# Patient Record
Sex: MALE | Race: BLACK OR AFRICAN AMERICAN | NOT HISPANIC OR LATINO | Employment: UNEMPLOYED | URBAN - METROPOLITAN AREA
[De-identification: names, ages, dates, MRNs, and addresses within clinical notes are randomized per-mention and may not be internally consistent; named-entity substitution may affect disease eponyms.]

---

## 2016-03-01 LAB — CRC RECOMMENDATION EXT: NORMAL

## 2017-03-03 ENCOUNTER — OFFICE VISIT (OUTPATIENT)
Dept: FAMILY MEDICINE | Facility: CLINIC | Age: 62
End: 2017-03-03
Payer: MEDICARE

## 2017-03-03 VITALS
WEIGHT: 187.38 LBS | DIASTOLIC BLOOD PRESSURE: 88 MMHG | OXYGEN SATURATION: 99 % | SYSTOLIC BLOOD PRESSURE: 146 MMHG | HEART RATE: 75 BPM | TEMPERATURE: 98 F | BODY MASS INDEX: 26.82 KG/M2 | HEIGHT: 70 IN

## 2017-03-03 DIAGNOSIS — E11.59 HYPERTENSION ASSOCIATED WITH DIABETES: ICD-10-CM

## 2017-03-03 DIAGNOSIS — I15.2 HYPERTENSION ASSOCIATED WITH DIABETES: ICD-10-CM

## 2017-03-03 DIAGNOSIS — E11.8 TYPE 2 DIABETES MELLITUS WITH COMPLICATION, WITHOUT LONG-TERM CURRENT USE OF INSULIN: Primary | ICD-10-CM

## 2017-03-03 DIAGNOSIS — I69.359 CVA, OLD, HEMIPARESIS: ICD-10-CM

## 2017-03-03 DIAGNOSIS — J30.1 SEASONAL ALLERGIC RHINITIS DUE TO POLLEN: ICD-10-CM

## 2017-03-03 DIAGNOSIS — F17.200 TOBACCO DEPENDENCE: ICD-10-CM

## 2017-03-03 PROCEDURE — 2022F DILAT RTA XM EVC RTNOPTHY: CPT | Mod: S$GLB,,, | Performed by: FAMILY MEDICINE

## 2017-03-03 PROCEDURE — 3079F DIAST BP 80-89 MM HG: CPT | Mod: S$GLB,,, | Performed by: FAMILY MEDICINE

## 2017-03-03 PROCEDURE — 99499 UNLISTED E&M SERVICE: CPT | Mod: S$GLB,,, | Performed by: FAMILY MEDICINE

## 2017-03-03 PROCEDURE — 99214 OFFICE O/P EST MOD 30 MIN: CPT | Mod: S$GLB,,, | Performed by: FAMILY MEDICINE

## 2017-03-03 PROCEDURE — 3044F HG A1C LEVEL LT 7.0%: CPT | Mod: S$GLB,,, | Performed by: FAMILY MEDICINE

## 2017-03-03 PROCEDURE — 4010F ACE/ARB THERAPY RXD/TAKEN: CPT | Mod: S$GLB,,, | Performed by: FAMILY MEDICINE

## 2017-03-03 PROCEDURE — 3077F SYST BP >= 140 MM HG: CPT | Mod: S$GLB,,, | Performed by: FAMILY MEDICINE

## 2017-03-03 PROCEDURE — 1160F RVW MEDS BY RX/DR IN RCRD: CPT | Mod: S$GLB,,, | Performed by: FAMILY MEDICINE

## 2017-03-03 RX ORDER — METFORMIN HYDROCHLORIDE 1000 MG/1
1000 TABLET ORAL 2 TIMES DAILY WITH MEALS
Qty: 180 TABLET | Refills: 3 | Status: SHIPPED | OUTPATIENT
Start: 2017-03-03 | End: 2017-07-11 | Stop reason: SDUPTHER

## 2017-03-03 RX ORDER — METOPROLOL TARTRATE 25 MG/1
25 TABLET, FILM COATED ORAL 2 TIMES DAILY
Qty: 180 TABLET | Refills: 3 | Status: SHIPPED | OUTPATIENT
Start: 2017-03-03 | End: 2017-07-11 | Stop reason: SDUPTHER

## 2017-03-03 RX ORDER — LACTULOSE 10 G/15ML
SOLUTION ORAL; RECTAL
Qty: 1800 ML | Refills: 3 | Status: SHIPPED | OUTPATIENT
Start: 2017-03-03 | End: 2017-08-25 | Stop reason: SDUPTHER

## 2017-03-03 RX ORDER — VALSARTAN 320 MG/1
320 TABLET ORAL DAILY
Qty: 90 TABLET | Refills: 3 | Status: SHIPPED | OUTPATIENT
Start: 2017-03-03 | End: 2017-07-11

## 2017-03-03 RX ORDER — GLIMEPIRIDE 2 MG/1
2 TABLET ORAL
Qty: 90 TABLET | Refills: 3 | Status: SHIPPED | OUTPATIENT
Start: 2017-03-03 | End: 2017-07-11 | Stop reason: SDUPTHER

## 2017-03-03 RX ORDER — ATORVASTATIN CALCIUM 80 MG/1
80 TABLET, FILM COATED ORAL DAILY
Qty: 90 TABLET | Refills: 3 | Status: SHIPPED | OUTPATIENT
Start: 2017-03-03 | End: 2017-07-11 | Stop reason: SDUPTHER

## 2017-03-03 RX ORDER — CLOPIDOGREL BISULFATE 75 MG/1
75 TABLET ORAL DAILY
Qty: 90 TABLET | Refills: 3 | Status: SHIPPED | OUTPATIENT
Start: 2017-03-03 | End: 2017-07-11 | Stop reason: SDUPTHER

## 2017-03-03 RX ORDER — AMLODIPINE BESYLATE 10 MG/1
10 TABLET ORAL DAILY
Qty: 90 TABLET | Refills: 3 | Status: SHIPPED | OUTPATIENT
Start: 2017-03-03 | End: 2017-07-11 | Stop reason: SDUPTHER

## 2017-03-03 NOTE — MR AVS SNAPSHOT
[facility]  [address] [place] [address]  Darlyn SCHULER [address]  Phone: [phone]  Fax: [phone]                  Cristi Pulido   3/3/2017 10:20 AM   Office Visit    Description:  Male : 1955   Provider:  Rell Winn MD   Department:  [facility]           Reason for Visit     Follow-up           Diagnoses this Visit        Comments    Type 2 diabetes mellitus with complication, without long-term current use of insulin    -  Primary     Seasonal allergic rhinitis due to pollen         CVA, old, hemiparesis         Tobacco dependence         Hypertension associated with diabetes                To Do List           Goals (5 Years of Data)     None       These Medications        Disp Refills Start End    amlodipine (NORVASC) 10 MG tablet 90 tablet 3 3/3/2017     Take 1 tablet (10 mg total) by mouth once daily. - Oral    Pharmacy: [facility] - [address] Ph #: [phone]       atorvastatin (LIPITOR) 80 MG tablet 90 tablet 3 3/3/2017     Take 1 tablet (80 mg total) by mouth once daily. - Oral    Pharmacy: [facility] - [address] Ph #: [phone]       clopidogrel (PLAVIX) 75 mg tablet 90 tablet 3 3/3/2017     Take 1 tablet (75 mg total) by mouth once daily. - Oral    Pharmacy: [facility] - [address] Ph #: [phone]       glimepiride (AMARYL) 2 MG tablet 90 tablet 3 3/3/2017     Take 1 tablet (2 mg total) by mouth daily with breakfast. - Oral    Pharmacy: [facility] - [place] [id number] Ph #: [phone]       lactulose (CHRONULAC) 10 gram/15 mL solution 1800 mL 3 3/3/2017     TAKE  15ML  1-3  TIMES  A  DAY    Pharmacy: [facility] Delivery [address] Ph #: [phone]       metformin (GLUCOPHAGE) 1000 MG tablet 180 tablet 3 3/3/2017     Take 1 tablet (1,000 mg  total) by mouth 2 (two) times daily with meals. - Oral    Pharmacy: [facility] - [facility] [address] [facility] Ph #: [phone]       metoprolol tartrate (LOPRESSOR) 25 MG tablet 180 tablet 3 3/3/2017 3/3/2018    Take 1 tablet (25 mg total) by mouth 2 (two) times daily. - Oral    Pharmacy: [facility] - [facility] [address] [facility] Ph #: [phone]       valsartan (DIOVAN) 320 MG tablet 90 tablet 3 3/3/2017 3/3/2018    Take 1 tablet (320 mg total) by mouth once daily. - Oral    Pharmacy: [facility] - [facility] [address] [facility] Ph #: [phone]         [facility]     [facility] On Call Nurse Care Line - 24/7 Assistance  Registered nurses in the [facility] On Call Center provide clinical advisement, health education, appointment booking, and other advisory services.  Call for this free service at [phone].             Medications           Message regarding Medications     Verify the changes and/or additions to your medication regime listed below are the same as discussed with your clinician today.  If any of these changes or additions are incorrect, please notify your healthcare provider.        STOP taking these medications     cetirizine (ZYRTEC) 10 mg Cap One po daily           Verify that the below list of medications is an accurate representation of the medications you are currently taking.  If none reported, the list may be blank. If incorrect, please contact your healthcare provider. Carry this list with you in case of emergency.           Current Medications     ACCU-CHEK FASTCLIX Misc TEST TWICE DAILY    amlodipine (NORVASC) 10 MG tablet Take 1 tablet (10 mg total) by mouth once daily.    atorvastatin (LIPITOR) 80 MG tablet Take 1 tablet (80 mg total) by mouth once daily.    blood sugar diagnostic (ACCU-CHEK SMARTVIEW TEST STRIP) Strp TEST TWICE DAILY    clopidogrel (PLAVIX) 75 mg tablet Take 1 tablet (75 mg total) by  "mouth once daily.    fluticasone (FLONASE) 50 mcg/actuation nasal spray 1 spray by Each Nare route once daily.    gabapentin (NEURONTIN) 600 MG tablet Take 1 tablet (600 mg total) by mouth 3 (three) times daily.    glimepiride (AMARYL) 2 MG tablet Take 1 tablet (2 mg total) by mouth daily with breakfast.    lactulose (CHRONULAC) 10 gram/15 mL solution TAKE  15ML  1-3  TIMES  A  DAY    metformin (GLUCOPHAGE) 1000 MG tablet Take 1 tablet (1,000 mg total) by mouth 2 (two) times daily with meals.    metoprolol tartrate (LOPRESSOR) 25 MG tablet Take 1 tablet (25 mg total) by mouth 2 (two) times daily.    polyethylene glycol (GLYCOLAX) 17 gram/dose powder Take 17 g by mouth once daily.    valsartan (DIOVAN) 320 MG tablet Take 1 tablet (320 mg total) by mouth once daily.           Clinical Reference Information           Your Vitals Were     BP Pulse Temp Height Weight SpO2    146/88 75 97.7 °F (36.5 °C) (Oral) 5' 10" (1.778 m) 85 kg (187 lb 6.3 oz) 99%    BMI                26.89 kg/m2          Blood Pressure          Most Recent Value    BP  (!)  146/88      Allergies as of 3/3/2017     No Known Allergies      Immunizations Administered on Date of Encounter - 3/3/2017     None      Orders Placed During Today's Visit     Future Labs/Procedures Expected by Expires    CBC auto differential  As directed 3/3/2018    Comprehensive metabolic panel  As directed 3/3/2018    Hemoglobin A1c  As directed 3/3/2018    Lipid panel  As directed 3/3/2018    TSH  As directed 3/3/2018      [facility] Sign-Up     Activating your [facility] account is as easy as 1-2-3!     1) Visit [email or web address], select Sign Up Now, enter this activation code and your date of birth, then select Next.  [email or web address]  Expires: 4/17/2017 11:06 AM      2) Create a username and password to use when you visit [facility] in the future and select a security question in case you lose your password and select Next.    3) Enter your e-mail address and click Sign " Up!    Additional Information  If you have questions, please e-mail [email or web address] or call [phone] to talk to our [facility] staff. Remember, [facility] is NOT to be used for urgent needs. For medical emergencies, dial 911.         Smoking Cessation     If you would like to quit smoking:   You may be eligible for free services if you are a [place] resident and started smoking cigarettes before September 1, 1988.  Call the Smoking Cessation [employer] ([facility]) toll free at [phone] or [phone].   Call 6-497-QUIT-NOW if you do not meet the above criteria.            Language Assistance Services     ATTENTION: Language assistance services are available, free of charge. Please call [phone].      ATENCIÓN: Si charli anneekaterina, tiene a enrique disposición servicios gratuitos de asistencia lingüística. Llame al [phone].     CHÚ Ý: N?u b?n nói Ti?ng Vi?t, có các d?ch v? h? tr? ngôn ng? mi?n phí dành cho b?n. G?i s? [phone].         [facility] complies with applicable Federal civil rights laws and does not discriminate on the basis of race, color, national origin, age, disability, or sex.

## 2017-03-03 NOTE — PROGRESS NOTES
Patient ID: Cristi Pulido is a 62 y.o. male.    Chief Complaint: Follow-up (check-up)    HPI       Cristi Pulido is a 62 y.o. male doing well at this time with no co  Dm stable  Htn not taking Diovan ? Why taking amlodipine and metoprolol  Constipation much better with lactulose prn  Neuropathy stable on gabapentin  CVA with muscle deficit cont with no extension. Cont to take Plavix and statin will place on arb       Review of Symptoms    Constitutional  Neg activity change, No chills/ fever   Resp  Neg hemoptysis, stridor, choking  CVS  Neg chest pain, palpitations    Physical Exam    Constitutional:   Oriented to person, place, and time.appears well-developed and well-nourished.   No distress.     HENT:   Head: Normocephalic and atraumatic.     Right Ear: Tympanic membrane, external ear and ear canal normal     Left Ear: Tympanic membrane, external ear and ear canal normal    Nose: External Normal. Normal turbinates, No rhinorrhea     Mouth/Throat: Uvula is midline, oropharynx is clear and moist and mucous membranes are normal.     Neck: supple no anterior cervical adenopathy    Carotid artery:  Bruit    NEG [x]   POS[]    Eyes:   Conjunctivae are normal. Right eye exhibits no discharge. Left eye exhibits no discharge. No scleral icterus. No periorbital edema       Cardiovascular:  Regular rate, Regular rhythm     No extrasystole  Normal S1-S2    Pulmonary/Chest:   Normal effort and breath sounds.  No wheezing, rhonchi or rales.      Musculoskeletal:  No edema.  No worsening of neurological deficits  Walks with walker-can walk without walker however at risk of falling       Neurological:   Alert and oriented to person, place, and time. Coordination fair      Skin:   Skin is warm and dry.   No diaphoresis.   No rash noted.    Psychiatric: Normal mood and affect. Behavior is normal. Judgment and thought content normal.       Assessment / Plan:      ICD-10-CM ICD-9-CM   1. Type 2 diabetes mellitus with complication,  without long-term current use of insulin E11.8 250.90   2. Seasonal allergic rhinitis due to pollen J30.1 477.0   3. CVA, old, hemiparesis I69.359 438.20   4. Tobacco dependence F17.200 305.1   5. Hypertension associated with diabetes E11.59 250.80    I10 401.9     Type 2 diabetes mellitus with complication, without long-term current use of insulin  -     Comprehensive metabolic panel; Future  -     CBC auto differential; Future  -     Lipid panel; Future  -     TSH; Future  -     Hemoglobin A1c; Future    Seasonal allergic rhinitis due to pollen  -     Comprehensive metabolic panel; Future  -     CBC auto differential; Future  -     Lipid panel; Future  -     TSH; Future  -     Hemoglobin A1c; Future    CVA, old, hemiparesis  -     Comprehensive metabolic panel; Future  -     CBC auto differential; Future  -     Lipid panel; Future  -     TSH; Future  -     Hemoglobin A1c; Future    Tobacco dependence  -     Comprehensive metabolic panel; Future  -     CBC auto differential; Future  -     Lipid panel; Future  -     TSH; Future  -     Hemoglobin A1c; Future    Hypertension associated with diabetes  -     Comprehensive metabolic panel; Future  -     CBC auto differential; Future  -     Lipid panel; Future  -     TSH; Future  -     Hemoglobin A1c; Future    Other orders  -     amlodipine (NORVASC) 10 MG tablet; Take 1 tablet (10 mg total) by mouth once daily.  Dispense: 90 tablet; Refill: 3  -     atorvastatin (LIPITOR) 80 MG tablet; Take 1 tablet (80 mg total) by mouth once daily.  Dispense: 90 tablet; Refill: 3  -     clopidogrel (PLAVIX) 75 mg tablet; Take 1 tablet (75 mg total) by mouth once daily.  Dispense: 90 tablet; Refill: 3  -     glimepiride (AMARYL) 2 MG tablet; Take 1 tablet (2 mg total) by mouth daily with breakfast.  Dispense: 90 tablet; Refill: 3  -     lactulose (CHRONULAC) 10 gram/15 mL solution; TAKE  15ML  1-3  TIMES  A  DAY  Dispense: 1800 mL; Refill: 3  -     metformin (GLUCOPHAGE) 1000 MG  tablet; Take 1 tablet (1,000 mg total) by mouth 2 (two) times daily with meals.  Dispense: 180 tablet; Refill: 3  -     metoprolol tartrate (LOPRESSOR) 25 MG tablet; Take 1 tablet (25 mg total) by mouth 2 (two) times daily.  Dispense: 180 tablet; Refill: 3  -     valsartan (DIOVAN) 320 MG tablet; Take 1 tablet (320 mg total) by mouth once daily.  Dispense: 90 tablet; Refill: 3      Not taking Diovan I am not sure why  Blood pressure elevated-diabetic restart Diovan

## 2017-03-04 LAB
ALBUMIN SERPL-MCNC: 4.2 G/DL (ref 3.6–5.1)
ALBUMIN/GLOB SERPL: 1.4 (CALC) (ref 1–2.5)
ALP SERPL-CCNC: 104 U/L (ref 40–115)
ALT SERPL-CCNC: 7 U/L (ref 9–46)
AST SERPL-CCNC: 9 U/L (ref 10–35)
BASOPHILS # BLD AUTO: 47 CELLS/UL (ref 0–200)
BASOPHILS NFR BLD AUTO: 0.6 %
BILIRUB SERPL-MCNC: 0.4 MG/DL (ref 0.2–1.2)
BUN SERPL-MCNC: 16 MG/DL (ref 7–25)
BUN/CREAT SERPL: ABNORMAL (CALC) (ref 6–22)
CALCIUM SERPL-MCNC: 9.4 MG/DL (ref 8.6–10.3)
CHLORIDE SERPL-SCNC: 105 MMOL/L (ref 98–110)
CHOLEST SERPL-MCNC: 166 MG/DL (ref 125–200)
CHOLEST/HDLC SERPL: 3.3 (CALC)
CO2 SERPL-SCNC: 27 MMOL/L (ref 20–31)
CREAT SERPL-MCNC: 1.09 MG/DL (ref 0.7–1.25)
EOSINOPHIL # BLD AUTO: 166 CELLS/UL (ref 15–500)
EOSINOPHIL NFR BLD AUTO: 2.1 %
ERYTHROCYTE [DISTWIDTH] IN BLOOD BY AUTOMATED COUNT: 15.2 % (ref 11–15)
GFR SERPL CREATININE-BSD FRML MDRD: 71 ML/MIN/1.73M2
GLOBULIN SER CALC-MCNC: 3 G/DL (CALC) (ref 1.9–3.7)
GLUCOSE SERPL-MCNC: 104 MG/DL (ref 65–99)
HBA1C MFR BLD: 6.9 % OF TOTAL HGB
HCT VFR BLD AUTO: 39.9 % (ref 38.5–50)
HDLC SERPL-MCNC: 50 MG/DL
HGB BLD-MCNC: 12.9 G/DL (ref 13.2–17.1)
LDLC SERPL CALC-MCNC: 104 MG/DL (CALC)
LYMPHOCYTES # BLD AUTO: 3516 CELLS/UL (ref 850–3900)
LYMPHOCYTES NFR BLD AUTO: 44.5 %
MCH RBC QN AUTO: 28.2 PG (ref 27–33)
MCHC RBC AUTO-ENTMCNC: 32.3 G/DL (ref 32–36)
MCV RBC AUTO: 87.3 FL (ref 80–100)
MONOCYTES # BLD AUTO: 435 CELLS/UL (ref 200–950)
MONOCYTES NFR BLD AUTO: 5.5 %
NEUTROPHILS # BLD AUTO: 3737 CELLS/UL (ref 1500–7800)
NEUTROPHILS NFR BLD AUTO: 47.3 %
NONHDLC SERPL-MCNC: 116 MG/DL (CALC)
PLATELET # BLD AUTO: 202 THOUSAND/UL (ref 140–400)
PMV BLD REES-ECKER: 11.3 FL (ref 7.5–12.5)
POTASSIUM SERPL-SCNC: 5.1 MMOL/L (ref 3.5–5.3)
PROT SERPL-MCNC: 7.2 G/DL (ref 6.1–8.1)
RBC # BLD AUTO: 4.57 MILLION/UL (ref 4.2–5.8)
SODIUM SERPL-SCNC: 144 MMOL/L (ref 135–146)
TRIGL SERPL-MCNC: 58 MG/DL
TSH SERPL-ACNC: 0.47 MIU/L (ref 0.4–4.5)
WBC # BLD AUTO: 7.9 THOUSAND/UL (ref 3.8–10.8)

## 2017-07-11 ENCOUNTER — OFFICE VISIT (OUTPATIENT)
Dept: FAMILY MEDICINE | Facility: CLINIC | Age: 62
End: 2017-07-11
Payer: MEDICARE

## 2017-07-11 VITALS
OXYGEN SATURATION: 98 % | SYSTOLIC BLOOD PRESSURE: 138 MMHG | WEIGHT: 186.63 LBS | HEIGHT: 69 IN | TEMPERATURE: 99 F | BODY MASS INDEX: 27.64 KG/M2 | DIASTOLIC BLOOD PRESSURE: 86 MMHG | HEART RATE: 78 BPM

## 2017-07-11 DIAGNOSIS — I69.359 CVA, OLD, HEMIPARESIS: ICD-10-CM

## 2017-07-11 DIAGNOSIS — E78.5 HYPERLIPIDEMIA, UNSPECIFIED HYPERLIPIDEMIA TYPE: ICD-10-CM

## 2017-07-11 DIAGNOSIS — E11.59 HYPERTENSION ASSOCIATED WITH DIABETES: Primary | ICD-10-CM

## 2017-07-11 DIAGNOSIS — E11.9 TYPE 2 DIABETES MELLITUS WITHOUT COMPLICATION, WITHOUT LONG-TERM CURRENT USE OF INSULIN: ICD-10-CM

## 2017-07-11 DIAGNOSIS — I15.2 HYPERTENSION ASSOCIATED WITH DIABETES: Primary | ICD-10-CM

## 2017-07-11 DIAGNOSIS — J30.1 CHRONIC SEASONAL ALLERGIC RHINITIS DUE TO POLLEN: ICD-10-CM

## 2017-07-11 DIAGNOSIS — Z23 NEED FOR PNEUMOCOCCAL VACCINATION: ICD-10-CM

## 2017-07-11 DIAGNOSIS — F17.200 TOBACCO DEPENDENCE: ICD-10-CM

## 2017-07-11 PROCEDURE — 99214 OFFICE O/P EST MOD 30 MIN: CPT | Mod: S$GLB,,, | Performed by: FAMILY MEDICINE

## 2017-07-11 PROCEDURE — 99499 UNLISTED E&M SERVICE: CPT | Mod: S$GLB,,, | Performed by: FAMILY MEDICINE

## 2017-07-11 PROCEDURE — 4010F ACE/ARB THERAPY RXD/TAKEN: CPT | Mod: S$GLB,,, | Performed by: FAMILY MEDICINE

## 2017-07-11 PROCEDURE — 3044F HG A1C LEVEL LT 7.0%: CPT | Mod: S$GLB,,, | Performed by: FAMILY MEDICINE

## 2017-07-11 RX ORDER — ATORVASTATIN CALCIUM 80 MG/1
80 TABLET, FILM COATED ORAL DAILY
Qty: 90 TABLET | Refills: 3 | Status: SHIPPED | OUTPATIENT
Start: 2017-07-11 | End: 2017-08-25 | Stop reason: SDUPTHER

## 2017-07-11 RX ORDER — CLOPIDOGREL BISULFATE 75 MG/1
75 TABLET ORAL DAILY
Qty: 90 TABLET | Refills: 3 | Status: SHIPPED | OUTPATIENT
Start: 2017-07-11 | End: 2017-08-25 | Stop reason: SDUPTHER

## 2017-07-11 RX ORDER — METFORMIN HYDROCHLORIDE 1000 MG/1
1000 TABLET ORAL 2 TIMES DAILY WITH MEALS
Qty: 180 TABLET | Refills: 3 | Status: SHIPPED | OUTPATIENT
Start: 2017-07-11 | End: 2017-08-25 | Stop reason: SDUPTHER

## 2017-07-11 RX ORDER — METOPROLOL TARTRATE 25 MG/1
25 TABLET, FILM COATED ORAL 2 TIMES DAILY
Qty: 180 TABLET | Refills: 3 | Status: SHIPPED | OUTPATIENT
Start: 2017-07-11 | End: 2017-08-25 | Stop reason: SDUPTHER

## 2017-07-11 RX ORDER — LEVOCETIRIZINE DIHYDROCHLORIDE 5 MG/1
5 TABLET, FILM COATED ORAL NIGHTLY
Qty: 30 TABLET | Refills: 1 | Status: SHIPPED | OUTPATIENT
Start: 2017-07-11 | End: 2018-03-12

## 2017-07-11 RX ORDER — LANCETS
EACH MISCELLANEOUS
Qty: 204 EACH | Refills: 3 | Status: SHIPPED | OUTPATIENT
Start: 2017-07-11 | End: 2018-06-07 | Stop reason: SDUPTHER

## 2017-07-11 RX ORDER — LISINOPRIL 20 MG/1
40 TABLET ORAL DAILY
Qty: 180 TABLET | Refills: 3 | Status: SHIPPED | OUTPATIENT
Start: 2017-07-11 | End: 2017-08-25 | Stop reason: SDUPTHER

## 2017-07-11 RX ORDER — POLYETHYLENE GLYCOL 3350 17 G/17G
17 POWDER, FOR SOLUTION ORAL DAILY
Qty: 510 BOTTLE | Refills: 11 | Status: SHIPPED | OUTPATIENT
Start: 2017-07-11 | End: 2017-08-25 | Stop reason: SDUPTHER

## 2017-07-11 RX ORDER — GLIMEPIRIDE 2 MG/1
2 TABLET ORAL
Qty: 90 TABLET | Refills: 3 | Status: SHIPPED | OUTPATIENT
Start: 2017-07-11 | End: 2017-08-25 | Stop reason: SDUPTHER

## 2017-07-11 RX ORDER — GABAPENTIN 600 MG/1
600 TABLET ORAL 3 TIMES DAILY
Qty: 270 TABLET | Refills: 3 | Status: SHIPPED | OUTPATIENT
Start: 2017-07-11 | End: 2017-08-25 | Stop reason: SDUPTHER

## 2017-07-11 RX ORDER — AMLODIPINE BESYLATE 10 MG/1
10 TABLET ORAL DAILY
Qty: 90 TABLET | Refills: 3 | Status: SHIPPED | OUTPATIENT
Start: 2017-07-11 | End: 2017-08-25 | Stop reason: SDUPTHER

## 2017-07-11 NOTE — PROGRESS NOTES
Patient ID: Cristi Pulido is a 62 y.o. male.    Chief Complaint: Medication Refill    HPI      Cristi Pulido is a 62 y.o. male here for follow-up visit and medicine refill.  Patient with hypertension-controlled at this time.  Stated that he was taking brothers lisinopril and work better than valsartan and would like to change.  CVA with right-sided weakness-no changes at this time.    Takes Plavix  Patient with ALLERGIC rhinitis-uses Flonase.  Symptoms not fully controlled wants additional medicines  Patient with lipidemia takes Lipitor 80 mg daily  Diabetes-controlled with diet gabapentin        Review of Symptoms    Constitutional  Neg activity change, No chills /fever   Resp  Neg hemoptysis, stridor, choking  CVS  Neg chest pain, palpitations    Physical Exam    Constitutional:  Oriented to person, place, and time.appears well-developed and well-nourished.  No distress.      HENT  Head: Normocephalic and atraumatic  Right Ear: External ear normal.   Left Ear: External ear normal.   Nose: External nose normal.   Mouth:  Moist mucus membranes.    Eyes:  Conjunctivae are normal. Right eye exhibits no discharge.  Left eye exhibits no discharge. No scleral icterus.  No periorbital edema    Cardiovascular:  Regular rate, Regular rhythm     No extrasystole  Normal S1-S2      Pulmonary/Chest:   Normal effort and breath sounds.  No wheezing, rhonchi or rales.    Musculoskeletal:  No edema. No obvious deformity No waisting  Right sided weakness no change.     Neurological:  Alert and oriented to person, place, and time.   Coordination fair-uses walker    Skin:   Skin is warm and dry.  No diaphoresis.   No rash noted.     Psychiatric: Normal mood and affect. Behavior is normal.  Judgment and thought content normal.     Carotid neg for bruit    Assessment / Plan:      ICD-10-CM ICD-9-CM   1. Hypertension associated with diabetes E11.59 250.80    I10 401.9   2. Type 2 diabetes mellitus without complication, without long-term  current use of insulin E11.9 250.00   3. Hyperlipidemia, unspecified hyperlipidemia type E78.5 272.4   4. CVA, old, hemiparesis I69.359 438.20   5. Tobacco dependence F17.200 305.1   6. Chronic seasonal allergic rhinitis due to pollen J30.1 477.0   7. Need for pneumococcal vaccination Z23 V03.82     Hypertension associated with diabetes  -     Comprehensive metabolic panel; Future  -     CBC auto differential; Future  -     Lipid panel; Future  -     TSH; Future  -     Hemoglobin A1c; Future    Type 2 diabetes mellitus without complication, without long-term current use of insulin  -     Comprehensive metabolic panel; Future  -     CBC auto differential; Future  -     Lipid panel; Future  -     TSH; Future  -     Hemoglobin A1c; Future    Hyperlipidemia, unspecified hyperlipidemia type  -     Comprehensive metabolic panel; Future  -     CBC auto differential; Future  -     Lipid panel; Future  -     TSH; Future  -     Hemoglobin A1c; Future    CVA, old, hemiparesis  -     Comprehensive metabolic panel; Future  -     CBC auto differential; Future  -     Lipid panel; Future  -     TSH; Future  -     Hemoglobin A1c; Future    Tobacco dependence  -     Comprehensive metabolic panel; Future  -     CBC auto differential; Future  -     Lipid panel; Future  -     TSH; Future  -     Hemoglobin A1c; Future    Chronic seasonal allergic rhinitis due to pollen  -     Comprehensive metabolic panel; Future  -     CBC auto differential; Future  -     Lipid panel; Future  -     TSH; Future  -     Hemoglobin A1c; Future    Need for pneumococcal vaccination  -     Cancel: Pneumococcal Polysaccharide Vaccine (23 Valent) (SQ/IM)  -     Comprehensive metabolic panel; Future  -     CBC auto differential; Future  -     Lipid panel; Future  -     TSH; Future  -     Hemoglobin A1c; Future    Other orders  -     lisinopril (PRINIVIL,ZESTRIL) 20 MG tablet; Take 2 tablets (40 mg total) by mouth once daily.  Dispense: 180 tablet; Refill: 3  -      polyethylene glycol (GLYCOLAX) 17 gram/dose powder; Take 17 g by mouth once daily.  Dispense: 510 Bottle; Refill: 11  -     metoprolol tartrate (LOPRESSOR) 25 MG tablet; Take 1 tablet (25 mg total) by mouth 2 (two) times daily.  Dispense: 180 tablet; Refill: 3  -     metformin (GLUCOPHAGE) 1000 MG tablet; Take 1 tablet (1,000 mg total) by mouth 2 (two) times daily with meals.  Dispense: 180 tablet; Refill: 3  -     glimepiride (AMARYL) 2 MG tablet; Take 1 tablet (2 mg total) by mouth daily with breakfast.  Dispense: 90 tablet; Refill: 3  -     gabapentin (NEURONTIN) 600 MG tablet; Take 1 tablet (600 mg total) by mouth 3 (three) times daily.  Dispense: 270 tablet; Refill: 3  -     clopidogrel (PLAVIX) 75 mg tablet; Take 1 tablet (75 mg total) by mouth once daily.  Dispense: 90 tablet; Refill: 3  -     atorvastatin (LIPITOR) 80 MG tablet; Take 1 tablet (80 mg total) by mouth once daily.  Dispense: 90 tablet; Refill: 3  -     amlodipine (NORVASC) 10 MG tablet; Take 1 tablet (10 mg total) by mouth once daily.  Dispense: 90 tablet; Refill: 3  -     lancets (ACCU-CHEK FASTCLIX) Misc; TEST TWICE DAILY  Dispense: 204 each; Refill: 3  -     blood sugar diagnostic (ACCU-CHEK SMARTVIEW TEST STRIP) Strp; TEST TWICE DAILY  Dispense: 200 strip; Refill: 3  -     levocetirizine (XYZAL) 5 MG tablet; Take 1 tablet (5 mg total) by mouth every evening. For allergies  Dispense: 30 tablet; Refill: 1    wants to change to lisinopril and stop valsartan

## 2017-08-25 ENCOUNTER — TELEPHONE (OUTPATIENT)
Dept: FAMILY MEDICINE | Facility: CLINIC | Age: 62
End: 2017-08-25

## 2017-08-25 RX ORDER — GABAPENTIN 600 MG/1
600 TABLET ORAL 3 TIMES DAILY
Qty: 270 TABLET | Refills: 3 | Status: SHIPPED | OUTPATIENT
Start: 2017-08-25 | End: 2018-01-29 | Stop reason: SDUPTHER

## 2017-08-25 RX ORDER — AMLODIPINE BESYLATE 10 MG/1
10 TABLET ORAL DAILY
Qty: 90 TABLET | Refills: 3 | Status: SHIPPED | OUTPATIENT
Start: 2017-08-25 | End: 2018-01-29 | Stop reason: SDUPTHER

## 2017-08-25 RX ORDER — GLIMEPIRIDE 2 MG/1
2 TABLET ORAL
Qty: 90 TABLET | Refills: 3 | Status: SHIPPED | OUTPATIENT
Start: 2017-08-25 | End: 2018-01-29 | Stop reason: SDUPTHER

## 2017-08-25 RX ORDER — FLUTICASONE PROPIONATE 50 MCG
1 SPRAY, SUSPENSION (ML) NASAL DAILY
Qty: 3 BOTTLE | Refills: 3 | Status: SHIPPED | OUTPATIENT
Start: 2017-08-25 | End: 2019-03-01 | Stop reason: SDUPTHER

## 2017-08-25 RX ORDER — METFORMIN HYDROCHLORIDE 1000 MG/1
1000 TABLET ORAL 2 TIMES DAILY WITH MEALS
Qty: 180 TABLET | Refills: 3 | Status: SHIPPED | OUTPATIENT
Start: 2017-08-25 | End: 2018-01-29 | Stop reason: SDUPTHER

## 2017-08-25 RX ORDER — METOPROLOL TARTRATE 25 MG/1
25 TABLET, FILM COATED ORAL 2 TIMES DAILY
Qty: 180 TABLET | Refills: 3 | Status: SHIPPED | OUTPATIENT
Start: 2017-08-25 | End: 2018-01-29 | Stop reason: SDUPTHER

## 2017-08-25 RX ORDER — CLOPIDOGREL BISULFATE 75 MG/1
75 TABLET ORAL DAILY
Qty: 90 TABLET | Refills: 3 | Status: SHIPPED | OUTPATIENT
Start: 2017-08-25 | End: 2018-01-29 | Stop reason: SDUPTHER

## 2017-08-25 RX ORDER — ATORVASTATIN CALCIUM 80 MG/1
80 TABLET, FILM COATED ORAL DAILY
Qty: 90 TABLET | Refills: 3 | Status: SHIPPED | OUTPATIENT
Start: 2017-08-25 | End: 2018-01-29 | Stop reason: SDUPTHER

## 2017-08-25 RX ORDER — POLYETHYLENE GLYCOL 3350 17 G/17G
17 POWDER, FOR SOLUTION ORAL DAILY
Qty: 510 BOTTLE | Refills: 11 | Status: SHIPPED | OUTPATIENT
Start: 2017-08-25 | End: 2019-03-01 | Stop reason: SDUPTHER

## 2017-08-25 RX ORDER — LACTULOSE 10 G/15ML
SOLUTION ORAL; RECTAL
Qty: 1800 ML | Refills: 3 | Status: SHIPPED | OUTPATIENT
Start: 2017-08-25 | End: 2019-03-01 | Stop reason: SDUPTHER

## 2017-08-25 RX ORDER — LISINOPRIL 20 MG/1
40 TABLET ORAL DAILY
Qty: 180 TABLET | Refills: 3 | Status: SHIPPED | OUTPATIENT
Start: 2017-08-25 | End: 2018-01-29 | Stop reason: SDUPTHER

## 2017-08-25 NOTE — TELEPHONE ENCOUNTER
----- Message from Mandy Taylor sent at 8/25/2017 12:25 PM CDT -----  Contact: The pt came in   The pt states that his medications were supposed to be sent to [facility].  He still has not received anything.  Please call him at [phone]

## 2018-01-12 ENCOUNTER — PES CALL (OUTPATIENT)
Dept: ADMINISTRATIVE | Facility: CLINIC | Age: 63
End: 2018-01-12

## 2018-01-29 ENCOUNTER — OFFICE VISIT (OUTPATIENT)
Dept: FAMILY MEDICINE | Facility: CLINIC | Age: 63
End: 2018-01-29
Payer: MEDICARE

## 2018-01-29 VITALS
HEIGHT: 69 IN | TEMPERATURE: 99 F | OXYGEN SATURATION: 98 % | WEIGHT: 182.81 LBS | SYSTOLIC BLOOD PRESSURE: 146 MMHG | DIASTOLIC BLOOD PRESSURE: 98 MMHG | BODY MASS INDEX: 27.08 KG/M2 | HEART RATE: 112 BPM

## 2018-01-29 DIAGNOSIS — F17.200 TOBACCO DEPENDENCE: ICD-10-CM

## 2018-01-29 DIAGNOSIS — I15.2 HYPERTENSION ASSOCIATED WITH DIABETES: Primary | ICD-10-CM

## 2018-01-29 DIAGNOSIS — I69.359 CVA, OLD, HEMIPARESIS: ICD-10-CM

## 2018-01-29 DIAGNOSIS — E11.59 HYPERTENSION ASSOCIATED WITH DIABETES: Primary | ICD-10-CM

## 2018-01-29 DIAGNOSIS — Z23 NEED FOR PNEUMOCOCCAL VACCINATION: ICD-10-CM

## 2018-01-29 DIAGNOSIS — J30.1 CHRONIC SEASONAL ALLERGIC RHINITIS DUE TO POLLEN: ICD-10-CM

## 2018-01-29 DIAGNOSIS — E78.5 HYPERLIPIDEMIA, UNSPECIFIED HYPERLIPIDEMIA TYPE: ICD-10-CM

## 2018-01-29 DIAGNOSIS — E11.9 TYPE 2 DIABETES MELLITUS WITHOUT COMPLICATION, WITHOUT LONG-TERM CURRENT USE OF INSULIN: ICD-10-CM

## 2018-01-29 PROCEDURE — 99499 UNLISTED E&M SERVICE: CPT | Mod: S$GLB,,, | Performed by: FAMILY MEDICINE

## 2018-01-29 PROCEDURE — 99214 OFFICE O/P EST MOD 30 MIN: CPT | Mod: S$GLB,,, | Performed by: FAMILY MEDICINE

## 2018-01-29 PROCEDURE — 90732 PPSV23 VACC 2 YRS+ SUBQ/IM: CPT | Mod: S$GLB,ICN,, | Performed by: FAMILY MEDICINE

## 2018-01-29 PROCEDURE — G0009 ADMIN PNEUMOCOCCAL VACCINE: HCPCS | Mod: S$GLB,ICN,, | Performed by: FAMILY MEDICINE

## 2018-01-29 RX ORDER — METOPROLOL TARTRATE 25 MG/1
25 TABLET, FILM COATED ORAL 2 TIMES DAILY
Qty: 180 TABLET | Refills: 3 | Status: SHIPPED | OUTPATIENT
Start: 2018-01-29 | End: 2018-01-29 | Stop reason: SDUPTHER

## 2018-01-29 RX ORDER — GABAPENTIN 600 MG/1
600 TABLET ORAL 3 TIMES DAILY
Qty: 270 TABLET | Refills: 3 | Status: SHIPPED | OUTPATIENT
Start: 2018-01-29 | End: 2018-10-15 | Stop reason: SDUPTHER

## 2018-01-29 RX ORDER — CEPHALEXIN 500 MG/1
1000 CAPSULE ORAL EVERY 12 HOURS
Qty: 28 CAPSULE | Refills: 0 | Status: SHIPPED | OUTPATIENT
Start: 2018-01-29 | End: 2018-03-12

## 2018-01-29 RX ORDER — LISINOPRIL 20 MG/1
40 TABLET ORAL DAILY
Qty: 60 TABLET | Refills: 3 | Status: SHIPPED | OUTPATIENT
Start: 2018-01-29 | End: 2018-03-12 | Stop reason: SDUPTHER

## 2018-01-29 RX ORDER — AMLODIPINE BESYLATE 10 MG/1
10 TABLET ORAL DAILY
Qty: 90 TABLET | Refills: 3 | Status: SHIPPED | OUTPATIENT
Start: 2018-01-29 | End: 2018-01-29 | Stop reason: SDUPTHER

## 2018-01-29 RX ORDER — METFORMIN HYDROCHLORIDE 1000 MG/1
1000 TABLET ORAL 2 TIMES DAILY WITH MEALS
Qty: 180 TABLET | Refills: 3 | Status: SHIPPED | OUTPATIENT
Start: 2018-01-29 | End: 2018-05-16 | Stop reason: SDUPTHER

## 2018-01-29 RX ORDER — ATORVASTATIN CALCIUM 80 MG/1
80 TABLET, FILM COATED ORAL DAILY
Qty: 90 TABLET | Refills: 3 | Status: SHIPPED | OUTPATIENT
Start: 2018-01-29 | End: 2018-03-12 | Stop reason: SDUPTHER

## 2018-01-29 RX ORDER — LISINOPRIL 20 MG/1
40 TABLET ORAL DAILY
Qty: 180 TABLET | Refills: 3 | Status: SHIPPED | OUTPATIENT
Start: 2018-01-29 | End: 2018-01-29 | Stop reason: SDUPTHER

## 2018-01-29 RX ORDER — METOPROLOL TARTRATE 50 MG/1
50 TABLET ORAL 2 TIMES DAILY
Qty: 180 TABLET | Refills: 3 | Status: SHIPPED | OUTPATIENT
Start: 2018-01-29 | End: 2018-05-16 | Stop reason: SDUPTHER

## 2018-01-29 RX ORDER — CLOPIDOGREL BISULFATE 75 MG/1
75 TABLET ORAL DAILY
Qty: 90 TABLET | Refills: 3 | Status: SHIPPED | OUTPATIENT
Start: 2018-01-29 | End: 2018-05-16 | Stop reason: SDUPTHER

## 2018-01-29 RX ORDER — AMLODIPINE BESYLATE 10 MG/1
10 TABLET ORAL DAILY
Qty: 30 TABLET | Refills: 3 | Status: SHIPPED | OUTPATIENT
Start: 2018-01-29 | End: 2018-03-12 | Stop reason: SDUPTHER

## 2018-01-29 RX ORDER — GLIMEPIRIDE 2 MG/1
2 TABLET ORAL
Qty: 30 TABLET | Refills: 3 | Status: SHIPPED | OUTPATIENT
Start: 2018-01-29 | End: 2018-03-12 | Stop reason: SDUPTHER

## 2018-01-29 RX ORDER — GLIMEPIRIDE 2 MG/1
2 TABLET ORAL
Qty: 90 TABLET | Refills: 3 | Status: SHIPPED | OUTPATIENT
Start: 2018-01-29 | End: 2018-01-29 | Stop reason: SDUPTHER

## 2018-01-29 NOTE — PROGRESS NOTES
Patient ID: Cristi Pulido is a 62 y.o. male.    Chief Complaint: Diabetes (6 Month f/u ) and Hypertension    HPI      Cristi Pulido is a 62 y.o. male. here for exam.   No current complaints except high bp and sore on chest wall. It  this skin lesion has been there for 2 weeks.  It is tender slightly erythematous and drains occasionally.    CVA-no new problems takes A inhibitor  ALLERGIC rhinitis-Xyzal  Diabetes-uncontrolled    Hypertension-not fully controlled at this time      Review of Symptoms    Constitutional: Negative.    HENT: Negative.    Eyes: Negative.    Respiratory: Negative.    Cardiovascular: Negative.    Gastrointestinal: Negative.    Endocrine: Negative.    Genitourinary: Negative.    Musculoskeletal: Residual muscle skeletal problems-walks with walker cannot work    Skin: Negative.    Allergic/Immunologic: Negative.    Neurological: Negative.    Hematological: Negative.    Psychiatric/Behavioral: Negative.      Except as above in HPI        Physical  Exam    Constitutional:  Oriented to person, place, and time. Appears well-developed and well-nourished.     HENT:   Head: Normocephalic and atraumatic.     Right Ear: Tympanic membrane, external ear and ear canal normal.     Left Ear: Tympanic membrane, external ear and ear canal normal.     Nose: Nose normal. No rhinorrhea or nasal deformity.     Mouth/Throat: Uvula is midline, oropharynx is clear and moist and mucous membranes are normal.      Eyes: Conjunctivae are normal. Right eye exhibits no discharge. Left eye exhibits no discharge. No scleral icterus.     Neck:  No JVD present. No tracheal deviation  []  Neck supple.   []  No Carotid bruit    Cardiovascular: Normal rate, regular rhythm and normal heart sounds.      Pulmonary/Chest: Effort normal and breath sounds normal. No stridor. No respiratory distress. No wheezes. No rales.      Musculoskeletal: Normal range of motion. No edema or tenderness.   No deformity   Missing toes       Lymphadenopathy:   No cervical adenopathy.     Neurological:  Alert and oriented to person, place, and time. Coordination normal.     Skin: Skin is warm and dry. No rash noted.   2 cm oval nodule under right breast-small head able to dilate with Q-tip.  Tender to palpation no discharge    Psychiatric: Normal mood and affect. Speech is normal and behavior is normal. Judgment and thought content normal.     Complete Blood Count  Lab Results   Component Value Date    RBC 4.57 03/03/2017    HGB 12.9 (L) 03/03/2017    HCT 39.9 03/03/2017    MCV 87.3 03/03/2017    MCH 28.2 03/03/2017    MCHC 32.3 03/03/2017    RDW 15.2 (H) 03/03/2017     03/03/2017    MPV 11.3 03/03/2017    GRAN 2.4 02/04/2016    GRAN 39.5 02/04/2016    LYMPH 3,516 03/03/2017    LYMPH 44.5 03/03/2017    MONO 435 03/03/2017    MONO 5.5 03/03/2017     03/03/2017    BASO 47 03/03/2017    EOSINOPHIL 2.1 03/03/2017    BASOPHIL 0.6 03/03/2017    DIFFMETHOD Automated 02/04/2016       Comprehensive Metabolic Panel  No results found for: GLU, BUN, CREATININE, NA, K, CL, PROT, ALBUMIN, BILITOT, AST, ALKPHOS, CO2, ALT, ANIONGAP, EGFRNONAA, ESTGFRAFRICA    TSH  No results found for: TSH    Assessment / Plan:      ICD-10-CM ICD-9-CM   1. Hypertension associated with diabetes E11.59 250.80    I10 401.9   2. Type 2 diabetes mellitus without complication, without long-term current use of insulin E11.9 250.00   3. Hyperlipidemia, unspecified hyperlipidemia type E78.5 272.4   4. CVA, old, hemiparesis I69.359 438.20   5. Chronic seasonal allergic rhinitis due to pollen J30.1 477.0   6. Tobacco dependence F17.200 305.1   7. Need for pneumococcal vaccination Z23 V03.82     Hypertension associated with diabetes  -     Comprehensive metabolic panel; Future  -     CBC auto differential; Future  -     Lipid panel; Future  -     TSH; Future  -     Hemoglobin A1c; Future  -     Pneumococcal Polysaccharide Vaccine (23 Valent) (SQ/IM)    Type 2 diabetes mellitus without complication,  without long-term current use of insulin  -     Comprehensive metabolic panel; Future  -     CBC auto differential; Future  -     Lipid panel; Future  -     TSH; Future  -     Hemoglobin A1c; Future  -     Pneumococcal Polysaccharide Vaccine (23 Valent) (SQ/IM)    Hyperlipidemia, unspecified hyperlipidemia type  -     Comprehensive metabolic panel; Future  -     CBC auto differential; Future  -     Lipid panel; Future  -     TSH; Future  -     Hemoglobin A1c; Future  -     Pneumococcal Polysaccharide Vaccine (23 Valent) (SQ/IM)    CVA, old, hemiparesis  -     Comprehensive metabolic panel; Future  -     CBC auto differential; Future  -     Lipid panel; Future  -     TSH; Future  -     Hemoglobin A1c; Future  -     Pneumococcal Polysaccharide Vaccine (23 Valent) (SQ/IM)    Chronic seasonal allergic rhinitis due to pollen  -     Comprehensive metabolic panel; Future  -     CBC auto differential; Future  -     Lipid panel; Future  -     TSH; Future  -     Hemoglobin A1c; Future  -     Pneumococcal Polysaccharide Vaccine (23 Valent) (SQ/IM)    Tobacco dependence  -     Comprehensive metabolic panel; Future  -     CBC auto differential; Future  -     Lipid panel; Future  -     TSH; Future  -     Hemoglobin A1c; Future  -     Pneumococcal Polysaccharide Vaccine (23 Valent) (SQ/IM)    Need for pneumococcal vaccination  -     Pneumococcal Polysaccharide Vaccine (23 Valent) (SQ/IM)    Other orders  -     Discontinue: glimepiride (AMARYL) 2 MG tablet; Take 1 tablet (2 mg total) by mouth daily with breakfast.  Dispense: 90 tablet; Refill: 3  -     clopidogrel (PLAVIX) 75 mg tablet; Take 1 tablet (75 mg total) by mouth once daily.  Dispense: 90 tablet; Refill: 3  -     metFORMIN (GLUCOPHAGE) 1000 MG tablet; Take 1 tablet (1,000 mg total) by mouth 2 (two) times daily with meals.  Dispense: 180 tablet; Refill: 3  -     Discontinue: metoprolol tartrate (LOPRESSOR) 25 MG tablet; Take 1 tablet (25 mg total) by mouth 2 (two) times  daily.  Dispense: 180 tablet; Refill: 3  -     Discontinue: lisinopril (PRINIVIL,ZESTRIL) 20 MG tablet; Take 2 tablets (40 mg total) by mouth once daily.  Dispense: 180 tablet; Refill: 3  -     gabapentin (NEURONTIN) 600 MG tablet; Take 1 tablet (600 mg total) by mouth 3 (three) times daily.  Dispense: 270 tablet; Refill: 3  -     atorvastatin (LIPITOR) 80 MG tablet; Take 1 tablet (80 mg total) by mouth once daily.  Dispense: 90 tablet; Refill: 3  -     Discontinue: amLODIPine (NORVASC) 10 MG tablet; Take 1 tablet (10 mg total) by mouth once daily.  Dispense: 90 tablet; Refill: 3  -     amLODIPine (NORVASC) 10 MG tablet; Take 1 tablet (10 mg total) by mouth once daily.  Dispense: 30 tablet; Refill: 3  -     glimepiride (AMARYL) 2 MG tablet; Take 1 tablet (2 mg total) by mouth daily with breakfast.  Dispense: 30 tablet; Refill: 3  -     lisinopril (PRINIVIL,ZESTRIL) 20 MG tablet; Take 2 tablets (40 mg total) by mouth once daily.  Dispense: 60 tablet; Refill: 3  -     metoprolol tartrate (LOPRESSOR) 50 MG tablet; Take 1 tablet (50 mg total) by mouth 2 (two) times daily.  Dispense: 180 tablet; Refill: 3  -     cephALEXin (KEFLEX) 500 MG capsule; Take 2 capsules (1,000 mg total) by mouth every 12 (twelve) hours.  Dispense: 28 capsule; Refill: 0     discussed increasing meds    Small abscess on chest wall open up with Q-tip-wound should drain-Keflex if not better      Discussed how to stay healthy including: diet, exercise, refraining from smoking and discussed screening exams / tests needed for age, sex and family Hx.

## 2018-01-30 LAB
ALBUMIN SERPL-MCNC: 4.5 G/DL (ref 3.6–5.1)
ALBUMIN/GLOB SERPL: 1.5 (CALC) (ref 1–2.5)
ALP SERPL-CCNC: 112 U/L (ref 40–115)
ALT SERPL-CCNC: 12 U/L (ref 9–46)
AST SERPL-CCNC: 15 U/L (ref 10–35)
BASOPHILS # BLD AUTO: 31 CELLS/UL (ref 0–200)
BASOPHILS NFR BLD AUTO: 0.5 %
BILIRUB SERPL-MCNC: 0.6 MG/DL (ref 0.2–1.2)
BUN SERPL-MCNC: 12 MG/DL (ref 7–25)
BUN/CREAT SERPL: ABNORMAL (CALC) (ref 6–22)
CALCIUM SERPL-MCNC: 9.8 MG/DL (ref 8.6–10.3)
CHLORIDE SERPL-SCNC: 102 MMOL/L (ref 98–110)
CHOLEST SERPL-MCNC: 180 MG/DL
CHOLEST/HDLC SERPL: 3.3 (CALC)
CO2 SERPL-SCNC: 29 MMOL/L (ref 20–31)
CREAT SERPL-MCNC: 1.06 MG/DL (ref 0.7–1.25)
EOSINOPHIL # BLD AUTO: 87 CELLS/UL (ref 15–500)
EOSINOPHIL NFR BLD AUTO: 1.4 %
ERYTHROCYTE [DISTWIDTH] IN BLOOD BY AUTOMATED COUNT: 12.4 % (ref 11–15)
GFR SERPL CREATININE-BSD FRML MDRD: 74 ML/MIN/1.73M2
GLOBULIN SER CALC-MCNC: 3 G/DL (CALC) (ref 1.9–3.7)
GLUCOSE SERPL-MCNC: 199 MG/DL (ref 65–99)
HBA1C MFR BLD: 7.2 % OF TOTAL HGB
HCT VFR BLD AUTO: 45.3 % (ref 38.5–50)
HDLC SERPL-MCNC: 54 MG/DL
HGB BLD-MCNC: 14.3 G/DL (ref 13.2–17.1)
LDLC SERPL CALC-MCNC: 107 MG/DL (CALC)
LYMPHOCYTES # BLD AUTO: 2982 CELLS/UL (ref 850–3900)
LYMPHOCYTES NFR BLD AUTO: 48.1 %
MCH RBC QN AUTO: 28.1 PG (ref 27–33)
MCHC RBC AUTO-ENTMCNC: 31.6 G/DL (ref 32–36)
MCV RBC AUTO: 89.2 FL (ref 80–100)
MONOCYTES # BLD AUTO: 409 CELLS/UL (ref 200–950)
MONOCYTES NFR BLD AUTO: 6.6 %
NEUTROPHILS # BLD AUTO: 2691 CELLS/UL (ref 1500–7800)
NEUTROPHILS NFR BLD AUTO: 43.4 %
NONHDLC SERPL-MCNC: 126 MG/DL (CALC)
PLATELET # BLD AUTO: 179 THOUSAND/UL (ref 140–400)
PMV BLD REES-ECKER: 13 FL (ref 7.5–12.5)
POTASSIUM SERPL-SCNC: 4.4 MMOL/L (ref 3.5–5.3)
PROT SERPL-MCNC: 7.5 G/DL (ref 6.1–8.1)
RBC # BLD AUTO: 5.08 MILLION/UL (ref 4.2–5.8)
SODIUM SERPL-SCNC: 141 MMOL/L (ref 135–146)
TRIGL SERPL-MCNC: 96 MG/DL
TSH SERPL-ACNC: 0.6 MIU/L (ref 0.4–4.5)
WBC # BLD AUTO: 6.2 THOUSAND/UL (ref 3.8–10.8)

## 2018-03-12 ENCOUNTER — OFFICE VISIT (OUTPATIENT)
Dept: FAMILY MEDICINE | Facility: CLINIC | Age: 63
End: 2018-03-12
Payer: MEDICARE

## 2018-03-12 VITALS
WEIGHT: 185.19 LBS | DIASTOLIC BLOOD PRESSURE: 92 MMHG | TEMPERATURE: 99 F | OXYGEN SATURATION: 99 % | BODY MASS INDEX: 27.35 KG/M2 | HEART RATE: 77 BPM | SYSTOLIC BLOOD PRESSURE: 138 MMHG

## 2018-03-12 DIAGNOSIS — J30.1 CHRONIC SEASONAL ALLERGIC RHINITIS DUE TO POLLEN: ICD-10-CM

## 2018-03-12 DIAGNOSIS — E11.9 TYPE 2 DIABETES MELLITUS WITHOUT COMPLICATION, WITHOUT LONG-TERM CURRENT USE OF INSULIN: ICD-10-CM

## 2018-03-12 DIAGNOSIS — I69.359 CVA, OLD, HEMIPARESIS: ICD-10-CM

## 2018-03-12 DIAGNOSIS — E11.59 HYPERTENSION ASSOCIATED WITH DIABETES: Primary | ICD-10-CM

## 2018-03-12 DIAGNOSIS — I15.2 HYPERTENSION ASSOCIATED WITH DIABETES: Primary | ICD-10-CM

## 2018-03-12 PROCEDURE — 3080F DIAST BP >= 90 MM HG: CPT | Mod: CPTII,S$GLB,, | Performed by: FAMILY MEDICINE

## 2018-03-12 PROCEDURE — 99499 UNLISTED E&M SERVICE: CPT | Mod: S$GLB,,, | Performed by: FAMILY MEDICINE

## 2018-03-12 PROCEDURE — 3045F PR MOST RECENT HEMOGLOBIN A1C LEVEL 7.0-9.0%: CPT | Mod: CPTII,S$GLB,, | Performed by: FAMILY MEDICINE

## 2018-03-12 PROCEDURE — 99214 OFFICE O/P EST MOD 30 MIN: CPT | Mod: S$GLB,,, | Performed by: FAMILY MEDICINE

## 2018-03-12 PROCEDURE — 3075F SYST BP GE 130 - 139MM HG: CPT | Mod: CPTII,S$GLB,, | Performed by: FAMILY MEDICINE

## 2018-03-12 RX ORDER — LISINOPRIL 20 MG/1
40 TABLET ORAL DAILY
Qty: 180 TABLET | Refills: 3 | Status: SHIPPED | OUTPATIENT
Start: 2018-03-12 | End: 2018-05-16 | Stop reason: SDUPTHER

## 2018-03-12 RX ORDER — GLIMEPIRIDE 2 MG/1
2 TABLET ORAL
Qty: 90 TABLET | Refills: 3 | Status: SHIPPED | OUTPATIENT
Start: 2018-03-12 | End: 2018-10-15 | Stop reason: SDUPTHER

## 2018-03-12 RX ORDER — ATORVASTATIN CALCIUM 80 MG/1
80 TABLET, FILM COATED ORAL DAILY
Qty: 90 TABLET | Refills: 3 | Status: SHIPPED | OUTPATIENT
Start: 2018-03-12 | End: 2018-10-15 | Stop reason: SDUPTHER

## 2018-03-12 RX ORDER — AMLODIPINE BESYLATE 10 MG/1
10 TABLET ORAL DAILY
Qty: 90 TABLET | Refills: 3 | Status: SHIPPED | OUTPATIENT
Start: 2018-03-12 | End: 2018-05-16 | Stop reason: SDUPTHER

## 2018-03-12 NOTE — PROGRESS NOTES
Patient ID: Cristi Pulido is a 63 y.o. male.    Chief Complaint: Follow-up and Medication Refill    HPI      Cristi Pulido is a 63 y.o. male here for physical exam following up on diabetes, high blood pressure, history of CVA with hemiparesis, seasonal ALLERGIES.  All stable at this time.            Review of Symptoms    Constitutional  Neg activity change, No chills /fever   Resp  Neg hemoptysis, stridor, choking  CVS  Neg chest pain, palpitations    Physical Exam    Constitutional:  Oriented to person, place, and time.appears well-developed and well-nourished.  No distress.      HENT  Head: Normocephalic and atraumatic  Right Ear: External ear normal.   Left Ear: External ear normal.   Nose: External nose normal.   Mouth:  Moist mucus membranes.    Eyes:  Conjunctivae are normal. Right eye exhibits no discharge.  Left eye exhibits no discharge. No scleral icterus.  No periorbital edema    Cardiovascular:  Regular rate, Regular rhythm     No extrasystole  Normal S1-S2      Pulmonary/Chest:   Normal effort and breath sounds.  No wheezing, rhonchi or rales.    Musculoskeletal:  No edema. No obvious deformity No wasting  Hemiparesis-no worsening-walks with walker      Neurological:  Alert and oriented to person, place, and time.   Coordination normal.     Skin:   Skin is warm and dry.  No diaphoresis.   No rash noted.     Psychiatric: Normal mood and affect. Behavior is normal.  Judgment and thought content normal.       Assessment / Plan:      ICD-10-CM ICD-9-CM   1. Hypertension associated with diabetes E11.59 250.80    I10 401.9   2. Type 2 diabetes mellitus without complication, without long-term current use of insulin E11.9 250.00   3. Chronic seasonal allergic rhinitis due to pollen J30.1 477.0   4. CVA, old, hemiparesis I69.359 438.20     Hypertension associated with diabetes    Type 2 diabetes mellitus without complication, without long-term current use of insulin    Chronic seasonal allergic rhinitis due to  pollen    CVA, old, hemiparesis    Other orders  -     lisinopril (PRINIVIL,ZESTRIL) 20 MG tablet; Take 2 tablets (40 mg total) by mouth once daily.  Dispense: 180 tablet; Refill: 3  -     amLODIPine (NORVASC) 10 MG tablet; Take 1 tablet (10 mg total) by mouth once daily.  Dispense: 90 tablet; Refill: 3  -     atorvastatin (LIPITOR) 80 MG tablet; Take 1 tablet (80 mg total) by mouth once daily.  Dispense: 90 tablet; Refill: 3  -     glimepiride (AMARYL) 2 MG tablet; Take 1 tablet (2 mg total) by mouth daily with breakfast.  Dispense: 90 tablet; Refill: 3      Above condition stable-continue current medicines no change check lab work periodically

## 2018-03-15 ENCOUNTER — PES CALL (OUTPATIENT)
Dept: ADMINISTRATIVE | Facility: CLINIC | Age: 63
End: 2018-03-15

## 2018-03-21 DIAGNOSIS — E11.9 DIABETES MELLITUS WITHOUT COMPLICATION: ICD-10-CM

## 2018-05-16 ENCOUNTER — OFFICE VISIT (OUTPATIENT)
Dept: FAMILY MEDICINE | Facility: CLINIC | Age: 63
End: 2018-05-16
Payer: MEDICARE

## 2018-05-16 VITALS
WEIGHT: 188.06 LBS | HEIGHT: 70 IN | HEART RATE: 88 BPM | OXYGEN SATURATION: 97 % | TEMPERATURE: 99 F | SYSTOLIC BLOOD PRESSURE: 125 MMHG | BODY MASS INDEX: 26.92 KG/M2 | DIASTOLIC BLOOD PRESSURE: 70 MMHG

## 2018-05-16 DIAGNOSIS — Z00.00 ROUTINE HEALTH MAINTENANCE: Primary | ICD-10-CM

## 2018-05-16 DIAGNOSIS — F17.200 TOBACCO DEPENDENCE: ICD-10-CM

## 2018-05-16 DIAGNOSIS — E78.5 HYPERLIPIDEMIA, UNSPECIFIED HYPERLIPIDEMIA TYPE: ICD-10-CM

## 2018-05-16 DIAGNOSIS — I69.359 CVA, OLD, HEMIPARESIS: ICD-10-CM

## 2018-05-16 DIAGNOSIS — E11.9 TYPE 2 DIABETES MELLITUS WITHOUT COMPLICATION, WITHOUT LONG-TERM CURRENT USE OF INSULIN: ICD-10-CM

## 2018-05-16 PROCEDURE — 99397 PER PM REEVAL EST PAT 65+ YR: CPT | Mod: S$GLB,,, | Performed by: FAMILY MEDICINE

## 2018-05-16 PROCEDURE — 3045F PR MOST RECENT HEMOGLOBIN A1C LEVEL 7.0-9.0%: CPT | Mod: CPTII,S$GLB,, | Performed by: FAMILY MEDICINE

## 2018-05-16 PROCEDURE — 99499 UNLISTED E&M SERVICE: CPT | Mod: S$GLB,,, | Performed by: FAMILY MEDICINE

## 2018-05-16 PROCEDURE — 3074F SYST BP LT 130 MM HG: CPT | Mod: CPTII,S$GLB,, | Performed by: FAMILY MEDICINE

## 2018-05-16 PROCEDURE — 3078F DIAST BP <80 MM HG: CPT | Mod: CPTII,S$GLB,, | Performed by: FAMILY MEDICINE

## 2018-05-16 RX ORDER — METOPROLOL TARTRATE 50 MG/1
50 TABLET ORAL 2 TIMES DAILY
Qty: 180 TABLET | Refills: 3 | Status: SHIPPED | OUTPATIENT
Start: 2018-05-16 | End: 2018-10-15 | Stop reason: SDUPTHER

## 2018-05-16 RX ORDER — AMLODIPINE BESYLATE 10 MG/1
10 TABLET ORAL DAILY
Qty: 90 TABLET | Refills: 3 | Status: SHIPPED | OUTPATIENT
Start: 2018-05-16 | End: 2018-10-15 | Stop reason: SDUPTHER

## 2018-05-16 RX ORDER — LISINOPRIL 20 MG/1
40 TABLET ORAL DAILY
Qty: 180 TABLET | Refills: 3 | Status: SHIPPED | OUTPATIENT
Start: 2018-05-16 | End: 2018-10-15 | Stop reason: SDUPTHER

## 2018-05-16 RX ORDER — METFORMIN HYDROCHLORIDE 1000 MG/1
1000 TABLET ORAL 2 TIMES DAILY WITH MEALS
Qty: 180 TABLET | Refills: 3 | Status: SHIPPED | OUTPATIENT
Start: 2018-05-16 | End: 2018-10-15 | Stop reason: SDUPTHER

## 2018-05-16 RX ORDER — CLOPIDOGREL BISULFATE 75 MG/1
75 TABLET ORAL DAILY
Qty: 90 TABLET | Refills: 3 | Status: SHIPPED | OUTPATIENT
Start: 2018-05-16 | End: 2018-10-15 | Stop reason: SDUPTHER

## 2018-05-16 NOTE — PROGRESS NOTES
Patient ID: Cristi Pulido is a 63 y.o. male.    Chief Complaint: Annual Exam (patient needs [employer] Insurance paper filled out. he will schedule Eye exam ); Immunizations (Gold slip was given for Tetanus, Zoster patient is due for AAA screening); and Medication Refill (lavix, metoprolol, Metformin, lisinopril, Amlodipine)    HPI       Cristi Pulido is a 63 y.o. male here following up on diabetes, history of CVA, hypertension which is controlled at home, tobacco dependence-he continues to smoke and chronic constipation.  Patient also with neuropathy uses gabapentin daily.        Review of Symptoms    Constitutional  No change in activity, No chills fever   Resp  Neg hemoptysis, stridor, choking  CVS  Neg chest pain, palpitations  Other review of systems except for chronic medical conditions-with residual deficits from CVA normal-within normal limits.    Physical Exam    Constitutional:   Oriented to person, place, and time.appears well-developed and well-nourished.   No distress.     HENT  Head: Normocephalic and atraumatic  Right Ear: External ear normal.   Left Ear: External ear normal.   Nose: External nose normal.   Mouth: Moist mucous membranes    Eyes:   Conjunctivae are normal. Right eye exhibits no discharge. Left eye exhibits no discharge. No scleral icterus. No periorbital edema    Musculoskeletal:  No edema. No obvious deformity No wasting  Muscle weakness due to CVA     Neurological:  Alert and oriented to person, place, and time. Coordination normal.  walks with walker    Skin:   Skin is warm and dry.  No diaphoresis.   No rash noted.     Psychiatric: Normal mood and affect. Behavior is normal. Judgment and thought content normal.       Assessment / Plan:      ICD-10-CM ICD-9-CM   1. Routine health maintenance Z00.00 V70.0   2. Type 2 diabetes mellitus without complication, without long-term current use of insulin E11.9 250.00   3. CVA, old, hemiparesis I69.359 438.20   4. Hyperlipidemia, unspecified  hyperlipidemia type E78.5 272.4   5. Tobacco dependence F17.200 305.1      Routine health maintenance    Type 2 diabetes mellitus without complication, without long-term current use of insulin    CVA, old, hemiparesis    Hyperlipidemia, unspecified hyperlipidemia type    Tobacco dependence  -     US Abdominal Aorta; Future; Expected date: 05/16/2018    Other orders  -     amLODIPine (NORVASC) 10 MG tablet; Take 1 tablet (10 mg total) by mouth once daily.  Dispense: 90 tablet; Refill: 3  -     metoprolol tartrate (LOPRESSOR) 50 MG tablet; Take 1 tablet (50 mg total) by mouth 2 (two) times daily.  Dispense: 180 tablet; Refill: 3  -     clopidogrel (PLAVIX) 75 mg tablet; Take 1 tablet (75 mg total) by mouth once daily.  Dispense: 90 tablet; Refill: 3  -     metFORMIN (GLUCOPHAGE) 1000 MG tablet; Take 1 tablet (1,000 mg total) by mouth 2 (two) times daily with meals.  Dispense: 180 tablet; Refill: 3  -     lisinopril (PRINIVIL,ZESTRIL) 20 MG tablet; Take 2 tablets (40 mg total) by mouth once daily.  Dispense: 180 tablet; Refill: 3        Discussed annual labs and screening tools including aortic aneurysm screening immunizations

## 2018-06-07 RX ORDER — LANCETS
EACH MISCELLANEOUS
Qty: 200 EACH | Refills: 3 | Status: SHIPPED | OUTPATIENT
Start: 2018-06-07 | End: 2019-08-12 | Stop reason: SDUPTHER

## 2018-06-07 NOTE — TELEPHONE ENCOUNTER
----- Message from Angelica Fregoso sent at 6/7/2018 12:34 PM CDT -----  Contact: self  Pt states he needs more testing strips and needles for his diabetes      I called and spoke with pt wife, roberto [facility] in [facility]   Accu chek smart view   Strips and lancets BID

## 2018-07-20 DIAGNOSIS — E11.9 DIABETES MELLITUS WITHOUT COMPLICATION: ICD-10-CM

## 2018-10-15 ENCOUNTER — OFFICE VISIT (OUTPATIENT)
Dept: FAMILY MEDICINE | Facility: CLINIC | Age: 63
End: 2018-10-15
Payer: MEDICARE

## 2018-10-15 VITALS
SYSTOLIC BLOOD PRESSURE: 146 MMHG | WEIGHT: 189.94 LBS | HEART RATE: 98 BPM | HEIGHT: 70 IN | BODY MASS INDEX: 27.19 KG/M2 | OXYGEN SATURATION: 96 % | DIASTOLIC BLOOD PRESSURE: 88 MMHG | TEMPERATURE: 99 F

## 2018-10-15 DIAGNOSIS — E78.5 HYPERLIPIDEMIA, UNSPECIFIED HYPERLIPIDEMIA TYPE: ICD-10-CM

## 2018-10-15 DIAGNOSIS — E11.59 HYPERTENSION ASSOCIATED WITH DIABETES: ICD-10-CM

## 2018-10-15 DIAGNOSIS — I15.2 HYPERTENSION ASSOCIATED WITH DIABETES: ICD-10-CM

## 2018-10-15 DIAGNOSIS — Z00.00 ROUTINE HEALTH MAINTENANCE: Primary | ICD-10-CM

## 2018-10-15 DIAGNOSIS — I69.359 CVA, OLD, HEMIPARESIS: ICD-10-CM

## 2018-10-15 DIAGNOSIS — E11.9 TYPE 2 DIABETES MELLITUS WITHOUT COMPLICATION, WITHOUT LONG-TERM CURRENT USE OF INSULIN: ICD-10-CM

## 2018-10-15 DIAGNOSIS — F17.200 TOBACCO DEPENDENCE: ICD-10-CM

## 2018-10-15 DIAGNOSIS — J30.1 CHRONIC SEASONAL ALLERGIC RHINITIS DUE TO POLLEN: ICD-10-CM

## 2018-10-15 PROCEDURE — 3045F PR MOST RECENT HEMOGLOBIN A1C LEVEL 7.0-9.0%: CPT | Mod: CPTII,S$GLB,, | Performed by: FAMILY MEDICINE

## 2018-10-15 PROCEDURE — 3077F SYST BP >= 140 MM HG: CPT | Mod: CPTII,S$GLB,, | Performed by: FAMILY MEDICINE

## 2018-10-15 PROCEDURE — 3079F DIAST BP 80-89 MM HG: CPT | Mod: CPTII,S$GLB,, | Performed by: FAMILY MEDICINE

## 2018-10-15 PROCEDURE — 99397 PER PM REEVAL EST PAT 65+ YR: CPT | Mod: S$GLB,,, | Performed by: FAMILY MEDICINE

## 2018-10-15 RX ORDER — METOPROLOL TARTRATE 50 MG/1
50 TABLET ORAL 2 TIMES DAILY
Qty: 180 TABLET | Refills: 3 | Status: SHIPPED | OUTPATIENT
Start: 2018-10-15 | End: 2019-03-01 | Stop reason: SDUPTHER

## 2018-10-15 RX ORDER — GLIMEPIRIDE 2 MG/1
2 TABLET ORAL
Qty: 90 TABLET | Refills: 3 | Status: SHIPPED | OUTPATIENT
Start: 2018-10-15 | End: 2019-03-01 | Stop reason: SDUPTHER

## 2018-10-15 RX ORDER — SILDENAFIL CITRATE 20 MG/1
TABLET ORAL
Qty: 90 TABLET | Refills: 2 | Status: SHIPPED | OUTPATIENT
Start: 2018-10-15 | End: 2020-01-29 | Stop reason: SDUPTHER

## 2018-10-15 RX ORDER — CLOPIDOGREL BISULFATE 75 MG/1
75 TABLET ORAL DAILY
Qty: 90 TABLET | Refills: 3 | Status: SHIPPED | OUTPATIENT
Start: 2018-10-15 | End: 2019-03-01 | Stop reason: SDUPTHER

## 2018-10-15 RX ORDER — AMLODIPINE BESYLATE 10 MG/1
10 TABLET ORAL DAILY
Qty: 90 TABLET | Refills: 3 | Status: SHIPPED | OUTPATIENT
Start: 2018-10-15 | End: 2019-03-01 | Stop reason: SDUPTHER

## 2018-10-15 RX ORDER — ATORVASTATIN CALCIUM 80 MG/1
80 TABLET, FILM COATED ORAL DAILY
Qty: 90 TABLET | Refills: 3 | Status: SHIPPED | OUTPATIENT
Start: 2018-10-15 | End: 2019-03-01 | Stop reason: SDUPTHER

## 2018-10-15 RX ORDER — INSULIN PUMP SYRINGE, 3 ML
EACH MISCELLANEOUS
Qty: 1 EACH | Refills: 0 | Status: SHIPPED | OUTPATIENT
Start: 2018-10-15

## 2018-10-15 RX ORDER — GABAPENTIN 600 MG/1
600 TABLET ORAL 3 TIMES DAILY PRN
Qty: 270 TABLET | Refills: 3 | Status: SHIPPED | OUTPATIENT
Start: 2018-10-15 | End: 2019-03-01 | Stop reason: SDUPTHER

## 2018-10-15 RX ORDER — METFORMIN HYDROCHLORIDE 1000 MG/1
1000 TABLET ORAL 2 TIMES DAILY WITH MEALS
Qty: 180 TABLET | Refills: 3 | Status: SHIPPED | OUTPATIENT
Start: 2018-10-15 | End: 2019-03-01 | Stop reason: SDUPTHER

## 2018-10-15 RX ORDER — LISINOPRIL 20 MG/1
40 TABLET ORAL DAILY
Qty: 180 TABLET | Refills: 3 | Status: SHIPPED | OUTPATIENT
Start: 2018-10-15 | End: 2019-03-01 | Stop reason: SDUPTHER

## 2018-10-15 NOTE — PROGRESS NOTES
" Patient ID: Cristi Pulido is a 63 y.o. male.    Chief Complaint: Follow-up (medication check )    HPI      Cristi Pulido is a 63 y.o. male. here for annual exam.   No current complaints.  Doing well with current medications.  Glucose is stable according the patient.  No changes in muscle skeletal condition.  CVA with hemiparesis persists but getting around well with walker for stability.         Review of Symptoms    Constitutional: Negative.    HENT: Negative.    Eyes: Negative.    Respiratory: Negative.    Cardiovascular: Negative.    Gastrointestinal: Negative.    Endocrine: Negative.    Genitourinary: Negative.    Musculoskeletal:  Hemiparesis-not dense-has overcome with regaining large percent of range of motion both upper and lowe extremity unilateral  Skin: Negative.    Allergic/Immunologic: Negative.    Neurological: Negative.    Hematological: Negative.    Psychiatric/Behavioral: Negative.      Except as above in HPI      BP (!) 146/88 (BP Location: Left arm, Patient Position: Sitting)   Pulse 98   Temp 99 °F (37.2 °C) (Oral)   Ht 5' 10" (1.778 m)   Wt 86.1 kg (189 lb 14.8 oz)   SpO2 96%   BMI 27.25 kg/m²     Physical  Exam    Constitutional:  Oriented to person, place, and time. Appears well-developed and well-nourished.     HENT:   Head: Normocephalic and atraumatic.     Right Ear: Tympanic membrane, ear canal and External ear normal     Left Ear: Tympanic membrane, ear canal and External ear normal     Nose: Nose normal. No rhinorrhea or nasal deformity.     Mouth/Throat: Uvula is midline, oropharynx is clear and moist and mucous membranes are normal.      Eyes: Conjunctivae are normal. Right eye exhibits no discharge. Left eye exhibits no discharge. No scleral icterus.     Neck:  No JVD present. No tracheal deviation  [x]  Neck supple.   []  No Carotid bruit    Cardiovascular:  Regular rate and rhythm with normal S1 and S2     Pulmonary/Chest:   Clear to auscultation bilaterally without wheezes, " rhonchi or rales    Musculoskeletal:   Gait uses walker    Lymphadenopathy:  No cervical adenopathy.     Neurological:  Alert and oriented to person, place, and time. Mild dysarthria      Skin: Skin is warm and dry. No rash noted.     Psychiatric: Normal mood and affect. Speech is normal and behavior is normal. Judgment and thought content normal.     Complete Blood Count  Lab Results   Component Value Date    RBC 5.08 01/29/2018    HGB 14.3 01/29/2018    HCT 45.3 01/29/2018    MCV 89.2 01/29/2018    MCH 28.1 01/29/2018    MCHC 31.6 (L) 01/29/2018    RDW 12.4 01/29/2018     01/29/2018    MPV 13.0 (H) 01/29/2018    GRAN 2.4 02/04/2016    GRAN 39.5 02/04/2016    LYMPH 2,982 01/29/2018    LYMPH 48.1 01/29/2018    MONO 409 01/29/2018    MONO 6.6 01/29/2018    EOS 87 01/29/2018    BASO 31 01/29/2018    EOSINOPHIL 1.4 01/29/2018    BASOPHIL 0.5 01/29/2018    DIFFMETHOD Automated 02/04/2016       Comprehensive Metabolic Panel  No results found for: GLU, BUN, CREATININE, NA, K, CL, PROT, ALBUMIN, BILITOT, AST, ALKPHOS, CO2, ALT, ANIONGAP, EGFRNONAA, ESTGFRAFRICA    TSH  No results found for: TSH    Assessment / Plan:      ICD-10-CM ICD-9-CM   1. Routine health maintenance Z00.00 V70.0   2. Hyperlipidemia, unspecified hyperlipidemia type E78.5 272.4   3. Type 2 diabetes mellitus without complication, without long-term current use of insulin E11.9 250.00   4. CVA, old, hemiparesis I69.359 438.20   5. Tobacco dependence F17.200 305.1   6. Chronic seasonal allergic rhinitis due to pollen J30.1 477.0   7. Hypertension associated with diabetes E11.59 250.80    I10 401.9     Routine health maintenance    Hyperlipidemia, unspecified hyperlipidemia type    Type 2 diabetes mellitus without complication, without long-term current use of insulin    CVA, old, hemiparesis    Tobacco dependence    Chronic seasonal allergic rhinitis due to pollen    Hypertension associated with diabetes    Other orders  -     atorvastatin (LIPITOR)  80 MG tablet; Take 1 tablet (80 mg total) by mouth once daily.  Dispense: 90 tablet; Refill: 3  -     gabapentin (NEURONTIN) 600 MG tablet; Take 1 tablet (600 mg total) by mouth 3 (three) times daily as needed.  Dispense: 270 tablet; Refill: 3  -     clopidogrel (PLAVIX) 75 mg tablet; Take 1 tablet (75 mg total) by mouth once daily.  Dispense: 90 tablet; Refill: 3  -     metoprolol tartrate (LOPRESSOR) 50 MG tablet; Take 1 tablet (50 mg total) by mouth 2 (two) times daily.  Dispense: 180 tablet; Refill: 3  -     lisinopril (PRINIVIL,ZESTRIL) 20 MG tablet; Take 2 tablets (40 mg total) by mouth once daily.  Dispense: 180 tablet; Refill: 3  -     glimepiride (AMARYL) 2 MG tablet; Take 1 tablet (2 mg total) by mouth daily with breakfast.  Dispense: 90 tablet; Refill: 3  -     amLODIPine (NORVASC) 10 MG tablet; Take 1 tablet (10 mg total) by mouth once daily.  Dispense: 90 tablet; Refill: 3  -     metFORMIN (GLUCOPHAGE) 1000 MG tablet; Take 1 tablet (1,000 mg total) by mouth 2 (two) times daily with meals.  Dispense: 180 tablet; Refill: 3  -     blood-glucose meter kit; Dispense meter  brand covered by insurance  Dispense: 1 each; Refill: 0  -     diabetic supplies, miscellan. Misc; Lancets for 1-2 times a day testing    dispense brand covered by insurance t  Dispense: 180 each; Refill: 3  -     blood sugar diagnostic (BLOOD GLUCOSE TEST) Strp; Strips for one to 2 times a day testing   dispense brand covered by insurance and to match meter brand  Dispense: 180 each; Refill: 3  -     sildenafil (REVATIO) 20 mg Tab; Take 3 to 5 pills each pm as needed for ed  Dispense: 90 tablet; Refill: 2          Discussed how to stay healthy including: diet, exercise, refraining from smoking and discussed screening exams / tests needed for age, sex and family Hx.

## 2018-12-07 ENCOUNTER — PES CALL (OUTPATIENT)
Dept: ADMINISTRATIVE | Facility: CLINIC | Age: 63
End: 2018-12-07

## 2019-01-18 ENCOUNTER — PES CALL (OUTPATIENT)
Dept: ADMINISTRATIVE | Facility: CLINIC | Age: 64
End: 2019-01-18

## 2019-03-01 ENCOUNTER — CLINICAL SUPPORT (OUTPATIENT)
Dept: FAMILY MEDICINE | Facility: CLINIC | Age: 64
End: 2019-03-01
Payer: MEDICARE

## 2019-03-01 ENCOUNTER — OFFICE VISIT (OUTPATIENT)
Dept: FAMILY MEDICINE | Facility: CLINIC | Age: 64
End: 2019-03-01
Payer: MEDICARE

## 2019-03-01 VITALS
HEIGHT: 70 IN | OXYGEN SATURATION: 98 % | DIASTOLIC BLOOD PRESSURE: 94 MMHG | WEIGHT: 192.56 LBS | HEART RATE: 72 BPM | SYSTOLIC BLOOD PRESSURE: 152 MMHG | BODY MASS INDEX: 27.57 KG/M2 | TEMPERATURE: 98 F

## 2019-03-01 DIAGNOSIS — Z00.00 ROUTINE HEALTH MAINTENANCE: Primary | ICD-10-CM

## 2019-03-01 DIAGNOSIS — I15.2 HYPERTENSION ASSOCIATED WITH DIABETES: ICD-10-CM

## 2019-03-01 DIAGNOSIS — E11.59 HYPERTENSION ASSOCIATED WITH DIABETES: ICD-10-CM

## 2019-03-01 DIAGNOSIS — F17.200 TOBACCO DEPENDENCE: ICD-10-CM

## 2019-03-01 DIAGNOSIS — E78.5 HYPERLIPIDEMIA, UNSPECIFIED HYPERLIPIDEMIA TYPE: ICD-10-CM

## 2019-03-01 DIAGNOSIS — E11.8 TYPE 2 DIABETES MELLITUS WITH COMPLICATION, WITHOUT LONG-TERM CURRENT USE OF INSULIN: ICD-10-CM

## 2019-03-01 DIAGNOSIS — I69.359 CVA, OLD, HEMIPARESIS: ICD-10-CM

## 2019-03-01 DIAGNOSIS — J30.1 CHRONIC SEASONAL ALLERGIC RHINITIS DUE TO POLLEN: ICD-10-CM

## 2019-03-01 DIAGNOSIS — Z13.220 NEED FOR LIPID SCREENING: ICD-10-CM

## 2019-03-01 DIAGNOSIS — E11.9 TYPE 2 DIABETES MELLITUS WITHOUT COMPLICATION, WITHOUT LONG-TERM CURRENT USE OF INSULIN: ICD-10-CM

## 2019-03-01 PROCEDURE — 3045F PR MOST RECENT HEMOGLOBIN A1C LEVEL 7.0-9.0%: CPT | Mod: CPTII,S$GLB,, | Performed by: FAMILY MEDICINE

## 2019-03-01 PROCEDURE — 99214 OFFICE O/P EST MOD 30 MIN: CPT | Mod: S$GLB,,, | Performed by: FAMILY MEDICINE

## 2019-03-01 PROCEDURE — 3045F PR MOST RECENT HEMOGLOBIN A1C LEVEL 7.0-9.0%: ICD-10-PCS | Mod: CPTII,S$GLB,, | Performed by: FAMILY MEDICINE

## 2019-03-01 PROCEDURE — 3080F PR MOST RECENT DIASTOLIC BLOOD PRESSURE >= 90 MM HG: ICD-10-PCS | Mod: CPTII,S$GLB,, | Performed by: FAMILY MEDICINE

## 2019-03-01 PROCEDURE — 99499 UNLISTED E&M SERVICE: CPT | Mod: S$GLB,,, | Performed by: FAMILY MEDICINE

## 2019-03-01 PROCEDURE — 1101F PT FALLS ASSESS-DOCD LE1/YR: CPT | Mod: CPTII,S$GLB,, | Performed by: FAMILY MEDICINE

## 2019-03-01 PROCEDURE — 3077F PR MOST RECENT SYSTOLIC BLOOD PRESSURE >= 140 MM HG: ICD-10-PCS | Mod: CPTII,S$GLB,, | Performed by: FAMILY MEDICINE

## 2019-03-01 PROCEDURE — 3080F DIAST BP >= 90 MM HG: CPT | Mod: CPTII,S$GLB,, | Performed by: FAMILY MEDICINE

## 2019-03-01 PROCEDURE — 99499 RISK ADDL DX/OHS AUDIT: ICD-10-PCS | Mod: S$GLB,,, | Performed by: FAMILY MEDICINE

## 2019-03-01 PROCEDURE — 99214 PR OFFICE/OUTPT VISIT, EST, LEVL IV, 30-39 MIN: ICD-10-PCS | Mod: S$GLB,,, | Performed by: FAMILY MEDICINE

## 2019-03-01 PROCEDURE — 1101F PR PT FALLS ASSESS DOC 0-1 FALLS W/OUT INJ PAST YR: ICD-10-PCS | Mod: CPTII,S$GLB,, | Performed by: FAMILY MEDICINE

## 2019-03-01 PROCEDURE — 3077F SYST BP >= 140 MM HG: CPT | Mod: CPTII,S$GLB,, | Performed by: FAMILY MEDICINE

## 2019-03-01 RX ORDER — CLOPIDOGREL BISULFATE 75 MG/1
75 TABLET ORAL DAILY
Qty: 90 TABLET | Refills: 3 | Status: SHIPPED | OUTPATIENT
Start: 2019-03-01 | End: 2019-03-13 | Stop reason: SDUPTHER

## 2019-03-01 RX ORDER — METOPROLOL TARTRATE 50 MG/1
50 TABLET ORAL 2 TIMES DAILY
Qty: 180 TABLET | Refills: 3 | Status: SHIPPED | OUTPATIENT
Start: 2019-03-01 | End: 2019-03-13 | Stop reason: SDUPTHER

## 2019-03-01 RX ORDER — ATORVASTATIN CALCIUM 80 MG/1
80 TABLET, FILM COATED ORAL DAILY
Qty: 90 TABLET | Refills: 3 | Status: SHIPPED | OUTPATIENT
Start: 2019-03-01 | End: 2019-03-13 | Stop reason: SDUPTHER

## 2019-03-01 RX ORDER — POLYETHYLENE GLYCOL 3350 17 G/17G
17 POWDER, FOR SOLUTION ORAL DAILY
Qty: 510 BOTTLE | Refills: 11 | Status: SHIPPED | OUTPATIENT
Start: 2019-03-01 | End: 2019-03-13 | Stop reason: SDUPTHER

## 2019-03-01 RX ORDER — METFORMIN HYDROCHLORIDE 1000 MG/1
1000 TABLET ORAL 2 TIMES DAILY WITH MEALS
Qty: 180 TABLET | Refills: 3 | Status: SHIPPED | OUTPATIENT
Start: 2019-03-01 | End: 2019-03-13 | Stop reason: SDUPTHER

## 2019-03-01 RX ORDER — LACTULOSE 10 G/15ML
SOLUTION ORAL; RECTAL
Qty: 1800 ML | Refills: 3 | Status: SHIPPED | OUTPATIENT
Start: 2019-03-01 | End: 2019-03-01 | Stop reason: SDUPTHER

## 2019-03-01 RX ORDER — GABAPENTIN 600 MG/1
600 TABLET ORAL 3 TIMES DAILY PRN
Qty: 270 TABLET | Refills: 3 | Status: SHIPPED | OUTPATIENT
Start: 2019-03-01 | End: 2019-03-13 | Stop reason: SDUPTHER

## 2019-03-01 RX ORDER — GLIMEPIRIDE 2 MG/1
2 TABLET ORAL
Qty: 90 TABLET | Refills: 3 | Status: SHIPPED | OUTPATIENT
Start: 2019-03-01 | End: 2019-03-13 | Stop reason: SDUPTHER

## 2019-03-01 RX ORDER — FLUTICASONE PROPIONATE 50 MCG
1 SPRAY, SUSPENSION (ML) NASAL DAILY
Qty: 3 BOTTLE | Refills: 3 | Status: SHIPPED | OUTPATIENT
Start: 2019-03-01 | End: 2019-03-13 | Stop reason: SDUPTHER

## 2019-03-01 RX ORDER — LISINOPRIL 20 MG/1
40 TABLET ORAL DAILY
Qty: 180 TABLET | Refills: 3 | Status: SHIPPED | OUTPATIENT
Start: 2019-03-01 | End: 2019-03-13 | Stop reason: SDUPTHER

## 2019-03-01 RX ORDER — LACTULOSE 10 G/15ML
SOLUTION ORAL; RECTAL
Qty: 1800 ML | Refills: 3 | Status: SHIPPED | OUTPATIENT
Start: 2019-03-01 | End: 2019-03-13 | Stop reason: SDUPTHER

## 2019-03-01 RX ORDER — AMLODIPINE BESYLATE 10 MG/1
10 TABLET ORAL DAILY
Qty: 90 TABLET | Refills: 3 | Status: SHIPPED | OUTPATIENT
Start: 2019-03-01 | End: 2019-03-13 | Stop reason: SDUPTHER

## 2019-03-01 NOTE — PROGRESS NOTES
Patient unable to complete eye exam - I was unable to get clear images  Referral placed for eye doctor   And I scheduled his appt with dr aquino

## 2019-03-01 NOTE — PROGRESS NOTES
" Patient ID: Cristi Pulido is a 64 y.o. male.    Chief Complaint: Check up, Lab orders    HPI      Cristi Pulido is a 64 y.o. male. here for annual exam.   No current complaints.        Review of Symptoms    Constitutional: Negative.    HENT: Negative.    Eyes: Negative.    Respiratory: Negative.    Cardiovascular: Negative.    Gastrointestinal: Negative.    Endocrine: Negative.    Genitourinary: Negative.    Musculoskeletal: Negative.    Skin: Negative.    Allergic/Immunologic: Negative.    Neurological: Negative.    Hematological: Negative.    Psychiatric/Behavioral: Negative.      Except as above in HPI      BP (!) 152/94   Pulse 72   Temp 97.8 °F (36.6 °C)   Ht 5' 10" (1.778 m)   Wt 87.4 kg (192 lb 9.2 oz)   SpO2 98%   BMI 27.63 kg/m²     Physical  Exam    Constitutional:  Oriented to person, place, and time. Appears well-developed and well-nourished.     HENT:   Head: Normocephalic and atraumatic.     Right Ear:  External ear normal     Left Ear:  External ear normal     Nose: Nose normal. No rhinorrhea or nasal deformity.     Mouth/Throat: Uvula is midline, oropharynx is clear and moist and mucous membranes are normal.      Eyes: Conjunctivae are normal. Right eye exhibits no discharge. Left eye exhibits no discharge. No scleral icterus.     Neck:  No JVD present. No tracheal deviation  [x]  Neck supple.   [x]  No Carotid bruit    Cardiovascular:  Regular rate and rhythm with normal S1 and S2     Pulmonary/Chest:   Clear to auscultation bilaterally without wheezes, rhonchi or rales    Musculoskeletal: Normal range of motion. No edema or tenderness.   No deformity     Lymphadenopathy:  No cervical adenopathy.     Neurological:  Alert and oriented to person, place, and time. Coordination normal.     Skin: Skin is warm and dry. No rash noted.     Psychiatric: Normal mood and affect. Speech is normal and behavior is normal. Judgment and thought content normal.     Complete Blood Count  Lab Results   Component " Value Date    RBC 4.79 03/01/2019    HGB 13.4 03/01/2019    HCT 42.0 03/01/2019    MCV 87.7 03/01/2019    MCH 28.0 03/01/2019    MCHC 31.9 (L) 03/01/2019    RDW 12.8 03/01/2019     03/01/2019    MPV 12.3 03/01/2019    GRAN 2.4 02/04/2016    GRAN 39.5 02/04/2016    LYMPH 2,635 03/01/2019    LYMPH 47.9 03/01/2019    MONO 413 03/01/2019    MONO 7.5 03/01/2019     03/01/2019    BASO 22 03/01/2019    EOSINOPHIL 2.7 03/01/2019    BASOPHIL 0.4 03/01/2019    DIFFMETHOD Automated 02/04/2016       Comprehensive Metabolic Panel  Lab Results   Component Value Date     (H) 03/01/2019    BUN 10 03/01/2019    CREATININE 0.88 03/01/2019     03/01/2019    K 4.4 03/01/2019     03/01/2019    PROT 7.1 03/01/2019    ALBUMIN 4.4 03/01/2019    BILITOT 0.5 03/01/2019    AST 16 03/01/2019    ALKPHOS 102 03/01/2019    CO2 32 03/01/2019    ALT 13 03/01/2019    EGFRNONAA 90 03/01/2019    ESTGFRAFRICA 104 03/01/2019       TSH  Lab Results   Component Value Date    TSH 0.36 (L) 03/01/2019       Assessment / Plan:      ICD-10-CM ICD-9-CM   1. Routine health maintenance Z00.00 V70.0   2. Need for lipid screening Z13.220 V77.91   3. Type 2 diabetes mellitus with complication, without long-term current use of insulin E11.8 250.90   4. CVA, old, hemiparesis I69.359 438.20   5. Hyperlipidemia, unspecified hyperlipidemia type E78.5 272.4   6. Type 2 diabetes mellitus without complication, without long-term current use of insulin E11.9 250.00   7. Tobacco dependence F17.200 305.1   8. Chronic seasonal allergic rhinitis due to pollen J30.1 477.0   9. Hypertension associated with diabetes E11.59 250.80    I10 401.9     Routine health maintenance    Need for lipid screening  -     Cancel: Lipid panel; Future; Expected date: 03/01/2019  -     Cancel: Hemoglobin A1c; Future; Expected date: 03/01/2019  -     Hemoglobin A1c; Future; Expected date: 03/01/2019  -     Lipid panel; Future; Expected date: 03/01/2019    Type 2  diabetes mellitus with complication, without long-term current use of insulin  -     Cancel: Lipid panel; Future; Expected date: 03/01/2019  -     Cancel: Hemoglobin A1c; Future; Expected date: 03/01/2019  -     Diabetic Eye Screening Photo; Future  -     Hemoglobin A1c; Future; Expected date: 03/01/2019  -     Lipid panel; Future; Expected date: 03/01/2019    CVA, old, hemiparesis  -     Cancel: Lipid panel; Future; Expected date: 03/01/2019  -     Cancel: Hemoglobin A1c; Future; Expected date: 03/01/2019  -     Hemoglobin A1c; Future; Expected date: 03/01/2019  -     Lipid panel; Future; Expected date: 03/01/2019    Hyperlipidemia, unspecified hyperlipidemia type  -     Cancel: Lipid panel; Future; Expected date: 03/01/2019  -     Cancel: Hemoglobin A1c; Future; Expected date: 03/01/2019  -     Hemoglobin A1c; Future; Expected date: 03/01/2019  -     Lipid panel; Future; Expected date: 03/01/2019    Type 2 diabetes mellitus without complication, without long-term current use of insulin  -     Cancel: Lipid panel; Future; Expected date: 03/01/2019  -     Cancel: Hemoglobin A1c; Future; Expected date: 03/01/2019  -     Hemoglobin A1c; Future; Expected date: 03/01/2019  -     Lipid panel; Future; Expected date: 03/01/2019  -     Ambulatory referral to Ophthalmology    Tobacco dependence  -     Cancel: Lipid panel; Future; Expected date: 03/01/2019  -     Cancel: Hemoglobin A1c; Future; Expected date: 03/01/2019  -     Hemoglobin A1c; Future; Expected date: 03/01/2019  -     Lipid panel; Future; Expected date: 03/01/2019    Chronic seasonal allergic rhinitis due to pollen  -     Cancel: Lipid panel; Future; Expected date: 03/01/2019  -     Cancel: Hemoglobin A1c; Future; Expected date: 03/01/2019  -     Hemoglobin A1c; Future; Expected date: 03/01/2019  -     Lipid panel; Future; Expected date: 03/01/2019    Hypertension associated with diabetes  -     Cancel: Lipid panel; Future; Expected date: 03/01/2019  -      Cancel: Hemoglobin A1c; Future; Expected date: 03/01/2019  -     Hemoglobin A1c; Future; Expected date: 03/01/2019  -     Lipid panel; Future; Expected date: 03/01/2019    Other orders  -     metoprolol tartrate (LOPRESSOR) 50 MG tablet; Take 1 tablet (50 mg total) by mouth 2 (two) times daily.  Dispense: 180 tablet; Refill: 3  -     metFORMIN (GLUCOPHAGE) 1000 MG tablet; Take 1 tablet (1,000 mg total) by mouth 2 (two) times daily with meals.  Dispense: 180 tablet; Refill: 3  -     lisinopril (PRINIVIL,ZESTRIL) 20 MG tablet; Take 2 tablets (40 mg total) by mouth once daily.  Dispense: 180 tablet; Refill: 3  -     glimepiride (AMARYL) 2 MG tablet; Take 1 tablet (2 mg total) by mouth daily with breakfast.  Dispense: 90 tablet; Refill: 3  -     gabapentin (NEURONTIN) 600 MG tablet; Take 1 tablet (600 mg total) by mouth 3 (three) times daily as needed.  Dispense: 270 tablet; Refill: 3  -     clopidogrel (PLAVIX) 75 mg tablet; Take 1 tablet (75 mg total) by mouth once daily.  Dispense: 90 tablet; Refill: 3  -     Discontinue: lactulose (CHRONULAC) 10 gram/15 mL solution; TAKE  15ML  1-3  TIMES  A  DAY  Dispense: 1800 mL; Refill: 3  -     blood sugar diagnostic (BLOOD GLUCOSE TEST) Strp; Strips for one to 2 times a day testing   dispense brand covered by insurance and to match meter brand  Dispense: 180 each; Refill: 3  -     atorvastatin (LIPITOR) 80 MG tablet; Take 1 tablet (80 mg total) by mouth once daily.  Dispense: 90 tablet; Refill: 3  -     amLODIPine (NORVASC) 10 MG tablet; Take 1 tablet (10 mg total) by mouth once daily.  Dispense: 90 tablet; Refill: 3  -     polyethylene glycol (GLYCOLAX) 17 gram/dose powder; Take 17 g by mouth once daily.  Dispense: 510 Bottle; Refill: 11  -     fluticasone (FLONASE) 50 mcg/actuation nasal spray; 1 spray (50 mcg total) by Each Nare route once daily.  Dispense: 3 Bottle; Refill: 3  -     lactulose (CHRONULAC) 10 gram/15 mL solution; TAKE  15ML  1-3  TIMES  A  DAY  Dispense:  1800 mL; Refill: 3          Discussed how to stay healthy including: diet, exercise, refraining from smoking and discussed screening exams / tests needed for age, sex and family Hx.

## 2019-03-02 LAB
ALBUMIN SERPL-MCNC: 4.4 G/DL (ref 3.6–5.1)
ALBUMIN/GLOB SERPL: 1.6 (CALC) (ref 1–2.5)
ALP SERPL-CCNC: 102 U/L (ref 40–115)
ALT SERPL-CCNC: 13 U/L (ref 9–46)
AST SERPL-CCNC: 16 U/L (ref 10–35)
BASOPHILS # BLD AUTO: 22 CELLS/UL (ref 0–200)
BASOPHILS NFR BLD AUTO: 0.4 %
BILIRUB SERPL-MCNC: 0.5 MG/DL (ref 0.2–1.2)
BUN SERPL-MCNC: 10 MG/DL (ref 7–25)
BUN/CREAT SERPL: ABNORMAL (CALC) (ref 6–22)
CALCIUM SERPL-MCNC: 9.1 MG/DL (ref 8.6–10.3)
CHLORIDE SERPL-SCNC: 105 MMOL/L (ref 98–110)
CHOLEST SERPL-MCNC: 132 MG/DL
CHOLEST/HDLC SERPL: 2.6 (CALC)
CO2 SERPL-SCNC: 32 MMOL/L (ref 20–32)
CREAT SERPL-MCNC: 0.88 MG/DL (ref 0.7–1.25)
EOSINOPHIL # BLD AUTO: 149 CELLS/UL (ref 15–500)
EOSINOPHIL NFR BLD AUTO: 2.7 %
ERYTHROCYTE [DISTWIDTH] IN BLOOD BY AUTOMATED COUNT: 12.8 % (ref 11–15)
GFRSERPLBLD MDRD-ARVRAT: 90 ML/MIN/1.73M2
GLOBULIN SER CALC-MCNC: 2.7 G/DL (CALC) (ref 1.9–3.7)
GLUCOSE SERPL-MCNC: 137 MG/DL (ref 65–99)
HBA1C MFR BLD: 7.5 % OF TOTAL HGB
HCT VFR BLD AUTO: 42 % (ref 38.5–50)
HDLC SERPL-MCNC: 50 MG/DL
HGB BLD-MCNC: 13.4 G/DL (ref 13.2–17.1)
LDLC SERPL CALC-MCNC: 68 MG/DL (CALC)
LYMPHOCYTES # BLD AUTO: 2635 CELLS/UL (ref 850–3900)
LYMPHOCYTES NFR BLD AUTO: 47.9 %
MCH RBC QN AUTO: 28 PG (ref 27–33)
MCHC RBC AUTO-ENTMCNC: 31.9 G/DL (ref 32–36)
MCV RBC AUTO: 87.7 FL (ref 80–100)
MONOCYTES # BLD AUTO: 413 CELLS/UL (ref 200–950)
MONOCYTES NFR BLD AUTO: 7.5 %
NEUTROPHILS # BLD AUTO: 2283 CELLS/UL (ref 1500–7800)
NEUTROPHILS NFR BLD AUTO: 41.5 %
NONHDLC SERPL-MCNC: 82 MG/DL (CALC)
PLATELET # BLD AUTO: 166 THOUSAND/UL (ref 140–400)
PMV BLD REES-ECKER: 12.3 FL (ref 7.5–12.5)
POTASSIUM SERPL-SCNC: 4.4 MMOL/L (ref 3.5–5.3)
PROT SERPL-MCNC: 7.1 G/DL (ref 6.1–8.1)
RBC # BLD AUTO: 4.79 MILLION/UL (ref 4.2–5.8)
SODIUM SERPL-SCNC: 144 MMOL/L (ref 135–146)
TRIGL SERPL-MCNC: 49 MG/DL
TSH SERPL-ACNC: 0.36 MIU/L (ref 0.4–4.5)
WBC # BLD AUTO: 5.5 THOUSAND/UL (ref 3.8–10.8)

## 2019-03-05 ENCOUNTER — TELEPHONE (OUTPATIENT)
Dept: FAMILY MEDICINE | Facility: CLINIC | Age: 64
End: 2019-03-05

## 2019-03-05 DIAGNOSIS — E11.9 TYPE 2 DIABETES MELLITUS WITHOUT COMPLICATION, WITHOUT LONG-TERM CURRENT USE OF INSULIN: ICD-10-CM

## 2019-03-05 DIAGNOSIS — I69.359 CVA, OLD, HEMIPARESIS: ICD-10-CM

## 2019-03-05 DIAGNOSIS — R79.89 LOW TSH LEVEL: Primary | ICD-10-CM

## 2019-03-05 DIAGNOSIS — E78.5 HYPERLIPIDEMIA, UNSPECIFIED HYPERLIPIDEMIA TYPE: ICD-10-CM

## 2019-03-05 DIAGNOSIS — F17.200 TOBACCO DEPENDENCE: ICD-10-CM

## 2019-03-05 DIAGNOSIS — J30.1 SEASONAL ALLERGIC RHINITIS DUE TO POLLEN: ICD-10-CM

## 2019-03-05 NOTE — LETTER
March 7, 2019    Cristi Pulido  [address]  [facility] [address]             [facility]  [address] [address]  Phone: [phone]  Fax: [phone] Dear Mr. Pulido:    We have been trying to reach you to discuss recent test results and recommended treatment. It is very important to us to keep you updated on your health so we can continue to keep all of our patient's healthy. Please contact our office.       Sincerely,    Dr. Rell Mclaughlin

## 2019-03-06 NOTE — TELEPHONE ENCOUNTER
Your lab work shows that your glucose is well controlled and your cholesterol is very good.  Your electrolytes are fine but your thyroid hormone appears to be over active.  Would like to draw a different lab value to double check.    Free T4 ordered [facility]

## 2019-03-13 ENCOUNTER — CLINICAL SUPPORT (OUTPATIENT)
Dept: FAMILY MEDICINE | Facility: CLINIC | Age: 64
End: 2019-03-13
Payer: MEDICARE

## 2019-03-13 ENCOUNTER — HOSPITAL ENCOUNTER (OUTPATIENT)
Dept: RADIOLOGY | Facility: HOSPITAL | Age: 64
Discharge: HOME | End: 2019-03-13
Attending: FAMILY MEDICINE
Payer: MEDICARE

## 2019-03-13 VITALS — OXYGEN SATURATION: 99 % | HEART RATE: 66 BPM | DIASTOLIC BLOOD PRESSURE: 76 MMHG | SYSTOLIC BLOOD PRESSURE: 136 MMHG

## 2019-03-13 DIAGNOSIS — J30.1 SEASONAL ALLERGIC RHINITIS DUE TO POLLEN: ICD-10-CM

## 2019-03-13 DIAGNOSIS — F17.200 TOBACCO DEPENDENCE: ICD-10-CM

## 2019-03-13 DIAGNOSIS — I69.359 CVA, OLD, HEMIPARESIS: ICD-10-CM

## 2019-03-13 DIAGNOSIS — R79.89 LOW TSH LEVEL: ICD-10-CM

## 2019-03-13 DIAGNOSIS — E78.5 HYPERLIPIDEMIA, UNSPECIFIED HYPERLIPIDEMIA TYPE: ICD-10-CM

## 2019-03-13 DIAGNOSIS — E11.9 TYPE 2 DIABETES MELLITUS WITHOUT COMPLICATION, WITHOUT LONG-TERM CURRENT USE OF INSULIN: ICD-10-CM

## 2019-03-13 LAB — T4 FREE SERPL-MCNC: 1.19 NG/DL

## 2019-03-13 PROCEDURE — 76775 US EXAM ABDO BACK WALL LIM: CPT | Mod: TC,HCNC,PO

## 2019-03-13 PROCEDURE — 84439 ASSAY OF FREE THYROXINE: CPT | Mod: HCNC

## 2019-03-13 RX ORDER — AMLODIPINE BESYLATE 10 MG/1
10 TABLET ORAL DAILY
Qty: 90 TABLET | Refills: 3 | Status: SHIPPED | OUTPATIENT
Start: 2019-03-13 | End: 2019-08-12 | Stop reason: SDUPTHER

## 2019-03-13 RX ORDER — GLIMEPIRIDE 2 MG/1
2 TABLET ORAL
Qty: 90 TABLET | Refills: 3 | Status: SHIPPED | OUTPATIENT
Start: 2019-03-13 | End: 2019-08-12 | Stop reason: SDUPTHER

## 2019-03-13 RX ORDER — FLUTICASONE PROPIONATE 50 MCG
1 SPRAY, SUSPENSION (ML) NASAL DAILY
Qty: 3 BOTTLE | Refills: 3 | Status: SHIPPED | OUTPATIENT
Start: 2019-03-13 | End: 2020-05-20 | Stop reason: SDUPTHER

## 2019-03-13 RX ORDER — POLYETHYLENE GLYCOL 3350 17 G/17G
17 POWDER, FOR SOLUTION ORAL DAILY
Qty: 510 BOTTLE | Refills: 11 | Status: SHIPPED | OUTPATIENT
Start: 2019-03-13 | End: 2020-01-29 | Stop reason: SDUPTHER

## 2019-03-13 RX ORDER — ATORVASTATIN CALCIUM 80 MG/1
80 TABLET, FILM COATED ORAL DAILY
Qty: 90 TABLET | Refills: 3 | Status: SHIPPED | OUTPATIENT
Start: 2019-03-13 | End: 2019-08-12 | Stop reason: SDUPTHER

## 2019-03-13 RX ORDER — LACTULOSE 10 G/15ML
SOLUTION ORAL; RECTAL
Qty: 1800 ML | Refills: 3 | Status: SHIPPED | OUTPATIENT
Start: 2019-03-13 | End: 2020-01-29 | Stop reason: SDUPTHER

## 2019-03-13 RX ORDER — GABAPENTIN 600 MG/1
600 TABLET ORAL 3 TIMES DAILY PRN
Qty: 270 TABLET | Refills: 3 | Status: SHIPPED | OUTPATIENT
Start: 2019-03-13 | End: 2019-08-12 | Stop reason: SDUPTHER

## 2019-03-13 RX ORDER — LISINOPRIL 20 MG/1
40 TABLET ORAL DAILY
Qty: 180 TABLET | Refills: 3 | Status: SHIPPED | OUTPATIENT
Start: 2019-03-13 | End: 2019-08-12 | Stop reason: SDUPTHER

## 2019-03-13 RX ORDER — METOPROLOL TARTRATE 50 MG/1
50 TABLET ORAL 2 TIMES DAILY
Qty: 180 TABLET | Refills: 3 | Status: SHIPPED | OUTPATIENT
Start: 2019-03-13 | End: 2019-08-12 | Stop reason: SDUPTHER

## 2019-03-13 RX ORDER — CLOPIDOGREL BISULFATE 75 MG/1
75 TABLET ORAL DAILY
Qty: 90 TABLET | Refills: 3 | Status: SHIPPED | OUTPATIENT
Start: 2019-03-13 | End: 2019-08-12 | Stop reason: SDUPTHER

## 2019-03-13 RX ORDER — METFORMIN HYDROCHLORIDE 1000 MG/1
1000 TABLET ORAL 2 TIMES DAILY WITH MEALS
Qty: 180 TABLET | Refills: 3 | Status: SHIPPED | OUTPATIENT
Start: 2019-03-13 | End: 2019-08-12 | Stop reason: SDUPTHER

## 2019-03-13 NOTE — PROGRESS NOTES
Cristi Pulido 64 y.o. male is here today for Blood Pressure check.   History of HTN yes.    Review of patient's allergies indicates:  No Known Allergies  Creatinine   Date Value Ref Range Status   03/01/2019 0.88 0.70 - 1.25 mg/dL Final     Comment:     For patients >49 years of age, the reference limit  for Creatinine is approximately 13% higher for people  identified as -American.          Sodium   Date Value Ref Range Status   03/01/2019 144 135 - 146 mmol/L Final     Potassium   Date Value Ref Range Status   03/01/2019 4.4 3.5 - 5.3 mmol/L Final   ]  Patient verifies taking blood pressure medications on a regular basis at the same time of the day.     Current Outpatient Medications:     amLODIPine (NORVASC) 10 MG tablet, Take 1 tablet (10 mg total) by mouth once daily., Disp: 90 tablet, Rfl: 3    atorvastatin (LIPITOR) 80 MG tablet, Take 1 tablet (80 mg total) by mouth once daily., Disp: 90 tablet, Rfl: 3    blood sugar diagnostic (BLOOD GLUCOSE TEST) Strp, Strips for one to 2 times a day testing   dispense brand covered by insurance and to match meter brand, Disp: 180 each, Rfl: 3    blood-glucose meter kit, Dispense meter  brand covered by insurance, Disp: 1 each, Rfl: 0    clopidogrel (PLAVIX) 75 mg tablet, Take 1 tablet (75 mg total) by mouth once daily., Disp: 90 tablet, Rfl: 3    diabetic supplies, miscellan. Misc, Lancets for 1-2 times a day testing    dispense brand covered by insurance t, Disp: 180 each, Rfl: 3    fluticasone (FLONASE) 50 mcg/actuation nasal spray, 1 spray (50 mcg total) by Each Nare route once daily., Disp: 3 Bottle, Rfl: 3    gabapentin (NEURONTIN) 600 MG tablet, Take 1 tablet (600 mg total) by mouth 3 (three) times daily as needed., Disp: 270 tablet, Rfl: 3    glimepiride (AMARYL) 2 MG tablet, Take 1 tablet (2 mg total) by mouth daily with breakfast., Disp: 90 tablet, Rfl: 3    lactulose (CHRONULAC) 10 gram/15 mL solution, TAKE  15ML  1-3  TIMES  A  DAY, Disp: 1800 mL,  Rfl: 3    lancets (ACCU-CHEK FASTCLIX) Misc, TEST TWICE DAILY, Disp: 200 each, Rfl: 3    lisinopril (PRINIVIL,ZESTRIL) 20 MG tablet, Take 2 tablets (40 mg total) by mouth once daily., Disp: 180 tablet, Rfl: 3    metFORMIN (GLUCOPHAGE) 1000 MG tablet, Take 1 tablet (1,000 mg total) by mouth 2 (two) times daily with meals., Disp: 180 tablet, Rfl: 3    metoprolol tartrate (LOPRESSOR) 50 MG tablet, Take 1 tablet (50 mg total) by mouth 2 (two) times daily., Disp: 180 tablet, Rfl: 3    polyethylene glycol (GLYCOLAX) 17 gram/dose powder, Take 17 g by mouth once daily., Disp: 510 Bottle, Rfl: 11    sildenafil (REVATIO) 20 mg Tab, Take 3 to 5 pills each pm as needed for ed, Disp: 90 tablet, Rfl: 2  Does patient have record of home blood pressure readings no.  Last dose of blood pressure medication was taken at 7 am.  Patient is asymptomatic.       Blood pressure reading after 15 minutes was 136/76, Pulse 66.  Dr. Winn  notified.

## 2019-03-13 NOTE — TELEPHONE ENCOUNTER
All pt meds at last visit were sent to [facility]  He needs them to all go to [facility] mail order

## 2019-03-14 LAB
LEFT EYE DM RETINOPATHY: NORMAL
RIGHT EYE DM RETINOPATHY: NORMAL

## 2019-03-19 ENCOUNTER — TELEPHONE (OUTPATIENT)
Dept: ADMINISTRATIVE | Facility: HOSPITAL | Age: 64
End: 2019-03-19

## 2019-04-05 ENCOUNTER — OFFICE VISIT (OUTPATIENT)
Dept: INTERNAL MEDICINE | Facility: CLINIC | Age: 64
End: 2019-04-05
Payer: MEDICARE

## 2019-04-05 VITALS
WEIGHT: 192.81 LBS | DIASTOLIC BLOOD PRESSURE: 80 MMHG | BODY MASS INDEX: 27.6 KG/M2 | HEART RATE: 72 BPM | SYSTOLIC BLOOD PRESSURE: 128 MMHG | HEIGHT: 70 IN

## 2019-04-05 DIAGNOSIS — E11.9 TYPE 2 DIABETES MELLITUS WITHOUT COMPLICATION, WITHOUT LONG-TERM CURRENT USE OF INSULIN: ICD-10-CM

## 2019-04-05 DIAGNOSIS — E11.59 HYPERTENSION ASSOCIATED WITH DIABETES: ICD-10-CM

## 2019-04-05 DIAGNOSIS — F17.200 TOBACCO DEPENDENCE: ICD-10-CM

## 2019-04-05 DIAGNOSIS — Z00.00 ENCOUNTER FOR PREVENTIVE HEALTH EXAMINATION: Primary | ICD-10-CM

## 2019-04-05 DIAGNOSIS — I69.359 CVA, OLD, HEMIPARESIS: ICD-10-CM

## 2019-04-05 DIAGNOSIS — I15.2 HYPERTENSION ASSOCIATED WITH DIABETES: ICD-10-CM

## 2019-04-05 DIAGNOSIS — I70.0 ATHEROSCLEROSIS OF ABDOMINAL AORTA: ICD-10-CM

## 2019-04-05 PROCEDURE — 99999 PR PBB SHADOW E&M-EST. PATIENT-LVL V: CPT | Mod: PBBFAC,HCNC,, | Performed by: NURSE PRACTITIONER

## 2019-04-05 PROCEDURE — 3045F PR MOST RECENT HEMOGLOBIN A1C LEVEL 7.0-9.0%: CPT | Mod: HCNC,CPTII,S$GLB, | Performed by: NURSE PRACTITIONER

## 2019-04-05 PROCEDURE — 3079F DIAST BP 80-89 MM HG: CPT | Mod: HCNC,CPTII,S$GLB, | Performed by: NURSE PRACTITIONER

## 2019-04-05 PROCEDURE — G0439 PPPS, SUBSEQ VISIT: HCPCS | Mod: HCNC,S$GLB,, | Performed by: NURSE PRACTITIONER

## 2019-04-05 PROCEDURE — 3045F PR MOST RECENT HEMOGLOBIN A1C LEVEL 7.0-9.0%: ICD-10-PCS | Mod: HCNC,CPTII,S$GLB, | Performed by: NURSE PRACTITIONER

## 2019-04-05 PROCEDURE — 99999 PR PBB SHADOW E&M-EST. PATIENT-LVL V: ICD-10-PCS | Mod: PBBFAC,HCNC,, | Performed by: NURSE PRACTITIONER

## 2019-04-05 PROCEDURE — G0439 PR MEDICARE ANNUAL WELLNESS SUBSEQUENT VISIT: ICD-10-PCS | Mod: HCNC,S$GLB,, | Performed by: NURSE PRACTITIONER

## 2019-04-05 PROCEDURE — 99499 UNLISTED E&M SERVICE: CPT | Mod: HCNC,S$GLB,, | Performed by: NURSE PRACTITIONER

## 2019-04-05 PROCEDURE — 3074F PR MOST RECENT SYSTOLIC BLOOD PRESSURE < 130 MM HG: ICD-10-PCS | Mod: HCNC,CPTII,S$GLB, | Performed by: NURSE PRACTITIONER

## 2019-04-05 PROCEDURE — 3079F PR MOST RECENT DIASTOLIC BLOOD PRESSURE 80-89 MM HG: ICD-10-PCS | Mod: HCNC,CPTII,S$GLB, | Performed by: NURSE PRACTITIONER

## 2019-04-05 PROCEDURE — 99499 RISK ADDL DX/OHS AUDIT: ICD-10-PCS | Mod: HCNC,S$GLB,, | Performed by: NURSE PRACTITIONER

## 2019-04-05 PROCEDURE — 3074F SYST BP LT 130 MM HG: CPT | Mod: HCNC,CPTII,S$GLB, | Performed by: NURSE PRACTITIONER

## 2019-04-05 NOTE — PATIENT INSTRUCTIONS
Counseling and Referral of Other Preventative  (Italic type indicates deductible and co-insurance are waived)    Patient Name: Cristi Pulido  Today's Date: 4/5/2019    Health Maintenance       Date Due Completion Date    Zoster Vaccine 02/19/2013 ---    TETANUS VACCINE 04/26/2019 (Originally 2/19/1973) ---    Hemoglobin A1c 09/01/2019 3/1/2019    Foot Exam 03/01/2020 3/1/2019 (Done)    Override on 3/1/2019: Done    Override on 1/29/2018: Done (dry skin 4 toes left foot   onch)    Override on 3/3/2017: Done    Override on 3/3/2017: Done (ampuated toe bilat  full sensation dry)    Lipid Panel 03/01/2020 3/1/2019    High Dose Statin 03/13/2020 3/13/2019    Eye Exam 03/14/2020 3/14/2019    Colonoscopy 03/01/2021 3/1/2016    Override on 2/1/2016: (N/S)    Pneumococcal Vaccine (65+ Low/Medium Risk) (2 of 2 - PPSV23) 01/29/2023 1/29/2018        No orders of the defined types were placed in this encounter.    The following information is provided to all patients.  This information is to help you find resources for any of the problems found today that may be affecting your health:                Living healthy guide: www.[facility].gov      Understanding Diabetes: www.diabetes.org      Eating healthy: www.cdc.gov/healthyweight      CDC home safety checklist: www.cdc.gov/steadi/patient.html      Agency on Aging: www.goea.[place].[facility]      Alcoholics anonymous (AA): www.aa.org      Physical Activity: www.elgin.nih.gov/ez2rgwn      Tobacco use: www.quitwithusla.org

## 2019-04-05 NOTE — PROGRESS NOTES
"Cristi Pulido presented for a  Medicare AWV and comprehensive Health Risk Assessment today. The following components were reviewed and updated:    · Medical history  · Family History  · Social history  · Allergies and Current Medications  · Health Risk Assessment  · Health Maintenance  · Care Team     ** See Completed Assessments for Annual Wellness Visit within the encounter summary.**       The following assessments were completed:  · Living Situation  · CAGE  · Depression Screening  · Timed Get Up and Go  · Whisper Test  · Cognitive Function Screening  · Nutrition Screening  · ADL Screening  · PAQ Screening    Vitals:    04/05/19 1118   BP: 128/80   Pulse: 72   Weight: 87.5 kg (192 lb 12.7 oz)   Height: 5' 10" (1.778 m)     Body mass index is 27.66 kg/m².  Physical Exam   Constitutional: He is oriented to person, place, and time. He appears well-developed and well-nourished. No distress.   HENT:   Head: Normocephalic and atraumatic.   Eyes: Pupils are equal, round, and reactive to light. EOM are normal.   Neck: Normal range of motion.   Cardiovascular: Normal rate, regular rhythm and normal heart sounds.   Pulmonary/Chest: Effort normal and breath sounds normal. No respiratory distress.   Abdominal: Soft. There is no tenderness.   Musculoskeletal: Normal range of motion.   Patient ambulates with walker.    Neurological: He is alert and oriented to person, place, and time. Coordination normal.   Skin: Skin is warm and dry.   Psychiatric: He has a normal mood and affect. His behavior is normal. Judgment and thought content normal.   Nursing note and vitals reviewed.        Diagnoses and health risks identified today and associated recommendations/orders:    1. Encounter for preventive health examination    2. Type 2 diabetes mellitus without complication, without long-term current use of insulin  Chronic; stable. Continue current treatment plan as previously prescribed by PCP.     3. Hypertension associated with " diabetes  Chronic; stable. Continue current treatment plan as previously prescribed by PCP.    4. CVA, old, hemiparesis  Chronic; stable. Continue current treatment plan as previously prescribed by PCP.    5. Atherosclerosis of abdominal aorta  Noted on [facility] dated 3/13/2019. Patient followed by PCP.    6. Tobacco dependence  Chronic. Smoking cessation program offered, patient declined at this time. Patient aware of health consequences related to smoking cigarettes. Patient not ready to quit smoking.         Provided Cristi with a 5-10 year written screening schedule and personal prevention plan. Recommendations were developed using the USPSTF age appropriate recommendations. Education, counseling, and referrals were provided as needed. After Visit Summary printed and given to patient which includes a list of additional screenings\tests needed.    Follow up for HRA visit in 1 year.    Noni Criag NP

## 2019-04-15 ENCOUNTER — OFFICE VISIT (OUTPATIENT)
Dept: FAMILY MEDICINE | Facility: CLINIC | Age: 64
End: 2019-04-15
Payer: MEDICARE

## 2019-04-15 VITALS
TEMPERATURE: 98 F | DIASTOLIC BLOOD PRESSURE: 82 MMHG | HEART RATE: 69 BPM | WEIGHT: 193.31 LBS | SYSTOLIC BLOOD PRESSURE: 136 MMHG | HEIGHT: 70 IN | OXYGEN SATURATION: 96 % | BODY MASS INDEX: 27.67 KG/M2

## 2019-04-15 DIAGNOSIS — I15.2 HYPERTENSION ASSOCIATED WITH DIABETES: ICD-10-CM

## 2019-04-15 DIAGNOSIS — E11.59 HYPERTENSION ASSOCIATED WITH DIABETES: ICD-10-CM

## 2019-04-15 DIAGNOSIS — F17.200 TOBACCO DEPENDENCE: ICD-10-CM

## 2019-04-15 DIAGNOSIS — I69.359 CVA, OLD, HEMIPARESIS: Primary | ICD-10-CM

## 2019-04-15 DIAGNOSIS — E11.8 TYPE 2 DIABETES MELLITUS WITH COMPLICATION, WITHOUT LONG-TERM CURRENT USE OF INSULIN: ICD-10-CM

## 2019-04-15 PROCEDURE — 3075F SYST BP GE 130 - 139MM HG: CPT | Mod: CPTII,S$GLB,, | Performed by: FAMILY MEDICINE

## 2019-04-15 PROCEDURE — 3045F PR MOST RECENT HEMOGLOBIN A1C LEVEL 7.0-9.0%: ICD-10-PCS | Mod: CPTII,S$GLB,, | Performed by: FAMILY MEDICINE

## 2019-04-15 PROCEDURE — 3075F PR MOST RECENT SYSTOLIC BLOOD PRESS GE 130-139MM HG: ICD-10-PCS | Mod: CPTII,S$GLB,, | Performed by: FAMILY MEDICINE

## 2019-04-15 PROCEDURE — 1101F PT FALLS ASSESS-DOCD LE1/YR: CPT | Mod: CPTII,S$GLB,, | Performed by: FAMILY MEDICINE

## 2019-04-15 PROCEDURE — 99499 RISK ADDL DX/OHS AUDIT: ICD-10-PCS | Mod: S$GLB,,, | Performed by: FAMILY MEDICINE

## 2019-04-15 PROCEDURE — 3045F PR MOST RECENT HEMOGLOBIN A1C LEVEL 7.0-9.0%: CPT | Mod: CPTII,S$GLB,, | Performed by: FAMILY MEDICINE

## 2019-04-15 PROCEDURE — 99213 OFFICE O/P EST LOW 20 MIN: CPT | Mod: S$GLB,,, | Performed by: FAMILY MEDICINE

## 2019-04-15 PROCEDURE — 99213 PR OFFICE/OUTPT VISIT, EST, LEVL III, 20-29 MIN: ICD-10-PCS | Mod: S$GLB,,, | Performed by: FAMILY MEDICINE

## 2019-04-15 PROCEDURE — 3079F DIAST BP 80-89 MM HG: CPT | Mod: CPTII,S$GLB,, | Performed by: FAMILY MEDICINE

## 2019-04-15 PROCEDURE — 1101F PR PT FALLS ASSESS DOC 0-1 FALLS W/OUT INJ PAST YR: ICD-10-PCS | Mod: CPTII,S$GLB,, | Performed by: FAMILY MEDICINE

## 2019-04-15 PROCEDURE — 99499 UNLISTED E&M SERVICE: CPT | Mod: S$GLB,,, | Performed by: FAMILY MEDICINE

## 2019-04-15 PROCEDURE — 3079F PR MOST RECENT DIASTOLIC BLOOD PRESSURE 80-89 MM HG: ICD-10-PCS | Mod: CPTII,S$GLB,, | Performed by: FAMILY MEDICINE

## 2019-04-15 NOTE — PROGRESS NOTES
Patient ID: Cristi Pulido is a 64 y.o. male.    Chief Complaint: DMV paperwork    HPI      Cristi Pulido is a 64 y.o. male gentleman with history of CVA right-sided weakness.  Here to fill up the MV paperwork delineating his ability/disabilities.    Review of Symptoms    Constitutional  Neg activity change, No chills /fever   Resp  Neg hemoptysis, stridor, choking  CVS  Neg chest pain, palpitations    Physical Exam    Constitutional:  Oriented to person, place, and time.appears well-developed and well-nourished.  No distress.      HENT  Head: Normocephalic and atraumatic  Right Ear: External ear normal.   Left Ear: External ear normal.   Nose: External nose normal.   Mouth:  Moist mucus membranes.    Eyes:  Conjunctivae are normal. Right eye exhibits no discharge.  Left eye exhibits no discharge. No scleral icterus.  No periorbital edema    Cardiovascular:  Regular rate and rhythm with normal S1 and S2     Pulmonary/Chest:   Clear to auscultation bilaterally without wheezes, rhonchi or rales      Musculoskeletal:  No edema. No obvious deformity No wasting       Neurological:  Alert and oriented to person, place, and time.   Coordination normal.     Skin:   Skin is warm and dry.  No diaphoresis.   No rash noted.     Psychiatric: Normal mood and affect. Behavior is normal.  Judgment and thought content normal.     Complete Blood Count  Lab Results   Component Value Date    RBC 4.79 03/01/2019    HGB 13.4 03/01/2019    HCT 42.0 03/01/2019    MCV 87.7 03/01/2019    MCH 28.0 03/01/2019    MCHC 31.9 (L) 03/01/2019    RDW 12.8 03/01/2019     03/01/2019    MPV 12.3 03/01/2019    GRAN 2.4 02/04/2016    GRAN 39.5 02/04/2016    LYMPH 2,635 03/01/2019    LYMPH 47.9 03/01/2019    MONO 413 03/01/2019    MONO 7.5 03/01/2019     03/01/2019    BASO 22 03/01/2019    EOSINOPHIL 2.7 03/01/2019    BASOPHIL 0.4 03/01/2019    DIFFMETHOD Automated 02/04/2016       Comprehensive Metabolic Panel  Lab Results   Component Value Date      (H) 03/01/2019    BUN 10 03/01/2019    CREATININE 0.88 03/01/2019     03/01/2019    K 4.4 03/01/2019     03/01/2019    PROT 7.1 03/01/2019    ALBUMIN 4.4 03/01/2019    BILITOT 0.5 03/01/2019    AST 16 03/01/2019    ALKPHOS 102 03/01/2019    CO2 32 03/01/2019    ALT 13 03/01/2019    EGFRNONAA 90 03/01/2019    ESTGFRAFRICA 104 03/01/2019       TSH  Lab Results   Component Value Date    TSH 0.36 (L) 03/01/2019       Assessment / Plan:      ICD-10-CM ICD-9-CM   1. CVA, old, hemiparesis I69.359 438.20   2. Tobacco dependence F17.200 305.1   3. Type 2 diabetes mellitus with complication, without long-term current use of insulin E11.8 250.90   4. Hypertension associated with diabetes E11.59 250.80    I10 401.9     CVA, old, hemiparesis    Tobacco dependence    Type 2 diabetes mellitus with complication, without long-term current use of insulin    Hypertension associated with diabetes

## 2019-07-23 ENCOUNTER — TELEPHONE (OUTPATIENT)
Dept: FAMILY MEDICINE | Facility: CLINIC | Age: 64
End: 2019-07-23

## 2019-07-23 NOTE — TELEPHONE ENCOUNTER
Spoke with wife she will drop off paper work for Dr. Mclaughlin to fill out. These forms are filled out periodically for patient's insurance.    ----- Message from Katrin Orourke sent at 7/23/2019  9:14 AM CDT -----  Contact: [phone] Cedricelle wife  Pt's wife is requesting to speak with you re: insurance form. She would like to drop off forms. Please advise

## 2019-08-12 ENCOUNTER — OFFICE VISIT (OUTPATIENT)
Dept: FAMILY MEDICINE | Facility: CLINIC | Age: 64
End: 2019-08-12
Payer: MEDICARE

## 2019-08-12 VITALS
DIASTOLIC BLOOD PRESSURE: 84 MMHG | TEMPERATURE: 99 F | SYSTOLIC BLOOD PRESSURE: 136 MMHG | HEART RATE: 82 BPM | OXYGEN SATURATION: 98 % | BODY MASS INDEX: 27 KG/M2 | WEIGHT: 188.63 LBS | HEIGHT: 70 IN

## 2019-08-12 DIAGNOSIS — E11.9 TYPE 2 DIABETES MELLITUS WITHOUT COMPLICATION, WITHOUT LONG-TERM CURRENT USE OF INSULIN: ICD-10-CM

## 2019-08-12 DIAGNOSIS — I15.2 HYPERTENSION ASSOCIATED WITH DIABETES: ICD-10-CM

## 2019-08-12 DIAGNOSIS — I69.359 CVA, OLD, HEMIPARESIS: ICD-10-CM

## 2019-08-12 DIAGNOSIS — E11.59 HYPERTENSION ASSOCIATED WITH DIABETES: ICD-10-CM

## 2019-08-12 DIAGNOSIS — Z00.00 ROUTINE HEALTH MAINTENANCE: Primary | ICD-10-CM

## 2019-08-12 DIAGNOSIS — E78.5 HYPERLIPIDEMIA, UNSPECIFIED HYPERLIPIDEMIA TYPE: ICD-10-CM

## 2019-08-12 PROCEDURE — 99397 PR PREVENTIVE VISIT,EST,65 & OVER: ICD-10-PCS | Mod: S$GLB,,, | Performed by: FAMILY MEDICINE

## 2019-08-12 PROCEDURE — 3079F PR MOST RECENT DIASTOLIC BLOOD PRESSURE 80-89 MM HG: ICD-10-PCS | Mod: CPTII,S$GLB,, | Performed by: FAMILY MEDICINE

## 2019-08-12 PROCEDURE — 99397 PER PM REEVAL EST PAT 65+ YR: CPT | Mod: S$GLB,,, | Performed by: FAMILY MEDICINE

## 2019-08-12 PROCEDURE — 3075F PR MOST RECENT SYSTOLIC BLOOD PRESS GE 130-139MM HG: ICD-10-PCS | Mod: CPTII,S$GLB,, | Performed by: FAMILY MEDICINE

## 2019-08-12 PROCEDURE — 3075F SYST BP GE 130 - 139MM HG: CPT | Mod: CPTII,S$GLB,, | Performed by: FAMILY MEDICINE

## 2019-08-12 PROCEDURE — 3079F DIAST BP 80-89 MM HG: CPT | Mod: CPTII,S$GLB,, | Performed by: FAMILY MEDICINE

## 2019-08-12 PROCEDURE — 3045F PR MOST RECENT HEMOGLOBIN A1C LEVEL 7.0-9.0%: ICD-10-PCS | Mod: CPTII,S$GLB,, | Performed by: FAMILY MEDICINE

## 2019-08-12 PROCEDURE — 3045F PR MOST RECENT HEMOGLOBIN A1C LEVEL 7.0-9.0%: CPT | Mod: CPTII,S$GLB,, | Performed by: FAMILY MEDICINE

## 2019-08-12 RX ORDER — AMLODIPINE BESYLATE 10 MG/1
10 TABLET ORAL DAILY
Qty: 90 TABLET | Refills: 3 | Status: SHIPPED | OUTPATIENT
Start: 2019-08-12 | End: 2020-01-29 | Stop reason: SDUPTHER

## 2019-08-12 RX ORDER — LISINOPRIL 20 MG/1
40 TABLET ORAL DAILY
Qty: 180 TABLET | Refills: 3 | Status: SHIPPED | OUTPATIENT
Start: 2019-08-12 | End: 2020-01-29 | Stop reason: SDUPTHER

## 2019-08-12 RX ORDER — GLIMEPIRIDE 2 MG/1
2 TABLET ORAL
Qty: 90 TABLET | Refills: 3 | Status: SHIPPED | OUTPATIENT
Start: 2019-08-12 | End: 2020-01-29 | Stop reason: SDUPTHER

## 2019-08-12 RX ORDER — ATORVASTATIN CALCIUM 80 MG/1
80 TABLET, FILM COATED ORAL DAILY
Qty: 90 TABLET | Refills: 3 | Status: SHIPPED | OUTPATIENT
Start: 2019-08-12 | End: 2020-01-29 | Stop reason: SDUPTHER

## 2019-08-12 RX ORDER — LANCETS
EACH MISCELLANEOUS
Qty: 200 EACH | Refills: 3 | Status: SHIPPED | OUTPATIENT
Start: 2019-08-12 | End: 2020-01-29 | Stop reason: SDUPTHER

## 2019-08-12 RX ORDER — CLOPIDOGREL BISULFATE 75 MG/1
75 TABLET ORAL DAILY
Qty: 90 TABLET | Refills: 3 | Status: SHIPPED | OUTPATIENT
Start: 2019-08-12 | End: 2020-01-29 | Stop reason: SDUPTHER

## 2019-08-12 RX ORDER — MULTIVITAMIN WITH IRON
1 TABLET ORAL DAILY
Qty: 90 EACH | Refills: 3 | Status: SHIPPED | OUTPATIENT
Start: 2019-08-12 | End: 2020-05-20

## 2019-08-12 RX ORDER — GABAPENTIN 600 MG/1
600 TABLET ORAL 3 TIMES DAILY PRN
Qty: 270 TABLET | Refills: 3 | Status: SHIPPED | OUTPATIENT
Start: 2019-08-12 | End: 2020-01-29 | Stop reason: SDUPTHER

## 2019-08-12 RX ORDER — METOPROLOL TARTRATE 50 MG/1
50 TABLET ORAL 2 TIMES DAILY
Qty: 180 TABLET | Refills: 3 | Status: SHIPPED | OUTPATIENT
Start: 2019-08-12 | End: 2020-01-29 | Stop reason: SDUPTHER

## 2019-08-12 RX ORDER — METFORMIN HYDROCHLORIDE 1000 MG/1
1000 TABLET ORAL 2 TIMES DAILY WITH MEALS
Qty: 180 TABLET | Refills: 3 | Status: SHIPPED | OUTPATIENT
Start: 2019-08-12 | End: 2020-01-29 | Stop reason: SDUPTHER

## 2019-08-19 NOTE — PROGRESS NOTES
" Patient ID: Cristi Pulido is a 64 y.o. male.    Chief Complaint: Medication Refill    HPI       Cristi Pulido is a 64 y.o. male here for medication refill.  Hypertension-under control  CVA with decreased muscle tone-stable no new problems  Type 2 diabetes-under control this time.      Review of Symptoms    Constitutional  Neg activity change, No chills/ fever   Resp  Neg hemoptysis, stridor, choking  CVS  Neg chest pain, palpitations        Physical Exam    Vitals:    08/12/19 1545   BP: 136/84   Pulse: 82   Temp: 98.5 °F (36.9 °C)   SpO2: 98%   Weight: 85.6 kg (188 lb 9.7 oz)   Height: 5' 10" (1.778 m)        Constitutional:   Oriented to person, place, and time.appears well-developed and well-nourished.   No distress.     HENT:   Head: Normocephalic and atraumatic.     Right Ear: Tympanic membrane, external ear and ear canal normal     Left Ear: Tympanic membrane, external ear and ear canal normal    Nose: External Normal. Normal turbinates, No rhinorrhea     Mouth/Throat: Uvula is midline, oropharynx is clear and moist and mucous membranes are normal.     Neck: supple no anterior cervical adenopathy    Carotid artery:  Bruit    NEG []   POS[]    Eyes:   Conjunctivae are normal. Right eye exhibits no discharge. Left eye exhibits no discharge. No scleral icterus. No periorbital edema       Cardiovascular:  Regular rate and rhythm with normal S1 and S2     Pulmonary/Chest:   Clear to auscultation bilaterally without wheezes, rhonchi or rales        Musculoskeletal:  No edema. No obvious deformity No wasting  Walks with a walker-slight decrease in strength in laterally     Neurological:   Alert and oriented to person, place, and time. Coordination normal.     Skin:   Skin is warm and dry.   No diaphoresis.   No rash noted.    Psychiatric: Normal mood and affect. Behavior is normal. Judgment and thought content normal.       Assessment / Plan:      ICD-10-CM ICD-9-CM   1. Routine health maintenance Z00.00 V70.0   2. CVA, " old, hemiparesis I69.359 438.20   3. Hypertension associated with diabetes E11.59 250.80    I10 401.9   4. Hyperlipidemia, unspecified hyperlipidemia type E78.5 272.4   5. Type 2 diabetes mellitus without complication, without long-term current use of insulin E11.9 250.00     Routine health maintenance    CVA, old, hemiparesis    Hypertension associated with diabetes    Hyperlipidemia, unspecified hyperlipidemia type    Type 2 diabetes mellitus without complication, without long-term current use of insulin    Other orders  -     clopidogrel (PLAVIX) 75 mg tablet; Take 1 tablet (75 mg total) by mouth once daily.  Dispense: 90 tablet; Refill: 3  -     metFORMIN (GLUCOPHAGE) 1000 MG tablet; Take 1 tablet (1,000 mg total) by mouth 2 (two) times daily with meals.  Dispense: 180 tablet; Refill: 3  -     atorvastatin (LIPITOR) 80 MG tablet; Take 1 tablet (80 mg total) by mouth once daily.  Dispense: 90 tablet; Refill: 3  -     glimepiride (AMARYL) 2 MG tablet; Take 1 tablet (2 mg total) by mouth daily with breakfast.  Dispense: 90 tablet; Refill: 3  -     blood sugar diagnostic (BLOOD GLUCOSE TEST) Strp; Strips for one to 2 times a day testing   dispense brand covered by insurance and to match meter brand  Dispense: 180 each; Refill: 3  -     lisinopril (PRINIVIL,ZESTRIL) 20 MG tablet; Take 2 tablets (40 mg total) by mouth once daily.  Dispense: 180 tablet; Refill: 3  -     amLODIPine (NORVASC) 10 MG tablet; Take 1 tablet (10 mg total) by mouth once daily.  Dispense: 90 tablet; Refill: 3  -     metoprolol tartrate (LOPRESSOR) 50 MG tablet; Take 1 tablet (50 mg total) by mouth 2 (two) times daily.  Dispense: 180 tablet; Refill: 3  -     gabapentin (NEURONTIN) 600 MG tablet; Take 1 tablet (600 mg total) by mouth 3 (three) times daily as needed.  Dispense: 270 tablet; Refill: 3  -     lancets (ACCU-CHEK FASTCLIX LANCING DEV) Misc; TEST TWICE DAILY  Dispense: 200 each; Refill: 3  -     multivitamin with iron Tab; Take 1 each  by mouth once daily.  Dispense: 90 each; Refill: 3     Discussed medication-continue current medication

## 2019-08-30 DIAGNOSIS — E11.59 HYPERTENSION ASSOCIATED WITH DIABETES: ICD-10-CM

## 2019-08-30 DIAGNOSIS — I15.2 HYPERTENSION ASSOCIATED WITH DIABETES: ICD-10-CM

## 2019-11-20 ENCOUNTER — PES CALL (OUTPATIENT)
Dept: ADMINISTRATIVE | Facility: CLINIC | Age: 64
End: 2019-11-20

## 2020-01-29 ENCOUNTER — OFFICE VISIT (OUTPATIENT)
Dept: FAMILY MEDICINE | Facility: CLINIC | Age: 65
End: 2020-01-29
Payer: MEDICARE

## 2020-01-29 VITALS
HEART RATE: 82 BPM | WEIGHT: 190.56 LBS | BODY MASS INDEX: 27.28 KG/M2 | TEMPERATURE: 98 F | HEIGHT: 70 IN | OXYGEN SATURATION: 98 % | SYSTOLIC BLOOD PRESSURE: 134 MMHG | DIASTOLIC BLOOD PRESSURE: 86 MMHG

## 2020-01-29 DIAGNOSIS — E11.9 TYPE 2 DIABETES MELLITUS WITHOUT COMPLICATION, WITHOUT LONG-TERM CURRENT USE OF INSULIN: Primary | ICD-10-CM

## 2020-01-29 DIAGNOSIS — E78.5 HYPERLIPIDEMIA, UNSPECIFIED HYPERLIPIDEMIA TYPE: ICD-10-CM

## 2020-01-29 DIAGNOSIS — I69.359 CVA, OLD, HEMIPARESIS: ICD-10-CM

## 2020-01-29 DIAGNOSIS — Z13.220 NEED FOR LIPID SCREENING: ICD-10-CM

## 2020-01-29 DIAGNOSIS — R79.89 LOW TSH LEVEL: ICD-10-CM

## 2020-01-29 DIAGNOSIS — E11.59 HYPERTENSION ASSOCIATED WITH DIABETES: ICD-10-CM

## 2020-01-29 DIAGNOSIS — F17.200 TOBACCO DEPENDENCE: ICD-10-CM

## 2020-01-29 DIAGNOSIS — J30.1 SEASONAL ALLERGIC RHINITIS DUE TO POLLEN: ICD-10-CM

## 2020-01-29 DIAGNOSIS — I70.0 ATHEROSCLEROSIS OF ABDOMINAL AORTA: ICD-10-CM

## 2020-01-29 DIAGNOSIS — I15.2 HYPERTENSION ASSOCIATED WITH DIABETES: ICD-10-CM

## 2020-01-29 PROCEDURE — 1159F MED LIST DOCD IN RCRD: CPT | Mod: S$GLB,,, | Performed by: FAMILY MEDICINE

## 2020-01-29 PROCEDURE — 99499 UNLISTED E&M SERVICE: CPT | Mod: S$GLB,,, | Performed by: FAMILY MEDICINE

## 2020-01-29 PROCEDURE — 1101F PT FALLS ASSESS-DOCD LE1/YR: CPT | Mod: CPTII,S$GLB,, | Performed by: FAMILY MEDICINE

## 2020-01-29 PROCEDURE — 3075F SYST BP GE 130 - 139MM HG: CPT | Mod: CPTII,S$GLB,, | Performed by: FAMILY MEDICINE

## 2020-01-29 PROCEDURE — 99214 PR OFFICE/OUTPT VISIT, EST, LEVL IV, 30-39 MIN: ICD-10-PCS | Mod: S$GLB,,, | Performed by: FAMILY MEDICINE

## 2020-01-29 PROCEDURE — 1159F PR MEDICATION LIST DOCUMENTED IN MEDICAL RECORD: ICD-10-PCS | Mod: S$GLB,,, | Performed by: FAMILY MEDICINE

## 2020-01-29 PROCEDURE — 1101F PR PT FALLS ASSESS DOC 0-1 FALLS W/OUT INJ PAST YR: ICD-10-PCS | Mod: CPTII,S$GLB,, | Performed by: FAMILY MEDICINE

## 2020-01-29 PROCEDURE — 3075F PR MOST RECENT SYSTOLIC BLOOD PRESS GE 130-139MM HG: ICD-10-PCS | Mod: CPTII,S$GLB,, | Performed by: FAMILY MEDICINE

## 2020-01-29 PROCEDURE — 3079F PR MOST RECENT DIASTOLIC BLOOD PRESSURE 80-89 MM HG: ICD-10-PCS | Mod: CPTII,S$GLB,, | Performed by: FAMILY MEDICINE

## 2020-01-29 PROCEDURE — 3079F DIAST BP 80-89 MM HG: CPT | Mod: CPTII,S$GLB,, | Performed by: FAMILY MEDICINE

## 2020-01-29 PROCEDURE — 99214 OFFICE O/P EST MOD 30 MIN: CPT | Mod: S$GLB,,, | Performed by: FAMILY MEDICINE

## 2020-01-29 PROCEDURE — 99499 RISK ADDL DX/OHS AUDIT: ICD-10-PCS | Mod: S$GLB,,, | Performed by: FAMILY MEDICINE

## 2020-01-29 RX ORDER — GLIMEPIRIDE 2 MG/1
2 TABLET ORAL
Qty: 90 TABLET | Refills: 3 | Status: SHIPPED | OUTPATIENT
Start: 2020-01-29 | End: 2020-05-20 | Stop reason: SDUPTHER

## 2020-01-29 RX ORDER — CLOPIDOGREL BISULFATE 75 MG/1
75 TABLET ORAL DAILY
Qty: 90 TABLET | Refills: 3 | Status: SHIPPED | OUTPATIENT
Start: 2020-01-29 | End: 2020-05-20 | Stop reason: SDUPTHER

## 2020-01-29 RX ORDER — GABAPENTIN 600 MG/1
600 TABLET ORAL 3 TIMES DAILY PRN
Qty: 270 TABLET | Refills: 3 | Status: SHIPPED | OUTPATIENT
Start: 2020-01-29 | End: 2020-05-20 | Stop reason: SDUPTHER

## 2020-01-29 RX ORDER — METOPROLOL TARTRATE 50 MG/1
50 TABLET ORAL 2 TIMES DAILY
Qty: 180 TABLET | Refills: 3 | Status: SHIPPED | OUTPATIENT
Start: 2020-01-29 | End: 2020-05-20 | Stop reason: SDUPTHER

## 2020-01-29 RX ORDER — LANCETS
EACH MISCELLANEOUS
Qty: 200 EACH | Refills: 3 | Status: SHIPPED | OUTPATIENT
Start: 2020-01-29 | End: 2020-05-20 | Stop reason: SDUPTHER

## 2020-01-29 RX ORDER — SILDENAFIL CITRATE 20 MG/1
TABLET ORAL
Qty: 90 TABLET | Refills: 2 | Status: SHIPPED | OUTPATIENT
Start: 2020-01-29 | End: 2020-05-20 | Stop reason: SDUPTHER

## 2020-01-29 RX ORDER — ATORVASTATIN CALCIUM 80 MG/1
80 TABLET, FILM COATED ORAL DAILY
Qty: 90 TABLET | Refills: 3 | Status: SHIPPED | OUTPATIENT
Start: 2020-01-29 | End: 2020-05-20 | Stop reason: SDUPTHER

## 2020-01-29 RX ORDER — METFORMIN HYDROCHLORIDE 1000 MG/1
1000 TABLET ORAL 2 TIMES DAILY WITH MEALS
Qty: 180 TABLET | Refills: 3 | Status: SHIPPED | OUTPATIENT
Start: 2020-01-29 | End: 2020-05-20 | Stop reason: SDUPTHER

## 2020-01-29 RX ORDER — AMLODIPINE BESYLATE 10 MG/1
10 TABLET ORAL DAILY
Qty: 90 TABLET | Refills: 3 | Status: SHIPPED | OUTPATIENT
Start: 2020-01-29 | End: 2020-05-20 | Stop reason: SDUPTHER

## 2020-01-29 RX ORDER — LACTULOSE 10 G/15ML
SOLUTION ORAL; RECTAL
Qty: 1800 ML | Refills: 3 | Status: SHIPPED | OUTPATIENT
Start: 2020-01-29 | End: 2020-05-20 | Stop reason: SDUPTHER

## 2020-01-29 RX ORDER — LISINOPRIL 20 MG/1
40 TABLET ORAL DAILY
Qty: 180 TABLET | Refills: 3 | Status: SHIPPED | OUTPATIENT
Start: 2020-01-29 | End: 2020-05-20 | Stop reason: SDUPTHER

## 2020-01-29 RX ORDER — POLYETHYLENE GLYCOL 3350 17 G/17G
17 POWDER, FOR SOLUTION ORAL DAILY
Qty: 510 BOTTLE | Refills: 11 | Status: SHIPPED | OUTPATIENT
Start: 2020-01-29 | End: 2020-05-20 | Stop reason: SDUPTHER

## 2020-01-29 NOTE — PROGRESS NOTES
" Patient ID: Cristi Pulido is a 64 y.o. male.    Chief Complaint: Check up; Med refills    HPI      Cristi Pulido is a 64 y.o. male here following up on multiple medical problems including old CVA-continue tobacco dependence-still smoking but less-hypertension associated with diabetes under control.  Seasonal allergies which are controlled at this time.  Hyperlipidemia-continues take medicine without side effects.  Previous foot ulcers and amputation of toe on each foot.    Vitals:    01/29/20 1439   BP: 134/86   Pulse: 82   Temp: 97.6 °F (36.4 °C)   SpO2: 98%   Weight: 86.4 kg (190 lb 9.4 oz)   Height: 5' 10" (1.778 m)            Review of Symptoms    Constitutional  Neg activity change, No chills /fever   Resp  Neg hemoptysis, stridor, choking  CVS  Neg chest pain, palpitations    Physical Exam    Constitutional:  Oriented to person, place, and time.appears well-developed and well-nourished.  No distress.      HENT  Head: Normocephalic and atraumatic  Right Ear: External ear normal.   Left Ear: External ear normal.   Nose: External nose normal.   Mouth:  Moist mucus membranes.    Eyes:  Conjunctivae are normal. Right eye exhibits no discharge.  Left eye exhibits no discharge. No scleral icterus.  No periorbital edema    Cardiovascular:  Regular rate and rhythm with normal S1 and S2     Pulmonary/Chest:   Clear to auscultation bilaterally without wheezes, rhonchi or rales      Musculoskeletal:  No edema. No obvious deformity No wasting    thidk nails    Full sensation    Absent toe bilaterally       Neurological:  Alert and oriented to person, place, and time.   Coordination normal.     Skin:   Skin is warm and dry.  No diaphoresis.   No rash noted.     Psychiatric: Normal mood and affect. Behavior is normal.  Judgment and thought content normal.     Complete Blood Count  Lab Results   Component Value Date    RBC 4.79 03/01/2019    HGB 13.4 03/01/2019    HCT 42.0 03/01/2019    MCV 87.7 03/01/2019    MCH 28.0 03/01/2019 "    MCHC 31.9 (L) 03/01/2019    RDW 12.8 03/01/2019     03/01/2019    MPV 12.3 03/01/2019    GRAN 2.4 02/04/2016    GRAN 39.5 02/04/2016    LYMPH 2,635 03/01/2019    LYMPH 47.9 03/01/2019    MONO 413 03/01/2019    MONO 7.5 03/01/2019     03/01/2019    BASO 22 03/01/2019    EOSINOPHIL 2.7 03/01/2019    BASOPHIL 0.4 03/01/2019    DIFFMETHOD Automated 02/04/2016       Comprehensive Metabolic Panel  No results found for: GLU, BUN, CREATININE, NA, K, CL, PROT, ALBUMIN, BILITOT, AST, ALKPHOS, CO2, ALT, ANIONGAP, EGFRNONAA, ESTGFRAFRICA    TSH  No results found for: TSH    Assessment / Plan:      ICD-10-CM ICD-9-CM   1. Type 2 diabetes mellitus without complication, without long-term current use of insulin E11.9 250.00   2. CVA, old, hemiparesis I69.359 438.20   3. Hypertension associated with diabetes E11.59 250.80    I10 401.9   4. Low TSH level R79.89 794.5   5. Seasonal allergic rhinitis due to pollen J30.1 477.0   6. Need for lipid screening Z13.220 V77.91   7. Hyperlipidemia, unspecified hyperlipidemia type E78.5 272.4   8. Tobacco dependence F17.200 305.1   9. Atherosclerosis of abdominal aorta I70.0 440.0     Type 2 diabetes mellitus without complication, without long-term current use of insulin  -     Hemoglobin A1c; Future; Expected date: 01/29/2020  -     Comprehensive metabolic panel; Future  -     CBC auto differential; Future  -     Lipid panel; Future  -     TSH; Future  -     T4, free; Future; Expected date: 01/29/2020    CVA, old, hemiparesis  -     Comprehensive metabolic panel; Future  -     CBC auto differential; Future  -     Lipid panel; Future  -     TSH; Future    Hypertension associated with diabetes  -     Comprehensive metabolic panel; Future  -     CBC auto differential; Future  -     Lipid panel; Future  -     TSH; Future    Low TSH level  -     Comprehensive metabolic panel; Future  -     CBC auto differential; Future  -     Lipid panel; Future  -     TSH; Future  -     T4, free;  Future; Expected date: 01/29/2020    Seasonal allergic rhinitis due to pollen  -     Comprehensive metabolic panel; Future  -     CBC auto differential; Future  -     Lipid panel; Future  -     TSH; Future    Need for lipid screening  -     Comprehensive metabolic panel; Future  -     CBC auto differential; Future  -     Lipid panel; Future  -     TSH; Future    Hyperlipidemia, unspecified hyperlipidemia type  -     Comprehensive metabolic panel; Future  -     CBC auto differential; Future  -     Lipid panel; Future  -     TSH; Future    Tobacco dependence    Atherosclerosis of abdominal aorta    Other orders  -     lancets (ACCU-CHEK FASTCLIX LANCING DEV) Misc; TEST TWICE DAILY  Dispense: 200 each; Refill: 3  -     lactulose (CHRONULAC) 10 gram/15 mL solution; TAKE  15ML  1-3  TIMES  A  DAY  Dispense: 1800 mL; Refill: 3  -     blood sugar diagnostic (BLOOD GLUCOSE TEST) Strp; Strips for one to 2 times a day testing   dispense brand covered by insurance and to match meter brand  Dispense: 180 each; Refill: 3  -     metFORMIN (GLUCOPHAGE) 1000 MG tablet; Take 1 tablet (1,000 mg total) by mouth 2 (two) times daily with meals.  Dispense: 180 tablet; Refill: 3  -     amLODIPine (NORVASC) 10 MG tablet; Take 1 tablet (10 mg total) by mouth once daily.  Dispense: 90 tablet; Refill: 3  -     polyethylene glycol (GLYCOLAX) 17 gram/dose powder; Take 17 g by mouth once daily.  Dispense: 510 Bottle; Refill: 11  -     glimepiride (AMARYL) 2 MG tablet; Take 1 tablet (2 mg total) by mouth daily with breakfast.  Dispense: 90 tablet; Refill: 3  -     lisinopril (PRINIVIL,ZESTRIL) 20 MG tablet; Take 2 tablets (40 mg total) by mouth once daily.  Dispense: 180 tablet; Refill: 3  -     metoprolol tartrate (LOPRESSOR) 50 MG tablet; Take 1 tablet (50 mg total) by mouth 2 (two) times daily.  Dispense: 180 tablet; Refill: 3  -     gabapentin (NEURONTIN) 600 MG tablet; Take 1 tablet (600 mg total) by mouth 3 (three) times daily as needed.   Dispense: 270 tablet; Refill: 3  -     clopidogrel (PLAVIX) 75 mg tablet; Take 1 tablet (75 mg total) by mouth once daily.  Dispense: 90 tablet; Refill: 3  -     atorvastatin (LIPITOR) 80 MG tablet; Take 1 tablet (80 mg total) by mouth once daily.  Dispense: 90 tablet; Refill: 3  -     sildenafil (REVATIO) 20 mg Tab; Take 3 to 5 pills each pm as needed for ed  Dispense: 90 tablet; Refill: 2

## 2020-02-15 ENCOUNTER — TELEPHONE (OUTPATIENT)
Dept: FAMILY MEDICINE | Facility: CLINIC | Age: 65
End: 2020-02-15

## 2020-02-15 DIAGNOSIS — E11.9 TYPE 2 DIABETES MELLITUS WITHOUT COMPLICATION, WITHOUT LONG-TERM CURRENT USE OF INSULIN: Primary | ICD-10-CM

## 2020-02-15 LAB
BASOPHILS # BLD AUTO: 29 CELLS/UL (ref 0–200)
BASOPHILS NFR BLD AUTO: 0.4 %
CHOLEST SERPL-MCNC: 158 MG/DL
CHOLEST/HDLC SERPL: 3.6 (CALC)
EOSINOPHIL # BLD AUTO: 241 CELLS/UL (ref 15–500)
EOSINOPHIL NFR BLD AUTO: 3.3 %
ERYTHROCYTE [DISTWIDTH] IN BLOOD BY AUTOMATED COUNT: 12.1 % (ref 11–15)
HBA1C MFR BLD: 8.5 % OF TOTAL HGB
HCT VFR BLD AUTO: 42.1 % (ref 38.5–50)
HDLC SERPL-MCNC: 44 MG/DL
HGB BLD-MCNC: 13.8 G/DL (ref 13.2–17.1)
LDLC SERPL CALC-MCNC: 97 MG/DL (CALC)
LYMPHOCYTES # BLD AUTO: 2898 CELLS/UL (ref 850–3900)
LYMPHOCYTES NFR BLD AUTO: 39.7 %
MCH RBC QN AUTO: 28.3 PG (ref 27–33)
MCHC RBC AUTO-ENTMCNC: 32.8 G/DL (ref 32–36)
MCV RBC AUTO: 86.4 FL (ref 80–100)
MONOCYTES # BLD AUTO: 584 CELLS/UL (ref 200–950)
MONOCYTES NFR BLD AUTO: 8 %
NEUTROPHILS # BLD AUTO: 3548 CELLS/UL (ref 1500–7800)
NEUTROPHILS NFR BLD AUTO: 48.6 %
NONHDLC SERPL-MCNC: 114 MG/DL (CALC)
PLATELET # BLD AUTO: 201 THOUSAND/UL (ref 140–400)
PMV BLD REES-ECKER: 12.7 FL (ref 7.5–12.5)
RBC # BLD AUTO: 4.87 MILLION/UL (ref 4.2–5.8)
TRIGL SERPL-MCNC: 78 MG/DL
TSH SERPL-ACNC: 1.1 MIU/L (ref 0.4–4.5)
WBC # BLD AUTO: 7.3 THOUSAND/UL (ref 3.8–10.8)

## 2020-02-15 NOTE — LETTER
February 18, 2020    Cristi Pulido  [address]  [facility] [address]             [facility]  [address] [place] [id number]  Phone: [phone]  Fax: [phone] Dear Mr. Pulido:    We have been trying to reach you to discuss recent test results and recommended treatment. Overall your lab work is good except your hemoglobin A1c is elevated. Dr. Mclaughlin recommends you to schedule a appointment with [facility]. It is very important to us to keep you updated on your health so we can continue to keep all of our patient's healthy. Please contact our office.       If you have any questions or concerns, please don't hesitate to call.    Sincerely,    Silvia Howard MA

## 2020-02-16 NOTE — TELEPHONE ENCOUNTER
Lab work is good except your hemoglobin A1c is elevated.  I am going to send you to diabetic education

## 2020-02-18 NOTE — TELEPHONE ENCOUNTER
LM advising patient of lab results and recommendation to start diabetes education . Contact Letter mailed

## 2020-05-20 ENCOUNTER — OFFICE VISIT (OUTPATIENT)
Dept: FAMILY MEDICINE | Facility: CLINIC | Age: 65
End: 2020-05-20
Payer: MEDICARE

## 2020-05-20 VITALS
OXYGEN SATURATION: 96 % | SYSTOLIC BLOOD PRESSURE: 150 MMHG | HEART RATE: 110 BPM | BODY MASS INDEX: 27.76 KG/M2 | TEMPERATURE: 98 F | HEIGHT: 70 IN | WEIGHT: 193.88 LBS | DIASTOLIC BLOOD PRESSURE: 86 MMHG

## 2020-05-20 DIAGNOSIS — E11.9 TYPE 2 DIABETES MELLITUS WITHOUT COMPLICATION, WITHOUT LONG-TERM CURRENT USE OF INSULIN: Primary | ICD-10-CM

## 2020-05-20 DIAGNOSIS — E11.59 HYPERTENSION ASSOCIATED WITH DIABETES: ICD-10-CM

## 2020-05-20 DIAGNOSIS — R79.89 LOW TSH LEVEL: ICD-10-CM

## 2020-05-20 DIAGNOSIS — I69.359 CVA, OLD, HEMIPARESIS: ICD-10-CM

## 2020-05-20 DIAGNOSIS — I15.2 HYPERTENSION ASSOCIATED WITH DIABETES: ICD-10-CM

## 2020-05-20 DIAGNOSIS — I70.0 ATHEROSCLEROSIS OF ABDOMINAL AORTA: ICD-10-CM

## 2020-05-20 DIAGNOSIS — J30.1 SEASONAL ALLERGIC RHINITIS DUE TO POLLEN: ICD-10-CM

## 2020-05-20 PROCEDURE — 1159F MED LIST DOCD IN RCRD: CPT | Mod: S$GLB,,, | Performed by: FAMILY MEDICINE

## 2020-05-20 PROCEDURE — 1159F PR MEDICATION LIST DOCUMENTED IN MEDICAL RECORD: ICD-10-PCS | Mod: S$GLB,,, | Performed by: FAMILY MEDICINE

## 2020-05-20 PROCEDURE — 3079F DIAST BP 80-89 MM HG: CPT | Mod: CPTII,S$GLB,, | Performed by: FAMILY MEDICINE

## 2020-05-20 PROCEDURE — 99499 RISK ADDL DX/OHS AUDIT: ICD-10-PCS | Mod: S$GLB,,, | Performed by: FAMILY MEDICINE

## 2020-05-20 PROCEDURE — 3077F PR MOST RECENT SYSTOLIC BLOOD PRESSURE >= 140 MM HG: ICD-10-PCS | Mod: CPTII,S$GLB,, | Performed by: FAMILY MEDICINE

## 2020-05-20 PROCEDURE — 3051F PR MOST RECENT HEMOGLOBIN A1C LEVEL 7.0 - < 8.0%: ICD-10-PCS | Mod: CPTII,S$GLB,, | Performed by: FAMILY MEDICINE

## 2020-05-20 PROCEDURE — 1101F PT FALLS ASSESS-DOCD LE1/YR: CPT | Mod: CPTII,S$GLB,, | Performed by: FAMILY MEDICINE

## 2020-05-20 PROCEDURE — 3079F PR MOST RECENT DIASTOLIC BLOOD PRESSURE 80-89 MM HG: ICD-10-PCS | Mod: CPTII,S$GLB,, | Performed by: FAMILY MEDICINE

## 2020-05-20 PROCEDURE — 3051F HG A1C>EQUAL 7.0%<8.0%: CPT | Mod: CPTII,S$GLB,, | Performed by: FAMILY MEDICINE

## 2020-05-20 PROCEDURE — 99499 UNLISTED E&M SERVICE: CPT | Mod: S$GLB,,, | Performed by: FAMILY MEDICINE

## 2020-05-20 PROCEDURE — 3077F SYST BP >= 140 MM HG: CPT | Mod: CPTII,S$GLB,, | Performed by: FAMILY MEDICINE

## 2020-05-20 PROCEDURE — 99214 OFFICE O/P EST MOD 30 MIN: CPT | Mod: S$GLB,,, | Performed by: FAMILY MEDICINE

## 2020-05-20 PROCEDURE — 1101F PR PT FALLS ASSESS DOC 0-1 FALLS W/OUT INJ PAST YR: ICD-10-PCS | Mod: CPTII,S$GLB,, | Performed by: FAMILY MEDICINE

## 2020-05-20 PROCEDURE — 99214 PR OFFICE/OUTPT VISIT, EST, LEVL IV, 30-39 MIN: ICD-10-PCS | Mod: S$GLB,,, | Performed by: FAMILY MEDICINE

## 2020-05-20 RX ORDER — MULTIVITAMIN WITH IRON
1 TABLET ORAL DAILY
Qty: 90 EACH | Refills: 3 | Status: CANCELLED | OUTPATIENT
Start: 2020-05-20

## 2020-05-20 RX ORDER — LANCETS
EACH MISCELLANEOUS
Qty: 200 EACH | Refills: 3 | Status: SHIPPED | OUTPATIENT
Start: 2020-05-20 | End: 2020-05-25 | Stop reason: SDUPTHER

## 2020-05-20 RX ORDER — METOPROLOL TARTRATE 50 MG/1
50 TABLET ORAL 2 TIMES DAILY
Qty: 180 TABLET | Refills: 3 | Status: SHIPPED | OUTPATIENT
Start: 2020-05-20 | End: 2020-05-25 | Stop reason: SDUPTHER

## 2020-05-20 RX ORDER — SILDENAFIL CITRATE 20 MG/1
TABLET ORAL
Qty: 90 TABLET | Refills: 2 | Status: SHIPPED | OUTPATIENT
Start: 2020-05-20 | End: 2020-05-25 | Stop reason: SDUPTHER

## 2020-05-20 RX ORDER — GLIMEPIRIDE 2 MG/1
2 TABLET ORAL
Qty: 90 TABLET | Refills: 3 | Status: SHIPPED | OUTPATIENT
Start: 2020-05-20 | End: 2020-05-25 | Stop reason: SDUPTHER

## 2020-05-20 RX ORDER — MULTIVITAMIN
1 TABLET ORAL DAILY
Qty: 90 TABLET | Refills: 3 | Status: SHIPPED | OUTPATIENT
Start: 2020-05-20 | End: 2020-05-25 | Stop reason: SDUPTHER

## 2020-05-20 RX ORDER — LISINOPRIL 20 MG/1
40 TABLET ORAL DAILY
Qty: 180 TABLET | Refills: 3 | Status: SHIPPED | OUTPATIENT
Start: 2020-05-20 | End: 2020-05-25 | Stop reason: SDUPTHER

## 2020-05-20 RX ORDER — METFORMIN HYDROCHLORIDE 1000 MG/1
1000 TABLET ORAL 2 TIMES DAILY WITH MEALS
Qty: 180 TABLET | Refills: 3 | Status: SHIPPED | OUTPATIENT
Start: 2020-05-20 | End: 2020-05-25 | Stop reason: SDUPTHER

## 2020-05-20 RX ORDER — FLUTICASONE PROPIONATE 50 MCG
1 SPRAY, SUSPENSION (ML) NASAL DAILY
Qty: 48 G | Refills: 3 | Status: SHIPPED | OUTPATIENT
Start: 2020-05-20 | End: 2020-05-25 | Stop reason: SDUPTHER

## 2020-05-20 RX ORDER — AMLODIPINE BESYLATE 10 MG/1
10 TABLET ORAL DAILY
Qty: 90 TABLET | Refills: 3 | Status: SHIPPED | OUTPATIENT
Start: 2020-05-20 | End: 2020-05-25 | Stop reason: SDUPTHER

## 2020-05-20 RX ORDER — POLYETHYLENE GLYCOL 3350 17 G/17G
17 POWDER, FOR SOLUTION ORAL DAILY
Qty: 510 BOTTLE | Refills: 11 | Status: SHIPPED | OUTPATIENT
Start: 2020-05-20 | End: 2020-05-25 | Stop reason: SDUPTHER

## 2020-05-20 RX ORDER — LACTULOSE 10 G/15ML
SOLUTION ORAL; RECTAL
Qty: 1800 ML | Refills: 3 | Status: SHIPPED | OUTPATIENT
Start: 2020-05-20 | End: 2020-05-25 | Stop reason: SDUPTHER

## 2020-05-20 RX ORDER — ATORVASTATIN CALCIUM 80 MG/1
80 TABLET, FILM COATED ORAL DAILY
Qty: 90 TABLET | Refills: 3 | Status: SHIPPED | OUTPATIENT
Start: 2020-05-20 | End: 2020-05-25 | Stop reason: SDUPTHER

## 2020-05-20 RX ORDER — CLOPIDOGREL BISULFATE 75 MG/1
75 TABLET ORAL DAILY
Qty: 90 TABLET | Refills: 3 | Status: SHIPPED | OUTPATIENT
Start: 2020-05-20 | End: 2020-05-25 | Stop reason: SDUPTHER

## 2020-05-20 RX ORDER — GABAPENTIN 600 MG/1
600 TABLET ORAL 3 TIMES DAILY PRN
Qty: 270 TABLET | Refills: 3 | Status: SHIPPED | OUTPATIENT
Start: 2020-05-20 | End: 2020-05-25 | Stop reason: SDUPTHER

## 2020-05-20 RX ORDER — MULTIVITAMIN
1 TABLET ORAL DAILY
Qty: 90 TABLET | Refills: 3 | Status: SHIPPED | OUTPATIENT
Start: 2020-05-20 | End: 2020-05-20 | Stop reason: SDUPTHER

## 2020-05-23 LAB
ALBUMIN SERPL-MCNC: 4.4 G/DL (ref 3.6–5.1)
ALBUMIN/GLOB SERPL: 1.4 (CALC) (ref 1–2.5)
ALP SERPL-CCNC: 107 U/L (ref 35–144)
ALT SERPL-CCNC: 29 U/L (ref 9–46)
AST SERPL-CCNC: 23 U/L (ref 10–35)
BASOPHILS # BLD AUTO: 30 CELLS/UL (ref 0–200)
BASOPHILS NFR BLD AUTO: 0.5 %
BILIRUB SERPL-MCNC: 0.5 MG/DL (ref 0.2–1.2)
BUN SERPL-MCNC: 14 MG/DL (ref 7–25)
BUN/CREAT SERPL: ABNORMAL (CALC) (ref 6–22)
CALCIUM SERPL-MCNC: 10 MG/DL (ref 8.6–10.3)
CHLORIDE SERPL-SCNC: 100 MMOL/L (ref 98–110)
CHOLEST SERPL-MCNC: 167 MG/DL
CHOLEST/HDLC SERPL: 3.3 (CALC)
CO2 SERPL-SCNC: 28 MMOL/L (ref 20–32)
CREAT SERPL-MCNC: 1.1 MG/DL (ref 0.7–1.25)
EOSINOPHIL # BLD AUTO: 112 CELLS/UL (ref 15–500)
EOSINOPHIL NFR BLD AUTO: 1.9 %
ERYTHROCYTE [DISTWIDTH] IN BLOOD BY AUTOMATED COUNT: 14.5 % (ref 11–15)
GFRSERPLBLD MDRD-ARVRAT: 69 ML/MIN/1.73M2
GLOBULIN SER CALC-MCNC: 3.2 G/DL (CALC) (ref 1.9–3.7)
GLUCOSE SERPL-MCNC: 124 MG/DL (ref 65–99)
HBA1C MFR BLD: 7.1 % OF TOTAL HGB
HCT VFR BLD AUTO: 43.1 % (ref 38.5–50)
HDLC SERPL-MCNC: 51 MG/DL
HGB BLD-MCNC: 13.9 G/DL (ref 13.2–17.1)
LDLC SERPL CALC-MCNC: 95 MG/DL (CALC)
LYMPHOCYTES # BLD AUTO: 2614 CELLS/UL (ref 850–3900)
LYMPHOCYTES NFR BLD AUTO: 44.3 %
MCH RBC QN AUTO: 28.5 PG (ref 27–33)
MCHC RBC AUTO-ENTMCNC: 32.3 G/DL (ref 32–36)
MCV RBC AUTO: 88.3 FL (ref 80–100)
MONOCYTES # BLD AUTO: 407 CELLS/UL (ref 200–950)
MONOCYTES NFR BLD AUTO: 6.9 %
NEUTROPHILS # BLD AUTO: 2738 CELLS/UL (ref 1500–7800)
NEUTROPHILS NFR BLD AUTO: 46.4 %
NONHDLC SERPL-MCNC: 116 MG/DL (CALC)
PLATELET # BLD AUTO: 217 THOUSAND/UL (ref 140–400)
PMV BLD REES-ECKER: 12.6 FL (ref 7.5–12.5)
POTASSIUM SERPL-SCNC: 4.8 MMOL/L (ref 3.5–5.3)
PROT SERPL-MCNC: 7.6 G/DL (ref 6.1–8.1)
RBC # BLD AUTO: 4.88 MILLION/UL (ref 4.2–5.8)
SODIUM SERPL-SCNC: 140 MMOL/L (ref 135–146)
T4 FREE SERPL-MCNC: 1.4 NG/DL (ref 0.8–1.8)
TRIGL SERPL-MCNC: 112 MG/DL
TSH SERPL-ACNC: 1 MIU/L (ref 0.4–4.5)
WBC # BLD AUTO: 5.9 THOUSAND/UL (ref 3.8–10.8)

## 2020-05-25 RX ORDER — AMLODIPINE BESYLATE 10 MG/1
10 TABLET ORAL DAILY
Qty: 90 TABLET | Refills: 3 | Status: SHIPPED | OUTPATIENT
Start: 2020-05-25 | End: 2020-05-29 | Stop reason: SDUPTHER

## 2020-05-25 RX ORDER — ATORVASTATIN CALCIUM 80 MG/1
80 TABLET, FILM COATED ORAL DAILY
Qty: 90 TABLET | Refills: 3 | Status: SHIPPED | OUTPATIENT
Start: 2020-05-25 | End: 2020-09-03 | Stop reason: SDUPTHER

## 2020-05-25 RX ORDER — MULTIVITAMIN
1 TABLET ORAL DAILY
Qty: 90 TABLET | Refills: 3 | Status: SHIPPED | OUTPATIENT
Start: 2020-05-25 | End: 2020-09-03 | Stop reason: SDUPTHER

## 2020-05-25 RX ORDER — METOPROLOL TARTRATE 50 MG/1
50 TABLET ORAL 2 TIMES DAILY
Qty: 180 TABLET | Refills: 3 | Status: SHIPPED | OUTPATIENT
Start: 2020-05-25 | End: 2020-05-29 | Stop reason: SDUPTHER

## 2020-05-25 RX ORDER — GLIMEPIRIDE 2 MG/1
2 TABLET ORAL
Qty: 90 TABLET | Refills: 3 | Status: SHIPPED | OUTPATIENT
Start: 2020-05-25 | End: 2020-09-03 | Stop reason: SDUPTHER

## 2020-05-25 RX ORDER — LANCETS
EACH MISCELLANEOUS
Qty: 200 EACH | Refills: 3 | Status: SHIPPED | OUTPATIENT
Start: 2020-05-25

## 2020-05-25 RX ORDER — POLYETHYLENE GLYCOL 3350 17 G/17G
17 POWDER, FOR SOLUTION ORAL DAILY
Qty: 510 BOTTLE | Refills: 11 | Status: SHIPPED | OUTPATIENT
Start: 2020-05-25 | End: 2021-01-04 | Stop reason: SDUPTHER

## 2020-05-25 RX ORDER — METFORMIN HYDROCHLORIDE 1000 MG/1
1000 TABLET ORAL 2 TIMES DAILY WITH MEALS
Qty: 180 TABLET | Refills: 3 | Status: SHIPPED | OUTPATIENT
Start: 2020-05-25 | End: 2020-09-03 | Stop reason: SDUPTHER

## 2020-05-25 RX ORDER — SILDENAFIL CITRATE 20 MG/1
TABLET ORAL
Qty: 90 TABLET | Refills: 2 | Status: SHIPPED | OUTPATIENT
Start: 2020-05-25 | End: 2020-09-03

## 2020-05-25 RX ORDER — FLUTICASONE PROPIONATE 50 MCG
1 SPRAY, SUSPENSION (ML) NASAL DAILY
Qty: 48 G | Refills: 3 | Status: SHIPPED | OUTPATIENT
Start: 2020-05-25 | End: 2021-03-23 | Stop reason: SDUPTHER

## 2020-05-25 RX ORDER — LACTULOSE 10 G/15ML
SOLUTION ORAL; RECTAL
Qty: 1800 ML | Refills: 3 | Status: SHIPPED | OUTPATIENT
Start: 2020-05-25 | End: 2020-12-14 | Stop reason: SDUPTHER

## 2020-05-25 RX ORDER — CLOPIDOGREL BISULFATE 75 MG/1
75 TABLET ORAL DAILY
Qty: 90 TABLET | Refills: 3 | Status: SHIPPED | OUTPATIENT
Start: 2020-05-25 | End: 2020-05-29 | Stop reason: SDUPTHER

## 2020-05-25 RX ORDER — GABAPENTIN 600 MG/1
600 TABLET ORAL 3 TIMES DAILY PRN
Qty: 270 TABLET | Refills: 3 | Status: SHIPPED | OUTPATIENT
Start: 2020-05-25 | End: 2020-12-14 | Stop reason: SDUPTHER

## 2020-05-25 RX ORDER — LISINOPRIL 20 MG/1
40 TABLET ORAL DAILY
Qty: 180 TABLET | Refills: 3 | Status: SHIPPED | OUTPATIENT
Start: 2020-05-25 | End: 2020-05-29 | Stop reason: SDUPTHER

## 2020-05-25 NOTE — TELEPHONE ENCOUNTER
PATIENT NOTIFIED of lab results. Please send all prescriptions to [facility] mail order. Pharmacy has been updated

## 2020-05-29 ENCOUNTER — TELEPHONE (OUTPATIENT)
Dept: ADMINISTRATIVE | Facility: HOSPITAL | Age: 65
End: 2020-05-29

## 2020-05-29 RX ORDER — CLOPIDOGREL BISULFATE 75 MG/1
75 TABLET ORAL DAILY
Qty: 90 TABLET | Refills: 3 | Status: SHIPPED | OUTPATIENT
Start: 2020-05-29 | End: 2020-09-03 | Stop reason: SDUPTHER

## 2020-05-29 RX ORDER — LISINOPRIL 20 MG/1
40 TABLET ORAL DAILY
Qty: 180 TABLET | Refills: 3 | Status: SHIPPED | OUTPATIENT
Start: 2020-05-29 | End: 2020-09-03 | Stop reason: SDUPTHER

## 2020-05-29 RX ORDER — METOPROLOL TARTRATE 50 MG/1
50 TABLET ORAL 2 TIMES DAILY
Qty: 180 TABLET | Refills: 3 | Status: SHIPPED | OUTPATIENT
Start: 2020-05-29 | End: 2020-09-03 | Stop reason: SDUPTHER

## 2020-05-29 RX ORDER — AMLODIPINE BESYLATE 10 MG/1
10 TABLET ORAL DAILY
Qty: 90 TABLET | Refills: 3 | Status: SHIPPED | OUTPATIENT
Start: 2020-05-29 | End: 2020-09-03 | Stop reason: SDUPTHER

## 2020-05-29 NOTE — TELEPHONE ENCOUNTER
Pt is requesting a refill for Norvasc 10mg, plavix 75mg, lisinopril 20mg, metoprolol 50mg sent to [facility] in [facility], pt states he has been out of medication since 05/25/2020.

## 2020-06-03 ENCOUNTER — CLINICAL SUPPORT (OUTPATIENT)
Dept: FAMILY MEDICINE | Facility: CLINIC | Age: 65
End: 2020-06-03
Payer: MEDICARE

## 2020-06-03 DIAGNOSIS — E11.59 HYPERTENSION ASSOCIATED WITH DIABETES: Primary | ICD-10-CM

## 2020-06-03 DIAGNOSIS — I15.2 HYPERTENSION ASSOCIATED WITH DIABETES: Primary | ICD-10-CM

## 2020-06-04 VITALS — DIASTOLIC BLOOD PRESSURE: 72 MMHG | HEART RATE: 73 BPM | OXYGEN SATURATION: 97 % | SYSTOLIC BLOOD PRESSURE: 126 MMHG

## 2020-06-04 NOTE — PROGRESS NOTES
Cristi Pulido 65 y.o. male is here today for Blood Pressure check.   History of HTN yes.    Review of patient's allergies indicates:  No Known Allergies  Creatinine   Date Value Ref Range Status   05/22/2020 1.10 0.70 - 1.25 mg/dL Final     Comment:     For patients >49 years of age, the reference limit  for Creatinine is approximately 13% higher for people  identified as -American.          Sodium   Date Value Ref Range Status   05/22/2020 140 135 - 146 mmol/L Final     Potassium   Date Value Ref Range Status   05/22/2020 4.8 3.5 - 5.3 mmol/L Final   ]  Patient verifies taking blood pressure medications on a regular basis at the same time of the day.     Current Outpatient Medications:     amLODIPine (NORVASC) 10 MG tablet, Take 1 tablet (10 mg total) by mouth once daily., Disp: 90 tablet, Rfl: 3    atorvastatin (LIPITOR) 80 MG tablet, Take 1 tablet (80 mg total) by mouth once daily., Disp: 90 tablet, Rfl: 3    blood sugar diagnostic (BLOOD GLUCOSE TEST) Strp, Strips for one to 2 times a day testing   dispense brand covered by insurance and to match meter brand, Disp: 180 each, Rfl: 3    blood-glucose meter kit, Dispense meter  brand covered by insurance, Disp: 1 each, Rfl: 0    clopidogreL (PLAVIX) 75 mg tablet, Take 1 tablet (75 mg total) by mouth once daily., Disp: 90 tablet, Rfl: 3    diabetic supplies, miscellan. Misc, Lancets for 1-2 times a day testing    dispense brand covered by insurance t, Disp: 180 each, Rfl: 3    fluticasone propionate (FLONASE) 50 mcg/actuation nasal spray, 1 spray (50 mcg total) by Each Nostril route once daily., Disp: 48 g, Rfl: 3    gabapentin (NEURONTIN) 600 MG tablet, Take 1 tablet (600 mg total) by mouth 3 (three) times daily as needed., Disp: 270 tablet, Rfl: 3    glimepiride (AMARYL) 2 MG tablet, Take 1 tablet (2 mg total) by mouth daily with breakfast., Disp: 90 tablet, Rfl: 3    lactulose (CHRONULAC) 10 gram/15 mL solution, TAKE  15ML  1-3  TIMES  A  DAY, Disp:  1800 mL, Rfl: 3    lancets (ACCU-CHEK FASTCLIX LANCING DEV) Misc, TEST TWICE DAILY, Disp: 200 each, Rfl: 3    lisinopriL (PRINIVIL,ZESTRIL) 20 MG tablet, Take 2 tablets (40 mg total) by mouth once daily., Disp: 180 tablet, Rfl: 3    metFORMIN (GLUCOPHAGE) 1000 MG tablet, Take 1 tablet (1,000 mg total) by mouth 2 (two) times daily with meals., Disp: 180 tablet, Rfl: 3    metoprolol tartrate (LOPRESSOR) 50 MG tablet, Take 1 tablet (50 mg total) by mouth 2 (two) times daily., Disp: 180 tablet, Rfl: 3    multivitamin (THERAGRAN) per tablet, Take 1 tablet by mouth once daily., Disp: 90 tablet, Rfl: 3    polyethylene glycol (GLYCOLAX) 17 gram/dose powder, Take 17 g by mouth once daily., Disp: 510 Bottle, Rfl: 11    sildenafil (REVATIO) 20 mg Tab, Take 3 to 5 pills each pm as needed for ed, Disp: 90 tablet, Rfl: 2  Does patient have record of home blood pressure readings no.     Last dose of blood pressure medication was taken at 7 am.  Patient is asymptomatic.     Blood pressure reading after 15 minutes was 126 72, Pulse 73.  Dr. Winn notified.

## 2020-06-08 NOTE — PROGRESS NOTES
" Patient ID: Cristi Pulido is a 65 y.o. male.    Chief Complaint: Check up; med refill    HPI      Cristi Pulido is a 65 y.o. male here for continued follow-up.  Patient with old CVA with hemiparesis no new problems.  Patient with type 2 diabetes not having any problems at home.  Does not take insulin.  Hypertension associated with diabetes-elevated blood pressure today without symptoms.  Continues to smoke a small amount.    Vitals:    05/20/20 1439 05/20/20 1504   BP: (!) 182/96 (!) 150/86   Pulse: 110    Temp: 98.2 °F (36.8 °C)    SpO2: 96%    Weight: 87.9 kg (193 lb 14.3 oz)    Height: 5' 10" (1.778 m)             Review of Symptoms    Constitutional  Neg activity change, No chills /fever   Resp  Neg hemoptysis, stridor, choking  CVS  Neg chest pain, palpitations    Physical Exam    Constitutional:  Oriented to person, place, and time.appears well-developed and well-nourished.  No distress.      HENT  Head: Normocephalic and atraumatic  Right Ear: External ear normal.   Left Ear: External ear normal.   Nose: External nose normal.   Mouth:  Moist mucus membranes.    Eyes:  Conjunctivae are normal. Right eye exhibits no discharge.  Left eye exhibits no discharge. No scleral icterus.  No periorbital edema    Cardiovascular:  Regular rate and rhythm with normal S1 and S2     Pulmonary/Chest:   Clear to auscultation bilaterally without wheezes, rhonchi or rales      Musculoskeletal:  No edema. No obvious deformity No wasting       Neurological:  Alert and oriented to person, place, and time.   Coordination normal.     Skin:   Skin is warm and dry.  No diaphoresis.   No rash noted.     Psychiatric: Normal mood and affect. Behavior is normal.  Judgment and thought content normal.     Complete Blood Count  Lab Results   Component Value Date    RBC 4.88 05/22/2020    HGB 13.9 05/22/2020    HCT 43.1 05/22/2020    MCV 88.3 05/22/2020    MCH 28.5 05/22/2020    MCHC 32.3 05/22/2020    RDW 14.5 05/22/2020     05/22/2020    " MPV 12.6 (H) 05/22/2020    GRAN 2.4 02/04/2016    GRAN 39.5 02/04/2016    LYMPH 2,614 05/22/2020    LYMPH 44.3 05/22/2020    MONO 407 05/22/2020    MONO 6.9 05/22/2020     05/22/2020    BASO 30 05/22/2020    EOSINOPHIL 1.9 05/22/2020    BASOPHIL 0.5 05/22/2020    DIFFMETHOD Automated 02/04/2016       Comprehensive Metabolic Panel  Lab Results   Component Value Date     (H) 05/22/2020    BUN 14 05/22/2020    CREATININE 1.10 05/22/2020     05/22/2020    K 4.8 05/22/2020     05/22/2020    PROT 7.6 05/22/2020    ALBUMIN 4.4 05/22/2020    BILITOT 0.5 05/22/2020    AST 23 05/22/2020    ALKPHOS 107 05/22/2020    CO2 28 05/22/2020    ALT 29 05/22/2020    EGFRNONAA 69 05/22/2020    ESTGFRAFRICA 80 05/22/2020       TSH  Lab Results   Component Value Date    TSH 1.00 05/22/2020       Assessment / Plan:      ICD-10-CM ICD-9-CM   1. Type 2 diabetes mellitus without complication, without long-term current use of insulin E11.9 250.00   2. CVA, old, hemiparesis I69.359 438.20   3. Hypertension associated with diabetes E11.59 250.80    I10 401.9   4. Low TSH level R79.89 794.5   5. Atherosclerosis of abdominal aorta I70.0 440.0   6. Seasonal allergic rhinitis due to pollen J30.1 477.0     Type 2 diabetes mellitus without complication, without long-term current use of insulin  -     Cancel: Hemoglobin A1C; Future; Expected date: 05/20/2020  -     Cancel: Comprehensive metabolic panel; Future; Expected date: 05/20/2020  -     Cancel: CBC auto differential; Future; Expected date: 05/20/2020  -     Comprehensive metabolic panel; Future; Expected date: 05/20/2020  -     CBC auto differential; Future; Expected date: 05/20/2020  -     Hemoglobin A1C; Future; Expected date: 05/20/2020    CVA, old, hemiparesis    Hypertension associated with diabetes    Low TSH level    Atherosclerosis of abdominal aorta    Seasonal allergic rhinitis due to pollen    Other orders  -     Discontinue: metFORMIN (GLUCOPHAGE) 1000 MG  tablet; Take 1 tablet (1,000 mg total) by mouth 2 (two) times daily with meals.  Dispense: 180 tablet; Refill: 3  -     Discontinue: amLODIPine (NORVASC) 10 MG tablet; Take 1 tablet (10 mg total) by mouth once daily.  Dispense: 90 tablet; Refill: 3  -     Discontinue: polyethylene glycol (GLYCOLAX) 17 gram/dose powder; Take 17 g by mouth once daily.  Dispense: 510 Bottle; Refill: 11  -     Discontinue: glimepiride (AMARYL) 2 MG tablet; Take 1 tablet (2 mg total) by mouth daily with breakfast.  Dispense: 90 tablet; Refill: 3  -     Discontinue: lisinopriL (PRINIVIL,ZESTRIL) 20 MG tablet; Take 2 tablets (40 mg total) by mouth once daily.  Dispense: 180 tablet; Refill: 3  -     Discontinue: gabapentin (NEURONTIN) 600 MG tablet; Take 1 tablet (600 mg total) by mouth 3 (three) times daily as needed.  Dispense: 270 tablet; Refill: 3  -     Discontinue: metoprolol tartrate (LOPRESSOR) 50 MG tablet; Take 1 tablet (50 mg total) by mouth 2 (two) times daily.  Dispense: 180 tablet; Refill: 3  -     Discontinue: clopidogreL (PLAVIX) 75 mg tablet; Take 1 tablet (75 mg total) by mouth once daily.  Dispense: 90 tablet; Refill: 3  -     Discontinue: atorvastatin (LIPITOR) 80 MG tablet; Take 1 tablet (80 mg total) by mouth once daily.  Dispense: 90 tablet; Refill: 3  -     Discontinue: fluticasone propionate (FLONASE) 50 mcg/actuation nasal spray; 1 spray (50 mcg total) by Each Nostril route once daily.  Dispense: 48 g; Refill: 3  -     Discontinue: lactulose (CHRONULAC) 10 gram/15 mL solution; TAKE  15ML  1-3  TIMES  A  DAY  Dispense: 1800 mL; Refill: 3  -     Discontinue: sildenafil (REVATIO) 20 mg Tab; Take 3 to 5 pills each pm as needed for ed  Dispense: 90 tablet; Refill: 2  -     Discontinue: lancets (ACCU-CHEK FASTCLIX LANCING DEV) Misc; TEST TWICE DAILY  Dispense: 200 each; Refill: 3  -     Discontinue: blood sugar diagnostic (BLOOD GLUCOSE TEST) Strp; Strips for one to 2 times a day testing   dispense brand covered by  insurance and to match meter brand  Dispense: 180 each; Refill: 3  -     Discontinue: multivitamin (THERAGRAN) per tablet; Take 1 tablet by mouth once daily.  Dispense: 90 tablet; Refill: 3  -     Discontinue: multivitamin (THERAGRAN) per tablet; Take 1 tablet by mouth once daily.  Dispense: 90 tablet; Refill: 3  -     Lipid Panel  -     T4, free  -     TSH  -     Hemoglobin A1C

## 2020-06-19 ENCOUNTER — TELEPHONE (OUTPATIENT)
Dept: FAMILY MEDICINE | Facility: CLINIC | Age: 65
End: 2020-06-19

## 2020-06-19 NOTE — TELEPHONE ENCOUNTER
Pt dropped of paperwork on last week for the DMV.  Has it been completed?      I don't see it anywhere.

## 2020-06-23 NOTE — TELEPHONE ENCOUNTER
I called and LM for the pt advising him that his paper work for the [employer]V is ready to be picked up

## 2020-09-03 ENCOUNTER — OFFICE VISIT (OUTPATIENT)
Dept: FAMILY MEDICINE | Facility: CLINIC | Age: 65
End: 2020-09-03
Payer: MEDICARE

## 2020-09-03 VITALS
OXYGEN SATURATION: 95 % | WEIGHT: 190.69 LBS | DIASTOLIC BLOOD PRESSURE: 86 MMHG | TEMPERATURE: 98 F | HEART RATE: 82 BPM | SYSTOLIC BLOOD PRESSURE: 138 MMHG | HEIGHT: 70 IN | BODY MASS INDEX: 27.3 KG/M2

## 2020-09-03 DIAGNOSIS — E11.59 HYPERTENSION ASSOCIATED WITH DIABETES: ICD-10-CM

## 2020-09-03 DIAGNOSIS — I69.359 CVA, OLD, HEMIPARESIS: ICD-10-CM

## 2020-09-03 DIAGNOSIS — R79.89 LOW TSH LEVEL: ICD-10-CM

## 2020-09-03 DIAGNOSIS — I15.2 HYPERTENSION ASSOCIATED WITH DIABETES: ICD-10-CM

## 2020-09-03 DIAGNOSIS — E11.9 TYPE 2 DIABETES MELLITUS WITHOUT COMPLICATION, WITHOUT LONG-TERM CURRENT USE OF INSULIN: Primary | ICD-10-CM

## 2020-09-03 PROCEDURE — 3079F PR MOST RECENT DIASTOLIC BLOOD PRESSURE 80-89 MM HG: ICD-10-PCS | Mod: CPTII,S$GLB,, | Performed by: FAMILY MEDICINE

## 2020-09-03 PROCEDURE — 3075F PR MOST RECENT SYSTOLIC BLOOD PRESS GE 130-139MM HG: ICD-10-PCS | Mod: CPTII,S$GLB,, | Performed by: FAMILY MEDICINE

## 2020-09-03 PROCEDURE — 3051F HG A1C>EQUAL 7.0%<8.0%: CPT | Mod: CPTII,S$GLB,, | Performed by: FAMILY MEDICINE

## 2020-09-03 PROCEDURE — 3075F SYST BP GE 130 - 139MM HG: CPT | Mod: CPTII,S$GLB,, | Performed by: FAMILY MEDICINE

## 2020-09-03 PROCEDURE — 3051F PR MOST RECENT HEMOGLOBIN A1C LEVEL 7.0 - < 8.0%: ICD-10-PCS | Mod: CPTII,S$GLB,, | Performed by: FAMILY MEDICINE

## 2020-09-03 PROCEDURE — 99397 PER PM REEVAL EST PAT 65+ YR: CPT | Mod: S$GLB,,, | Performed by: FAMILY MEDICINE

## 2020-09-03 PROCEDURE — 99499 RISK ADDL DX/OHS AUDIT: ICD-10-PCS | Mod: S$GLB,,, | Performed by: FAMILY MEDICINE

## 2020-09-03 PROCEDURE — 99499 UNLISTED E&M SERVICE: CPT | Mod: S$GLB,,, | Performed by: FAMILY MEDICINE

## 2020-09-03 PROCEDURE — 3079F DIAST BP 80-89 MM HG: CPT | Mod: CPTII,S$GLB,, | Performed by: FAMILY MEDICINE

## 2020-09-03 PROCEDURE — 99397 PR PREVENTIVE VISIT,EST,65 & OVER: ICD-10-PCS | Mod: S$GLB,,, | Performed by: FAMILY MEDICINE

## 2020-09-03 RX ORDER — GLIMEPIRIDE 2 MG/1
2 TABLET ORAL
Qty: 90 TABLET | Refills: 3 | Status: SHIPPED | OUTPATIENT
Start: 2020-09-03 | End: 2020-12-14 | Stop reason: SDUPTHER

## 2020-09-03 RX ORDER — ATORVASTATIN CALCIUM 80 MG/1
80 TABLET, FILM COATED ORAL DAILY
Qty: 90 TABLET | Refills: 3 | Status: SHIPPED | OUTPATIENT
Start: 2020-09-03 | End: 2020-12-14 | Stop reason: SDUPTHER

## 2020-09-03 RX ORDER — CLOPIDOGREL BISULFATE 75 MG/1
75 TABLET ORAL DAILY
Qty: 90 TABLET | Refills: 3 | Status: SHIPPED | OUTPATIENT
Start: 2020-09-03 | End: 2020-12-14 | Stop reason: SDUPTHER

## 2020-09-03 RX ORDER — LANCETS 33 GAUGE
EACH MISCELLANEOUS
COMMUNITY
Start: 2020-06-01

## 2020-09-03 RX ORDER — METFORMIN HYDROCHLORIDE 1000 MG/1
1000 TABLET ORAL 2 TIMES DAILY WITH MEALS
Qty: 180 TABLET | Refills: 3 | Status: SHIPPED | OUTPATIENT
Start: 2020-09-03 | End: 2020-12-14 | Stop reason: SDUPTHER

## 2020-09-03 RX ORDER — LISINOPRIL 20 MG/1
40 TABLET ORAL DAILY
Qty: 180 TABLET | Refills: 3 | Status: SHIPPED | OUTPATIENT
Start: 2020-09-03 | End: 2020-12-14 | Stop reason: SDUPTHER

## 2020-09-03 RX ORDER — MULTIVITAMIN
1 TABLET ORAL DAILY
Qty: 90 TABLET | Refills: 3 | Status: SHIPPED | OUTPATIENT
Start: 2020-09-03 | End: 2022-10-17 | Stop reason: SDUPTHER

## 2020-09-03 RX ORDER — AMLODIPINE BESYLATE 10 MG/1
10 TABLET ORAL DAILY
Qty: 90 TABLET | Refills: 3 | Status: SHIPPED | OUTPATIENT
Start: 2020-09-03 | End: 2020-12-14 | Stop reason: SDUPTHER

## 2020-09-03 RX ORDER — METOPROLOL TARTRATE 50 MG/1
50 TABLET ORAL 2 TIMES DAILY
Qty: 180 TABLET | Refills: 3 | Status: SHIPPED | OUTPATIENT
Start: 2020-09-03 | End: 2020-12-14 | Stop reason: SDUPTHER

## 2020-09-03 RX ORDER — SILDENAFIL CITRATE 20 MG/1
TABLET ORAL
Qty: 90 TABLET | Refills: 2 | Status: SHIPPED | OUTPATIENT
Start: 2020-09-03 | End: 2020-12-14 | Stop reason: SDUPTHER

## 2020-09-03 NOTE — PROGRESS NOTES
" Patient ID: Cristi Pulido is a 65 y.o. male.    Chief Complaint: Annual Exam    HPI      Cristi Pulido is a 65 y.o. male patient here for annual examination.  No complaints of pain or problems.  Patient has a history of having CVA with hemiparesis walks with a walker.  That has been stable over several years.  Hypertension with diabetes-under control this time  Diabetes has been under control recently.  Allergic rhinitis-stable no new problems    Vitals:    09/03/20 0904   BP: 138/86   Pulse: 82   Temp: 97.8 °F (36.6 °C)   SpO2: 95%   Weight: 86.5 kg (190 lb 11.2 oz)   Height: 5' 10" (1.778 m)            Review of Symptoms    Constitutional  Neg activity change, No chills /fever   Resp  Neg hemoptysis, stridor, choking  CVS  Neg chest pain, palpitations  Other review systems up to 14 no complaints  Physical Exam    Constitutional:  Oriented to person, place, and time.appears well-developed and well-nourished.  No distress.      HENT  Head: Normocephalic and atraumatic  Right Ear: External ear normal.   Left Ear: External ear normal.   Nose: External nose normal.   Mouth:  Moist mucus membranes.    Eyes:  Conjunctivae are normal. Right eye exhibits no discharge.  Left eye exhibits no discharge. No scleral icterus.  No periorbital edema    Cardiovascular:  Regular rate and rhythm with normal S1 and S2     Pulmonary/Chest:   Clear to auscultation bilaterally without wheezes, rhonchi or rales      Musculoskeletal:  No edema. No obvious deformity No wasting  Weakness-right-side     Neurological:  Alert and oriented to person, place, and time.   Coordination uses a walker to walk    Skin:   Skin is warm and dry.  No diaphoresis.   No rash noted.     Psychiatric: Normal mood and affect. Behavior is normal.  Judgment and thought content normal.     Complete Blood Count  Lab Results   Component Value Date    RBC 4.88 05/22/2020    HGB 13.9 05/22/2020    HCT 43.1 05/22/2020    MCV 88.3 05/22/2020    MCH 28.5 05/22/2020    MCHC " 32.3 05/22/2020    RDW 14.5 05/22/2020     05/22/2020    MPV 12.6 (H) 05/22/2020    GRAN 2.4 02/04/2016    GRAN 39.5 02/04/2016    LYMPH 2,614 05/22/2020    LYMPH 44.3 05/22/2020    MONO 407 05/22/2020    MONO 6.9 05/22/2020     05/22/2020    BASO 30 05/22/2020    EOSINOPHIL 1.9 05/22/2020    BASOPHIL 0.5 05/22/2020    DIFFMETHOD Automated 02/04/2016       Comprehensive Metabolic Panel  Lab Results   Component Value Date     (H) 05/22/2020    BUN 14 05/22/2020    CREATININE 1.10 05/22/2020     05/22/2020    K 4.8 05/22/2020     05/22/2020    PROT 7.6 05/22/2020    ALBUMIN 4.4 05/22/2020    BILITOT 0.5 05/22/2020    AST 23 05/22/2020    ALKPHOS 107 05/22/2020    CO2 28 05/22/2020    ALT 29 05/22/2020    EGFRNONAA 69 05/22/2020    ESTGFRAFRICA 80 05/22/2020       TSH  Lab Results   Component Value Date    TSH 1.00 05/22/2020       Assessment / Plan:      ICD-10-CM ICD-9-CM   1. Type 2 diabetes mellitus without complication, without long-term current use of insulin  E11.9 250.00   2. Hypertension associated with diabetes  E11.59 250.80    I10 401.9   3. CVA, old, hemiparesis  I69.359 438.20   4. Low TSH level  R79.89 794.5     Type 2 diabetes mellitus without complication, without long-term current use of insulin  -     Lipid Panel; Future; Expected date: 09/03/2020  -     Hemoglobin A1C; Future; Expected date: 09/03/2020  -     Basic metabolic panel; Future; Expected date: 09/03/2020    Hypertension associated with diabetes  -     Lipid Panel; Future; Expected date: 09/03/2020  -     Hemoglobin A1C; Future; Expected date: 09/03/2020  -     Basic metabolic panel; Future; Expected date: 09/03/2020    CVA, old, hemiparesis  -     Lipid Panel; Future; Expected date: 09/03/2020  -     Hemoglobin A1C; Future; Expected date: 09/03/2020  -     Basic metabolic panel; Future; Expected date: 09/03/2020    Low TSH level  -     Lipid Panel; Future; Expected date: 09/03/2020  -     Hemoglobin A1C;  Future; Expected date: 09/03/2020  -     Basic metabolic panel; Future; Expected date: 09/03/2020    Other orders  -     lisinopriL (PRINIVIL,ZESTRIL) 20 MG tablet; Take 2 tablets (40 mg total) by mouth once daily.  Dispense: 180 tablet; Refill: 3  -     glimepiride (AMARYL) 2 MG tablet; Take 1 tablet (2 mg total) by mouth daily with breakfast.  Dispense: 90 tablet; Refill: 3  -     amLODIPine (NORVASC) 10 MG tablet; Take 1 tablet (10 mg total) by mouth once daily.  Dispense: 90 tablet; Refill: 3  -     metFORMIN (GLUCOPHAGE) 1000 MG tablet; Take 1 tablet (1,000 mg total) by mouth 2 (two) times daily with meals.  Dispense: 180 tablet; Refill: 3  -     clopidogreL (PLAVIX) 75 mg tablet; Take 1 tablet (75 mg total) by mouth once daily.  Dispense: 90 tablet; Refill: 3  -     atorvastatin (LIPITOR) 80 MG tablet; Take 1 tablet (80 mg total) by mouth once daily.  Dispense: 90 tablet; Refill: 3  -     metoprolol tartrate (LOPRESSOR) 50 MG tablet; Take 1 tablet (50 mg total) by mouth 2 (two) times daily.  Dispense: 180 tablet; Refill: 3  -     multivitamin (THERAGRAN) per tablet; Take 1 tablet by mouth once daily.  Dispense: 90 tablet; Refill: 3  -     sildenafil (REVATIO) 20 mg Tab; Take 3 to 5 pills each pm as needed for ed  Dispense: 90 tablet; Refill: 2

## 2020-10-06 ENCOUNTER — PES CALL (OUTPATIENT)
Dept: ADMINISTRATIVE | Facility: CLINIC | Age: 65
End: 2020-10-06

## 2020-12-08 ENCOUNTER — TELEPHONE (OUTPATIENT)
Dept: FAMILY MEDICINE | Facility: CLINIC | Age: 65
End: 2020-12-08

## 2020-12-08 LAB
BUN SERPL-MCNC: 13 MG/DL (ref 7–25)
BUN/CREAT SERPL: ABNORMAL (CALC) (ref 6–22)
CALCIUM SERPL-MCNC: 9.8 MG/DL (ref 8.6–10.3)
CHLORIDE SERPL-SCNC: 103 MMOL/L (ref 98–110)
CHOLEST SERPL-MCNC: 165 MG/DL
CHOLEST/HDLC SERPL: 3 (CALC)
CO2 SERPL-SCNC: 30 MMOL/L (ref 20–32)
CREAT SERPL-MCNC: 1.05 MG/DL (ref 0.7–1.25)
GFRSERPLBLD MDRD-ARVRAT: 73 ML/MIN/1.73M2
GLUCOSE SERPL-MCNC: 129 MG/DL (ref 65–99)
HBA1C MFR BLD: 7.6 % OF TOTAL HGB
HDLC SERPL-MCNC: 55 MG/DL
LDLC SERPL CALC-MCNC: 91 MG/DL (CALC)
NONHDLC SERPL-MCNC: 110 MG/DL (CALC)
POTASSIUM SERPL-SCNC: 4.3 MMOL/L (ref 3.5–5.3)
SODIUM SERPL-SCNC: 143 MMOL/L (ref 135–146)
TRIGL SERPL-MCNC: 92 MG/DL

## 2020-12-14 ENCOUNTER — OFFICE VISIT (OUTPATIENT)
Dept: FAMILY MEDICINE | Facility: CLINIC | Age: 65
End: 2020-12-14
Payer: MEDICARE

## 2020-12-14 VITALS
DIASTOLIC BLOOD PRESSURE: 80 MMHG | HEART RATE: 86 BPM | HEIGHT: 70 IN | OXYGEN SATURATION: 97 % | WEIGHT: 197.19 LBS | TEMPERATURE: 97 F | SYSTOLIC BLOOD PRESSURE: 138 MMHG | BODY MASS INDEX: 28.23 KG/M2

## 2020-12-14 DIAGNOSIS — I70.0 ATHEROSCLEROSIS OF ABDOMINAL AORTA: ICD-10-CM

## 2020-12-14 DIAGNOSIS — Z12.11 SCREENING FOR COLON CANCER: ICD-10-CM

## 2020-12-14 DIAGNOSIS — I15.2 HYPERTENSION ASSOCIATED WITH DIABETES: ICD-10-CM

## 2020-12-14 DIAGNOSIS — E11.59 HYPERTENSION ASSOCIATED WITH DIABETES: ICD-10-CM

## 2020-12-14 DIAGNOSIS — E11.9 TYPE 2 DIABETES MELLITUS WITHOUT COMPLICATION, WITHOUT LONG-TERM CURRENT USE OF INSULIN: Primary | ICD-10-CM

## 2020-12-14 DIAGNOSIS — Z12.11 ENCOUNTER FOR SCREENING FOR MALIGNANT NEOPLASM OF COLON: ICD-10-CM

## 2020-12-14 DIAGNOSIS — I69.359 CVA, OLD, HEMIPARESIS: ICD-10-CM

## 2020-12-14 PROCEDURE — 99499 RISK ADDL DX/OHS AUDIT: ICD-10-PCS | Mod: S$GLB,,, | Performed by: FAMILY MEDICINE

## 2020-12-14 PROCEDURE — 99214 OFFICE O/P EST MOD 30 MIN: CPT | Mod: S$GLB,,, | Performed by: FAMILY MEDICINE

## 2020-12-14 PROCEDURE — 3079F PR MOST RECENT DIASTOLIC BLOOD PRESSURE 80-89 MM HG: ICD-10-PCS | Mod: CPTII,S$GLB,, | Performed by: FAMILY MEDICINE

## 2020-12-14 PROCEDURE — 3051F PR MOST RECENT HEMOGLOBIN A1C LEVEL 7.0 - < 8.0%: ICD-10-PCS | Mod: CPTII,S$GLB,, | Performed by: FAMILY MEDICINE

## 2020-12-14 PROCEDURE — 99214 PR OFFICE/OUTPT VISIT, EST, LEVL IV, 30-39 MIN: ICD-10-PCS | Mod: S$GLB,,, | Performed by: FAMILY MEDICINE

## 2020-12-14 PROCEDURE — 1101F PR PT FALLS ASSESS DOC 0-1 FALLS W/OUT INJ PAST YR: ICD-10-PCS | Mod: CPTII,S$GLB,, | Performed by: FAMILY MEDICINE

## 2020-12-14 PROCEDURE — 3075F PR MOST RECENT SYSTOLIC BLOOD PRESS GE 130-139MM HG: ICD-10-PCS | Mod: CPTII,S$GLB,, | Performed by: FAMILY MEDICINE

## 2020-12-14 PROCEDURE — 3079F DIAST BP 80-89 MM HG: CPT | Mod: CPTII,S$GLB,, | Performed by: FAMILY MEDICINE

## 2020-12-14 PROCEDURE — 1101F PT FALLS ASSESS-DOCD LE1/YR: CPT | Mod: CPTII,S$GLB,, | Performed by: FAMILY MEDICINE

## 2020-12-14 PROCEDURE — 3008F BODY MASS INDEX DOCD: CPT | Mod: CPTII,S$GLB,, | Performed by: FAMILY MEDICINE

## 2020-12-14 PROCEDURE — 1126F AMNT PAIN NOTED NONE PRSNT: CPT | Mod: S$GLB,,, | Performed by: FAMILY MEDICINE

## 2020-12-14 PROCEDURE — 3288F PR FALLS RISK ASSESSMENT DOCUMENTED: ICD-10-PCS | Mod: CPTII,S$GLB,, | Performed by: FAMILY MEDICINE

## 2020-12-14 PROCEDURE — 3051F HG A1C>EQUAL 7.0%<8.0%: CPT | Mod: CPTII,S$GLB,, | Performed by: FAMILY MEDICINE

## 2020-12-14 PROCEDURE — 1159F MED LIST DOCD IN RCRD: CPT | Mod: S$GLB,,, | Performed by: FAMILY MEDICINE

## 2020-12-14 PROCEDURE — 3288F FALL RISK ASSESSMENT DOCD: CPT | Mod: CPTII,S$GLB,, | Performed by: FAMILY MEDICINE

## 2020-12-14 PROCEDURE — 1159F PR MEDICATION LIST DOCUMENTED IN MEDICAL RECORD: ICD-10-PCS | Mod: S$GLB,,, | Performed by: FAMILY MEDICINE

## 2020-12-14 PROCEDURE — 1126F PR PAIN SEVERITY QUANTIFIED, NO PAIN PRESENT: ICD-10-PCS | Mod: S$GLB,,, | Performed by: FAMILY MEDICINE

## 2020-12-14 PROCEDURE — 3008F PR BODY MASS INDEX (BMI) DOCUMENTED: ICD-10-PCS | Mod: CPTII,S$GLB,, | Performed by: FAMILY MEDICINE

## 2020-12-14 PROCEDURE — 3075F SYST BP GE 130 - 139MM HG: CPT | Mod: CPTII,S$GLB,, | Performed by: FAMILY MEDICINE

## 2020-12-14 PROCEDURE — 99499 UNLISTED E&M SERVICE: CPT | Mod: S$GLB,,, | Performed by: FAMILY MEDICINE

## 2020-12-14 RX ORDER — METFORMIN HYDROCHLORIDE 1000 MG/1
1000 TABLET ORAL 2 TIMES DAILY WITH MEALS
Qty: 180 TABLET | Refills: 3 | Status: SHIPPED | OUTPATIENT
Start: 2020-12-14 | End: 2021-01-04 | Stop reason: SDUPTHER

## 2020-12-14 RX ORDER — CLOPIDOGREL BISULFATE 75 MG/1
75 TABLET ORAL DAILY
Qty: 90 TABLET | Refills: 3 | Status: SHIPPED | OUTPATIENT
Start: 2020-12-14 | End: 2021-01-04 | Stop reason: SDUPTHER

## 2020-12-14 RX ORDER — ATORVASTATIN CALCIUM 80 MG/1
80 TABLET, FILM COATED ORAL DAILY
Qty: 90 TABLET | Refills: 3 | Status: SHIPPED | OUTPATIENT
Start: 2020-12-14 | End: 2021-01-04 | Stop reason: SDUPTHER

## 2020-12-14 RX ORDER — LACTULOSE 10 G/15ML
SOLUTION ORAL; RECTAL
Qty: 1800 ML | Refills: 3 | Status: SHIPPED | OUTPATIENT
Start: 2020-12-14 | End: 2021-01-04 | Stop reason: SDUPTHER

## 2020-12-14 RX ORDER — SILDENAFIL 100 MG/1
100 TABLET, FILM COATED ORAL DAILY PRN
Qty: 10 TABLET | Refills: 3 | Status: SHIPPED | OUTPATIENT
Start: 2020-12-14 | End: 2021-01-04 | Stop reason: SDUPTHER

## 2020-12-14 RX ORDER — GLIMEPIRIDE 2 MG/1
2 TABLET ORAL
Qty: 90 TABLET | Refills: 3 | Status: SHIPPED | OUTPATIENT
Start: 2020-12-14 | End: 2021-01-04 | Stop reason: SDUPTHER

## 2020-12-14 RX ORDER — LISINOPRIL 20 MG/1
40 TABLET ORAL DAILY
Qty: 180 TABLET | Refills: 3 | Status: SHIPPED | OUTPATIENT
Start: 2020-12-14 | End: 2021-01-04 | Stop reason: SDUPTHER

## 2020-12-14 RX ORDER — SILDENAFIL CITRATE 20 MG/1
TABLET ORAL
Qty: 90 TABLET | Refills: 2 | Status: SHIPPED | OUTPATIENT
Start: 2020-12-14 | End: 2021-06-23 | Stop reason: SDUPTHER

## 2020-12-14 RX ORDER — METOPROLOL TARTRATE 50 MG/1
50 TABLET ORAL 2 TIMES DAILY
Qty: 180 TABLET | Refills: 3 | Status: SHIPPED | OUTPATIENT
Start: 2020-12-14 | End: 2021-01-04 | Stop reason: SDUPTHER

## 2020-12-14 RX ORDER — GABAPENTIN 600 MG/1
600 TABLET ORAL 3 TIMES DAILY PRN
Qty: 270 TABLET | Refills: 3 | Status: SHIPPED | OUTPATIENT
Start: 2020-12-14 | End: 2021-01-04 | Stop reason: SDUPTHER

## 2020-12-14 RX ORDER — CETIRIZINE HYDROCHLORIDE 10 MG/1
10 TABLET ORAL DAILY
Qty: 90 TABLET | Refills: 3 | Status: SHIPPED | OUTPATIENT
Start: 2020-12-14 | End: 2021-01-04 | Stop reason: SDUPTHER

## 2020-12-14 RX ORDER — AMLODIPINE BESYLATE 10 MG/1
10 TABLET ORAL DAILY
Qty: 90 TABLET | Refills: 3 | Status: SHIPPED | OUTPATIENT
Start: 2020-12-14 | End: 2021-01-04 | Stop reason: SDUPTHER

## 2020-12-14 NOTE — PROGRESS NOTES
" Patient ID: Cristi Pulido is a 65 y.o. male.    Chief Complaint: Follow-up (3 month ), Medication Refill, and discuss A1c results    HPI      Cristi Pulido is a 65 y.o. male here following up on several chronic conditions.  Hypertension with repeat reading stable  CVA-no new problems  Diabetes-under good control at this time.  Atherosclerosis-no problem    Vitals:    12/14/20 1100 12/14/20 1118   BP: (!) 142/80 138/80   BP Location: Right arm    Patient Position: Sitting    Pulse: 86    Temp: 97.1 °F (36.2 °C)    TempSrc: Oral    SpO2: 97%    Weight: 89.4 kg (197 lb 3.2 oz)    Height: 5' 10" (1.778 m)             Review of Symptoms    Constitutional  Neg activity change, No chills /fever   Resp  Neg hemoptysis, stridor, choking  CVS  Neg chest pain, palpitations    Physical Exam    Constitutional:  Oriented to person, place, and time.appears well-developed and well-nourished.  No distress.      HENT  Head: Normocephalic and atraumatic  Right Ear: External ear normal.   Left Ear: External ear normal.   Nose: External nose normal.   Mouth:  Moist mucus membranes.    Eyes:  Conjunctivae are normal. Right eye exhibits no discharge.  Left eye exhibits no discharge. No scleral icterus.  No periorbital edema    Cardiovascular:  Regular rate and rhythm with normal S1 and S2     Pulmonary/Chest:   Clear to auscultation bilaterally without wheezes, rhonchi or rales      Musculoskeletal:  No edema. No wasting  Hemiparesis mild-uses walker to walk without problems     Neurological:  Alert and oriented to person, place, and time.   Coordination normal.     Skin:   Skin is warm and dry.  No diaphoresis.   No rash noted.     Psychiatric: Normal mood and affect. Behavior is normal.  Judgment and thought content normal.     Complete Blood Count  Lab Results   Component Value Date    RBC 4.88 05/22/2020    HGB 13.9 05/22/2020    HCT 43.1 05/22/2020    MCV 88.3 05/22/2020    MCH 28.5 05/22/2020    MCHC 32.3 05/22/2020    RDW 14.5 " 05/22/2020     05/22/2020    MPV 12.6 (H) 05/22/2020    GRAN 2.4 02/04/2016    GRAN 39.5 02/04/2016    LYMPH 2,614 05/22/2020    LYMPH 44.3 05/22/2020    MONO 407 05/22/2020    MONO 6.9 05/22/2020     05/22/2020    BASO 30 05/22/2020    EOSINOPHIL 1.9 05/22/2020    BASOPHIL 0.5 05/22/2020    DIFFMETHOD Automated 02/04/2016       Comprehensive Metabolic Panel  Lab Results   Component Value Date     (H) 12/07/2020    BUN 13 12/07/2020    CREATININE 1.05 12/07/2020     12/07/2020    K 4.3 12/07/2020     12/07/2020    CO2 30 12/07/2020    EGFRNONAA 73 12/07/2020    ESTGFRAFRICA 85 12/07/2020       TSH  No results found for: TSH    Assessment / Plan:      ICD-10-CM ICD-9-CM   1. Type 2 diabetes mellitus without complication, without long-term current use of insulin  E11.9 250.00   2. Screening for colon cancer  Z12.11 V76.51   3. Encounter for screening for malignant neoplasm of colon  Z12.11 V76.51   4. Hypertension associated with diabetes  E11.59 250.80    I10 401.9   5. CVA, old, hemiparesis  I69.359 438.20   6. Atherosclerosis of abdominal aorta  I70.0 440.0     Type 2 diabetes mellitus without complication, without long-term current use of insulin  -     Ambulatory referral/consult to Ophthalmology; Future; Expected date: 12/21/2020    Screening for colon cancer  -     Ambulatory referral/consult to Gastroenterology; Future; Expected date: 12/21/2020    Encounter for screening for malignant neoplasm of colon  -     Ambulatory referral/consult to Gastroenterology; Future; Expected date: 12/21/2020    Hypertension associated with diabetes    CVA, old, hemiparesis    Atherosclerosis of abdominal aorta    Other orders  -     sildenafil (REVATIO) 20 mg Tab; Take 3 to 5 pills each pm as needed for ed  Dispense: 90 tablet; Refill: 2  -     sildenafiL (VIAGRA) 100 MG tablet; Take 1 tablet (100 mg total) by mouth daily as needed for Erectile Dysfunction.  Dispense: 10 tablet; Refill: 3  -      amLODIPine (NORVASC) 10 MG tablet; Take 1 tablet (10 mg total) by mouth once daily.  Dispense: 90 tablet; Refill: 3  -     atorvastatin (LIPITOR) 80 MG tablet; Take 1 tablet (80 mg total) by mouth once daily.  Dispense: 90 tablet; Refill: 3  -     clopidogreL (PLAVIX) 75 mg tablet; Take 1 tablet (75 mg total) by mouth once daily.  Dispense: 90 tablet; Refill: 3  -     gabapentin (NEURONTIN) 600 MG tablet; Take 1 tablet (600 mg total) by mouth 3 (three) times daily as needed.  Dispense: 270 tablet; Refill: 3  -     glimepiride (AMARYL) 2 MG tablet; Take 1 tablet (2 mg total) by mouth daily with breakfast.  Dispense: 90 tablet; Refill: 3  -     lactulose (CHRONULAC) 10 gram/15 mL solution; TAKE  15ML  1-3  TIMES  A  DAY  Dispense: 1800 mL; Refill: 3  -     lisinopriL (PRINIVIL,ZESTRIL) 20 MG tablet; Take 2 tablets (40 mg total) by mouth once daily.  Dispense: 180 tablet; Refill: 3  -     metFORMIN (GLUCOPHAGE) 1000 MG tablet; Take 1 tablet (1,000 mg total) by mouth 2 (two) times daily with meals.  Dispense: 180 tablet; Refill: 3  -     metoprolol tartrate (LOPRESSOR) 50 MG tablet; Take 1 tablet (50 mg total) by mouth 2 (two) times daily.  Dispense: 180 tablet; Refill: 3  -     cetirizine (ZYRTEC) 10 MG tablet; Take 1 tablet (10 mg total) by mouth once daily. AT NIGHT FOR ALLEGIES  Dispense: 90 tablet; Refill: 3

## 2021-02-08 ENCOUNTER — PATIENT OUTREACH (OUTPATIENT)
Dept: ADMINISTRATIVE | Facility: HOSPITAL | Age: 66
End: 2021-02-08

## 2021-02-17 DIAGNOSIS — I15.2 HYPERTENSION ASSOCIATED WITH DIABETES: ICD-10-CM

## 2021-02-17 DIAGNOSIS — E11.59 HYPERTENSION ASSOCIATED WITH DIABETES: ICD-10-CM

## 2021-02-18 ENCOUNTER — PATIENT OUTREACH (OUTPATIENT)
Dept: ADMINISTRATIVE | Facility: HOSPITAL | Age: 66
End: 2021-02-18

## 2021-02-19 ENCOUNTER — TELEPHONE (OUTPATIENT)
Dept: INTERNAL MEDICINE | Facility: CLINIC | Age: 66
End: 2021-02-19

## 2021-03-11 ENCOUNTER — PATIENT OUTREACH (OUTPATIENT)
Dept: ADMINISTRATIVE | Facility: HOSPITAL | Age: 66
End: 2021-03-11

## 2021-03-23 ENCOUNTER — OFFICE VISIT (OUTPATIENT)
Dept: FAMILY MEDICINE | Facility: CLINIC | Age: 66
End: 2021-03-23
Payer: MEDICARE

## 2021-03-23 VITALS
TEMPERATURE: 97 F | BODY MASS INDEX: 28.44 KG/M2 | WEIGHT: 198.63 LBS | DIASTOLIC BLOOD PRESSURE: 80 MMHG | HEIGHT: 70 IN | OXYGEN SATURATION: 95 % | SYSTOLIC BLOOD PRESSURE: 136 MMHG | HEART RATE: 78 BPM

## 2021-03-23 DIAGNOSIS — I70.0 ATHEROSCLEROSIS OF ABDOMINAL AORTA: ICD-10-CM

## 2021-03-23 DIAGNOSIS — E11.59 HYPERTENSION ASSOCIATED WITH DIABETES: ICD-10-CM

## 2021-03-23 DIAGNOSIS — I69.359 CVA, OLD, HEMIPARESIS: ICD-10-CM

## 2021-03-23 DIAGNOSIS — J30.1 SEASONAL ALLERGIC RHINITIS DUE TO POLLEN: ICD-10-CM

## 2021-03-23 DIAGNOSIS — E11.9 TYPE 2 DIABETES MELLITUS WITHOUT COMPLICATION, WITHOUT LONG-TERM CURRENT USE OF INSULIN: Primary | ICD-10-CM

## 2021-03-23 DIAGNOSIS — Z89.421 HISTORY OF COMPLETE RAY AMPUTATION OF SECOND TOE OF RIGHT FOOT: ICD-10-CM

## 2021-03-23 DIAGNOSIS — Z89.422 HISTORY OF COMPLETE RAY AMPUTATION OF SECOND TOE OF LEFT FOOT: ICD-10-CM

## 2021-03-23 DIAGNOSIS — E78.5 HYPERLIPIDEMIA, UNSPECIFIED HYPERLIPIDEMIA TYPE: ICD-10-CM

## 2021-03-23 DIAGNOSIS — I15.2 HYPERTENSION ASSOCIATED WITH DIABETES: ICD-10-CM

## 2021-03-23 PROCEDURE — 3051F PR MOST RECENT HEMOGLOBIN A1C LEVEL 7.0 - < 8.0%: ICD-10-PCS | Mod: CPTII,S$GLB,, | Performed by: FAMILY MEDICINE

## 2021-03-23 PROCEDURE — 1159F PR MEDICATION LIST DOCUMENTED IN MEDICAL RECORD: ICD-10-PCS | Mod: S$GLB,,, | Performed by: FAMILY MEDICINE

## 2021-03-23 PROCEDURE — 1126F AMNT PAIN NOTED NONE PRSNT: CPT | Mod: S$GLB,,, | Performed by: FAMILY MEDICINE

## 2021-03-23 PROCEDURE — 3008F BODY MASS INDEX DOCD: CPT | Mod: CPTII,S$GLB,, | Performed by: FAMILY MEDICINE

## 2021-03-23 PROCEDURE — 3008F PR BODY MASS INDEX (BMI) DOCUMENTED: ICD-10-PCS | Mod: CPTII,S$GLB,, | Performed by: FAMILY MEDICINE

## 2021-03-23 PROCEDURE — 1126F PR PAIN SEVERITY QUANTIFIED, NO PAIN PRESENT: ICD-10-PCS | Mod: S$GLB,,, | Performed by: FAMILY MEDICINE

## 2021-03-23 PROCEDURE — 3051F HG A1C>EQUAL 7.0%<8.0%: CPT | Mod: CPTII,S$GLB,, | Performed by: FAMILY MEDICINE

## 2021-03-23 PROCEDURE — 1159F MED LIST DOCD IN RCRD: CPT | Mod: S$GLB,,, | Performed by: FAMILY MEDICINE

## 2021-03-23 PROCEDURE — 3079F DIAST BP 80-89 MM HG: CPT | Mod: CPTII,S$GLB,, | Performed by: FAMILY MEDICINE

## 2021-03-23 PROCEDURE — 1101F PR PT FALLS ASSESS DOC 0-1 FALLS W/OUT INJ PAST YR: ICD-10-PCS | Mod: CPTII,S$GLB,, | Performed by: FAMILY MEDICINE

## 2021-03-23 PROCEDURE — 99214 PR OFFICE/OUTPT VISIT, EST, LEVL IV, 30-39 MIN: ICD-10-PCS | Mod: S$GLB,,, | Performed by: FAMILY MEDICINE

## 2021-03-23 PROCEDURE — 3288F PR FALLS RISK ASSESSMENT DOCUMENTED: ICD-10-PCS | Mod: CPTII,S$GLB,, | Performed by: FAMILY MEDICINE

## 2021-03-23 PROCEDURE — 99214 OFFICE O/P EST MOD 30 MIN: CPT | Mod: S$GLB,,, | Performed by: FAMILY MEDICINE

## 2021-03-23 PROCEDURE — 3288F FALL RISK ASSESSMENT DOCD: CPT | Mod: CPTII,S$GLB,, | Performed by: FAMILY MEDICINE

## 2021-03-23 PROCEDURE — 1101F PT FALLS ASSESS-DOCD LE1/YR: CPT | Mod: CPTII,S$GLB,, | Performed by: FAMILY MEDICINE

## 2021-03-23 PROCEDURE — 3079F PR MOST RECENT DIASTOLIC BLOOD PRESSURE 80-89 MM HG: ICD-10-PCS | Mod: CPTII,S$GLB,, | Performed by: FAMILY MEDICINE

## 2021-03-23 PROCEDURE — 99499 RISK ADDL DX/OHS AUDIT: ICD-10-PCS | Mod: S$GLB,,, | Performed by: FAMILY MEDICINE

## 2021-03-23 PROCEDURE — 99499 UNLISTED E&M SERVICE: CPT | Mod: S$GLB,,, | Performed by: FAMILY MEDICINE

## 2021-03-23 PROCEDURE — 3075F PR MOST RECENT SYSTOLIC BLOOD PRESS GE 130-139MM HG: ICD-10-PCS | Mod: CPTII,S$GLB,, | Performed by: FAMILY MEDICINE

## 2021-03-23 PROCEDURE — 3075F SYST BP GE 130 - 139MM HG: CPT | Mod: CPTII,S$GLB,, | Performed by: FAMILY MEDICINE

## 2021-03-23 RX ORDER — GABAPENTIN 600 MG/1
600 TABLET ORAL 3 TIMES DAILY PRN
Qty: 270 TABLET | Refills: 3 | Status: SHIPPED | OUTPATIENT
Start: 2021-03-23 | End: 2021-06-23 | Stop reason: SDUPTHER

## 2021-03-23 RX ORDER — METOPROLOL TARTRATE 50 MG/1
50 TABLET ORAL 2 TIMES DAILY
Qty: 180 TABLET | Refills: 3 | Status: SHIPPED | OUTPATIENT
Start: 2021-03-23 | End: 2021-08-25 | Stop reason: SDUPTHER

## 2021-03-23 RX ORDER — LISINOPRIL 20 MG/1
40 TABLET ORAL DAILY
Qty: 180 TABLET | Refills: 3 | Status: SHIPPED | OUTPATIENT
Start: 2021-03-23 | End: 2021-06-23 | Stop reason: SDUPTHER

## 2021-03-23 RX ORDER — FLUTICASONE PROPIONATE 50 MCG
1 SPRAY, SUSPENSION (ML) NASAL DAILY
Qty: 48 G | Refills: 3 | Status: SHIPPED | OUTPATIENT
Start: 2021-03-23 | End: 2021-06-23 | Stop reason: SDUPTHER

## 2021-03-23 RX ORDER — ATORVASTATIN CALCIUM 80 MG/1
80 TABLET, FILM COATED ORAL DAILY
Qty: 90 TABLET | Refills: 3 | Status: SHIPPED | OUTPATIENT
Start: 2021-03-23 | End: 2021-06-23 | Stop reason: SDUPTHER

## 2021-03-23 RX ORDER — LACTULOSE 10 G/15ML
SOLUTION ORAL; RECTAL
Qty: 1800 ML | Refills: 3 | Status: SHIPPED | OUTPATIENT
Start: 2021-03-23 | End: 2021-06-23 | Stop reason: SDUPTHER

## 2021-03-23 RX ORDER — AMLODIPINE BESYLATE 10 MG/1
10 TABLET ORAL DAILY
Qty: 90 TABLET | Refills: 3 | Status: SHIPPED | OUTPATIENT
Start: 2021-03-23 | End: 2021-06-23 | Stop reason: SDUPTHER

## 2021-03-23 RX ORDER — GLIMEPIRIDE 4 MG/1
4 TABLET ORAL
Qty: 90 TABLET | Refills: 3 | Status: SHIPPED | OUTPATIENT
Start: 2021-03-23 | End: 2021-09-22

## 2021-03-23 RX ORDER — METFORMIN HYDROCHLORIDE 1000 MG/1
1000 TABLET ORAL 2 TIMES DAILY WITH MEALS
Qty: 180 TABLET | Refills: 3 | Status: SHIPPED | OUTPATIENT
Start: 2021-03-23 | End: 2021-06-23 | Stop reason: SDUPTHER

## 2021-03-23 RX ORDER — CLOPIDOGREL BISULFATE 75 MG/1
75 TABLET ORAL DAILY
Qty: 90 TABLET | Refills: 3 | Status: SHIPPED | OUTPATIENT
Start: 2021-03-23 | End: 2021-06-23 | Stop reason: SDUPTHER

## 2021-04-05 LAB
LEFT EYE DM RETINOPATHY: POSITIVE
RIGHT EYE DM RETINOPATHY: POSITIVE

## 2021-04-06 ENCOUNTER — PATIENT OUTREACH (OUTPATIENT)
Dept: ADMINISTRATIVE | Facility: HOSPITAL | Age: 66
End: 2021-04-06

## 2021-04-08 ENCOUNTER — PATIENT OUTREACH (OUTPATIENT)
Dept: ADMINISTRATIVE | Facility: HOSPITAL | Age: 66
End: 2021-04-08

## 2021-04-22 ENCOUNTER — PES CALL (OUTPATIENT)
Dept: ADMINISTRATIVE | Facility: CLINIC | Age: 66
End: 2021-04-22

## 2021-06-03 DIAGNOSIS — E11.9 TYPE 2 DIABETES MELLITUS WITHOUT COMPLICATION: ICD-10-CM

## 2021-06-07 ENCOUNTER — TELEPHONE (OUTPATIENT)
Dept: FAMILY MEDICINE | Facility: CLINIC | Age: 66
End: 2021-06-07

## 2021-06-07 DIAGNOSIS — I69.359 CVA, OLD, HEMIPARESIS: ICD-10-CM

## 2021-06-07 DIAGNOSIS — J30.1 SEASONAL ALLERGIC RHINITIS DUE TO POLLEN: ICD-10-CM

## 2021-06-07 DIAGNOSIS — E11.59 HYPERTENSION ASSOCIATED WITH DIABETES: ICD-10-CM

## 2021-06-07 DIAGNOSIS — E78.5 HYPERLIPIDEMIA, UNSPECIFIED HYPERLIPIDEMIA TYPE: ICD-10-CM

## 2021-06-07 DIAGNOSIS — I15.2 HYPERTENSION ASSOCIATED WITH DIABETES: ICD-10-CM

## 2021-06-07 DIAGNOSIS — I70.0 ATHEROSCLEROSIS OF ABDOMINAL AORTA: ICD-10-CM

## 2021-06-07 DIAGNOSIS — R79.89 LOW TSH LEVEL: ICD-10-CM

## 2021-06-07 DIAGNOSIS — E11.9 TYPE 2 DIABETES MELLITUS WITHOUT COMPLICATION, WITHOUT LONG-TERM CURRENT USE OF INSULIN: Primary | ICD-10-CM

## 2021-06-11 ENCOUNTER — TELEPHONE (OUTPATIENT)
Dept: FAMILY MEDICINE | Facility: CLINIC | Age: 66
End: 2021-06-11

## 2021-06-11 DIAGNOSIS — E11.9 TYPE 2 DIABETES MELLITUS WITHOUT COMPLICATION, WITHOUT LONG-TERM CURRENT USE OF INSULIN: Primary | ICD-10-CM

## 2021-06-11 LAB
ALBUMIN SERPL-MCNC: 4.4 G/DL (ref 3.6–5.1)
ALBUMIN/GLOB SERPL: 1.3 (CALC) (ref 1–2.5)
ALP SERPL-CCNC: 110 U/L (ref 35–144)
ALT SERPL-CCNC: 25 U/L (ref 9–46)
AST SERPL-CCNC: 21 U/L (ref 10–35)
BASOPHILS # BLD AUTO: 22 CELLS/UL (ref 0–200)
BASOPHILS NFR BLD AUTO: 0.3 %
BILIRUB SERPL-MCNC: 0.6 MG/DL (ref 0.2–1.2)
BUN SERPL-MCNC: 17 MG/DL (ref 7–25)
BUN/CREAT SERPL: ABNORMAL (CALC) (ref 6–22)
CALCIUM SERPL-MCNC: 9.7 MG/DL (ref 8.6–10.3)
CHLORIDE SERPL-SCNC: 103 MMOL/L (ref 98–110)
CHOLEST SERPL-MCNC: 166 MG/DL
CHOLEST/HDLC SERPL: 3.5 (CALC)
CO2 SERPL-SCNC: 26 MMOL/L (ref 20–32)
CREAT SERPL-MCNC: 1.14 MG/DL (ref 0.7–1.25)
EOSINOPHIL # BLD AUTO: 122 CELLS/UL (ref 15–500)
EOSINOPHIL NFR BLD AUTO: 1.7 %
ERYTHROCYTE [DISTWIDTH] IN BLOOD BY AUTOMATED COUNT: 12.8 % (ref 11–15)
GLOBULIN SER CALC-MCNC: 3.3 G/DL (CALC) (ref 1.9–3.7)
GLUCOSE SERPL-MCNC: 120 MG/DL (ref 65–99)
HBA1C MFR BLD: 8.2 % OF TOTAL HGB
HCT VFR BLD AUTO: 41.8 % (ref 38.5–50)
HDLC SERPL-MCNC: 48 MG/DL
HGB BLD-MCNC: 13.8 G/DL (ref 13.2–17.1)
LDLC SERPL CALC-MCNC: 97 MG/DL (CALC)
LYMPHOCYTES # BLD AUTO: 3578 CELLS/UL (ref 850–3900)
LYMPHOCYTES NFR BLD AUTO: 49.7 %
MCH RBC QN AUTO: 29.1 PG (ref 27–33)
MCHC RBC AUTO-ENTMCNC: 33 G/DL (ref 32–36)
MCV RBC AUTO: 88.2 FL (ref 80–100)
MONOCYTES # BLD AUTO: 418 CELLS/UL (ref 200–950)
MONOCYTES NFR BLD AUTO: 5.8 %
NEUTROPHILS # BLD AUTO: 3060 CELLS/UL (ref 1500–7800)
NEUTROPHILS NFR BLD AUTO: 42.5 %
NONHDLC SERPL-MCNC: 118 MG/DL (CALC)
PLATELET # BLD AUTO: 190 THOUSAND/UL (ref 140–400)
PMV BLD REES-ECKER: 12 FL (ref 7.5–12.5)
POTASSIUM SERPL-SCNC: 4.3 MMOL/L (ref 3.5–5.3)
PROT SERPL-MCNC: 7.7 G/DL (ref 6.1–8.1)
RBC # BLD AUTO: 4.74 MILLION/UL (ref 4.2–5.8)
SODIUM SERPL-SCNC: 142 MMOL/L (ref 135–146)
TRIGL SERPL-MCNC: 115 MG/DL
TSH SERPL-ACNC: 0.74 MIU/L (ref 0.4–4.5)
WBC # BLD AUTO: 7.2 THOUSAND/UL (ref 3.8–10.8)

## 2021-06-12 RX ORDER — CALCIUM CITRATE/VITAMIN D3 200MG-6.25
TABLET ORAL
Qty: 200 STRIP | Refills: 11 | Status: SHIPPED | OUTPATIENT
Start: 2021-06-12 | End: 2022-06-15

## 2021-06-14 ENCOUNTER — PES CALL (OUTPATIENT)
Dept: ADMINISTRATIVE | Facility: CLINIC | Age: 66
End: 2021-06-14

## 2021-06-23 ENCOUNTER — OFFICE VISIT (OUTPATIENT)
Dept: FAMILY MEDICINE | Facility: CLINIC | Age: 66
End: 2021-06-23
Payer: MEDICARE

## 2021-06-23 VITALS
TEMPERATURE: 99 F | WEIGHT: 197.75 LBS | SYSTOLIC BLOOD PRESSURE: 144 MMHG | HEIGHT: 70 IN | OXYGEN SATURATION: 97 % | HEART RATE: 96 BPM | DIASTOLIC BLOOD PRESSURE: 78 MMHG | BODY MASS INDEX: 28.31 KG/M2

## 2021-06-23 DIAGNOSIS — E11.9 TYPE 2 DIABETES MELLITUS WITHOUT COMPLICATION, WITHOUT LONG-TERM CURRENT USE OF INSULIN: Primary | ICD-10-CM

## 2021-06-23 DIAGNOSIS — I70.0 ATHEROSCLEROSIS OF ABDOMINAL AORTA: ICD-10-CM

## 2021-06-23 DIAGNOSIS — E11.59 HYPERTENSION ASSOCIATED WITH DIABETES: ICD-10-CM

## 2021-06-23 DIAGNOSIS — E78.5 HYPERLIPIDEMIA, UNSPECIFIED HYPERLIPIDEMIA TYPE: ICD-10-CM

## 2021-06-23 DIAGNOSIS — I15.2 HYPERTENSION ASSOCIATED WITH DIABETES: ICD-10-CM

## 2021-06-23 PROCEDURE — 3008F BODY MASS INDEX DOCD: CPT | Mod: CPTII,S$GLB,, | Performed by: FAMILY MEDICINE

## 2021-06-23 PROCEDURE — 3052F PR MOST RECENT HEMOGLOBIN A1C LEVEL 8.0 - < 9.0%: ICD-10-PCS | Mod: CPTII,S$GLB,, | Performed by: FAMILY MEDICINE

## 2021-06-23 PROCEDURE — 3078F DIAST BP <80 MM HG: CPT | Mod: CPTII,S$GLB,, | Performed by: FAMILY MEDICINE

## 2021-06-23 PROCEDURE — 3077F SYST BP >= 140 MM HG: CPT | Mod: CPTII,S$GLB,, | Performed by: FAMILY MEDICINE

## 2021-06-23 PROCEDURE — 1126F AMNT PAIN NOTED NONE PRSNT: CPT | Mod: S$GLB,,, | Performed by: FAMILY MEDICINE

## 2021-06-23 PROCEDURE — 1159F PR MEDICATION LIST DOCUMENTED IN MEDICAL RECORD: ICD-10-PCS | Mod: S$GLB,,, | Performed by: FAMILY MEDICINE

## 2021-06-23 PROCEDURE — 1101F PT FALLS ASSESS-DOCD LE1/YR: CPT | Mod: CPTII,S$GLB,, | Performed by: FAMILY MEDICINE

## 2021-06-23 PROCEDURE — 3288F FALL RISK ASSESSMENT DOCD: CPT | Mod: CPTII,S$GLB,, | Performed by: FAMILY MEDICINE

## 2021-06-23 PROCEDURE — 3052F HG A1C>EQUAL 8.0%<EQUAL 9.0%: CPT | Mod: CPTII,S$GLB,, | Performed by: FAMILY MEDICINE

## 2021-06-23 PROCEDURE — 1101F PR PT FALLS ASSESS DOC 0-1 FALLS W/OUT INJ PAST YR: ICD-10-PCS | Mod: CPTII,S$GLB,, | Performed by: FAMILY MEDICINE

## 2021-06-23 PROCEDURE — 1126F PR PAIN SEVERITY QUANTIFIED, NO PAIN PRESENT: ICD-10-PCS | Mod: S$GLB,,, | Performed by: FAMILY MEDICINE

## 2021-06-23 PROCEDURE — 99215 OFFICE O/P EST HI 40 MIN: CPT | Mod: S$GLB,,, | Performed by: FAMILY MEDICINE

## 2021-06-23 PROCEDURE — 3008F PR BODY MASS INDEX (BMI) DOCUMENTED: ICD-10-PCS | Mod: CPTII,S$GLB,, | Performed by: FAMILY MEDICINE

## 2021-06-23 PROCEDURE — 3077F PR MOST RECENT SYSTOLIC BLOOD PRESSURE >= 140 MM HG: ICD-10-PCS | Mod: CPTII,S$GLB,, | Performed by: FAMILY MEDICINE

## 2021-06-23 PROCEDURE — 99215 PR OFFICE/OUTPT VISIT, EST, LEVL V, 40-54 MIN: ICD-10-PCS | Mod: S$GLB,,, | Performed by: FAMILY MEDICINE

## 2021-06-23 PROCEDURE — 3078F PR MOST RECENT DIASTOLIC BLOOD PRESSURE < 80 MM HG: ICD-10-PCS | Mod: CPTII,S$GLB,, | Performed by: FAMILY MEDICINE

## 2021-06-23 PROCEDURE — 1159F MED LIST DOCD IN RCRD: CPT | Mod: S$GLB,,, | Performed by: FAMILY MEDICINE

## 2021-06-23 PROCEDURE — 3288F PR FALLS RISK ASSESSMENT DOCUMENTED: ICD-10-PCS | Mod: CPTII,S$GLB,, | Performed by: FAMILY MEDICINE

## 2021-06-23 RX ORDER — ATORVASTATIN CALCIUM 80 MG/1
80 TABLET, FILM COATED ORAL DAILY
Qty: 90 TABLET | Refills: 3 | Status: SHIPPED | OUTPATIENT
Start: 2021-06-23 | End: 2021-09-22 | Stop reason: SDUPTHER

## 2021-06-23 RX ORDER — LISINOPRIL 20 MG/1
40 TABLET ORAL DAILY
Qty: 180 TABLET | Refills: 3 | Status: SHIPPED | OUTPATIENT
Start: 2021-06-23 | End: 2021-09-22 | Stop reason: SDUPTHER

## 2021-06-23 RX ORDER — FLUTICASONE PROPIONATE 50 MCG
1 SPRAY, SUSPENSION (ML) NASAL DAILY
Qty: 48 G | Refills: 3 | Status: SHIPPED | OUTPATIENT
Start: 2021-06-23 | End: 2021-09-22

## 2021-06-23 RX ORDER — SILDENAFIL CITRATE 20 MG/1
TABLET ORAL
Qty: 90 TABLET | Refills: 2 | Status: SHIPPED | OUTPATIENT
Start: 2021-06-23 | End: 2021-09-22 | Stop reason: SDUPTHER

## 2021-06-23 RX ORDER — DULAGLUTIDE 0.75 MG/.5ML
0.75 INJECTION, SOLUTION SUBCUTANEOUS WEEKLY
Qty: 12 PEN | Refills: 1 | Status: SHIPPED | OUTPATIENT
Start: 2021-06-23 | End: 2021-08-25 | Stop reason: SDUPTHER

## 2021-06-23 RX ORDER — METFORMIN HYDROCHLORIDE 1000 MG/1
1000 TABLET ORAL 2 TIMES DAILY WITH MEALS
Qty: 180 TABLET | Refills: 3 | Status: SHIPPED | OUTPATIENT
Start: 2021-06-23 | End: 2021-09-22 | Stop reason: SDUPTHER

## 2021-06-23 RX ORDER — AMLODIPINE BESYLATE 10 MG/1
10 TABLET ORAL DAILY
Qty: 90 TABLET | Refills: 3 | Status: SHIPPED | OUTPATIENT
Start: 2021-06-23 | End: 2021-09-22 | Stop reason: SDUPTHER

## 2021-06-23 RX ORDER — LACTULOSE 10 G/15ML
SOLUTION ORAL; RECTAL
Qty: 1800 ML | Refills: 3 | Status: SHIPPED | OUTPATIENT
Start: 2021-06-23 | End: 2021-09-22 | Stop reason: SDUPTHER

## 2021-06-23 RX ORDER — CLOPIDOGREL BISULFATE 75 MG/1
75 TABLET ORAL DAILY
Qty: 90 TABLET | Refills: 3 | Status: SHIPPED | OUTPATIENT
Start: 2021-06-23 | End: 2021-09-22 | Stop reason: SDUPTHER

## 2021-06-23 RX ORDER — GABAPENTIN 600 MG/1
600 TABLET ORAL 3 TIMES DAILY PRN
Qty: 270 TABLET | Refills: 3 | Status: SHIPPED | OUTPATIENT
Start: 2021-06-23 | End: 2022-10-17 | Stop reason: SDUPTHER

## 2021-06-29 ENCOUNTER — CLINICAL SUPPORT (OUTPATIENT)
Dept: FAMILY MEDICINE | Facility: CLINIC | Age: 66
End: 2021-06-29

## 2021-06-29 VITALS — SYSTOLIC BLOOD PRESSURE: 120 MMHG | HEART RATE: 88 BPM | OXYGEN SATURATION: 97 % | DIASTOLIC BLOOD PRESSURE: 82 MMHG

## 2021-06-29 DIAGNOSIS — I15.2 HYPERTENSION ASSOCIATED WITH DIABETES: Primary | ICD-10-CM

## 2021-06-29 DIAGNOSIS — E11.59 HYPERTENSION ASSOCIATED WITH DIABETES: Primary | ICD-10-CM

## 2021-07-23 ENCOUNTER — PES CALL (OUTPATIENT)
Dept: ADMINISTRATIVE | Facility: CLINIC | Age: 66
End: 2021-07-23

## 2021-08-10 ENCOUNTER — PES CALL (OUTPATIENT)
Dept: ADMINISTRATIVE | Facility: CLINIC | Age: 66
End: 2021-08-10

## 2021-08-16 ENCOUNTER — LAB VISIT (OUTPATIENT)
Dept: LAB | Facility: HOSPITAL | Age: 66
End: 2021-08-16
Attending: FAMILY MEDICINE
Payer: MEDICARE

## 2021-08-16 ENCOUNTER — TELEPHONE (OUTPATIENT)
Dept: FAMILY MEDICINE | Facility: CLINIC | Age: 66
End: 2021-08-16

## 2021-08-16 DIAGNOSIS — E11.59 HYPERTENSION ASSOCIATED WITH DIABETES: ICD-10-CM

## 2021-08-16 DIAGNOSIS — I69.359 CVA, OLD, HEMIPARESIS: ICD-10-CM

## 2021-08-16 DIAGNOSIS — E78.5 HYPERLIPIDEMIA, UNSPECIFIED HYPERLIPIDEMIA TYPE: ICD-10-CM

## 2021-08-16 DIAGNOSIS — R79.89 LOW TSH LEVEL: ICD-10-CM

## 2021-08-16 DIAGNOSIS — E11.9 TYPE 2 DIABETES MELLITUS WITHOUT COMPLICATION, WITHOUT LONG-TERM CURRENT USE OF INSULIN: ICD-10-CM

## 2021-08-16 DIAGNOSIS — J30.1 SEASONAL ALLERGIC RHINITIS DUE TO POLLEN: ICD-10-CM

## 2021-08-16 DIAGNOSIS — I15.2 HYPERTENSION ASSOCIATED WITH DIABETES: ICD-10-CM

## 2021-08-16 DIAGNOSIS — I70.0 ATHEROSCLEROSIS OF ABDOMINAL AORTA: ICD-10-CM

## 2021-08-16 LAB
ALBUMIN SERPL BCP-MCNC: 4.7 G/DL (ref 3.5–5.2)
ALP SERPL-CCNC: 115 U/L (ref 38–126)
ALT SERPL W/O P-5'-P-CCNC: 27 U/L (ref 10–44)
ANION GAP SERPL CALC-SCNC: 11 MMOL/L (ref 8–16)
AST SERPL-CCNC: 30 U/L (ref 15–46)
BASOPHILS # BLD AUTO: 0.03 K/UL (ref 0–0.2)
BASOPHILS NFR BLD: 0.4 % (ref 0–1.9)
BILIRUB SERPL-MCNC: 0.5 MG/DL (ref 0.1–1)
CALCIUM SERPL-MCNC: 9.9 MG/DL (ref 8.7–10.5)
CHLORIDE SERPL-SCNC: 105 MMOL/L (ref 95–110)
CHOLEST SERPL-MCNC: 138 MG/DL (ref 120–199)
CHOLEST/HDLC SERPL: 3.2 {RATIO} (ref 2–5)
CO2 SERPL-SCNC: 26 MMOL/L (ref 23–29)
CREAT SERPL-MCNC: 0.97 MG/DL (ref 0.5–1.4)
DIFFERENTIAL METHOD: ABNORMAL
EOSINOPHIL # BLD AUTO: 0.1 K/UL (ref 0–0.5)
EOSINOPHIL NFR BLD: 1.5 % (ref 0–8)
ERYTHROCYTE [DISTWIDTH] IN BLOOD BY AUTOMATED COUNT: 13.3 % (ref 11.5–14.5)
EST. GFR  (AFRICAN AMERICAN): >60 ML/MIN/1.73 M^2
EST. GFR  (NON AFRICAN AMERICAN): >60 ML/MIN/1.73 M^2
ESTIMATED AVG GLUCOSE: 180 MG/DL (ref 68–131)
GLUCOSE SERPL-MCNC: 143 MG/DL (ref 70–110)
HBA1C MFR BLD: 7.9 % (ref 4–5.6)
HCT VFR BLD AUTO: 44.4 % (ref 40–54)
HDLC SERPL-MCNC: 43 MG/DL (ref 40–75)
HDLC SERPL: 31.2 % (ref 20–50)
HGB BLD-MCNC: 14.1 G/DL (ref 14–18)
IMM GRANULOCYTES # BLD AUTO: 0.01 K/UL (ref 0–0.04)
IMM GRANULOCYTES NFR BLD AUTO: 0.1 % (ref 0–0.5)
LDLC SERPL CALC-MCNC: 84.8 MG/DL (ref 63–159)
LYMPHOCYTES # BLD AUTO: 2.8 K/UL (ref 1–4.8)
LYMPHOCYTES NFR BLD: 42.1 % (ref 18–48)
MCH RBC QN AUTO: 29 PG (ref 27–31)
MCHC RBC AUTO-ENTMCNC: 31.8 G/DL (ref 32–36)
MCV RBC AUTO: 91 FL (ref 82–98)
MONOCYTES # BLD AUTO: 0.5 K/UL (ref 0.3–1)
MONOCYTES NFR BLD: 7.4 % (ref 4–15)
NEUTROPHILS # BLD AUTO: 3.3 K/UL (ref 1.8–7.7)
NEUTROPHILS NFR BLD: 48.5 % (ref 38–73)
NONHDLC SERPL-MCNC: 95 MG/DL
NRBC BLD-RTO: 0 /100 WBC
PLATELET # BLD AUTO: 183 K/UL (ref 150–450)
PMV BLD AUTO: 13 FL (ref 9.2–12.9)
POTASSIUM SERPL-SCNC: 4.4 MMOL/L (ref 3.5–5.1)
PROT SERPL-MCNC: 8.1 G/DL (ref 6–8.4)
RBC # BLD AUTO: 4.87 M/UL (ref 4.6–6.2)
SODIUM SERPL-SCNC: 142 MMOL/L (ref 136–145)
T4 FREE SERPL-MCNC: 1.15 NG/DL (ref 0.71–1.51)
TRIGL SERPL-MCNC: 51 MG/DL (ref 30–150)
TSH SERPL DL<=0.005 MIU/L-ACNC: 0.74 UIU/ML (ref 0.4–4)
UUN UR-MCNC: 11 MG/DL (ref 2–20)
WBC # BLD AUTO: 6.74 K/UL (ref 3.9–12.7)

## 2021-08-16 PROCEDURE — 83036 HEMOGLOBIN GLYCOSYLATED A1C: CPT | Performed by: FAMILY MEDICINE

## 2021-08-16 PROCEDURE — 80053 COMPREHEN METABOLIC PANEL: CPT | Mod: PO | Performed by: FAMILY MEDICINE

## 2021-08-16 PROCEDURE — 36415 COLL VENOUS BLD VENIPUNCTURE: CPT | Mod: PO | Performed by: FAMILY MEDICINE

## 2021-08-16 PROCEDURE — 84439 ASSAY OF FREE THYROXINE: CPT | Performed by: FAMILY MEDICINE

## 2021-08-16 PROCEDURE — 80061 LIPID PANEL: CPT | Performed by: FAMILY MEDICINE

## 2021-08-16 PROCEDURE — 85025 COMPLETE CBC W/AUTO DIFF WBC: CPT | Mod: PO | Performed by: FAMILY MEDICINE

## 2021-08-25 ENCOUNTER — TELEPHONE (OUTPATIENT)
Dept: FAMILY MEDICINE | Facility: CLINIC | Age: 66
End: 2021-08-25

## 2021-08-25 RX ORDER — METOPROLOL TARTRATE 50 MG/1
50 TABLET ORAL 2 TIMES DAILY
Qty: 180 TABLET | Refills: 3 | Status: SHIPPED | OUTPATIENT
Start: 2021-08-25 | End: 2021-09-22 | Stop reason: SDUPTHER

## 2021-08-25 RX ORDER — DULAGLUTIDE 0.75 MG/.5ML
0.75 INJECTION, SOLUTION SUBCUTANEOUS WEEKLY
Qty: 12 PEN | Refills: 1 | Status: SHIPPED | OUTPATIENT
Start: 2021-08-25 | End: 2021-09-22 | Stop reason: SDUPTHER

## 2021-09-22 ENCOUNTER — OFFICE VISIT (OUTPATIENT)
Dept: FAMILY MEDICINE | Facility: CLINIC | Age: 66
End: 2021-09-22
Payer: MEDICARE

## 2021-09-22 VITALS
DIASTOLIC BLOOD PRESSURE: 88 MMHG | TEMPERATURE: 99 F | OXYGEN SATURATION: 98 % | BODY MASS INDEX: 28.2 KG/M2 | SYSTOLIC BLOOD PRESSURE: 138 MMHG | HEART RATE: 74 BPM | HEIGHT: 70 IN | WEIGHT: 197 LBS

## 2021-09-22 DIAGNOSIS — I15.2 HYPERTENSION ASSOCIATED WITH DIABETES: Primary | ICD-10-CM

## 2021-09-22 DIAGNOSIS — E11.59 HYPERTENSION ASSOCIATED WITH DIABETES: Primary | ICD-10-CM

## 2021-09-22 DIAGNOSIS — R79.89 LOW TSH LEVEL: ICD-10-CM

## 2021-09-22 DIAGNOSIS — E78.5 HYPERLIPIDEMIA, UNSPECIFIED HYPERLIPIDEMIA TYPE: ICD-10-CM

## 2021-09-22 PROCEDURE — 3061F PR NEG MICROALBUMINURIA RESULT DOCUMENTED/REVIEW: ICD-10-PCS | Mod: CPTII,S$GLB,, | Performed by: FAMILY MEDICINE

## 2021-09-22 PROCEDURE — 3288F PR FALLS RISK ASSESSMENT DOCUMENTED: ICD-10-PCS | Mod: CPTII,S$GLB,, | Performed by: FAMILY MEDICINE

## 2021-09-22 PROCEDURE — 1101F PT FALLS ASSESS-DOCD LE1/YR: CPT | Mod: CPTII,S$GLB,, | Performed by: FAMILY MEDICINE

## 2021-09-22 PROCEDURE — 3288F FALL RISK ASSESSMENT DOCD: CPT | Mod: CPTII,S$GLB,, | Performed by: FAMILY MEDICINE

## 2021-09-22 PROCEDURE — 1159F MED LIST DOCD IN RCRD: CPT | Mod: CPTII,S$GLB,, | Performed by: FAMILY MEDICINE

## 2021-09-22 PROCEDURE — 3008F BODY MASS INDEX DOCD: CPT | Mod: CPTII,S$GLB,, | Performed by: FAMILY MEDICINE

## 2021-09-22 PROCEDURE — 99499 RISK ADDL DX/OHS AUDIT: ICD-10-PCS | Mod: S$GLB,,, | Performed by: FAMILY MEDICINE

## 2021-09-22 PROCEDURE — 99214 PR OFFICE/OUTPT VISIT, EST, LEVL IV, 30-39 MIN: ICD-10-PCS | Mod: S$GLB,,, | Performed by: FAMILY MEDICINE

## 2021-09-22 PROCEDURE — 1101F PR PT FALLS ASSESS DOC 0-1 FALLS W/OUT INJ PAST YR: ICD-10-PCS | Mod: CPTII,S$GLB,, | Performed by: FAMILY MEDICINE

## 2021-09-22 PROCEDURE — 99499 UNLISTED E&M SERVICE: CPT | Mod: S$GLB,,, | Performed by: FAMILY MEDICINE

## 2021-09-22 PROCEDURE — 3008F PR BODY MASS INDEX (BMI) DOCUMENTED: ICD-10-PCS | Mod: CPTII,S$GLB,, | Performed by: FAMILY MEDICINE

## 2021-09-22 PROCEDURE — 4010F ACE/ARB THERAPY RXD/TAKEN: CPT | Mod: CPTII,S$GLB,, | Performed by: FAMILY MEDICINE

## 2021-09-22 PROCEDURE — 3061F NEG MICROALBUMINURIA REV: CPT | Mod: CPTII,S$GLB,, | Performed by: FAMILY MEDICINE

## 2021-09-22 PROCEDURE — 99214 OFFICE O/P EST MOD 30 MIN: CPT | Mod: S$GLB,,, | Performed by: FAMILY MEDICINE

## 2021-09-22 PROCEDURE — 3079F PR MOST RECENT DIASTOLIC BLOOD PRESSURE 80-89 MM HG: ICD-10-PCS | Mod: CPTII,S$GLB,, | Performed by: FAMILY MEDICINE

## 2021-09-22 PROCEDURE — 1126F PR PAIN SEVERITY QUANTIFIED, NO PAIN PRESENT: ICD-10-PCS | Mod: CPTII,S$GLB,, | Performed by: FAMILY MEDICINE

## 2021-09-22 PROCEDURE — 3051F PR MOST RECENT HEMOGLOBIN A1C LEVEL 7.0 - < 8.0%: ICD-10-PCS | Mod: CPTII,S$GLB,, | Performed by: FAMILY MEDICINE

## 2021-09-22 PROCEDURE — 3075F SYST BP GE 130 - 139MM HG: CPT | Mod: CPTII,S$GLB,, | Performed by: FAMILY MEDICINE

## 2021-09-22 PROCEDURE — 3051F HG A1C>EQUAL 7.0%<8.0%: CPT | Mod: CPTII,S$GLB,, | Performed by: FAMILY MEDICINE

## 2021-09-22 PROCEDURE — 1159F PR MEDICATION LIST DOCUMENTED IN MEDICAL RECORD: ICD-10-PCS | Mod: CPTII,S$GLB,, | Performed by: FAMILY MEDICINE

## 2021-09-22 PROCEDURE — 3075F PR MOST RECENT SYSTOLIC BLOOD PRESS GE 130-139MM HG: ICD-10-PCS | Mod: CPTII,S$GLB,, | Performed by: FAMILY MEDICINE

## 2021-09-22 PROCEDURE — 3066F PR DOCUMENTATION OF TREATMENT FOR NEPHROPATHY: ICD-10-PCS | Mod: CPTII,S$GLB,, | Performed by: FAMILY MEDICINE

## 2021-09-22 PROCEDURE — 3066F NEPHROPATHY DOC TX: CPT | Mod: CPTII,S$GLB,, | Performed by: FAMILY MEDICINE

## 2021-09-22 PROCEDURE — 3079F DIAST BP 80-89 MM HG: CPT | Mod: CPTII,S$GLB,, | Performed by: FAMILY MEDICINE

## 2021-09-22 PROCEDURE — 1126F AMNT PAIN NOTED NONE PRSNT: CPT | Mod: CPTII,S$GLB,, | Performed by: FAMILY MEDICINE

## 2021-09-22 PROCEDURE — 4010F PR ACE/ARB THEARPY RXD/TAKEN: ICD-10-PCS | Mod: CPTII,S$GLB,, | Performed by: FAMILY MEDICINE

## 2021-09-22 RX ORDER — LACTULOSE 10 G/15ML
SOLUTION ORAL; RECTAL
Qty: 1800 ML | Refills: 3 | Status: SHIPPED | OUTPATIENT
Start: 2021-09-22 | End: 2022-02-14 | Stop reason: SDUPTHER

## 2021-09-22 RX ORDER — METOPROLOL TARTRATE 50 MG/1
50 TABLET ORAL 2 TIMES DAILY
Qty: 180 TABLET | Refills: 3 | Status: SHIPPED | OUTPATIENT
Start: 2021-09-22 | End: 2022-02-14 | Stop reason: SDUPTHER

## 2021-09-22 RX ORDER — ATORVASTATIN CALCIUM 80 MG/1
80 TABLET, FILM COATED ORAL DAILY
Qty: 90 TABLET | Refills: 3 | Status: SHIPPED | OUTPATIENT
Start: 2021-09-22 | End: 2022-02-14 | Stop reason: SDUPTHER

## 2021-09-22 RX ORDER — LISINOPRIL 20 MG/1
40 TABLET ORAL DAILY
Qty: 180 TABLET | Refills: 3 | Status: SHIPPED | OUTPATIENT
Start: 2021-09-22 | End: 2022-02-14 | Stop reason: SDUPTHER

## 2021-09-22 RX ORDER — DULAGLUTIDE 0.75 MG/.5ML
0.75 INJECTION, SOLUTION SUBCUTANEOUS WEEKLY
Qty: 12 PEN | Refills: 1 | Status: SHIPPED | OUTPATIENT
Start: 2021-09-22 | End: 2022-02-14

## 2021-09-22 RX ORDER — CLOPIDOGREL BISULFATE 75 MG/1
75 TABLET ORAL DAILY
Qty: 90 TABLET | Refills: 3 | Status: SHIPPED | OUTPATIENT
Start: 2021-09-22 | End: 2022-02-14 | Stop reason: SDUPTHER

## 2021-09-22 RX ORDER — METFORMIN HYDROCHLORIDE 1000 MG/1
1000 TABLET ORAL 2 TIMES DAILY WITH MEALS
Qty: 180 TABLET | Refills: 3 | Status: SHIPPED | OUTPATIENT
Start: 2021-09-22 | End: 2022-02-14 | Stop reason: SDUPTHER

## 2021-09-22 RX ORDER — AMLODIPINE BESYLATE 10 MG/1
10 TABLET ORAL DAILY
Qty: 90 TABLET | Refills: 3 | Status: SHIPPED | OUTPATIENT
Start: 2021-09-22 | End: 2021-12-27 | Stop reason: SDUPTHER

## 2021-09-22 RX ORDER — SILDENAFIL CITRATE 20 MG/1
TABLET ORAL
Qty: 180 TABLET | Refills: 3 | Status: SHIPPED | OUTPATIENT
Start: 2021-09-22 | End: 2022-02-14

## 2021-11-04 ENCOUNTER — TELEPHONE (OUTPATIENT)
Dept: FAMILY MEDICINE | Facility: CLINIC | Age: 66
End: 2021-11-04
Payer: MEDICARE

## 2021-12-15 ENCOUNTER — LAB VISIT (OUTPATIENT)
Dept: LAB | Facility: HOSPITAL | Age: 66
End: 2021-12-15
Attending: FAMILY MEDICINE
Payer: MEDICARE

## 2021-12-15 DIAGNOSIS — I15.2 HYPERTENSION ASSOCIATED WITH DIABETES: ICD-10-CM

## 2021-12-15 DIAGNOSIS — R79.89 LOW TSH LEVEL: ICD-10-CM

## 2021-12-15 DIAGNOSIS — E11.59 HYPERTENSION ASSOCIATED WITH DIABETES: ICD-10-CM

## 2021-12-15 DIAGNOSIS — E78.5 HYPERLIPIDEMIA, UNSPECIFIED HYPERLIPIDEMIA TYPE: ICD-10-CM

## 2021-12-15 LAB
ALBUMIN SERPL BCP-MCNC: 4.4 G/DL (ref 3.5–5.2)
ALP SERPL-CCNC: 127 U/L (ref 38–126)
ALT SERPL W/O P-5'-P-CCNC: 40 U/L (ref 10–44)
ANION GAP SERPL CALC-SCNC: 14 MMOL/L (ref 8–16)
AST SERPL-CCNC: 25 U/L (ref 15–46)
BASOPHILS # BLD AUTO: 0.03 K/UL (ref 0–0.2)
BASOPHILS NFR BLD: 0.4 % (ref 0–1.9)
BILIRUB SERPL-MCNC: 0.4 MG/DL (ref 0.1–1)
CALCIUM SERPL-MCNC: 9 MG/DL (ref 8.7–10.5)
CHLORIDE SERPL-SCNC: 103 MMOL/L (ref 95–110)
CHOLEST SERPL-MCNC: 135 MG/DL (ref 120–199)
CHOLEST/HDLC SERPL: 3.1 {RATIO} (ref 2–5)
CO2 SERPL-SCNC: 28 MMOL/L (ref 23–29)
CREAT SERPL-MCNC: 1.08 MG/DL (ref 0.5–1.4)
DIFFERENTIAL METHOD: ABNORMAL
EOSINOPHIL # BLD AUTO: 0.1 K/UL (ref 0–0.5)
EOSINOPHIL NFR BLD: 1.9 % (ref 0–8)
ERYTHROCYTE [DISTWIDTH] IN BLOOD BY AUTOMATED COUNT: 12.9 % (ref 11.5–14.5)
EST. GFR  (AFRICAN AMERICAN): >60 ML/MIN/1.73 M^2
EST. GFR  (NON AFRICAN AMERICAN): >60 ML/MIN/1.73 M^2
ESTIMATED AVG GLUCOSE: 249 MG/DL (ref 68–131)
GLUCOSE SERPL-MCNC: 169 MG/DL (ref 70–110)
HBA1C MFR BLD: 10.3 % (ref 4–5.6)
HCT VFR BLD AUTO: 41.7 % (ref 40–54)
HDLC SERPL-MCNC: 43 MG/DL (ref 40–75)
HDLC SERPL: 31.9 % (ref 20–50)
HGB BLD-MCNC: 13.5 G/DL (ref 14–18)
IMM GRANULOCYTES # BLD AUTO: 0.01 K/UL (ref 0–0.04)
IMM GRANULOCYTES NFR BLD AUTO: 0.1 % (ref 0–0.5)
LDLC SERPL CALC-MCNC: 76.4 MG/DL (ref 63–159)
LYMPHOCYTES # BLD AUTO: 3.4 K/UL (ref 1–4.8)
LYMPHOCYTES NFR BLD: 46.3 % (ref 18–48)
MCH RBC QN AUTO: 28.9 PG (ref 27–31)
MCHC RBC AUTO-ENTMCNC: 32.4 G/DL (ref 32–36)
MCV RBC AUTO: 89 FL (ref 82–98)
MONOCYTES # BLD AUTO: 0.6 K/UL (ref 0.3–1)
MONOCYTES NFR BLD: 7.8 % (ref 4–15)
NEUTROPHILS # BLD AUTO: 3.2 K/UL (ref 1.8–7.7)
NEUTROPHILS NFR BLD: 43.5 % (ref 38–73)
NONHDLC SERPL-MCNC: 92 MG/DL
NRBC BLD-RTO: 0 /100 WBC
PLATELET # BLD AUTO: 176 K/UL (ref 150–450)
PMV BLD AUTO: 12.8 FL (ref 9.2–12.9)
POTASSIUM SERPL-SCNC: 4.1 MMOL/L (ref 3.5–5.1)
PROT SERPL-MCNC: 8 G/DL (ref 6–8.4)
RBC # BLD AUTO: 4.67 M/UL (ref 4.6–6.2)
SODIUM SERPL-SCNC: 145 MMOL/L (ref 136–145)
T4 FREE SERPL-MCNC: 1.12 NG/DL (ref 0.71–1.51)
TRIGL SERPL-MCNC: 78 MG/DL (ref 30–150)
TSH SERPL DL<=0.005 MIU/L-ACNC: 0.92 UIU/ML (ref 0.4–4)
UUN UR-MCNC: 18 MG/DL (ref 2–20)
WBC # BLD AUTO: 7.35 K/UL (ref 3.9–12.7)

## 2021-12-15 PROCEDURE — 83036 HEMOGLOBIN GLYCOSYLATED A1C: CPT | Mod: HCNC | Performed by: FAMILY MEDICINE

## 2021-12-15 PROCEDURE — 84443 ASSAY THYROID STIM HORMONE: CPT | Mod: HCNC | Performed by: FAMILY MEDICINE

## 2021-12-15 PROCEDURE — 36415 COLL VENOUS BLD VENIPUNCTURE: CPT | Mod: HCNC,PO | Performed by: FAMILY MEDICINE

## 2021-12-15 PROCEDURE — 80061 LIPID PANEL: CPT | Mod: HCNC | Performed by: FAMILY MEDICINE

## 2021-12-15 PROCEDURE — 85025 COMPLETE CBC W/AUTO DIFF WBC: CPT | Mod: HCNC,PO | Performed by: FAMILY MEDICINE

## 2021-12-15 PROCEDURE — 80053 COMPREHEN METABOLIC PANEL: CPT | Mod: HCNC,PO | Performed by: FAMILY MEDICINE

## 2021-12-15 PROCEDURE — 84439 ASSAY OF FREE THYROXINE: CPT | Mod: HCNC | Performed by: FAMILY MEDICINE

## 2021-12-18 ENCOUNTER — TELEPHONE (OUTPATIENT)
Dept: FAMILY MEDICINE | Facility: CLINIC | Age: 66
End: 2021-12-18
Payer: MEDICARE

## 2021-12-27 ENCOUNTER — OFFICE VISIT (OUTPATIENT)
Dept: FAMILY MEDICINE | Facility: CLINIC | Age: 66
End: 2021-12-27
Payer: MEDICARE

## 2021-12-27 VITALS
SYSTOLIC BLOOD PRESSURE: 132 MMHG | HEIGHT: 70 IN | WEIGHT: 195.13 LBS | OXYGEN SATURATION: 96 % | DIASTOLIC BLOOD PRESSURE: 80 MMHG | BODY MASS INDEX: 27.94 KG/M2 | TEMPERATURE: 98 F | HEART RATE: 71 BPM

## 2021-12-27 DIAGNOSIS — J30.1 SEASONAL ALLERGIC RHINITIS DUE TO POLLEN: ICD-10-CM

## 2021-12-27 DIAGNOSIS — I15.2 HYPERTENSION ASSOCIATED WITH DIABETES: ICD-10-CM

## 2021-12-27 DIAGNOSIS — I70.0 ATHEROSCLEROSIS OF ABDOMINAL AORTA: ICD-10-CM

## 2021-12-27 DIAGNOSIS — E11.9 TYPE 2 DIABETES MELLITUS WITHOUT COMPLICATION, WITHOUT LONG-TERM CURRENT USE OF INSULIN: Primary | ICD-10-CM

## 2021-12-27 DIAGNOSIS — E11.59 HYPERTENSION ASSOCIATED WITH DIABETES: ICD-10-CM

## 2021-12-27 DIAGNOSIS — I69.359 CVA, OLD, HEMIPARESIS: ICD-10-CM

## 2021-12-27 PROCEDURE — 3008F BODY MASS INDEX DOCD: CPT | Mod: CPTII,S$GLB,, | Performed by: FAMILY MEDICINE

## 2021-12-27 PROCEDURE — 3079F PR MOST RECENT DIASTOLIC BLOOD PRESSURE 80-89 MM HG: ICD-10-PCS | Mod: CPTII,S$GLB,, | Performed by: FAMILY MEDICINE

## 2021-12-27 PROCEDURE — 1101F PR PT FALLS ASSESS DOC 0-1 FALLS W/OUT INJ PAST YR: ICD-10-PCS | Mod: CPTII,S$GLB,, | Performed by: FAMILY MEDICINE

## 2021-12-27 PROCEDURE — 1101F PT FALLS ASSESS-DOCD LE1/YR: CPT | Mod: CPTII,S$GLB,, | Performed by: FAMILY MEDICINE

## 2021-12-27 PROCEDURE — 3008F PR BODY MASS INDEX (BMI) DOCUMENTED: ICD-10-PCS | Mod: CPTII,S$GLB,, | Performed by: FAMILY MEDICINE

## 2021-12-27 PROCEDURE — 99499 RISK ADDL DX/OHS AUDIT: ICD-10-PCS | Mod: S$GLB,,, | Performed by: FAMILY MEDICINE

## 2021-12-27 PROCEDURE — 3079F DIAST BP 80-89 MM HG: CPT | Mod: CPTII,S$GLB,, | Performed by: FAMILY MEDICINE

## 2021-12-27 PROCEDURE — 1126F AMNT PAIN NOTED NONE PRSNT: CPT | Mod: CPTII,S$GLB,, | Performed by: FAMILY MEDICINE

## 2021-12-27 PROCEDURE — 1159F MED LIST DOCD IN RCRD: CPT | Mod: CPTII,S$GLB,, | Performed by: FAMILY MEDICINE

## 2021-12-27 PROCEDURE — 3075F PR MOST RECENT SYSTOLIC BLOOD PRESS GE 130-139MM HG: ICD-10-PCS | Mod: CPTII,S$GLB,, | Performed by: FAMILY MEDICINE

## 2021-12-27 PROCEDURE — 99214 OFFICE O/P EST MOD 30 MIN: CPT | Mod: S$GLB,,, | Performed by: FAMILY MEDICINE

## 2021-12-27 PROCEDURE — 3288F FALL RISK ASSESSMENT DOCD: CPT | Mod: CPTII,S$GLB,, | Performed by: FAMILY MEDICINE

## 2021-12-27 PROCEDURE — 99214 PR OFFICE/OUTPT VISIT, EST, LEVL IV, 30-39 MIN: ICD-10-PCS | Mod: S$GLB,,, | Performed by: FAMILY MEDICINE

## 2021-12-27 PROCEDURE — 1159F PR MEDICATION LIST DOCUMENTED IN MEDICAL RECORD: ICD-10-PCS | Mod: CPTII,S$GLB,, | Performed by: FAMILY MEDICINE

## 2021-12-27 PROCEDURE — 3075F SYST BP GE 130 - 139MM HG: CPT | Mod: CPTII,S$GLB,, | Performed by: FAMILY MEDICINE

## 2021-12-27 PROCEDURE — 3288F PR FALLS RISK ASSESSMENT DOCUMENTED: ICD-10-PCS | Mod: CPTII,S$GLB,, | Performed by: FAMILY MEDICINE

## 2021-12-27 PROCEDURE — 99499 UNLISTED E&M SERVICE: CPT | Mod: S$GLB,,, | Performed by: FAMILY MEDICINE

## 2021-12-27 PROCEDURE — 1126F PR PAIN SEVERITY QUANTIFIED, NO PAIN PRESENT: ICD-10-PCS | Mod: CPTII,S$GLB,, | Performed by: FAMILY MEDICINE

## 2021-12-27 RX ORDER — HUMAN INSULIN 100 [IU]/ML
INJECTION, SUSPENSION SUBCUTANEOUS
Qty: 15 EACH | Refills: 1 | Status: SHIPPED | OUTPATIENT
Start: 2021-12-27 | End: 2022-02-14 | Stop reason: SDUPTHER

## 2021-12-27 RX ORDER — AMLODIPINE BESYLATE 10 MG/1
10 TABLET ORAL DAILY
Qty: 90 TABLET | Refills: 3 | Status: SHIPPED | OUTPATIENT
Start: 2021-12-27 | End: 2022-03-30 | Stop reason: SDUPTHER

## 2021-12-27 NOTE — PROGRESS NOTES
" Patient ID: Cristi Pulido is a 66 y.o. male.    Chief Complaint: discuss labs     HPI      Cristi Pulido is a 66 y.o. male     Vitals:    12/27/21 1511   BP: 132/80   BP Location: Left arm   Patient Position: Sitting   Pulse: 71   Temp: 98.1 °F (36.7 °C)   SpO2: 96%   Weight: 88.5 kg (195 lb 1.7 oz)   Height: 5' 10" (1.778 m)            Review of Symptoms    Constitutional  Neg activity change, No chills /fever   Resp  Neg hemoptysis, stridor, choking  CVS  Neg chest pain, palpitations    Physical Exam    Constitutional:  Oriented to person, place, and time.appears well-developed and well-nourished.  No distress.      HENT  Head: Normocephalic and atraumatic  Right Ear: External ear normal.   Left Ear: External ear normal.   Nose: External nose normal.   Mouth:  Moist mucus membranes.    Eyes:  Conjunctivae are normal. Right eye exhibits no discharge.  Left eye exhibits no discharge. No scleral icterus.  No periorbital edema    Cardiovascular:  Regular rate and rhythm with normal S1 and S2     Pulmonary/Chest:   Clear to auscultation bilaterally without wheezes, rhonchi or rales      Musculoskeletal:  No edema. No obvious deformity No wasting       Neurological:  Alert and oriented to person, place, and time.   Coordination normal.     Skin:   Skin is warm and dry.  No diaphoresis.   No rash noted.     Psychiatric: Normal mood and affect. Behavior is normal.  Judgment and thought content normal.     Complete Blood Count  Lab Results   Component Value Date    RBC 4.67 12/15/2021    HGB 13.5 (L) 12/15/2021    HCT 41.7 12/15/2021    MCV 89 12/15/2021    MCH 28.9 12/15/2021    MCHC 32.4 12/15/2021    RDW 12.9 12/15/2021     12/15/2021    MPV 12.8 12/15/2021    GRAN 3.2 12/15/2021    GRAN 43.5 12/15/2021    LYMPH 3.4 12/15/2021    LYMPH 46.3 12/15/2021    MONO 0.6 12/15/2021    MONO 7.8 12/15/2021    EOS 0.1 12/15/2021    BASO 0.03 12/15/2021    EOSINOPHIL 1.9 12/15/2021    BASOPHIL 0.4 12/15/2021    DIFFMETHOD " Automated 12/15/2021       Comprehensive Metabolic Panel  Lab Results   Component Value Date     (H) 12/15/2021    BUN 18 12/15/2021    CREATININE 1.08 12/15/2021     12/15/2021    K 4.1 12/15/2021     12/15/2021    PROT 8.0 12/15/2021    ALBUMIN 4.4 12/15/2021    BILITOT 0.4 12/15/2021    AST 25 12/15/2021    ALKPHOS 127 (H) 12/15/2021    CO2 28 12/15/2021    ALT 40 12/15/2021    ANIONGAP 14 12/15/2021    EGFRNONAA >60.0 12/15/2021    ESTGFRAFRICA >60.0 12/15/2021       TSH  Lab Results   Component Value Date    TSH 0.923 12/15/2021       Assessment / Plan:      ICD-10-CM ICD-9-CM   1. Type 2 diabetes mellitus without complication, without long-term current use of insulin  E11.9 250.00   2. Atherosclerosis of abdominal aorta  I70.0 440.0   3. CVA, old, hemiparesis  I69.359 438.20   4. Hypertension associated with diabetes  E11.59 250.80    I15.2 401.9   5. Seasonal allergic rhinitis due to pollen  J30.1 477.0     Type 2 diabetes mellitus without complication, without long-term current use of insulin  -     Hemoglobin A1C; Future; Expected date: 12/27/2021    Atherosclerosis of abdominal aorta    CVA, old, hemiparesis    Hypertension associated with diabetes    Seasonal allergic rhinitis due to pollen    Other orders  -     insulin NPH/Reg human (NOVOLIN 70-30 FLEXPEN U-100) 100 unit/mL (70-30) InPn pen; 10 units before breakfast and supper ( pm meal)  Disp needles to match  Dispense: 15 each; Refill: 1  -     amLODIPine (NORVASC) 10 MG tablet; Take 1 tablet (10 mg total) by mouth once daily.  Dispense: 90 tablet; Refill: 3

## 2022-01-31 ENCOUNTER — LAB VISIT (OUTPATIENT)
Dept: LAB | Facility: HOSPITAL | Age: 67
End: 2022-01-31
Attending: FAMILY MEDICINE
Payer: MEDICARE

## 2022-01-31 DIAGNOSIS — E11.9 TYPE 2 DIABETES MELLITUS WITHOUT COMPLICATION, WITHOUT LONG-TERM CURRENT USE OF INSULIN: ICD-10-CM

## 2022-01-31 LAB
ESTIMATED AVG GLUCOSE: 246 MG/DL (ref 68–131)
HBA1C MFR BLD: 10.2 % (ref 4–5.6)

## 2022-01-31 PROCEDURE — 83036 HEMOGLOBIN GLYCOSYLATED A1C: CPT | Mod: HCNC | Performed by: FAMILY MEDICINE

## 2022-01-31 PROCEDURE — 36415 COLL VENOUS BLD VENIPUNCTURE: CPT | Mod: HCNC,PO | Performed by: FAMILY MEDICINE

## 2022-02-07 ENCOUNTER — TELEPHONE (OUTPATIENT)
Dept: FAMILY MEDICINE | Facility: CLINIC | Age: 67
End: 2022-02-07
Payer: MEDICARE

## 2022-02-08 NOTE — TELEPHONE ENCOUNTER
Left message for him-I want him to increase Trulicity.    Is he using this 0.75 injection every week    Secondly we going to increase his insulin please let me know what he is taking now    I would prefer him coming into clinic to discuss this if he is able to do so

## 2022-02-09 NOTE — TELEPHONE ENCOUNTER
I spoke with the pt  He is not sure what he is taking  He has an appt scheduled with Dr Winn Monday    I advised him to bring ALL of his meds including insulin  And all injectables

## 2022-02-14 ENCOUNTER — OFFICE VISIT (OUTPATIENT)
Dept: FAMILY MEDICINE | Facility: CLINIC | Age: 67
End: 2022-02-14
Payer: MEDICARE

## 2022-02-14 VITALS
DIASTOLIC BLOOD PRESSURE: 78 MMHG | WEIGHT: 197.56 LBS | HEART RATE: 92 BPM | OXYGEN SATURATION: 98 % | BODY MASS INDEX: 28.34 KG/M2 | SYSTOLIC BLOOD PRESSURE: 132 MMHG

## 2022-02-14 DIAGNOSIS — E11.59 HYPERTENSION ASSOCIATED WITH DIABETES: Primary | ICD-10-CM

## 2022-02-14 DIAGNOSIS — I70.0 ATHEROSCLEROSIS OF ABDOMINAL AORTA: ICD-10-CM

## 2022-02-14 DIAGNOSIS — I15.2 HYPERTENSION ASSOCIATED WITH DIABETES: Primary | ICD-10-CM

## 2022-02-14 DIAGNOSIS — Z12.2 ENCOUNTER FOR SCREENING FOR LUNG CANCER: ICD-10-CM

## 2022-02-14 DIAGNOSIS — Z89.421 HISTORY OF COMPLETE RAY AMPUTATION OF SECOND TOE OF RIGHT FOOT: ICD-10-CM

## 2022-02-14 DIAGNOSIS — I69.359 CVA, OLD, HEMIPARESIS: ICD-10-CM

## 2022-02-14 PROCEDURE — 3008F PR BODY MASS INDEX (BMI) DOCUMENTED: ICD-10-PCS | Mod: CPTII,S$GLB,, | Performed by: FAMILY MEDICINE

## 2022-02-14 PROCEDURE — 1159F PR MEDICATION LIST DOCUMENTED IN MEDICAL RECORD: ICD-10-PCS | Mod: CPTII,S$GLB,, | Performed by: FAMILY MEDICINE

## 2022-02-14 PROCEDURE — 4010F PR ACE/ARB THEARPY RXD/TAKEN: ICD-10-PCS | Mod: CPTII,S$GLB,, | Performed by: FAMILY MEDICINE

## 2022-02-14 PROCEDURE — 3075F SYST BP GE 130 - 139MM HG: CPT | Mod: CPTII,S$GLB,, | Performed by: FAMILY MEDICINE

## 2022-02-14 PROCEDURE — 1126F AMNT PAIN NOTED NONE PRSNT: CPT | Mod: CPTII,S$GLB,, | Performed by: FAMILY MEDICINE

## 2022-02-14 PROCEDURE — 99214 PR OFFICE/OUTPT VISIT, EST, LEVL IV, 30-39 MIN: ICD-10-PCS | Mod: S$GLB,,, | Performed by: FAMILY MEDICINE

## 2022-02-14 PROCEDURE — 1159F MED LIST DOCD IN RCRD: CPT | Mod: CPTII,S$GLB,, | Performed by: FAMILY MEDICINE

## 2022-02-14 PROCEDURE — 99499 UNLISTED E&M SERVICE: CPT | Mod: S$GLB,,, | Performed by: FAMILY MEDICINE

## 2022-02-14 PROCEDURE — 99214 OFFICE O/P EST MOD 30 MIN: CPT | Mod: S$GLB,,, | Performed by: FAMILY MEDICINE

## 2022-02-14 PROCEDURE — 3078F DIAST BP <80 MM HG: CPT | Mod: CPTII,S$GLB,, | Performed by: FAMILY MEDICINE

## 2022-02-14 PROCEDURE — 99499 RISK ADDL DX/OHS AUDIT: ICD-10-PCS | Mod: S$GLB,,, | Performed by: FAMILY MEDICINE

## 2022-02-14 PROCEDURE — 3075F PR MOST RECENT SYSTOLIC BLOOD PRESS GE 130-139MM HG: ICD-10-PCS | Mod: CPTII,S$GLB,, | Performed by: FAMILY MEDICINE

## 2022-02-14 PROCEDURE — 3046F PR MOST RECENT HEMOGLOBIN A1C LEVEL > 9.0%: ICD-10-PCS | Mod: CPTII,S$GLB,, | Performed by: FAMILY MEDICINE

## 2022-02-14 PROCEDURE — 3078F PR MOST RECENT DIASTOLIC BLOOD PRESSURE < 80 MM HG: ICD-10-PCS | Mod: CPTII,S$GLB,, | Performed by: FAMILY MEDICINE

## 2022-02-14 PROCEDURE — 4010F ACE/ARB THERAPY RXD/TAKEN: CPT | Mod: CPTII,S$GLB,, | Performed by: FAMILY MEDICINE

## 2022-02-14 PROCEDURE — 3008F BODY MASS INDEX DOCD: CPT | Mod: CPTII,S$GLB,, | Performed by: FAMILY MEDICINE

## 2022-02-14 PROCEDURE — 1126F PR PAIN SEVERITY QUANTIFIED, NO PAIN PRESENT: ICD-10-PCS | Mod: CPTII,S$GLB,, | Performed by: FAMILY MEDICINE

## 2022-02-14 PROCEDURE — 3046F HEMOGLOBIN A1C LEVEL >9.0%: CPT | Mod: CPTII,S$GLB,, | Performed by: FAMILY MEDICINE

## 2022-02-14 RX ORDER — LISINOPRIL 20 MG/1
40 TABLET ORAL DAILY
Qty: 180 TABLET | Refills: 3 | Status: SHIPPED | OUTPATIENT
Start: 2022-02-14 | End: 2022-06-16 | Stop reason: SDUPTHER

## 2022-02-14 RX ORDER — LACTULOSE 10 G/15ML
SOLUTION ORAL; RECTAL
Qty: 1800 ML | Refills: 3 | Status: SHIPPED | OUTPATIENT
Start: 2022-02-14 | End: 2022-05-26 | Stop reason: SDUPTHER

## 2022-02-14 RX ORDER — CLOPIDOGREL BISULFATE 75 MG/1
75 TABLET ORAL DAILY
Qty: 90 TABLET | Refills: 3 | Status: SHIPPED | OUTPATIENT
Start: 2022-02-14 | End: 2022-06-16 | Stop reason: SDUPTHER

## 2022-02-14 RX ORDER — ATORVASTATIN CALCIUM 80 MG/1
80 TABLET, FILM COATED ORAL DAILY
Qty: 90 TABLET | Refills: 3 | Status: SHIPPED | OUTPATIENT
Start: 2022-02-14 | End: 2022-06-16 | Stop reason: SDUPTHER

## 2022-02-14 RX ORDER — METFORMIN HYDROCHLORIDE 1000 MG/1
1000 TABLET ORAL 2 TIMES DAILY WITH MEALS
Qty: 180 TABLET | Refills: 3 | Status: SHIPPED | OUTPATIENT
Start: 2022-02-14 | End: 2022-06-16 | Stop reason: SDUPTHER

## 2022-02-14 RX ORDER — SILDENAFIL 100 MG/1
100 TABLET, FILM COATED ORAL DAILY PRN
Qty: 90 TABLET | Refills: 1 | Status: SHIPPED | OUTPATIENT
Start: 2022-02-14 | End: 2022-06-16 | Stop reason: SDUPTHER

## 2022-02-14 RX ORDER — METOPROLOL TARTRATE 50 MG/1
50 TABLET ORAL 2 TIMES DAILY
Qty: 180 TABLET | Refills: 3 | Status: SHIPPED | OUTPATIENT
Start: 2022-02-14 | End: 2022-10-17 | Stop reason: SDUPTHER

## 2022-02-14 RX ORDER — HUMAN INSULIN 100 [IU]/ML
INJECTION, SUSPENSION SUBCUTANEOUS
Qty: 15 EACH | Refills: 1 | Status: SHIPPED | OUTPATIENT
Start: 2022-02-14 | End: 2022-06-16 | Stop reason: SDUPTHER

## 2022-02-14 NOTE — PROGRESS NOTES
Patient ID: Cristi Pulido is a 67 y.o. male.    Chief Complaint: Diabetes    HPI      Cristi Pulido is a 67 y.o. male high blood pressure controlled at this time however diabetes is not.  Chronic constipation is controlled with the use of lactulose.  High blood pressure is controlled but diabetes is not.  Neuralgia is control with Neurontin 600 milligrams 3 times a day-this does not cause sedation.    Her    Vitals:    02/14/22 1452   BP: 132/78   Pulse: 92   SpO2: 98%   Weight: 89.6 kg (197 lb 8.5 oz)            Review of Symptoms      Physical Exam    Constitutional:  Oriented to person, place, and time.appears well-developed and well-nourished.  No distress.      HENT  Head: Normocephalic and atraumatic  Right Ear: External ear normal.   Left Ear: External ear normal.   Nose: External nose normal.   Mouth:  Moist mucus membranes.    Eyes:  Conjunctivae are normal. Right eye exhibits no discharge.  Left eye exhibits no discharge. No scleral icterus.  No periorbital edema    Cardiovascular:  Regular rate and rhythm with normal S1 and S2     Pulmonary/Chest:   Clear to auscultation bilaterally without wheezes, rhonchi or rales      Musculoskeletal:  No edema. No obvious deformity No wasting       Neurological:  Alert and oriented to person, place, and time.   Coordination normal.     Skin:   Skin is warm and dry.  No diaphoresis.   No rash noted.     Psychiatric: Normal mood and affect. Behavior is normal.  Judgment and thought content normal.     Complete Blood Count  Lab Results   Component Value Date    RBC 4.67 12/15/2021    HGB 13.5 (L) 12/15/2021    HCT 41.7 12/15/2021    MCV 89 12/15/2021    MCH 28.9 12/15/2021    MCHC 32.4 12/15/2021    RDW 12.9 12/15/2021     12/15/2021    MPV 12.8 12/15/2021    GRAN 3.2 12/15/2021    GRAN 43.5 12/15/2021    LYMPH 3.4 12/15/2021    LYMPH 46.3 12/15/2021    MONO 0.6 12/15/2021    MONO 7.8 12/15/2021    EOS 0.1 12/15/2021    BASO 0.03 12/15/2021    EOSINOPHIL 1.9 12/15/2021     BASOPHIL 0.4 12/15/2021    DIFFMETHOD Automated 12/15/2021       Comprehensive Metabolic Panel  Lab Results   Component Value Date     (H) 12/15/2021    BUN 18 12/15/2021    CREATININE 1.08 12/15/2021     12/15/2021    K 4.1 12/15/2021     12/15/2021    PROT 8.0 12/15/2021    ALBUMIN 4.4 12/15/2021    BILITOT 0.4 12/15/2021    AST 25 12/15/2021    ALKPHOS 127 (H) 12/15/2021    CO2 28 12/15/2021    ALT 40 12/15/2021    ANIONGAP 14 12/15/2021    EGFRNONAA >60.0 12/15/2021    ESTGFRAFRICA >60.0 12/15/2021       TSH  Lab Results   Component Value Date    TSH 0.923 12/15/2021       Assessment / Plan:      ICD-10-CM ICD-9-CM   1. Hypertension associated with diabetes  E11.59 250.80    I15.2 401.9   2. 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 CVA, old, hemiparesis  I69.359 438.20   4. Atherosclerosis of abdominal aorta  I70.0 440.0   5. 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  -     Comprehensive Metabolic Panel; Future; Expected date: 02/14/2022  -     Lipid Panel; Future; Expected date: 02/14/2022  -     Hemoglobin A1C; Future; Expected date: 02/14/2022    CVA, old, hemiparesis  -     Comprehensive Metabolic Panel; Future; Expected date: 02/14/2022  -     Lipid Panel; Future; Expected date: 02/14/2022  -     Hemoglobin A1C; Future; Expected date: 02/14/2022    Atherosclerosis of abdominal aorta  -     Comprehensive Metabolic Panel; Future; Expected date: 02/14/2022  -     Lipid Panel; Future; Expected date: 02/14/2022  -     Hemoglobin A1C; Future; Expected date: 02/14/2022    Encounter for screening for lung cancer    Other orders  -     metoprolol tartrate  (LOPRESSOR) 50 MG tablet; Take 1 tablet (50 mg total) by mouth 2 (two) times daily.  Dispense: 180 tablet; Refill: 3  -     metFORMIN (GLUCOPHAGE) 1000 MG tablet; Take 1 tablet (1,000 mg total) by mouth 2 (two) times daily with meals.  Dispense: 180 tablet; Refill: 3  -     lisinopriL (PRINIVIL,ZESTRIL) 20 MG tablet; Take 2 tablets (40 mg total) by mouth once daily.  Dispense: 180 tablet; Refill: 3  -     lactulose (CHRONULAC) 10 gram/15 mL solution; TAKE  15ML  1-3  TIMES  A  DAY  Dispense: 1800 mL; Refill: 3  -     insulin NPH/Reg human (NOVOLIN 70-30 FLEXPEN U-100) 100 unit/mL (70-30) InPn pen; Inject 20 units in the am and 15 units in the pm  Dispense: 15 each; Refill: 1  -     atorvastatin (LIPITOR) 80 MG tablet; Take 1 tablet (80 mg total) by mouth once daily.  Dispense: 90 tablet; Refill: 3  -     clopidogreL (PLAVIX) 75 mg tablet; Take 1 tablet (75 mg total) by mouth once daily.  Dispense: 90 tablet; Refill: 3  -     sildenafiL (VIAGRA) 100 MG tablet; Take 1 tablet (100 mg total) by mouth daily as needed for Erectile Dysfunction.  Dispense: 90 tablet; Refill: 1

## 2022-03-25 ENCOUNTER — LAB VISIT (OUTPATIENT)
Dept: LAB | Facility: HOSPITAL | Age: 67
End: 2022-03-25
Attending: FAMILY MEDICINE
Payer: MEDICARE

## 2022-03-25 DIAGNOSIS — I69.359 CVA, OLD, HEMIPARESIS: ICD-10-CM

## 2022-03-25 DIAGNOSIS — E11.9 TYPE 2 DIABETES MELLITUS WITHOUT COMPLICATION, WITHOUT LONG-TERM CURRENT USE OF INSULIN: ICD-10-CM

## 2022-03-25 DIAGNOSIS — Z89.421 HISTORY OF COMPLETE RAY AMPUTATION OF SECOND TOE OF RIGHT FOOT: ICD-10-CM

## 2022-03-25 DIAGNOSIS — I70.0 ATHEROSCLEROSIS OF ABDOMINAL AORTA: ICD-10-CM

## 2022-03-25 DIAGNOSIS — I15.2 HYPERTENSION ASSOCIATED WITH DIABETES: ICD-10-CM

## 2022-03-25 DIAGNOSIS — E11.59 HYPERTENSION ASSOCIATED WITH DIABETES: ICD-10-CM

## 2022-03-25 DIAGNOSIS — E78.5 HYPERLIPIDEMIA, UNSPECIFIED HYPERLIPIDEMIA TYPE: ICD-10-CM

## 2022-03-25 LAB
ALBUMIN SERPL BCP-MCNC: 4.3 G/DL (ref 3.5–5.2)
ALP SERPL-CCNC: 122 U/L (ref 38–126)
ALT SERPL W/O P-5'-P-CCNC: 23 U/L (ref 10–44)
ANION GAP SERPL CALC-SCNC: 10 MMOL/L (ref 8–16)
ANION GAP SERPL CALC-SCNC: 10 MMOL/L (ref 8–16)
AST SERPL-CCNC: 26 U/L (ref 15–46)
BILIRUB SERPL-MCNC: 0.5 MG/DL (ref 0.1–1)
CALCIUM SERPL-MCNC: 9.8 MG/DL (ref 8.7–10.5)
CALCIUM SERPL-MCNC: 9.8 MG/DL (ref 8.7–10.5)
CHLORIDE SERPL-SCNC: 106 MMOL/L (ref 95–110)
CHLORIDE SERPL-SCNC: 106 MMOL/L (ref 95–110)
CHOLEST SERPL-MCNC: 159 MG/DL (ref 120–199)
CHOLEST/HDLC SERPL: 3.7 {RATIO} (ref 2–5)
CO2 SERPL-SCNC: 29 MMOL/L (ref 23–29)
CO2 SERPL-SCNC: 29 MMOL/L (ref 23–29)
CREAT SERPL-MCNC: 1.29 MG/DL (ref 0.5–1.4)
CREAT SERPL-MCNC: 1.29 MG/DL (ref 0.5–1.4)
EST. GFR  (AFRICAN AMERICAN): >60 ML/MIN/1.73 M^2
EST. GFR  (AFRICAN AMERICAN): >60 ML/MIN/1.73 M^2
EST. GFR  (NON AFRICAN AMERICAN): 56.2 ML/MIN/1.73 M^2
EST. GFR  (NON AFRICAN AMERICAN): 56.2 ML/MIN/1.73 M^2
ESTIMATED AVG GLUCOSE: 192 MG/DL (ref 68–131)
GLUCOSE SERPL-MCNC: 129 MG/DL (ref 70–110)
GLUCOSE SERPL-MCNC: 129 MG/DL (ref 70–110)
HBA1C MFR BLD: 8.3 % (ref 4–5.6)
HDLC SERPL-MCNC: 43 MG/DL (ref 40–75)
HDLC SERPL: 27 % (ref 20–50)
LDLC SERPL CALC-MCNC: 100.8 MG/DL (ref 63–159)
NONHDLC SERPL-MCNC: 116 MG/DL
POTASSIUM SERPL-SCNC: 5.4 MMOL/L (ref 3.5–5.1)
POTASSIUM SERPL-SCNC: 5.4 MMOL/L (ref 3.5–5.1)
PROT SERPL-MCNC: 7.7 G/DL (ref 6–8.4)
SODIUM SERPL-SCNC: 145 MMOL/L (ref 136–145)
SODIUM SERPL-SCNC: 145 MMOL/L (ref 136–145)
TRIGL SERPL-MCNC: 76 MG/DL (ref 30–150)
UUN UR-MCNC: 19 MG/DL (ref 2–20)
UUN UR-MCNC: 19 MG/DL (ref 2–20)

## 2022-03-25 PROCEDURE — 36415 COLL VENOUS BLD VENIPUNCTURE: CPT | Mod: PO | Performed by: FAMILY MEDICINE

## 2022-03-25 PROCEDURE — 80053 COMPREHEN METABOLIC PANEL: CPT | Mod: PO | Performed by: FAMILY MEDICINE

## 2022-03-25 PROCEDURE — 83036 HEMOGLOBIN GLYCOSYLATED A1C: CPT | Performed by: FAMILY MEDICINE

## 2022-03-25 PROCEDURE — 80061 LIPID PANEL: CPT | Performed by: FAMILY MEDICINE

## 2022-03-29 NOTE — TELEPHONE ENCOUNTER
Your on the right track.  Hemoglobin A1c is coming down.  Please continue your current medication.

## 2022-03-30 RX ORDER — AMLODIPINE BESYLATE 10 MG/1
10 TABLET ORAL DAILY
Qty: 90 TABLET | Refills: 3 | Status: SHIPPED | OUTPATIENT
Start: 2022-03-30 | End: 2022-07-19 | Stop reason: SDUPTHER

## 2022-03-30 NOTE — TELEPHONE ENCOUNTER
No new care gaps identified.  Powered by [employer]. Reference number: [id number].   3/30/2022 11:17:00 AM CDT

## 2022-05-26 RX ORDER — LACTULOSE 10 G/15ML
SOLUTION ORAL; RECTAL
Qty: 1800 ML | Refills: 3 | Status: SHIPPED | OUTPATIENT
Start: 2022-05-26 | End: 2022-10-17 | Stop reason: SDUPTHER

## 2022-05-26 NOTE — TELEPHONE ENCOUNTER
----- Message from Ariana Burgos sent at 5/26/2022  2:46 PM CDT -----  Regarding: refill  Contact: [phone]  Type:  RX Refill Request    Who Called:  self   Refill or New Rx: refill   RX Name and Strength: lactulose (CHRONULAC) 10 gram/15 mL solution  How is the patient currently taking it? (ex. 1XDay): : TAKE  15ML  1-3  TIMES  A  DAY  Is this a 30 day or 90 day RX: 1800 mL/3 refills   Preferred Pharmacy with phone number: [facility] - [place], [place] - [address]  Local or Mail Order: Mail Order   Ordering Provider:   Would the patient rather a call back or a response via [facility]?  call  Best Call Back Number: [phone]  Additional Information:

## 2022-06-15 RX ORDER — CALCIUM CITRATE/VITAMIN D3 200MG-6.25
TABLET ORAL
Qty: 200 STRIP | Refills: 3 | Status: SHIPPED | OUTPATIENT
Start: 2022-06-15

## 2022-06-15 NOTE — TELEPHONE ENCOUNTER
Refill Decision Note   Cristi Pulido  is requesting a refill authorization.  Brief Assessment and Rationale for Refill:  Approve     Medication Therapy Plan:       Medication Reconciliation Completed: No   Comments:     No Care Gaps recommended.     Note composed:7:20 AM 06/15/2022

## 2022-06-15 NOTE — TELEPHONE ENCOUNTER
No new care gaps identified.  [facility] Embedded Care Gaps. Reference number: [id number]. 6/15/2022   3:16:24 AM CDT

## 2022-06-16 ENCOUNTER — TELEPHONE (OUTPATIENT)
Dept: FAMILY MEDICINE | Facility: CLINIC | Age: 67
End: 2022-06-16
Payer: MEDICARE

## 2022-06-16 ENCOUNTER — OFFICE VISIT (OUTPATIENT)
Dept: FAMILY MEDICINE | Facility: CLINIC | Age: 67
End: 2022-06-16
Payer: MEDICARE

## 2022-06-16 VITALS
BODY MASS INDEX: 30.14 KG/M2 | TEMPERATURE: 99 F | DIASTOLIC BLOOD PRESSURE: 80 MMHG | HEIGHT: 70 IN | OXYGEN SATURATION: 97 % | SYSTOLIC BLOOD PRESSURE: 114 MMHG | WEIGHT: 210.56 LBS | HEART RATE: 68 BPM

## 2022-06-16 DIAGNOSIS — I69.359 CVA, OLD, HEMIPARESIS: Primary | ICD-10-CM

## 2022-06-16 DIAGNOSIS — E11.9 TYPE 2 DIABETES MELLITUS WITHOUT COMPLICATION, WITHOUT LONG-TERM CURRENT USE OF INSULIN: ICD-10-CM

## 2022-06-16 PROCEDURE — 3052F PR MOST RECENT HEMOGLOBIN A1C LEVEL 8.0 - < 9.0%: ICD-10-PCS | Mod: CPTII,S$GLB,, | Performed by: FAMILY MEDICINE

## 2022-06-16 PROCEDURE — 4010F ACE/ARB THERAPY RXD/TAKEN: CPT | Mod: CPTII,S$GLB,, | Performed by: FAMILY MEDICINE

## 2022-06-16 PROCEDURE — 1125F AMNT PAIN NOTED PAIN PRSNT: CPT | Mod: CPTII,S$GLB,, | Performed by: FAMILY MEDICINE

## 2022-06-16 PROCEDURE — 1159F MED LIST DOCD IN RCRD: CPT | Mod: CPTII,S$GLB,, | Performed by: FAMILY MEDICINE

## 2022-06-16 PROCEDURE — 4010F PR ACE/ARB THEARPY RXD/TAKEN: ICD-10-PCS | Mod: CPTII,S$GLB,, | Performed by: FAMILY MEDICINE

## 2022-06-16 PROCEDURE — 1101F PR PT FALLS ASSESS DOC 0-1 FALLS W/OUT INJ PAST YR: ICD-10-PCS | Mod: CPTII,S$GLB,, | Performed by: FAMILY MEDICINE

## 2022-06-16 PROCEDURE — 3079F DIAST BP 80-89 MM HG: CPT | Mod: CPTII,S$GLB,, | Performed by: FAMILY MEDICINE

## 2022-06-16 PROCEDURE — 1101F PT FALLS ASSESS-DOCD LE1/YR: CPT | Mod: CPTII,S$GLB,, | Performed by: FAMILY MEDICINE

## 2022-06-16 PROCEDURE — 1125F PR PAIN SEVERITY QUANTIFIED, PAIN PRESENT: ICD-10-PCS | Mod: CPTII,S$GLB,, | Performed by: FAMILY MEDICINE

## 2022-06-16 PROCEDURE — 3288F PR FALLS RISK ASSESSMENT DOCUMENTED: ICD-10-PCS | Mod: CPTII,S$GLB,, | Performed by: FAMILY MEDICINE

## 2022-06-16 PROCEDURE — 3008F BODY MASS INDEX DOCD: CPT | Mod: CPTII,S$GLB,, | Performed by: FAMILY MEDICINE

## 2022-06-16 PROCEDURE — 3074F PR MOST RECENT SYSTOLIC BLOOD PRESSURE < 130 MM HG: ICD-10-PCS | Mod: CPTII,S$GLB,, | Performed by: FAMILY MEDICINE

## 2022-06-16 PROCEDURE — 1159F PR MEDICATION LIST DOCUMENTED IN MEDICAL RECORD: ICD-10-PCS | Mod: CPTII,S$GLB,, | Performed by: FAMILY MEDICINE

## 2022-06-16 PROCEDURE — 3079F PR MOST RECENT DIASTOLIC BLOOD PRESSURE 80-89 MM HG: ICD-10-PCS | Mod: CPTII,S$GLB,, | Performed by: FAMILY MEDICINE

## 2022-06-16 PROCEDURE — 3052F HG A1C>EQUAL 8.0%<EQUAL 9.0%: CPT | Mod: CPTII,S$GLB,, | Performed by: FAMILY MEDICINE

## 2022-06-16 PROCEDURE — 99214 OFFICE O/P EST MOD 30 MIN: CPT | Mod: S$GLB,,, | Performed by: FAMILY MEDICINE

## 2022-06-16 PROCEDURE — 3288F FALL RISK ASSESSMENT DOCD: CPT | Mod: CPTII,S$GLB,, | Performed by: FAMILY MEDICINE

## 2022-06-16 PROCEDURE — 3074F SYST BP LT 130 MM HG: CPT | Mod: CPTII,S$GLB,, | Performed by: FAMILY MEDICINE

## 2022-06-16 PROCEDURE — 3008F PR BODY MASS INDEX (BMI) DOCUMENTED: ICD-10-PCS | Mod: CPTII,S$GLB,, | Performed by: FAMILY MEDICINE

## 2022-06-16 PROCEDURE — 99214 PR OFFICE/OUTPT VISIT, EST, LEVL IV, 30-39 MIN: ICD-10-PCS | Mod: S$GLB,,, | Performed by: FAMILY MEDICINE

## 2022-06-16 RX ORDER — SILDENAFIL 100 MG/1
100 TABLET, FILM COATED ORAL DAILY PRN
Qty: 90 TABLET | Refills: 1 | Status: SHIPPED | OUTPATIENT
Start: 2022-06-16 | End: 2022-10-17 | Stop reason: SDUPTHER

## 2022-06-16 RX ORDER — SILDENAFIL 100 MG/1
100 TABLET, FILM COATED ORAL DAILY PRN
Qty: 90 TABLET | Refills: 1 | Status: SHIPPED | OUTPATIENT
Start: 2022-06-16 | End: 2022-06-16 | Stop reason: SDUPTHER

## 2022-06-16 RX ORDER — HUMAN INSULIN 100 [IU]/ML
INJECTION, SUSPENSION SUBCUTANEOUS
Qty: 15 EACH | Refills: 1 | Status: SHIPPED | OUTPATIENT
Start: 2022-06-16 | End: 2022-10-17 | Stop reason: SDUPTHER

## 2022-06-16 RX ORDER — METFORMIN HYDROCHLORIDE 1000 MG/1
1000 TABLET ORAL 2 TIMES DAILY WITH MEALS
Qty: 180 TABLET | Refills: 3 | Status: SHIPPED | OUTPATIENT
Start: 2022-06-16 | End: 2022-10-17 | Stop reason: SDUPTHER

## 2022-06-16 RX ORDER — LISINOPRIL 20 MG/1
40 TABLET ORAL DAILY
Qty: 180 TABLET | Refills: 3 | Status: SHIPPED | OUTPATIENT
Start: 2022-06-16 | End: 2022-10-17 | Stop reason: SDUPTHER

## 2022-06-16 RX ORDER — ASPIRIN 81 MG/1
81 TABLET ORAL
COMMUNITY

## 2022-06-16 RX ORDER — CLOPIDOGREL BISULFATE 75 MG/1
75 TABLET ORAL DAILY
Qty: 90 TABLET | Refills: 3 | Status: SHIPPED | OUTPATIENT
Start: 2022-06-16 | End: 2022-10-17 | Stop reason: SDUPTHER

## 2022-06-16 RX ORDER — ATORVASTATIN CALCIUM 80 MG/1
80 TABLET, FILM COATED ORAL DAILY
Qty: 90 TABLET | Refills: 3 | Status: SHIPPED | OUTPATIENT
Start: 2022-06-16 | End: 2022-10-17 | Stop reason: SDUPTHER

## 2022-06-20 NOTE — PROGRESS NOTES
" Patient ID: Cristi Pulido is a 67 y.o. male.    Chief Complaint: Follow-up    HPI      Cristi Pulido is a 67 y.o. male following up on multiple medical problems.  Pt continues to have residual unilateral weakness from his stroke. Uses walker for ambulation. Walker seen to day is old and unstable.    Dm is under control and he is not experiencing symptoms.        Vitals:    06/16/22 0904   BP: 114/80   BP Location: Right arm   Patient Position: Sitting   Pulse: 68   Temp: 98.6 °F (37 °C)   TempSrc: Oral   SpO2: 97%   Weight: 95.5 kg (210 lb 8.6 oz)   Height: 5' 10" (1.778 m)            Review of Symptoms      Physical Exam    Constitutional:  Oriented to person, place, and time.appears well-developed and well-nourished.  No distress.      HENT  Head: Normocephalic and atraumatic  Right Ear: External ear normal.   Left Ear: External ear normal.   Nose: External nose normal.   Mouth:  Moist mucus membranes.    Eyes:  Conjunctivae are normal. Right eye exhibits no discharge.  Left eye exhibits no discharge. No scleral icterus.  No periorbital edema    Cardiovascular:  Regular rate and rhythm with normal S1 and S2     Pulmonary/Chest:   Clear to auscultation bilaterally without wheezes, rhonchi or rales      Musculoskeletal:  No edema. No obvious deformity No wasting       Neurological:  Alert and oriented to person, place, and time.   Coordination normal.     Skin:   Skin is warm and dry.  No diaphoresis.   No rash noted.     Psychiatric: Normal mood and affect. Behavior is normal.  Judgment and thought content normal.     Complete Blood Count  Lab Results   Component Value Date    RBC 4.67 12/15/2021    HGB 13.5 (L) 12/15/2021    HCT 41.7 12/15/2021    MCV 89 12/15/2021    MCH 28.9 12/15/2021    MCHC 32.4 12/15/2021    RDW 12.9 12/15/2021     12/15/2021    MPV 12.8 12/15/2021    GRAN 3.2 12/15/2021    GRAN 43.5 12/15/2021    LYMPH 3.4 12/15/2021    LYMPH 46.3 12/15/2021    MONO 0.6 12/15/2021    MONO 7.8 12/15/2021 "    EOS 0.1 12/15/2021    BASO 0.03 12/15/2021    EOSINOPHIL 1.9 12/15/2021    BASOPHIL 0.4 12/15/2021    DIFFMETHOD Automated 12/15/2021       Comprehensive Metabolic Panel  Lab Results   Component Value Date     (H) 03/25/2022     (H) 03/25/2022    BUN 19 03/25/2022    BUN 19 03/25/2022    CREATININE 1.29 03/25/2022    CREATININE 1.29 03/25/2022     03/25/2022     03/25/2022    K 5.4 (H) 03/25/2022    K 5.4 (H) 03/25/2022     03/25/2022     03/25/2022    PROT 7.7 03/25/2022    ALBUMIN 4.3 03/25/2022    BILITOT 0.5 03/25/2022    AST 26 03/25/2022    ALKPHOS 122 03/25/2022    CO2 29 03/25/2022    CO2 29 03/25/2022    ALT 23 03/25/2022    ANIONGAP 10 03/25/2022    ANIONGAP 10 03/25/2022    EGFRNONAA 56.2 (A) 03/25/2022    EGFRNONAA 56.2 (A) 03/25/2022    ESTGFRAFRICA >60.0 03/25/2022    ESTGFRAFRICA >60.0 03/25/2022       TSH  No results found for: TSH    Assessment / Plan:      ICD-10-CM ICD-9-CM   1. CVA, old, hemiparesis  I69.359 438.20   2. Type 2 diabetes mellitus without complication, without long-term current use of insulin  E11.9 250.00     CVA, old, hemiparesis  -     WALKER FOR HOME USE    Type 2 diabetes mellitus without complication, without long-term current use of insulin  -     Microalbumin/Creatinine Ratio, Urine; Future; Expected date: 06/16/2022  -     HEMOGLOBIN A1C; Future; Expected date: 06/16/2022    Other orders  -     lisinopriL (PRINIVIL,ZESTRIL) 20 MG tablet; Take 2 tablets (40 mg total) by mouth once daily.  Dispense: 180 tablet; Refill: 3  -     metFORMIN (GLUCOPHAGE) 1000 MG tablet; Take 1 tablet (1,000 mg total) by mouth 2 (two) times daily with meals.  Dispense: 180 tablet; Refill: 3  -     atorvastatin (LIPITOR) 80 MG tablet; Take 1 tablet (80 mg total) by mouth once daily.  Dispense: 90 tablet; Refill: 3  -     Discontinue: sildenafiL (VIAGRA) 100 MG tablet; Take 1 tablet (100 mg total) by mouth daily as needed for Erectile Dysfunction.  Dispense:  90 tablet; Refill: 1  -     clopidogreL (PLAVIX) 75 mg tablet; Take 1 tablet (75 mg total) by mouth once daily.  Dispense: 90 tablet; Refill: 3  -     insulin NPH/Reg human (NOVOLIN 70-30 FLEXPEN U-100) 100 unit/mL (70-30) InPn pen; Inject 20 units in the am and 15 units in the pm  Dispense: 15 each; Refill: 1  -     sildenafiL (VIAGRA) 100 MG tablet; Take 1 tablet (100 mg total) by mouth daily as needed for Erectile Dysfunction.  Dispense: 90 tablet; Refill: 1

## 2022-06-27 ENCOUNTER — LAB VISIT (OUTPATIENT)
Dept: LAB | Facility: HOSPITAL | Age: 67
End: 2022-06-27
Attending: FAMILY MEDICINE
Payer: MEDICARE

## 2022-06-27 DIAGNOSIS — E11.9 TYPE 2 DIABETES MELLITUS WITHOUT COMPLICATION, WITHOUT LONG-TERM CURRENT USE OF INSULIN: ICD-10-CM

## 2022-06-27 LAB
ALBUMIN/CREAT UR: 8.5 UG/MG (ref 0–30)
CREAT UR-MCNC: 165 MG/DL (ref 23–375)
MICROALBUMIN UR DL<=1MG/L-MCNC: 14 UG/ML

## 2022-06-27 PROCEDURE — 82570 ASSAY OF URINE CREATININE: CPT | Mod: PO | Performed by: FAMILY MEDICINE

## 2022-06-29 ENCOUNTER — TELEPHONE (OUTPATIENT)
Dept: FAMILY MEDICINE | Facility: CLINIC | Age: 67
End: 2022-06-29
Payer: MEDICARE

## 2022-06-29 NOTE — TELEPHONE ENCOUNTER
Suggest increasing his insulin by 8 units in the morning and 5 units in the evening-please bring reading is an insulin that he is injecting to office in two weeks.

## 2022-06-29 NOTE — TELEPHONE ENCOUNTER
Informed pt to increase Novolin to 28 units in AM and to 20 units in the PM. Pt to check glucose bid for 2 weeks and write down how much insulin to take. Then bring to clinic for MD to review. Pt verbalized  understanding

## 2022-07-01 ENCOUNTER — TELEPHONE (OUTPATIENT)
Dept: FAMILY MEDICINE | Facility: CLINIC | Age: 67
End: 2022-07-01
Payer: MEDICARE

## 2022-07-19 NOTE — TELEPHONE ENCOUNTER
----- Message from Pawel Neri sent at 7/19/2022  2:15 PM CDT -----  Contact: [phone]  Who Called: PT  Regarding: pt states he needs a refill on his amLODIPine (NORVASC) 10 MG tablet and he also needs small  Insulin needles   Would the patient rather a call back or a response via [facility]? Call back  Best Call Back Number: [phone]  Additional Information: [facility] (Now [facility] [phone]

## 2022-07-19 NOTE — TELEPHONE ENCOUNTER
No new care gaps identified.  [facility] Embedded Care Gaps. Reference number: [id number]. 7/19/2022   3:09:28 PM CDT

## 2022-07-20 RX ORDER — AMLODIPINE BESYLATE 10 MG/1
10 TABLET ORAL DAILY
Qty: 90 TABLET | Refills: 3 | Status: SHIPPED | OUTPATIENT
Start: 2022-07-20 | End: 2022-10-17 | Stop reason: SDUPTHER

## 2022-07-20 RX ORDER — PEN NEEDLE, DIABETIC 30 GX3/16"
NEEDLE, DISPOSABLE MISCELLANEOUS
Qty: 400 EACH | Refills: 0 | Status: SHIPPED | OUTPATIENT
Start: 2022-07-20

## 2022-07-25 ENCOUNTER — TELEPHONE (OUTPATIENT)
Dept: FAMILY MEDICINE | Facility: CLINIC | Age: 67
End: 2022-07-25
Payer: MEDICARE

## 2022-07-28 ENCOUNTER — PATIENT OUTREACH (OUTPATIENT)
Dept: ADMINISTRATIVE | Facility: HOSPITAL | Age: 67
End: 2022-07-28
Payer: MEDICARE

## 2022-07-28 NOTE — PROGRESS NOTES
Non-compliant report chart audits HGBA1C.        Care Everywhere and media, updates requested and reviewed.      [facility] and [facility] reviewed for tests needed.    Pt A1c not due until 9/27/2022

## 2022-10-17 ENCOUNTER — OFFICE VISIT (OUTPATIENT)
Dept: FAMILY MEDICINE | Facility: CLINIC | Age: 67
End: 2022-10-17
Payer: MEDICARE

## 2022-10-17 VITALS
BODY MASS INDEX: 30.05 KG/M2 | HEART RATE: 75 BPM | DIASTOLIC BLOOD PRESSURE: 80 MMHG | OXYGEN SATURATION: 96 % | SYSTOLIC BLOOD PRESSURE: 134 MMHG | TEMPERATURE: 99 F | WEIGHT: 209.88 LBS | HEIGHT: 70 IN

## 2022-10-17 DIAGNOSIS — Z23 NEED FOR INFLUENZA VACCINATION: ICD-10-CM

## 2022-10-17 DIAGNOSIS — I69.359 CVA, OLD, HEMIPARESIS: ICD-10-CM

## 2022-10-17 DIAGNOSIS — I15.2 HYPERTENSION ASSOCIATED WITH DIABETES: ICD-10-CM

## 2022-10-17 DIAGNOSIS — E11.59 HYPERTENSION ASSOCIATED WITH DIABETES: ICD-10-CM

## 2022-10-17 DIAGNOSIS — E11.9 TYPE 2 DIABETES MELLITUS WITHOUT COMPLICATION, WITHOUT LONG-TERM CURRENT USE OF INSULIN: Primary | ICD-10-CM

## 2022-10-17 PROCEDURE — 1159F PR MEDICATION LIST DOCUMENTED IN MEDICAL RECORD: ICD-10-PCS | Mod: CPTII,S$GLB,, | Performed by: FAMILY MEDICINE

## 2022-10-17 PROCEDURE — 3052F PR MOST RECENT HEMOGLOBIN A1C LEVEL 8.0 - < 9.0%: ICD-10-PCS | Mod: CPTII,S$GLB,, | Performed by: FAMILY MEDICINE

## 2022-10-17 PROCEDURE — 1159F MED LIST DOCD IN RCRD: CPT | Mod: CPTII,S$GLB,, | Performed by: FAMILY MEDICINE

## 2022-10-17 PROCEDURE — 3079F DIAST BP 80-89 MM HG: CPT | Mod: CPTII,S$GLB,, | Performed by: FAMILY MEDICINE

## 2022-10-17 PROCEDURE — 90694 FLU VACCINE - QUADRIVALENT - ADJUVANTED: ICD-10-PCS | Mod: S$GLB,,, | Performed by: FAMILY MEDICINE

## 2022-10-17 PROCEDURE — 3288F FALL RISK ASSESSMENT DOCD: CPT | Mod: CPTII,S$GLB,, | Performed by: FAMILY MEDICINE

## 2022-10-17 PROCEDURE — 3075F PR MOST RECENT SYSTOLIC BLOOD PRESS GE 130-139MM HG: ICD-10-PCS | Mod: CPTII,S$GLB,, | Performed by: FAMILY MEDICINE

## 2022-10-17 PROCEDURE — 3052F HG A1C>EQUAL 8.0%<EQUAL 9.0%: CPT | Mod: CPTII,S$GLB,, | Performed by: FAMILY MEDICINE

## 2022-10-17 PROCEDURE — 1101F PT FALLS ASSESS-DOCD LE1/YR: CPT | Mod: CPTII,S$GLB,, | Performed by: FAMILY MEDICINE

## 2022-10-17 PROCEDURE — 4010F PR ACE/ARB THEARPY RXD/TAKEN: ICD-10-PCS | Mod: CPTII,S$GLB,, | Performed by: FAMILY MEDICINE

## 2022-10-17 PROCEDURE — 4010F ACE/ARB THERAPY RXD/TAKEN: CPT | Mod: CPTII,S$GLB,, | Performed by: FAMILY MEDICINE

## 2022-10-17 PROCEDURE — 99214 PR OFFICE/OUTPT VISIT, EST, LEVL IV, 30-39 MIN: ICD-10-PCS | Mod: S$GLB,,, | Performed by: FAMILY MEDICINE

## 2022-10-17 PROCEDURE — G0008 FLU VACCINE - QUADRIVALENT - ADJUVANTED: ICD-10-PCS | Mod: S$GLB,,, | Performed by: FAMILY MEDICINE

## 2022-10-17 PROCEDURE — 3066F NEPHROPATHY DOC TX: CPT | Mod: CPTII,S$GLB,, | Performed by: FAMILY MEDICINE

## 2022-10-17 PROCEDURE — 3079F PR MOST RECENT DIASTOLIC BLOOD PRESSURE 80-89 MM HG: ICD-10-PCS | Mod: CPTII,S$GLB,, | Performed by: FAMILY MEDICINE

## 2022-10-17 PROCEDURE — 90694 VACC AIIV4 NO PRSRV 0.5ML IM: CPT | Mod: S$GLB,,, | Performed by: FAMILY MEDICINE

## 2022-10-17 PROCEDURE — 3288F PR FALLS RISK ASSESSMENT DOCUMENTED: ICD-10-PCS | Mod: CPTII,S$GLB,, | Performed by: FAMILY MEDICINE

## 2022-10-17 PROCEDURE — 3066F PR DOCUMENTATION OF TREATMENT FOR NEPHROPATHY: ICD-10-PCS | Mod: CPTII,S$GLB,, | Performed by: FAMILY MEDICINE

## 2022-10-17 PROCEDURE — 99214 OFFICE O/P EST MOD 30 MIN: CPT | Mod: S$GLB,,, | Performed by: FAMILY MEDICINE

## 2022-10-17 PROCEDURE — 3075F SYST BP GE 130 - 139MM HG: CPT | Mod: CPTII,S$GLB,, | Performed by: FAMILY MEDICINE

## 2022-10-17 PROCEDURE — 3061F NEG MICROALBUMINURIA REV: CPT | Mod: CPTII,S$GLB,, | Performed by: FAMILY MEDICINE

## 2022-10-17 PROCEDURE — 1126F AMNT PAIN NOTED NONE PRSNT: CPT | Mod: CPTII,S$GLB,, | Performed by: FAMILY MEDICINE

## 2022-10-17 PROCEDURE — 1126F PR PAIN SEVERITY QUANTIFIED, NO PAIN PRESENT: ICD-10-PCS | Mod: CPTII,S$GLB,, | Performed by: FAMILY MEDICINE

## 2022-10-17 PROCEDURE — 3061F PR NEG MICROALBUMINURIA RESULT DOCUMENTED/REVIEW: ICD-10-PCS | Mod: CPTII,S$GLB,, | Performed by: FAMILY MEDICINE

## 2022-10-17 PROCEDURE — 1101F PR PT FALLS ASSESS DOC 0-1 FALLS W/OUT INJ PAST YR: ICD-10-PCS | Mod: CPTII,S$GLB,, | Performed by: FAMILY MEDICINE

## 2022-10-17 PROCEDURE — G0008 ADMIN INFLUENZA VIRUS VAC: HCPCS | Mod: S$GLB,,, | Performed by: FAMILY MEDICINE

## 2022-10-17 RX ORDER — MULTIVITAMIN
1 TABLET ORAL DAILY
Qty: 90 TABLET | Refills: 3 | Status: SHIPPED | OUTPATIENT
Start: 2022-10-17

## 2022-10-17 RX ORDER — LISINOPRIL 20 MG/1
40 TABLET ORAL DAILY
Qty: 180 TABLET | Refills: 3 | Status: SHIPPED | OUTPATIENT
Start: 2022-10-17 | End: 2023-04-19 | Stop reason: SDUPTHER

## 2022-10-17 RX ORDER — SILDENAFIL 100 MG/1
100 TABLET, FILM COATED ORAL DAILY PRN
Qty: 90 TABLET | Refills: 1 | Status: SHIPPED | OUTPATIENT
Start: 2022-10-17 | End: 2023-04-19

## 2022-10-17 RX ORDER — CLOPIDOGREL BISULFATE 75 MG/1
75 TABLET ORAL DAILY
Qty: 90 TABLET | Refills: 3 | Status: SHIPPED | OUTPATIENT
Start: 2022-10-17 | End: 2023-04-19 | Stop reason: SDUPTHER

## 2022-10-17 RX ORDER — LACTULOSE 10 G/15ML
SOLUTION ORAL; RECTAL
Qty: 2000 ML | Refills: 3 | Status: SHIPPED | OUTPATIENT
Start: 2022-10-17 | End: 2023-04-19 | Stop reason: SDUPTHER

## 2022-10-17 RX ORDER — GABAPENTIN 600 MG/1
600 TABLET ORAL 3 TIMES DAILY PRN
Qty: 270 TABLET | Refills: 3 | Status: SHIPPED | OUTPATIENT
Start: 2022-10-17 | End: 2023-12-21 | Stop reason: SDUPTHER

## 2022-10-17 RX ORDER — METOPROLOL TARTRATE 50 MG/1
50 TABLET ORAL 2 TIMES DAILY
Qty: 180 TABLET | Refills: 3 | Status: SHIPPED | OUTPATIENT
Start: 2022-10-17 | End: 2023-01-20 | Stop reason: SDUPTHER

## 2022-10-17 RX ORDER — HUMAN INSULIN 100 [IU]/ML
INJECTION, SUSPENSION SUBCUTANEOUS
Qty: 15 EACH | Refills: 1 | Status: SHIPPED | OUTPATIENT
Start: 2022-10-17 | End: 2022-12-05 | Stop reason: SDUPTHER

## 2022-10-17 RX ORDER — FLUOXETINE HYDROCHLORIDE 20 MG/1
20 CAPSULE ORAL DAILY
Qty: 90 CAPSULE | Refills: 11 | Status: SHIPPED | OUTPATIENT
Start: 2022-10-17 | End: 2023-04-19 | Stop reason: SDUPTHER

## 2022-10-17 RX ORDER — AMLODIPINE BESYLATE 10 MG/1
10 TABLET ORAL DAILY
Qty: 90 TABLET | Refills: 3 | Status: SHIPPED | OUTPATIENT
Start: 2022-10-17 | End: 2023-01-20 | Stop reason: SDUPTHER

## 2022-10-17 RX ORDER — METFORMIN HYDROCHLORIDE 1000 MG/1
1000 TABLET ORAL 2 TIMES DAILY WITH MEALS
Qty: 180 TABLET | Refills: 3 | Status: SHIPPED | OUTPATIENT
Start: 2022-10-17 | End: 2023-01-20 | Stop reason: SDUPTHER

## 2022-10-17 RX ORDER — ATORVASTATIN CALCIUM 80 MG/1
80 TABLET, FILM COATED ORAL DAILY
Qty: 90 TABLET | Refills: 3 | Status: SHIPPED | OUTPATIENT
Start: 2022-10-17 | End: 2023-06-20 | Stop reason: SDUPTHER

## 2022-10-30 NOTE — PROGRESS NOTES
" Patient ID: Cristi Pulido is a 67 y.o. male.    Chief Complaint: Follow-up    HPI      Cristi Pulido is a 67 y.o. male here following up on chronic medical conditions including hypertension history of CVA type 2 diabetes overall debility.  Doing well on current medications no new problems.  No exacerbations of previous conditions.    Vitals:    10/17/22 0942   BP: 134/80   BP Location: Left arm   Patient Position: Sitting   Pulse: 75   Temp: 98.8 °F (37.1 °C)   TempSrc: Oral   SpO2: 96%   Weight: 95.2 kg (209 lb 14.1 oz)   Height: 5' 10" (1.778 m)            Review of Symptoms      Physical Exam    Constitutional:  Oriented to person, place, and time.appears well-developed and well-nourished.  No distress.      HENT  Head: Normocephalic and atraumatic  Right Ear: External ear normal.   Left Ear: External ear normal.   Nose: External nose normal.   Mouth:  Moist mucus membranes.    Eyes:  Conjunctivae are normal. Right eye exhibits no discharge.  Left eye exhibits no discharge. No scleral icterus.  No periorbital edema    Cardiovascular:  Regular rate and rhythm with normal S1 and S2     Pulmonary/Chest:   Clear to auscultation bilaterally without wheezes, rhonchi or rales      Musculoskeletal:  No edema. No obvious deformity No wasting       Neurological:  Alert and oriented to person, place, and time.   Coordination normal.     Skin:   Skin is warm and dry.  No diaphoresis.   No rash noted.     Psychiatric: Normal mood and affect. Behavior is normal.  Judgment and thought content normal.     Complete Blood Count  Lab Results   Component Value Date    RBC 4.67 12/15/2021    HGB 13.5 (L) 12/15/2021    HCT 41.7 12/15/2021    MCV 89 12/15/2021    MCH 28.9 12/15/2021    MCHC 32.4 12/15/2021    RDW 12.9 12/15/2021     12/15/2021    MPV 12.8 12/15/2021    GRAN 3.2 12/15/2021    GRAN 43.5 12/15/2021    LYMPH 3.4 12/15/2021    LYMPH 46.3 12/15/2021    MONO 0.6 12/15/2021    MONO 7.8 12/15/2021    EOS 0.1 12/15/2021    " BASO 0.03 12/15/2021    EOSINOPHIL 1.9 12/15/2021    BASOPHIL 0.4 12/15/2021    DIFFMETHOD Automated 12/15/2021       Comprehensive Metabolic Panel  No results found for: GLU, BUN, CREATININE, NA, K, CL, PROT, ALBUMIN, BILITOT, AST, ALKPHOS, CO2, ALT, ANIONGAP, EGFRNONAA, ESTGFRAFRICA    TSH  No results found for: TSH    Assessment / Plan:      ICD-10-CM ICD-9-CM   1. Type 2 diabetes mellitus without complication, without long-term current use of insulin  E11.9 250.00   2. Need for influenza vaccination  Z23 V04.81   3. CVA, old, hemiparesis  I69.359 438.20   4. Hypertension associated with diabetes  E11.59 250.80    I15.2 401.9     Type 2 diabetes mellitus without complication, without long-term current use of insulin  -     Hemoglobin A1C; Standing  -     Comprehensive Metabolic Panel; Future; Expected date: 10/17/2022  -     CBC Auto Differential; Future; Expected date: 10/17/2022  -     TSH; Future; Expected date: 10/17/2022  -     T4, Free; Future; Expected date: 10/17/2022  -     Lipid Panel; Future; Expected date: 10/17/2022    Need for influenza vaccination  -     Influenza (FLUAD) - Quadrivalent (Adjuvanted) *Preferred* (65+) (PF)    CVA, old, hemiparesis  -     Hemoglobin A1C; Standing  -     Comprehensive Metabolic Panel; Future; Expected date: 10/17/2022  -     CBC Auto Differential; Future; Expected date: 10/17/2022  -     TSH; Future; Expected date: 10/17/2022  -     T4, Free; Future; Expected date: 10/17/2022  -     Lipid Panel; Future; Expected date: 10/17/2022    Hypertension associated with diabetes  -     Hemoglobin A1C; Standing  -     Comprehensive Metabolic Panel; Future; Expected date: 10/17/2022  -     CBC Auto Differential; Future; Expected date: 10/17/2022  -     TSH; Future; Expected date: 10/17/2022  -     T4, Free; Future; Expected date: 10/17/2022  -     Lipid Panel; Future; Expected date: 10/17/2022    Other orders  -     amLODIPine (NORVASC) 10 MG tablet; Take 1 tablet (10 mg total)  by mouth once daily.  Dispense: 90 tablet; Refill: 3  -     atorvastatin (LIPITOR) 80 MG tablet; Take 1 tablet (80 mg total) by mouth once daily.  Dispense: 90 tablet; Refill: 3  -     gabapentin (NEURONTIN) 600 MG tablet; Take 1 tablet (600 mg total) by mouth 3 (three) times daily as needed.  Dispense: 270 tablet; Refill: 3  -     insulin NPH/Reg human (NOVOLIN 70-30 FLEXPEN U-100) 100 unit/mL (70-30) InPn pen; Inject 15 units in the am and 10 units in the pm  Dispense: 15 each; Refill: 1  -     lactulose (CHRONULAC) 10 gram/15 mL solution; TAKE  15ML  1-3  TIMES  A  DAY  Dispense: 2000 mL; Refill: 3  -     lisinopriL (PRINIVIL,ZESTRIL) 20 MG tablet; Take 2 tablets (40 mg total) by mouth once daily.  Dispense: 180 tablet; Refill: 3  -     metFORMIN (GLUCOPHAGE) 1000 MG tablet; Take 1 tablet (1,000 mg total) by mouth 2 (two) times daily with meals.  Dispense: 180 tablet; Refill: 3  -     metoprolol tartrate (LOPRESSOR) 50 MG tablet; Take 1 tablet (50 mg total) by mouth 2 (two) times daily.  Dispense: 180 tablet; Refill: 3  -     multivitamin (THERAGRAN) per tablet; Take 1 tablet by mouth once daily.  Dispense: 90 tablet; Refill: 3  -     sildenafiL (VIAGRA) 100 MG tablet; Take 1 tablet (100 mg total) by mouth daily as needed for Erectile Dysfunction.  Dispense: 90 tablet; Refill: 1  -     clopidogreL (PLAVIX) 75 mg tablet; Take 1 tablet (75 mg total) by mouth once daily.  Dispense: 90 tablet; Refill: 3  -     FLUoxetine 20 MG capsule; Take 1 capsule (20 mg total) by mouth once daily.  Dispense: 90 capsule; Refill: 11

## 2022-11-07 ENCOUNTER — PATIENT OUTREACH (OUTPATIENT)
Dept: ADMINISTRATIVE | Facility: HOSPITAL | Age: 67
End: 2022-11-07
Payer: MEDICARE

## 2022-11-22 ENCOUNTER — LAB VISIT (OUTPATIENT)
Dept: LAB | Facility: HOSPITAL | Age: 67
End: 2022-11-22
Attending: FAMILY MEDICINE
Payer: MEDICARE

## 2022-11-22 DIAGNOSIS — E11.9 TYPE 2 DIABETES MELLITUS WITHOUT COMPLICATION, WITHOUT LONG-TERM CURRENT USE OF INSULIN: ICD-10-CM

## 2022-11-22 DIAGNOSIS — E11.59 HYPERTENSION ASSOCIATED WITH DIABETES: ICD-10-CM

## 2022-11-22 DIAGNOSIS — I15.2 HYPERTENSION ASSOCIATED WITH DIABETES: ICD-10-CM

## 2022-11-22 DIAGNOSIS — I69.359 CVA, OLD, HEMIPARESIS: ICD-10-CM

## 2022-11-22 LAB
ALBUMIN SERPL BCP-MCNC: 4.5 G/DL (ref 3.5–5.2)
ALP SERPL-CCNC: 172 U/L (ref 38–126)
ALT SERPL W/O P-5'-P-CCNC: 25 U/L (ref 10–44)
ANION GAP SERPL CALC-SCNC: 12 MMOL/L (ref 8–16)
AST SERPL-CCNC: 25 U/L (ref 15–46)
BASOPHILS # BLD AUTO: 0.04 K/UL (ref 0–0.2)
BASOPHILS NFR BLD: 0.6 % (ref 0–1.9)
BILIRUB SERPL-MCNC: 0.6 MG/DL (ref 0.1–1)
CALCIUM SERPL-MCNC: 9.4 MG/DL (ref 8.7–10.5)
CHLORIDE SERPL-SCNC: 106 MMOL/L (ref 95–110)
CHOLEST SERPL-MCNC: 155 MG/DL (ref 120–199)
CHOLEST/HDLC SERPL: 3.4 {RATIO} (ref 2–5)
CO2 SERPL-SCNC: 28 MMOL/L (ref 23–29)
CREAT SERPL-MCNC: 1.28 MG/DL (ref 0.5–1.4)
DIFFERENTIAL METHOD: ABNORMAL
EOSINOPHIL # BLD AUTO: 0.1 K/UL (ref 0–0.5)
EOSINOPHIL NFR BLD: 1.5 % (ref 0–8)
ERYTHROCYTE [DISTWIDTH] IN BLOOD BY AUTOMATED COUNT: 13.4 % (ref 11.5–14.5)
EST. GFR  (NO RACE VARIABLE): >60 ML/MIN/1.73 M^2
ESTIMATED AVG GLUCOSE: 166 MG/DL (ref 68–131)
GLUCOSE SERPL-MCNC: 128 MG/DL (ref 70–110)
HBA1C MFR BLD: 7.4 % (ref 4–5.6)
HCT VFR BLD AUTO: 45 % (ref 40–54)
HDLC SERPL-MCNC: 45 MG/DL (ref 40–75)
HDLC SERPL: 29 % (ref 20–50)
HGB BLD-MCNC: 14.2 G/DL (ref 14–18)
IMM GRANULOCYTES # BLD AUTO: 0.02 K/UL (ref 0–0.04)
IMM GRANULOCYTES NFR BLD AUTO: 0.3 % (ref 0–0.5)
LDLC SERPL CALC-MCNC: 96 MG/DL (ref 63–159)
LYMPHOCYTES # BLD AUTO: 2.7 K/UL (ref 1–4.8)
LYMPHOCYTES NFR BLD: 39.2 % (ref 18–48)
MCH RBC QN AUTO: 28 PG (ref 27–31)
MCHC RBC AUTO-ENTMCNC: 31.6 G/DL (ref 32–36)
MCV RBC AUTO: 89 FL (ref 82–98)
MONOCYTES # BLD AUTO: 0.5 K/UL (ref 0.3–1)
MONOCYTES NFR BLD: 6.9 % (ref 4–15)
NEUTROPHILS # BLD AUTO: 3.5 K/UL (ref 1.8–7.7)
NEUTROPHILS NFR BLD: 51.5 % (ref 38–73)
NONHDLC SERPL-MCNC: 110 MG/DL
NRBC BLD-RTO: 0 /100 WBC
PLATELET # BLD AUTO: 246 K/UL (ref 150–450)
PMV BLD AUTO: 12.1 FL (ref 9.2–12.9)
POTASSIUM SERPL-SCNC: 4.7 MMOL/L (ref 3.5–5.1)
PROT SERPL-MCNC: 7.8 G/DL (ref 6–8.4)
RBC # BLD AUTO: 5.07 M/UL (ref 4.6–6.2)
SODIUM SERPL-SCNC: 146 MMOL/L (ref 136–145)
T4 FREE SERPL-MCNC: 1.41 NG/DL (ref 0.71–1.51)
TRIGL SERPL-MCNC: 70 MG/DL (ref 30–150)
TSH SERPL DL<=0.005 MIU/L-ACNC: 0.83 UIU/ML (ref 0.4–4)
UUN UR-MCNC: 14 MG/DL (ref 2–20)
WBC # BLD AUTO: 6.86 K/UL (ref 3.9–12.7)

## 2022-11-22 PROCEDURE — 36415 COLL VENOUS BLD VENIPUNCTURE: CPT | Mod: PO | Performed by: FAMILY MEDICINE

## 2022-11-22 PROCEDURE — 84443 ASSAY THYROID STIM HORMONE: CPT | Mod: PO | Performed by: FAMILY MEDICINE

## 2022-11-22 PROCEDURE — 85025 COMPLETE CBC W/AUTO DIFF WBC: CPT | Mod: PO | Performed by: FAMILY MEDICINE

## 2022-11-22 PROCEDURE — 80061 LIPID PANEL: CPT | Performed by: FAMILY MEDICINE

## 2022-11-22 PROCEDURE — 83036 HEMOGLOBIN GLYCOSYLATED A1C: CPT | Performed by: FAMILY MEDICINE

## 2022-11-22 PROCEDURE — 80053 COMPREHEN METABOLIC PANEL: CPT | Mod: PO | Performed by: FAMILY MEDICINE

## 2022-11-22 PROCEDURE — 84439 ASSAY OF FREE THYROXINE: CPT | Performed by: FAMILY MEDICINE

## 2022-11-23 ENCOUNTER — TELEPHONE (OUTPATIENT)
Dept: FAMILY MEDICINE | Facility: CLINIC | Age: 67
End: 2022-11-23
Payer: MEDICARE

## 2022-11-23 NOTE — TELEPHONE ENCOUNTER
Your lab work is very good.  Your hemoglobin A1c shows that you are controlling your diabetes very well.  Keep up the great work.  Happy Thanksgiving

## 2022-12-05 ENCOUNTER — TELEPHONE (OUTPATIENT)
Dept: FAMILY MEDICINE | Facility: CLINIC | Age: 67
End: 2022-12-05

## 2022-12-05 RX ORDER — HUMAN INSULIN 100 [IU]/ML
INJECTION, SUSPENSION SUBCUTANEOUS
Qty: 15 EACH | Refills: 1 | Status: SHIPPED | OUTPATIENT
Start: 2022-12-05 | End: 2023-01-20 | Stop reason: SDUPTHER

## 2022-12-05 NOTE — TELEPHONE ENCOUNTER
----- Message from Soumya Sargent sent at 12/5/2022  2:31 PM CST -----  Type:  Patient Returning Call    Who Called:pt  Who Left Message for Patient:office nurse chandana  Does the patient know what this is regarding?:medicine refill   Would the patient rather a call back or a response via [facility]? call  Best Call Back Number:[phone]  Additional Information:

## 2022-12-05 NOTE — TELEPHONE ENCOUNTER
Pt needs refill of   insulin NPH/Reg human (NOVOLIN 70-30 FLEXPEN U-100) 100 unit/mL (70-30) InPn pen 15 each 1 10/17/2022  No   Sig: Inject 15 units in the am    To luis as he ran out   And cant wait for mail order

## 2022-12-05 NOTE — TELEPHONE ENCOUNTER
Message  Received: Today   Pt Advice  Yony MITCHELL [facility] Staff  Caller: pt (Today,  2:41 PM)  Type:  Patient Returning Call     Who Called:pt   Who Left Message for Patient:Yuliya Barnes MA   Does the patient know what this is regarding?: no   Would the patient rather a call back or a response via [facility]? call   Best Call Back Number:[phone]   Additional Information:

## 2023-01-19 ENCOUNTER — PATIENT OUTREACH (OUTPATIENT)
Dept: ADMINISTRATIVE | Facility: OTHER | Age: 68
End: 2023-01-19

## 2023-01-20 ENCOUNTER — OFFICE VISIT (OUTPATIENT)
Dept: FAMILY MEDICINE | Facility: CLINIC | Age: 68
End: 2023-01-20
Payer: MEDICARE

## 2023-01-20 VITALS
HEIGHT: 70 IN | SYSTOLIC BLOOD PRESSURE: 136 MMHG | WEIGHT: 199.5 LBS | DIASTOLIC BLOOD PRESSURE: 82 MMHG | BODY MASS INDEX: 28.56 KG/M2 | OXYGEN SATURATION: 96 % | HEART RATE: 112 BPM | TEMPERATURE: 99 F

## 2023-01-20 DIAGNOSIS — I15.2 HYPERTENSION ASSOCIATED WITH DIABETES: ICD-10-CM

## 2023-01-20 DIAGNOSIS — Z79.4 DIABETES MELLITUS DUE TO UNDERLYING CONDITION WITH COMPLICATION, WITH LONG-TERM CURRENT USE OF INSULIN: Primary | ICD-10-CM

## 2023-01-20 DIAGNOSIS — E08.8 DIABETES MELLITUS DUE TO UNDERLYING CONDITION WITH COMPLICATION, WITH LONG-TERM CURRENT USE OF INSULIN: Primary | ICD-10-CM

## 2023-01-20 DIAGNOSIS — I70.0 ATHEROSCLEROSIS OF ABDOMINAL AORTA: ICD-10-CM

## 2023-01-20 DIAGNOSIS — J30.1 SEASONAL ALLERGIC RHINITIS DUE TO POLLEN: ICD-10-CM

## 2023-01-20 DIAGNOSIS — E11.59 HYPERTENSION ASSOCIATED WITH DIABETES: ICD-10-CM

## 2023-01-20 DIAGNOSIS — I69.359 CVA, OLD, HEMIPARESIS: ICD-10-CM

## 2023-01-20 DIAGNOSIS — F17.200 TOBACCO DEPENDENCE: ICD-10-CM

## 2023-01-20 PROCEDURE — 99214 PR OFFICE/OUTPT VISIT, EST, LEVL IV, 30-39 MIN: ICD-10-PCS | Mod: S$GLB,,, | Performed by: FAMILY MEDICINE

## 2023-01-20 PROCEDURE — 3075F SYST BP GE 130 - 139MM HG: CPT | Mod: CPTII,S$GLB,, | Performed by: FAMILY MEDICINE

## 2023-01-20 PROCEDURE — 1159F MED LIST DOCD IN RCRD: CPT | Mod: CPTII,S$GLB,, | Performed by: FAMILY MEDICINE

## 2023-01-20 PROCEDURE — 1159F PR MEDICATION LIST DOCUMENTED IN MEDICAL RECORD: ICD-10-PCS | Mod: CPTII,S$GLB,, | Performed by: FAMILY MEDICINE

## 2023-01-20 PROCEDURE — 1101F PT FALLS ASSESS-DOCD LE1/YR: CPT | Mod: CPTII,S$GLB,, | Performed by: FAMILY MEDICINE

## 2023-01-20 PROCEDURE — 1101F PR PT FALLS ASSESS DOC 0-1 FALLS W/OUT INJ PAST YR: ICD-10-PCS | Mod: CPTII,S$GLB,, | Performed by: FAMILY MEDICINE

## 2023-01-20 PROCEDURE — 1126F AMNT PAIN NOTED NONE PRSNT: CPT | Mod: CPTII,S$GLB,, | Performed by: FAMILY MEDICINE

## 2023-01-20 PROCEDURE — 3008F PR BODY MASS INDEX (BMI) DOCUMENTED: ICD-10-PCS | Mod: CPTII,S$GLB,, | Performed by: FAMILY MEDICINE

## 2023-01-20 PROCEDURE — 3079F DIAST BP 80-89 MM HG: CPT | Mod: CPTII,S$GLB,, | Performed by: FAMILY MEDICINE

## 2023-01-20 PROCEDURE — 99499 UNLISTED E&M SERVICE: CPT | Mod: S$GLB,,, | Performed by: FAMILY MEDICINE

## 2023-01-20 PROCEDURE — 3008F BODY MASS INDEX DOCD: CPT | Mod: CPTII,S$GLB,, | Performed by: FAMILY MEDICINE

## 2023-01-20 PROCEDURE — 1126F PR PAIN SEVERITY QUANTIFIED, NO PAIN PRESENT: ICD-10-PCS | Mod: CPTII,S$GLB,, | Performed by: FAMILY MEDICINE

## 2023-01-20 PROCEDURE — 99214 OFFICE O/P EST MOD 30 MIN: CPT | Mod: S$GLB,,, | Performed by: FAMILY MEDICINE

## 2023-01-20 PROCEDURE — 99499 RISK ADDL DX/OHS AUDIT: ICD-10-PCS | Mod: S$GLB,,, | Performed by: FAMILY MEDICINE

## 2023-01-20 PROCEDURE — 3288F FALL RISK ASSESSMENT DOCD: CPT | Mod: CPTII,S$GLB,, | Performed by: FAMILY MEDICINE

## 2023-01-20 PROCEDURE — 3075F PR MOST RECENT SYSTOLIC BLOOD PRESS GE 130-139MM HG: ICD-10-PCS | Mod: CPTII,S$GLB,, | Performed by: FAMILY MEDICINE

## 2023-01-20 PROCEDURE — 3079F PR MOST RECENT DIASTOLIC BLOOD PRESSURE 80-89 MM HG: ICD-10-PCS | Mod: CPTII,S$GLB,, | Performed by: FAMILY MEDICINE

## 2023-01-20 PROCEDURE — 3288F PR FALLS RISK ASSESSMENT DOCUMENTED: ICD-10-PCS | Mod: CPTII,S$GLB,, | Performed by: FAMILY MEDICINE

## 2023-01-20 RX ORDER — GLIMEPIRIDE 2 MG/1
2 TABLET ORAL
Qty: 30 TABLET | Refills: 0 | Status: SHIPPED | OUTPATIENT
Start: 2023-01-20 | End: 2023-02-24 | Stop reason: SDUPTHER

## 2023-01-20 RX ORDER — METFORMIN HYDROCHLORIDE 1000 MG/1
1000 TABLET ORAL 2 TIMES DAILY WITH MEALS
Qty: 180 TABLET | Refills: 3 | Status: SHIPPED | OUTPATIENT
Start: 2023-01-20 | End: 2023-04-19 | Stop reason: SDUPTHER

## 2023-01-20 RX ORDER — METOPROLOL TARTRATE 50 MG/1
50 TABLET ORAL 2 TIMES DAILY
Qty: 180 TABLET | Refills: 3 | Status: SHIPPED | OUTPATIENT
Start: 2023-01-20 | End: 2023-07-25 | Stop reason: SDUPTHER

## 2023-01-20 RX ORDER — HUMAN INSULIN 100 [IU]/ML
INJECTION, SUSPENSION SUBCUTANEOUS
Qty: 45 EACH | Refills: 1 | Status: SHIPPED | OUTPATIENT
Start: 2023-01-20 | End: 2023-06-20

## 2023-01-20 RX ORDER — INFLUENZA A VIRUS A/VICTORIA/2570/2019 IVR-215 (H1N1) ANTIGEN (FORMALDEHYDE INACTIVATED), INFLUENZA A VIRUS A/DARWIN/6/2021 IVR-227 (H3N2) ANTIGEN (FORMALDEHYDE INACTIVATED), INFLUENZA B VIRUS B/AUSTRIA/1359417/2021 BVR-26 ANTIGEN (FORMALDEHYDE INACTIVATED), INFLUENZA B VIRUS B/PHUKET/3073/2013 BVR-1B ANTIGEN (FORMALDEHYDE INACTIVATED) 15; 15; 15; 15 UG/.5ML; UG/.5ML; UG/.5ML; UG/.5ML
INJECTION, SUSPENSION INTRAMUSCULAR
COMMUNITY
Start: 2022-10-17 | End: 2023-04-19

## 2023-01-20 RX ORDER — AMLODIPINE BESYLATE 10 MG/1
10 TABLET ORAL DAILY
Qty: 90 TABLET | Refills: 3 | Status: SHIPPED | OUTPATIENT
Start: 2023-01-20 | End: 2023-04-19 | Stop reason: SDUPTHER

## 2023-01-30 NOTE — PROGRESS NOTES
" Patient ID: Cristi Pulido is a 67 y.o. male.    Chief Complaint: Follow-up    HPI      Cristi Pulido is a 67 y.o. male here following up on multiple chronic diseases including diabetes mellitus for which he stays under control.  Lipidemia cardiovascular disease which is stable.  No chest pain or palpitations.    Insomnia-no specific complaints of sleep disturbance    Vitals:    01/20/23 1044   BP: 136/82   BP Location: Left arm   Patient Position: Sitting   Pulse: (!) 112   Temp: 98.5 °F (36.9 °C)   TempSrc: Oral   SpO2: 96%   Weight: 90.5 kg (199 lb 8.3 oz)   Height: 5' 10" (1.778 m)            Review of Symptoms      Physical Exam    Constitutional:  Oriented to person, place, and time.appears well-developed and well-nourished.  No distress.      HENT  Head: Normocephalic and atraumatic  Right Ear: External ear normal.   Left Ear: External ear normal.   Nose: External nose normal.   Mouth:  Moist mucus membranes.    Eyes:  Conjunctivae are normal. Right eye exhibits no discharge.  Left eye exhibits no discharge. No scleral icterus.  No periorbital edema    Cardiovascular:  Regular rate and rhythm with normal S1 and S2     Pulmonary/Chest:   Clear to auscultation bilaterally without wheezes, rhonchi or rales      Musculoskeletal:  No edema. No obvious deformity No wasting       Neurological:  Alert and oriented to person, place, and time.   Coordination normal.     Skin:   Skin is warm and dry.  No diaphoresis.   No rash noted.     Psychiatric: Normal mood and affect. Behavior is normal.  Judgment and thought content normal.     Complete Blood Count  Lab Results   Component Value Date    RBC 5.07 11/22/2022    HGB 14.2 11/22/2022    HCT 45.0 11/22/2022    MCV 89 11/22/2022    MCH 28.0 11/22/2022    MCHC 31.6 (L) 11/22/2022    RDW 13.4 11/22/2022     11/22/2022    MPV 12.1 11/22/2022    GRAN 3.5 11/22/2022    GRAN 51.5 11/22/2022    LYMPH 2.7 11/22/2022    LYMPH 39.2 11/22/2022    MONO 0.5 11/22/2022    MONO 6.9 " 11/22/2022    EOS 0.1 11/22/2022    BASO 0.04 11/22/2022    EOSINOPHIL 1.5 11/22/2022    BASOPHIL 0.6 11/22/2022    DIFFMETHOD Automated 11/22/2022       Comprehensive Metabolic Panel  Lab Results   Component Value Date     (H) 11/22/2022    BUN 14 11/22/2022    CREATININE 1.28 11/22/2022     (H) 11/22/2022    K 4.7 11/22/2022     11/22/2022    PROT 7.8 11/22/2022    ALBUMIN 4.5 11/22/2022    BILITOT 0.6 11/22/2022    AST 25 11/22/2022    ALKPHOS 172 (H) 11/22/2022    CO2 28 11/22/2022    ALT 25 11/22/2022    ANIONGAP 12 11/22/2022       TSH  Lab Results   Component Value Date    TSH 0.828 11/22/2022       Assessment / Plan:      ICD-10-CM ICD-9-CM   1. Diabetes mellitus due to underlying condition with complication, with long-term current use of insulin  E08.8 249.90    Z79.4 V58.67   2. Hypertension associated with diabetes  E11.59 250.80    I15.2 401.9   3. CVA, old, hemiparesis  I69.359 438.20   4. Atherosclerosis of abdominal aorta  I70.0 440.0   5. Seasonal allergic rhinitis due to pollen  J30.1 477.0   6. Tobacco dependence  F17.200 305.1     Diabetes mellitus due to underlying condition with complication, with long-term current use of insulin    Hypertension associated with diabetes  -     Hemoglobin A1C; Standing  -     Comprehensive Metabolic Panel; Future; Expected date: 01/20/2023  -     Lipid Panel; Future; Expected date: 01/20/2023    CVA, old, hemiparesis    Atherosclerosis of abdominal aorta    Seasonal allergic rhinitis due to pollen    Tobacco dependence    Other orders  -     insulin NPH/Reg human (NOVOLIN 70-30 FLEXPEN U-100) 100 unit/mL (70-30) InPn pen; Inject 15 units in the am and 10 units in the pmInject 15 units in the am and 10 units in the pm  Dispense: 45 each; Refill: 1  -     glimepiride (AMARYL) 2 MG tablet; Take 1 tablet (2 mg total) by mouth before breakfast. Stop when you restart insulin  Dispense: 30 tablet; Refill: 0  -     amLODIPine (NORVASC) 10 MG tablet;  Take 1 tablet (10 mg total) by mouth once daily.  Dispense: 90 tablet; Refill: 3  -     metFORMIN (GLUCOPHAGE) 1000 MG tablet; Take 1 tablet (1,000 mg total) by mouth 2 (two) times daily with meals.  Dispense: 180 tablet; Refill: 3  -     metoprolol tartrate (LOPRESSOR) 50 MG tablet; Take 1 tablet (50 mg total) by mouth 2 (two) times daily.  Dispense: 180 tablet; Refill: 3    Continue current medication-discussed adherence to diabetic diet trying to exercise-using walker   Using insulin-must remain on medication and take medication as prescribed.

## 2023-02-24 RX ORDER — GLIMEPIRIDE 2 MG/1
2 TABLET ORAL
Qty: 30 TABLET | Refills: 0 | Status: SHIPPED | OUTPATIENT
Start: 2023-02-24 | End: 2023-03-06 | Stop reason: SDUPTHER

## 2023-02-24 NOTE — TELEPHONE ENCOUNTER
----- Message from Ariana Burgos sent at 2/24/2023  1:54 PM CST -----  Regarding: refill  Contact: [phone]  Patient is requesting a call back regarding his diabetes medication.   Would the patient rather a call back or a response via [facility]?  Call   Best Call Back Number:  [phone]  Additional Information:                  
Care Due:                  Date            Visit Type   Department     Provider  --------------------------------------------------------------------------------                                EP -                              PRIMARY      [facility] FAM[facility]  Last Visit: 01-      CARE ([facility])   MEDICINE       Rell Winn                               -                              PRIMARY      [facility] FAM[facility]  Next Visit: 06-      CARE ([facility])   [facility]       Rell Winn                                                            Last  Test          Frequency    Reason                     Performed    Due Date  --------------------------------------------------------------------------------    HBA1C.......  6 months...  glimepiride, insulin,      11- 05-                             metFORMIN................    [facility] Embedded Care Gaps. Reference number: [id number]. 2/24/2023   2:59:02 PM CST  
Detail Level: Detailed
Spoke to pt. Pt stated that he still has not received his insulin from the mail order pharmacy. Pt is asking for another Rx of glimepiride to the [facility]. I explained that he needs to call [facility] to see what is going on wh Novolin Rx.  
Add 57014 Cpt? (Important Note: In 2017 The Use Of 57878 Is Being Tracked By Cms To Determine Future Global Period Reimbursement For Global Periods): yes

## 2023-03-06 RX ORDER — GLIMEPIRIDE 2 MG/1
2 TABLET ORAL
Qty: 30 TABLET | Refills: 0 | Status: SHIPPED | OUTPATIENT
Start: 2023-03-06 | End: 2023-04-19

## 2023-03-06 NOTE — TELEPHONE ENCOUNTER
----- Message from Khadra Fregoso sent at 3/6/2023  1:28 PM CST -----  Type:  RX Refill Request    Who Called: pt  Refill or New Rx: refill  RX Name and Strength: glimepiride (AMARYL) 2 MG tablet 30 tablet 0     [facility]    Ordering Provider:  Reach pt via:  Best Call Back Number: [phone]  Additional Information: pt says he is out of his meds

## 2023-03-06 NOTE — TELEPHONE ENCOUNTER
No new care gaps identified.  [facility] Embedded Care Gaps. Reference number: [id number]. 3/06/2023   4:02:27 PM CST

## 2023-04-17 LAB
LEFT EYE DM RETINOPATHY: POSITIVE
RIGHT EYE DM RETINOPATHY: POSITIVE

## 2023-04-19 ENCOUNTER — OFFICE VISIT (OUTPATIENT)
Dept: FAMILY MEDICINE | Facility: CLINIC | Age: 68
End: 2023-04-19
Payer: MEDICARE

## 2023-04-19 ENCOUNTER — LAB VISIT (OUTPATIENT)
Dept: LAB | Facility: HOSPITAL | Age: 68
End: 2023-04-19
Attending: FAMILY MEDICINE
Payer: MEDICARE

## 2023-04-19 VITALS
WEIGHT: 202.5 LBS | DIASTOLIC BLOOD PRESSURE: 70 MMHG | SYSTOLIC BLOOD PRESSURE: 122 MMHG | HEART RATE: 100 BPM | OXYGEN SATURATION: 95 % | BODY MASS INDEX: 28.99 KG/M2 | TEMPERATURE: 98 F | HEIGHT: 70 IN

## 2023-04-19 DIAGNOSIS — E11.59 HYPERTENSION ASSOCIATED WITH DIABETES: ICD-10-CM

## 2023-04-19 DIAGNOSIS — I15.2 HYPERTENSION ASSOCIATED WITH DIABETES: ICD-10-CM

## 2023-04-19 DIAGNOSIS — E11.9 TYPE 2 DIABETES MELLITUS WITHOUT COMPLICATION, WITHOUT LONG-TERM CURRENT USE OF INSULIN: ICD-10-CM

## 2023-04-19 DIAGNOSIS — E11.9 TYPE 2 DIABETES MELLITUS WITHOUT COMPLICATION, WITHOUT LONG-TERM CURRENT USE OF INSULIN: Primary | ICD-10-CM

## 2023-04-19 DIAGNOSIS — Z23 NEED FOR PNEUMOCOCCAL VACCINATION: ICD-10-CM

## 2023-04-19 LAB
ALBUMIN SERPL BCP-MCNC: 4.8 G/DL (ref 3.5–5.2)
ALP SERPL-CCNC: 157 U/L (ref 38–126)
ALT SERPL W/O P-5'-P-CCNC: 26 U/L (ref 10–44)
ANION GAP SERPL CALC-SCNC: 14 MMOL/L (ref 8–16)
AST SERPL-CCNC: 26 U/L (ref 15–46)
BILIRUB SERPL-MCNC: 0.5 MG/DL (ref 0.1–1)
CALCIUM SERPL-MCNC: 9.7 MG/DL (ref 8.7–10.5)
CHLORIDE SERPL-SCNC: 100 MMOL/L (ref 95–110)
CHOLEST SERPL-MCNC: 154 MG/DL (ref 120–199)
CHOLEST/HDLC SERPL: 3.4 {RATIO} (ref 2–5)
CO2 SERPL-SCNC: 26 MMOL/L (ref 23–29)
CREAT SERPL-MCNC: 1.03 MG/DL (ref 0.5–1.4)
EST. GFR  (NO RACE VARIABLE): >60 ML/MIN/1.73 M^2
ESTIMATED AVG GLUCOSE: 197 MG/DL (ref 68–131)
GLUCOSE SERPL-MCNC: 260 MG/DL (ref 70–110)
HBA1C MFR BLD: 8.5 % (ref 4–5.6)
HDLC SERPL-MCNC: 45 MG/DL (ref 40–75)
HDLC SERPL: 29.2 % (ref 20–50)
LDLC SERPL CALC-MCNC: 95.6 MG/DL (ref 63–159)
NONHDLC SERPL-MCNC: 109 MG/DL
POTASSIUM SERPL-SCNC: 4 MMOL/L (ref 3.5–5.1)
PROT SERPL-MCNC: 8.5 G/DL (ref 6–8.4)
SODIUM SERPL-SCNC: 140 MMOL/L (ref 136–145)
TRIGL SERPL-MCNC: 67 MG/DL (ref 30–150)
UUN UR-MCNC: 16 MG/DL (ref 2–20)

## 2023-04-19 PROCEDURE — 99214 OFFICE O/P EST MOD 30 MIN: CPT | Mod: S$GLB,,, | Performed by: FAMILY MEDICINE

## 2023-04-19 PROCEDURE — 80053 COMPREHEN METABOLIC PANEL: CPT | Mod: HCNC,PO | Performed by: FAMILY MEDICINE

## 2023-04-19 PROCEDURE — 1101F PT FALLS ASSESS-DOCD LE1/YR: CPT | Mod: CPTII,S$GLB,, | Performed by: FAMILY MEDICINE

## 2023-04-19 PROCEDURE — 83036 HEMOGLOBIN GLYCOSYLATED A1C: CPT | Mod: HCNC | Performed by: FAMILY MEDICINE

## 2023-04-19 PROCEDURE — 1126F PR PAIN SEVERITY QUANTIFIED, NO PAIN PRESENT: ICD-10-PCS | Mod: CPTII,S$GLB,, | Performed by: FAMILY MEDICINE

## 2023-04-19 PROCEDURE — 3074F SYST BP LT 130 MM HG: CPT | Mod: CPTII,S$GLB,, | Performed by: FAMILY MEDICINE

## 2023-04-19 PROCEDURE — 3008F BODY MASS INDEX DOCD: CPT | Mod: CPTII,S$GLB,, | Performed by: FAMILY MEDICINE

## 2023-04-19 PROCEDURE — G0009 PNEUMOCOCCAL CONJUGATE VACCINE 20-VALENT: ICD-10-PCS | Mod: S$GLB,,, | Performed by: FAMILY MEDICINE

## 2023-04-19 PROCEDURE — 3078F PR MOST RECENT DIASTOLIC BLOOD PRESSURE < 80 MM HG: ICD-10-PCS | Mod: CPTII,S$GLB,, | Performed by: FAMILY MEDICINE

## 2023-04-19 PROCEDURE — 4010F PR ACE/ARB THEARPY RXD/TAKEN: ICD-10-PCS | Mod: CPTII,S$GLB,, | Performed by: FAMILY MEDICINE

## 2023-04-19 PROCEDURE — 3288F PR FALLS RISK ASSESSMENT DOCUMENTED: ICD-10-PCS | Mod: CPTII,S$GLB,, | Performed by: FAMILY MEDICINE

## 2023-04-19 PROCEDURE — G0009 ADMIN PNEUMOCOCCAL VACCINE: HCPCS | Mod: S$GLB,,, | Performed by: FAMILY MEDICINE

## 2023-04-19 PROCEDURE — 3288F FALL RISK ASSESSMENT DOCD: CPT | Mod: CPTII,S$GLB,, | Performed by: FAMILY MEDICINE

## 2023-04-19 PROCEDURE — 90677 PNEUMOCOCCAL CONJUGATE VACCINE 20-VALENT: ICD-10-PCS | Mod: S$GLB,,, | Performed by: FAMILY MEDICINE

## 2023-04-19 PROCEDURE — 1126F AMNT PAIN NOTED NONE PRSNT: CPT | Mod: CPTII,S$GLB,, | Performed by: FAMILY MEDICINE

## 2023-04-19 PROCEDURE — 1101F PR PT FALLS ASSESS DOC 0-1 FALLS W/OUT INJ PAST YR: ICD-10-PCS | Mod: CPTII,S$GLB,, | Performed by: FAMILY MEDICINE

## 2023-04-19 PROCEDURE — 3074F PR MOST RECENT SYSTOLIC BLOOD PRESSURE < 130 MM HG: ICD-10-PCS | Mod: CPTII,S$GLB,, | Performed by: FAMILY MEDICINE

## 2023-04-19 PROCEDURE — 3078F DIAST BP <80 MM HG: CPT | Mod: CPTII,S$GLB,, | Performed by: FAMILY MEDICINE

## 2023-04-19 PROCEDURE — 80061 LIPID PANEL: CPT | Mod: HCNC | Performed by: FAMILY MEDICINE

## 2023-04-19 PROCEDURE — 3008F PR BODY MASS INDEX (BMI) DOCUMENTED: ICD-10-PCS | Mod: CPTII,S$GLB,, | Performed by: FAMILY MEDICINE

## 2023-04-19 PROCEDURE — 4010F ACE/ARB THERAPY RXD/TAKEN: CPT | Mod: CPTII,S$GLB,, | Performed by: FAMILY MEDICINE

## 2023-04-19 PROCEDURE — 1159F PR MEDICATION LIST DOCUMENTED IN MEDICAL RECORD: ICD-10-PCS | Mod: CPTII,S$GLB,, | Performed by: FAMILY MEDICINE

## 2023-04-19 PROCEDURE — 3052F PR MOST RECENT HEMOGLOBIN A1C LEVEL 8.0 - < 9.0%: ICD-10-PCS | Mod: CPTII,S$GLB,, | Performed by: FAMILY MEDICINE

## 2023-04-19 PROCEDURE — 36415 COLL VENOUS BLD VENIPUNCTURE: CPT | Mod: HCNC,PO | Performed by: FAMILY MEDICINE

## 2023-04-19 PROCEDURE — 3052F HG A1C>EQUAL 8.0%<EQUAL 9.0%: CPT | Mod: CPTII,S$GLB,, | Performed by: FAMILY MEDICINE

## 2023-04-19 PROCEDURE — 99214 PR OFFICE/OUTPT VISIT, EST, LEVL IV, 30-39 MIN: ICD-10-PCS | Mod: S$GLB,,, | Performed by: FAMILY MEDICINE

## 2023-04-19 PROCEDURE — 1159F MED LIST DOCD IN RCRD: CPT | Mod: CPTII,S$GLB,, | Performed by: FAMILY MEDICINE

## 2023-04-19 PROCEDURE — 90677 PCV20 VACCINE IM: CPT | Mod: S$GLB,,, | Performed by: FAMILY MEDICINE

## 2023-04-19 RX ORDER — GLIMEPIRIDE 2 MG/1
2 TABLET ORAL
Qty: 90 TABLET | Refills: 3 | Status: SHIPPED | OUTPATIENT
Start: 2023-04-19 | End: 2023-06-20 | Stop reason: SDUPTHER

## 2023-04-19 RX ORDER — LACTULOSE 10 G/15ML
SOLUTION ORAL; RECTAL
Qty: 2000 ML | Refills: 3 | Status: SHIPPED | OUTPATIENT
Start: 2023-04-19 | End: 2023-06-20

## 2023-04-19 RX ORDER — CLOPIDOGREL BISULFATE 75 MG/1
75 TABLET ORAL DAILY
Qty: 90 TABLET | Refills: 3 | Status: SHIPPED | OUTPATIENT
Start: 2023-04-19 | End: 2023-07-25 | Stop reason: SDUPTHER

## 2023-04-19 RX ORDER — METFORMIN HYDROCHLORIDE 1000 MG/1
1000 TABLET ORAL 2 TIMES DAILY WITH MEALS
Qty: 180 TABLET | Refills: 3 | Status: SHIPPED | OUTPATIENT
Start: 2023-04-19 | End: 2023-06-20 | Stop reason: SDUPTHER

## 2023-04-19 RX ORDER — AMLODIPINE BESYLATE 10 MG/1
10 TABLET ORAL DAILY
Qty: 90 TABLET | Refills: 3 | Status: SHIPPED | OUTPATIENT
Start: 2023-04-19 | End: 2023-06-20 | Stop reason: SDUPTHER

## 2023-04-19 RX ORDER — FLUOXETINE HYDROCHLORIDE 20 MG/1
20 CAPSULE ORAL DAILY
Qty: 90 CAPSULE | Refills: 11 | Status: SHIPPED | OUTPATIENT
Start: 2023-04-19 | End: 2024-04-18

## 2023-04-19 RX ORDER — LISINOPRIL 20 MG/1
40 TABLET ORAL DAILY
Qty: 180 TABLET | Refills: 3 | Status: SHIPPED | OUTPATIENT
Start: 2023-04-19 | End: 2023-07-25 | Stop reason: SDUPTHER

## 2023-04-19 NOTE — PROGRESS NOTES
" Patient ID: Cristi Pulido is a 68 y.o. male.    Chief Complaint: Diabetes and Blood Sugar Problem    HPI      Cristi Pulido is a 68 y.o. male here following up on diabetes mellitus history of CVA anemia mild depressive symptoms and anxiety.  And neuropathy.  Not able to take diabetes mellitus in because he does not have it.  Has been substituting metformin and Amaryl.    Vitals:    04/19/23 1331   BP: 122/70   BP Location: Left arm   Patient Position: Sitting   Pulse: 100   Temp: 98.3 °F (36.8 °C)   TempSrc: Oral   SpO2: 95%   Weight: 91.8 kg (202 lb 7.9 oz)   Height: 5' 10" (1.778 m)            Review of Symptoms      Physical Exam    Constitutional:  Oriented to person, place, and time.appears well-developed and well-nourished.  No distress.      HENT  Head: Normocephalic and atraumatic  Right Ear: External ear normal.   Left Ear: External ear normal.   Nose: External nose normal.   Mouth:  Moist mucus membranes.    Eyes:  Conjunctivae are normal. Right eye exhibits no discharge.  Left eye exhibits no discharge. No scleral icterus.  No periorbital edema    Cardiovascular:  Regular rate and rhythm with normal S1 and S2     Pulmonary/Chest:   Clear to auscultation bilaterally without wheezes, rhonchi or rales      Musculoskeletal:  No edema. No obvious deformity No wasting       Neurological:  Alert and oriented to person, place, and time.   Coordination normal.     Skin:   Skin is warm and dry.  No diaphoresis.   No rash noted.     Psychiatric: Normal mood and affect. Behavior is normal.  Judgment and thought content normal.     Complete Blood Count  Lab Results   Component Value Date    RBC 5.07 11/22/2022    HGB 14.2 11/22/2022    HCT 45.0 11/22/2022    MCV 89 11/22/2022    MCH 28.0 11/22/2022    MCHC 31.6 (L) 11/22/2022    RDW 13.4 11/22/2022     11/22/2022    MPV 12.1 11/22/2022    GRAN 3.5 11/22/2022    GRAN 51.5 11/22/2022    LYMPH 2.7 11/22/2022    LYMPH 39.2 11/22/2022    MONO 0.5 11/22/2022    MONO 6.9 " 11/22/2022    EOS 0.1 11/22/2022    BASO 0.04 11/22/2022    EOSINOPHIL 1.5 11/22/2022    BASOPHIL 0.6 11/22/2022    DIFFMETHOD Automated 11/22/2022       Comprehensive Metabolic Panel  Lab Results   Component Value Date     (H) 04/19/2023    BUN 16 04/19/2023    CREATININE 1.03 04/19/2023     04/19/2023    K 4.0 04/19/2023     04/19/2023    PROT 8.5 (H) 04/19/2023    ALBUMIN 4.8 04/19/2023    BILITOT 0.5 04/19/2023    AST 26 04/19/2023    ALKPHOS 157 (H) 04/19/2023    CO2 26 04/19/2023    ALT 26 04/19/2023    ANIONGAP 14 04/19/2023       TSH  Lab Results   Component Value Date    TSH 0.828 11/22/2022       Assessment / Plan:      ICD-10-CM ICD-9-CM   1. Type 2 diabetes mellitus without complication, without long-term current use of insulin  E11.9 250.00   2. Need for pneumococcal vaccination  Z23 V03.82     Type 2 diabetes mellitus without complication, without long-term current use of insulin  -     Hemoglobin A1C; Future; Expected date: 04/19/2023    Need for pneumococcal vaccination  -     Pneumococcal Conjugate Vaccine (20 Valent) (IM)    Other orders  -     amLODIPine (NORVASC) 10 MG tablet; Take 1 tablet (10 mg total) by mouth once daily.  Dispense: 90 tablet; Refill: 3  -     clopidogreL (PLAVIX) 75 mg tablet; Take 1 tablet (75 mg total) by mouth once daily.  Dispense: 90 tablet; Refill: 3  -     FLUoxetine 20 MG capsule; Take 1 capsule (20 mg total) by mouth once daily.  Dispense: 90 capsule; Refill: 11  -     lactulose (CHRONULAC) 10 gram/15 mL solution; TAKE  15ML  1-3  TIMES  A  DAY  Dispense: 2000 mL; Refill: 3  -     lisinopriL (PRINIVIL,ZESTRIL) 20 MG tablet; Take 2 tablets (40 mg total) by mouth once daily.  Dispense: 180 tablet; Refill: 3  -     glimepiride (AMARYL) 2 MG tablet; Take 1 tablet (2 mg total) by mouth before breakfast. Stop when you restart insulin  Dispense: 90 tablet; Refill: 3  -     metFORMIN (GLUCOPHAGE) 1000 MG tablet; Take 1 tablet (1,000 mg total) by mouth 2  (two) times daily with meals.  Dispense: 180 tablet; Refill: 3

## 2023-04-19 NOTE — PROGRESS NOTES
Pt completed eye exam with Dr. Perales on 4/18/23    Pt does not have mychart for digital medicine

## 2023-04-20 ENCOUNTER — TELEPHONE (OUTPATIENT)
Dept: FAMILY MEDICINE | Facility: CLINIC | Age: 68
End: 2023-04-20
Payer: MEDICARE

## 2023-04-20 NOTE — TELEPHONE ENCOUNTER
Spoke with pt about a1c. Advised pt to follow low sugar/low carb diet.    Pt scheduled for labs 6/19/23.        Khadra MITCHELL [facility] Staff; Smiley Palencia MA  Caller: Unspecified (Today,  2:22 PM)  Type:  Patient Returning Call/Call Back     Who Called: pt   Who Left Message for Patient: MARLENA Palencia   Does the patient know what this is regarding?: yes   Would the patient rather a call back or a response via [facility]? call   Best Call Back Number: [phone]   Additional Information:  pt requests a call back before 5pm today    Statement Selected

## 2023-04-20 NOTE — TELEPHONE ENCOUNTER
----- Message from Rell Winn MD sent at 4/19/2023  7:13 PM CDT -----  You need to cut back on the amount of glucose/sugar/carbohydrates you ingest as your hemoglobin A1c has risen above eight.  If you do not get your medicine in one week you let me know.  A1c needs to be scheduled in 3 mo   Prior to his June 20 appt

## 2023-04-25 ENCOUNTER — PATIENT OUTREACH (OUTPATIENT)
Dept: ADMINISTRATIVE | Facility: HOSPITAL | Age: 68
End: 2023-04-25
Payer: MEDICARE

## 2023-04-27 ENCOUNTER — PATIENT OUTREACH (OUTPATIENT)
Dept: ADMINISTRATIVE | Facility: HOSPITAL | Age: 68
End: 2023-04-27
Payer: MEDICARE

## 2023-06-06 ENCOUNTER — PATIENT OUTREACH (OUTPATIENT)
Dept: ADMINISTRATIVE | Facility: HOSPITAL | Age: 68
End: 2023-06-06
Payer: MEDICARE

## 2023-06-06 DIAGNOSIS — E11.9 TYPE 2 DIABETES MELLITUS WITHOUT COMPLICATION, WITHOUT LONG-TERM CURRENT USE OF INSULIN: Primary | ICD-10-CM

## 2023-06-06 NOTE — PROGRESS NOTES
Care Everywhere updates requested and reviewed.  Immunizations reconciled. Media reports reviewed.  Duplicate HM overrides and  orders removed.  Overdue HM topic chart audit and/or requested.  Overdue lab testing linked to upcoming lab appointments if applies.      Health Maintenance Due   Topic Date Due    TETANUS VACCINE  Never done    COVID-19 Vaccine (4 - Moderna series) 2022    Foot Exam  2022    Diabetes Urine Screening  2023

## 2023-06-19 ENCOUNTER — PATIENT OUTREACH (OUTPATIENT)
Dept: ADMINISTRATIVE | Facility: OTHER | Age: 68
End: 2023-06-19
Payer: MEDICARE

## 2023-06-19 ENCOUNTER — LAB VISIT (OUTPATIENT)
Dept: LAB | Facility: HOSPITAL | Age: 68
End: 2023-06-19
Attending: FAMILY MEDICINE
Payer: MEDICARE

## 2023-06-19 DIAGNOSIS — I15.2 HYPERTENSION ASSOCIATED WITH DIABETES: ICD-10-CM

## 2023-06-19 DIAGNOSIS — E11.59 HYPERTENSION ASSOCIATED WITH DIABETES: ICD-10-CM

## 2023-06-19 DIAGNOSIS — E11.9 TYPE 2 DIABETES MELLITUS WITHOUT COMPLICATION, WITHOUT LONG-TERM CURRENT USE OF INSULIN: ICD-10-CM

## 2023-06-19 LAB
ALBUMIN/CREAT UR: 24 UG/MG (ref 0–30)
CREAT UR-MCNC: 100 MG/DL (ref 23–375)
ESTIMATED AVG GLUCOSE: 169 MG/DL (ref 68–131)
HBA1C MFR BLD: 7.5 % (ref 4–5.6)
MICROALBUMIN UR DL<=1MG/L-MCNC: 24 UG/ML

## 2023-06-19 PROCEDURE — 82570 ASSAY OF URINE CREATININE: CPT | Mod: PO | Performed by: FAMILY MEDICINE

## 2023-06-19 PROCEDURE — 83036 HEMOGLOBIN GLYCOSYLATED A1C: CPT | Performed by: FAMILY MEDICINE

## 2023-06-19 PROCEDURE — 36415 COLL VENOUS BLD VENIPUNCTURE: CPT | Mod: PO | Performed by: FAMILY MEDICINE

## 2023-06-19 NOTE — PROGRESS NOTES
CHW - Initial Contact    This Community Health Worker completed the Social Determinant of Health questionnaire with MRN [id number] over the phone today.    Pt identified barriers of most importance are: No barriers reported   Referrals to community agencies completed with patient/caregiver consent outside of [facility] include: No  Referrals were put through [facility] - No  Support and Services: No support & services have been documented.  Other information discussed the patient needs / wants help with: SDOH complete. No assistance has been requested. No further outreach is required at this time.   Follow up required: No  No future outreach task assigned

## 2023-06-20 ENCOUNTER — OFFICE VISIT (OUTPATIENT)
Dept: FAMILY MEDICINE | Facility: CLINIC | Age: 68
End: 2023-06-20
Payer: MEDICARE

## 2023-06-20 VITALS
TEMPERATURE: 99 F | HEIGHT: 70 IN | HEART RATE: 72 BPM | WEIGHT: 192.81 LBS | DIASTOLIC BLOOD PRESSURE: 84 MMHG | OXYGEN SATURATION: 96 % | BODY MASS INDEX: 27.6 KG/M2 | SYSTOLIC BLOOD PRESSURE: 132 MMHG

## 2023-06-20 DIAGNOSIS — E11.9 TYPE 2 DIABETES MELLITUS WITHOUT COMPLICATION, WITHOUT LONG-TERM CURRENT USE OF INSULIN: Primary | ICD-10-CM

## 2023-06-20 DIAGNOSIS — R79.89 LOW TSH LEVEL: ICD-10-CM

## 2023-06-20 DIAGNOSIS — I15.2 HYPERTENSION ASSOCIATED WITH DIABETES: ICD-10-CM

## 2023-06-20 DIAGNOSIS — E78.5 HYPERLIPIDEMIA, UNSPECIFIED HYPERLIPIDEMIA TYPE: ICD-10-CM

## 2023-06-20 DIAGNOSIS — I70.0 ATHEROSCLEROSIS OF ABDOMINAL AORTA: ICD-10-CM

## 2023-06-20 DIAGNOSIS — E11.59 HYPERTENSION ASSOCIATED WITH DIABETES: ICD-10-CM

## 2023-06-20 DIAGNOSIS — I69.359 CVA, OLD, HEMIPARESIS: ICD-10-CM

## 2023-06-20 DIAGNOSIS — Z89.421 HISTORY OF COMPLETE RAY AMPUTATION OF SECOND TOE OF RIGHT FOOT: ICD-10-CM

## 2023-06-20 PROCEDURE — 3061F NEG MICROALBUMINURIA REV: CPT | Mod: CPTII,S$GLB,, | Performed by: FAMILY MEDICINE

## 2023-06-20 PROCEDURE — 3079F DIAST BP 80-89 MM HG: CPT | Mod: CPTII,S$GLB,, | Performed by: FAMILY MEDICINE

## 2023-06-20 PROCEDURE — 99499 UNLISTED E&M SERVICE: CPT | Mod: S$GLB,,, | Performed by: FAMILY MEDICINE

## 2023-06-20 PROCEDURE — 3008F BODY MASS INDEX DOCD: CPT | Mod: CPTII,S$GLB,, | Performed by: FAMILY MEDICINE

## 2023-06-20 PROCEDURE — 4010F PR ACE/ARB THEARPY RXD/TAKEN: ICD-10-PCS | Mod: CPTII,S$GLB,, | Performed by: FAMILY MEDICINE

## 2023-06-20 PROCEDURE — 3051F PR MOST RECENT HEMOGLOBIN A1C LEVEL 7.0 - < 8.0%: ICD-10-PCS | Mod: CPTII,S$GLB,, | Performed by: FAMILY MEDICINE

## 2023-06-20 PROCEDURE — 1126F PR PAIN SEVERITY QUANTIFIED, NO PAIN PRESENT: ICD-10-PCS | Mod: CPTII,S$GLB,, | Performed by: FAMILY MEDICINE

## 2023-06-20 PROCEDURE — 4010F ACE/ARB THERAPY RXD/TAKEN: CPT | Mod: CPTII,S$GLB,, | Performed by: FAMILY MEDICINE

## 2023-06-20 PROCEDURE — 1101F PR PT FALLS ASSESS DOC 0-1 FALLS W/OUT INJ PAST YR: ICD-10-PCS | Mod: CPTII,S$GLB,, | Performed by: FAMILY MEDICINE

## 2023-06-20 PROCEDURE — 3061F PR NEG MICROALBUMINURIA RESULT DOCUMENTED/REVIEW: ICD-10-PCS | Mod: CPTII,S$GLB,, | Performed by: FAMILY MEDICINE

## 2023-06-20 PROCEDURE — 99214 PR OFFICE/OUTPT VISIT, EST, LEVL IV, 30-39 MIN: ICD-10-PCS | Mod: S$GLB,,, | Performed by: FAMILY MEDICINE

## 2023-06-20 PROCEDURE — 3051F HG A1C>EQUAL 7.0%<8.0%: CPT | Mod: CPTII,S$GLB,, | Performed by: FAMILY MEDICINE

## 2023-06-20 PROCEDURE — 1159F PR MEDICATION LIST DOCUMENTED IN MEDICAL RECORD: ICD-10-PCS | Mod: CPTII,S$GLB,, | Performed by: FAMILY MEDICINE

## 2023-06-20 PROCEDURE — 1126F AMNT PAIN NOTED NONE PRSNT: CPT | Mod: CPTII,S$GLB,, | Performed by: FAMILY MEDICINE

## 2023-06-20 PROCEDURE — 1159F MED LIST DOCD IN RCRD: CPT | Mod: CPTII,S$GLB,, | Performed by: FAMILY MEDICINE

## 2023-06-20 PROCEDURE — 1101F PT FALLS ASSESS-DOCD LE1/YR: CPT | Mod: CPTII,S$GLB,, | Performed by: FAMILY MEDICINE

## 2023-06-20 PROCEDURE — 99499 RISK ADDL DX/OHS AUDIT: ICD-10-PCS | Mod: S$GLB,,, | Performed by: FAMILY MEDICINE

## 2023-06-20 PROCEDURE — 3079F PR MOST RECENT DIASTOLIC BLOOD PRESSURE 80-89 MM HG: ICD-10-PCS | Mod: CPTII,S$GLB,, | Performed by: FAMILY MEDICINE

## 2023-06-20 PROCEDURE — 3066F NEPHROPATHY DOC TX: CPT | Mod: CPTII,S$GLB,, | Performed by: FAMILY MEDICINE

## 2023-06-20 PROCEDURE — 3075F SYST BP GE 130 - 139MM HG: CPT | Mod: CPTII,S$GLB,, | Performed by: FAMILY MEDICINE

## 2023-06-20 PROCEDURE — 3066F PR DOCUMENTATION OF TREATMENT FOR NEPHROPATHY: ICD-10-PCS | Mod: CPTII,S$GLB,, | Performed by: FAMILY MEDICINE

## 2023-06-20 PROCEDURE — 3075F PR MOST RECENT SYSTOLIC BLOOD PRESS GE 130-139MM HG: ICD-10-PCS | Mod: CPTII,S$GLB,, | Performed by: FAMILY MEDICINE

## 2023-06-20 PROCEDURE — 3288F PR FALLS RISK ASSESSMENT DOCUMENTED: ICD-10-PCS | Mod: CPTII,S$GLB,, | Performed by: FAMILY MEDICINE

## 2023-06-20 PROCEDURE — 99214 OFFICE O/P EST MOD 30 MIN: CPT | Mod: S$GLB,,, | Performed by: FAMILY MEDICINE

## 2023-06-20 PROCEDURE — 3008F PR BODY MASS INDEX (BMI) DOCUMENTED: ICD-10-PCS | Mod: CPTII,S$GLB,, | Performed by: FAMILY MEDICINE

## 2023-06-20 PROCEDURE — 3288F FALL RISK ASSESSMENT DOCD: CPT | Mod: CPTII,S$GLB,, | Performed by: FAMILY MEDICINE

## 2023-06-20 RX ORDER — ATORVASTATIN CALCIUM 80 MG/1
80 TABLET, FILM COATED ORAL DAILY
Qty: 90 TABLET | Refills: 3 | Status: SHIPPED | OUTPATIENT
Start: 2023-06-20 | End: 2023-07-25 | Stop reason: SDUPTHER

## 2023-06-20 RX ORDER — GLIMEPIRIDE 2 MG/1
2 TABLET ORAL
Qty: 90 TABLET | Refills: 3 | Status: SHIPPED | OUTPATIENT
Start: 2023-06-20 | End: 2023-07-25 | Stop reason: SDUPTHER

## 2023-06-20 RX ORDER — AMLODIPINE BESYLATE 10 MG/1
10 TABLET ORAL DAILY
Qty: 90 TABLET | Refills: 3 | Status: SHIPPED | OUTPATIENT
Start: 2023-06-20 | End: 2023-07-25 | Stop reason: SDUPTHER

## 2023-06-20 RX ORDER — METFORMIN HYDROCHLORIDE 1000 MG/1
1000 TABLET ORAL 2 TIMES DAILY WITH MEALS
Qty: 180 TABLET | Refills: 3 | Status: SHIPPED | OUTPATIENT
Start: 2023-06-20 | End: 2023-07-25 | Stop reason: SDUPTHER

## 2023-06-20 NOTE — PROGRESS NOTES
" Patient ID: Cristi Pulido is a 68 y.o. male.    Chief Complaint: Follow-up    HPI      Cristi Pulido is a 68 y.o. male here for follow-up.  We follow him for diabetes mellitus history of having CVA with hemiparesis hyperlipidemia hypertension and previous amputation of 2nd toe of right foot.  All doing well this time.  Reducing food intake.  Says he feels hungry.  Is not using any Trulicity or other GLP one at this time.    Vitals:    06/20/23 1033   BP: 132/84   BP Location: Left arm   Patient Position: Sitting   Pulse: 72   Temp: 98.5 °F (36.9 °C)   TempSrc: Oral   SpO2: 96%   Weight: 87.5 kg (192 lb 12.7 oz)   Height: 5' 10" (1.778 m)            Review of Symptoms  Negative    Physical Exam    Constitutional:  Oriented to person, place, and time.appears well-developed and well-nourished.  No distress.      HENT  Head: Normocephalic and atraumatic  Right Ear: External ear normal.   Left Ear: External ear normal.   Nose: External nose normal.   Mouth:  Moist mucus membranes.    Eyes:  Conjunctivae are normal. Right eye exhibits no discharge.  Left eye exhibits no discharge. No scleral icterus.  No periorbital edema    Cardiovascular:  Regular rate and rhythm with normal S1 and S2     Pulmonary/Chest:   Clear to auscultation bilaterally without wheezes, rhonchi or rales      Musculoskeletal:  No edema. No obvious deformity No wasting       Neurological:  Alert and oriented to person, place, and time.   Coordination normal.     Skin:   Skin is warm and dry.  No diaphoresis.   No rash noted.     Psychiatric: Normal mood and affect. Behavior is normal.  Judgment and thought content normal.     Complete Blood Count  Lab Results   Component Value Date    RBC 5.07 11/22/2022    HGB 14.2 11/22/2022    HCT 45.0 11/22/2022    MCV 89 11/22/2022    MCH 28.0 11/22/2022    MCHC 31.6 (L) 11/22/2022    RDW 13.4 11/22/2022     11/22/2022    MPV 12.1 11/22/2022    GRAN 3.5 11/22/2022    GRAN 51.5 11/22/2022    LYMPH 2.7 11/22/2022 "    LYMPH 39.2 11/22/2022    MONO 0.5 11/22/2022    MONO 6.9 11/22/2022    EOS 0.1 11/22/2022    BASO 0.04 11/22/2022    EOSINOPHIL 1.5 11/22/2022    BASOPHIL 0.6 11/22/2022    DIFFMETHOD Automated 11/22/2022       Comprehensive Metabolic Panel  Lab Results   Component Value Date     (H) 04/19/2023    BUN 16 04/19/2023    CREATININE 1.03 04/19/2023     04/19/2023    K 4.0 04/19/2023     04/19/2023    PROT 8.5 (H) 04/19/2023    ALBUMIN 4.8 04/19/2023    BILITOT 0.5 04/19/2023    AST 26 04/19/2023    ALKPHOS 157 (H) 04/19/2023    CO2 26 04/19/2023    ALT 26 04/19/2023    ANIONGAP 14 04/19/2023       TSH  No results found for: TSH    Assessment / Plan:      ICD-10-CM ICD-9-CM   1. Type 2 diabetes mellitus without complication, without long-term current use of insulin  E11.9 250.00   2. 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 Hypertension associated with diabetes  E11.59 250.80    I15.2 401.9   4. Atherosclerosis of abdominal aorta  I70.0 440.0   5. CVA, old, hemiparesis  I69.359 438.20   6. Hyperlipidemia, unspecified hyperlipidemia type  E78.5 272.4   7. 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  -     Comprehensive Metabolic Panel; Future  -     CBC Auto Differential; Future  -     Lipid Panel; Future  -     TSH; Future  -     Hemoglobin A1C; Future  -     T4, Free; Future; Expected date: 06/20/2023    Hypertension associated with diabetes  -     Comprehensive Metabolic Panel; Future  -     CBC Auto Differential; Future  -     Lipid Panel; Future  -     TSH; Future  -     Hemoglobin A1C; Future  -     T4,  Free; Future; Expected date: 06/20/2023    Atherosclerosis of abdominal aorta  -     Comprehensive Metabolic Panel; Future  -     CBC Auto Differential; Future  -     Lipid Panel; Future  -     TSH; Future  -     Hemoglobin A1C; Future  -     T4, Free; Future; Expected date: 06/20/2023    CVA, old, hemiparesis  -     Comprehensive Metabolic Panel; Future  -     CBC Auto Differential; Future  -     Lipid Panel; Future  -     TSH; Future  -     Hemoglobin A1C; Future  -     T4, Free; Future; Expected date: 06/20/2023    Hyperlipidemia, unspecified hyperlipidemia type  -     Comprehensive Metabolic Panel; Future  -     CBC Auto Differential; Future  -     Lipid Panel; Future  -     TSH; Future  -     Hemoglobin A1C; Future  -     T4, Free; Future; Expected date: 06/20/2023    Low TSH level  -     Comprehensive Metabolic Panel; Future  -     CBC Auto Differential; Future  -     Lipid Panel; Future  -     TSH; Future  -     Hemoglobin A1C; Future  -     T4, Free; Future; Expected date: 06/20/2023    Other orders  -     amLODIPine (NORVASC) 10 MG tablet; Take 1 tablet (10 mg total) by mouth once daily.  Dispense: 90 tablet; Refill: 3  -     atorvastatin (LIPITOR) 80 MG tablet; Take 1 tablet (80 mg total) by mouth once daily.  Dispense: 90 tablet; Refill: 3  -     glimepiride (AMARYL) 2 MG tablet; Take 1 tablet (2 mg total) by mouth before breakfast. Stop when you restart insulin  Dispense: 90 tablet; Refill: 3  -     metFORMIN (GLUCOPHAGE) 1000 MG tablet; Take 1 tablet (1,000 mg total) by mouth 2 (two) times daily with meals.  Dispense: 180 tablet; Refill: 3      Does not want tme to check feet

## 2023-07-24 ENCOUNTER — TELEPHONE (OUTPATIENT)
Dept: FAMILY MEDICINE | Facility: CLINIC | Age: 68
End: 2023-07-24
Payer: MEDICARE

## 2023-07-24 NOTE — TELEPHONE ENCOUNTER
----- Message from Syed Burgess sent at 7/24/2023  4:14 PM CDT -----  Type:  RX Refill Request    Who Called: pt  Refill or New Rx:refill  RX Name and Strength:glimepiride (AMARYL) 2 MG tablet , lisinopriL (PRINIVIL,ZESTRIL) 20 MG tablet, clopidogreL (PLAVIX) 75 mg tablet, metoprolol tartrate (LOPRESSOR) 50 MG tablet and alcatose (not on chart)    Preferred Pharmacy with phone number: [facility] - [place], [place] - [address]  Local or Mail Order:mail  Ordering Provider:St. Rodriguez  Would the patient rather a call back or a response via [facility]? call  Best Call Back Number:[phone]  Additional Information:

## 2023-07-25 RX ORDER — AMLODIPINE BESYLATE 10 MG/1
10 TABLET ORAL DAILY
Qty: 90 TABLET | Refills: 3 | Status: SHIPPED | OUTPATIENT
Start: 2023-07-25 | End: 2023-12-21 | Stop reason: SDUPTHER

## 2023-07-25 RX ORDER — LACTULOSE 10 G/15ML
SOLUTION ORAL; RECTAL
Qty: 2000 ML | Refills: 3 | Status: SHIPPED | OUTPATIENT
Start: 2023-07-25 | End: 2023-12-21 | Stop reason: SDUPTHER

## 2023-07-25 RX ORDER — CLOPIDOGREL BISULFATE 75 MG/1
75 TABLET ORAL DAILY
Qty: 90 TABLET | Refills: 3 | Status: SHIPPED | OUTPATIENT
Start: 2023-07-25 | End: 2023-12-21 | Stop reason: SDUPTHER

## 2023-07-25 RX ORDER — LISINOPRIL 20 MG/1
40 TABLET ORAL DAILY
Qty: 180 TABLET | Refills: 3 | Status: SHIPPED | OUTPATIENT
Start: 2023-07-25 | End: 2023-12-21 | Stop reason: SDUPTHER

## 2023-07-25 RX ORDER — METOPROLOL TARTRATE 50 MG/1
50 TABLET ORAL 2 TIMES DAILY
Qty: 180 TABLET | Refills: 3 | Status: SHIPPED | OUTPATIENT
Start: 2023-07-25 | End: 2023-12-21 | Stop reason: SDUPTHER

## 2023-07-25 RX ORDER — METFORMIN HYDROCHLORIDE 1000 MG/1
1000 TABLET ORAL 2 TIMES DAILY WITH MEALS
Qty: 180 TABLET | Refills: 3 | Status: SHIPPED | OUTPATIENT
Start: 2023-07-25 | End: 2023-12-21 | Stop reason: SDUPTHER

## 2023-07-25 RX ORDER — GLIMEPIRIDE 2 MG/1
2 TABLET ORAL
Qty: 90 TABLET | Refills: 3 | Status: SHIPPED | OUTPATIENT
Start: 2023-07-25 | End: 2023-11-17 | Stop reason: SDUPTHER

## 2023-07-25 RX ORDER — ATORVASTATIN CALCIUM 80 MG/1
80 TABLET, FILM COATED ORAL DAILY
Qty: 90 TABLET | Refills: 3 | Status: SHIPPED | OUTPATIENT
Start: 2023-07-25 | End: 2023-12-21 | Stop reason: SDUPTHER

## 2023-07-25 NOTE — TELEPHONE ENCOUNTER
Unable to reach patient. Detailed voicemail was left regarding patient's Rx's being re-filled and sent to mail order. Patient was provided clinic number and encouraged to contact us with any additional questions.

## 2023-11-17 RX ORDER — GLIMEPIRIDE 2 MG/1
2 TABLET ORAL
Qty: 90 TABLET | Refills: 3 | Status: SHIPPED | OUTPATIENT
Start: 2023-11-17 | End: 2023-12-21 | Stop reason: SDUPTHER

## 2023-11-17 NOTE — TELEPHONE ENCOUNTER
Spoke to pt. I explained that the [facility] pharmacy has closed and asked which pharmacy he would like his medication sent to. Pt requested [facility] in [place].

## 2023-11-17 NOTE — TELEPHONE ENCOUNTER
----- Message from Yony Gutierrez sent at 11/17/2023 11:02 AM CST -----  Contact: pt  Type: Requesting refill      Who Called: PT  Regarding: refill for  Disp Refills Start End KEREN  glimepiride (AMARYL) 2 MG tablet 90 tablet 3 7/25/2023 4/20/2026 No  Sig - Route: Take 1 tablet (2 mg total) by mouth before breakfast. Stop when you restart insulin - Oral      Would the patient rather a call back or a response via [facility]? Call back  Best Call Back Number:  [phone]  Additional Information: Daniel Russ in [facility]

## 2023-11-17 NOTE — TELEPHONE ENCOUNTER
Care Due:                  Date            Visit Type   Department     Provider  --------------------------------------------------------------------------------                                EP -                              PRIMARY      [facility] FAMILY  Last Visit: 06-      CARE ([facility])   MEDICINE       Rell Winn                               -                              PRIMARY      [facility] FAMILY  Next Visit: 12-      CARE ([facility])   MEDICINE       Rell Winn                                                            Last  Test          Frequency    Reason                     Performed    Due Date  --------------------------------------------------------------------------------    CBC.........  12 months..  clopidogreL..............  11- 11-    HBA1C.......  6 months...  glimepiride, metFORMIN...  06- 12-    [facility] Embedded Care Due Messages. Reference number: [id number].   11/17/2023 11:14:06 AM CST

## 2023-12-18 ENCOUNTER — LAB VISIT (OUTPATIENT)
Dept: LAB | Facility: HOSPITAL | Age: 68
End: 2023-12-18
Attending: FAMILY MEDICINE
Payer: MEDICARE

## 2023-12-18 DIAGNOSIS — R79.89 LOW TSH LEVEL: ICD-10-CM

## 2023-12-18 DIAGNOSIS — I69.359 CVA, OLD, HEMIPARESIS: ICD-10-CM

## 2023-12-18 DIAGNOSIS — I70.0 ATHEROSCLEROSIS OF ABDOMINAL AORTA: ICD-10-CM

## 2023-12-18 DIAGNOSIS — Z89.421 HISTORY OF COMPLETE RAY AMPUTATION OF SECOND TOE OF RIGHT FOOT: ICD-10-CM

## 2023-12-18 DIAGNOSIS — I15.2 HYPERTENSION ASSOCIATED WITH DIABETES: ICD-10-CM

## 2023-12-18 DIAGNOSIS — E11.59 HYPERTENSION ASSOCIATED WITH DIABETES: ICD-10-CM

## 2023-12-18 DIAGNOSIS — E78.5 HYPERLIPIDEMIA, UNSPECIFIED HYPERLIPIDEMIA TYPE: ICD-10-CM

## 2023-12-18 DIAGNOSIS — E11.9 TYPE 2 DIABETES MELLITUS WITHOUT COMPLICATION, WITHOUT LONG-TERM CURRENT USE OF INSULIN: ICD-10-CM

## 2023-12-18 LAB
ALBUMIN SERPL BCP-MCNC: 4.1 G/DL (ref 3.5–5.2)
ALP SERPL-CCNC: 122 U/L (ref 38–126)
ALT SERPL W/O P-5'-P-CCNC: 19 U/L (ref 10–44)
ANION GAP SERPL CALC-SCNC: 12 MMOL/L (ref 8–16)
AST SERPL-CCNC: 23 U/L (ref 15–46)
BASOPHILS # BLD AUTO: 0.03 K/UL (ref 0–0.2)
BASOPHILS NFR BLD: 0.4 % (ref 0–1.9)
BILIRUB SERPL-MCNC: 0.4 MG/DL (ref 0.1–1)
CALCIUM SERPL-MCNC: 9.2 MG/DL (ref 8.7–10.5)
CHLORIDE SERPL-SCNC: 105 MMOL/L (ref 95–110)
CHOLEST SERPL-MCNC: 138 MG/DL (ref 120–199)
CHOLEST/HDLC SERPL: 3.3 {RATIO} (ref 2–5)
CO2 SERPL-SCNC: 27 MMOL/L (ref 23–29)
CREAT SERPL-MCNC: 1.15 MG/DL (ref 0.5–1.4)
DIFFERENTIAL METHOD: ABNORMAL
EOSINOPHIL # BLD AUTO: 0.2 K/UL (ref 0–0.5)
EOSINOPHIL NFR BLD: 3 % (ref 0–8)
ERYTHROCYTE [DISTWIDTH] IN BLOOD BY AUTOMATED COUNT: 12.8 % (ref 11.5–14.5)
EST. GFR  (NO RACE VARIABLE): >60 ML/MIN/1.73 M^2
ESTIMATED AVG GLUCOSE: 217 MG/DL (ref 68–131)
GLUCOSE SERPL-MCNC: 181 MG/DL (ref 70–110)
HBA1C MFR BLD: 9.2 % (ref 4–5.6)
HCT VFR BLD AUTO: 41.8 % (ref 40–54)
HDLC SERPL-MCNC: 42 MG/DL (ref 40–75)
HDLC SERPL: 30.4 % (ref 20–50)
HGB BLD-MCNC: 13.1 G/DL (ref 14–18)
IMM GRANULOCYTES # BLD AUTO: 0.02 K/UL (ref 0–0.04)
IMM GRANULOCYTES NFR BLD AUTO: 0.3 % (ref 0–0.5)
LDLC SERPL CALC-MCNC: 83.8 MG/DL (ref 63–159)
LYMPHOCYTES # BLD AUTO: 2.7 K/UL (ref 1–4.8)
LYMPHOCYTES NFR BLD: 33.8 % (ref 18–48)
MCH RBC QN AUTO: 28.4 PG (ref 27–31)
MCHC RBC AUTO-ENTMCNC: 31.3 G/DL (ref 32–36)
MCV RBC AUTO: 91 FL (ref 82–98)
MONOCYTES # BLD AUTO: 0.8 K/UL (ref 0.3–1)
MONOCYTES NFR BLD: 9.6 % (ref 4–15)
NEUTROPHILS # BLD AUTO: 4.2 K/UL (ref 1.8–7.7)
NEUTROPHILS NFR BLD: 52.9 % (ref 38–73)
NONHDLC SERPL-MCNC: 96 MG/DL
NRBC BLD-RTO: 0 /100 WBC
PLATELET # BLD AUTO: 213 K/UL (ref 150–450)
PMV BLD AUTO: 12.3 FL (ref 9.2–12.9)
POTASSIUM SERPL-SCNC: 4.5 MMOL/L (ref 3.5–5.1)
PROT SERPL-MCNC: 7.6 G/DL (ref 6–8.4)
RBC # BLD AUTO: 4.62 M/UL (ref 4.6–6.2)
SODIUM SERPL-SCNC: 144 MMOL/L (ref 136–145)
T4 FREE SERPL-MCNC: 1.06 NG/DL (ref 0.71–1.51)
TRIGL SERPL-MCNC: 61 MG/DL (ref 30–150)
TSH SERPL DL<=0.005 MIU/L-ACNC: 0.65 UIU/ML (ref 0.4–4)
UUN UR-MCNC: 13 MG/DL (ref 2–20)
WBC # BLD AUTO: 8 K/UL (ref 3.9–12.7)

## 2023-12-18 PROCEDURE — 80053 COMPREHEN METABOLIC PANEL: CPT | Mod: HCNC,PN | Performed by: FAMILY MEDICINE

## 2023-12-18 PROCEDURE — 84443 ASSAY THYROID STIM HORMONE: CPT | Mod: HCNC,PN | Performed by: FAMILY MEDICINE

## 2023-12-18 PROCEDURE — 80061 LIPID PANEL: CPT | Mod: HCNC | Performed by: FAMILY MEDICINE

## 2023-12-18 PROCEDURE — 84439 ASSAY OF FREE THYROXINE: CPT | Mod: HCNC | Performed by: FAMILY MEDICINE

## 2023-12-18 PROCEDURE — 36415 COLL VENOUS BLD VENIPUNCTURE: CPT | Mod: HCNC,PN | Performed by: FAMILY MEDICINE

## 2023-12-18 PROCEDURE — 85025 COMPLETE CBC W/AUTO DIFF WBC: CPT | Mod: HCNC,PN | Performed by: FAMILY MEDICINE

## 2023-12-18 PROCEDURE — 83036 HEMOGLOBIN GLYCOSYLATED A1C: CPT | Mod: HCNC | Performed by: FAMILY MEDICINE

## 2023-12-21 ENCOUNTER — OFFICE VISIT (OUTPATIENT)
Dept: FAMILY MEDICINE | Facility: CLINIC | Age: 68
End: 2023-12-21
Payer: MEDICARE

## 2023-12-21 VITALS
WEIGHT: 201.81 LBS | HEIGHT: 70 IN | TEMPERATURE: 98 F | OXYGEN SATURATION: 96 % | BODY MASS INDEX: 28.89 KG/M2 | SYSTOLIC BLOOD PRESSURE: 126 MMHG | HEART RATE: 64 BPM | DIASTOLIC BLOOD PRESSURE: 86 MMHG

## 2023-12-21 DIAGNOSIS — I69.359 CVA, OLD, HEMIPARESIS: ICD-10-CM

## 2023-12-21 DIAGNOSIS — I70.0 ATHEROSCLEROSIS OF ABDOMINAL AORTA: ICD-10-CM

## 2023-12-21 DIAGNOSIS — I15.2 HYPERTENSION ASSOCIATED WITH DIABETES: ICD-10-CM

## 2023-12-21 DIAGNOSIS — E11.59 HYPERTENSION ASSOCIATED WITH DIABETES: ICD-10-CM

## 2023-12-21 DIAGNOSIS — F17.200 TOBACCO DEPENDENCE: ICD-10-CM

## 2023-12-21 DIAGNOSIS — J30.1 SEASONAL ALLERGIC RHINITIS DUE TO POLLEN: ICD-10-CM

## 2023-12-21 DIAGNOSIS — E11.9 TYPE 2 DIABETES MELLITUS WITHOUT COMPLICATION, WITHOUT LONG-TERM CURRENT USE OF INSULIN: Primary | ICD-10-CM

## 2023-12-21 PROCEDURE — 3066F NEPHROPATHY DOC TX: CPT | Mod: CPTII,S$GLB,, | Performed by: FAMILY MEDICINE

## 2023-12-21 PROCEDURE — 3008F PR BODY MASS INDEX (BMI) DOCUMENTED: ICD-10-PCS | Mod: CPTII,S$GLB,, | Performed by: FAMILY MEDICINE

## 2023-12-21 PROCEDURE — 99214 PR OFFICE/OUTPT VISIT, EST, LEVL IV, 30-39 MIN: ICD-10-PCS | Mod: S$GLB,,, | Performed by: FAMILY MEDICINE

## 2023-12-21 PROCEDURE — 3066F PR DOCUMENTATION OF TREATMENT FOR NEPHROPATHY: ICD-10-PCS | Mod: CPTII,S$GLB,, | Performed by: FAMILY MEDICINE

## 2023-12-21 PROCEDURE — 3074F PR MOST RECENT SYSTOLIC BLOOD PRESSURE < 130 MM HG: ICD-10-PCS | Mod: CPTII,S$GLB,, | Performed by: FAMILY MEDICINE

## 2023-12-21 PROCEDURE — 3061F PR NEG MICROALBUMINURIA RESULT DOCUMENTED/REVIEW: ICD-10-PCS | Mod: CPTII,S$GLB,, | Performed by: FAMILY MEDICINE

## 2023-12-21 PROCEDURE — 1126F AMNT PAIN NOTED NONE PRSNT: CPT | Mod: CPTII,S$GLB,, | Performed by: FAMILY MEDICINE

## 2023-12-21 PROCEDURE — 3061F NEG MICROALBUMINURIA REV: CPT | Mod: CPTII,S$GLB,, | Performed by: FAMILY MEDICINE

## 2023-12-21 PROCEDURE — 3288F PR FALLS RISK ASSESSMENT DOCUMENTED: ICD-10-PCS | Mod: CPTII,S$GLB,, | Performed by: FAMILY MEDICINE

## 2023-12-21 PROCEDURE — 1101F PT FALLS ASSESS-DOCD LE1/YR: CPT | Mod: CPTII,S$GLB,, | Performed by: FAMILY MEDICINE

## 2023-12-21 PROCEDURE — 1101F PR PT FALLS ASSESS DOC 0-1 FALLS W/OUT INJ PAST YR: ICD-10-PCS | Mod: CPTII,S$GLB,, | Performed by: FAMILY MEDICINE

## 2023-12-21 PROCEDURE — 3079F PR MOST RECENT DIASTOLIC BLOOD PRESSURE 80-89 MM HG: ICD-10-PCS | Mod: CPTII,S$GLB,, | Performed by: FAMILY MEDICINE

## 2023-12-21 PROCEDURE — 3008F BODY MASS INDEX DOCD: CPT | Mod: CPTII,S$GLB,, | Performed by: FAMILY MEDICINE

## 2023-12-21 PROCEDURE — 2023F DILAT RTA XM W/O RTNOPTHY: CPT | Mod: CPTII,S$GLB,, | Performed by: FAMILY MEDICINE

## 2023-12-21 PROCEDURE — 1159F PR MEDICATION LIST DOCUMENTED IN MEDICAL RECORD: ICD-10-PCS | Mod: CPTII,S$GLB,, | Performed by: FAMILY MEDICINE

## 2023-12-21 PROCEDURE — 3079F DIAST BP 80-89 MM HG: CPT | Mod: CPTII,S$GLB,, | Performed by: FAMILY MEDICINE

## 2023-12-21 PROCEDURE — 4010F ACE/ARB THERAPY RXD/TAKEN: CPT | Mod: CPTII,S$GLB,, | Performed by: FAMILY MEDICINE

## 2023-12-21 PROCEDURE — 4010F PR ACE/ARB THEARPY RXD/TAKEN: ICD-10-PCS | Mod: CPTII,S$GLB,, | Performed by: FAMILY MEDICINE

## 2023-12-21 PROCEDURE — 1126F PR PAIN SEVERITY QUANTIFIED, NO PAIN PRESENT: ICD-10-PCS | Mod: CPTII,S$GLB,, | Performed by: FAMILY MEDICINE

## 2023-12-21 PROCEDURE — 2023F PR DILATED RETINAL EXAM W/O EVID OF RETINOPATHY: ICD-10-PCS | Mod: CPTII,S$GLB,, | Performed by: FAMILY MEDICINE

## 2023-12-21 PROCEDURE — 3046F HEMOGLOBIN A1C LEVEL >9.0%: CPT | Mod: CPTII,S$GLB,, | Performed by: FAMILY MEDICINE

## 2023-12-21 PROCEDURE — 3288F FALL RISK ASSESSMENT DOCD: CPT | Mod: CPTII,S$GLB,, | Performed by: FAMILY MEDICINE

## 2023-12-21 PROCEDURE — 3074F SYST BP LT 130 MM HG: CPT | Mod: CPTII,S$GLB,, | Performed by: FAMILY MEDICINE

## 2023-12-21 PROCEDURE — 1159F MED LIST DOCD IN RCRD: CPT | Mod: CPTII,S$GLB,, | Performed by: FAMILY MEDICINE

## 2023-12-21 PROCEDURE — 99214 OFFICE O/P EST MOD 30 MIN: CPT | Mod: S$GLB,,, | Performed by: FAMILY MEDICINE

## 2023-12-21 PROCEDURE — 3046F PR MOST RECENT HEMOGLOBIN A1C LEVEL > 9.0%: ICD-10-PCS | Mod: CPTII,S$GLB,, | Performed by: FAMILY MEDICINE

## 2023-12-21 RX ORDER — AMLODIPINE BESYLATE 10 MG/1
10 TABLET ORAL DAILY
Qty: 90 TABLET | Refills: 3 | Status: SHIPPED | OUTPATIENT
Start: 2023-12-21

## 2023-12-21 RX ORDER — METOPROLOL TARTRATE 50 MG/1
50 TABLET ORAL 2 TIMES DAILY
Qty: 180 TABLET | Refills: 3 | Status: SHIPPED | OUTPATIENT
Start: 2023-12-21 | End: 2024-12-20

## 2023-12-21 RX ORDER — SILDENAFIL 100 MG/1
TABLET, FILM COATED ORAL
Qty: 30 TABLET | Refills: 2 | Status: SHIPPED | OUTPATIENT
Start: 2023-12-21

## 2023-12-21 RX ORDER — METFORMIN HYDROCHLORIDE 1000 MG/1
1000 TABLET ORAL 2 TIMES DAILY WITH MEALS
Qty: 180 TABLET | Refills: 3 | Status: SHIPPED | OUTPATIENT
Start: 2023-12-21

## 2023-12-21 RX ORDER — GABAPENTIN 600 MG/1
600 TABLET ORAL 3 TIMES DAILY PRN
Qty: 270 TABLET | Refills: 3 | Status: SHIPPED | OUTPATIENT
Start: 2023-12-21

## 2023-12-21 RX ORDER — LISINOPRIL 20 MG/1
40 TABLET ORAL DAILY
Qty: 180 TABLET | Refills: 3 | Status: SHIPPED | OUTPATIENT
Start: 2023-12-21 | End: 2024-12-20

## 2023-12-21 RX ORDER — DULAGLUTIDE 0.75 MG/.5ML
0.75 INJECTION, SOLUTION SUBCUTANEOUS WEEKLY
Qty: 12 PEN | Refills: 1 | Status: SHIPPED | OUTPATIENT
Start: 2023-12-21 | End: 2024-02-22 | Stop reason: SDUPTHER

## 2023-12-21 RX ORDER — CLOPIDOGREL BISULFATE 75 MG/1
75 TABLET ORAL DAILY
Qty: 90 TABLET | Refills: 3 | Status: SHIPPED | OUTPATIENT
Start: 2023-12-21

## 2023-12-21 RX ORDER — GLIMEPIRIDE 2 MG/1
2 TABLET ORAL
Qty: 90 TABLET | Refills: 3 | Status: SHIPPED | OUTPATIENT
Start: 2023-12-21 | End: 2024-01-22 | Stop reason: SDUPTHER

## 2023-12-21 RX ORDER — ATORVASTATIN CALCIUM 80 MG/1
80 TABLET, FILM COATED ORAL DAILY
Qty: 90 TABLET | Refills: 3 | Status: SHIPPED | OUTPATIENT
Start: 2023-12-21

## 2023-12-21 RX ORDER — LACTULOSE 10 G/15ML
SOLUTION ORAL; RECTAL
Qty: 2000 ML | Refills: 3 | Status: SHIPPED | OUTPATIENT
Start: 2023-12-21

## 2023-12-30 NOTE — PROGRESS NOTES
" Patient ID: Cristi Pulido is a 68 y.o. male.    Chief Complaint: Follow-up    HPI      Cristi Pulido is a 68 y.o. male here following up on diabetes mellitus which is not under good control at this time.  Patient also with a history of having CVA stable in the last several years.  No extension of these problems TIAs or any other obvious infarction.  Patient continues to use tobacco and does not want to quit.    Patient with chronic seasonal allergies for which you takes over-the-counter medications periodically  Patient complains of ED for which he uses Viagra p.r.n..  Patient also with chronic constipation uses lactulose with significant success  Patient hesitant to use Trulicity because of injection    Vitals:    12/21/23 0956   BP: 126/86   BP Location: Right arm   Patient Position: Sitting   Pulse: 64   Temp: 97.8 °F (36.6 °C)   TempSrc: Oral   SpO2: 96%   Weight: 91.6 kg (201 lb 13.3 oz)   Height: 5' 10" (1.778 m)            Review of Symptoms      Physical Exam    Constitutional:  Oriented to person, place, and time.appears well-developed and well-nourished.  No distress.      HENT  Head: Normocephalic and atraumatic  Right Ear: External ear normal.   Left Ear: External ear normal.   Nose: External nose normal.   Mouth:  Moist mucus membranes.    Eyes:  Conjunctivae are normal. Right eye exhibits no discharge.  Left eye exhibits no discharge. No scleral icterus.  No periorbital edema    Cardiovascular:  Regular rate and rhythm with normal S1 and S2     Pulmonary/Chest:   Clear to auscultation bilaterally without wheezes, rhonchi or rales      Musculoskeletal:  Hemiparesis-uses a walker-compensated very well       Neurological:  Alert and oriented to person, place, and time.   Coordination normal.     Skin:   Skin is warm and dry.  No diaphoresis.   No rash noted.     Psychiatric: Normal mood and affect. Behavior is normal.  Judgment and thought content normal.     Complete Blood Count  Lab Results   Component Value " Date    RBC 4.62 12/18/2023    HGB 13.1 (L) 12/18/2023    HCT 41.8 12/18/2023    MCV 91 12/18/2023    MCH 28.4 12/18/2023    MCHC 31.3 (L) 12/18/2023    RDW 12.8 12/18/2023     12/18/2023    MPV 12.3 12/18/2023    GRAN 4.2 12/18/2023    GRAN 52.9 12/18/2023    LYMPH 2.7 12/18/2023    LYMPH 33.8 12/18/2023    MONO 0.8 12/18/2023    MONO 9.6 12/18/2023    EOS 0.2 12/18/2023    BASO 0.03 12/18/2023    EOSINOPHIL 3.0 12/18/2023    BASOPHIL 0.4 12/18/2023    DIFFMETHOD Automated 12/18/2023       Comprehensive Metabolic Panel  Lab Results   Component Value Date     (H) 12/18/2023    BUN 13 12/18/2023    CREATININE 1.15 12/18/2023     12/18/2023    K 4.5 12/18/2023     12/18/2023    PROT 7.6 12/18/2023    ALBUMIN 4.1 12/18/2023    BILITOT 0.4 12/18/2023    AST 23 12/18/2023    ALKPHOS 122 12/18/2023    CO2 27 12/18/2023    ALT 19 12/18/2023    ANIONGAP 12 12/18/2023       TSH  Lab Results   Component Value Date    TSH 0.648 12/18/2023       Assessment / Plan:      ICD-10-CM ICD-9-CM   1. Type 2 diabetes mellitus without complication, without long-term current use of insulin  E11.9 250.00   2. Hypertension associated with diabetes  E11.59 250.80    I15.2 401.9   3. Atherosclerosis of abdominal aorta  I70.0 440.0   4. CVA, old, hemiparesis  I69.359 438.20   5. Seasonal allergic rhinitis due to pollen  J30.1 477.0   6. Tobacco dependence  F17.200 305.1     Type 2 diabetes mellitus without complication, without long-term current use of insulin  -     Hemoglobin A1C; Future; Expected date: 12/21/2023  -     Hemoglobin A1C; Future; Expected date: 12/21/2023  -     Comprehensive Metabolic Panel; Future; Expected date: 12/21/2023  -     Lipid Panel; Future; Expected date: 12/21/2023    Hypertension associated with diabetes    Atherosclerosis of abdominal aorta    CVA, old, hemiparesis    Seasonal allergic rhinitis due to pollen    Tobacco dependence    Other orders  -     amLODIPine (NORVASC) 10 MG  tablet; Take 1 tablet (10 mg total) by mouth once daily.  Dispense: 90 tablet; Refill: 3  -     atorvastatin (LIPITOR) 80 MG tablet; Take 1 tablet (80 mg total) by mouth once daily.  Dispense: 90 tablet; Refill: 3  -     clopidogreL (PLAVIX) 75 mg tablet; Take 1 tablet (75 mg total) by mouth once daily.  Dispense: 90 tablet; Refill: 3  -     glimepiride (AMARYL) 2 MG tablet; Take 1 tablet (2 mg total) by mouth before breakfast. Stop when you restart insulin  Dispense: 90 tablet; Refill: 3  -     gabapentin (NEURONTIN) 600 MG tablet; Take 1 tablet (600 mg total) by mouth 3 (three) times daily as needed.  Dispense: 270 tablet; Refill: 3  -     lactulose (CHRONULAC) 10 gram/15 mL solution; TAKE  15ML  1-3  TIMES  A  DAY  Dispense: 2000 mL; Refill: 3  -     lisinopriL (PRINIVIL,ZESTRIL) 20 MG tablet; Take 2 tablets (40 mg total) by mouth once daily.  Dispense: 180 tablet; Refill: 3  -     metFORMIN (GLUCOPHAGE) 1000 MG tablet; Take 1 tablet (1,000 mg total) by mouth 2 (two) times daily with meals.  Dispense: 180 tablet; Refill: 3  -     metoprolol tartrate (LOPRESSOR) 50 MG tablet; Take 1 tablet (50 mg total) by mouth 2 (two) times daily.  Dispense: 180 tablet; Refill: 3  -     dulaglutide (TRULICITY) 0.75 mg/0.5 mL pen injector; Inject 0.75 mg into the skin once a week.  Dispense: 12 pen ; Refill: 1  -     sildenafiL (VIAGRA) 100 MG tablet; One half to one tablet po qhs prn ED  Dispense: 30 tablet; Refill: 2    Continue current medications for hypertension   Continue medications for previous CVA  For diabetes use Trulicity discussed that he can come here and have his shot given initially and demonstrated

## 2024-01-22 ENCOUNTER — TELEPHONE (OUTPATIENT)
Dept: FAMILY MEDICINE | Facility: CLINIC | Age: 69
End: 2024-01-22
Payer: MEDICARE

## 2024-01-22 RX ORDER — GLIMEPIRIDE 2 MG/1
2 TABLET ORAL
Qty: 90 TABLET | Refills: 3 | Status: SHIPPED | OUTPATIENT
Start: 2024-01-22 | End: 2024-02-08 | Stop reason: SDUPTHER

## 2024-01-22 NOTE — TELEPHONE ENCOUNTER
----- Message from Ariana Burgos sent at 1/22/2024  2:48 PM CST -----  Regarding: send meds to local pharmacy  Contact: [phone]  Patient is requesting a call back regarding he is out of the two rx and needs a new rx sent to   [id number] - [address] [facility] AT [facility]  Would the patient rather a call back or a response via [facility]?  call  Best Call Back Number:  [phone]  Additional Information:

## 2024-01-22 NOTE — TELEPHONE ENCOUNTER
Spoke with pt in regards to message. He's requesting that you send in rx for Glimepiride to [facility] ([place]).

## 2024-02-08 NOTE — TELEPHONE ENCOUNTER
----- Message from Ivanna Myrick sent at 2/8/2024  3:57 PM CST -----  Type:  RX Refill Request    Who Called: pt  Refill or New Rx:refill  RX Name and Strength:glimepiride (AMARYL) 2 MG tablet  How is the patient currently taking it? (ex. 1XDay):titi 1 tablet (2 mg total) by mouth before breakfast. Stop when you restart insulin  Is this a 30 day or 90 day RX:90  Preferred Pharmacy with phone number:[facility] #[id number] [address] AT [facility] [address]   Phone: [phone]  Fax: [phone]        Local or Mail Order:local  Ordering Provider: Dr Winn  Would the patient rather a call back or a response via [facility]? call  Best Call Back Number:[phone]  Additional Information:

## 2024-02-08 NOTE — TELEPHONE ENCOUNTER
No care due was identified.  [facility] Embedded Care Due Messages. Reference number: [id number].   2/08/2024 4:16:36 PM CST

## 2024-02-09 RX ORDER — GLIMEPIRIDE 2 MG/1
2 TABLET ORAL
Qty: 90 TABLET | Refills: 3 | Status: SHIPPED | OUTPATIENT
Start: 2024-02-09 | End: 2024-04-30 | Stop reason: SDUPTHER

## 2024-02-21 ENCOUNTER — TELEPHONE (OUTPATIENT)
Dept: FAMILY MEDICINE | Facility: CLINIC | Age: 69
End: 2024-02-21
Payer: MEDICARE

## 2024-02-22 ENCOUNTER — LAB VISIT (OUTPATIENT)
Dept: LAB | Facility: HOSPITAL | Age: 69
End: 2024-02-22
Attending: FAMILY MEDICINE
Payer: MEDICARE

## 2024-02-22 ENCOUNTER — OFFICE VISIT (OUTPATIENT)
Dept: FAMILY MEDICINE | Facility: CLINIC | Age: 69
End: 2024-02-22
Payer: MEDICARE

## 2024-02-22 VITALS
SYSTOLIC BLOOD PRESSURE: 120 MMHG | HEIGHT: 70 IN | WEIGHT: 199.19 LBS | BODY MASS INDEX: 28.52 KG/M2 | DIASTOLIC BLOOD PRESSURE: 62 MMHG | TEMPERATURE: 98 F | OXYGEN SATURATION: 95 % | HEART RATE: 108 BPM

## 2024-02-22 DIAGNOSIS — E11.9 TYPE 2 DIABETES MELLITUS WITHOUT COMPLICATION, WITHOUT LONG-TERM CURRENT USE OF INSULIN: ICD-10-CM

## 2024-02-22 DIAGNOSIS — I70.0 ATHEROSCLEROSIS OF ABDOMINAL AORTA: ICD-10-CM

## 2024-02-22 DIAGNOSIS — M76.60 ACHILLES TENDINITIS, UNSPECIFIED LATERALITY: ICD-10-CM

## 2024-02-22 DIAGNOSIS — Z89.421 HISTORY OF COMPLETE RAY AMPUTATION OF SECOND TOE OF RIGHT FOOT: ICD-10-CM

## 2024-02-22 DIAGNOSIS — M76.61 ACHILLES TENDINITIS OF RIGHT LOWER EXTREMITY: ICD-10-CM

## 2024-02-22 DIAGNOSIS — E11.59 HYPERTENSION ASSOCIATED WITH DIABETES: ICD-10-CM

## 2024-02-22 DIAGNOSIS — I69.359 CVA, OLD, HEMIPARESIS: ICD-10-CM

## 2024-02-22 DIAGNOSIS — I15.2 HYPERTENSION ASSOCIATED WITH DIABETES: ICD-10-CM

## 2024-02-22 DIAGNOSIS — E11.9 TYPE 2 DIABETES MELLITUS WITHOUT COMPLICATION, WITHOUT LONG-TERM CURRENT USE OF INSULIN: Primary | ICD-10-CM

## 2024-02-22 LAB
ESTIMATED AVG GLUCOSE: 240 MG/DL (ref 68–131)
HBA1C MFR BLD: 10 % (ref 4–5.6)

## 2024-02-22 PROCEDURE — 3008F BODY MASS INDEX DOCD: CPT | Mod: CPTII,S$GLB,, | Performed by: FAMILY MEDICINE

## 2024-02-22 PROCEDURE — 3078F DIAST BP <80 MM HG: CPT | Mod: CPTII,S$GLB,, | Performed by: FAMILY MEDICINE

## 2024-02-22 PROCEDURE — 96372 THER/PROPH/DIAG INJ SC/IM: CPT | Mod: S$GLB,,, | Performed by: FAMILY MEDICINE

## 2024-02-22 PROCEDURE — 83036 HEMOGLOBIN GLYCOSYLATED A1C: CPT | Mod: HCNC | Performed by: FAMILY MEDICINE

## 2024-02-22 PROCEDURE — 3074F SYST BP LT 130 MM HG: CPT | Mod: CPTII,S$GLB,, | Performed by: FAMILY MEDICINE

## 2024-02-22 PROCEDURE — 1159F MED LIST DOCD IN RCRD: CPT | Mod: CPTII,S$GLB,, | Performed by: FAMILY MEDICINE

## 2024-02-22 PROCEDURE — 1126F AMNT PAIN NOTED NONE PRSNT: CPT | Mod: CPTII,S$GLB,, | Performed by: FAMILY MEDICINE

## 2024-02-22 PROCEDURE — 3288F FALL RISK ASSESSMENT DOCD: CPT | Mod: CPTII,S$GLB,, | Performed by: FAMILY MEDICINE

## 2024-02-22 PROCEDURE — 1160F RVW MEDS BY RX/DR IN RCRD: CPT | Mod: CPTII,S$GLB,, | Performed by: FAMILY MEDICINE

## 2024-02-22 PROCEDURE — 3046F HEMOGLOBIN A1C LEVEL >9.0%: CPT | Mod: CPTII,S$GLB,, | Performed by: FAMILY MEDICINE

## 2024-02-22 PROCEDURE — 4010F ACE/ARB THERAPY RXD/TAKEN: CPT | Mod: CPTII,S$GLB,, | Performed by: FAMILY MEDICINE

## 2024-02-22 PROCEDURE — 99213 OFFICE O/P EST LOW 20 MIN: CPT | Mod: 25,S$GLB,, | Performed by: FAMILY MEDICINE

## 2024-02-22 PROCEDURE — 1101F PT FALLS ASSESS-DOCD LE1/YR: CPT | Mod: CPTII,S$GLB,, | Performed by: FAMILY MEDICINE

## 2024-02-22 PROCEDURE — 36415 COLL VENOUS BLD VENIPUNCTURE: CPT | Mod: HCNC,PN | Performed by: FAMILY MEDICINE

## 2024-02-22 RX ORDER — TRIAMCINOLONE ACETONIDE 40 MG/ML
40 INJECTION, SUSPENSION INTRA-ARTICULAR; INTRAMUSCULAR ONCE
Status: COMPLETED | OUTPATIENT
Start: 2024-02-22 | End: 2024-02-22

## 2024-02-22 RX ORDER — MELOXICAM 15 MG/1
15 TABLET ORAL DAILY
Qty: 15 TABLET | Refills: 0 | Status: SHIPPED | OUTPATIENT
Start: 2024-02-22 | End: 2024-06-10

## 2024-02-22 RX ORDER — DULAGLUTIDE 0.75 MG/.5ML
0.75 INJECTION, SOLUTION SUBCUTANEOUS WEEKLY
Qty: 12 PEN | Refills: 1 | Status: SHIPPED | OUTPATIENT
Start: 2024-02-22 | End: 2024-03-08 | Stop reason: SDUPTHER

## 2024-02-22 RX ORDER — MELOXICAM 15 MG/1
15 TABLET ORAL DAILY
Qty: 15 TABLET | Refills: 0 | Status: SHIPPED | OUTPATIENT
Start: 2024-02-22 | End: 2024-02-22 | Stop reason: SDUPTHER

## 2024-02-22 RX ADMIN — TRIAMCINOLONE ACETONIDE 40 MG: 40 INJECTION, SUSPENSION INTRA-ARTICULAR; INTRAMUSCULAR at 10:02

## 2024-02-25 NOTE — PROGRESS NOTES
" Patient ID: Cristi Pulido is a 69 y.o. male.    Chief Complaint: gout pain    HPI      Cristi Pulido is a 69 y.o. male here with complains of pain in his right heel at the insertion of his Achilles tendon.  Painful for few days.  No erythema.  Believe this gout.    Here to fill out form for DMV.    Diabetes mellitus hemoglobin A1c has not been done in awhile.  No complaints of polyuria polyphagia polydipsia.  Has not received do Trulicity ordered by mail order.  CVA-no new symptoms-CVA was over 15 years ago.    Vitals:    02/22/24 0957   BP: 120/62   BP Location: Right arm   Patient Position: Sitting   Pulse: 108   Temp: 98.2 °F (36.8 °C)   TempSrc: Oral   SpO2: 95%   Weight: 90.4 kg (199 lb 3 oz)   Height: 5' 10" (1.778 m)            Review of Symptoms      Physical Exam    Constitutional:  Oriented to person, place, and time.appears well-developed and well-nourished.  No distress.      HENT  Head: Normocephalic and atraumatic  Right Ear: External ear normal.   Left Ear: External ear normal.   Nose: External nose normal.   Mouth:  Moist mucus membranes.    Eyes:  Conjunctivae are normal. Right eye exhibits no discharge.  Left eye exhibits no discharge. No scleral icterus.  No periorbital edema    Cardiovascular:  Regular rate and rhythm with normal S1 and S2     Pulmonary/Chest:   Clear to auscultation bilaterally without wheezes, rhonchi or rales      Musculoskeletal:  No edema. No obvious deformity No wasting  Exquisitely tender to touch palpation inferior Achilles tendon and calcaneus.  No erythema.     Neurological:  Alert and oriented to person, place, and time.   Coordination normal.     Skin:   Skin is warm and dry.  No diaphoresis.   No rash noted.     Psychiatric: Normal mood and affect. Behavior is normal.  Judgment and thought content normal.     Complete Blood Count  Lab Results   Component Value Date    RBC 4.62 12/18/2023    HGB 13.1 (L) 12/18/2023    HCT 41.8 12/18/2023    MCV 91 12/18/2023    MCH 28.4 " 12/18/2023    MCHC 31.3 (L) 12/18/2023    RDW 12.8 12/18/2023     12/18/2023    MPV 12.3 12/18/2023    GRAN 4.2 12/18/2023    GRAN 52.9 12/18/2023    LYMPH 2.7 12/18/2023    LYMPH 33.8 12/18/2023    MONO 0.8 12/18/2023    MONO 9.6 12/18/2023    EOS 0.2 12/18/2023    BASO 0.03 12/18/2023    EOSINOPHIL 3.0 12/18/2023    BASOPHIL 0.4 12/18/2023    DIFFMETHOD Automated 12/18/2023       Comprehensive Metabolic Panel  Lab Results   Component Value Date     (H) 12/18/2023    BUN 13 12/18/2023    CREATININE 1.15 12/18/2023     12/18/2023    K 4.5 12/18/2023     12/18/2023    PROT 7.6 12/18/2023    ALBUMIN 4.1 12/18/2023    BILITOT 0.4 12/18/2023    AST 23 12/18/2023    ALKPHOS 122 12/18/2023    CO2 27 12/18/2023    ALT 19 12/18/2023    ANIONGAP 12 12/18/2023       TSH  Lab Results   Component Value Date    TSH 0.648 12/18/2023       Assessment / Plan:      ICD-10-CM ICD-9-CM   1. Type 2 diabetes mellitus without complication, without long-term current use of insulin  E11.9 250.00   2. Achilles tendinitis, unspecified laterality  M76.60 726.71   3. Achilles tendinitis of right lower extremity  M76.61 726.71   4. Hypertension associated with diabetes  E11.59 250.80    I15.2 401.9   5. CVA, old, hemiparesis  I69.359 438.20   6. Atherosclerosis of abdominal aorta  I70.0 440.0   7. 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  Dispense: 15 tablet; Refill: 0  -     meloxicam (MOBIC) 15 MG tablet; Take 1 tablet (15 mg total) by mouth once daily.  Dispense: 15  tablet; Refill: 0    Achilles tendinitis of right lower extremity    Hypertension associated with diabetes  -     Comprehensive Metabolic Panel; Future  -     CBC Auto Differential; Future  -     Lipid Panel; Future  -     TSH; Future    CVA, old, hemiparesis    Atherosclerosis of abdominal aorta  -     Comprehensive Metabolic Panel; Future  -     CBC Auto Differential; Future  -     Lipid Panel; Future  -     TSH; Future    History of complete ray amputation of second toe of right foot    Other orders  -     dulaglutide (TRULICITY) 0.75 mg/0.5 mL pen injector; Inject 0.75 mg into the skin once a week.  Dispense: 12 pen ; Refill: 1    Tendonitis Achilles tendon-ice stretching  Diabetes medications sent to mail order Trulicity  DMV-patient should be able to drive has been driving for years no new muscle skeletal deficits.  CVA-stable-hypertension-stable

## 2024-02-26 ENCOUNTER — TELEPHONE (OUTPATIENT)
Dept: FAMILY MEDICINE | Facility: CLINIC | Age: 69
End: 2024-02-26
Payer: MEDICARE

## 2024-02-26 NOTE — TELEPHONE ENCOUNTER
----- Message from Rell Winn MD sent at 2/25/2024 10:55 AM CST -----  Your blood work shows high hemoglobin A1c-glucose levels are out of control-please get the Trulicity that was ordered-if you having problems getting it please let me know.

## 2024-02-28 NOTE — TELEPHONE ENCOUNTER
Spoke with pt in regards to message. He is awaiting Trulicity to be delivered from mail order pharmacy. Pt is already scheduled to have repeat A1c in a few months.

## 2024-03-08 NOTE — TELEPHONE ENCOUNTER
----- Message from Kandy Castillo sent at 3/8/2024  3:40 PM CST -----  Type:  RX Refill Request    Who Called: Pt   Refill or New Rx:refill   RX Name and Strength:dulaglutide (TRULICITY) 0.75 mg/0.5 mL pen injector  How is the patient currently taking it? (ex. 1XDay):1X   Is this a 30 day or 90 day RX:30  Preferred Pharmacy with phone number:[facility] - [facility] [address]   Phone: [phone]  Fax: [phone]  Would the patient rather a call back or a response via [facility]? Call back   Best Call Back Number:[phone]  Additional Information:

## 2024-03-08 NOTE — TELEPHONE ENCOUNTER
No care due was identified.  Health [facility] Embedded Care Due Messages. Reference number: [id number].   3/08/2024 3:47:00 PM CST

## 2024-03-09 RX ORDER — DULAGLUTIDE 0.75 MG/.5ML
0.75 INJECTION, SOLUTION SUBCUTANEOUS WEEKLY
Qty: 12 PEN | Refills: 1 | Status: SHIPPED | OUTPATIENT
Start: 2024-03-09 | End: 2024-06-10

## 2024-04-24 NOTE — TELEPHONE ENCOUNTER
----- Message from Nena Bey sent at 4/24/2024 11:29 AM CDT -----  .Type:  RX Refill Request    Who Called: pt  Refill or New Rx:refill  RX Name and Strength:amLODIPine (NORVASC) 10 MG tablet  How is the patient currently taking it? (ex. 1XDay): Take 1 tablet (10 mg total) by mouth once daily. - Oral  Is this a 30 day or 90 day RX:90 tablet  Preferred Pharmacy with phone number:[facility][id number] - [place], [place] - [address] [facility]  Local or Mail Order:local  Ordering Provider:[facility]  Would the patient rather a call back or a response via [facility]? Call back  Best Call Back Number:[phone]  Additional Information:      Refill or New Rx:refill  RX Name and Strength:lisinopriL (PRINIVIL,ZESTRIL) 20 MG tablet  How is the patient currently taking it? (ex. 1XDay):Take 2 tablets (40 mg total) by mouth once daily. - Oral  Is this a 30 day or 90 day RX:180 tablet    Refill or New Rx:refill  RX Name and Strength:clopidogreL (PLAVIX) 75 mg tablet  How is the patient currently taking it? (ex. 1XDay): Take 1 tablet (75 mg total) by mouth once daily. - Oral  Is this a 30 day or 90 day RX:90 tablet      Refill or New Rx:refill  RX Name and Strength:metoprolol tartrate (LOPRESSOR) 50 MG tablet  How is the patient currently taking it? (ex. 1XDay): Take 1 tablet (50 mg total) by mouth 2 (two) times daily. - Oral  Is this a 30 day or 90 day RX:180 tablet      Refill or New Rx:refill  RX Name and Strength:lactulose (CHRONULAC) 10 gram/15 mL solution  How is the patient currently taking it? (ex. 1XDay): TAKE  15ML  1-3  TIMES  A  DAY  Is this a 30 day or 90 day RX:2000 mL

## 2024-04-24 NOTE — TELEPHONE ENCOUNTER
No care due was identified.  Health [facility] Embedded Care Due Messages. Reference number: [id number].   4/24/2024 11:44:33 AM CDT

## 2024-04-25 RX ORDER — CLOPIDOGREL BISULFATE 75 MG/1
75 TABLET ORAL DAILY
Qty: 90 TABLET | Refills: 3 | Status: SHIPPED | OUTPATIENT
Start: 2024-04-25

## 2024-04-25 RX ORDER — LISINOPRIL 20 MG/1
40 TABLET ORAL DAILY
Qty: 180 TABLET | Refills: 3 | Status: SHIPPED | OUTPATIENT
Start: 2024-04-25 | End: 2025-04-25

## 2024-04-25 RX ORDER — METOPROLOL TARTRATE 50 MG/1
50 TABLET ORAL 2 TIMES DAILY
Qty: 180 TABLET | Refills: 3 | Status: SHIPPED | OUTPATIENT
Start: 2024-04-25 | End: 2025-04-25

## 2024-04-25 RX ORDER — AMLODIPINE BESYLATE 10 MG/1
10 TABLET ORAL DAILY
Qty: 90 TABLET | Refills: 3 | Status: SHIPPED | OUTPATIENT
Start: 2024-04-25

## 2024-04-25 RX ORDER — LACTULOSE 10 G/15ML
SOLUTION ORAL; RECTAL
Qty: 2000 ML | Refills: 3 | Status: SHIPPED | OUTPATIENT
Start: 2024-04-25 | End: 2024-06-03 | Stop reason: SDUPTHER

## 2024-04-29 LAB
LEFT EYE DM RETINOPATHY: POSITIVE
RIGHT EYE DM RETINOPATHY: POSITIVE

## 2024-04-30 RX ORDER — GLIMEPIRIDE 2 MG/1
2 TABLET ORAL
Qty: 90 TABLET | Refills: 3 | Status: SHIPPED | OUTPATIENT
Start: 2024-04-30 | End: 2027-01-25

## 2024-04-30 NOTE — TELEPHONE ENCOUNTER
No care due was identified.  Health [facility] Embedded Care Due Messages. Reference number: [id number].   4/30/2024 1:58:38 PM CDT

## 2024-04-30 NOTE — TELEPHONE ENCOUNTER
----- Message from Yony Gutierrez sent at 4/30/2024  1:52 PM CDT -----  Contact: pt  Type: Requesting refill        Who Called: PT  Regarding:  glimepiride (AMARYL) 2 MG tablet, cholesterol, and Metformin        Would the patient rather a call back or a response via [facility]? Call back  Best Call Back Number:   Additional Information: [id number] - [place] [address] AT [facility] [address]   Phone: [phone]  Fax: [phone]

## 2024-05-07 ENCOUNTER — PATIENT OUTREACH (OUTPATIENT)
Dept: ADMINISTRATIVE | Facility: HOSPITAL | Age: 69
End: 2024-05-07
Payer: MEDICARE

## 2024-05-07 DIAGNOSIS — E11.9 TYPE 2 DIABETES MELLITUS WITHOUT COMPLICATION, WITHOUT LONG-TERM CURRENT USE OF INSULIN: Primary | ICD-10-CM

## 2024-05-07 NOTE — PROGRESS NOTES
Population Health Chart Review & Patient Outreach Details      Additional Pop Health Notes:      Eye exam updated in .         Updates Requested / Reviewed:      Other    Aslet Kruica         Health Maintenance Topics Overdue:      [facility] Score: 1     Eye Exam    RSV Vaccine                  Health Maintenance Topic(s) Outreach Outcomes & Actions Taken:    Eye Exam - Outreach Outcomes & Actions Taken  : Diabetic Eye External Records Uploaded, Care Team & History Updated if Applicable

## 2024-05-31 ENCOUNTER — LAB VISIT (OUTPATIENT)
Dept: LAB | Facility: HOSPITAL | Age: 69
End: 2024-05-31
Attending: FAMILY MEDICINE
Payer: MEDICARE

## 2024-05-31 DIAGNOSIS — I70.0 ATHEROSCLEROSIS OF ABDOMINAL AORTA: ICD-10-CM

## 2024-05-31 DIAGNOSIS — E11.9 TYPE 2 DIABETES MELLITUS WITHOUT COMPLICATION, WITHOUT LONG-TERM CURRENT USE OF INSULIN: ICD-10-CM

## 2024-05-31 DIAGNOSIS — E11.59 HYPERTENSION ASSOCIATED WITH DIABETES: ICD-10-CM

## 2024-05-31 DIAGNOSIS — I15.2 HYPERTENSION ASSOCIATED WITH DIABETES: ICD-10-CM

## 2024-05-31 LAB
ALBUMIN SERPL BCP-MCNC: 4.6 G/DL (ref 3.5–5.2)
ALBUMIN/CREAT UR: 109.4 UG/MG (ref 0–30)
ALP SERPL-CCNC: 136 U/L (ref 38–126)
ALT SERPL W/O P-5'-P-CCNC: 18 U/L (ref 10–44)
ANION GAP SERPL CALC-SCNC: 15 MMOL/L (ref 8–16)
AST SERPL-CCNC: 23 U/L (ref 15–46)
BASOPHILS # BLD AUTO: 0.04 K/UL (ref 0–0.2)
BASOPHILS NFR BLD: 0.6 % (ref 0–1.9)
BILIRUB SERPL-MCNC: 0.6 MG/DL (ref 0.1–1)
CALCIUM SERPL-MCNC: 9.4 MG/DL (ref 8.7–10.5)
CHLORIDE SERPL-SCNC: 108 MMOL/L (ref 95–110)
CHOLEST SERPL-MCNC: 161 MG/DL (ref 120–199)
CHOLEST/HDLC SERPL: 3.6 {RATIO} (ref 2–5)
CO2 SERPL-SCNC: 24 MMOL/L (ref 23–29)
CREAT SERPL-MCNC: 1.07 MG/DL (ref 0.5–1.4)
CREAT UR-MCNC: 139 MG/DL (ref 23–375)
DIFFERENTIAL METHOD BLD: ABNORMAL
EOSINOPHIL # BLD AUTO: 0.1 K/UL (ref 0–0.5)
EOSINOPHIL NFR BLD: 1.9 % (ref 0–8)
ERYTHROCYTE [DISTWIDTH] IN BLOOD BY AUTOMATED COUNT: 13.2 % (ref 11.5–14.5)
EST. GFR  (NO RACE VARIABLE): >60 ML/MIN/1.73 M^2
ESTIMATED AVG GLUCOSE: 192 MG/DL (ref 68–131)
GLUCOSE SERPL-MCNC: 166 MG/DL (ref 70–110)
HBA1C MFR BLD: 8.3 % (ref 4–5.6)
HCT VFR BLD AUTO: 41.7 % (ref 40–54)
HDLC SERPL-MCNC: 45 MG/DL (ref 40–75)
HDLC SERPL: 28 % (ref 20–50)
HGB BLD-MCNC: 13.3 G/DL (ref 14–18)
IMM GRANULOCYTES # BLD AUTO: 0.01 K/UL (ref 0–0.04)
IMM GRANULOCYTES NFR BLD AUTO: 0.2 % (ref 0–0.5)
LDLC SERPL CALC-MCNC: 100.6 MG/DL (ref 63–159)
LYMPHOCYTES # BLD AUTO: 2.6 K/UL (ref 1–4.8)
LYMPHOCYTES NFR BLD: 42 % (ref 18–48)
MCH RBC QN AUTO: 28.7 PG (ref 27–31)
MCHC RBC AUTO-ENTMCNC: 31.9 G/DL (ref 32–36)
MCV RBC AUTO: 90 FL (ref 82–98)
MICROALBUMIN UR DL<=1MG/L-MCNC: 152 UG/ML
MONOCYTES # BLD AUTO: 0.4 K/UL (ref 0.3–1)
MONOCYTES NFR BLD: 6.9 % (ref 4–15)
NEUTROPHILS # BLD AUTO: 3 K/UL (ref 1.8–7.7)
NEUTROPHILS NFR BLD: 48.4 % (ref 38–73)
NONHDLC SERPL-MCNC: 116 MG/DL
NRBC BLD-RTO: 0 /100 WBC
PLATELET # BLD AUTO: 214 K/UL (ref 150–450)
PMV BLD AUTO: 12.6 FL (ref 9.2–12.9)
POTASSIUM SERPL-SCNC: 4.3 MMOL/L (ref 3.5–5.1)
PROT SERPL-MCNC: 8 G/DL (ref 6–8.4)
RBC # BLD AUTO: 4.64 M/UL (ref 4.6–6.2)
SODIUM SERPL-SCNC: 147 MMOL/L (ref 136–145)
TRIGL SERPL-MCNC: 77 MG/DL (ref 30–150)
TSH SERPL DL<=0.005 MIU/L-ACNC: 0.97 UIU/ML (ref 0.4–4)
UUN UR-MCNC: 13 MG/DL (ref 2–20)
WBC # BLD AUTO: 6.22 K/UL (ref 3.9–12.7)

## 2024-05-31 PROCEDURE — 83036 HEMOGLOBIN GLYCOSYLATED A1C: CPT | Mod: HCNC | Performed by: FAMILY MEDICINE

## 2024-05-31 PROCEDURE — 80061 LIPID PANEL: CPT | Mod: HCNC | Performed by: FAMILY MEDICINE

## 2024-05-31 PROCEDURE — 36415 COLL VENOUS BLD VENIPUNCTURE: CPT | Mod: HCNC,PN | Performed by: FAMILY MEDICINE

## 2024-05-31 PROCEDURE — 80053 COMPREHEN METABOLIC PANEL: CPT | Mod: HCNC,PN | Performed by: FAMILY MEDICINE

## 2024-05-31 PROCEDURE — 82043 UR ALBUMIN QUANTITATIVE: CPT | Mod: HCNC,PN | Performed by: FAMILY MEDICINE

## 2024-05-31 PROCEDURE — 84443 ASSAY THYROID STIM HORMONE: CPT | Mod: HCNC,PN | Performed by: FAMILY MEDICINE

## 2024-05-31 PROCEDURE — 85025 COMPLETE CBC W/AUTO DIFF WBC: CPT | Mod: HCNC,PN | Performed by: FAMILY MEDICINE

## 2024-06-03 ENCOUNTER — TELEPHONE (OUTPATIENT)
Dept: FAMILY MEDICINE | Facility: CLINIC | Age: 69
End: 2024-06-03

## 2024-06-03 RX ORDER — LACTULOSE 10 G/15ML
SOLUTION ORAL; RECTAL
Qty: 2000 ML | Refills: 3 | Status: SHIPPED | OUTPATIENT
Start: 2024-06-03

## 2024-06-03 RX ORDER — METFORMIN HYDROCHLORIDE 1000 MG/1
1000 TABLET ORAL 2 TIMES DAILY WITH MEALS
Qty: 180 TABLET | Refills: 3 | Status: SHIPPED | OUTPATIENT
Start: 2024-06-03

## 2024-06-03 RX ORDER — ATORVASTATIN CALCIUM 80 MG/1
80 TABLET, FILM COATED ORAL DAILY
Qty: 90 TABLET | Refills: 3 | Status: SHIPPED | OUTPATIENT
Start: 2024-06-03

## 2024-06-03 NOTE — TELEPHONE ENCOUNTER
LM for pt to call back clinic    ----- Message from Mandy Taylor sent at 6/3/2024  1:13 PM CDT -----  Regarding: appt  Contact: self/ walked in  The pt needs to reschedule the appt he had for today.  He also is inquiring about morphine.  Please call him at [phone]

## 2024-06-03 NOTE — TELEPHONE ENCOUNTER
No care due was identified.  [facility] Embedded Care Due Messages. Reference number: [id number].   6/03/2024 3:19:03 PM CDT

## 2024-06-03 NOTE — TELEPHONE ENCOUNTER
----- Message from Viridiana Chowdhury sent at 6/3/2024  3:04 PM CDT -----  Type:  RX Refill Request    Who Called:  Cristi Pulido  Refill or New Rx: Refill  RX Name and Strength:   metFORMIN (GLUCOPHAGE) 1000 MG tablet [[id number]   How is the patient currently taking it? (ex. 1XDay):  Take 1 tablet (1,000 mg total) by mouth 2 (two) times daily with meals. - Oral  Is this a 30 day or 90 day RX: 180  Preferred Pharmacy with phone number:[facility] - [facility] [address]   Phone: [phone]  Fax: [phone]  Local or Mail Order: Mail  Ordering Provider: [facility]  Would the patient rather a call back or a response via [facility]?  call  Best Call Back Number:  [phone]  Additional Information:     Type:  RX Refill Request    Who Called:  Pt  Refill or New Rx: Refill  RX Name and Strength:   atorvastatin (LIPITOR) 80 MG tablet [[id number]   How is the patient currently taking it? (ex. 1XDay): Take 1 tablet (80 mg total) by mouth once daily. - Oral  Is this a 30 day or 90 day RX: 90        Type:  RX Refill Request    Who Called:  Pt  Refill or New Rx: Refill  RX Name and Strength:   lactulose (CHRONULAC) 10 gram/15 mL solution [[id number]]   How is the patient currently taking it? (ex. 1XDay):

## 2024-06-05 ENCOUNTER — TELEPHONE (OUTPATIENT)
Dept: FAMILY MEDICINE | Facility: CLINIC | Age: 69
End: 2024-06-05
Payer: MEDICARE

## 2024-06-05 NOTE — TELEPHONE ENCOUNTER
----- Message from Rell Winn MD sent at 6/5/2024  2:34 AM CDT -----  Significant improvement in your hemoglobin A1c down from 10    At 8.3.  No changes need be made-continue current medication

## 2024-06-05 NOTE — TELEPHONE ENCOUNTER
Spoke to pts wife.    Pt was unavailable at the moment.    Call pt later on to schedule A1c in 3 months

## 2024-06-10 ENCOUNTER — OFFICE VISIT (OUTPATIENT)
Dept: FAMILY MEDICINE | Facility: CLINIC | Age: 69
End: 2024-06-10
Payer: MEDICARE

## 2024-06-10 ENCOUNTER — PATIENT OUTREACH (OUTPATIENT)
Dept: FAMILY MEDICINE | Facility: CLINIC | Age: 69
End: 2024-06-10

## 2024-06-10 VITALS
DIASTOLIC BLOOD PRESSURE: 86 MMHG | HEART RATE: 95 BPM | SYSTOLIC BLOOD PRESSURE: 136 MMHG | BODY MASS INDEX: 28.17 KG/M2 | HEIGHT: 70 IN | OXYGEN SATURATION: 95 % | TEMPERATURE: 99 F | WEIGHT: 196.75 LBS

## 2024-06-10 DIAGNOSIS — J30.1 SEASONAL ALLERGIC RHINITIS DUE TO POLLEN: ICD-10-CM

## 2024-06-10 DIAGNOSIS — E08.8 DIABETES MELLITUS DUE TO UNDERLYING CONDITION WITH COMPLICATION, WITH LONG-TERM CURRENT USE OF INSULIN: ICD-10-CM

## 2024-06-10 DIAGNOSIS — E11.9 TYPE 2 DIABETES MELLITUS WITHOUT COMPLICATION, WITHOUT LONG-TERM CURRENT USE OF INSULIN: Primary | ICD-10-CM

## 2024-06-10 DIAGNOSIS — I70.0 ATHEROSCLEROSIS OF ABDOMINAL AORTA: ICD-10-CM

## 2024-06-10 DIAGNOSIS — F17.200 TOBACCO DEPENDENCE: ICD-10-CM

## 2024-06-10 DIAGNOSIS — R79.89 LOW TSH LEVEL: ICD-10-CM

## 2024-06-10 DIAGNOSIS — I69.359 CVA, OLD, HEMIPARESIS: ICD-10-CM

## 2024-06-10 DIAGNOSIS — Z79.4 DIABETES MELLITUS DUE TO UNDERLYING CONDITION WITH COMPLICATION, WITH LONG-TERM CURRENT USE OF INSULIN: ICD-10-CM

## 2024-06-10 DIAGNOSIS — E11.59 HYPERTENSION ASSOCIATED WITH DIABETES: ICD-10-CM

## 2024-06-10 DIAGNOSIS — I15.2 HYPERTENSION ASSOCIATED WITH DIABETES: ICD-10-CM

## 2024-06-10 PROCEDURE — 3079F DIAST BP 80-89 MM HG: CPT | Mod: CPTII,S$GLB,, | Performed by: FAMILY MEDICINE

## 2024-06-10 PROCEDURE — 3066F NEPHROPATHY DOC TX: CPT | Mod: CPTII,S$GLB,, | Performed by: FAMILY MEDICINE

## 2024-06-10 PROCEDURE — 3075F SYST BP GE 130 - 139MM HG: CPT | Mod: CPTII,S$GLB,, | Performed by: FAMILY MEDICINE

## 2024-06-10 PROCEDURE — 1159F MED LIST DOCD IN RCRD: CPT | Mod: CPTII,S$GLB,, | Performed by: FAMILY MEDICINE

## 2024-06-10 PROCEDURE — 1101F PT FALLS ASSESS-DOCD LE1/YR: CPT | Mod: CPTII,S$GLB,, | Performed by: FAMILY MEDICINE

## 2024-06-10 PROCEDURE — 4010F ACE/ARB THERAPY RXD/TAKEN: CPT | Mod: CPTII,S$GLB,, | Performed by: FAMILY MEDICINE

## 2024-06-10 PROCEDURE — 3052F HG A1C>EQUAL 8.0%<EQUAL 9.0%: CPT | Mod: CPTII,S$GLB,, | Performed by: FAMILY MEDICINE

## 2024-06-10 PROCEDURE — 1126F AMNT PAIN NOTED NONE PRSNT: CPT | Mod: CPTII,S$GLB,, | Performed by: FAMILY MEDICINE

## 2024-06-10 PROCEDURE — 3008F BODY MASS INDEX DOCD: CPT | Mod: CPTII,S$GLB,, | Performed by: FAMILY MEDICINE

## 2024-06-10 PROCEDURE — 1160F RVW MEDS BY RX/DR IN RCRD: CPT | Mod: CPTII,S$GLB,, | Performed by: FAMILY MEDICINE

## 2024-06-10 PROCEDURE — 3060F POS MICROALBUMINURIA REV: CPT | Mod: CPTII,S$GLB,, | Performed by: FAMILY MEDICINE

## 2024-06-10 PROCEDURE — 3288F FALL RISK ASSESSMENT DOCD: CPT | Mod: CPTII,S$GLB,, | Performed by: FAMILY MEDICINE

## 2024-06-10 PROCEDURE — 99214 OFFICE O/P EST MOD 30 MIN: CPT | Mod: S$GLB,,, | Performed by: FAMILY MEDICINE

## 2024-06-10 RX ORDER — DULAGLUTIDE 0.75 MG/.5ML
0.75 INJECTION, SOLUTION SUBCUTANEOUS WEEKLY
Qty: 12 PEN | Refills: 1 | Status: SHIPPED | OUTPATIENT
Start: 2024-06-10

## 2024-06-10 RX ORDER — FLUOXETINE HYDROCHLORIDE 20 MG/1
20 CAPSULE ORAL DAILY
Qty: 90 CAPSULE | Refills: 3 | Status: SHIPPED | OUTPATIENT
Start: 2024-06-10 | End: 2025-06-10

## 2024-06-10 NOTE — PROGRESS NOTES
" Patient ID: Cristi Pulido is a 69 y.o. male.    Chief Complaint: Follow-up    HPI      Cristi Pulido is a 69 y.o. male here following up on chronic medical conditions all which are stable.  Patient with no new complaints.  Wants to continue to follow-up every 3-4 months type 2 diabetes slightly elevated but using his medication.  Has not been able to acquire the GLP one.  Unsure why this is taking place.    History of having CVA-no new neurological problems or complaints.  Continues to walk with a walker and has some decreased strength was no extension of these limitations.  Continues use tobacco on daily basis-does not want to quit.    Hypertension controlled at this time with current medications  Lipidemia-need for statin high dose-tolerating 80 milligrams daily.    Vitals:    06/10/24 1456 06/10/24 1510   BP: (!) 146/90 136/86   BP Location: Left arm    Patient Position: Sitting    Pulse: 95    Temp: 98.6 °F (37 °C)    TempSrc: Oral    SpO2: 95%    Weight: 89.3 kg (196 lb 12.2 oz)    Height: 5' 10" (1.778 m)             Review of Symptoms      Physical Exam    Constitutional:  Oriented to person, place, and time.appears well-developed and well-nourished.  No distress.      HENT  Head: Normocephalic and atraumatic  Right Ear: External ear normal.   Left Ear: External ear normal.   Nose: External nose normal.   Mouth:  Moist mucus membranes.    Eyes:  Conjunctivae are normal. Right eye exhibits no discharge.  Left eye exhibits no discharge. No scleral icterus.  No periorbital edema    Cardiovascular:  Regular rate and rhythm with normal S1 and S2     Pulmonary/Chest:   Clear to auscultation bilaterally without wheezes, rhonchi or rales      Musculoskeletal:  No edema. No obvious deformity No wasting       Neurological:  Alert and oriented to person, place, and time.   Coordination normal.     Skin:   Skin is warm and dry.  No diaphoresis.   No rash noted.     Psychiatric: Normal mood and affect. Behavior is " normal.  Judgment and thought content normal.     Complete Blood Count  Lab Results   Component Value Date    RBC 4.64 05/31/2024    HGB 13.3 (L) 05/31/2024    HCT 41.7 05/31/2024    MCV 90 05/31/2024    MCH 28.7 05/31/2024    MCHC 31.9 (L) 05/31/2024    RDW 13.2 05/31/2024     05/31/2024    MPV 12.6 05/31/2024    GRAN 3.0 05/31/2024    GRAN 48.4 05/31/2024    LYMPH 2.6 05/31/2024    LYMPH 42.0 05/31/2024    MONO 0.4 05/31/2024    MONO 6.9 05/31/2024    EOS 0.1 05/31/2024    BASO 0.04 05/31/2024    EOSINOPHIL 1.9 05/31/2024    BASOPHIL 0.6 05/31/2024    DIFFMETHOD Automated 05/31/2024       Comprehensive Metabolic Panel  Lab Results   Component Value Date     (H) 05/31/2024    BUN 13 05/31/2024    CREATININE 1.07 05/31/2024     (H) 05/31/2024    K 4.3 05/31/2024     05/31/2024    PROT 8.0 05/31/2024    ALBUMIN 4.6 05/31/2024    BILITOT 0.6 05/31/2024    AST 23 05/31/2024    ALKPHOS 136 (H) 05/31/2024    CO2 24 05/31/2024    ALT 18 05/31/2024    ANIONGAP 15 05/31/2024       TSH  Lab Results   Component Value Date    TSH 0.972 05/31/2024       Assessment / Plan:      ICD-10-CM ICD-9-CM   1. Type 2 diabetes mellitus without complication, without long-term current use of insulin  E11.9 250.00   2. Hypertension associated with diabetes  E11.59 250.80    I15.2 401.9   3. Seasonal allergic rhinitis due to pollen  J30.1 477.0   4. CVA, old, hemiparesis  I69.359 438.20   5. Atherosclerosis of abdominal aorta  I70.0 440.0   6. Low TSH level  R79.89 794.5   7. Diabetes mellitus due to underlying condition with complication, with long-term current use of insulin  E08.8 249.90    Z79.4 V58.67   8. Tobacco dependence  F17.200 305.1     Type 2 diabetes mellitus without complication, without long-term current use of insulin    Hypertension associated with diabetes    Seasonal allergic rhinitis due to pollen    CVA, old, hemiparesis    Atherosclerosis of abdominal aorta    Low TSH level    Diabetes mellitus  due to underlying condition with complication, with long-term current use of insulin    Tobacco dependence    Other orders  -     dulaglutide (TRULICITY) 0.75 mg/0.5 mL pen injector; Inject 0.75 mg into the skin once a week.  Dispense: 12 pen ; Refill: 1  -     FLUoxetine 20 MG capsule; Take 1 capsule (20 mg total) by mouth once daily. To help mood and anxiety  Dispense: 90 capsule; Refill: 3    Represcribed fluoxetine  Again prescribe Trulicity in hopes can get this medication help his diabetes under better control.  Old CVA-no new problems with the you current medications   Tobacco dependence-does not want to quit.

## 2024-08-05 RX ORDER — GLIMEPIRIDE 2 MG/1
2 TABLET ORAL
Qty: 90 TABLET | Refills: 3 | Status: SHIPPED | OUTPATIENT
Start: 2024-08-05 | End: 2027-05-02

## 2024-08-14 ENCOUNTER — PATIENT OUTREACH (OUTPATIENT)
Dept: ADMINISTRATIVE | Facility: HOSPITAL | Age: 69
End: 2024-08-14
Payer: MEDICARE

## 2024-08-14 NOTE — PROGRESS NOTES
08/14/2024  VB chart audit performed. Care Everywhere updates requested and reviewed  Overdue HM topic chart audit and/or requested. LINKS triggered and reconciled. Media reviewed

## 2024-09-05 ENCOUNTER — LAB VISIT (OUTPATIENT)
Dept: LAB | Facility: HOSPITAL | Age: 69
End: 2024-09-05
Attending: FAMILY MEDICINE
Payer: MEDICARE

## 2024-09-05 DIAGNOSIS — E11.9 TYPE 2 DIABETES MELLITUS WITHOUT COMPLICATION, WITHOUT LONG-TERM CURRENT USE OF INSULIN: ICD-10-CM

## 2024-09-05 LAB
ESTIMATED AVG GLUCOSE: 183 MG/DL (ref 68–131)
HBA1C MFR BLD: 8 % (ref 4–5.6)

## 2024-09-05 PROCEDURE — 36415 COLL VENOUS BLD VENIPUNCTURE: CPT | Mod: HCNC,PN | Performed by: FAMILY MEDICINE

## 2024-09-05 PROCEDURE — 83036 HEMOGLOBIN GLYCOSYLATED A1C: CPT | Mod: HCNC | Performed by: FAMILY MEDICINE

## 2024-09-10 ENCOUNTER — OFFICE VISIT (OUTPATIENT)
Dept: FAMILY MEDICINE | Facility: CLINIC | Age: 69
End: 2024-09-10
Payer: MEDICARE

## 2024-09-10 VITALS
BODY MASS INDEX: 27.09 KG/M2 | OXYGEN SATURATION: 95 % | SYSTOLIC BLOOD PRESSURE: 136 MMHG | WEIGHT: 189.25 LBS | DIASTOLIC BLOOD PRESSURE: 84 MMHG | HEIGHT: 70 IN | TEMPERATURE: 99 F | HEART RATE: 73 BPM

## 2024-09-10 DIAGNOSIS — E11.59 HYPERTENSION ASSOCIATED WITH DIABETES: ICD-10-CM

## 2024-09-10 DIAGNOSIS — K59.09 CHRONIC CONSTIPATION: ICD-10-CM

## 2024-09-10 DIAGNOSIS — F17.200 TOBACCO DEPENDENCE: ICD-10-CM

## 2024-09-10 DIAGNOSIS — I15.2 HYPERTENSION ASSOCIATED WITH DIABETES: ICD-10-CM

## 2024-09-10 DIAGNOSIS — I69.359 CVA, OLD, HEMIPARESIS: ICD-10-CM

## 2024-09-10 DIAGNOSIS — E11.9 TYPE 2 DIABETES MELLITUS WITHOUT COMPLICATION, WITHOUT LONG-TERM CURRENT USE OF INSULIN: Primary | ICD-10-CM

## 2024-09-10 PROCEDURE — 99214 OFFICE O/P EST MOD 30 MIN: CPT | Mod: S$GLB,,, | Performed by: FAMILY MEDICINE

## 2024-09-10 RX ORDER — LACTULOSE 10 G/15ML
SOLUTION ORAL; RECTAL
Qty: 2000 ML | Refills: 3 | Status: SHIPPED | OUTPATIENT
Start: 2024-09-10

## 2024-09-10 RX ORDER — ATORVASTATIN CALCIUM 80 MG/1
80 TABLET, FILM COATED ORAL DAILY
Qty: 90 TABLET | Refills: 3 | Status: SHIPPED | OUTPATIENT
Start: 2024-09-10

## 2024-09-10 RX ORDER — TIRZEPATIDE 2.5 MG/.5ML
2.5 INJECTION, SOLUTION SUBCUTANEOUS
Qty: 12 PEN | Refills: 1 | Status: SHIPPED | OUTPATIENT
Start: 2024-09-10

## 2024-09-10 RX ORDER — METFORMIN HYDROCHLORIDE 1000 MG/1
1000 TABLET ORAL 2 TIMES DAILY WITH MEALS
Qty: 180 TABLET | Refills: 3 | Status: SHIPPED | OUTPATIENT
Start: 2024-09-10

## 2024-09-10 RX ORDER — PREDNISONE 10 MG/1
10 TABLET ORAL DAILY
Qty: 7 TABLET | Refills: 0 | Status: SHIPPED | OUTPATIENT
Start: 2024-09-10

## 2024-09-12 NOTE — PROGRESS NOTES
" Patient ID: Cristi Pulido is a 69 y.o. male.    Chief Complaint: Follow-up    HPI      Cristi Pulido is a 69 y.o. male here for routine follow-up.  Followed up every 3-4 months for diabetes mellitus which is under increasingly better control although not fully control.  Patient with hypertension under great control at this time.  Patient with old CVA-no new stroke type symptoms.  Able to ambulate with a walker.  Tobacco dependence-continues to use tobacco products-not interested in completing stopping this time.  Chronic constipation-use lactulose    Vitals:    09/10/24 1515   BP: 136/84   BP Location: Left arm   Patient Position: Sitting   Pulse: 73   Temp: 98.9 °F (37.2 °C)   TempSrc: Oral   SpO2: 95%   Weight: 85.9 kg (189 lb 4.2 oz)   Height: 5' 10" (1.778 m)            Review of Symptoms      Physical Exam    Constitutional:  Oriented to person, place, and time.appears well-developed and well-nourished.  No distress.      HENT  Head: Normocephalic and atraumatic  Right Ear: External ear normal.   Left Ear: External ear normal.   Nose: External nose normal.   Mouth:  Moist mucus membranes.    Eyes:  Conjunctivae are normal. Right eye exhibits no discharge.  Left eye exhibits no discharge. No scleral icterus.  No periorbital edema    Cardiovascular:  Regular rate and rhythm with normal S1 and S2     Pulmonary/Chest:   Clear to auscultation bilaterally without wheezes, rhonchi or rales      Musculoskeletal:  No changes       Neurological:  Alert and oriented to person, place, and time.   Coordination normal.     Skin:   Skin is warm and dry.  No diaphoresis.   No rash noted.     Psychiatric: Normal mood and affect. Behavior is normal.  Judgment and thought content normal.     Complete Blood Count  Lab Results   Component Value Date    RBC 4.64 05/31/2024    HGB 13.3 (L) 05/31/2024    HCT 41.7 05/31/2024    MCV 90 05/31/2024    MCH 28.7 05/31/2024    MCHC 31.9 (L) 05/31/2024    RDW 13.2 05/31/2024     05/31/2024 "    MPV 12.6 05/31/2024    GRAN 3.0 05/31/2024    GRAN 48.4 05/31/2024    LYMPH 2.6 05/31/2024    LYMPH 42.0 05/31/2024    MONO 0.4 05/31/2024    MONO 6.9 05/31/2024    EOS 0.1 05/31/2024    BASO 0.04 05/31/2024    EOSINOPHIL 1.9 05/31/2024    BASOPHIL 0.6 05/31/2024    DIFFMETHOD Automated 05/31/2024       Comprehensive Metabolic Panel  Lab Results   Component Value Date     (H) 05/31/2024    BUN 13 05/31/2024    CREATININE 1.07 05/31/2024     (H) 05/31/2024    K 4.3 05/31/2024     05/31/2024    PROT 8.0 05/31/2024    ALBUMIN 4.6 05/31/2024    BILITOT 0.6 05/31/2024    AST 23 05/31/2024    ALKPHOS 136 (H) 05/31/2024    CO2 24 05/31/2024    ALT 18 05/31/2024    ANIONGAP 15 05/31/2024       TSH  Lab Results   Component Value Date    TSH 0.972 05/31/2024       Assessment / Plan:      ICD-10-CM ICD-9-CM   1. Type 2 diabetes mellitus without complication, without long-term current use of insulin  E11.9 250.00   2. Hypertension associated with diabetes  E11.59 250.80    I15.2 401.9   3. CVA, old, hemiparesis  I69.359 438.20     Type 2 diabetes mellitus without complication, without long-term current use of insulin  -     Comprehensive Metabolic Panel; Future; Expected date: 09/10/2024  -     Hemoglobin A1C; Future; Expected date: 09/10/2024  -     Lipid Panel; Future; Expected date: 09/10/2024    Hypertension associated with diabetes    CVA, old, hemiparesis    Other orders  -     atorvastatin (LIPITOR) 80 MG tablet; Take 1 tablet (80 mg total) by mouth once daily.  Dispense: 90 tablet; Refill: 3  -     lactulose (CHRONULAC) 10 gram/15 mL solution; TAKE  15ML  1-3  TIMES  A  DAY  Dispense: 2000 mL; Refill: 3  -     metFORMIN (GLUCOPHAGE) 1000 MG tablet; Take 1 tablet (1,000 mg total) by mouth 2 (two) times daily with meals.  Dispense: 180 tablet; Refill: 3  -     predniSONE (DELTASONE) 10 MG tablet; Take 1 tablet (10 mg total) by mouth once daily. For allerties and nasal congestion  Dispense: 7 tablet;  Refill: 0  -     tirzepatide (MOUNJARO) 2.5 mg/0.5 mL PnIj; Inject 2.5 mg into the skin every 7 days.  Dispense: 12 Pen; Refill: 1    Discussed restarting Mounjaro on the 1st of the year in 25     Patient with mild nasal congestion today treat with prednisone daily    Constipation-use lactulose

## 2024-09-19 ENCOUNTER — TELEPHONE (OUTPATIENT)
Dept: FAMILY MEDICINE | Facility: CLINIC | Age: 69
End: 2024-09-19
Payer: MEDICARE

## 2024-09-19 NOTE — TELEPHONE ENCOUNTER
LM for pt to call back clinic    ----- Message from Rell Winn MD sent at 9/8/2024 11:14 AM CDT -----  Hemoglobin A1c 8.0.  Almost to goal.-if you bring me your glucose readings I can help you titrate your insulin.-need your blood glucose levels when you check them once or twice a day and how much insulin you using.  Are you using Trulicity?  If so we are going to go up on the dose

## 2024-11-13 ENCOUNTER — PATIENT OUTREACH (OUTPATIENT)
Dept: ADMINISTRATIVE | Facility: HOSPITAL | Age: 69
End: 2024-11-13
Payer: MEDICARE

## 2024-11-15 RX ORDER — GLIMEPIRIDE 2 MG/1
2 TABLET ORAL
Qty: 90 TABLET | Refills: 3 | Status: SHIPPED | OUTPATIENT
Start: 2024-11-15 | End: 2027-08-12

## 2024-11-15 RX ORDER — LANCETS 33 GAUGE
EACH MISCELLANEOUS
Qty: 100 EACH | Refills: 3 | Status: SHIPPED | OUTPATIENT
Start: 2024-11-15

## 2024-11-15 RX ORDER — METOPROLOL TARTRATE 50 MG/1
50 TABLET ORAL 2 TIMES DAILY
Qty: 180 TABLET | Refills: 3 | Status: SHIPPED | OUTPATIENT
Start: 2024-11-15 | End: 2025-11-15

## 2024-11-15 NOTE — TELEPHONE ENCOUNTER
No care due was identified.  [facility] Embedded Care Due Messages. Reference number: [id number].   11/15/2024 3:38:38 PM CST

## 2024-11-15 NOTE — TELEPHONE ENCOUNTER
----- Message from Alexa sent at 11/15/2024  3:15 PM CST -----  Regarding: Call back  Contact: [phone]  Type:  RX Refill Request    1.  Who Called: PT   Refill or New Rx: Refills   RX Name and Strength: glimepiride (AMARYL) 2 MG tablet 90 tablet  How is the patient currently taking it? (ex. 1XDay): 1 x a day   Is this a 30 day or 90 day RX: 90  Preferred Pharmacy with phone number: [facility] #[id number] [place] [address] AT [facility] [address] Phone: [phone] Fax: [phone]    2. TRUE METRIX GLUCOSE TEST STRIP Strp 200 strip    3. TRUEPLUS LANCETS 33 gauge Misc    4. metoprolol tartrate (LOPRESSOR) 50 MG tablet 180 tablet

## 2024-12-10 ENCOUNTER — LAB VISIT (OUTPATIENT)
Dept: LAB | Facility: HOSPITAL | Age: 69
End: 2024-12-10
Attending: FAMILY MEDICINE
Payer: MEDICARE

## 2024-12-10 DIAGNOSIS — E11.9 TYPE 2 DIABETES MELLITUS WITHOUT COMPLICATION, WITHOUT LONG-TERM CURRENT USE OF INSULIN: ICD-10-CM

## 2024-12-10 LAB
ALBUMIN SERPL BCP-MCNC: 4.5 G/DL (ref 3.5–5.2)
ALP SERPL-CCNC: 129 U/L (ref 38–126)
ALT SERPL W/O P-5'-P-CCNC: 22 U/L (ref 10–44)
ANION GAP SERPL CALC-SCNC: 11 MMOL/L (ref 8–16)
AST SERPL-CCNC: 21 U/L (ref 15–46)
BILIRUB SERPL-MCNC: 0.5 MG/DL (ref 0.1–1)
CALCIUM SERPL-MCNC: 9.3 MG/DL (ref 8.7–10.5)
CHLORIDE SERPL-SCNC: 105 MMOL/L (ref 95–110)
CHOLEST SERPL-MCNC: 158 MG/DL (ref 120–199)
CHOLEST/HDLC SERPL: 3.5 {RATIO} (ref 2–5)
CO2 SERPL-SCNC: 27 MMOL/L (ref 23–29)
CREAT SERPL-MCNC: 1.12 MG/DL (ref 0.5–1.4)
EST. GFR  (NO RACE VARIABLE): >60 ML/MIN/1.73 M^2
ESTIMATED AVG GLUCOSE: 169 MG/DL (ref 68–131)
GLUCOSE SERPL-MCNC: 118 MG/DL (ref 70–110)
HBA1C MFR BLD: 7.5 % (ref 4–5.6)
HDLC SERPL-MCNC: 45 MG/DL (ref 40–75)
HDLC SERPL: 28.5 % (ref 20–50)
LDLC SERPL CALC-MCNC: 98.4 MG/DL (ref 63–159)
NONHDLC SERPL-MCNC: 113 MG/DL
POTASSIUM SERPL-SCNC: 4.2 MMOL/L (ref 3.5–5.1)
PROT SERPL-MCNC: 8.1 G/DL (ref 6–8.4)
SODIUM SERPL-SCNC: 143 MMOL/L (ref 136–145)
TRIGL SERPL-MCNC: 73 MG/DL (ref 30–150)
UUN UR-MCNC: 14 MG/DL (ref 2–20)

## 2024-12-10 PROCEDURE — 80061 LIPID PANEL: CPT | Mod: HCNC,PN | Performed by: FAMILY MEDICINE

## 2024-12-10 PROCEDURE — 80053 COMPREHEN METABOLIC PANEL: CPT | Mod: HCNC,PN | Performed by: FAMILY MEDICINE

## 2024-12-10 PROCEDURE — 83036 HEMOGLOBIN GLYCOSYLATED A1C: CPT | Mod: HCNC,PN | Performed by: FAMILY MEDICINE

## 2024-12-12 ENCOUNTER — TELEPHONE (OUTPATIENT)
Dept: FAMILY MEDICINE | Facility: CLINIC | Age: 69
End: 2024-12-12
Payer: MEDICARE

## 2024-12-13 NOTE — TELEPHONE ENCOUNTER
Your lab work is very good-hemoglobin A1c is 7.5 showing good control of your diabetes.    Your cholesterol is very good as well.

## 2025-01-13 DIAGNOSIS — Z00.00 ENCOUNTER FOR MEDICARE ANNUAL WELLNESS EXAM: ICD-10-CM

## 2025-01-14 ENCOUNTER — OFFICE VISIT (OUTPATIENT)
Dept: FAMILY MEDICINE | Facility: CLINIC | Age: 70
End: 2025-01-14
Payer: MEDICARE

## 2025-01-14 VITALS
SYSTOLIC BLOOD PRESSURE: 136 MMHG | BODY MASS INDEX: 28.55 KG/M2 | DIASTOLIC BLOOD PRESSURE: 76 MMHG | HEART RATE: 78 BPM | TEMPERATURE: 98 F | HEIGHT: 70 IN | WEIGHT: 199.44 LBS | OXYGEN SATURATION: 98 %

## 2025-01-14 DIAGNOSIS — I69.359 CVA, OLD, HEMIPARESIS: ICD-10-CM

## 2025-01-14 DIAGNOSIS — E11.59 HYPERTENSION ASSOCIATED WITH DIABETES: Primary | ICD-10-CM

## 2025-01-14 DIAGNOSIS — Z23 FLU VACCINE NEED: ICD-10-CM

## 2025-01-14 DIAGNOSIS — I15.2 HYPERTENSION ASSOCIATED WITH DIABETES: Primary | ICD-10-CM

## 2025-01-14 DIAGNOSIS — E11.9 TYPE 2 DIABETES MELLITUS WITHOUT COMPLICATION, WITHOUT LONG-TERM CURRENT USE OF INSULIN: ICD-10-CM

## 2025-01-14 PROCEDURE — 1159F MED LIST DOCD IN RCRD: CPT | Mod: CPTII,S$GLB,, | Performed by: FAMILY MEDICINE

## 2025-01-14 PROCEDURE — 3078F DIAST BP <80 MM HG: CPT | Mod: CPTII,S$GLB,, | Performed by: FAMILY MEDICINE

## 2025-01-14 PROCEDURE — 90653 IIV ADJUVANT VACCINE IM: CPT | Mod: S$GLB,,, | Performed by: FAMILY MEDICINE

## 2025-01-14 PROCEDURE — 1101F PT FALLS ASSESS-DOCD LE1/YR: CPT | Mod: CPTII,S$GLB,, | Performed by: FAMILY MEDICINE

## 2025-01-14 PROCEDURE — 3288F FALL RISK ASSESSMENT DOCD: CPT | Mod: CPTII,S$GLB,, | Performed by: FAMILY MEDICINE

## 2025-01-14 PROCEDURE — G0008 ADMIN INFLUENZA VIRUS VAC: HCPCS | Mod: S$GLB,,, | Performed by: FAMILY MEDICINE

## 2025-01-14 PROCEDURE — 3075F SYST BP GE 130 - 139MM HG: CPT | Mod: CPTII,S$GLB,, | Performed by: FAMILY MEDICINE

## 2025-01-14 PROCEDURE — 1126F AMNT PAIN NOTED NONE PRSNT: CPT | Mod: CPTII,S$GLB,, | Performed by: FAMILY MEDICINE

## 2025-01-14 PROCEDURE — 4010F ACE/ARB THERAPY RXD/TAKEN: CPT | Mod: CPTII,S$GLB,, | Performed by: FAMILY MEDICINE

## 2025-01-14 PROCEDURE — 3008F BODY MASS INDEX DOCD: CPT | Mod: CPTII,S$GLB,, | Performed by: FAMILY MEDICINE

## 2025-01-14 PROCEDURE — 99214 OFFICE O/P EST MOD 30 MIN: CPT | Mod: 25,S$GLB,, | Performed by: FAMILY MEDICINE

## 2025-01-14 RX ORDER — AMLODIPINE BESYLATE 10 MG/1
10 TABLET ORAL DAILY
Qty: 90 TABLET | Refills: 3 | Status: SHIPPED | OUTPATIENT
Start: 2025-01-14

## 2025-01-14 RX ORDER — GLIMEPIRIDE 4 MG/1
4 TABLET ORAL
Qty: 90 TABLET | Refills: 3 | Status: SHIPPED | OUTPATIENT
Start: 2025-01-14

## 2025-01-14 RX ORDER — LACTULOSE 10 G/15ML
SOLUTION ORAL; RECTAL
Qty: 2000 ML | Refills: 3 | Status: SHIPPED | OUTPATIENT
Start: 2025-01-14

## 2025-01-14 RX ORDER — METOPROLOL TARTRATE 50 MG/1
50 TABLET ORAL 2 TIMES DAILY
Qty: 180 TABLET | Refills: 3 | Status: SHIPPED | OUTPATIENT
Start: 2025-01-14 | End: 2026-01-14

## 2025-01-14 RX ORDER — METFORMIN HYDROCHLORIDE 1000 MG/1
1000 TABLET ORAL 2 TIMES DAILY WITH MEALS
Qty: 180 TABLET | Refills: 3 | Status: SHIPPED | OUTPATIENT
Start: 2025-01-14

## 2025-01-14 RX ORDER — TIRZEPATIDE 2.5 MG/.5ML
2.5 INJECTION, SOLUTION SUBCUTANEOUS
Qty: 4 PEN | Refills: 1 | Status: SHIPPED | OUTPATIENT
Start: 2025-01-14

## 2025-01-14 RX ORDER — CLOPIDOGREL BISULFATE 75 MG/1
75 TABLET ORAL DAILY
Qty: 90 TABLET | Refills: 3 | Status: SHIPPED | OUTPATIENT
Start: 2025-01-14

## 2025-01-14 RX ORDER — LISINOPRIL 20 MG/1
40 TABLET ORAL DAILY
Qty: 180 TABLET | Refills: 3 | Status: SHIPPED | OUTPATIENT
Start: 2025-01-14 | End: 2026-01-14

## 2025-01-14 RX ORDER — FLUOXETINE HYDROCHLORIDE 20 MG/1
20 CAPSULE ORAL DAILY
Qty: 90 CAPSULE | Refills: 3 | Status: SHIPPED | OUTPATIENT
Start: 2025-01-14 | End: 2026-01-14

## 2025-01-14 RX ORDER — ATORVASTATIN CALCIUM 80 MG/1
80 TABLET, FILM COATED ORAL DAILY
Qty: 90 TABLET | Refills: 3 | Status: SHIPPED | OUTPATIENT
Start: 2025-01-14

## 2025-01-21 NOTE — PROGRESS NOTES
" Patient ID: Cristi Pulido is a 69 y.o. male.    Chief Complaint: Follow-up    HPI    Cristi Pulido is a 69 y.o. male     History of Present Illness    CHIEF COMPLAINT:  Patient presents today for follow up.    DIABETES:  He reports glucose remains elevated at 140-170 while taking diabetes medication daily in the morning. He notes improved glucose levels of 110-115 when taking medication twice daily.    MEDICATIONS:  Not taking GLP one medication never received it.    SOCIAL HISTORY:  He has a history of cigarette smoking and continues to smoke, expressing no intention to quit at this time.     Hypertension-medications agreeing with him no problems.      Chronic constipation continues to take both prescribed and over-the-counter medications to help him have bowel movements.      Anxiety stress uses fluoxetine 20 milligrams a day stable     History of CVA-no new problems.  Continues to take atorvastatin and clopidogrel        Vitals:    01/14/25 1046   BP: 136/76   BP Location: Left arm   Patient Position: Sitting   Pulse: 78   Temp: 98.2 °F (36.8 °C)   TempSrc: Oral   SpO2: 98%   Weight: 90.5 kg (199 lb 6.5 oz)   Height: 5' 10" (1.778 m)            Review of Symptoms      Physical Exam    Constitutional:  Oriented to person, place, and time.appears well-developed and well-nourished.  No distress.      HENT  Head: Normocephalic and atraumatic  Right Ear: External ear normal.   Left Ear: External ear normal.   Nose: External nose normal.   Mouth:  Moist mucus membranes.    Eyes:  Conjunctivae are normal. Right eye exhibits no discharge.  Left eye exhibits no discharge. No scleral icterus.  No periorbital edema    Cardiovascular:  Regular rate and rhythm with normal S1 and S2     Pulmonary/Chest:   Clear to auscultation bilaterally without wheezes, rhonchi or rales      Musculoskeletal:  No edema. No obvious deformity No wasting  Unilateral weakness from previous stroke     Neurological:  Alert and oriented to person, place, " "and time.   Coordination normal.     Skin:   Skin is warm and dry.  No diaphoresis.   No rash noted.     Psychiatric: Normal mood and affect. Behavior is normal.  Judgment and thought content normal.     Physical Exam              Complete Blood Count  Lab Results   Component Value Date    RBC 4.64 05/31/2024    HGB 13.3 (L) 05/31/2024    HCT 41.7 05/31/2024    MCV 90 05/31/2024    MCH 28.7 05/31/2024    MCHC 31.9 (L) 05/31/2024    RDW 13.2 05/31/2024     05/31/2024    MPV 12.6 05/31/2024    GRAN 3.0 05/31/2024    GRAN 48.4 05/31/2024    LYMPH 2.6 05/31/2024    LYMPH 42.0 05/31/2024    MONO 0.4 05/31/2024    MONO 6.9 05/31/2024    EOS 0.1 05/31/2024    BASO 0.04 05/31/2024    EOSINOPHIL 1.9 05/31/2024    BASOPHIL 0.6 05/31/2024    DIFFMETHOD Automated 05/31/2024       Comprehensive Metabolic Panel  Lab Results   Component Value Date     (H) 12/10/2024    BUN 14 12/10/2024    CREATININE 1.12 12/10/2024     12/10/2024    K 4.2 12/10/2024     12/10/2024    PROT 8.1 12/10/2024    ALBUMIN 4.5 12/10/2024    BILITOT 0.5 12/10/2024    AST 21 12/10/2024    ALKPHOS 129 (H) 12/10/2024    CO2 27 12/10/2024    ALT 22 12/10/2024    ANIONGAP 11 12/10/2024       TSH  No results found for: "TSH"    Assessment / Plan:      ICD-10-CM ICD-9-CM   1. Hypertension associated with diabetes  E11.59 250.80    I15.2 401.9   2. Flu vaccine need  Z23 V04.81   3. CVA, old, hemiparesis  I69.359 438.20   4. Type 2 diabetes mellitus without complication, without long-term current use of insulin  E11.9 250.00     Hypertension associated with diabetes  -     Lipid Panel; Future; Expected date: 01/14/2025  -     TSH; Future; Expected date: 01/14/2025  -     Comprehensive Metabolic Panel; Future; Expected date: 01/14/2025  -     Microalbumin/Creatinine Ratio, Urine; Future; Expected date: 01/14/2025  -     Hemoglobin A1C; Future; Expected date: 01/14/2025  -     CBC Auto Differential; Future; Expected date: 01/14/2025    Flu " vaccine need  -     influenza (adjuvanted) (Fluad) 45 mcg/0.5 mL IM vaccine (> or = 66 yo) 0.5 mL    CVA, old, hemiparesis  -     Lipid Panel; Future; Expected date: 01/14/2025  -     TSH; Future; Expected date: 01/14/2025  -     Comprehensive Metabolic Panel; Future; Expected date: 01/14/2025  -     Microalbumin/Creatinine Ratio, Urine; Future; Expected date: 01/14/2025  -     Hemoglobin A1C; Future; Expected date: 01/14/2025  -     CBC Auto Differential; Future; Expected date: 01/14/2025    Type 2 diabetes mellitus without complication, without long-term current use of insulin  -     Lipid Panel; Future; Expected date: 01/14/2025  -     TSH; Future; Expected date: 01/14/2025  -     Comprehensive Metabolic Panel; Future; Expected date: 01/14/2025  -     Microalbumin/Creatinine Ratio, Urine; Future; Expected date: 01/14/2025  -     Hemoglobin A1C; Future; Expected date: 01/14/2025  -     CBC Auto Differential; Future; Expected date: 01/14/2025    Other orders  -     tirzepatide (MOUNJARO) 2.5 mg/0.5 mL PnIj; Inject 2.5 mg into the skin every 7 days.  Dispense: 4 Pen; Refill: 1  -     lactulose (CHRONULAC) 10 gram/15 mL solution; TAKE  15ML  1-3  TIMES  A  DAY  Dispense: 2000 mL; Refill: 3  -     atorvastatin (LIPITOR) 80 MG tablet; Take 1 tablet (80 mg total) by mouth once daily.  Dispense: 90 tablet; Refill: 3  -     FLUoxetine 20 MG capsule; Take 1 capsule (20 mg total) by mouth once daily. To help mood and anxiety  Dispense: 90 capsule; Refill: 3  -     clopidogreL (PLAVIX) 75 mg tablet; Take 1 tablet (75 mg total) by mouth once daily.  Dispense: 90 tablet; Refill: 3  -     amLODIPine (NORVASC) 10 MG tablet; Take 1 tablet (10 mg total) by mouth once daily.  Dispense: 90 tablet; Refill: 3  -     lisinopriL (PRINIVIL,ZESTRIL) 20 MG tablet; Take 2 tablets (40 mg total) by mouth once daily.  Dispense: 180 tablet; Refill: 3  -     metFORMIN (GLUCOPHAGE) 1000 MG tablet; Take 1 tablet (1,000 mg total) by mouth 2 (two)  times daily with meals.  Dispense: 180 tablet; Refill: 3  -     metoprolol tartrate (LOPRESSOR) 50 MG tablet; Take 1 tablet (50 mg total) by mouth 2 (two) times daily.  Dispense: 180 tablet; Refill: 3  -     glimepiride (AMARYL) 4 MG tablet; Take 1 tablet (4 mg total) by mouth before breakfast. Stop when you restart insulin  Dispense: 90 tablet; Refill: 3        Assessment & Plan    - Assessed patient's current medication regimen, including diabetes management  - Considered patient's financial constraints in treatment planning  - Evaluated need for flu vaccination    TYPE 2 DIABETES MELLITUS WITH OTHER CIRCULATORY COMPLICATIONS:  - Monitor blood sugar and evaluate medication effectiveness.  - Without medication, levels range from 140-170 mg/dL; with medication, levels are 112-115 mg/dL.  - Increase diabetes medication to 4 mg daily (2 mg in the morning, 2 mg in the evening) based on these readings.  - Send prescriptions to [facility] pharmacy.  - Auscultate the patient's heart to monitor cardiovascular health, which is currently good.    CVA, OLD, HEMIPARESIS:  - Inquire about any recent stroke occurrences.  - Patient reports no recent strokes.    SMOKING CESSATION:  - Confirm the patient's current cigarette smoking habit and discuss potential health implications of continued smoking.  - Advise the patient to quit smoking, while acknowledging their resistance to change at this time.    MEMORY CONCERNS:  - Address the patient's expressed concern about memory issues.  - Acknowledge the concern and consider further evaluation if necessary.    FLU VACCINATION:  - Administer flu vaccine in office as per patient's request.    MEDICATIONS/SUPPLEMENTS:  - Discontinue amistatin.  - Continue other current medications.    FOLLOW UP:  - Schedule follow-up visit in 4-6 months.         Using medications-home suggestions of therapy and walking-trying to coordinate medicines to help with overall care with diabetes history  CVA-trying to prevent further progression-hypertension associated with diabetes.    This note was generated with the assistance of ambient listening technology. Verbal consent was obtained by the patient and accompanying visitor(s) for the recording of patient appointment to facilitate this note. I attest to having reviewed and edited the generated note for accuracy, though some syntax or spelling errors may persist. Please contact the author of this note for any clarification.

## 2025-01-29 ENCOUNTER — TELEPHONE (OUTPATIENT)
Dept: FAMILY MEDICINE | Facility: CLINIC | Age: 70
End: 2025-01-29
Payer: MEDICARE

## 2025-01-29 NOTE — TELEPHONE ENCOUNTER
I have spoken to [place] regarding pt's mounjaro. Julinao stated this medication did go through pt's insurance. Pt has to meet a deductible. Pt will have to pay $444. [facility] mailed this information to patient. Please advise.

## 2025-01-31 NOTE — TELEPHONE ENCOUNTER
Pt has been notified of the cost of mounjaro. That MD will reorder this medication later in the year once pt meets deductible.     Nena Bey [facility] Staff  Caller: Unspecified (Today,  2:55 PM)  .Type:  Needs Medical Advice    Who Called: pt    Would the patient rather a call back or a response via [facility]? Call back  Best Call Back Number: [phone]  Additional Information:    Pt stated he missed a call and would like a call back

## 2025-03-14 ENCOUNTER — TELEPHONE (OUTPATIENT)
Dept: FAMILY MEDICINE | Facility: CLINIC | Age: 70
End: 2025-03-14
Payer: MEDICARE

## 2025-04-17 NOTE — TELEPHONE ENCOUNTER
Nena Bey [place] Staff  Caller: Unspecified (Today,  3:44 PM)  .Type:  RX Refill Request    Who Called: pt  Refill or New Rx:refill  RX Name and Strength:glimepiride (AMARYL) 4 MG tablet  How is the patient currently taking it? (ex. 1XDay):Take 1 tablet (4 mg total) by mouth before breakfast. Stop when you restart insulin - Oral  Is this a 30 day or 90 day RX:90 tablet  Preferred Pharmacy with phone number:[id number] - [place] - [address] [facility]  Local or Mail Order:local  Ordering Provider:St Rodriguez  Would the patient rather a call back or a response via [facility]? Call back  Best Call Back Number:[phone]  Additional Information:     Refill or New Rx:refill  RX Name and Strength:amLODIPine (NORVASC) 10 MG tablet  How is the patient currently taking it? (ex. 1XDay):Take 1 tablet (10 mg total) by mouth once daily. - Oral  Is this a 30 day or 90 day RX:90 tablet    Refill or New Rx:refill  RX Name and Strength:lisinopriL (PRINIVIL,ZESTRIL) 20 MG tablet  How is the patient currently taking it? (ex. 1XDay): Take 2 tablets (40 mg total) by mouth once daily. - Oral  Is this a 30 day or 90 day RX:180 tablet    Refill or New Rx:refill  RX Name and Strength:metoprolol tartrate (LOPRESSOR) 50 MG tablet  How is the patient currently taking it? (ex. 1XDay):Take 1 tablet (50 mg total) by mouth 2 (two) times daily. - Oral  Is this a 30 day or 90 day RX:180 tablet    Refill or New Rx:refill  RX Name and Strength:clopidogreL (PLAVIX) 75 mg tablet  How is the patient currently taking it? (ex. 1XDay):Take 1 tablet (75 mg total) by mouth once daily. - Oral  Is this a 30 day or 90 day RX:90 tablet      Refill or New Rx:refill  RX Name and Strength:lactulose (CHRONULAC) 10 gram/15 mL solution  How is the patient currently taking it? (ex. 1XDay):TAKE  15ML  1-3  TIMES  A  DAY  Is this a 30 day or 90 day RX:2000 mL    Refill or New Rx:refill  RX Name  and Strength:gabapentin (NEURONTIN) 600 MG tablet  How is the patient currently taking it? (ex. 1XDay): Take 1 tablet (600 mg total) by mouth 3 (three) times daily as needed. - Oral  Is this a 30 day or 90 day RX:270 tablet

## 2025-04-17 NOTE — TELEPHONE ENCOUNTER
Care Due:                  Date            Visit Type   Department     Provider  --------------------------------------------------------------------------------                                EP -                              PRIMARY      [facility] FAMILY  Last Visit: 01-      CARE ([facility])   MEDICINE       Rell Winn                               -                              PRIMARY      [facility] FAMILY  Next Visit: 05-      CARE ([facility])   [facility]       Rell Winn                                                            Last  Test          Frequency    Reason                     Performed    Due Date  --------------------------------------------------------------------------------    CBC.........  12 months..  clopidogreL..............  05- 05-    HBA1C.......  6 months...  glimepiride, metFORMIN,    12-   06-                             tirzepatide..............    Health [facility] Embedded Care Due Messages. Reference number: [id number].   4/17/2025 4:05:50 PM CDT

## 2025-04-18 RX ORDER — CLOPIDOGREL BISULFATE 75 MG/1
75 TABLET ORAL DAILY
Qty: 90 TABLET | Refills: 3 | Status: SHIPPED | OUTPATIENT
Start: 2025-04-18

## 2025-04-18 RX ORDER — LACTULOSE 10 G/15ML
SOLUTION ORAL; RECTAL
Qty: 2000 ML | Refills: 3 | Status: SHIPPED | OUTPATIENT
Start: 2025-04-18

## 2025-04-18 RX ORDER — GLIMEPIRIDE 4 MG/1
4 TABLET ORAL
Qty: 90 TABLET | Refills: 3 | Status: SHIPPED | OUTPATIENT
Start: 2025-04-18

## 2025-04-18 RX ORDER — METOPROLOL TARTRATE 50 MG/1
50 TABLET ORAL 2 TIMES DAILY
Qty: 180 TABLET | Refills: 3 | Status: SHIPPED | OUTPATIENT
Start: 2025-04-18 | End: 2026-04-18

## 2025-04-18 RX ORDER — GABAPENTIN 600 MG/1
600 TABLET ORAL 3 TIMES DAILY PRN
Qty: 270 TABLET | Refills: 3 | Status: SHIPPED | OUTPATIENT
Start: 2025-04-18

## 2025-04-18 RX ORDER — LISINOPRIL 20 MG/1
40 TABLET ORAL DAILY
Qty: 180 TABLET | Refills: 3 | Status: SHIPPED | OUTPATIENT
Start: 2025-04-18 | End: 2026-04-18

## 2025-04-18 RX ORDER — AMLODIPINE BESYLATE 10 MG/1
10 TABLET ORAL DAILY
Qty: 90 TABLET | Refills: 3 | Status: SHIPPED | OUTPATIENT
Start: 2025-04-18

## 2025-04-28 ENCOUNTER — LAB VISIT (OUTPATIENT)
Dept: LAB | Facility: HOSPITAL | Age: 70
End: 2025-04-28
Attending: FAMILY MEDICINE
Payer: MEDICARE

## 2025-04-28 DIAGNOSIS — I69.359 CVA, OLD, HEMIPARESIS: ICD-10-CM

## 2025-04-28 DIAGNOSIS — I15.2 HYPERTENSION ASSOCIATED WITH DIABETES: ICD-10-CM

## 2025-04-28 DIAGNOSIS — E11.59 HYPERTENSION ASSOCIATED WITH DIABETES: ICD-10-CM

## 2025-04-28 DIAGNOSIS — E11.9 TYPE 2 DIABETES MELLITUS WITHOUT COMPLICATION, WITHOUT LONG-TERM CURRENT USE OF INSULIN: ICD-10-CM

## 2025-04-28 LAB
ABSOLUTE EOSINOPHIL (OHS): 0.07 K/UL
ABSOLUTE MONOCYTE (OHS): 0.41 K/UL (ref 0.3–1)
ABSOLUTE NEUTROPHIL COUNT (OHS): 4.05 K/UL (ref 1.8–7.7)
ALBUMIN SERPL BCP-MCNC: 4.2 G/DL (ref 3.5–5.2)
ALBUMIN/CREAT UR: 19.5 UG/MG
ALP SERPL-CCNC: 151 UNIT/L (ref 38–126)
ALT SERPL W/O P-5'-P-CCNC: 15 UNIT/L (ref 10–44)
ANION GAP (OHS): 14 MMOL/L (ref 8–16)
AST SERPL-CCNC: 18 UNIT/L (ref 15–46)
BASOPHILS # BLD AUTO: 0.02 K/UL
BASOPHILS NFR BLD AUTO: 0.3 %
BILIRUB SERPL-MCNC: 0.5 MG/DL (ref 0.1–1)
BUN SERPL-MCNC: 20 MG/DL (ref 2–20)
CALCIUM SERPL-MCNC: 9.5 MG/DL (ref 8.7–10.5)
CHLORIDE SERPL-SCNC: 106 MMOL/L (ref 95–110)
CHOLEST SERPL-MCNC: 142 MG/DL (ref 120–199)
CHOLEST/HDLC SERPL: 3.8 {RATIO} (ref 2–5)
CO2 SERPL-SCNC: 25 MMOL/L (ref 23–29)
CREAT SERPL-MCNC: 1.2 MG/DL (ref 0.5–1.4)
CREAT UR-MCNC: 87 MG/DL (ref 23–375)
EAG (OHS): 174 MG/DL (ref 68–131)
ERYTHROCYTE [DISTWIDTH] IN BLOOD BY AUTOMATED COUNT: 14 % (ref 11.5–14.5)
GFR SERPLBLD CREATININE-BSD FMLA CKD-EPI: >60 ML/MIN/1.73/M2
GLUCOSE SERPL-MCNC: 96 MG/DL (ref 70–110)
HBA1C MFR BLD: 7.7 % (ref 4–5.6)
HCT VFR BLD AUTO: 38.5 % (ref 40–54)
HDLC SERPL-MCNC: 37 MG/DL (ref 40–75)
HDLC SERPL: 26.1 % (ref 20–50)
HGB BLD-MCNC: 12.2 GM/DL (ref 14–18)
IMM GRANULOCYTES # BLD AUTO: 0.02 K/UL (ref 0–0.04)
IMM GRANULOCYTES NFR BLD AUTO: 0.3 % (ref 0–0.5)
LDLC SERPL CALC-MCNC: 89.4 MG/DL (ref 63–159)
LYMPHOCYTES # BLD AUTO: 2.15 K/UL (ref 1–4.8)
MCH RBC QN AUTO: 28.4 PG (ref 27–31)
MCHC RBC AUTO-ENTMCNC: 31.7 G/DL (ref 32–36)
MCV RBC AUTO: 90 FL (ref 82–98)
MICROALBUMIN UR-MCNC: 17 UG/ML (ref ?–5000)
NONHDLC SERPL-MCNC: 105 MG/DL
NUCLEATED RBC (/100WBC) (OHS): 0 /100 WBC
PLATELET # BLD AUTO: 204 K/UL (ref 150–450)
PMV BLD AUTO: 12.9 FL (ref 9.2–12.9)
POTASSIUM SERPL-SCNC: 4.4 MMOL/L (ref 3.5–5.1)
PROT SERPL-MCNC: 7.4 GM/DL (ref 6–8.4)
RBC # BLD AUTO: 4.29 M/UL (ref 4.6–6.2)
RELATIVE EOSINOPHIL (OHS): 1 %
RELATIVE LYMPHOCYTE (OHS): 32 % (ref 18–48)
RELATIVE MONOCYTE (OHS): 6.1 % (ref 4–15)
RELATIVE NEUTROPHIL (OHS): 60.3 % (ref 38–73)
SODIUM SERPL-SCNC: 145 MMOL/L (ref 136–145)
TRIGL SERPL-MCNC: 78 MG/DL (ref 30–150)
TSH SERPL-ACNC: 0.67 UIU/ML (ref 0.4–4)
WBC # BLD AUTO: 6.72 K/UL (ref 3.9–12.7)

## 2025-04-28 PROCEDURE — 82465 ASSAY BLD/SERUM CHOLESTEROL: CPT | Mod: HCNC,PN

## 2025-04-28 PROCEDURE — 84443 ASSAY THYROID STIM HORMONE: CPT | Mod: HCNC,PN

## 2025-04-28 PROCEDURE — 83036 HEMOGLOBIN GLYCOSYLATED A1C: CPT | Mod: HCNC,PN

## 2025-04-28 PROCEDURE — 36415 COLL VENOUS BLD VENIPUNCTURE: CPT | Mod: HCNC,PN

## 2025-04-28 PROCEDURE — 82374 ASSAY BLOOD CARBON DIOXIDE: CPT | Mod: HCNC,PN

## 2025-04-28 PROCEDURE — 85025 COMPLETE CBC W/AUTO DIFF WBC: CPT | Mod: HCNC,PN

## 2025-04-28 PROCEDURE — 82570 ASSAY OF URINE CREATININE: CPT | Mod: HCNC,PN

## 2025-04-30 ENCOUNTER — RESULTS FOLLOW-UP (OUTPATIENT)
Dept: FAMILY MEDICINE | Facility: CLINIC | Age: 70
End: 2025-04-30

## 2025-05-02 ENCOUNTER — TELEPHONE (OUTPATIENT)
Dept: FAMILY MEDICINE | Facility: CLINIC | Age: 70
End: 2025-05-02
Payer: MEDICARE

## 2025-05-02 NOTE — LETTER
May 2, 2025    [address]  [facility] [address]             [facility]  [address] [address]  Phone: [phone]  Fax: [phone] Dear Mr. Cristi Pulido:    Below are the results from your recent visit:    Resulted Orders   Lipid Panel   Result Value Ref Range    Cholesterol Total 142 120 - 199 mg/dL      Comment:      The National Cholesterol Education Program (NCEP) has set the  following guidelines (reference ranges) for Cholesterol:  Optimal.....................<200 mg/dL  Borderline High.............200-239 mg/dL  High........................> or = 240 mg/dL    Triglyceride 78 30 - 150 mg/dL      Comment:      The National Cholesterol Education Program (NCEP) has set the  following guidelines (reference values) for triglycerides:  Normal......................<150 mg/dL  Borderline High.............150-199 mg/dL  High........................200-499 mg/dL    HDL Cholesterol 37 (L) 40 - 75 mg/dL      Comment:      The National Cholesterol Education Program (NCEP) has set the   following guidelines (reference values) for HDL Cholesterol:   Low...............<40 mg/dL   Optimal...........>60 mg/dL    LDL Cholesterol 89.4 63.0 - 159.0 mg/dL      Comment:      The National Cholesterol Education Program (NCEP) has set the  following guidelines (reference values) for LDL Cholesterol:  Optimal.......................<130 mg/dL  Borderline High...............130-159 mg/dL  High..........................160-189 mg/dL  Very High.....................>190 mg/dL  LDL calculated using the Friedewald equation.    HDL/Cholesterol Ratio 26.1 20.0 - 50.0 %    Cholesterol/HDL Ratio 3.8 2.0 - 5.0    Non HDL Cholesterol 105 mg/dL      Comment:      Risk category and Non-HDL cholesterol goals:  Coronary heart disease (CHD)or equivalent (10-year risk of CHD >20%):  Non-HDL cholesterol goal     <130 mg/dL  Two or more CHD risk factors and 10-year risk of CHD <= 20%:  Non-HDL cholesterol  goal     <160 mg/dL  0 to 1 CHD risk factor:  Non-HDL cholesterol goal     <190 mg/dL   TSH   Result Value Ref Range    TSH 0.674 0.400 - 4.000 uIU/mL   Comprehensive Metabolic Panel   Result Value Ref Range    Sodium 145 136 - 145 mmol/L    Potassium 4.4 3.5 - 5.1 mmol/L    Chloride 106 95 - 110 mmol/L    CO2 25 23 - 29 mmol/L    Glucose 96 70 - 110 mg/dL    BUN 20 2 - 20 mg/dL    Creatinine 1.2 0.5 - 1.4 mg/dL    Calcium 9.5 8.7 - 10.5 mg/dL    Protein Total 7.4 6.0 - 8.4 gm/dL    Albumin 4.2 3.5 - 5.2 g/dL    Bilirubin Total 0.5 0.1 - 1.0 mg/dL      Comment:      For infants and newborns, interpretation of results should be based   on gestational age, weight and in agreement with clinical   observations.    Premature Infant recommended reference ranges:   0-24 hours:  <8.0 mg/dL   24-48 hours: <12.0 mg/dL   3-5 days:    <15.0 mg/dL   6-29 days:   <15.0 mg/dL     (H) 38 - 126 unit/L    AST 18 15 - 46 unit/L    ALT 15 10 - 44 unit/L    Anion Gap 14 8 - 16 mmol/L    eGFR >60 >60 mL/min/1.73/m2      Comment:      Estimated GFR calculated using the CKD-EPI creatinine (2021) equation.   Microalbumin/Creatinine Ratio, Urine   Result Value Ref Range    Urine Microalbumin 17.0   ug/mL    Urine Creatinine 87.0 23.0 - 375.0 mg/dL    Microalbumin/Creatinine Ratio Urine 19.5 <=30.0 ug/mg   Hemoglobin A1C   Result Value Ref Range    Hemoglobin A1c 7.7 (H) 4.0 - 5.6 %      Comment:      ADA Screening Guidelines:  5.7-6.4%  Consistent with prediabetes  >=6.5%  Consistent with diabetes    High levels of fetal hemoglobin interfere with the HbA1C  assay. Heterozygous hemoglobin variants (HbS, HgC, etc)do  not significantly interfere with this assay.   However, presence of multiple variants may affect accuracy.    Estimated Average Glucose 174 (H) 68 - 131 mg/dL   CBC with Differential   Result Value Ref Range    WBC 6.72 3.90 - 12.70 K/uL    RBC 4.29 (L) 4.60 - 6.20 M/uL    HGB 12.2 (L) 14.0 - 18.0 gm/dL    HCT 38.5 (L) 40.0  - 54.0 %    MCV 90 82 - 98 fL    MCH 28.4 27.0 - 31.0 pg    MCHC 31.7 (L) 32.0 - 36.0 g/dL    RDW 14.0 11.5 - 14.5 %    Platelet Count 204 150 - 450 K/uL    MPV 12.9 9.2 - 12.9 fL    Nucleated RBC 0 <=0 /100 WBC    Neut % 60.3 38 - 73 %    Lymph % 32.0 18 - 48 %    Mono % 6.1 4 - 15 %    Eos % 1.0 <=8 %    Basophil % 0.3 <=1.9 %    Imm Grans % 0.3 0.0 - 0.5 %    Neut # 4.05 1.8 - 7.7 K/uL    Lymph # 2.15 1 - 4.8 K/uL    Mono # 0.41 0.3 - 1 K/uL    Eos # 0.07 <=0.5 K/uL    Baso # 0.02 <=0.2 K/uL    Imm Grans # 0.02 0.00 - 0.04 K/uL      Comment:      Mild elevation in immature granulocytes is non specific and can be seen in a variety of conditions including stress response, acute inflammation, trauma and pregnancy. Correlation with other laboratory and clinical findings is essential.     Good control of diabetes-no medication or lifestyle changes needed this time-I will see you in a couple of weeks.   Please don't hesitate to call our office if you have any questions or concerns.      Sincerely,        Rell Winn MD

## 2025-05-02 NOTE — TELEPHONE ENCOUNTER
Attempted pt. Left detailed message in regards to message below. Sent result letter in the mail with the message below.

## 2025-05-02 NOTE — TELEPHONE ENCOUNTER
----- Message from Rell Winn MD sent at 4/30/2025  2:24 AM CDT -----  Good control of diabetes-no medication or lifestyle changes needed this time-I will see you in a couple of weeks.  ----- Message -----  From: Lab, Background User  Sent: 4/28/2025  10:39 AM CDT  To: Rell Winn MD

## 2025-05-15 ENCOUNTER — OFFICE VISIT (OUTPATIENT)
Dept: FAMILY MEDICINE | Facility: CLINIC | Age: 70
End: 2025-05-15
Payer: MEDICARE

## 2025-05-15 ENCOUNTER — PATIENT MESSAGE (OUTPATIENT)
Dept: FAMILY MEDICINE | Facility: CLINIC | Age: 70
End: 2025-05-15

## 2025-05-15 VITALS
OXYGEN SATURATION: 96 % | SYSTOLIC BLOOD PRESSURE: 130 MMHG | DIASTOLIC BLOOD PRESSURE: 68 MMHG | HEIGHT: 70 IN | WEIGHT: 192.44 LBS | TEMPERATURE: 99 F | HEART RATE: 66 BPM | BODY MASS INDEX: 27.55 KG/M2

## 2025-05-15 DIAGNOSIS — I15.2 HYPERTENSION ASSOCIATED WITH DIABETES: ICD-10-CM

## 2025-05-15 DIAGNOSIS — I70.0 ATHEROSCLEROSIS OF ABDOMINAL AORTA: ICD-10-CM

## 2025-05-15 DIAGNOSIS — K59.09 CHRONIC CONSTIPATION: ICD-10-CM

## 2025-05-15 DIAGNOSIS — Z00.00 ROUTINE HEALTH MAINTENANCE: Primary | ICD-10-CM

## 2025-05-15 DIAGNOSIS — J30.1 SEASONAL ALLERGIC RHINITIS DUE TO POLLEN: ICD-10-CM

## 2025-05-15 DIAGNOSIS — I69.359 CVA, OLD, HEMIPARESIS: ICD-10-CM

## 2025-05-15 DIAGNOSIS — E11.59 HYPERTENSION ASSOCIATED WITH DIABETES: ICD-10-CM

## 2025-05-15 DIAGNOSIS — E11.9 TYPE 2 DIABETES MELLITUS WITHOUT COMPLICATION, WITHOUT LONG-TERM CURRENT USE OF INSULIN: ICD-10-CM

## 2025-05-15 PROCEDURE — 1101F PT FALLS ASSESS-DOCD LE1/YR: CPT | Mod: CPTII,S$GLB,, | Performed by: FAMILY MEDICINE

## 2025-05-15 PROCEDURE — 3061F NEG MICROALBUMINURIA REV: CPT | Mod: CPTII,S$GLB,, | Performed by: FAMILY MEDICINE

## 2025-05-15 PROCEDURE — 4010F ACE/ARB THERAPY RXD/TAKEN: CPT | Mod: CPTII,S$GLB,, | Performed by: FAMILY MEDICINE

## 2025-05-15 PROCEDURE — 3008F BODY MASS INDEX DOCD: CPT | Mod: CPTII,S$GLB,, | Performed by: FAMILY MEDICINE

## 2025-05-15 PROCEDURE — 99397 PER PM REEVAL EST PAT 65+ YR: CPT | Mod: S$GLB,,, | Performed by: FAMILY MEDICINE

## 2025-05-15 PROCEDURE — 1159F MED LIST DOCD IN RCRD: CPT | Mod: CPTII,S$GLB,, | Performed by: FAMILY MEDICINE

## 2025-05-15 PROCEDURE — 1126F AMNT PAIN NOTED NONE PRSNT: CPT | Mod: CPTII,S$GLB,, | Performed by: FAMILY MEDICINE

## 2025-05-15 PROCEDURE — 3051F HG A1C>EQUAL 7.0%<8.0%: CPT | Mod: CPTII,S$GLB,, | Performed by: FAMILY MEDICINE

## 2025-05-15 PROCEDURE — 3288F FALL RISK ASSESSMENT DOCD: CPT | Mod: CPTII,S$GLB,, | Performed by: FAMILY MEDICINE

## 2025-05-15 PROCEDURE — 3078F DIAST BP <80 MM HG: CPT | Mod: CPTII,S$GLB,, | Performed by: FAMILY MEDICINE

## 2025-05-15 PROCEDURE — 3075F SYST BP GE 130 - 139MM HG: CPT | Mod: CPTII,S$GLB,, | Performed by: FAMILY MEDICINE

## 2025-05-15 PROCEDURE — 3066F NEPHROPATHY DOC TX: CPT | Mod: CPTII,S$GLB,, | Performed by: FAMILY MEDICINE

## 2025-05-15 RX ORDER — LANCETS 33 GAUGE
EACH MISCELLANEOUS
Qty: 100 EACH | Refills: 3 | Status: CANCELLED | OUTPATIENT
Start: 2025-05-15

## 2025-05-15 NOTE — PROGRESS NOTES
Pt stated he did not go to eye doctor this year. Referral placed for Dr. Perales. CHARLI signed.   Pt to get RSV at pharmacy

## 2025-05-19 ENCOUNTER — PATIENT OUTREACH (OUTPATIENT)
Dept: ADMINISTRATIVE | Facility: HOSPITAL | Age: 70
End: 2025-05-19
Payer: MEDICARE

## 2025-05-19 NOTE — PROGRESS NOTES
Population Health Chart Review & Patient Outreach Details      Additional Pop Health Notes:               Updates Requested / Reviewed:      Updated Care Coordination Note and Care Everywhere         Health Maintenance Topics Overdue:      VB Score: 1     Eye Exam    RSV Vaccine                  Health Maintenance Topic(s) Outreach Outcomes & Actions Taken:    Eye Exam - Outreach Outcomes & Actions Taken  : Pt Will Schedule with External Provider / Order Routed & Care Team Updated if Applicable and CHARLI uploaded to media

## 2025-05-26 NOTE — PROGRESS NOTES
" Patient ID: Cristi Pulido is a 70 y.o. male.    Chief Complaint: Follow-up    HPI    Cristi Pulido is a 70 y.o. male here for follow-up in regards to type 2 diabetes hypertension history of CVA chronic constipation seasonal allergies and athero sclerosis abdominal aorta.  All stable this time.                Vitals:    05/15/25 0952 05/15/25 1033   BP: (!) 150/60 130/68   BP Location: Right arm    Patient Position: Sitting    Pulse: 66    Temp: 98.5 °F (36.9 °C)    TempSrc: Oral    SpO2: 96%    Weight: 87.3 kg (192 lb 7.4 oz)    Height: 5' 10" (1.778 m)             Review of Symptoms      Physical Exam    Constitutional:  Oriented to person, place, and time.appears well-developed and well-nourished.  No distress.      HENT  Head: Normocephalic and atraumatic  Right Ear: External ear normal.   Left Ear: External ear normal.   Nose: External nose normal.   Mouth:  Moist mucus membranes.    Eyes:  Conjunctivae are normal. Right eye exhibits no discharge.  Left eye exhibits no discharge. No scleral icterus.  No periorbital edema    Cardiovascular:  Regular rate and rhythm with normal S1 and S2     Pulmonary/Chest:   Clear to auscultation bilaterally without wheezes, rhonchi or rales      Musculoskeletal:  No edema. No obvious deformity No wasting       Neurological:  Alert and oriented to person, place, and time.   Coordination normal.     Skin:   Skin is warm and dry.  No diaphoresis.   No rash noted.     Psychiatric: Normal mood and affect. Behavior is normal.  Judgment and thought content normal.     Physical Exam              Complete Blood Count  Lab Results   Component Value Date    RBC 4.29 (L) 04/28/2025    HGB 12.2 (L) 04/28/2025    HCT 38.5 (L) 04/28/2025    MCV 90 04/28/2025    MCH 28.4 04/28/2025    MCHC 31.7 (L) 04/28/2025    RDW 14.0 04/28/2025     04/28/2025    MPV 12.9 04/28/2025    GRAN 3.0 05/31/2024    GRAN 48.4 05/31/2024    LYMPH 32.0 04/28/2025    LYMPH 2.15 04/28/2025    MONO 6.1 04/28/2025    " MONO 0.41 04/28/2025    EOS 1.0 04/28/2025    EOS 0.07 04/28/2025    BASO 0.04 05/31/2024    EOSINOPHIL 1.9 05/31/2024    BASOPHIL 0.3 04/28/2025    BASOPHIL 0.02 04/28/2025    DIFFMETHOD Automated 05/31/2024       Comprehensive Metabolic Panel  Lab Results   Component Value Date    GLU 96 04/28/2025    BUN 20 04/28/2025    CREATININE 1.2 04/28/2025     04/28/2025    K 4.4 04/28/2025     04/28/2025    PROT 7.4 04/28/2025    ALBUMIN 4.2 04/28/2025    BILITOT 0.5 04/28/2025    AST 18 04/28/2025    ALKPHOS 151 (H) 04/28/2025    CO2 25 04/28/2025    ALT 15 04/28/2025    ANIONGAP 14 04/28/2025       TSH  Lab Results   Component Value Date    TSH 0.674 04/28/2025       Assessment / Plan:      ICD-10-CM ICD-9-CM   1. Routine health maintenance  Z00.00 V70.0   2. Type 2 diabetes mellitus without complication, without long-term current use of insulin  E11.9 250.00   3. Hypertension associated with diabetes  E11.59 250.80    I15.2 401.9   4. CVA, old, hemiparesis  I69.359 438.20   5. Chronic constipation  K59.09 564.00   6. Seasonal allergic rhinitis due to pollen  J30.1 477.0   7. Atherosclerosis of abdominal aorta  I70.0 440.0     Routine health maintenance    Type 2 diabetes mellitus without complication, without long-term current use of insulin  -     Ambulatory referral/consult to Ophthalmology; Future; Expected date: 05/22/2025  -     Hemoglobin A1C; Future; Expected date: 05/15/2025  -     Basic Metabolic Panel; Future; Expected date: 05/15/2025  -     Lipid Panel; Future; Expected date: 05/15/2025  -     TSH; Future; Expected date: 05/15/2025  -     Comprehensive Metabolic Panel; Future; Expected date: 05/15/2025  -     Microalbumin/Creatinine Ratio, Urine; Future; Expected date: 05/15/2025  -     Hemoglobin A1C; Future; Expected date: 05/15/2025  -     CBC Auto Differential; Future; Expected date: 05/15/2025  -     Ambulatory referral/consult to Ophthalmology; Future; Expected date:  05/22/2025    Hypertension associated with diabetes  -     Hemoglobin A1C; Future; Expected date: 05/15/2025  -     Basic Metabolic Panel; Future; Expected date: 05/15/2025  -     Lipid Panel; Future; Expected date: 05/15/2025  -     TSH; Future; Expected date: 05/15/2025  -     Comprehensive Metabolic Panel; Future; Expected date: 05/15/2025  -     Microalbumin/Creatinine Ratio, Urine; Future; Expected date: 05/15/2025  -     Hemoglobin A1C; Future; Expected date: 05/15/2025  -     CBC Auto Differential; Future; Expected date: 05/15/2025    CVA, old, hemiparesis  -     Hemoglobin A1C; Future; Expected date: 05/15/2025  -     Basic Metabolic Panel; Future; Expected date: 05/15/2025  -     Lipid Panel; Future; Expected date: 05/15/2025  -     TSH; Future; Expected date: 05/15/2025  -     Comprehensive Metabolic Panel; Future; Expected date: 05/15/2025  -     Microalbumin/Creatinine Ratio, Urine; Future; Expected date: 05/15/2025  -     Hemoglobin A1C; Future; Expected date: 05/15/2025  -     CBC Auto Differential; Future; Expected date: 05/15/2025    Chronic constipation  -     Hemoglobin A1C; Future; Expected date: 05/15/2025  -     Basic Metabolic Panel; Future; Expected date: 05/15/2025  -     Lipid Panel; Future; Expected date: 05/15/2025  -     TSH; Future; Expected date: 05/15/2025  -     Comprehensive Metabolic Panel; Future; Expected date: 05/15/2025  -     Microalbumin/Creatinine Ratio, Urine; Future; Expected date: 05/15/2025  -     Hemoglobin A1C; Future; Expected date: 05/15/2025  -     CBC Auto Differential; Future; Expected date: 05/15/2025    Seasonal allergic rhinitis due to pollen  -     Hemoglobin A1C; Future; Expected date: 05/15/2025  -     Basic Metabolic Panel; Future; Expected date: 05/15/2025  -     Lipid Panel; Future; Expected date: 05/15/2025  -     TSH; Future; Expected date: 05/15/2025  -     Comprehensive Metabolic Panel; Future; Expected date: 05/15/2025  -     Microalbumin/Creatinine  Ratio, Urine; Future; Expected date: 05/15/2025  -     Hemoglobin A1C; Future; Expected date: 05/15/2025  -     CBC Auto Differential; Future; Expected date: 05/15/2025    Atherosclerosis of abdominal aorta  -     Hemoglobin A1C; Future; Expected date: 05/15/2025  -     Basic Metabolic Panel; Future; Expected date: 05/15/2025  -     Lipid Panel; Future; Expected date: 05/15/2025  -     TSH; Future; Expected date: 05/15/2025  -     Comprehensive Metabolic Panel; Future; Expected date: 05/15/2025  -     Microalbumin/Creatinine Ratio, Urine; Future; Expected date: 05/15/2025  -     Hemoglobin A1C; Future; Expected date: 05/15/2025  -     CBC Auto Differential; Future; Expected date: 05/15/2025        Discussed keeping glucose levels stable-doing well despite not getting GLP one.      CVA-continue current medication continue to do as much exercise as possible  Constipation continue p.r.n. medications and daily medications    Allergies-over-the-counter medications p.r.n.     Athero sclerosis-on medication high dose statin

## 2025-06-09 ENCOUNTER — PATIENT MESSAGE (OUTPATIENT)
Dept: ADMINISTRATIVE | Facility: HOSPITAL | Age: 70
End: 2025-06-09
Payer: MEDICARE

## 2025-06-19 ENCOUNTER — ANESTHESIA EVENT (OUTPATIENT)
Dept: INTERVENTIONAL RADIOLOGY/VASCULAR | Facility: HOSPITAL | Age: 70
End: 2025-06-19
Payer: MEDICARE

## 2025-06-19 ENCOUNTER — HOSPITAL ENCOUNTER (INPATIENT)
Facility: HOSPITAL | Age: 70
LOS: 22 days | Discharge: NURSING, REHAB OR LONG-TERM CARE | DRG: 023 | End: 2025-07-11
Attending: EMERGENCY MEDICINE | Admitting: PSYCHIATRY & NEUROLOGY
Payer: MEDICARE

## 2025-06-19 ENCOUNTER — HOSPITAL ENCOUNTER (EMERGENCY)
Facility: HOSPITAL | Age: 70
Discharge: HOME | End: 2025-06-19
Attending: STUDENT IN AN ORGANIZED HEALTH CARE EDUCATION/TRAINING PROGRAM
Payer: MEDICARE

## 2025-06-19 VITALS
TEMPERATURE: 98 F | HEART RATE: 86 BPM | RESPIRATION RATE: 29 BRPM | HEIGHT: 70 IN | OXYGEN SATURATION: 97 % | SYSTOLIC BLOOD PRESSURE: 220 MMHG | WEIGHT: 198 LBS | DIASTOLIC BLOOD PRESSURE: 106 MMHG | BODY MASS INDEX: 28.35 KG/M2

## 2025-06-19 DIAGNOSIS — L84 CALLUS OF FOOT: ICD-10-CM

## 2025-06-19 DIAGNOSIS — I63.9 STROKE: ICD-10-CM

## 2025-06-19 DIAGNOSIS — R29.818 ACUTE FOCAL NEUROLOGICAL DEFICIT: ICD-10-CM

## 2025-06-19 DIAGNOSIS — I63.412 EMBOLIC STROKE INVOLVING LEFT MIDDLE CEREBRAL ARTERY: Primary | ICD-10-CM

## 2025-06-19 DIAGNOSIS — I63.512 ACUTE ISCHEMIC LEFT MCA STROKE: ICD-10-CM

## 2025-06-19 DIAGNOSIS — I65.23 BILATERAL CAROTID ARTERY STENOSIS: ICD-10-CM

## 2025-06-19 DIAGNOSIS — I63.232 ACUTE ISCHEMIC LEFT ICA STROKE: ICD-10-CM

## 2025-06-19 DIAGNOSIS — I47.20 V-TACH: ICD-10-CM

## 2025-06-19 PROBLEM — E78.5 HYPERLIPEMIA: Status: ACTIVE | Noted: 2025-06-19

## 2025-06-19 PROBLEM — R53.1 RIGHT SIDED WEAKNESS: Status: ACTIVE | Noted: 2025-06-19

## 2025-06-19 PROBLEM — R47.01 APHASIA: Status: ACTIVE | Noted: 2025-06-19

## 2025-06-19 PROBLEM — R47.1 DYSARTHRIA AND ANARTHRIA: Status: ACTIVE | Noted: 2025-06-19

## 2025-06-19 LAB
ABSOLUTE EOSINOPHIL (OHS): 0.13 K/UL
ABSOLUTE MONOCYTE (OHS): 0.57 K/UL (ref 0.3–1)
ABSOLUTE NEUTROPHIL COUNT (OHS): 3.54 K/UL (ref 1.8–7.7)
ALBUMIN SERPL BCP-MCNC: 4 G/DL (ref 3.5–5.2)
ALP SERPL-CCNC: 148 UNIT/L (ref 38–126)
ALT SERPL W/O P-5'-P-CCNC: 14 UNIT/L (ref 10–44)
ANION GAP (OHS): 11 MMOL/L (ref 8–16)
AORTIC SIZE INDEX (SOV): 1.6 CM/M2
AST SERPL-CCNC: 20 UNIT/L (ref 15–46)
AV AREA BY CONTINUOUS VTI: 2.3 CM2
AV INDEX (PROSTH): 0.73
AV LVOT MEAN GRADIENT: 1 MMHG
AV LVOT PEAK GRADIENT: 2 MMHG
AV MEAN GRADIENT: 3 MMHG
AV PEAK GRADIENT: 3 MMHG
AV VALVE AREA BY VELOCITY RATIO: 2.4 CM²
AV VALVE AREA: 2.3 CM2
AV VELOCITY RATIO: 0.78
BASOPHILS # BLD AUTO: 0.04 K/UL
BASOPHILS NFR BLD AUTO: 0.6 %
BILIRUB SERPL-MCNC: 0.4 MG/DL (ref 0.1–1)
BSA FOR ECHO PROCEDURE: 2.1 M2
BUN SERPL-MCNC: 17 MG/DL (ref 2–20)
CALCIUM SERPL-MCNC: 8.6 MG/DL (ref 8.7–10.5)
CHLORIDE SERPL-SCNC: 106 MMOL/L (ref 95–110)
CHOLEST SERPL-MCNC: 123 MG/DL (ref 120–199)
CHOLEST/HDLC SERPL: 3.2 {RATIO} (ref 2–5)
CO2 SERPL-SCNC: 25 MMOL/L (ref 23–29)
CREAT SERPL-MCNC: 1.1 MG/DL (ref 0.5–1.4)
CV ECHO LV RWT: 0.39 CM
DOP CALC AO PEAK VEL: 0.9 M/S
DOP CALC AO VTI: 19.5 CM
DOP CALC LVOT AREA: 3.1 CM2
DOP CALC LVOT DIAMETER: 2 CM
DOP CALC LVOT PEAK VEL: 0.7 M/S
DOP CALC LVOT STROKE VOLUME: 44.9 CM3
DOP CALCLVOT PEAK VEL VTI: 14.3 CM
E WAVE DECELERATION TIME: 248 MS
E WAVE DECELERATION TIME: 253 MS
E/A RATIO: 0.76
E/E' RATIO: 8 M/S
EAG (OHS): 154 MG/DL (ref 68–131)
ECHO EF ESTIMATED: 82 %
ECHO LV POSTERIOR WALL: 0.9 CM (ref 0.6–1.1)
ERYTHROCYTE [DISTWIDTH] IN BLOOD BY AUTOMATED COUNT: 13.3 % (ref 11.5–14.5)
FRACTIONAL SHORTENING: 50 % (ref 28–44)
GFR SERPLBLD CREATININE-BSD FMLA CKD-EPI: >60 ML/MIN/1.73/M2
GLUCOSE SERPL-MCNC: 147 MG/DL (ref 70–110)
HBA1C MFR BLD: 7 % (ref 4–5.6)
HCT VFR BLD AUTO: 37.2 % (ref 40–54)
HDLC SERPL-MCNC: 38 MG/DL (ref 40–75)
HDLC SERPL: 30.9 % (ref 20–50)
HGB BLD-MCNC: 12.2 GM/DL (ref 14–18)
IMM GRANULOCYTES # BLD AUTO: 0.01 K/UL (ref 0–0.04)
IMM GRANULOCYTES NFR BLD AUTO: 0.1 % (ref 0–0.5)
INDIRECT COOMBS: NORMAL
INR PPP: 1.1 (ref 0.8–1.2)
INTERVENTRICULAR SEPTUM: 1 CM (ref 0.6–1.1)
LA MAJOR: 4.6 CM
LA MINOR: 4.5 CM
LA WIDTH: 3.3 CM
LDLC SERPL CALC-MCNC: 75.2 MG/DL (ref 63–159)
LEFT ATRIUM SIZE: 4.2 CM
LEFT ATRIUM VOLUME INDEX: 26 ML/M2
LEFT ATRIUM VOLUME: 54 CM3
LEFT INTERNAL DIMENSION IN SYSTOLE: 2.3 CM (ref 2.1–4)
LEFT VENTRICLE DIASTOLIC VOLUME INDEX: 46.86 ML/M2
LEFT VENTRICLE DIASTOLIC VOLUME: 97 ML
LEFT VENTRICLE MASS INDEX: 71.5 G/M2
LEFT VENTRICLE SYSTOLIC VOLUME INDEX: 8.7 ML/M2
LEFT VENTRICLE SYSTOLIC VOLUME: 18 ML
LEFT VENTRICULAR INTERNAL DIMENSION IN DIASTOLE: 4.6 CM (ref 3.5–6)
LEFT VENTRICULAR MASS: 148.1 G
LV LATERAL E/E' RATIO: 7.7
LV SEPTAL E/E' RATIO: 8.6
LYMPHOCYTES # BLD AUTO: 2.71 K/UL (ref 1–4.8)
MCH RBC QN AUTO: 29 PG (ref 27–31)
MCHC RBC AUTO-ENTMCNC: 32.8 G/DL (ref 32–36)
MCV RBC AUTO: 88 FL (ref 82–98)
MV PEAK A VEL: 0.91 M/S
MV PEAK E VEL: 0.69 M/S
NONHDLC SERPL-MCNC: 85 MG/DL
NUCLEATED RBC (/100WBC) (OHS): 0 /100 WBC
OHS CV RV/LV RATIO: 0.54 CM
OHS QRS DURATION: 82 MS
OHS QTC CALCULATION: 435 MS
PLATELET # BLD AUTO: 171 K/UL (ref 150–450)
PMV BLD AUTO: 12.6 FL (ref 9.2–12.9)
POCT GLUCOSE: 164 MG/DL (ref 70–110)
POCT GLUCOSE: 184 MG/DL (ref 70–110)
POCT GLUCOSE: 195 MG/DL (ref 70–110)
POCT GLUCOSE: 235 MG/DL (ref 70–110)
POCT GLUCOSE: 325 MG/DL (ref 70–110)
POTASSIUM SERPL-SCNC: 4.1 MMOL/L (ref 3.5–5.1)
PROT SERPL-MCNC: 7.3 GM/DL (ref 6–8.4)
PROTHROMBIN TIME: 11.6 SECONDS (ref 9–12.5)
RA MAJOR: 4.54 CM
RA PRESSURE ESTIMATED: 3 MMHG
RA WIDTH: 2.39 CM
RBC # BLD AUTO: 4.21 M/UL (ref 4.6–6.2)
RELATIVE EOSINOPHIL (OHS): 1.9 %
RELATIVE LYMPHOCYTE (OHS): 38.7 % (ref 18–48)
RELATIVE MONOCYTE (OHS): 8.1 % (ref 4–15)
RELATIVE NEUTROPHIL (OHS): 50.6 % (ref 38–73)
RH BLD: NORMAL
RIGHT VENTRICLE DIASTOLIC BASEL DIMENSION: 2.5 CM
SINUS: 3.4 CM
SODIUM SERPL-SCNC: 142 MMOL/L (ref 136–145)
SPECIMEN OUTDATE: NORMAL
STJ: 2.8 CM
TDI LATERAL: 0.09 M/S
TDI SEPTAL: 0.08 M/S
TDI: 0.09 M/S
TRICUSPID ANNULAR PLANE SYSTOLIC EXCURSION: 2 CM
TRIGL SERPL-MCNC: 49 MG/DL (ref 30–150)
TSH SERPL-ACNC: 1.31 UIU/ML (ref 0.4–4)
WBC # BLD AUTO: 7 K/UL (ref 3.9–12.7)
Z-SCORE OF LEFT VENTRICULAR DIMENSION IN END DIASTOLE: -3.13
Z-SCORE OF LEFT VENTRICULAR DIMENSION IN END SYSTOLE: -4.07

## 2025-06-19 PROCEDURE — 63600175 PHARM REV CODE 636 W HCPCS: Mod: TB,HCNC,ER

## 2025-06-19 PROCEDURE — 96374 THER/PROPH/DIAG INJ IV PUSH: CPT | Mod: HCNC,ER

## 2025-06-19 PROCEDURE — 93005 ELECTROCARDIOGRAM TRACING: CPT | Mod: HCNC,ER

## 2025-06-19 PROCEDURE — 99291 CRITICAL CARE FIRST HOUR: CPT | Mod: HCNC,FS,, | Performed by: PSYCHIATRY & NEUROLOGY

## 2025-06-19 PROCEDURE — 99285 EMERGENCY DEPT VISIT HI MDM: CPT | Mod: 25,HCNC,ER

## 2025-06-19 PROCEDURE — 84443 ASSAY THYROID STIM HORMONE: CPT | Mod: HCNC,ER | Performed by: STUDENT IN AN ORGANIZED HEALTH CARE EDUCATION/TRAINING PROGRAM

## 2025-06-19 PROCEDURE — 25000003 PHARM REV CODE 250: Mod: HCNC | Performed by: NURSE ANESTHETIST, CERTIFIED REGISTERED

## 2025-06-19 PROCEDURE — 99285 EMERGENCY DEPT VISIT HI MDM: CPT | Mod: 25,27,HCNC

## 2025-06-19 PROCEDURE — 92610 EVALUATE SWALLOWING FUNCTION: CPT | Mod: HCNC

## 2025-06-19 PROCEDURE — G0425 INPT/ED TELECONSULT30: HCPCS | Mod: HCNC,95,, | Performed by: PSYCHIATRY & NEUROLOGY

## 2025-06-19 PROCEDURE — 27000221 HC OXYGEN, UP TO 24 HOURS: Mod: HCNC

## 2025-06-19 PROCEDURE — 85610 PROTHROMBIN TIME: CPT | Mod: HCNC,ER | Performed by: STUDENT IN AN ORGANIZED HEALTH CARE EDUCATION/TRAINING PROGRAM

## 2025-06-19 PROCEDURE — 63600175 PHARM REV CODE 636 W HCPCS: Mod: HCNC,ER | Performed by: STUDENT IN AN ORGANIZED HEALTH CARE EDUCATION/TRAINING PROGRAM

## 2025-06-19 PROCEDURE — 99900035 HC TECH TIME PER 15 MIN (STAT): Mod: HCNC

## 2025-06-19 PROCEDURE — 63600175 PHARM REV CODE 636 W HCPCS: Mod: HCNC,ER | Performed by: EMERGENCY MEDICINE

## 2025-06-19 PROCEDURE — 92523 SPEECH SOUND LANG COMPREHEN: CPT | Mod: HCNC

## 2025-06-19 PROCEDURE — 25000003 PHARM REV CODE 250: Mod: HCNC | Performed by: INTERNAL MEDICINE

## 2025-06-19 PROCEDURE — 96376 TX/PRO/DX INJ SAME DRUG ADON: CPT | Mod: HCNC,ER

## 2025-06-19 PROCEDURE — 63600175 PHARM REV CODE 636 W HCPCS: Mod: HCNC | Performed by: PSYCHIATRY & NEUROLOGY

## 2025-06-19 PROCEDURE — 80053 COMPREHEN METABOLIC PANEL: CPT | Mod: HCNC,ER | Performed by: STUDENT IN AN ORGANIZED HEALTH CARE EDUCATION/TRAINING PROGRAM

## 2025-06-19 PROCEDURE — 20000000 HC ICU ROOM: Mod: HCNC

## 2025-06-19 PROCEDURE — B314YZZ FLUOROSCOPY OF LEFT COMMON CAROTID ARTERY USING OTHER CONTRAST: ICD-10-PCS | Performed by: NEUROLOGICAL SURGERY

## 2025-06-19 PROCEDURE — 99499 UNLISTED E&M SERVICE: CPT | Mod: HCNC,,, | Performed by: PHYSICIAN ASSISTANT

## 2025-06-19 PROCEDURE — 63600175 PHARM REV CODE 636 W HCPCS: Mod: HCNC | Performed by: NURSE ANESTHETIST, CERTIFIED REGISTERED

## 2025-06-19 PROCEDURE — 99223 1ST HOSP IP/OBS HIGH 75: CPT | Mod: HCNC,GC,, | Performed by: PSYCHIATRY & NEUROLOGY

## 2025-06-19 PROCEDURE — 93010 ELECTROCARDIOGRAM REPORT: CPT | Mod: HCNC,,, | Performed by: INTERNAL MEDICINE

## 2025-06-19 PROCEDURE — 25500020 PHARM REV CODE 255: Mod: HCNC | Performed by: PSYCHIATRY & NEUROLOGY

## 2025-06-19 PROCEDURE — 99223 1ST HOSP IP/OBS HIGH 75: CPT | Mod: 25,HCNC,, | Performed by: NEUROLOGICAL SURGERY

## 2025-06-19 PROCEDURE — 86900 BLOOD TYPING SEROLOGIC ABO: CPT | Mod: HCNC | Performed by: PHYSICIAN ASSISTANT

## 2025-06-19 PROCEDURE — 94760 N-INVAS EAR/PLS OXIMETRY 1: CPT | Mod: HCNC,ER

## 2025-06-19 PROCEDURE — 80061 LIPID PANEL: CPT | Mod: HCNC,ER | Performed by: STUDENT IN AN ORGANIZED HEALTH CARE EDUCATION/TRAINING PROGRAM

## 2025-06-19 PROCEDURE — 27000221 HC OXYGEN, UP TO 24 HOURS: Mod: HCNC,ER

## 2025-06-19 PROCEDURE — 83036 HEMOGLOBIN GLYCOSYLATED A1C: CPT | Mod: HCNC | Performed by: PHYSICIAN ASSISTANT

## 2025-06-19 PROCEDURE — 63600175 PHARM REV CODE 636 W HCPCS: Mod: HCNC | Performed by: PHYSICIAN ASSISTANT

## 2025-06-19 PROCEDURE — 85025 COMPLETE CBC W/AUTO DIFF WBC: CPT | Mod: HCNC,ER | Performed by: STUDENT IN AN ORGANIZED HEALTH CARE EDUCATION/TRAINING PROGRAM

## 2025-06-19 PROCEDURE — 94761 N-INVAS EAR/PLS OXIMETRY MLT: CPT | Mod: HCNC

## 2025-06-19 PROCEDURE — B317YZZ FLUOROSCOPY OF LEFT INTERNAL CAROTID ARTERY USING OTHER CONTRAST: ICD-10-PCS | Performed by: NEUROLOGICAL SURGERY

## 2025-06-19 PROCEDURE — 96375 TX/PRO/DX INJ NEW DRUG ADDON: CPT | Mod: HCNC,ER

## 2025-06-19 PROCEDURE — 82962 GLUCOSE BLOOD TEST: CPT | Mod: HCNC

## 2025-06-19 PROCEDURE — 25000003 PHARM REV CODE 250: Mod: HCNC | Performed by: PHYSICIAN ASSISTANT

## 2025-06-19 PROCEDURE — 25500020 PHARM REV CODE 255: Mod: HCNC,ER | Performed by: STUDENT IN AN ORGANIZED HEALTH CARE EDUCATION/TRAINING PROGRAM

## 2025-06-19 PROCEDURE — 03CG3ZZ EXTIRPATION OF MATTER FROM INTRACRANIAL ARTERY, PERCUTANEOUS APPROACH: ICD-10-PCS | Performed by: NEUROLOGICAL SURGERY

## 2025-06-19 RX ORDER — MUPIROCIN 20 MG/G
OINTMENT TOPICAL 2 TIMES DAILY
Status: COMPLETED | OUTPATIENT
Start: 2025-06-19 | End: 2025-06-23

## 2025-06-19 RX ORDER — MIDAZOLAM HYDROCHLORIDE 1 MG/ML
1 INJECTION, SOLUTION INTRAMUSCULAR; INTRAVENOUS
Status: DISCONTINUED | OUTPATIENT
Start: 2025-06-19 | End: 2025-06-20

## 2025-06-19 RX ORDER — LABETALOL HYDROCHLORIDE 5 MG/ML
INJECTION, SOLUTION INTRAVENOUS
Status: DISCONTINUED | OUTPATIENT
Start: 2025-06-19 | End: 2025-06-19

## 2025-06-19 RX ORDER — LIDOCAINE HYDROCHLORIDE 20 MG/ML
INJECTION, SOLUTION EPIDURAL; INFILTRATION; INTRACAUDAL; PERINEURAL
Status: DISCONTINUED | OUTPATIENT
Start: 2025-06-19 | End: 2025-06-19

## 2025-06-19 RX ORDER — BISACODYL 10 MG/1
10 SUPPOSITORY RECTAL DAILY PRN
Status: DISCONTINUED | OUTPATIENT
Start: 2025-06-19 | End: 2025-07-11 | Stop reason: HOSPADM

## 2025-06-19 RX ORDER — NICARDIPINE HYDROCHLORIDE 2.5 MG/ML
INJECTION INTRAVENOUS
Status: DISCONTINUED | OUTPATIENT
Start: 2025-06-19 | End: 2025-06-19

## 2025-06-19 RX ORDER — LABETALOL HYDROCHLORIDE 5 MG/ML
10 INJECTION, SOLUTION INTRAVENOUS
Status: COMPLETED | OUTPATIENT
Start: 2025-06-19 | End: 2025-06-19

## 2025-06-19 RX ORDER — IODIXANOL 320 MG/ML
200 INJECTION, SOLUTION INTRAVASCULAR
Status: COMPLETED | OUTPATIENT
Start: 2025-06-19 | End: 2025-06-19

## 2025-06-19 RX ORDER — INSULIN ASPART 100 [IU]/ML
0-10 INJECTION, SOLUTION INTRAVENOUS; SUBCUTANEOUS EVERY 6 HOURS PRN
Status: DISCONTINUED | OUTPATIENT
Start: 2025-06-19 | End: 2025-06-20

## 2025-06-19 RX ORDER — FLUOXETINE 20 MG/1
20 CAPSULE ORAL DAILY
Status: DISCONTINUED | OUTPATIENT
Start: 2025-06-20 | End: 2025-06-21

## 2025-06-19 RX ORDER — AMOXICILLIN 250 MG
1 CAPSULE ORAL 2 TIMES DAILY
Status: DISCONTINUED | OUTPATIENT
Start: 2025-06-19 | End: 2025-06-21

## 2025-06-19 RX ORDER — METOPROLOL TARTRATE 50 MG/1
50 TABLET ORAL 2 TIMES DAILY
Status: DISCONTINUED | OUTPATIENT
Start: 2025-06-19 | End: 2025-06-21

## 2025-06-19 RX ORDER — SODIUM CHLORIDE 0.9 % (FLUSH) 0.9 %
10 SYRINGE (ML) INJECTION
Status: DISCONTINUED | OUTPATIENT
Start: 2025-06-19 | End: 2025-06-20

## 2025-06-19 RX ORDER — FENTANYL CITRATE 50 UG/ML
INJECTION, SOLUTION INTRAMUSCULAR; INTRAVENOUS
Status: DISCONTINUED | OUTPATIENT
Start: 2025-06-19 | End: 2025-06-19

## 2025-06-19 RX ORDER — DEXAMETHASONE SODIUM PHOSPHATE 4 MG/ML
INJECTION, SOLUTION INTRA-ARTICULAR; INTRALESIONAL; INTRAMUSCULAR; INTRAVENOUS; SOFT TISSUE
Status: DISCONTINUED | OUTPATIENT
Start: 2025-06-19 | End: 2025-06-19

## 2025-06-19 RX ORDER — AMLODIPINE BESYLATE 10 MG/1
10 TABLET ORAL DAILY
Status: DISCONTINUED | OUTPATIENT
Start: 2025-06-20 | End: 2025-06-19

## 2025-06-19 RX ORDER — SODIUM CHLORIDE 0.9 % (FLUSH) 0.9 %
10 SYRINGE (ML) INJECTION
Status: DISCONTINUED | OUTPATIENT
Start: 2025-06-19 | End: 2025-06-19 | Stop reason: HOSPADM

## 2025-06-19 RX ORDER — ONDANSETRON HYDROCHLORIDE 2 MG/ML
4 INJECTION, SOLUTION INTRAVENOUS EVERY 8 HOURS PRN
Status: DISCONTINUED | OUTPATIENT
Start: 2025-06-19 | End: 2025-06-24

## 2025-06-19 RX ORDER — SODIUM CHLORIDE 0.9 % (FLUSH) 0.9 %
10 SYRINGE (ML) INJECTION ONCE
Status: DISCONTINUED | OUTPATIENT
Start: 2025-06-19 | End: 2025-06-19 | Stop reason: HOSPADM

## 2025-06-19 RX ORDER — DEXMEDETOMIDINE HYDROCHLORIDE 100 UG/ML
INJECTION, SOLUTION INTRAVENOUS
Status: DISCONTINUED | OUTPATIENT
Start: 2025-06-19 | End: 2025-06-19

## 2025-06-19 RX ORDER — NICARDIPINE HYDROCHLORIDE 0.2 MG/ML
2.5 INJECTION INTRAVENOUS CONTINUOUS
Status: DISCONTINUED | OUTPATIENT
Start: 2025-06-19 | End: 2025-06-19 | Stop reason: HOSPADM

## 2025-06-19 RX ORDER — LABETALOL HYDROCHLORIDE 5 MG/ML
INJECTION, SOLUTION INTRAVENOUS
Status: DISCONTINUED
Start: 2025-06-19 | End: 2025-06-19 | Stop reason: HOSPADM

## 2025-06-19 RX ORDER — PROPOFOL 10 MG/ML
VIAL (ML) INTRAVENOUS
Status: DISCONTINUED | OUTPATIENT
Start: 2025-06-19 | End: 2025-06-19

## 2025-06-19 RX ORDER — ONDANSETRON HYDROCHLORIDE 2 MG/ML
INJECTION, SOLUTION INTRAVENOUS
Status: DISCONTINUED | OUTPATIENT
Start: 2025-06-19 | End: 2025-06-19

## 2025-06-19 RX ORDER — SUCCINYLCHOLINE CHLORIDE 20 MG/ML
INJECTION INTRAMUSCULAR; INTRAVENOUS
Status: DISCONTINUED | OUTPATIENT
Start: 2025-06-19 | End: 2025-06-19

## 2025-06-19 RX ORDER — GLUCAGON 1 MG
1 KIT INJECTION
Status: DISCONTINUED | OUTPATIENT
Start: 2025-06-19 | End: 2025-06-20

## 2025-06-19 RX ORDER — NICARDIPINE HYDROCHLORIDE 0.2 MG/ML
0-15 INJECTION INTRAVENOUS CONTINUOUS
Status: DISCONTINUED | OUTPATIENT
Start: 2025-06-19 | End: 2025-06-20

## 2025-06-19 RX ORDER — ATORVASTATIN CALCIUM 40 MG/1
40 TABLET, FILM COATED ORAL DAILY
Status: DISCONTINUED | OUTPATIENT
Start: 2025-06-19 | End: 2025-06-21

## 2025-06-19 RX ORDER — ROCURONIUM BROMIDE 10 MG/ML
INJECTION, SOLUTION INTRAVENOUS
Status: DISCONTINUED | OUTPATIENT
Start: 2025-06-19 | End: 2025-06-19

## 2025-06-19 RX ORDER — AMLODIPINE BESYLATE 10 MG/1
10 TABLET ORAL NIGHTLY
Status: DISCONTINUED | OUTPATIENT
Start: 2025-06-19 | End: 2025-06-21

## 2025-06-19 RX ORDER — PHENYLEPHRINE HYDROCHLORIDE 10 MG/ML
INJECTION INTRAVENOUS
Status: DISCONTINUED | OUTPATIENT
Start: 2025-06-19 | End: 2025-06-19

## 2025-06-19 RX ORDER — SODIUM CHLORIDE 0.9 % (FLUSH) 0.9 %
10 SYRINGE (ML) INJECTION
Status: DISCONTINUED | OUTPATIENT
Start: 2025-06-19 | End: 2025-07-11 | Stop reason: HOSPADM

## 2025-06-19 RX ORDER — LISINOPRIL 20 MG/1
40 TABLET ORAL DAILY
Status: DISCONTINUED | OUTPATIENT
Start: 2025-06-20 | End: 2025-06-21

## 2025-06-19 RX ORDER — IBUPROFEN 200 MG
1 TABLET ORAL DAILY
Status: DISCONTINUED | OUTPATIENT
Start: 2025-06-20 | End: 2025-07-11 | Stop reason: HOSPADM

## 2025-06-19 RX ADMIN — NICARDIPINE HYDROCHLORIDE 2.5 MG/HR: 0.2 INJECTION, SOLUTION INTRAVENOUS at 11:06

## 2025-06-19 RX ADMIN — PROPOFOL 150 MG: 10 INJECTION, EMULSION INTRAVENOUS at 08:06

## 2025-06-19 RX ADMIN — ONDANSETRON 4 MG: 2 INJECTION INTRAMUSCULAR; INTRAVENOUS at 08:06

## 2025-06-19 RX ADMIN — PHENYLEPHRINE HYDROCHLORIDE 150 MCG: 10 INJECTION INTRAVENOUS at 09:06

## 2025-06-19 RX ADMIN — DEXAMETHASONE SODIUM PHOSPHATE 4 MG: 4 INJECTION, SOLUTION INTRAMUSCULAR; INTRAVENOUS at 08:06

## 2025-06-19 RX ADMIN — METOPROLOL TARTRATE 50 MG: 50 TABLET, FILM COATED ORAL at 09:06

## 2025-06-19 RX ADMIN — FENTANYL CITRATE 75 MCG: 50 INJECTION, SOLUTION INTRAMUSCULAR; INTRAVENOUS at 08:06

## 2025-06-19 RX ADMIN — FENTANYL CITRATE 25 MCG: 50 INJECTION, SOLUTION INTRAMUSCULAR; INTRAVENOUS at 09:06

## 2025-06-19 RX ADMIN — ROCURONIUM BROMIDE 10 MG: 10 INJECTION INTRAVENOUS at 08:06

## 2025-06-19 RX ADMIN — NICARDIPINE HYDROCHLORIDE 7.5 MG/HR: 0.2 INJECTION, SOLUTION INTRAVENOUS at 05:06

## 2025-06-19 RX ADMIN — PHENYLEPHRINE HYDROCHLORIDE 0.2 MCG/KG/MIN: 10 INJECTION INTRAVENOUS at 08:06

## 2025-06-19 RX ADMIN — ROCURONIUM BROMIDE 40 MG: 10 INJECTION INTRAVENOUS at 08:06

## 2025-06-19 RX ADMIN — MUPIROCIN: 20 OINTMENT TOPICAL at 09:06

## 2025-06-19 RX ADMIN — LABETALOL HYDROCHLORIDE 10 MG: 5 INJECTION INTRAVENOUS at 06:06

## 2025-06-19 RX ADMIN — SUGAMMADEX 200 MG: 100 INJECTION, SOLUTION INTRAVENOUS at 09:06

## 2025-06-19 RX ADMIN — AMLODIPINE BESYLATE 10 MG: 10 TABLET ORAL at 09:06

## 2025-06-19 RX ADMIN — SUCCINYLCHOLINE CHLORIDE 120 MG: 20 INJECTION, SOLUTION INTRAMUSCULAR; INTRAVENOUS at 08:06

## 2025-06-19 RX ADMIN — IOHEXOL 100 ML: 350 INJECTION, SOLUTION INTRAVENOUS at 05:06

## 2025-06-19 RX ADMIN — ONDANSETRON 4 MG: 2 INJECTION INTRAMUSCULAR; INTRAVENOUS at 10:06

## 2025-06-19 RX ADMIN — LIDOCAINE HYDROCHLORIDE 100 MG: 20 INJECTION, SOLUTION EPIDURAL; INFILTRATION; INTRACAUDAL; PERINEURAL at 08:06

## 2025-06-19 RX ADMIN — Medication 22.5 MG: at 05:06

## 2025-06-19 RX ADMIN — SODIUM CHLORIDE: 0.9 INJECTION, SOLUTION INTRAVENOUS at 08:06

## 2025-06-19 RX ADMIN — PHENYLEPHRINE HYDROCHLORIDE 100 MCG: 10 INJECTION INTRAVENOUS at 08:06

## 2025-06-19 RX ADMIN — LABETALOL HYDROCHLORIDE 10 MG: 5 INJECTION, SOLUTION INTRAVENOUS at 05:06

## 2025-06-19 RX ADMIN — INSULIN ASPART 8 UNITS: 100 INJECTION, SOLUTION INTRAVENOUS; SUBCUTANEOUS at 04:06

## 2025-06-19 RX ADMIN — MUPIROCIN: 20 OINTMENT TOPICAL at 12:06

## 2025-06-19 RX ADMIN — NICARDIPINE HYDROCHLORIDE 2.5 MG/HR: 0.2 INJECTION, SOLUTION INTRAVENOUS at 07:06

## 2025-06-19 RX ADMIN — DEXMEDETOMIDINE 8 MCG: 200 INJECTION, SOLUTION INTRAVENOUS at 09:06

## 2025-06-19 RX ADMIN — NICARDIPINE HYDROCHLORIDE 0.2 MCG: 25 INJECTION INTRAVENOUS at 09:06

## 2025-06-19 RX ADMIN — SENNOSIDES AND DOCUSATE SODIUM 1 TABLET: 50; 8.6 TABLET ORAL at 09:06

## 2025-06-19 RX ADMIN — SODIUM CHLORIDE: 0.9 INJECTION, SOLUTION INTRAVENOUS at 09:06

## 2025-06-19 RX ADMIN — IODIXANOL 60 ML: 320 INJECTION, SOLUTION INTRAVASCULAR at 09:06

## 2025-06-19 RX ADMIN — LABETALOL HYDROCHLORIDE 20 MG: 5 INJECTION, SOLUTION INTRAVENOUS at 09:06

## 2025-06-19 NOTE — ANESTHESIA PROCEDURE NOTES
Intubation    Date/Time: 6/19/2025 8:44 AM    Performed by: Katrin Ellis CRNA  Authorized by: William Callejas MD    Intubation:     Induction:  Rapid sequence induction    Intubated:  Postinduction    Mask Ventilation:  Not attempted    Attempts:  1    Attempted By:  Student    Method of Intubation:  Video laryngoscopy    Blade:  Woods 3    Laryngeal View Grade: Grade I - full view of cords      Difficult Airway Encountered?: No      Complications:  None    Airway Device:  Oral endotracheal tube    Airway Device Size:  7.5    Style/Cuff Inflation:  Cuffed (inflated to minimal occlusive pressure)    Tube secured:  22    Secured at:  The lips    Placement Verified By:  Capnometry    Complicating Factors:  None    Findings Post-Intubation:  BS equal bilateral and atraumatic/condition of teeth unchanged

## 2025-06-19 NOTE — ANESTHESIA PREPROCEDURE EVALUATION
[facility]  Anesthesia Pre-Operative Evaluation         Patient Name/: Cristi Pulido, 1955  MRN: [id number]    SUBJECTIVE:     Pre-operative evaluation for * No procedures listed *     2025    Cristi Pulido is a 70 y.o. male w/ a significant PMHx of stroke. Patient with AMS and unable to give hx. Emergent consent obtained with two physicians. Patient now presents for the above procedure(s).          ________________________________________  Echo: No results found for this or any previous visit.    ________________________________________    LDA: 2 18G PIV       Drips: Cardene gtt      Problem List[1]    Review of patient's allergies indicates:  No Known Allergies    Current Inpatient Medications:       Medications Ordered Prior to Encounter[2]    Past Surgical History:   Procedure Laterality Date    AMPUTATION, LOWER LIMB Bilateral     middle toes    bilateral 3rd toe amputation      COLONOSCOPY  2021    FRACTURE SURGERY Left     hip    JOINT REPLACEMENT Left     KOMAL       Social History:  Tobacco Use: High Risk (5/15/2025)    Patient History     Smoking Tobacco Use: Every Day     Smokeless Tobacco Use: Never     Passive Exposure: Current       Alcohol Use: Not At Risk (2023)    AUDIT-C     Frequency of Alcohol Consumption: Never     Average Number of Drinks: Patient does not drink     Frequency of Binge Drinking: Never       OBJECTIVE:     Vital Signs Range:  BMI Readings from Last 1 Encounters:   25 28.41 kg/m²       Temp:  [36.2 °C (97.1 °F)-36.4 °C (97.6 °F)]   Pulse:  [73-87]   Resp:  [14-32]   BP: (138-220)/()   SpO2:  [95 %-100 %]        Significant Labs:        Component Value Date/Time    WBC 7.00 2025 0514    HGB 12.2 (L) 2025 0514    HGB 13.3 (L) 2024 0825    HCT 37.2 (L) 2025 0514    HCT 41.7 2024 0825     2025 0514     2024 0825     2025 0514     12/10/2024 1029    K 4.1 2025 0514     K 4.2 12/10/2024 1029     06/19/2025 0514     12/10/2024 1029    CO2 25 06/19/2025 0514    CO2 27 12/10/2024 1029     (H) 06/19/2025 0514     (H) 12/10/2024 1029    BUN 17 06/19/2025 0514    BUN 10 11/17/2014 2046    CREATININE 1.1 06/19/2025 0514    MG 1.8 11/20/2014 0543    PHOS 2.1 (L) 11/20/2014 0543    CALCIUM 8.6 (L) 06/19/2025 0514    CALCIUM 9.3 12/10/2024 1029    ALBUMIN 4.0 06/19/2025 0514    ALBUMIN 4.5 12/10/2024 1029    PROT 7.3 06/19/2025 0514    PROT 8.1 12/10/2024 1029    ALKPHOS 148 (H) 06/19/2025 0514    ALKPHOS 129 (H) 12/10/2024 1029    BILITOT 0.4 06/19/2025 0514    BILITOT 0.5 12/10/2024 1029    AST 20 06/19/2025 0514    AST 21 12/10/2024 1029    ALT 14 06/19/2025 0514    ALT 22 12/10/2024 1029    INR 1.1 06/19/2025 0514    HGBA1C 7.7 (H) 04/28/2025 0936    HGBA1C 7.5 (H) 12/10/2024 1029        Please see Results Review for additional labs.     Diagnostic Studies: No relevant studies.    EKG:   Results for orders placed or performed during the hospital encounter of 06/19/25   ECG 12 lead    Collection Time: 06/19/25  5:43 AM   Result Value Ref Range    QRS Duration 82 ms    [facility] QTC Calculation 435 ms    Narrative    Test Reason : R29.818,    Vent. Rate :  72 BPM     Atrial Rate :  72 BPM     P-R Int : 164 ms          QRS Dur :  82 ms      QT Int : 398 ms       P-R-T Axes :  62  48 231 degrees    QTcB Int : 435 ms    Normal sinus rhythm  T wave abnormality, consider inferolateral ischemia  Abnormal ECG  When compared with ECG of 18-Nov-2014 09:38,  No significant change was found    Referred By: AAAREFERRAL SELF           Confirmed By:        ECHO:  See subjective, if available.      ASSESSMENT/PLAN:           Pre-op Assessment    I have reviewed the Patient Summary Reports.     I have reviewed the Nursing Notes.    I have reviewed the Medications.     Review of Systems  Social:  Smoker       Cardiovascular:     Hypertension                                           Hepatic/GI:         Taking GLP-1 Agonists            Neurological:   CVA                                    Endocrine:  Diabetes, type 2               Physical Exam  General: Well nourished  AMS, A&O x0  Airway:  Mallampati: unable to assess     Chest/Lungs:  Normal Respiratory Rate    Heart:  Rate: Normal        Anesthesia Plan  Type of Anesthesia, risks & benefits discussed:    Anesthesia Type: Gen ETT  Intra-op Monitoring Plan: Standard ASA Monitors  Induction:  IV  Airway Plan: Video, Post-Induction  Informed Consent: Informed consent signed with the Patient and all parties understand the risks and agree with anesthesia plan.  All questions answered.   ASA Score: 4 Emergent  Day of Surgery Review of History & Physical: H&P Update referred to the surgeon/provider.    Ready For Surgery From Anesthesia Perspective.     .           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 90 tablet 3    aspirin (ECOTRIN) 81 MG EC tablet Take 81 mg by mouth. Pt take 2 tablets daily. When pt runs out of plavix (Patient taking differently: Take 81 mg by mouth as needed. Pt take 2 tablets daily. When pt runs out of plavix)       atorvastatin (LIPITOR) 80 MG tablet Take 1 tablet (80 mg total) by mouth once daily. 90 tablet 3    blood sugar diagnostic (TRUE METRIX GLUCOSE TEST STRIP) Strp Test Blood SugarTEST BLOOD SUGAR TWICE DAILY 200 strip 3    blood-glucose meter kit Dispense meter  brand covered by insurance 1 each 0    clopidogreL (PLAVIX) 75 mg tablet Take 1 tablet (75 mg total) by mouth once daily. 90 tablet 3    FLUoxetine 20 MG capsule Take 1 capsule (20 mg total) by mouth once daily. To help mood and anxiety 90 capsule 3    gabapentin (NEURONTIN) 600 MG tablet Take 1 tablet (600 mg total) by mouth 3 (three) times daily as needed. 270 tablet 3    glimepiride (AMARYL) 4 MG tablet Take 1 tablet (4 mg total) by mouth before breakfast. Stop when you restart insulin 90 tablet 3    lactulose (CHRONULAC) 10 gram/15 mL solution TAKE  15ML  1-3  TIMES  A  DAY 2000 mL 3    lancets (ACCU-CHEK FASTCLIX LANCING DEV) Misc TEST TWICE DAILY 200 each 3    lisinopriL (PRINIVIL,ZESTRIL) 20 MG tablet Take 2 tablets (40 mg total) by mouth once daily. 180 tablet 3    metFORMIN (GLUCOPHAGE) 1000 MG tablet Take 1 tablet (1,000 mg total) by mouth 2 (two) times daily with meals. 180 tablet 3    metoprolol tartrate (LOPRESSOR) 50 MG tablet Take 1 tablet (50 mg total) by mouth 2 (two) times daily. 180 tablet 3    TRUEPLUS LANCETS 33 gauge Misc Use to test sugar/glucose daily. 100 each 3

## 2025-06-19 NOTE — PLAN OF CARE
Pt arrived to   for stroke. Pt oriented to unit and staff. Plan of care reviewed with patient, patient verbalizes understanding. Comfort measures utilized. Pt safely transferred from stretcher to procedural table. Fall risk reviewed with patient, fall risk interventions maintained. Positioner pillows utilized to minimize pressure points. Blankets applied. Pt prepped and draped utilizing standard sterile technique. Timeouts completed utilizing standard universal time-out, per department and facility policy.  Anesthesia at bedside; Refer to anesthesia record regarding sedation and vital signs.

## 2025-06-19 NOTE — SUBJECTIVE & OBJECTIVE
Past Medical History:   Diagnosis Date    Diabetes mellitus     Hyperlipidemia     Hypertension     Stroke     Stroke     right side weak    Type 2 diabetes mellitus      Past Surgical History:   Procedure Laterality Date    AMPUTATION, LOWER LIMB Bilateral     middle toes    bilateral 3rd toe amputation      COLONOSCOPY  01/2021    FRACTURE SURGERY Left     hip    JOINT REPLACEMENT Left     KOMAL      Medications Ordered Prior to Encounter[1]   Allergies: Patient has no known allergies.  Family History   Problem Relation Name Age of Onset    Diabetes Mother      Heart disease Mother      Hypertension Mother      Diabetes Father      Diabetes Sister Elaa     Diabetes Brother Marcelo     Diabetes Daughter Siri     No Known Problems Son Cristi Marks.     Diabetes Sister Jimena     Diabetes Sister Karma     Diabetes Brother Constantine     Hypertension Daughter Ciera      Social History[2]  Review of Systems   Reason unable to perform ROS: aphasia.     Objective:     Vitals:    Temp: 97.5 °F (36.4 °C)  Pulse: 89  Rhythm: normal sinus rhythm  BP: (!) 157/95  MAP (mmHg): 120  Resp: (!) 28  SpO2: (!) 94 %  Oxygen Concentration (%): 6    Temp  Min: 97.1 °F (36.2 °C)  Max: 97.6 °F (36.4 °C)  Pulse  Min: 71  Max: 89  BP  Min: 134/64  Max: 321/59  MAP (mmHg)  Min: 88  Max: 148  Resp  Min: 10  Max: 40  SpO2  Min: 91 %  Max: 100 %  Oxygen Concentration (%)  Min: 6  Max: 6    No intake/output data recorded.            Physical Exam    Examination:   Constitutional: Well-nourished and -developed. No apparent distress.   Eyes: Conjunctiva clear, anicteric. Lids no lesions.  Head/Ears/Nose/Mouth/Throat/Neck: Moist mucous membranes. External ears, nose atraumatic.   Cardiovascular: Regular rhythm. Distal pedal pulses present via doppler. Two toes amputated. Groin access site soft and nontender.   Respiratory: Comfortable respirations.     Neurologic:  -GCS [id number]  -Alert. Nonverbal, some grunts/mumbles to questions. Does not follow  any commands.   -Cranial nerves: right facial weakness, left gaze deviation, right hemianopsia  -Motor :    RUE: No movement to painful stimuli   LUE: Full antigravity movement, no drift   RLE: some spontaneous movement, no antigravity movement    LLE: full antigravity movement   -Sensation: grimacing to painful stimuli in right upper extremity, no movement          Today I personally reviewed pertinent medications, imaging, cardiology results, laboratory results, notably:    - Echo complete, unremarkable  - CT head post-thrombectomy reviewed, hemorrhage stable  - TSH wnl  - Hemoglobin A1c 7.0          [1]   Current Facility-Administered Medications on File Prior to Encounter   Medication Dose Route Frequency Provider Last Rate Last Admin    [COMPLETED] iohexoL (OMNIPAQUE 350) injection 100 mL  100 mL Intravenous ONCE PRN Spike Francisco MD   100 mL at 06/19/25 0547    [COMPLETED] labetaloL injection 10 mg  10 mg Intravenous ED 1 Time Spike Francisco MD   10 mg at 06/19/25 0545    [COMPLETED] labetaloL injection 10 mg  10 mg Intravenous ED 1 Time Kostas Arevalo MD   10 mg at 06/19/25 0605    [COMPLETED] tenecteplase (TNKase) IV KIT 22.5 mg  22.5 mg Intravenous Once Spike Francisco MD   22.5 mg at 06/19/25 0548    [DISCONTINUED] niCARdipine 40 mg/200 mL (0.2 mg/mL) infusion  2.5 mg/hr Intravenous Continuous Kostas Arevalo MD 12.5 mL/hr at 06/19/25 0727 2.5 mg/hr at 06/19/25 0727    [DISCONTINUED] sodium chloride 0.9% flush 10 mL  10 mL Intravenous Once Spike Francisco MD        [DISCONTINUED] sodium chloride 0.9% flush 10 mL  10 mL Intravenous PRN Carlito Sorensen MD         Current Outpatient Medications on File Prior to Encounter   Medication Sig Dispense Refill    amLODIPine (NORVASC) 10 MG tablet Take 1 tablet (10 mg total) by mouth once daily. 90 tablet 3    aspirin (ECOTRIN) 81 MG EC tablet Take 81 mg by mouth. Pt take 2 tablets daily. When pt runs out of plavix (Patient taking  differently: Take 81 mg by mouth as needed. Pt take 2 tablets daily. When pt runs out of plavix)      atorvastatin (LIPITOR) 80 MG tablet Take 1 tablet (80 mg total) by mouth once daily. 90 tablet 3    blood sugar diagnostic (TRUE METRIX GLUCOSE TEST STRIP) Strp Test Blood SugarTEST BLOOD SUGAR TWICE DAILY 200 strip 3    blood-glucose meter kit Dispense meter  brand covered by insurance 1 each 0    clopidogreL (PLAVIX) 75 mg tablet Take 1 tablet (75 mg total) by mouth once daily. 90 tablet 3    FLUoxetine 20 MG capsule Take 1 capsule (20 mg total) by mouth once daily. To help mood and anxiety 90 capsule 3    gabapentin (NEURONTIN) 600 MG tablet Take 1 tablet (600 mg total) by mouth 3 (three) times daily as needed. 270 tablet 3    glimepiride (AMARYL) 4 MG tablet Take 1 tablet (4 mg total) by mouth before breakfast. Stop when you restart insulin 90 tablet 3    lactulose (CHRONULAC) 10 gram/15 mL solution TAKE  15ML  1-3  TIMES  A  DAY 2000 mL 3    lancets (ACCU-CHEK FASTCLIX LANCING DEV) Misc TEST TWICE DAILY 200 each 3    lisinopriL (PRINIVIL,ZESTRIL) 20 MG tablet Take 2 tablets (40 mg total) by mouth once daily. 180 tablet 3    metFORMIN (GLUCOPHAGE) 1000 MG tablet Take 1 tablet (1,000 mg total) by mouth 2 (two) times daily with meals. 180 tablet 3    metoprolol tartrate (LOPRESSOR) 50 MG tablet Take 1 tablet (50 mg total) by mouth 2 (two) times daily. 180 tablet 3    TRUEPLUS LANCETS 33 gauge Misc Use to test sugar/glucose daily. 100 each 3   [2]   Social History  Tobacco Use    Smoking status: Every Day     Current packs/day: 0.50     Average packs/day: 0.5 packs/day for 40.0 years (20.0 ttl pk-yrs)     Types: Cigarettes     Passive exposure: Current    Smokeless tobacco: Never    Tobacco comments:     Patient aware of smoking cessation program. He is also aware of health consequences r/t smoking.   Substance Use Topics    Alcohol use: Yes     Comment: Occasionally (football season)    Drug use: Yes     Types:  Marijuana

## 2025-06-19 NOTE — ED NOTES
Hx provided by pt's wife at bedside. Pt presented to ED via ems from home after a fall that occurred at 0400 today. Pt's wife states she did not witness the fall but heard it and found the pt on the floor by the bed. At this time pt was noted to be minimally responsive. Pt has hx of previous cva with right sided deficits but wife reports pt only had mild weakness and continued to ambulate with walker, drive and speak. At this time pt is looking around but cannot follow commands. Facial droop and right sided paralysis noted.

## 2025-06-19 NOTE — ASSESSMENT & PLAN NOTE
69 y/o male with previous CVA with minimal residual right sided weakness, HTN, HLD, DM presents with LM1 occlusion     Antithrombotics for secondary stroke prevention: Antiplatelets: None: Hold all Antithrombotics x 24 hours after IV Thrombolytic therapy administration    Statins for secondary stroke prevention and hyperlipidemia, if present:   Statins: Atorvastatin- 40 mg daily    Aggressive risk factor modification: HTN, Smoking, DM, HLD, Exercise     Rehab efforts: The patient has been evaluated by a stroke team provider and the therapy needs have been fully considered based off the presenting complaints and exam findings. The following therapy evaluations are needed: PT evaluate and treat, OT evaluate and treat, SLP evaluate and treat, PM&R evaluate for appropriate placement    Diagnostics ordered/pending: MRI head without contrast to assess brain parenchyma    VTE prophylaxis: None: Reason for No Pharmacological VTE Prophylaxis: Holding x 24 hours s/p treatment with Thrombolytic therapy    BP parameters: Infarct: Post sucessful thrombectomy, SBP <140

## 2025-06-19 NOTE — HPI
Cristi Pulido is a 70 y.o. male w/ PMH of HTN, DM, prior stroke w/ residual RSW who presents as a transfer from OSH after presenting with acute onset of dysarthria and RSW. Telestroke was completed and his NIHSS was 19. CT showed probable early ischemic changes in the L insular ribbon and L frontal lobe. CTA demonstrated occlusion of the distal L MCA M1 segment. Patient was transferred to [facility] for further management. On arrival, patient taken for repeat CTH which showed small left temporal hemorrhage along with continued early ischemic transferred. Patient taken to IR for thrombectomy and to be admitted to [facility] post-procedure.

## 2025-06-19 NOTE — CONSULTS
"Interventional Neuroradiology Pre-procedure Note    Procedure: Diagnostic cerebral angiogram    History of Present Illness:  Cristi Pulido is a 70 y.o. male who is tranferred after CTA discovered left M1 occlusion. LKW is reported to be 4 AM. NIHSS on tele stroke eval was 19. NIHSS on arrival to [facility] is 24. S/p TNK prior to transfer.     ROS:   UTO    Scheduled Meds:   Current Meds: Current Medications[1]   Continuous Infusions:   PRN Meds:  Current Facility-Administered Medications:     sodium chloride 0.9%, 10 mL, Intravenous, PRN    Allergies: Review of patient's allergies indicates:  No Known Allergies  Sedation Hx: No adverse events.    Labs:  PT/INR/PTT:  11.6/1.1/-- (06/19 0514)  WBC/Hgb/Hct/Plts:  7.00/12.2/37.2/171 (06/19 0514)  BUN/Cr/glu/ALT/AST/amyl/lip:  17/1.1/--/14/20/--/-- (06/19 0514)     Objective:  Vitals: Blood pressure (!) 220/106, pulse 86, temperature 97.6 °F (36.4 °C), temperature source Oral, resp. rate (!) 29, height 5' 10" (1.778 m), weight 89.8 kg (198 lb), SpO2 97%.     Physical Exam:  Awake but does not follow comamnds,Left gaze peference, right facial droop, RUE 0/5, RLE 1/5, LUE and LLE 4/5, right sided decreased sensation, global aphasia    ASA: 3  MAL: Pt will be intubated    Plan:  -Plan for cerebral angiogram with possible intervention  -Sedation Plan: General anesthesia  -All diagnostics and imaging reviewed  -Patient NPO since MN  -Emergency two physician consent performed   -Further reccs to follow procedure          Carlito Sorensen MD, A  Fellow, NeuroEndovascular Surgery, [facility] Zohaib corbin  Neurologist, [facility] HARINI Olivares         [1]   Current Facility-Administered Medications:     sodium chloride 0.9% flush 10 mL, 10 mL, Intravenous, PRN, Carlito Sorensen MD    Current Outpatient Medications:     amLODIPine (NORVASC) 10 MG tablet, Take 1 tablet (10 mg total) by mouth once daily., Disp: 90 tablet, Rfl: 3    aspirin (ECOTRIN) 81 MG EC tablet, Take 81 " mg by mouth. Pt take 2 tablets daily. When pt runs out of plavix (Patient taking differently: Take 81 mg by mouth as needed. Pt take 2 tablets daily. When pt runs out of plavix), Disp: , Rfl:     atorvastatin (LIPITOR) 80 MG tablet, Take 1 tablet (80 mg total) by mouth once daily., Disp: 90 tablet, Rfl: 3    blood sugar diagnostic (TRUE METRIX GLUCOSE TEST STRIP) Strp, Test Blood SugarTEST BLOOD SUGAR TWICE DAILY, Disp: 200 strip, Rfl: 3    blood-glucose meter kit, Dispense meter  brand covered by insurance, Disp: 1 each, Rfl: 0    clopidogreL (PLAVIX) 75 mg tablet, Take 1 tablet (75 mg total) by mouth once daily., Disp: 90 tablet, Rfl: 3    FLUoxetine 20 MG capsule, Take 1 capsule (20 mg total) by mouth once daily. To help mood and anxiety, Disp: 90 capsule, Rfl: 3    gabapentin (NEURONTIN) 600 MG tablet, Take 1 tablet (600 mg total) by mouth 3 (three) times daily as needed., Disp: 270 tablet, Rfl: 3    glimepiride (AMARYL) 4 MG tablet, Take 1 tablet (4 mg total) by mouth before breakfast. Stop when you restart insulin, Disp: 90 tablet, Rfl: 3    lactulose (CHRONULAC) 10 gram/15 mL solution, TAKE  15ML  1-3  TIMES  A  DAY, Disp: 2000 mL, Rfl: 3    lancets (ACCU-CHEK FASTCLIX LANCING DEV) Misc, TEST TWICE DAILY, Disp: 200 each, Rfl: 3    lisinopriL (PRINIVIL,ZESTRIL) 20 MG tablet, Take 2 tablets (40 mg total) by mouth once daily., Disp: 180 tablet, Rfl: 3    metFORMIN (GLUCOPHAGE) 1000 MG tablet, Take 1 tablet (1,000 mg total) by mouth 2 (two) times daily with meals., Disp: 180 tablet, Rfl: 3    metoprolol tartrate (LOPRESSOR) 50 MG tablet, Take 1 tablet (50 mg total) by mouth 2 (two) times daily., Disp: 180 tablet, Rfl: 3    TRUEPLUS LANCETS 33 gauge Misc, Use to test sugar/glucose daily., Disp: 100 each, Rfl: 3

## 2025-06-19 NOTE — ASSESSMENT & PLAN NOTE
Hemoglobin A1c 7  SSI in place   Diabetic diet once cleared for meals / diabetasource if unable to advance PO diet

## 2025-06-19 NOTE — ASSESSMENT & PLAN NOTE
Longstanding hypertension   SBP goal 100-140 post successful thrombectomy  - Nicardipine gtt + PRN hydralazine and labetalol available]  - Home

## 2025-06-19 NOTE — PLAN OF CARE
Problem: SLP  Goal: SLP Goal  Description: Speech Language Pathology Goals  Goals expected to be met by 7/3    1. Pt will participate in ongoing swallow assessment to determine least restrictive PO diet.    2. Pt will participate in ongoing cognitive-linguistic assessment to determine additional therapeutic needs.   3. Pt will follow 1 step commands with 80% acc following model/prompt.   4. Pt will answer simple Y/N questions with 80% acc.     Outcome: Progressing       Bedside swallow and speech-language assessment completed. Recommend he continue to be NPO except ice chips and small tsp sips of water for pleasure as well as med crushed in puree when awake/alert and upright. SLP will continue to follow for ongoing assessment.

## 2025-06-19 NOTE — ED NOTES
Pt left via stretcher with [facility]   SUBJECTIVE  Feels ok this am sitting up in bed     Objective   Last Recorded Vitals  Blood pressure 118/65, pulse 89, temperature 98.6 °F (37 °C), temperature source Oral, resp. rate 16, height 5' 8\" (1.727 m), weight 94.5 kg (208 lb 5.4 oz), SpO2 93 %.    Physcial Exam  Gen: A/O X3  Lungs: CTA BL  Heart: RRR, NL S1S2  Ext: no edema BL        Labs   Recent Results (from the past 24 hour(s))   Metered blood glucose    Collection Time: 03/07/20  6:51 AM   Result Value Ref Range    Glucose Bedside  (H) 70 - 99 mg/dL   Magnesium    Collection Time: 03/07/20  8:47 AM   Result Value Ref Range    MAGNESIUM 3.2 (H) 1.7 - 2.4 mg/dL   Prothrombin Time    Collection Time: 03/07/20  8:47 AM   Result Value Ref Range    PROTIME 10.1 9.7 - 11.8 sec    INR 0.9    Basic Metabolic Panel    Collection Time: 03/07/20  8:47 AM   Result Value Ref Range    Sodium 138 135 - 145 mmol/L    Potassium 4.4 3.4 - 5.1 mmol/L    Chloride 110 (H) 98 - 107 mmol/L    Carbon Dioxide 25 21 - 32 mmol/L    Anion Gap 7 (L) 10 - 20 mmol/L    Glucose 138 (H) 65 - 99 mg/dL    BUN 22 (H) 6 - 20 mg/dL    Creatinine 0.90 0.67 - 1.17 mg/dL    GFR Estimate,  >90     GFR Estimate, Non African American 87     BUN/Creatinine Ratio 24 7 - 25    CALCIUM 8.0 (L) 8.4 - 10.2 mg/dL   CBC with Automated Differential    Collection Time: 03/07/20  8:47 AM   Result Value Ref Range    WBC 21.4 (H) 4.2 - 11.0 K/mcL    RBC 2.75 (L) 4.50 - 5.90 mil/mcL    HGB 8.8 (L) 13.0 - 17.0 g/dL    HCT 26.8 (L) 39.0 - 51.0 %    MCV 97.5 78.0 - 100.0 fl    MCH 32.0 26.0 - 34.0 pg    MCHC 32.8 32.0 - 36.5 g/dL    RDW-CV 13.2 11.0 - 15.0 %     (L) 140 - 450 K/mcL    NRBC 0 0 /100 WBC    DIFF TYPE MANUAL DIFFERENTIAL     SEG 82 %    BAND 1 0 - 10 %    LYMPH 10 %    MONO 7 %    EOS 0 %    BASO 0 %    Absolute Neut 17.8 (H) 1.8 - 7.7 K/mcL    Absolute Lymph 2.1 1.0 - 4.0 K/mcL    Absolute Mono 1.5 (H) 0.3 - 0.9 K/mcL    Absolute Eos 0.0 (L) 0.1 - 0.5 K/mcL    Absolute  Baso 0.0 0.0 - 0.3 K/mcL    RBC MORPHOLOGY NORMAL NORMAL    WBC MORPHOLOGY NORMAL NORMAL    PLATELETS APPEAR NORMAL NORMAL   Glycohemoglobin    Collection Time: 03/07/20  8:47 AM   Result Value Ref Range    Hemoglobin A1C 5.2 4.5 - 5.6 %   Metered blood glucose    Collection Time: 03/07/20 12:12 PM   Result Value Ref Range    Glucose Bedside  (H) 70 - 99 mg/dL   Metered blood glucose    Collection Time: 03/07/20  5:49 PM   Result Value Ref Range    Glucose Bedside  (H) 70 - 99 mg/dL   Metered blood glucose    Collection Time: 03/07/20  9:23 PM   Result Value Ref Range    Glucose Bedside  (H) 70 - 99 mg/dL       Imaging  XR CHEST PA OR AP 1 VIEW   Final Result   1.   Support lines and tubes, as above.  Interval extubation, removal of the enteric tube, and removal of the Kirwin-Ivanna catheter.   2.   Cardiomegaly with improvement of pulmonary vascular congestion.   3.   Interval increase of small left pleural effusion with left basilar consolidation.  Mild right basilar atelectasis.      Electronically Signed by: URI RYAN M.D.    Signed on: 3/6/2020 12:50 PM          XR CHEST PA OR AP 1 VIEW   Final Result       Support devices are as above.  Of note, the tip of the enteric tube is coiled in the stomach and should be repositioned.  Cardiomegaly and mild pulmonary edema/volume overload.         Electronically Signed by: ROSETTA BUSBY MD    Signed on: 3/5/2020 1:09 PM          XR CHEST PA AND LATERAL 2 VIEWS   Final Result   Borderline heart size.      Electronically Signed by: MALOU LOTT M.D.    Signed on: 3/4/2020 7:53 PM          Cath/PV Case   Final Result          ASSESSMENT/PLAN  CAD s/p x5 coronary bypass 3/5  HLD  HTN  On BB, FDasa, statin  Primary post-surgical care per SICU team  Still on BID IV lasix and decision to change to PO per CT surg team   Will need therapy/ cardiac rehab     Acute postsurgical blood loss anemia  Leukocytosis, likely reactive  Mild  Thrombocytopenia  Trend cell lines     GERD  Cont PPI     Obesity  Out pt weight mngmt     Elevated Blood sugars   on SSI - will check A1C as sugars elevated      DISPO  DC decision to be made per CT surg service - plan for home in next 2-3 days        Shira Hope MD  3/8/2020

## 2025-06-19 NOTE — PLAN OF CARE
Procedure completed. Patient tolerated well; VSS. Hemostasis achieved to right groin via Perclose at 0925; patient to remain flat until [id number]. Site CDI without hematoma. Patient to be transported to [facility] accompanied by CRNA and RN; report to be given at the bedside.

## 2025-06-19 NOTE — ED PROVIDER NOTES
Encounter Date: 6/19/2025       History     Chief Complaint   Patient presents with    Cerebrovascular Accident     [facility]  [facility] with hx of stroke and blood thinner use. Reports pt had unwitnessed fall at approx 0400 and was noted by sig other to have right sided deficits of facial droop, right side neglect with arm and leg weakness, inappropriate sound , pt van +      HPI    70-year-old male with a history of diabetes, hypertension, hyperlipidemia, remote CVA with reported right-sided weakness at baseline, on Plavix presents to the ED for evaluation secondary to a fall that occurred approximately 1 hour and 20 minutes prior to arrival.  Per report from EMS patient was at his baseline mental status but she is GCS 15, talking and walking with a walker.  Had an unwitnessed fall around approximately 4:00 a.m..  Wife reportedly heard him fall and found him afterwards.  Notes he was at his baseline prior to the fall but since the fall he has had nonsensical speech, right-sided weakness, and neglecting his right side.  Patient is not answering questions or following commands limiting the history and physical.    Review of patient's allergies indicates:  No Known Allergies  Past Medical History:   Diagnosis Date    Diabetes mellitus     Hyperlipidemia     Hypertension     Stroke     Stroke     right side weak    Type 2 diabetes mellitus      Past Surgical History:   Procedure Laterality Date    AMPUTATION, LOWER LIMB Bilateral     middle toes    bilateral 3rd toe amputation      COLONOSCOPY  01/2021    FRACTURE SURGERY Left     hip    JOINT REPLACEMENT Left     KOMAL     Family History   Problem Relation Name Age of Onset    Diabetes Mother      Heart disease Mother      Hypertension Mother      Diabetes Father      Diabetes Sister Foretta     Diabetes Brother Marcelo     Diabetes Daughter Siri     No Known Problems Son Cristi Marks.     Diabetes Sister Jimena     Diabetes Sister Karma     Diabetes Brother Constantine      Hypertension Daughter Ciera      Social History[1]  Review of Systems    Physical Exam     Initial Vitals   BP Pulse Resp Temp SpO2   06/19/25 0508 06/19/25 0508 06/19/25 0508 06/19/25 0600 06/19/25 0508   138/71 80 16 97.1 °F (36.2 °C) 96 %      MAP       --                Physical Exam    Constitutional: Vital signs are normal. He appears well-developed and well-nourished.  Non-toxic appearance. He does not have a sickly appearance. He does not appear ill.   HENT:   Head: Normocephalic and atraumatic. Mouth/Throat: Mucous membranes are normal.   Eyes: EOM are normal.   Neck: Neck supple.   Cardiovascular:  Normal rate and regular rhythm.           Pulmonary/Chest: No respiratory distress.   Abdominal: Abdomen is soft. Bowel sounds are normal. There is no abdominal tenderness. There is no rebound and no guarding.   Musculoskeletal:      Cervical back: Neck supple.     Neurological: He is alert.   Appears to have leftward gaze preference and neglecting his right side; we will not follow any commands; has nonsensical speech   Skin: Skin is warm and dry. No rash noted.   Psychiatric: He has a normal mood and affect.         ED Course   Procedures  Labs Reviewed   COMPREHENSIVE METABOLIC PANEL - Abnormal       Result Value    Sodium 142      Potassium 4.1      Chloride 106      CO2 25      Glucose 147 (*)     BUN 17      Creatinine 1.1      Calcium 8.6 (*)     Protein Total 7.3      Albumin 4.0      Bilirubin Total 0.4       (*)     AST 20      ALT 14      Anion Gap 11      eGFR >60     LIPID PANEL - Abnormal    Cholesterol Total 123      Triglyceride 49      HDL Cholesterol 38 (*)     LDL Cholesterol 75.2      HDL/Cholesterol Ratio 30.9      Cholesterol/HDL Ratio 3.2      Non HDL Cholesterol 85     CBC WITH DIFFERENTIAL - Abnormal    WBC 7.00      RBC 4.21 (*)     HGB 12.2 (*)     HCT 37.2 (*)     MCV 88      MCH 29.0      MCHC 32.8      RDW 13.3      Platelet Count 171      MPV 12.6      Nucleated RBC 0       Neut % 50.6      Lymph % 38.7      Mono % 8.1      Eos % 1.9      Basophil % 0.6      Imm Grans % 0.1      Neut # 3.54      Lymph # 2.71      Mono # 0.57      Eos # 0.13      Baso # 0.04      Imm Grans # 0.01     PROTIME-INR - Normal    PT 11.6      INR 1.1     TSH - Normal    TSH 1.310     CBC W/ AUTO DIFFERENTIAL    Narrative:     The following orders were created for panel order CBC W/ AUTO DIFFERENTIAL.  Procedure                               Abnormality         Status                     ---------                               -----------         ------                     CBC with Differential[[id number]]       Abnormal            Final result                 Please view results for these tests on the individual orders.        ECG Results              ECG 12 lead (Final result)        Collection Time Result Time QRS Duration OHS QTC Calculation    06/19/25 05:43:14 06/19/25 12:12:52 82 435                     Final result by Interface, Lab In [facility] (06/19/25 12:13:00)                   Narrative:    Test Reason : R29.818,    Vent. Rate :  72 BPM     Atrial Rate :  72 BPM     P-R Int : 164 ms          QRS Dur :  82 ms      QT Int : 398 ms       P-R-T Axes :  62  48 231 degrees    QTcB Int : 435 ms    Normal sinus rhythm  T wave abnormality, consider inferolateral ischemia  Abnormal ECG  When compared with ECG of 18-Nov-2014 09:38,  No significant change was found  Confirmed by Brendan Batres ([id number]) on 6/19/2025 12:12:49 PM    Referred By: AAAREFERRAL SELF           Confirmed By: Brendan Batres                                  Imaging Results              CTA Head and Neck (xpd) (Final result)  Result time 06/19/25 07:02:40      Final result by Malick Causey MD (06/19/25 07:02:40)                   Impression:      Distal left M1 MCA occlusion with relatively prompt collateralization of the more distal MCA vessels.    Severe stenosis left mid vertebral artery and left PCA.    COMMUNICATION  This  critical result was discovered/received at 06:54.  The critical information above was relayed directly by me by telephone to Dr. Arevalo with the emergency room on 06/19/2025 at 18:55.    All CT scans at this facility are performed  using dose modulation techniques as appropriate to performed exam including the following:  automated exposure control; adjustment of mA and/or kV according to the patients size (this includes techniques or standardized protocols for targeted exams where dose is matched to indication/reason for exam: i.e. extremities or head);  iterative reconstruction technique.      Electronically signed by: Malick Causey  Date:    06/19/2025  Time:    07:02               Narrative:    EXAMINATION:  CTA HEAD AND NECK (XPD)    CLINICAL HISTORY:  Neuro deficit, acute, stroke suspected;    TECHNIQUE:  CT angiogram was performed from the level of the lenka to the top of the head following the IV administration of 100mL of Omnipaque 350.   Sagittal and coronal reconstructions and maximum intensity projection reconstructions were performed. Arterial stenosis percentages are based on NASCET measurement criteria.    COMPARISON:  None    FINDINGS:  Technical difficulties delayed availability of images.    Skull/Extracranial Contents (limited evaluation): No fracture. Mastoid air cells and paranasal sinuses are essentially clear.    Non-Vascular Structures of the Neck/Thoracic Inlet (limited evaluation): Normal.    Aorta: Normal 3 vessel arch.    Extracranial carotid circulation: Moderate atherosclerosis with 40% stenosis of the right carotid bifurcation and 30% stenosis at the left carotid bifurcation.    Extracranial vertebral circulation: No hemodynamically significant stenosis, aneurysmal dilatation, or dissection.    Intracranial Arteries: ICA terminus are intact bilaterally.  Right MCA appears intact.  No aneurysm, severe stenosis, or occlusion.    Bilateral ZI appear intact.  No aneurysm, severe  stenosis, or occlusion.    Moderate proximal left MCA stenosis near the terminus.  Distal left M1 MCA occlusion.  Some reconstitution via collateral vessels.    Vertebrobasilar circulation appears intact without occlusion.  There is moderate to severe stenosis of the left mid intracranial vertebral artery with stenosis on the order of 80%.    Right PCA appears intact.  Left PCA demonstrates a severe stenosis.    Venous structures (limited evaluation): Normal.                                        CT Head Without Contrast (Final result)  Result time 06/19/25 07:05:53      Final result by Malick Causey MD (06/19/25 07:05:53)                   Impression:      Question some early left MCA ischemia corresponding to the patient's known left MCA territory occlusion.  No hemorrhage.    Senescent changes as above.    This report was flagged in Epic as abnormal.    All CT scans at this facility are performed  using dose modulation techniques as appropriate to performed exam including the following:  automated exposure control; adjustment of mA and/or kV according to the patients size (this includes techniques or standardized protocols for targeted exams where dose is matched to indication/reason for exam: i.e. extremities or head);  iterative reconstruction technique.      Electronically signed by: Malick Causey  Date:    06/19/2025  Time:    07:05               Narrative:    EXAMINATION:  CT HEAD WITHOUT CONTRAST    CLINICAL HISTORY:  Neuro deficit, acute, stroke suspected;    TECHNIQUE:  Low dose axial CT images obtained throughout the head without intravenous contrast. Sagittal and coronal reconstructions were performed.    COMPARISON:  None.    FINDINGS:  Intracranial compartment:    Ventricles and sulci are normal in size for age without evidence of hydrocephalus. No extra-axial blood or fluid collections.    Question some early loss of gray-white differentiation along the left insular ribbon and left frontal  lobe.  No parenchymal mass, hemorrhage, edema.    Skull/extracranial contents (limited evaluation): No fracture. Mastoid air cells and paranasal sinuses are essentially clear.                                       Medications   labetaloL injection 10 mg (10 mg Intravenous Given 6/19/25 0545)   tenecteplase (TNKase) IV KIT 22.5 mg (22.5 mg Intravenous Given 6/19/25 0548)   iohexoL (OMNIPAQUE 350) injection 100 mL (100 mLs Intravenous Given 6/19/25 0547)   labetaloL injection 10 mg (10 mg Intravenous Given 6/19/25 0605)     Medical Decision Making  Amount and/or Complexity of Data Reviewed  Labs: ordered.  Radiology: ordered.    Risk  Prescription drug management.    Presentation concerning for possible stroke  VAN positive  Code stroke activated  Blood glucose in the 100s   Vascular neurology is waiting for CT head and CTA imaging to across over; I do not see any hemorrhage on the CT head per my read  He is on Plavix but no other blood thinners  We agree with the patient is likely having a stroke  Has a prior stroke in the past but no other contraindications to tPA  Blood pressure 197 systolic; given 10 mg of labetalol with a blood pressure improved to 175/80  Given TNK 22.5 mg  Basic labs reviewed and unremarkable  Transfer initiated   Signed out to Dr. Arevalo to follow up imaging and transfer    Please put in 50 minutes of critical care due to patient having a stroke requiring tenecteplase and IV blood pressure control  Separate from teaching and exclusive of procedure and ekg time  Includes:  Time at bedside  Time reviewing test results  Time discussing case with staff  Time documenting the medical record  Time spent with family members  Time spent with consults  Management                                Thrombolysis Candidate? Yes. The risks and benefits of tenecteplase were discussed with patient/family. The patient and/or family verbalized understanding of risks and benefits and has given verbal consent for  Tenecteplase. If patient was not competent, or no family was available, treatment will be administered as an emergency procedure and in what we believe to be the patients best interest. Delays to Thrombolysis? No        Clinical Impression:  Final diagnoses:  [R29.818] Acute focal neurological deficit          ED Disposition Condition    Transfer to Another Facility Stable                      Spike Francisco MD  06/19/25 0614       Spike Francisco MD  06/21/25 0416         [1]   Social History  Tobacco Use    Smoking status: Every Day     Current packs/day: 0.50     Average packs/day: 0.5 packs/day for 40.0 years (20.0 ttl pk-yrs)     Types: Cigarettes     Passive exposure: Current    Smokeless tobacco: Never    Tobacco comments:     Patient aware of smoking cessation program. He is also aware of health consequences r/t smoking.   Substance Use Topics    Alcohol use: Yes     Comment: Occasionally (football season)    Drug use: Yes     Types: Marijuana        Spike Francisco MD  07/04/25 2770

## 2025-06-19 NOTE — TELEMEDICINE CONSULT
[facility]  Vascular Neurology  Comprehensive Stroke Center  TeleVascular Neurology Acute Consultation Note        Consult Information  Consults    Consulting Provider: CAYETANO RAMIREZ   Current Providers  No providers found    Patient Location:  [facility] EMERGENCY DEPARTMENT Emergency Department    Spoke hospital nurse at bedside with patient assisting consultant.  Patient information was obtained from past medical records and primary team.       Stroke Documentation  Acute Stroke Times   Last Known Normal Date: 06/19/25  Last Known Normal Time: 0400  Symptom Onset Date: 06/19/25  Symptom Onset Time: 0400  Stroke Team Called Date: 06/19/25  Stroke Team Called Time: 0518  Stroke Team Arrival Date: 06/19/25  Stroke Team Arrival Time: 0540  CT completed but images not available for review - spoke to document results: 0544  Thrombolytic Therapy Recommended: Yes  Thrombectomy Recommended:  (Aaiting CTA)  Decision to Treat Time for Tenecteplase: 0544    NIH Scale:  Interval: baseline  1a. Level of Consciousness: 3-->Responds only with reflex motor or autonomic effects or totally unresponsive, flaccid, and areflexic  1b. LOC Questions: 2-->Answers neither question correctly  1c. LOC Commands: 2-->Performs neither task correctly  2. Best Gaze: 2-->Forced deviation, or total gaze paresis not overcome by the oculocephalic maneuver  3. Visual: 0-->No visual loss  4. Facial Palsy: 2-->Partial paralysis (total or near-total paralysis of lower face)  5a. Motor Arm, Left: 3-->No effort against gravity, limb falls  5b. Motor Arm, Right: 0-->No drift, limb holds 90 (or 45) degrees for full 10 secs  6a. Motor Leg, Left: 2-->Some effort against gravity, leg falls to bed by 5 secs, but has some effort against gravity  6b. Motor Leg, Right: 0-->No drift, leg holds 30 degree position for full 5 secs  7. Limb Ataxia: 0-->Absent  8. Sensory: 0-->Normal, no sensory loss  9. Best Language: 3-->Mute, global aphasia, no  "usable speech or auditory comprehension  10. Dysarthria: 0-->Normal  11. Extinction and Inattention (formerly Neglect): 0-->No abnormality  Total (NIH Stroke Scale): 19      Modified Clinton Baseline: Score: 2  Modified Clinton Discharge:    Stephanie Coma Scale: 8   ABCD2 Score:    [id number] Score:    HAS -BLED Score:    ICH Score:    Hunt & Kimball Classification:      Blood pressure (!) 175/81, pulse 81, resp. rate 14, height 5' 10" (1.778 m), weight 89.8 kg (198 lb), SpO2 96%.      In my opinion, this was a: Tier 1; VAN Stroke Assessment: Positive     Medical Decision Making  HPI:  70 y.o. male with HTN, DM, stroke with residual mild R arm weakness, brought in after a fall, worsening R arm weakness, and new development of inability to talk and follow commands as well as L gaze preference.  No improving.     Images personally reviewed and interpreted:  Study: Head CT  Study Interpretation: no acute abnormality.  CTA completed but unavailable for review at this moment.     Assessment and plan:  Acute L MCA syndrome.  Pt with SBP >190, thus recommended lowering BP <185 and treating with TNK.  Awaiting CTA brain/neck to be uploaded and determine eligibility for MT.    Lytics recommendation: Recommend IV Tenecteplase 0.25mg/kg IV push (max dose 25mg); If Tenecteplase is not available use Alteplase 0.9mg/kg IV bolus followed by infusion (max dose 90mg)     BP goal prior to IV thrombolytics - <185/110 - Initiate BP management prior to treatment if not at goal    BP goal post IV thrombolytics - <180/105 for at least 24 hrs. - Continue BP management to maintain goal    Additional Recommendations:   Neurological assessment and vital signs (except temperature) every 15 minutes x 2 hours, then every 30 minutes x 6 hours, then every hour x 16 hours..  Frequency of BP assessments may need to be increased if systolic BP stays >= 180 mm Hg or diastolic BP stays >= 105 mm Hg. Administer antihypertensive meds as ordered  Temperature " every 4 hours or as required.  Follow hospital protocol for further orders re: post thrombolytic therapy patient management.  No antithrombotics or anticoagulants (including but not limited to: heparin, warfarin, aspirin, clopidigrel, or dipyridamole) for 24 hours, then start antithrombotics as ordered by treating physician    Adapted from the American Heart Association/American Stroke Association (AHA/ASA) and American Association of Neuroscience Nurses (AANN) Guidelines.       Clinical Delays to Decision to Treat: None    Thrombectomy recommendation: Awaiting CTA results from Spoke for determination   Placement recommendation: pending further studies         ADDENDUM: CTA brain showed L M1 occlusion. ASPECTS 6, thus pt getting transfer to [facility] for MTK[facility] NEAL  Physical Exam  Past Medical History:   Diagnosis Date    Diabetes mellitus     Hyperlipidemia     Hypertension     Stroke     Stroke     right side weak    Type 2 diabetes mellitus      Past Surgical History:   Procedure Laterality Date    AMPUTATION, LOWER LIMB Bilateral     middle toes    bilateral 3rd toe amputation      COLONOSCOPY  01/2021    FRACTURE SURGERY Left     hip    JOINT REPLACEMENT Left     KOMAL     Family History   Problem Relation Name Age of Onset    Diabetes Mother      Heart disease Mother      Hypertension Mother      Diabetes Father      Diabetes Sister Foretta     Diabetes Brother Marcelo     Diabetes Daughter Siri     No Known Problems Son Cristi Marks.     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  Immediate/timely  evaluation by a highly prepared expert is paramount for optimal outcomes  High risk for neurological deterioration if not properly diagnosed  High risk for neurological deterioration if not treated promplty/as soon as possible  Complex diagnostic evaluation may be required (advanced imaging)  High risk treatment options (thrombolytics and/or thrombectomy)    Patient care was coordinated with spoke provider, including but not limted to    Discussing likely diagnosis/etiology of symptoms  Making recommendations for further diagnostic studies  Making recommendations for intravenous thrombolytics or other advanced therapies  Making recommendations for disposition (admission/transfer for higher level of care)      Neurology consultation requested by spoke provider. Audiovisual encounter with the patient performed using a secure connection.  Results and impressions from the visit are documented on this note and were communicated to the consulting provider/team via direct communication. The note has been shared for addition to the patients electronic medical record.

## 2025-06-19 NOTE — ASSESSMENT & PLAN NOTE
Cristi Pulido is a 70 y.o. male w/ PMH of HTN, DM, prior stroke w/ residual RSW who presents as a transfer from OSH after presenting with acute onset of dysarthria and RSW. Telestroke was completed and his NIHSS was 19. CT showed probable early ischemic changes in the L insular ribbon and L frontal lobe. CTA demonstrated occlusion of the distal L MCA M1 segment. He was given TNK at 0548. Patient was transferred to [facility] for further management. On arrival, repeat NIHSS of 24. Patient taken for repeat CTH which showed small left temporal hemorrhage along with continued early ischemic transferred. Patient taken to IR for thrombectomy and to be admitted to [facility] post-procedure.     Antithrombotics for secondary stroke prevention: Antiplatelets: None: Hold all Antithrombotics x 24 hours after IV Thrombolytic therapy administration    Statins for secondary stroke prevention and hyperlipidemia, if present:   Statins: Atorvastatin- 40 mg daily    Aggressive risk factor modification: HTN, Smoking, DM, HLD     Rehab efforts: The patient has been evaluated by a stroke team provider and the therapy needs have been fully considered based off the presenting complaints and exam findings. The following therapy evaluations are needed: PT evaluate and treat, OT evaluate and treat, SLP evaluate and treat, PM&R evaluate for appropriate placement    Diagnostics ordered/pending: HgbA1C to assess blood glucose levels, MRI head without contrast to assess brain parenchyma, TTE to assess cardiac function/status     VTE prophylaxis: Mechanical prophylaxis: Place SCDs  None: Reason for No Pharmacological VTE Prophylaxis: Holding x 24 hours s/p treatment with Thrombolytic therapy    BP parameters: Infarct: Post sucessful thrombectomy, SBP <140       Progress Note - Electrophysiology  Mckayla Mendoza 66 y o  female MRN: [id number]  Unit/Bed#: [facility] 421-01 Encounter: [id number]      Assessment/Plan:  1  Intermittent 2:1 block - s/p Medtronic dual chamber pacemaker 2/17/23  2  1st degree AVB  3  Atrial tachycardia    a  S/p RFA of left atrial inferoseptal region 12/30/2022  b  S/p RFA of inferoseptal region, AVNRT, anterior left atrial wall 2/6/2023  4  History of tachy-induced cardiomyopathy  a  2/2023 TTE - EF 55%  b  1/2022 TTE - EF 25%  1  Hypothyroidism      Device instructions and restrictions were discussed with the patient in detail  She will also be provided with written instructions detailing what was discussed  Patient also requires a 2 week device and site check which has already been arranged and is in Epic  In terms of medications, we have restarted home beta blocker  She is now tolerating all her home meds  Patient is on goal directed medical therapy of metoprolol, entresto, and lasix  Patient is stable from an EP standpoint  Subjective/Objective   Subjective: Mckayla Mendoza is POD # 1 MDT DC PPM  Her incision is clean, dry, and intact without evidence of hematoma or ecchymosis or signs of infection  Vital signs and labs were reviewed  Assessment of chest x-rays show proper lead placement without evidence of pneumothorax  Device interrogation showed appropriate device function with lead parameters such as sensing, threshold, and impedance being tested  Patient denies chest pain/heaviness/tightness/pressure, palpitations, shortness of breath, orthopnea, lightheadedness, presyncope, syncope or N/V  Telemetry shows NSR with HR 90s            Objective:  Vitals: /73   Pulse 102   Temp 98 9 °F (37 2 °C)   Resp 16   Wt 62 2 kg (137 lb 2 oz)   SpO2 96%   BMI 24 29 kg/m²     Vitals:    02/16/23 0556 02/17/23 0600   Weight: 62 4 kg (137 lb 8 oz) 62 2 kg (137 lb 2 oz)     Orthostatic Blood Pressures    Flowsheet Row Most Recent Value   Blood Pressure 113/73 filed at 02/17/2023 0759   Patient Position - Orthostatic VS Lying filed at 02/16/2023 1125            Intake/Output Summary (Last 24 hours) at 2/17/2023 0943  Last data filed at 2/17/2023 4926  Gross per 24 hour   Intake 560 ml   Output 1050 ml   Net -490 ml       Invasive Devices     Peripheral Intravenous Line  Duration           Peripheral IV 02/14/23 Right Antecubital 2 days    Peripheral IV 02/16/23 Left Forearm 1 day                          Scheduled Meds:  Current Facility-Administered Medications   Medication Dose Route Frequency Provider Last Rate   • acetaminophen  650 mg Oral Q6H PRN Zabrina Hawley MD     • ALPRAZolam  0 25 mg Oral HS PRN Nino Quintero PA-C     • apixaban  5 mg Oral BID Carlos Robles PA-C     • furosemide  20 mg Oral BID (diuretic) Nazia Willis PA-C     • levothyroxine  100 mcg Oral Early Morning Zabrina Hawley MD     • metoprolol succinate  25 mg Oral Daily Carlos Robles PA-C     • sacubitril-valsartan  1 tablet Oral BID Trupti Watkins PA-C       Continuous Infusions:   PRN Meds: •  acetaminophen  •  ALPRAZolam    Review of Systems:  ROS as noted above, otherwise 12 point review of systems was performed and is negative  Physical Exam:   Physical Exam  Vitals reviewed  Constitutional:       General: She is not in acute distress  Appearance: She is not ill-appearing or diaphoretic  HENT:      Head: Normocephalic and atraumatic  Right Ear: External ear normal       Left Ear: External ear normal       Nose: Nose normal    Eyes:      General:         Right eye: No discharge  Left eye: No discharge  Cardiovascular:      Rate and Rhythm: Normal rate and regular rhythm  Heart sounds: No murmur heard  No friction rub  Pulmonary:      Effort: Pulmonary effort is normal       Breath sounds: Normal breath sounds  No wheezing, rhonchi or rales     Chest:       Abdominal:      General: There is no distension  Palpations: Abdomen is soft  Tenderness: There is no abdominal tenderness  Musculoskeletal:         General: No deformity or signs of injury  Cervical back: No rigidity  No muscular tenderness  Right lower leg: No edema  Left lower leg: No edema  Skin:     General: Skin is warm and dry  Capillary Refill: Capillary refill takes less than 2 seconds  Coloration: Skin is not jaundiced or pale  Neurological:      General: No focal deficit present  Mental Status: She is alert and oriented to person, place, and time  Mental status is at baseline  Psychiatric:         Mood and Affect: Mood normal          Behavior: Behavior normal          Thought Content: Thought content normal                    Lab Results: I have personally reviewed pertinent lab results  Results from last 7 days   Lab Units 02/17/23 0457 02/16/23 0458 02/14/23  1510   WBC Thousand/uL 5 81 5 58 6 42   HEMOGLOBIN g/dL 10 5* 10 1* 10 6*   HEMATOCRIT % 32 7* 32 6* 33 6*   PLATELETS Thousands/uL 115* 126* 144*     Results from last 7 days   Lab Units 02/17/23 0457 02/16/23 0458 02/15/23  0019   POTASSIUM mmol/L 4 0 4 0 4 3   CHLORIDE mmol/L 104 107 108   CO2 mmol/L 27 29 22   BUN mg/dL 18 15 11   CREATININE mg/dL 0 98 1 08 0 91   CALCIUM mg/dL 9 1 8 9 9 3     Results from last 7 days   Lab Units 02/16/23  0458   INR  1 82*     Results from last 7 days   Lab Units 02/15/23  0019   MAGNESIUM mg/dL 1 9       Imaging: I have personally reviewed pertinent films in PACS  ECHO: 2/4/23  •  Left Ventricle: Left ventricular cavity size is normal  Wall thickness is mildly increased  The left ventricular ejection fraction is 55%  Systolic function is normal  Wall motion is normal   •  IVS: There is abnormal septal motion consistent with bundle branch block  There is sigmoid appearance of the septum    •  Right Ventricle: Right ventricular cavity size is normal  Systolic function is normal   • Aortic Valve: There is mild regurgitation  •  Mitral Valve: There is mild regurgitation  •  Tricuspid Valve: There is mild regurgitation  •  Aorta: The aortic root is moderately dilated  •  Pulmonary Artery: The pulmonary artery systolic pressure is moderately increased        VTE Pharmacologic Prophylaxis: Eliquis  VTE Mechanical Prophylaxis: sequential compression device

## 2025-06-19 NOTE — ED PROVIDER NOTES
Care transferred from Dr. Francisco.  70-year-old male van positive stroke onset 4:00 a.m. status post TNK, labetalol.  Awaiting transfer for neuro critical care.  Will reassess.  Awaiting CTA head and neck to evaluate for LVO.     6:49 AM Technical difficulties with CT imaging crossing over PACS.  Vascular Neurology and Radiology can not read.  I just discuss the case with the vascular neurologist who was reviewing the films.  Awaiting to officially see if M2 occlusion.    7:01 AM Vascular neurology and IR have reviewed images.  M1 distal occlusion and possible embolectomy candidate.    DELAY IN TRANSFER BECAUSE IMAGES WOULD NOT PULL UP ON RADIOLOGY AND PACS MACHINE DOWN.  CT HEAD INDEPENDENTLY INTERPRETED BY ER PHYSICIAN AND TNK GIVEN BUT UNABLE TO INTERPRET WHETHER NOT LVO 2/2 DELAYED ABILITY TO VIEW CTA HEAD / NECK IMAGES AS WELL.   VASCULAR NEUROLOGY EVENTUALLY ABLE TO REVIEW IMAGES AT 6:45 AM.  DISCUSSED WITH INTERVENTIONAL RADIOLOGY AT MAIN AND IMMEDIATE TRANSFER BEGAN AT 7:03 A.M.     Kostas Arevalo MD  06/19/25 0703       Kostas Arevalo MD  06/19/25 0705       Kostas Arevalo MD  06/19/25 0718

## 2025-06-19 NOTE — ASSESSMENT & PLAN NOTE
Cristi Pulido is a 70 y.o. male w/ PMH of HTN, DM, prior stroke w/ residual RSW who presents as a transfer from OSH after presenting with acute onset of dysarthria and RSW. Telestroke was completed and his NIHSS was 19. CT showed probable early ischemic changes in the L insular ribbon and L frontal lobe. CTA demonstrated occlusion of the distal L MCA M1 segment. He was given TNK at 0548. [place] [facility] [place]. On arrival, repeat NIHSS of 24. Patient taken for repeat CTH which showed small left temporal hemorrhage along with continued early ischemic changes. Patient taken to IR for thrombectomy and to be admitted to [facility] post-procedure.     NAEON. S/P thrombectomy. TICI 3. TNK monitoring ended at 0548. Aphasic, follows no commands. RSW remains. MRI revealing for blood products in stroke bed, likely reperfusion injury. OK to start monotherapy with either plavix or ASA 6/21/25 for secondary stroke prevention. ECHO unremarkable. Family member at bedside agreeable to NGT placement. Etiology ESUS.       Antithrombotics for secondary stroke prevention: Antiplatelets: None: OK to start monotherapy with ASA 81 mg or Plavix 75 mg 6/21/25.     Statins for secondary stroke prevention and hyperlipidemia, if present:   Statins: Atorvastatin- 40 mg daily    Aggressive risk factor modification: HTN, Smoking, DM, HLD     Rehab efforts: The patient has been evaluated by a stroke team provider and the therapy needs have been fully considered based off the presenting complaints and exam findings. The following therapy evaluations are needed: PT evaluate and treat, OT evaluate and treat, SLP evaluate and treat, PM&R evaluate for appropriate placement    Diagnostics ordered/pending: None     VTE prophylaxis: Mechanical prophylaxis: Place SCDs  None: Reason for No Pharmacological VTE Prophylaxis: Holding x 24 hours s/p treatment with Thrombolytic therapy    BP parameters: Infarct: Post sucessful thrombectomy,  SBP <140

## 2025-06-19 NOTE — CONSULTS
Interventional Neuroradiology Pre-procedure Note     Procedure: Diagnostic cerebral angiogram     History of Present Illness:  Cristi Pulido is a 70 y.o. male who is tranferred after CTA discovered left M1 occlusion. LKW is reported to be 4 AM. NIHSS on tele stroke eval was 19. NIHSS on arrival to [facility] is 24. S/p TNK prior to transfer.      ROS:   UTO     Scheduled Meds:   Current Meds: [Current Medications]     [Current Medications]     Current Facility-Administered Medications:     sodium chloride 0.9% flush 10 mL, 10 mL, Intravenous, PRN, Carlito Sorensen MD     Current Outpatient Medications:     amLODIPine (NORVASC) 10 MG tablet, Take 1 tablet (10 mg total) by mouth once daily., Disp: 90 tablet, Rfl: 3    aspirin (ECOTRIN) 81 MG EC tablet, Take 81 mg by mouth. Pt take 2 tablets daily. When pt runs out of plavix (Patient taking differently: Take 81 mg by mouth as needed. Pt take 2 tablets daily. When pt runs out of plavix), Disp: , Rfl:     atorvastatin (LIPITOR) 80 MG tablet, Take 1 tablet (80 mg total) by mouth once daily., Disp: 90 tablet, Rfl: 3    blood sugar diagnostic (TRUE METRIX GLUCOSE TEST STRIP) Strp, Test Blood SugarTEST BLOOD SUGAR TWICE DAILY, Disp: 200 strip, Rfl: 3    blood-glucose meter kit, Dispense meter  brand covered by insurance, Disp: 1 each, Rfl: 0    clopidogreL (PLAVIX) 75 mg tablet, Take 1 tablet (75 mg total) by mouth once daily., Disp: 90 tablet, Rfl: 3    FLUoxetine 20 MG capsule, Take 1 capsule (20 mg total) by mouth once daily. To help mood and anxiety, Disp: 90 capsule, Rfl: 3    gabapentin (NEURONTIN) 600 MG tablet, Take 1 tablet (600 mg total) by mouth 3 (three) times daily as needed., Disp: 270 tablet, Rfl: 3    glimepiride (AMARYL) 4 MG tablet, Take 1 tablet (4 mg total) by mouth before breakfast. Stop when you restart insulin, Disp: 90 tablet, Rfl: 3    lactulose (CHRONULAC) 10 gram/15 mL solution, TAKE  15ML  1-3  TIMES  A  DAY, Disp: 2000 mL, Rfl: 3    lancets  "(ACCU-CHEK FASTCLIX LANCING DEV) Misc, TEST TWICE DAILY, Disp: 200 each, Rfl: 3    lisinopriL (PRINIVIL,ZESTRIL) 20 MG tablet, Take 2 tablets (40 mg total) by mouth once daily., Disp: 180 tablet, Rfl: 3    metFORMIN (GLUCOPHAGE) 1000 MG tablet, Take 1 tablet (1,000 mg total) by mouth 2 (two) times daily with meals., Disp: 180 tablet, Rfl: 3    metoprolol tartrate (LOPRESSOR) 50 MG tablet, Take 1 tablet (50 mg total) by mouth 2 (two) times daily., Disp: 180 tablet, Rfl: 3    TRUEPLUS LANCETS 33 gauge Misc, Use to test sugar/glucose daily., Disp: 100 each, Rfl: 3  Continuous Infusions:   PRN Meds:  Current Facility-Administered Medications:     sodium chloride 0.9%, 10 mL, Intravenous, PRN     Allergies: Review of patient's allergies indicates:  No Known Allergies  Sedation Hx: No adverse events.     Labs:  PT/INR/PTT:  11.6/1.1/-- (06/19 0514)  WBC/Hgb/Hct/Plts:  7.00/12.2/37.2/171 (06/19 0514)  BUN/Cr/glu/ALT/AST/amyl/lip:  17/1.1/--/14/20/--/-- (06/19 0514)      Objective:  Vitals: Blood pressure (!) 220/106, pulse 86, temperature 97.6 °F (36.4 °C), temperature source Oral, resp. rate (!) 29, height 5' 10" (1.778 m), weight 89.8 kg (198 lb), SpO2 97%.      Physical Exam:  Awake but does not follow comamnds,Left gaze peference, right facial droop, RUE 0/5, RLE 1/5, LUE and LLE 4/5, right sided decreased sensation, global aphasia     ASA: 3  MAL: Pt will be intubated     Plan:  -Plan for cerebral angiogram with possible intervention  -Sedation Plan: General anesthesia  -All diagnostics and imaging reviewed  -Patient NPO since MN  -Emergency two physician consent performed   -Further reccs to follow procedure              Carlito Sorensen MD, MHA  Fellow, NeuroEndovascular Surgery, [facility]  Neurologist, [facility] [place], [place]  "

## 2025-06-19 NOTE — SUBJECTIVE & OBJECTIVE
HPI:  70 y.o. male with HTN, DM, stroke with residual mild R arm weakness, brought in after a fall, worsening R arm weakness, and new development of inability to talk and follow commands as well as L gaze preference.  No improving.     Images personally reviewed and interpreted:  Study: Head CT  Study Interpretation: no acute abnormality.  CTA completed but unavailable for review at this moment.     Assessment and plan:  Acute L MCA syndrome.  Pt with SBP >190, thus recommended lowering BP <185 and treating with TNK.  Awaiting CTA brain/neck to be uploaded and determine eligibility for MT.    [facility]ytics recommendation: Recommend IV Tenecteplase 0.25mg/kg IV push (max dose 25mg); If Tenecteplase is not available use Alteplase 0.9mg/kg IV bolus followed by infusion (max dose 90mg)     BP goal prior to IV thrombolytics - <185/110 - Initiate BP management prior to treatment if not at goal    BP goal post IV thrombolytics - <180/105 for at least 24 hrs. - Continue BP management to maintain goal    Additional Recommendations:   Neurological assessment and vital signs (except temperature) every 15 minutes x 2 hours, then every 30 minutes x 6 hours, then every hour x 16 hours..  Frequency of BP assessments may need to be increased if systolic BP stays >= 180 mm Hg or diastolic BP stays >= 105 mm Hg. Administer antihypertensive meds as ordered  Temperature every 4 hours or as required.  Follow hospital protocol for further orders re: post thrombolytic therapy patient management.  No antithrombotics or anticoagulants (including but not limited to: heparin, warfarin, aspirin, clopidigrel, or dipyridamole) for 24 hours, then start antithrombotics as ordered by treating physician    Adapted from the American Heart Association/American Stroke Association (AHA/ASA) and American Association of Neuroscience Nurses (AANN) Guidelines.       Clinical Delays to Decision to Treat: None    Thrombectomy recommendation: Awaiting CTA results  from [facility] for determination   Placement recommendation: pending further studies

## 2025-06-19 NOTE — ED PROVIDER NOTES
Encounter Date: 6/19/2025       History     Chief Complaint   Patient presents with    Stroke Transfer     69 yo male transferred from OSH for evaluation by Vascular Neurology/IR.       PMH:  Type 2, hypertension, hyperlipidemia, CVA    History obtained from outside ED and EMS report.  Reportedly had an unwitnessed fall around 4:00 a.m..  Wife found him not moving his right side, with nonsensical speech.  Evaluated at outside hospital, received TNK.  Transferred for consideration of IR thrombectomy.  No family at bedside at the time of my evaluation.       The history is provided by the patient, medical records and the EMS personnel. The history is limited by the condition of the patient. No  was used.     Review of patient's allergies indicates:  No Known Allergies  Past Medical History:   Diagnosis Date    Diabetes mellitus     Hyperlipidemia     Hypertension     Stroke     Stroke     right side weak    Type 2 diabetes mellitus      Past Surgical History:   Procedure Laterality Date    AMPUTATION, LOWER LIMB Bilateral     middle toes    bilateral 3rd toe amputation      COLONOSCOPY  01/2021    FRACTURE SURGERY Left     hip    JOINT REPLACEMENT Left     KOMAL     Family History   Problem Relation Name Age of Onset    Diabetes Mother      Heart disease Mother      Hypertension Mother      Diabetes Father      Diabetes Sister Foretta     Diabetes Brother Marcelo     Diabetes Daughter Siri     No Known Problems Son Cristi Marks.     Diabetes Sister Jimena     Diabetes Sister Karma     Diabetes Brother Constantine     Hypertension Daughter Ciera      Social History[1]      Physical Exam     Initial Vitals [06/19/25 0819]   BP Pulse Resp Temp SpO2   (!) 158/85 82 16 97.1 °F (36.2 °C) 97 %      MAP       --         Physical Exam    Nursing note and vitals reviewed.  Constitutional: He is not diaphoretic. No distress.   HENT:   Head: Normocephalic and atraumatic.   Eyes: Right eye exhibits no discharge. Left  eye exhibits no discharge.   Neck: Neck supple. No tracheal deviation present.   Cardiovascular:  Normal rate and regular rhythm.           Pulmonary/Chest: Breath sounds normal. No respiratory distress.   Abdominal: Abdomen is soft. There is no abdominal tenderness.   Musculoskeletal:      Cervical back: Neck supple.     Neurological:   Awake  Aphasia   Left gaze preference  Right upper extremity and lower extremity weakness  Can hold left upper extremity and left lower extremity with minimal drift   Skin: Skin is warm. No rash noted.   Psychiatric: He has a normal mood and affect. His behavior is normal.         ED Course   Procedures  Labs Reviewed   POCT GLUCOSE - Abnormal       Result Value    POCT Glucose 184 (*)           Imaging Results              IR Angiogram Carotid Cerebral Bilateral inc Arch (In process)                       CT Head Without Contrast (Final result)  Result time 06/19/25 08:43:19      Final result by Rell Richardson MD (06/19/25 08:43:19)                   Impression:      Early ischemic changes in the left MCA distribution, new from recent CT.    New left inferior temporal intraparenchymal hematoma.    Chronic ischemic change with cerebral and cerebellar volume loss, as above.    This report was flagged in Epic as abnormal.      Electronically signed by: Rell Richardson MD  Date:    06/19/2025  Time:    08:43               Narrative:    EXAMINATION:  CT HEAD WITHOUT CONTRAST    CLINICAL HISTORY:  Stroke, follow up;    TECHNIQUE:  Low dose axial CT images obtained throughout the head without the use of intravenous contrast.  Axial, sagittal and coronal reconstructions were performed.    COMPARISON:  06/19/2025 at 05:19    FINDINGS:  Brain parenchyma: Interval development of hypoattenuation with loss of gray-white differentiation involving a moderate-sized portion of the left MCA territory.  This includes the insular cortex and portions of the frontal lobe.  Basal ganglia appears  spared.  No definite infarct changes in the parietal or temporal cortex.  However, there is a new small parenchymal hematoma inferior left temporal lobe measuring up to 1.2 cm.  No acute parenchymal hemorrhage elsewhere.  No new major vascular distribution infarct elsewhere.  There are moderate chronic small vessel ischemic changes with several remote-appearing lacunar type infarcts in the basal ganglia and thalami.  There are also remote bilateral cerebellar infarcts, right more than left.    Ventricles: Mild cerebral and moderate cerebellar volume loss.  No evidence of hydrocephalus.  No midline shift.    Extra-axial spaces: No hemorrhage or focal fluid collections.    Skull: No displaced calvarial fracture.    Mastoid air cells and visualized paranasal sinuses: Mastoid air cells are clear. Mild patchy mucosal thickening in the visualized paranasal sinuses. .    Other: Vasculature relatively hyperattenuating related to recent contrast administration.  Scattered vascular calcification about the skull base.    COMMUNICATION:  Findings were relayed to the immediately relayed but identification to the ordering provider (Chang) as well as the listed neurocritical care physician (Aditya) via the epic secure chat system at approximately 08:39.                                       Medications   sodium chloride 0.9% flush 10 mL (has no administration in time range)   sodium chloride 0.9% flush 10 mL (has no administration in time range)   bisacodyL suppository 10 mg (has no administration in time range)   senna-docusate 8.6-50 mg per tablet 1 tablet (1 tablet Oral Not Given 6/19/25 1115)   ondansetron injection 4 mg (4 mg Intravenous Given 6/19/25 1043)   niCARdipine 40 mg/200 mL (0.2 mg/mL) infusion (7.5 mg/hr Intravenous Verify Only 6/19/25 1600)   atorvastatin tablet 40 mg (0 mg Oral Hold 6/19/25 1115)   dextrose 50% injection 12.5 g (has no administration in time range)   glucagon (human recombinant) injection 1 mg  (has no administration in time range)   insulin aspart U-100 pen 0-10 Units (has no administration in time range)   FLUoxetine capsule 20 mg (has no administration in time range)   mupirocin 2 % ointment ( Nasal Given 6/19/25 1213)   lisinopriL tablet 40 mg (has no administration in time range)   metoprolol tartrate (LOPRESSOR) tablet 50 mg (has no administration in time range)   amLODIPine tablet 10 mg (has no administration in time range)   nicotine 21 mg/24 hr 1 patch (has no administration in time range)   iodixanoL (VISIPAQUE 320) injection 200 mL (60 mLs Intravenous Given 6/19/25 0956)     Medical Decision Making  Emergent evaluation of a 70-year-old male presenting for evaluation by Vascular Neurology, IR.  On arrival, protecting airway.     Differential including, but not limited to:  CVA, ICH, thyroid abnormality, hypoglycemia      Case discussed with Vascular Neurology, requesting repeat CTH.  CTH with early ischemic changes in left MCA distribution, new left inferior temporal intraparenchymal hematoma.    Case discussed with Neuro ICU - plan for IR and admission to Neuro ICU.     Amount and/or Complexity of Data Reviewed  External Data Reviewed: labs and radiology.     Details: Labs and imaging from outside hospital reviewed - no leukocytosis, stable hemoglobin, renal function stable    EKG from OSH NSR, rate 72    CTA:  Distal left M1 MCA occlusion with relatively prompt collateralization of the more distal MCA vessels.     Severe stenosis left mid vertebral artery and left PCA.    Radiology: ordered.    Risk  Decision regarding hospitalization.              Attending Attestation:         Attending Critical Care:   Critical Care Times:   Direct Patient Care (initial evaluation, reassessments, and time considering the case)................................................................13 minutes.   Additional History from reviewing old medical records or taking additional history from the family, EMS,  PCP, etc.......................5 minutes.   Ordering, Reviewing, and Interpreting Diagnostic Studies...............................................................................................................4 minutes.   Documentation..................................................................................................................................................................................5 minutes.   Consultation with other Physicians. .................................................................................................................................................6 minutes.   ==============================================================  Total Critical Care Time - exclusive of procedural time: 33 minutes.  ==============================================================  Critical care was necessary to treat or prevent imminent or life-threatening deterioration of the following conditions: stroke.   Critical care was time spent personally by me on the following activities: obtaining history from patient or relative, examination of patient, review of x-rays / CT sent with the patient, discussion with consultants and review of old charts.   Critical Care Condition: potentially life-threatening                                  Clinical Impression:  Final diagnoses:  [I63.9] Stroke          ED Disposition Condition    Admit                       [1]   Social History  Tobacco Use    Smoking status: Every Day     Current packs/day: 0.50     Average packs/day: 0.5 packs/day for 40.0 years (20.0 ttl pk-yrs)     Types: Cigarettes     Passive exposure: Current    Smokeless tobacco: Never    Tobacco comments:     Patient aware of smoking cessation program. He is also aware of health consequences r/t smoking.   Substance Use Topics    Alcohol use: Yes     Comment: Occasionally (football season)    Drug use: Yes     Types: Marijuana        Karsten Downing MD  06/19/25 0217

## 2025-06-19 NOTE — PLAN OF CARE
[facility] Care Plan  POC reviewed with Cristi Pulido and family at 1400. Patient and Family verbalized understanding. Questions and concerns addressed. No acute events today. Pt progressing toward goals. Will continue to monitor. See below and flowsheets for full assessment and VS info.   - Post IR CT head completed and pt tolerated well.   - TTE completed and pt tolerated well  - Speech worked with pt and stated pt could have meds crushed in apple sauce or pudding and ice chips and water from a spoon, but no water from a straw or cup at this time and no full meals at this time and they will see him again tomorrow to re-eval him  - pt has order for MRI at 3am - have informed the team that pt will likely need meds to get through this scan. MRI questionnaire has already been completed and sent to MRI  - pt is in Roll belt and left wrist restraint from trying to get out of bed. While he is completely out on the right he is very strong and was able to get half way out of the bed today. Family was in agreement with the restraints.   - Cardene is currently running at 10 to keep SBP <140  - pt has been bathed twice and linens have been changed twice  - pt right groin site did have some drainage noted on it at 1255 and very slightly more at 1355 but dressing was changed at 1800 when bed and pt was wet with urine including this dressing so a new pressure dressing was applied.   - we are dopplering his pedal pulses  - pt is on 2LNC have not been able to wean him off all day. Each time attempted pt destat to upper 80s. No hx of 02 use at home but big hx of smoking        Is this a stroke patient? yes- Stroke booklet reviewed with family and is at bedside.   Care Plan Individualization:     Neuro:  Stephanie Coma Scale  Best Eye Response: 4-->(E4) spontaneous  Best Motor Response: 5-->(M5) localizes pain  Best Verbal Response: 1-->(V1) none  [place] Coma Scale Score: 10  Assessment Qualifiers: Patient not sedated/intubated  Pupil PERRLA:  "yes     24 hr Temp:  [97.1 °F (36.2 °C)-97.6 °F (36.4 °C)]     CV:   Rhythm: normal sinus rhythm  BP goals:   SBP < 140  MAP > 65    Resp:      Oxygen Concentration (%): 6    Plan: N/A    GI/:     Diet/Nutrition Received: NPO  Last Bowel Movement: 25  Voiding Characteristics: external catheter    Intake/Output Summary (Last 24 hours) at 2025 1825  Last data filed at 2025 1715  Gross per 24 hour   Intake 1185.55 ml   Output 611 ml   Net 574.55 ml          Labs/Accuchecks:  Recent Labs   Lab 25  0514   WBC 7.00   RBC 4.21*   HGB 12.2*   HCT 37.2*         Recent Labs   Lab 25  0514      K 4.1   CO2 25      BUN 17   CREATININE 1.1   ALKPHOS 148*   ALT 14   AST 20   BILITOT 0.4      Recent Labs   Lab 25  0514   PROTIME 11.6   INR 1.1    No results for input(s): "CPK", "CPKMB", "TROPONINI", "MB" in the last 168 hours.    Electrolytes: N/A - electrolytes WDL  Accuchecks: Q6H    Gtts:   nicardipine  0-15 mg/hr Intravenous Continuous 37.5 mL/hr at 25 1739 7.5 mg/hr at 25 1739       LDA/Wounds:    Hilton Risk Assessment  Sensory Perception: 3-->slightly limited  Moisture: 3-->occasionally moist  Activity: 1-->bedfast  Mobility: 2-->very limited  Nutrition: 3-->adequate  Friction and Shear: 3-->no apparent problem  Hilton Score: 15    Is your hilton score 12 or less? no            Restraints:   Restraint Order  Length of Order: Order good for next 24 hours or when removed.  Date that the current order will : 25  Time that the current order will :   Order Upon Application: Yes    WC   Problem: Adult Inpatient Plan of Care  Goal: Plan of Care Review  Outcome: Progressing  Goal: Patient-Specific Goal (Individualized)  Outcome: Progressing  Goal: Absence of Hospital-Acquired Illness or Injury  Outcome: Progressing  Goal: Optimal Comfort and Wellbeing  Outcome: Progressing     Problem: Diabetes Comorbidity  Goal: Blood Glucose Level Within " Targeted Range  Outcome: Progressing     Problem: Wound  Goal: Optimal Coping  Outcome: Progressing  Goal: Optimal Functional Ability  Outcome: Progressing  Goal: Absence of Infection Signs and Symptoms  Outcome: Progressing  Goal: Improved Oral Intake  Outcome: Progressing  Goal: Optimal Pain Control and Function  Outcome: Progressing  Goal: Skin Health and Integrity  Outcome: Progressing  Goal: Optimal Wound Healing  Outcome: Progressing     Problem: Stroke, Ischemic (Includes Transient Ischemic Attack)  Goal: Optimal Coping  Outcome: Progressing  Goal: Optimal Cerebral Tissue Perfusion  Outcome: Progressing  Goal: Optimal Functional Ability  Outcome: Progressing  Goal: Effective Oxygenation and Ventilation  Outcome: Progressing  Goal: Effective Urinary Elimination  Outcome: Progressing     Problem: Fall Injury Risk  Goal: Absence of Fall and Fall-Related Injury  Outcome: Progressing     Problem: Skin Injury Risk Increased  Goal: Skin Health and Integrity  Outcome: Progressing     Problem: Restraint, Nonviolent  Goal: Absence of Harm or Injury  Outcome: Progressing

## 2025-06-19 NOTE — CONSULTS
[facility] - Emergency Dept  Vascular Neurology  [facility] St[facility]  Consult Note    Consults  Assessment/Plan:     Patient is a 70 y.o. year old male with:    * Stroke due to occlusion of left middle cerebral artery  Cristi Pulido is a 70 y.o. male w/ PMH of HTN, DM, prior stroke w/ residual RSW who presents as a transfer from OSH after presenting with acute onset of dysarthria and RSW. Telestroke was completed and his NIHSS was 19. CT showed probable early ischemic changes in the L insular ribbon and L frontal lobe. CTA demonstrated occlusion of the distal L MCA M1 segment. He was given TNK at 0548. Patient was transferred to [facility] for further management. On arrival, repeat NIHSS of 24. Patient taken for repeat CTH which showed small left temporal hemorrhage along with continued early ischemic changes. Patient taken to IR for thrombectomy and to be admitted to [facility] post-procedure.     Antithrombotics for secondary stroke prevention: Antiplatelets: None: Hold all Antithrombotics x 24 hours after IV Thrombolytic therapy administration    Statins for secondary stroke prevention and hyperlipidemia, if present:   Statins: Atorvastatin- 40 mg daily    Aggressive risk factor modification: HTN, Smoking, DM, HLD     Rehab efforts: The patient has been evaluated by a stroke team provider and the therapy needs have been fully considered based off the presenting complaints and exam findings. The following therapy evaluations are needed: PT evaluate and treat, OT evaluate and treat, SLP evaluate and treat, PM&R evaluate for appropriate placement    Diagnostics ordered/pending: HgbA1C to assess blood glucose levels, MRI head without contrast to assess brain parenchyma, TTE to assess cardiac function/status     VTE prophylaxis: Mechanical prophylaxis: Place SCDs  None: Reason for No Pharmacological VTE Prophylaxis: Holding x 24 hours s/p treatment with Thrombolytic therapy    BP parameters: Infarct: Post sucessful thrombectomy,  SBP <140        Right sided weakness  -Therapy eval and treat    Dysarthria and anarthria  -Therapy eval and treat    Aphasia  -Therapy eval and treat    Type 2 diabetes mellitus without complication, without long-term current use of insulin  Lab Results   Component Value Date    LABA1C 7.2 (H) 01/29/2018    HGBA1C 7.7 (H) 04/28/2025     Follow up repeat A1c. Hold home antihyperglycemics. SSI for goal -180 while in hospital    Hypertension associated with diabetes  Stroke risk factor  -goal SBP post successful thrombectomy <140    Tobacco dependence  Stroke risk factor  - on cessation  -nicotine patch PRN        STROKE DOCUMENTATION     Acute Stroke Times   Last Known Normal Date: 06/19/25  Last Known Normal Time: 0400  Symptom Onset Date: 06/19/25  Symptom Onset Time: 0400  Stroke Team Called Date: 06/19/25  Stroke Team Called Time: 0813  Stroke Team Arrival Date: 06/19/25  Stroke Team Arrival Time: 0813  CT Interpretation Time: 0827  Thrombolytic Therapy Recommended:  (already given prior to transfer)  CTA Interpretation Time:  (already completed prior to transfer)  Thrombectomy Recommended: Yes  Decision to Treat Time for IR: 0832    NIH Scale:  1a. Level of Consciousness: 0-->Alert, keenly responsive  1b. LOC Questions: 2-->Answers neither question correctly  1c. LOC Commands: 2-->Performs neither task correctly  2. Best Gaze: 2-->Forced deviation, or total gaze paresis not overcome by the oculocephalic maneuver  3. Visual: 2-->Complete hemianopia  4. Facial Palsy: 2-->Partial paralysis (total or near-total paralysis of lower face)  5a. Motor Arm, Left: 0-->No drift, limb holds 90 (or 45) degrees for full 10 secs  5b. Motor Arm, Right: 4-->No movement  6a. Motor Leg, Left: 1-->Drift, leg falls by the end of the 5-sec period but does not hit bed  6b. Motor Leg, Right: 4-->No movement  7. Limb Ataxia: 0-->Absent  8. Sensory: 1-->Mild-to-moderate sensory loss, patient feels pinprick is less sharp or is  dull on the affected side, or there is a loss of superficial pain with pinprick, but patient is aware of being touched  9. Best Language: 2-->Severe aphasia, all communication is through fragmentary expression, great need for inference, questioning, and guessing by the listener. Range of information that can be exchanged is limited, listener carries burden of. . . (see row details)  10. Dysarthria: 1-->Mild-to-moderate dysarthria, patient slurs at least some words and, at worst, can be understood with some difficulty  11. Extinction and Inattention (formerly Neglect): 1-->Visual, tactile, auditory, spatial, or personal inattention or extinction to bilateral simultaneous stimulation in one of the sensory modalities  Total (NIH Stroke Scale): 24    Modified Nadeem Score: 2  Stephanie Coma Scale:    ABCD2 Score:    [id number] Score:   HAS -BLED Score:   ICH Score:   Hunt & Kimball Classification:       Thrombolysis Candidate? Yes, given prior to arrival at outside hospital    Delays to Thrombolysis?  Not Applicable    Interventional Revascularization Candidate?   Is the patient eligible for mechanical endovascular reperfusion (KASHIF)?  Yes    Delays to Thrombectomy? No    Hemorrhagic change of an Ischemic Stroke: Does this patient have an ischemic stroke with hemorrhagic changes? Yes, Grading Scale: PH Type 1 (PH-1) = hematoma in < 30% of the infarcted area with some slight space-occupying effect. Is this a symptomatic change?  No - Hemorrhage is not clinically significant     Subjective:     History of Present Illness:  Cristi Pulido is a 70 y.o. male w/ PMH of HTN, DM, prior stroke w/ residual RSW who presents as a transfer from OSH after presenting with acute onset of dysarthria and RSW. Telestroke was completed and his NIHSS was 19. CT showed probable early ischemic changes in the L insular ribbon and L frontal lobe. CTA demonstrated occlusion of the distal L MCA M1 segment. Patient was transferred to [facility] [place] [facility] for further  management. On arrival, patient taken for repeat CTH which showed small left temporal hemorrhage along with continued early ischemic transferred. Patient taken to IR for thrombectomy and to be admitted to [facility] post-procedure.          Past Medical History:   Diagnosis Date    Diabetes mellitus     Hyperlipidemia     Hypertension     Stroke     Stroke     right side weak    Type 2 diabetes mellitus      Past Surgical History:   Procedure Laterality Date    AMPUTATION, LOWER LIMB Bilateral     middle toes    bilateral 3rd toe amputation      COLONOSCOPY  01/2021    FRACTURE SURGERY Left     hip    JOINT REPLACEMENT Left     KOMAL     Social History[1]  Review of patient's allergies indicates:  No Known Allergies    Medications: I have reviewed the current medication administration record.    Prescriptions Prior to Admission[2]    Review of Systems   Neurological:  Positive for facial asymmetry, speech difficulty and weakness.     Objective:     Vital Signs (Most Recent):  Temp: 97.1 °F (36.2 °C) (06/19/25 0819)  Pulse: 80 (06/19/25 0830)  Resp: 16 (06/19/25 0830)  BP: (!) 188/72 (06/19/25 0830)  SpO2: 100 % (06/19/25 0830)    Vital Signs Range (Last 24H):  Temp:  [97.1 °F (36.2 °C)-97.6 °F (36.4 °C)]   Pulse:  [73-87]   Resp:  [14-32]   BP: (138-220)/()   SpO2:  [95 %-100 %]        Physical Exam  HENT:      Mouth/Throat:      Mouth: Mucous membranes are moist.      Pharynx: Oropharynx is clear.   Pulmonary:      Effort: Pulmonary effort is normal. No respiratory distress.   Neurological:      Mental Status: He is alert.              Neurological Exam:   LOC: alert  Attention Span: poor  Language: Global aphasia  Articulation: Dysarthria  Orientation: Untestable due to severe aphasia   Visual Fields: Full and Visual neglect  EOM (CN III, IV, VI): Oculocephalic Reflex  Abnormal - fixed L gaze preference  Pupils (CN II, III): PERRL  Facial Sensation (CN V): Normal  Facial Movement (CN VII): Lower facial weakness on  "the Right  Motor: Arm left  Normal 5/5  Leg left  Paresis: 4/5  Arm right  Plegia 0/5  Leg right Plegia 0/5  Sensation: Cy-hypoesthesia right  Tone: Flaccid  RUE and RLE       Laboratory:  CMP:   Recent Labs   Lab 06/19/25  0514   CALCIUM 8.6*   ALBUMIN 4.0   PROT 7.3      K 4.1   CO2 25      BUN 17   CREATININE 1.1   ALKPHOS 148*   ALT 14   AST 20   BILITOT 0.4     CBC:   Recent Labs   Lab 06/19/25  0514   WBC 7.00   RBC 4.21*   HGB 12.2*   HCT 37.2*      MCV 88   MCH 29.0   MCHC 32.8     Lipid Panel: No results for input(s): "CHOL", "LDLCALC", "HDL", "TRIG" in the last 168 hours.  Hgb A1C: No results for input(s): "HGBA1C" in the last 168 hours.  TSH:   Recent Labs   Lab 06/19/25  0514   TSH 1.310       Diagnostic Results:      Brain/Vessel imaging:  Results for orders placed or performed during the hospital encounter of 06/19/25 (from the past 2160 hours)   CT Head Without Contrast    Narrative    EXAMINATION:  CT HEAD WITHOUT CONTRAST    CLINICAL HISTORY:  Stroke, follow up;    TECHNIQUE:  Low dose axial CT images obtained throughout the head without the use of intravenous contrast.  Axial, sagittal and coronal reconstructions were performed.    COMPARISON:  06/19/2025 at 05:19    FINDINGS:  Brain parenchyma: Interval development of hypoattenuation with loss of gray-white differentiation involving a moderate-sized portion of the left MCA territory.  This includes the insular cortex and portions of the frontal lobe.  Basal ganglia appears spared.  No definite infarct changes in the parietal or temporal cortex.  However, there is a new small parenchymal hematoma inferior left temporal lobe measuring up to 1.2 cm.  No acute parenchymal hemorrhage elsewhere.  No new major vascular distribution infarct elsewhere.  There are moderate chronic small vessel ischemic changes with several remote-appearing lacunar type infarcts in the basal ganglia and thalami.  There are also remote bilateral " cerebellar infarcts, right more than left.    Ventricles: Mild cerebral and moderate cerebellar volume loss.  No evidence of hydrocephalus.  No midline shift.    Extra-axial spaces: No hemorrhage or focal fluid collections.    Skull: No displaced calvarial fracture.    Mastoid air cells and visualized paranasal sinuses: Mastoid air cells are clear. Mild patchy mucosal thickening in the visualized paranasal sinuses. .    Other: Vasculature relatively hyperattenuating related to recent contrast administration.  Scattered vascular calcification about the skull base.    COMMUNICATION:  Findings were relayed to the immediately relayed but identification to the ordering provider (Chang) as well as the listed neurocritical care physician (Aditya) via the epic secure chat system at approximately 08:39.      Impression    Early ischemic changes in the left MCA distribution, new from recent CT.    New left inferior temporal intraparenchymal hematoma.    Chronic ischemic change with cerebral and cerebellar volume loss, as above.    This report was flagged in Epic as abnormal.      Electronically signed by: Rell Richardson MD  Date:    06/19/2025  Time:    08:43   Results for orders placed or performed during the hospital encounter of 06/19/25 (from the past 2160 hours)   CTA Head and Neck (xpd)    Narrative    EXAMINATION:  CTA HEAD AND NECK (XPD)    CLINICAL HISTORY:  Neuro deficit, acute, stroke suspected;    TECHNIQUE:  CT angiogram was performed from the level of the lenka to the top of the head following the IV administration of 100mL of Omnipaque 350.   Sagittal and coronal reconstructions and maximum intensity projection reconstructions were performed. Arterial stenosis percentages are based on NASCET measurement criteria.    COMPARISON:  None    FINDINGS:  Technical difficulties delayed availability of images.    Skull/Extracranial Contents (limited evaluation): No fracture. Mastoid air cells and paranasal sinuses are  essentially clear.    Non-Vascular Structures of the Neck/Thoracic Inlet (limited evaluation): Normal.    Aorta: Normal 3 vessel arch.    Extracranial carotid circulation: Moderate atherosclerosis with 40% stenosis of the right carotid bifurcation and 30% stenosis at the left carotid bifurcation.    Extracranial vertebral circulation: No hemodynamically significant stenosis, aneurysmal dilatation, or dissection.    Intracranial Arteries: ICA terminus are intact bilaterally.  Right MCA appears intact.  No aneurysm, severe stenosis, or occlusion.    Bilateral ZI appear intact.  No aneurysm, severe stenosis, or occlusion.    Moderate proximal left MCA stenosis near the terminus.  Distal left M1 MCA occlusion.  Some reconstitution via collateral vessels.    Vertebrobasilar circulation appears intact without occlusion.  There is moderate to severe stenosis of the left mid intracranial vertebral artery with stenosis on the order of 80%.    Right PCA appears intact.  Left PCA demonstrates a severe stenosis.    Venous structures (limited evaluation): Normal.      Impression    Distal left M1 MCA occlusion with relatively prompt collateralization of the more distal MCA vessels.    Severe stenosis left mid vertebral artery and left PCA.    COMMUNICATION  This critical result was discovered/received at 06:54.  The critical information above was relayed directly by me by telephone to Dr. Arevalo with the emergency room on 06/19/2025 at 18:55.    All CT scans at this facility are performed  using dose modulation techniques as appropriate to performed exam including the following:  automated exposure control; adjustment of mA and/or kV according to the patients size (this includes techniques or standardized protocols for targeted exams where dose is matched to indication/reason for exam: i.e. extremities or head);  iterative reconstruction technique.      Electronically signed by: Malick  Marium  Date:    06/19/2025  Time:    07:02         Cardiac Evaluation:   Results for orders placed or performed during the hospital encounter of 06/19/25   ECG 12 lead    Collection Time: 06/19/25  5:43 AM   Result Value Ref Range    QRS Duration 82 ms    [facility] QTC Calculation 435 ms    Narrative    Test Reason : R29.818,    Vent. Rate :  72 BPM     Atrial Rate :  72 BPM     P-R Int : 164 ms          QRS Dur :  82 ms      QT Int : 398 ms       P-R-T Axes :  62  48 231 degrees    QTcB Int : 435 ms    Normal sinus rhythm  T wave abnormality, consider inferolateral ischemia  Abnormal ECG  When compared with ECG of 18-Nov-2014 09:38,  No significant change was found    Referred By: AAAREFERRAL SELF           Confirmed By:        ECHO pending        Ashok Hodgson MD  [facility]  Department of Vascular Neurology   [facility] - Emergency Dept          [1]   Social History  Tobacco Use    Smoking status: Every Day     Current packs/day: 0.50     Average packs/day: 0.5 packs/day for 40.0 years (20.0 ttl pk-yrs)     Types: Cigarettes     Passive exposure: Current    Smokeless tobacco: Never    Tobacco comments:     Patient aware of smoking cessation program. He is also aware of health consequences r/t smoking.   Substance Use Topics    Alcohol use: Yes     Comment: Occasionally (football season)    Drug use: Yes     Types: Marijuana   [2] (Not in a hospital admission)

## 2025-06-19 NOTE — NURSING
Time of anesthesia transfer of care (and end time for our post-op monitoring):    TIME OF HEMOSTASIS: 925    Procedure site location: R groin    Mechanical compression device: none    Surgical incision care orders in? Yes    Drain care orders in? n/a    HOB orders: HOB flat until 1325    [facility] provider notified: RENAY Pathak           Nursing Transfer Note       Transfer From IR    Transfer via bed    Transfer with  to O2, cardiac monitoring    Transported by CRNA    Medicines sent: No    Chart sent with patient: Yes    Belongings sent with patient: (specify belongings)    Notified: no family    Bedside Neuro assessment performed: Yes    Does the patient have altered skin integrity? no       Bedside Handoff given to:Ann Marie RN    Upon arrival to floor: cardiac monitor applied, patient oriented to room, call bell in reach, and bed in lowest position

## 2025-06-19 NOTE — SUBJECTIVE & OBJECTIVE
Past Medical History:   Diagnosis Date    Diabetes mellitus     Hyperlipidemia     Hypertension     Stroke     Stroke     right side weak    Type 2 diabetes mellitus      Past Surgical History:   Procedure Laterality Date    AMPUTATION, LOWER LIMB Bilateral     middle toes    bilateral 3rd toe amputation      COLONOSCOPY  01/2021    FRACTURE SURGERY Left     hip    JOINT REPLACEMENT Left     KOMAL     Social History[1]  Review of patient's allergies indicates:  No Known Allergies    Medications: I have reviewed the current medication administration record.    Prescriptions Prior to Admission[2]    Review of Systems   Neurological:  Positive for facial asymmetry, speech difficulty and weakness.     Objective:     Vital Signs (Most Recent):  Temp: 97.1 °F (36.2 °C) (06/19/25 0819)  Pulse: 80 (06/19/25 0830)  Resp: 16 (06/19/25 0830)  BP: (!) 188/72 (06/19/25 0830)  SpO2: 100 % (06/19/25 0830)    Vital Signs Range (Last 24H):  Temp:  [97.1 °F (36.2 °C)-97.6 °F (36.4 °C)]   Pulse:  [73-87]   Resp:  [14-32]   BP: (138-220)/()   SpO2:  [95 %-100 %]        Physical Exam  HENT:      Mouth/Throat:      Mouth: Mucous membranes are moist.      Pharynx: Oropharynx is clear.   Pulmonary:      Effort: Pulmonary effort is normal. No respiratory distress.   Neurological:      Mental Status: He is alert.              Neurological Exam:   LOC: alert  Attention Span: poor  Language: Global aphasia  Articulation: Dysarthria  Orientation: Untestable due to severe aphasia   Visual Fields: Full and Visual neglect  EOM (CN III, IV, VI): Oculocephalic Reflex  Abnormal - fixed L gaze preference  Pupils (CN II, III): PERRL  Facial Sensation (CN V): Normal  Facial Movement (CN VII): Lower facial weakness on the Right  Motor: Arm left  Normal 5/5  Leg left  Paresis: 4/5  Arm right  Plegia 0/5  Leg right Plegia 0/5  Sensation: Cy-hypoesthesia right  Tone: Flaccid  RUE and RLE       Laboratory:  CMP:   Recent Labs   Lab 06/19/25  7789  "  CALCIUM 8.6*   ALBUMIN 4.0   PROT 7.3      K 4.1   CO2 25      BUN 17   CREATININE 1.1   ALKPHOS 148*   ALT 14   AST 20   BILITOT 0.4     CBC:   Recent Labs   Lab 06/19/25  0514   WBC 7.00   RBC 4.21*   HGB 12.2*   HCT 37.2*      MCV 88   MCH 29.0   MCHC 32.8     Lipid Panel: No results for input(s): "CHOL", "LDLCALC", "HDL", "TRIG" in the last 168 hours.  Hgb A1C: No results for input(s): "HGBA1C" in the last 168 hours.  TSH:   Recent Labs   Lab 06/19/25  0514   TSH 1.310       Diagnostic Results:      Brain/Vessel imaging:  Results for orders placed or performed during the hospital encounter of 06/19/25 (from the past 2160 hours)   CT Head Without Contrast    Narrative    EXAMINATION:  CT HEAD WITHOUT CONTRAST    CLINICAL HISTORY:  Stroke, follow up;    TECHNIQUE:  Low dose axial CT images obtained throughout the head without the use of intravenous contrast.  Axial, sagittal and coronal reconstructions were performed.    COMPARISON:  06/19/2025 at 05:19    FINDINGS:  Brain parenchyma: Interval development of hypoattenuation with loss of gray-white differentiation involving a moderate-sized portion of the left MCA territory.  This includes the insular cortex and portions of the frontal lobe.  Basal ganglia appears spared.  No definite infarct changes in the parietal or temporal cortex.  However, there is a new small parenchymal hematoma inferior left temporal lobe measuring up to 1.2 cm.  No acute parenchymal hemorrhage elsewhere.  No new major vascular distribution infarct elsewhere.  There are moderate chronic small vessel ischemic changes with several remote-appearing lacunar type infarcts in the basal ganglia and thalami.  There are also remote bilateral cerebellar infarcts, right more than left.    Ventricles: Mild cerebral and moderate cerebellar volume loss.  No evidence of hydrocephalus.  No midline shift.    Extra-axial spaces: No hemorrhage or focal fluid collections.    Skull: No " displaced calvarial fracture.    Mastoid air cells and visualized paranasal sinuses: Mastoid air cells are clear. Mild patchy mucosal thickening in the visualized paranasal sinuses. .    Other: Vasculature relatively hyperattenuating related to recent contrast administration.  Scattered vascular calcification about the skull base.    COMMUNICATION:  Findings were relayed to the immediately relayed but identification to the ordering provider (Chang) as well as the listed neurocritical care physician (Aditya) via the epic secure chat system at approximately 08:39.      Impression    Early ischemic changes in the left MCA distribution, new from recent CT.    New left inferior temporal intraparenchymal hematoma.    Chronic ischemic change with cerebral and cerebellar volume loss, as above.    This report was flagged in Epic as abnormal.      Electronically signed by: Rell Richardson MD  Date:    06/19/2025  Time:    08:43   Results for orders placed or performed during the hospital encounter of 06/19/25 (from the past 2160 hours)   CTA Head and Neck (xpd)    Narrative    EXAMINATION:  CTA HEAD AND NECK (XPD)    CLINICAL HISTORY:  Neuro deficit, acute, stroke suspected;    TECHNIQUE:  CT angiogram was performed from the level of the lenka to the top of the head following the IV administration of 100mL of Omnipaque 350.   Sagittal and coronal reconstructions and maximum intensity projection reconstructions were performed. Arterial stenosis percentages are based on NASCET measurement criteria.    COMPARISON:  None    FINDINGS:  Technical difficulties delayed availability of images.    Skull/Extracranial Contents (limited evaluation): No fracture. Mastoid air cells and paranasal sinuses are essentially clear.    Non-Vascular Structures of the Neck/Thoracic Inlet (limited evaluation): Normal.    Aorta: Normal 3 vessel arch.    Extracranial carotid circulation: Moderate atherosclerosis with 40% stenosis of the right carotid  bifurcation and 30% stenosis at the left carotid bifurcation.    Extracranial vertebral circulation: No hemodynamically significant stenosis, aneurysmal dilatation, or dissection.    Intracranial Arteries: ICA terminus are intact bilaterally.  Right MCA appears intact.  No aneurysm, severe stenosis, or occlusion.    Bilateral ZI appear intact.  No aneurysm, severe stenosis, or occlusion.    Moderate proximal left MCA stenosis near the terminus.  Distal left M1 MCA occlusion.  Some reconstitution via collateral vessels.    Vertebrobasilar circulation appears intact without occlusion.  There is moderate to severe stenosis of the left mid intracranial vertebral artery with stenosis on the order of 80%.    Right PCA appears intact.  Left PCA demonstrates a severe stenosis.    Venous structures (limited evaluation): Normal.      Impression    Distal left M1 MCA occlusion with relatively prompt collateralization of the more distal MCA vessels.    Severe stenosis left mid vertebral artery and left PCA.    COMMUNICATION  This critical result was discovered/received at 06:54.  The critical information above was relayed directly by me by telephone to Dr. Arevalo with the emergency room on 06/19/2025 at 18:55.    All CT scans at this facility are performed  using dose modulation techniques as appropriate to performed exam including the following:  automated exposure control; adjustment of mA and/or kV according to the patients size (this includes techniques or standardized protocols for targeted exams where dose is matched to indication/reason for exam: i.e. extremities or head);  iterative reconstruction technique.      Electronically signed by: Malick Causey  Date:    06/19/2025  Time:    07:02         Cardiac Evaluation:   Results for orders placed or performed during the hospital encounter of 06/19/25   ECG 12 lead    Collection Time: 06/19/25  5:43 AM   Result Value Ref Range    QRS Duration 82 ms    OHS QTC Calculation  435 ms    Narrative    Test Reason : R29.818,    Vent. Rate :  72 BPM     Atrial Rate :  72 BPM     P-R Int : 164 ms          QRS Dur :  82 ms      QT Int : 398 ms       P-R-T Axes :  62  48 231 degrees    QTcB Int : 435 ms    Normal sinus rhythm  T wave abnormality, consider inferolateral ischemia  Abnormal ECG  When compared with ECG of 18-Nov-2014 09:38,  No significant change was found    Referred By: AAAREFERRAL SELF           Confirmed By:        ECHO pending           [1]   Social History  Tobacco Use    Smoking status: Every Day     Current packs/day: 0.50     Average packs/day: 0.5 packs/day for 40.0 years (20.0 ttl pk-yrs)     Types: Cigarettes     Passive exposure: Current    Smokeless tobacco: Never    Tobacco comments:     Patient aware of smoking cessation program. He is also aware of health consequences r/t smoking.   Substance Use Topics    Alcohol use: Yes     Comment: Occasionally (football season)    Drug use: Yes     Types: Marijuana   [2] (Not in a hospital admission)

## 2025-06-19 NOTE — TRANSFER OF CARE
Anesthesia Transfer of Care Note    Patient: Cristi Pulido    Procedure(s) Performed: * No procedures listed *    Patient location: ICU    Anesthesia Type: general    Transport from OR: Transported from OR on 6-10 L/min O2 by face mask with adequate spontaneous ventilation. Continuos invasive BP monitoring in transport. Continuous SpO2 monitoring in transport. Continuous ECG monitoring in transport    Post pain: adequate analgesia    Post assessment: no apparent anesthetic complications    Post vital signs: stable    Level of consciousness: awake and alert    Nausea/Vomiting: no nausea/vomiting    Complications: none    Transfer of care protocol was followed      Last vitals: Visit Vitals  BP (!) 188/72 (BP Location: Right arm, Patient Position: Lying)   Pulse 80   Temp 36.2 °C (97.1 °F) (Oral)   Resp 16   SpO2 100%

## 2025-06-19 NOTE — PT/OT/SLP EVAL
Speech Language Pathology Evaluation  Cognitive/Bedside Swallow    Patient Name:  Cristi Pulido   MRN:  [id number]  Admitting Diagnosis: Stroke due to occlusion of left middle cerebral artery    Recommendations:      General Recommendations:  Dysphagia therapy, Speech/language therapy, and Cognitive-linguistic therapy  Diet recommendations:  NPO (except meds crushed in puree), NPO (except ice chips & tsp sips of water sparingly for pleasure)   Aspiration Precautions: 1 bite/sip at a time, Assistance with meals, Check for pocketing/oral residue, Eliminate distractions, Feed only when awake/alert, Frequent oral care, HOB to 90 degrees, Ice chips sparingly, Meds crushed in puree, Small bites/sips, and Strict aspiration precautions   General Precautions: Standard, aspiration, fall  Communication strategies:  yes/no questions only  Discharge recommendations:  High Intensity Therapy   Barriers to Discharge:  Level of Skilled Assistance Needed      Assessment:     Cristi Pulido is a 70 y.o. male with an SLP diagnosis of Aphasia, Dysphagia, Dysarthria, and Cognitive-Linguistic Impairment.  SLP will continue to follow.     History:     Past Medical History:   Diagnosis Date    Diabetes mellitus     Hyperlipidemia     Hypertension     Stroke     Stroke     right side weak    Type 2 diabetes mellitus      Past Surgical History:   Procedure Laterality Date    AMPUTATION, LOWER LIMB Bilateral     middle toes    bilateral 3rd toe amputation      COLONOSCOPY  01/2021    FRACTURE SURGERY Left     hip    JOINT REPLACEMENT Left     KOMAL     Chief Complaint: Stroke due to occlusion of left middle cerebral artery     History of Present Illness: Mr. Pulido is a 69 y/o male with PMH significant for previous CVA with minimal residual right sided weakness, NIDDM, HTN, HLD who presents to [facility] as a transfer for LMCA stroke. History obtained from patient's family at bedside and chart review. Patient presented to the ED early this morning with acute  onset dysarthria and right sided weakness. LKN around 4AM. He was evaluated by telestroke, found to have NIHSS of 19. CT showed probable early ischemic changes in the L insular ribbon and L frontal lobe. CTA demonstrated occlusion of the distal L MCA M1 segment. He was given TNK at 0548 and transferred to [facility] for possible mechanical thrombectomy and further management. On arrival, patient reported to have NIHSS 24. CTH done showed a small left temporal hemorrhage and continued early ischemic changes. He was taken to IR where he had a successful thrombectomy of L M1 occlusion with TICI 3 reperfusion in one pass. He was admitted to [facility] for close monitoring and stroke management.     Prior Intubation HX:  6/19 for procedure     Prior diet: unrestricted, no dentures    Subjective     Spoke with RN prior to session. Pt awake confused and restless in bed upon entry to room. Family present at bedside. Waist belt restraint in place.     Pain/Comfort:  Pain Rating 1:  (none indicated)    Respiratory Status: Nasal cannula, flow 2 L/min    Objective:     Cognitive Status:    Arousal/Alertness Inconsistent responses    Attention Selective attention deficit - poor and Sustained attention deficit - poor, R side inattention present   Orientation Unable to assess  Safety awareness - poor, attempting       Receptive Language:   Comprehension:      Questions Simple yes/no 25% acc  Commands  One step 10% acc ( 1/10) following model and tactile prompting to open mouth     Pragmatics:    inconsistent eye contact  , turn taking - poor, and topic maintenance - poor    Expressive Language:  Verbal:    Initiation -wfl  Naming Confrontation 0%  Conversational speech - severely impaired  Nonverbal:   Gestures - able to point in attempt to communicate wants/needs across session       Motor Speech:  Dysarthria - severe  Intelligibility - poor  Articulation - imprecise, impaired   Phonation - wfl    Voice:    WFL    Visual-Spatial:  kiersten  Appearing to have right sided visual inattention     Reading:   kiersten     Written Expression:   kiersten    Oral Musculature Evaluation  Oral Musculature: gross asymmetry present, right weakness  Dentition: edentulous, rarely or never uses dentures to eat  Secretion Management: adequate  Mucosal Quality: adequate  Mandibular Strength and Mobility: WFL  Oral Labial Strength and Mobility: impaired coordination, impaired seal, impaired pursing, impaired retraction  Lingual Strength and Mobility: impaired protrusion, impaired right lateral movement, impaired left lateral movement  Volitional Cough: unable to elicit  Volitional Swallow: unable to elicit  Voice Prior to PO Intake: clear, strong    Bedside Swallow Eval:   Consistencies Assessed:  Thin liquids ice chip x 1, tsp x 4  Puree - tsp x 1  Solids - small bite of dry cracker x 1     Oral Phase:   Poor oral acceptance  Anterior loss  Decreased closure around utensil  Impaired rotational chew  Orally expelled cracker  Slow oral transit time    Pharyngeal Phase:   no overt clinical signs/symptoms of aspiration  no overt clinical signs/symptoms of pharyngeal dysphagia    Compensatory Strategies  None    Treatment: Recommend he remains NPO at this time 2/2 mentation/confusion. Allow ice chips and small tsp sips of water for pleasure when awake and upright in bed as well as frequent oral care and meds crushed in puree. Education provided re: role of SLP, diet recs, swallow precs, s/s aspiration and POC.  Family verbalized understanding and agreement. White board updated.  Pt education to be ongoing.     Goals:   Multidisciplinary Problems       SLP Goals          Problem: SLP    Goal Priority Disciplines Outcome   SLP Goal     SLP Progressing   Description: Speech Language Pathology Goals  Goals expected to be met by 7/3    1. Pt will participate in ongoing swallow assessment to determine least restrictive PO diet.    2. Pt will participate in  ongoing cognitive-linguistic assessment to determine additional therapeutic needs.   3. Pt will follow 1 step commands with 80% acc following model/prompt.   4. Pt will answer simple Y/N questions with 80% acc.                                Plan:     Patient to be seen:  4 x/week   Plan of Care expires:  07/19/25  Plan of Care reviewed with:  family, caregiver, spouse, daughter, patient   SLP Follow-Up:  Yes       Time Tracking:     SLP Treatment Date:   06/19/25  Speech Start Time:  1453  Speech Stop Time:  1511     Speech Total Time (min):  18 min    Billable Minutes: Eval 8  and Eval Swallow and Oral Function 10    06/19/2025

## 2025-06-19 NOTE — CONSULTS
Inpatient consult to Physical Medicine Rehab  Consult performed by: Elaine Mcconnell NP  Consult ordered by: Zo Parker PA-C      Consult received.      Elaine Mcconnell NP  Physical Medicine & Rehabilitation   06/19/2025

## 2025-06-19 NOTE — EICU
Virtual ICU Admission    Admit Date: 2025  LOS: 0  Code Status: Full Code   : 1955  Bed: 9068/9068 A:     Diagnosis: Stroke due to occlusion of left middle cerebral artery    Patient  has a past medical history of Diabetes mellitus, Hyperlipidemia, Hypertension, Stroke, Stroke, and Type 2 diabetes mellitus.    Last VS: BP (!) 188/72 (BP Location: Right arm, Patient Position: Lying)   Pulse 80   Temp 97.1 °F (36.2 °C) (Oral)   Resp 16   SpO2 100%       VICU Review    VICU nurse assessment :  [place] completed, LDA documentation reconciliation completed, and Skin care/wounds LDA reconciliation    Skin assessed with bedside team

## 2025-06-19 NOTE — ASSESSMENT & PLAN NOTE
Lab Results   Component Value Date    LABA1C 7.2 (H) 01/29/2018    HGBA1C 7.7 (H) 04/28/2025     Follow up repeat A1c. Hold home antihyperglycemics. SSI for goal -180 while in hospital

## 2025-06-19 NOTE — HPI
Mr. Pulido is a 71 y/o male with PMH significant for previous CVA with minimal residual right sided weakness, NIDDM, HTN, HLD who presents to [facility] as a transfer for LMCA stroke. History obtained from patient's family at bedside and chart review. Patient presented to the ED early this morning with acute onset dysarthria and right sided weakness. LKN around 4AM. He was evaluated by telestroke, found to have NIHSS of 19. CT showed probable early ischemic changes in the L insular ribbon and L frontal lobe. CTA demonstrated occlusion of the distal L MCA M1 segment. He was given TNK at 0548 and transferred to [facility] for possible mechanical thrombectomy and further management. On arrival, patient reported to have NIHSS 24. CTH done showed a small left temporal hemorrhage and continued early ischemic changes. He was taken to IR where he had a successful thrombectomy of L M1 occlusion with TICI 3 reperfusion in one pass. He was admitted to [facility] for close monitoring and stroke management.

## 2025-06-19 NOTE — H&P
[facility] - Neuro Critical Care  Neurocritical Care  History & Physical    Admit Date: 6/19/2025  Service Date: 06/19/2025  Length of Stay: 0    Subjective:     Chief Complaint: Stroke due to occlusion of left middle cerebral artery    History of Present Illness: Mr. Pulido is a 69 y/o male with PMH significant for previous CVA with minimal residual right sided weakness, NIDDM, HTN, HLD who presents to [facility] a transfer for LMCA stroke. History obtained from patient's family at bedside and chart review. Patient presented to the ED early this morning with acute onset dysarthria and right sided weakness. LKN around 4AM. He was evaluated by telestroke, found to have NIHSS of 19. CT showed probable early ischemic changes in the L insular ribbon and L frontal lobe. CTA demonstrated occlusion of the distal L MCA M1 segment. He was given TNK at 0548 and transferred to [facility] for possible mechanical thrombectomy and further management. On arrival, patient reported to have NIHSS 24. CTH done showed a small left temporal hemorrhage and continued early ischemic changes. He was taken to IR where he had a successful thrombectomy of L M1 occlusion with TICI 3 reperfusion in one pass. He was admitted to [facility] for close monitoring and stroke management.         Past Medical History:   Diagnosis Date    Diabetes mellitus     Hyperlipidemia     Hypertension     Stroke     Stroke     right side weak    Type 2 diabetes mellitus      Past Surgical History:   Procedure Laterality Date    AMPUTATION, LOWER LIMB Bilateral     middle toes    bilateral 3rd toe amputation      COLONOSCOPY  01/2021    FRACTURE SURGERY Left     hip    JOINT REPLACEMENT Left     KOMAL      Medications Ordered Prior to Encounter[1]   Allergies: Patient has no known allergies.  Family History   Problem Relation Name Age of Onset    Diabetes Mother      Heart disease Mother      Hypertension Mother      Diabetes Father      Diabetes Sister Foretta     Diabetes Brother Marcelo      Diabetes Daughter Siri     No Known Problems Son Cristi Marks.     Diabetes Sister Jimena     Diabetes Sister Karma     Diabetes Brother Constantine     Hypertension Daughter Ciera      Social History[2]  Review of Systems   Reason unable to perform ROS: aphasia.     Objective:     Vitals:    Temp: 97.5 °F (36.4 °C)  Pulse: 89  Rhythm: normal sinus rhythm  BP: (!) 157/95  MAP (mmHg): 120  Resp: (!) 28  SpO2: (!) 94 %  Oxygen Concentration (%): 6    Temp  Min: 97.1 °F (36.2 °C)  Max: 97.6 °F (36.4 °C)  Pulse  Min: 71  Max: 89  BP  Min: 134/64  Max: 321/59  MAP (mmHg)  Min: 88  Max: 148  Resp  Min: 10  Max: 40  SpO2  Min: 91 %  Max: 100 %  Oxygen Concentration (%)  Min: 6  Max: 6    No intake/output data recorded.            Physical Exam    Examination:   Constitutional: Well-nourished and -developed. No apparent distress.   Eyes: Conjunctiva clear, anicteric. Lids no lesions.  Head/Ears/Nose/Mouth/Throat/Neck: Moist mucous membranes. External ears, nose atraumatic.   Cardiovascular: Regular rhythm. Distal pedal pulses present via doppler. Two toes amputated. Groin access site soft and nontender.   Respiratory: Comfortable respirations.     Neurologic:  -GCS E4V2M5  -Alert. Nonverbal, some grunts/mumbles to questions. Does not follow any commands.   -Cranial nerves: right facial weakness, left gaze deviation, right hemianopsia  -Motor :    RUE: No movement to painful stimuli   LUE: Full antigravity movement, no drift   RLE: some spontaneous movement, no antigravity movement    LLE: full antigravity movement   -Sensation: grimacing to painful stimuli in right upper extremity, no movement          Today I personally reviewed pertinent medications, imaging, cardiology results, laboratory results, notably:    - Echo complete, unremarkable  - CT head post-thrombectomy reviewed, hemorrhage stable  - TSH wnl  - Hemoglobin A1c 7.0     Assessment/Plan:     Neuro  * Stroke due to occlusion of left middle cerebral artery  70  y/o male with previous CVA with minimal residual right sided weakness, HTN, HLD, DM presents with LM1 occlusion     Antithrombotics for secondary stroke prevention: Antiplatelets: None: Hold all Antithrombotics x 24 hours after IV Thrombolytic therapy administration    Statins for secondary stroke prevention and hyperlipidemia, if present:   Statins: Atorvastatin- 40 mg daily    Aggressive risk factor modification: HTN, Smoking, DM, HLD, Exercise     Rehab efforts: The patient has been evaluated by a stroke team provider and the therapy needs have been fully considered based off the presenting complaints and exam findings. The following therapy evaluations are needed: PT evaluate and treat, OT evaluate and treat, SLP evaluate and treat, PM&R evaluate for appropriate placement    Diagnostics ordered/pending: MRI head without contrast to assess brain parenchyma    VTE prophylaxis: None: Reason for No Pharmacological VTE Prophylaxis: Holding x 24 hours s/p treatment with Thrombolytic therapy    BP parameters: Infarct: Post sucessful thrombectomy, SBP <140        Right sided weakness  PT/OT consulted     Dysarthria and anarthria  SLP consulted  - OK for meds in pudding, no meals yet  - Repeat SLP consult tomorrow, if fails again will place NGT for nutrition     Aphasia  SLP consulted and following along     Cardiac/Vascular  Hyperlipemia  Well controlled with statin, continue     Hypertension associated with diabetes  Longstanding hypertension   SBP goal 100-140 post successful thrombectomy  - Nicardipine gtt + PRN hydralazine and labetalol available]  - Home     Endocrine  Type 2 diabetes mellitus without complication, without long-term current use of insulin  Hemoglobin A1c 7  SSI in place   Diabetic diet once cleared for meals / diabetasource if unable to advance PO diet     Other  Tobacco dependence  Consider nicotine patch if needed           The patient is being Prophylaxed for:  Venous Thromboembolism with:  Mechanical  Stress Ulcer with: Not Applicable   Ventilator Pneumonia with: not applicable    Activity Orders            Progressive Mobility Protocol (mobilize patient to their highest level of functioning at least twice daily) starting at 06/19 2000    Turn patient starting at 06/19 1200    Elevate HOB starting at 06/19 1004    Diet NPO: NPO starting at 06/19 1004          Full Code    Critical care time: Critical care time spent on the evaluation and treatment of severe organ dysfunction, review of pertinent labs and imaging studies, discussions with consulting providers and discussions with patient/family: 45 minutes.      Zo Parker PA-C  Neurocritical Care  [facility] - Neuro Critical Care       [1]   Current Facility-Administered Medications on File Prior to Encounter   Medication Dose Route Frequency Provider Last Rate Last Admin    [COMPLETED] iohexoL (OMNIPAQUE 350) injection 100 mL  100 mL Intravenous ONCE PRN Spike Francisco MD   100 mL at 06/19/25 0547    [COMPLETED] labetaloL injection 10 mg  10 mg Intravenous ED 1 Time Spike Francisco MD   10 mg at 06/19/25 0545    [COMPLETED] labetaloL injection 10 mg  10 mg Intravenous ED 1 Time Kostas Arevalo MD   10 mg at 06/19/25 0605    [COMPLETED] tenecteplase (TNKase) IV KIT 22.5 mg  22.5 mg Intravenous Once Spike Francisco MD   22.5 mg at 06/19/25 0548    [DISCONTINUED] niCARdipine 40 mg/200 mL (0.2 mg/mL) infusion  2.5 mg/hr Intravenous Continuous Kostas Arevalo MD 12.5 mL/hr at 06/19/25 0727 2.5 mg/hr at 06/19/25 0727    [DISCONTINUED] sodium chloride 0.9% flush 10 mL  10 mL Intravenous Once Spike Francisco MD        [DISCONTINUED] sodium chloride 0.9% flush 10 mL  10 mL Intravenous PRN Carlito Sorensen MD         Current Outpatient Medications on File Prior to Encounter   Medication Sig Dispense Refill    amLODIPine (NORVASC) 10 MG tablet Take 1 tablet (10 mg total) by mouth once daily. 90 tablet 3    aspirin (ECOTRIN) 81 MG EC  tablet Take 81 mg by mouth. Pt take 2 tablets daily. When pt runs out of plavix (Patient taking differently: Take 81 mg by mouth as needed. Pt take 2 tablets daily. When pt runs out of plavix)      atorvastatin (LIPITOR) 80 MG tablet Take 1 tablet (80 mg total) by mouth once daily. 90 tablet 3    blood sugar diagnostic (TRUE METRIX GLUCOSE TEST STRIP) Strp Test Blood SugarTEST BLOOD SUGAR TWICE DAILY 200 strip 3    blood-glucose meter kit Dispense meter  brand covered by insurance 1 each 0    clopidogreL (PLAVIX) 75 mg tablet Take 1 tablet (75 mg total) by mouth once daily. 90 tablet 3    FLUoxetine 20 MG capsule Take 1 capsule (20 mg total) by mouth once daily. To help mood and anxiety 90 capsule 3    gabapentin (NEURONTIN) 600 MG tablet Take 1 tablet (600 mg total) by mouth 3 (three) times daily as needed. 270 tablet 3    glimepiride (AMARYL) 4 MG tablet Take 1 tablet (4 mg total) by mouth before breakfast. Stop when you restart insulin 90 tablet 3    lactulose (CHRONULAC) 10 gram/15 mL solution TAKE  15ML  1-3  TIMES  A  DAY 2000 mL 3    lancets (ACCU-CHEK FASTCLIX LANCING DEV) Misc TEST TWICE DAILY 200 each 3    lisinopriL (PRINIVIL,ZESTRIL) 20 MG tablet Take 2 tablets (40 mg total) by mouth once daily. 180 tablet 3    metFORMIN (GLUCOPHAGE) 1000 MG tablet Take 1 tablet (1,000 mg total) by mouth 2 (two) times daily with meals. 180 tablet 3    metoprolol tartrate (LOPRESSOR) 50 MG tablet Take 1 tablet (50 mg total) by mouth 2 (two) times daily. 180 tablet 3    TRUEPLUS LANCETS 33 gauge Misc Use to test sugar/glucose daily. 100 each 3   [2]   Social History  Tobacco Use    Smoking status: Every Day     Current packs/day: 0.50     Average packs/day: 0.5 packs/day for 40.0 years (20.0 ttl pk-yrs)     Types: Cigarettes     Passive exposure: Current    Smokeless tobacco: Never    Tobacco comments:     Patient aware of smoking cessation program. He is also aware of health consequences r/t smoking.   Substance Use  Topics    Alcohol use: Yes     Comment: Occasionally (football season)    Drug use: Yes     Types: Marijuana

## 2025-06-19 NOTE — ASSESSMENT & PLAN NOTE
SLP consulted  - OK for meds in pudding, no meals yet  - Repeat SLP consult tomorrow, if fails again will place NGT for nutrition

## 2025-06-19 NOTE — HPI
Cristi Pulido is a 70 y.o. male w/ PMH of HTN, DM, prior stroke w/ residual RSW who presents as a transfer from [facility] after presenting with acute onset of dysarthria and RSW. Telestroke was completed and his NIHSS was 19. CT showed probable early ischemic changes in the L insular ribbon and L frontal lobe. CTA demonstrated occlusion of the distal L MCA M1 segment. Patient was transferred to [facility] [place] further management. On arrival, patient taken for repeat CTH which showed

## 2025-06-19 NOTE — PROCEDURES
Interventional Neuroradiology Post-Procedure Note    Pre Op Diagnosis: Left MCA stroke    Post Op Diagnosis: Same    Procedure: Thrombectomy    Procedure performed by: Mahendra Valentin MD; Edu VARGAS, Carlito; Dayne VARGAS, Jean    Written Informed Consent Obtained: Yes    Specimen Removed: NO    Estimated Blood Loss: Minimal    Procedure report:   An 8 F sheath was placed into the right femoral artery and a 5F SELECT catheter within Zoom 88 access 110 cm was advanced into the aortic arch.  The Left CCA/ICA  arteries were subselected and angiography of the brain was performed after injection into each of these vessels.    Preliminary interpretation: Left M 1 occlusion s/p one pass, TICI 3 - aspiration using [facility] [employer] system.  Please see Imaging report for full details.    A right femoral artery angiogram was performed, the sheath removed and hemostasis achieved using Perclose device.  No hematoma was present at the time of hemostasis.    The patient tolerated the procedure well.     Plan:  -To [facility] for monitoring  -Bed rest for 2h  -Groin check and pulse check q2h   - BP goal < 140 mm Hg  -Avoid carrying heavy weights > 10 lbs x 24 hrs   -Remove groin dressing tomorrow       Jean Oscar MD  Fellow, NeuroEndovascular Surgery, [facility]

## 2025-06-20 LAB
ABSOLUTE EOSINOPHIL (OHS): 0.02 K/UL
ABSOLUTE MONOCYTE (OHS): 0.91 K/UL (ref 0.3–1)
ABSOLUTE NEUTROPHIL COUNT (OHS): 7.23 K/UL (ref 1.8–7.7)
ALBUMIN SERPL BCP-MCNC: 3.6 G/DL (ref 3.5–5.2)
ALP SERPL-CCNC: 174 UNIT/L (ref 40–150)
ALT SERPL W/O P-5'-P-CCNC: 10 UNIT/L (ref 10–44)
ANION GAP (OHS): 11 MMOL/L (ref 8–16)
AST SERPL-CCNC: 16 UNIT/L (ref 11–45)
BASOPHILS # BLD AUTO: 0.02 K/UL
BASOPHILS NFR BLD AUTO: 0.2 %
BILIRUB SERPL-MCNC: 0.4 MG/DL (ref 0.1–1)
BUN SERPL-MCNC: 17 MG/DL (ref 8–23)
CALCIUM SERPL-MCNC: 8.4 MG/DL (ref 8.7–10.5)
CHLORIDE SERPL-SCNC: 108 MMOL/L (ref 95–110)
CO2 SERPL-SCNC: 23 MMOL/L (ref 23–29)
CREAT SERPL-MCNC: 1.1 MG/DL (ref 0.5–1.4)
ERYTHROCYTE [DISTWIDTH] IN BLOOD BY AUTOMATED COUNT: 13.6 % (ref 11.5–14.5)
GFR SERPLBLD CREATININE-BSD FMLA CKD-EPI: >60 ML/MIN/1.73/M2
GLUCOSE SERPL-MCNC: 172 MG/DL (ref 70–110)
HCT VFR BLD AUTO: 35.7 % (ref 40–54)
HGB BLD-MCNC: 11.4 GM/DL (ref 14–18)
IMM GRANULOCYTES # BLD AUTO: 0.02 K/UL (ref 0–0.04)
IMM GRANULOCYTES NFR BLD AUTO: 0.2 % (ref 0–0.5)
LYMPHOCYTES # BLD AUTO: 2.17 K/UL (ref 1–4.8)
MAGNESIUM SERPL-MCNC: 1.5 MG/DL (ref 1.6–2.6)
MCH RBC QN AUTO: 28.1 PG (ref 27–31)
MCHC RBC AUTO-ENTMCNC: 31.9 G/DL (ref 32–36)
MCV RBC AUTO: 88 FL (ref 82–98)
NUCLEATED RBC (/100WBC) (OHS): 0 /100 WBC
PHOSPHATE SERPL-MCNC: 2.9 MG/DL (ref 2.7–4.5)
PLATELET # BLD AUTO: 183 K/UL (ref 150–450)
PMV BLD AUTO: 12.5 FL (ref 9.2–12.9)
POCT GLUCOSE: 101 MG/DL (ref 70–110)
POCT GLUCOSE: 190 MG/DL (ref 70–110)
POCT GLUCOSE: 286 MG/DL (ref 70–110)
POTASSIUM SERPL-SCNC: 4.1 MMOL/L (ref 3.5–5.1)
PROT SERPL-MCNC: 6.7 GM/DL (ref 6–8.4)
RBC # BLD AUTO: 4.05 M/UL (ref 4.6–6.2)
RELATIVE EOSINOPHIL (OHS): 0.2 %
RELATIVE LYMPHOCYTE (OHS): 20.9 % (ref 18–48)
RELATIVE MONOCYTE (OHS): 8.8 % (ref 4–15)
RELATIVE NEUTROPHIL (OHS): 69.7 % (ref 38–73)
SODIUM SERPL-SCNC: 142 MMOL/L (ref 136–145)
WBC # BLD AUTO: 10.37 K/UL (ref 3.9–12.7)

## 2025-06-20 PROCEDURE — 25000003 PHARM REV CODE 250: Mod: HCNC

## 2025-06-20 PROCEDURE — 99900035 HC TECH TIME PER 15 MIN (STAT): Mod: HCNC

## 2025-06-20 PROCEDURE — 97165 OT EVAL LOW COMPLEX 30 MIN: CPT | Mod: HCNC

## 2025-06-20 PROCEDURE — 99233 SBSQ HOSP IP/OBS HIGH 50: CPT | Mod: HCNC,,, | Performed by: PSYCHIATRY & NEUROLOGY

## 2025-06-20 PROCEDURE — 97163 PT EVAL HIGH COMPLEX 45 MIN: CPT | Mod: HCNC

## 2025-06-20 PROCEDURE — 99499 UNLISTED E&M SERVICE: CPT | Mod: HCNC,,,

## 2025-06-20 PROCEDURE — 63600175 PHARM REV CODE 636 W HCPCS: Mod: HCNC

## 2025-06-20 PROCEDURE — 27000221 HC OXYGEN, UP TO 24 HOURS: Mod: HCNC

## 2025-06-20 PROCEDURE — S4991 NICOTINE PATCH NONLEGEND: HCPCS | Mod: HCNC | Performed by: PHYSICIAN ASSISTANT

## 2025-06-20 PROCEDURE — 80053 COMPREHEN METABOLIC PANEL: CPT | Mod: HCNC | Performed by: PHYSICIAN ASSISTANT

## 2025-06-20 PROCEDURE — 84100 ASSAY OF PHOSPHORUS: CPT | Mod: HCNC | Performed by: PHYSICIAN ASSISTANT

## 2025-06-20 PROCEDURE — 25000003 PHARM REV CODE 250: Mod: HCNC | Performed by: PHYSICIAN ASSISTANT

## 2025-06-20 PROCEDURE — 83735 ASSAY OF MAGNESIUM: CPT | Mod: HCNC | Performed by: PHYSICIAN ASSISTANT

## 2025-06-20 PROCEDURE — 97530 THERAPEUTIC ACTIVITIES: CPT | Mod: HCNC

## 2025-06-20 PROCEDURE — 92507 TX SP LANG VOICE COMM INDIV: CPT | Mod: HCNC

## 2025-06-20 PROCEDURE — 85025 COMPLETE CBC W/AUTO DIFF WBC: CPT | Mod: HCNC | Performed by: PHYSICIAN ASSISTANT

## 2025-06-20 PROCEDURE — 99291 CRITICAL CARE FIRST HOUR: CPT | Mod: HCNC,FS,, | Performed by: PSYCHIATRY & NEUROLOGY

## 2025-06-20 PROCEDURE — 94761 N-INVAS EAR/PLS OXIMETRY MLT: CPT | Mod: HCNC

## 2025-06-20 PROCEDURE — 92526 ORAL FUNCTION THERAPY: CPT | Mod: HCNC

## 2025-06-20 PROCEDURE — 20000000 HC ICU ROOM: Mod: HCNC

## 2025-06-20 PROCEDURE — 97112 NEUROMUSCULAR REEDUCATION: CPT | Mod: HCNC

## 2025-06-20 PROCEDURE — 63600175 PHARM REV CODE 636 W HCPCS: Mod: HCNC | Performed by: PSYCHIATRY & NEUROLOGY

## 2025-06-20 PROCEDURE — 63600175 PHARM REV CODE 636 W HCPCS: Mod: HCNC | Performed by: PHYSICIAN ASSISTANT

## 2025-06-20 RX ORDER — LANOLIN ALCOHOL/MO/W.PET/CERES
800 CREAM (GRAM) TOPICAL
Status: DISCONTINUED | OUTPATIENT
Start: 2025-06-20 | End: 2025-06-21

## 2025-06-20 RX ORDER — SODIUM,POTASSIUM PHOSPHATES 280-250MG
2 POWDER IN PACKET (EA) ORAL
Status: DISCONTINUED | OUTPATIENT
Start: 2025-06-20 | End: 2025-06-21

## 2025-06-20 RX ORDER — LABETALOL HCL 20 MG/4 ML
10 SYRINGE (ML) INTRAVENOUS EVERY 4 HOURS PRN
Status: DISCONTINUED | OUTPATIENT
Start: 2025-06-20 | End: 2025-07-11 | Stop reason: HOSPADM

## 2025-06-20 RX ORDER — GLUCAGON 1 MG
1 KIT INJECTION
Status: DISCONTINUED | OUTPATIENT
Start: 2025-06-20 | End: 2025-07-10

## 2025-06-20 RX ORDER — INSULIN ASPART 100 [IU]/ML
0-10 INJECTION, SOLUTION INTRAVENOUS; SUBCUTANEOUS EVERY 4 HOURS PRN
Status: DISCONTINUED | OUTPATIENT
Start: 2025-06-20 | End: 2025-07-10

## 2025-06-20 RX ORDER — HYDRALAZINE HYDROCHLORIDE 20 MG/ML
10 INJECTION INTRAMUSCULAR; INTRAVENOUS EVERY 4 HOURS PRN
Status: DISCONTINUED | OUTPATIENT
Start: 2025-06-20 | End: 2025-07-11 | Stop reason: HOSPADM

## 2025-06-20 RX ORDER — POLYETHYLENE GLYCOL 3350 17 G/17G
17 POWDER, FOR SOLUTION ORAL DAILY
Status: DISCONTINUED | OUTPATIENT
Start: 2025-06-20 | End: 2025-06-21

## 2025-06-20 RX ADMIN — MUPIROCIN: 20 OINTMENT TOPICAL at 09:06

## 2025-06-20 RX ADMIN — INSULIN ASPART 1 UNITS: 100 INJECTION, SOLUTION INTRAVENOUS; SUBCUTANEOUS at 08:06

## 2025-06-20 RX ADMIN — INSULIN ASPART 6 UNITS: 100 INJECTION, SOLUTION INTRAVENOUS; SUBCUTANEOUS at 11:06

## 2025-06-20 RX ADMIN — METOPROLOL TARTRATE 50 MG: 50 TABLET, FILM COATED ORAL at 09:06

## 2025-06-20 RX ADMIN — Medication 1 PATCH: at 09:06

## 2025-06-20 RX ADMIN — ATORVASTATIN CALCIUM 40 MG: 40 TABLET, FILM COATED ORAL at 09:06

## 2025-06-20 RX ADMIN — FLUOXETINE HYDROCHLORIDE 20 MG: 20 CAPSULE ORAL at 09:06

## 2025-06-20 RX ADMIN — Medication 800 MG: at 09:06

## 2025-06-20 RX ADMIN — NICARDIPINE HYDROCHLORIDE 12.5 MG/HR: 0.2 INJECTION, SOLUTION INTRAVENOUS at 09:06

## 2025-06-20 RX ADMIN — SENNOSIDES AND DOCUSATE SODIUM 1 TABLET: 50; 8.6 TABLET ORAL at 09:06

## 2025-06-20 RX ADMIN — AMLODIPINE BESYLATE 10 MG: 10 TABLET ORAL at 09:06

## 2025-06-20 RX ADMIN — POLYETHYLENE GLYCOL 3350 17 G: 17 POWDER, FOR SOLUTION ORAL at 09:06

## 2025-06-20 RX ADMIN — LISINOPRIL 40 MG: 20 TABLET ORAL at 09:06

## 2025-06-20 RX ADMIN — MIDAZOLAM HYDROCHLORIDE 1 MG: 2 INJECTION, SOLUTION INTRAMUSCULAR; INTRAVENOUS at 04:06

## 2025-06-20 NOTE — EICU
Virtual ICU Quality Rounds    Admit Date: 6/19/2025  Hospital Day: 1    ICU Day: 22h    24H Vital Sign Range:  Temp:  [97.5 °F (36.4 °C)-98.1 °F (36.7 °C)]   Pulse:  []   Resp:  [10-40]   BP: (117-321)/(52-95)   SpO2:  [91 %-100 %]     VICU Surveillance Screening      LDA reconciliation : Yes

## 2025-06-20 NOTE — PT/OT/SLP PROGRESS
Speech Language Pathology Treatment    Patient Name:  Cristi Pulido   MRN:  [id number]  Admitting Diagnosis: Embolic stroke involving left middle cerebral artery    Recommendations:     General Recommendations:  Dysphagia therapy, Speech/language therapy, and Cognitive-linguistic therapy  Diet recommendations:  NPO, Liquid Diet Level: NPO   Aspiration Precautions: Alternate means of nutrition/hydration, Frequent oral care, Ice chips sparingly, necessary meds crushed in puree, and Strict aspiration precautions   General Precautions: Standard, aspiration, fall  Communication strategies:  yes/no questions only  Discharge recommendations:  High Intensity Therapy   Barriers to Discharge:  Level of Skilled Assistance Needed      Assessment:     Cristi Pulido is a 70 y.o. male with an SLP diagnosis of Aphasia, Dysphagia, Dysarthria, and Cognitive-Linguistic Impairment.     Subjective     Spoke with nursing prior to session. Pt awake/alert in bed with spouse at bedside. Left sided wrist and Posey belt restraints in place.     Pain/Comfort:  Pain Rating 1:  (did not indicate)    Respiratory Status: Room air    Objective:     Has the patient been evaluated by SLP for swallowing?   Yes  Keep patient NPO? Yes     Pt seen bedside for ongoing swallow assessment and speech therapy. HOB elevated and pt repositioned to be upright, midline in bed prior to PO trials. He continues to demonstrate poor command following, severe dysarthria and right sided facial weakness and droop. He was able to answer simple Y/N questions with 33% acc, though overall unreliable. Pt unable to complete automatic speech tasks counting 1-10 despite clinician model and visual aid, though able to phonate with correct intonation in unison with clinician. He required feed assist with all PO trials. Worked on intake of ice chip x 1, tsp sips of thin liquids x 5 and tsp of puree x 4. Noted adequate oral acceptance, though poor extraction and stripping requiring clinician  to spill/pour bolus material in mouth. Pt unable to extract liquids from cup rim or straw despite trials x 3 and prompting. Noted timely AP transit across initial sips/bites, with oral holding after 4-5 trials requiring prompting to initiate swallow response. No overt coughing/choking, wet vocal quality or change in O2 sats appreciated across trials though noted eye watering x 1 on last trial of thin liquids with bolus holding. Given ongoing cognitive-linguistic deficits, right sided weakness and poor command following recommend he remain NPO with consideration for alternative means of nutrition at this time. Allow ice chips for pleasure and necessary meds crushed in puree. Recommend frequent oral care. Provided oral care supplies at bedside at ended of session., Education provided re: role of SLP, diet recs, swallow precs, s/s aspiration and POC.  Spouse verbalized understanding and agreement. Pt education to be ongoing. RN and team notified post session.     Goals:   Multidisciplinary Problems       SLP Goals          Problem: SLP    Goal Priority Disciplines Outcome   SLP Goal     SLP Progressing   Description: Speech Language Pathology Goals  Goals expected to be met by 7/3    1. Pt will participate in ongoing swallow assessment to determine least restrictive PO diet.    2. Pt will participate in ongoing cognitive-linguistic assessment to determine additional therapeutic needs.   3. Pt will follow 1 step commands with 80% acc following model/prompt.   4. Pt will answer simple Y/N questions with 80% acc.                                Plan:     Patient to be seen:  4 x/week   Plan of Care expires:  07/19/25  Plan of Care reviewed with:  patient   SLP Follow-Up:  Yes       Time Tracking:     SLP Treatment Date:   06/20/25  Speech Start Time:  0849  Speech Stop Time:  0905     Speech Total Time (min):  16 min    Billable Minutes: Speech Therapy Individual 7 and Treatment Swallowing Dysfunction 9    06/20/2025

## 2025-06-20 NOTE — PLAN OF CARE
06/20/25 1434   Rounds   Attendance Provider;   Discharge Plan A Rehab   Why the patient remains in the hospital Requires continued medical care   Transition of Care Barriers None     Ashley Ospina MSW, LCSW  [facility]  Case Management Dept.

## 2025-06-20 NOTE — PLAN OF CARE
Problem: Occupational Therapy  Goal: Occupational Therapy Goal  Description: Goals to be met by: 07/20/2025     Patient will increase functional independence with ADLs by performing:    UE Dressing with Moderate Assistance.  LE Dressing with Maximum Assistance.  Grooming while seated with Moderate Assistance.  Toileting from bedside commode with Moderate Assistance for hygiene and clothing management.   Supine to sit with Moderate Assistance.  Stand pivot transfer with Moderate Assistance    Outcome: Progressing     OT eval complete & goals established.

## 2025-06-20 NOTE — PROGRESS NOTES
[facility] - Neuro Critical Care  Neurocritical Care  Progress Note    Admit Date: 6/19/2025  Service Date: 06/20/2025  Length of Stay: 1    Subjective:     Chief Complaint: Embolic stroke involving left middle cerebral artery    History of Present Illness: Mr. Pulido is a 71 y/o male with PMH significant for previous CVA with minimal residual right sided weakness, NIDDM, HTN, HLD who presents to [facility] [place] [facility] as a transfer for LMCA stroke. History obtained from patient's family at bedside and chart review. Patient presented to the ED early this morning with acute onset dysarthria and right sided weakness. LKN around 4AM. He was evaluated by telestroke, found to have NIHSS of 19. CT showed probable early ischemic changes in the L insular ribbon and L frontal lobe. CTA demonstrated occlusion of the distal L MCA M1 segment. He was given TNK at 0548 and transferred to [facility] [place] for possible mechanical thrombectomy and further management. On arrival, patient reported to have NIHSS 24. CTH done showed a small left temporal hemorrhage and continued early ischemic changes. He was taken to IR where he had a successful thrombectomy of L M1 occlusion with TICI 3 reperfusion in one pass. He was admitted to [facility] for close monitoring and stroke management.       Hospital Course: 06/20/2025 Liberalize SBP goal < 160. SLP eval. NGT placed. RD consult for TF. MRI reviewed w large L MCA infarct w mass effect and 4mm MLS; L temporal IPH stable. Hold on starting ASA and SQH given stroke burden and hemorrhage. Q2h neuro checks.     Review of Systems   Reason unable to perform ROS: aphasia.     Objective:     Vitals:    Temp: 98.6 °F (37 °C)  Pulse: 85  Rhythm: normal sinus rhythm  BP: 113/83  MAP (mmHg): 95  Resp: 14  SpO2: (!) 92 %    Temp  Min: 97.9 °F (36.6 °C)  Max: 98.6 °F (37 °C)  Pulse  Min: 66  Max: 115  BP  Min: 113/83  Max: 172/79  MAP (mmHg)  Min: 80  Max: 113  Resp  Min: 11  Max: 27  SpO2  Min: 90 %  Max: 100 %    06/19 0701 - 06/20 0700  In:  "1607.3 [I.V.:607.3]  Out: 1611 [Urine:1600]            Physical Exam  Vitals and nursing note reviewed.   HENT:      Head: Normocephalic and atraumatic.      Right Ear: External ear normal.      Left Ear: External ear normal.      Nose: Nose normal.      Mouth/Throat:      Mouth: Mucous membranes are moist.      Pharynx: Oropharynx is clear.   Eyes:      Pupils: Pupils are equal, round, and reactive to light.   Cardiovascular:      Rate and Rhythm: Normal rate and regular rhythm.   Pulmonary:      Effort: Pulmonary effort is normal. No respiratory distress.   Musculoskeletal:      Cervical back: Normal range of motion.      Right lower leg: No edema.      Left lower leg: No edema.      Comments: 2 toes amputated   Skin:     General: Skin is warm and dry.   Neurological:      Comments: -GCS E4V2M5  -Alert. Nonverbal, some grunts/mumbles to questions. Occasionally sounds like he's saying "yes" or "no" appropriately to questions. Does not follow any commands.   -Cranial nerves:   R facial weakness, left gaze deviation, right hemianopsia  -Motor :   RUE: minimal flexion to noxious stimuli  LUE: Full antigravity movement, no drift  RLE: some spontaneous movement, triple flexion to noxious stimuli  LLE: full antigravity movement   -Sensation: grimacing to painful stimuli in right upper extremity                 Today I personally reviewed pertinent medications, imaging, cardiology results, laboratory results, notably:    - Echo complete, unremarkable  - CT head post-thrombectomy reviewed, hemorrhage stable  - TSH wnl  - Hemoglobin A1c 7.0     Laboratory:  CBC:  Recent Labs   Lab 06/20/25  0354   WBC 10.37   RBC 4.05*   HGB 11.4*   HCT 35.7*      MCV 88   MCH 28.1   MCHC 31.9*     CMP:  Recent Labs   Lab 06/20/25  0354   CALCIUM 8.4*   ALBUMIN 3.6   PROT 6.7      K 4.1   CO2 23      BUN 17   CREATININE 1.1   ALKPHOS 174*   ALT 10   AST 16   BILITOT 0.4     Recent Labs   Lab 06/20/25  0354   MG 1.5* "   PHOS 2.9     Coagulation:  Recent Labs   Lab 06/19/25  0514   INR 1.1     Lipid Panel:  Recent Labs   Lab 06/19/25  0514   CHOL 123   HDL 38*   LDLCALC 75.2   TRIG 49     Endocrine:  Recent Labs     06/19/25  0514 06/19/25  1113   HGBA1C  --  7.0*   TSH 1.310  --          Assessment/Plan:     Neuro  * Embolic stroke involving left middle cerebral artery  71 y/o male with previous CVA with minimal residual right sided weakness, HTN, HLD, DM presents with LM1 occlusion s/p TNK at 0548 on 6/19/25 and thrombectomy (TICI 3 x 1 pass).    Admit to [facility]  Q2h neuro checks  Q1h vital signs, I/Os  EKG, Echo, CXR reviewed  A1c, TSH, lipid panel, aPTT, PT/INR, UA reviewed  CBC, CMP, mag, and phos daily  Atorvastatin 40  Liberalize SBP goal < 160  MRI reviewed w large L MCA infarct w mass effect and 4mm MLS; L temporal IPH stable.   Hold on starting ASA and SQH given stroke burden and hemorrhage.   Vascular Neurology following   SCD; hold chemo DVT ppx in acute setting  PT/OT/SLP    Right sided weakness  PT/OT consulted     Dysarthria and anarthria  SLP consulted  - OK for meds in pudding, no meals yet  - Repeat SLP consult fail, placed NGT     Aphasia  SLP consulted and following along   NPO   NGT    Cardiac/Vascular  Hyperlipemia  Well controlled with statin, continue   Atorvastatin 40    Hypertension associated with diabetes  Longstanding hypertension   SBP goal 100-140 post successful thrombectomy  - Nicardipine gtt + PRN hydralazine and labetalol available  - Home amlodipine 10, lisinopril 40, metop 50 BID    Endocrine  Type 2 diabetes mellitus without complication, without long-term current use of insulin  Hemoglobin A1c 7  SSI   SLP eval- NPO. NGT placed.   RD consulted for TF recs       Other  Tobacco dependence  Consider nicotine patch if needed   Pt is a daily smoker, relax SpO2 goal > 90%.           The patient is being Prophylaxed for:  Venous Thromboembolism with: Mechanical  Stress Ulcer with: Not Applicable    Ventilator Pneumonia with: not applicable    Activity Orders            Progressive Mobility Protocol (mobilize patient to their highest level of functioning at least twice daily) starting at 06/19 2000    Turn patient starting at 06/19 1200    Elevate HOB starting at 06/19 1004    Diet NPO: NPO starting at 06/19 1004          Full Code    Critical care time spent on the evaluation and treatment of severe organ dysfunction, review of pertinent labs and imaging studies, discussions with consulting providers and discussions with patient/family: 35 minutes.    Nir Núñez, PA-C  Neurocritical Care  [facility] - Neuro Critical [facility]

## 2025-06-20 NOTE — ASSESSMENT & PLAN NOTE
Longstanding hypertension   SBP goal 100-140 post successful thrombectomy  - Nicardipine gtt + PRN hydralazine and labetalol available  - Home amlodipine 10, lisinopril 40, metop 50 BID

## 2025-06-20 NOTE — CONSULTS
"[facility] Neuro Critical Care  Adult Nutrition  Consult Note    SUMMARY     Recommendations  --Initiate continuous TF at trickle rate and slowly advance to goal rate: Impact Peptide 1.5 @ 55 mL/hr to provide 1320 mL total volume, 1980 kcal, 185 g CHO, 124 g protein, 0 g fiber, 1019 mL free water, 132% DRI         --160 mL flush q4 hrs to provide additional 960 mL free water (Total 1979 mL)    --Once TF has been at continuous goal rate > 48 hours, may transition to bolus feeds: Impact Peptide 1.5 at 220 mL q4 hrs to provide 1320 mL total volume    --Nursing: please continue to document rate and formula on flowsheet    --RD to monitor weight, rate, tolerance    Goals: Provide nutrition within 48 hours  Nutrition Goal Status: new  Communication of RD Recs:  (POC)    Nutrition Discharge Planning     Nutrition Discharge Planning: Too early to determine, pending clinical course    Reason for Assessment    Reason For Assessment: consult (Tube feed recommendations)  Diagnosis: stroke/CVA (Embolic stroke involving left middle cerebral artery)  General Information Comments: 71 y/o male with PMH significant for previous CVA with minimal residual right sided weakness, NIDDM, HTN, HLD who presents to [facility] as a transfer for LMCA stroke. RD consult for tube feed recommendations. NG being placed today. Weight stable - no concerns for malnutrition at this time.     Nutrition Related Social Determinants of Health: SDOH: Adequate food in home environment     Food Insecurity: No Food Insecurity (6/20/2025)    Hunger Vital Sign     Worried About Running Out of Food in the Last Year: Never true     Ran Out of Food in the Last Year: Never true       Nutrition/Diet History    Spiritual, Cultural Beliefs, Yarsanism Practices, Values that Affect Care: no  Food Allergies: NKFA  Factors Affecting Nutritional Intake: NPO    Anthropometrics    Height: 5' 10" (177.8 cm)  Height (inches): 70 in  Height Method: Stated  Weight: 89 kg (196 lb 3.4 " oz)  Weight (lb): 196.21 lb  Weight Method: Bed Scale  Ideal Body Weight (IBW), Male: 166 lb  % Ideal Body Weight, Male (lb): 118.2 %  BMI (Calculated): 28.2  BMI Grade: 25 - 29.9 - overweight       Lab/Procedures/Meds    Pertinent Labs Reviewed: reviewed - glucose 172, Mg 1.5, A1c 7, avg glucose 154     Pertinent Medications Reviewed: reviewed - atorvastatin, nicotine patch, bowel reg    Estimated/Assessed Needs    Weight Used For Calorie Calculations: 89 kg (196 lb 3.4 oz)  Energy Calorie Requirements (kcal): 2058 kcal/day (x 1.25 AF)  Energy Need Method: Thornton-St Jeor  Protein Requirements: 107-178 g/day (1.2-2.0 g/kg IBW)  Weight Used For Protein Calculations: 89 kg (196 lb 3.4 oz)     Estimated Fluid Requirement Method: RDA Method  RDA Method (mL): 2058  CHO Requirement: 257 g/day      Nutrition Prescription Ordered    Current Diet Order: NPO    Evaluation of Received Nutrient/Fluid Intake    Energy Calories Required: not meeting needs  Protein Required: not meeting needs  Fluid Required:  (Per MD)  Comments: LBM 6/18  % Intake of Estimated Energy Needs: NPO  % Meal Intake: NPO     PES Statement  Inadequate enteral nutrition infusion related to  (Infusion volume not reached) as evidenced by  (Inadequate EN volume compared to estimated requirements)  Status: New    Nutrition Risk    Level of Risk/Frequency of Follow-up: moderate - high       Monitor and Evaluation    Monitor and Evaluation: Enteral and parenteral nutrition administration, Weight, Electrolyte and renal panel, Gastrointestinal profile, Glucose/endocrine profile, Inflammatory profile, Lipid profile, Nutrition focused physical findings, Skin       Nutrition Follow-Up    RD Follow-up?: Yes

## 2025-06-20 NOTE — PLAN OF CARE
Problem: Physical Therapy  Goal: Physical Therapy Goal  Description: Goals to be met by: 25     Patient will increase functional independence with mobility by performin. Supine to sit with Contact Guard Assistance  2. Sit to supine with Contact Guard Assistance  3. Sit to stand transfer with Moderate Assistance  4. Bed to chair transfer with Maximum Assistance using No Assistive Device  5. Gait  x 10 feet with Maximum Assistance using No Assistive Device.   6. Sitting at edge of bed x5 minutes with Stand-by Assistance  7. Follow 100% of 1-step commands throughout session    Outcome: Progressing   PT eval completed and goals established. Pt will begin PT POC, progressing as tolerated.

## 2025-06-20 NOTE — PROGRESS NOTES
[facility] Critical [facility]  Vascular Neurology  Comprehensive Stroke Center  Progress Note    Assessment/Plan:     * Embolic stroke involving left middle cerebral artery  Cristi Pulido is a 70 y.o. male w/ PMH of HTN, DM, prior stroke w/ residual RSW who presents as a transfer from OSH after presenting with acute onset of dysarthria and RSW. Telestroke was completed and his NIHSS was 19. CT showed probable early ischemic changes in the L insular ribbon and L frontal lobe. CTA demonstrated occlusion of the distal L MCA M1 segment. He was given TNK at 0548. [place] [facility] [place] On arrival, repeat NIHSS of 24. Patient taken for repeat CTH which showed small left temporal hemorrhage along with continued early ischemic changes. Patient taken to IR for thrombectomy and to be admitted to [facility] post-procedure.     NAEON. S/P thrombectomy. TICI 3. TNK monitoring ended at 0548. Aphasic, follows no commands. RSW remains. MRI revealing for blood products in stroke bed, likely reperfusion injury. OK to start monotherapy with either plavix or ASA 6/21/25 for secondary stroke prevention. ECHO unremarkable. Family member at bedside agreeable to NGT placement. Etiology ESUS.       Antithrombotics for secondary stroke prevention: Antiplatelets: None: OK to start monotherapy with ASA 81 mg or Plavix 75 mg 6/21/25.     Statins for secondary stroke prevention and hyperlipidemia, if present:   Statins: Atorvastatin- 40 mg daily    Aggressive risk factor modification: HTN, Smoking, DM, HLD     Rehab efforts: The patient has been evaluated by a stroke team provider and the therapy needs have been fully considered based off the presenting complaints and exam findings. The following therapy evaluations are needed: PT evaluate and treat, OT evaluate and treat, SLP evaluate and treat, PM&R evaluate for appropriate placement    Diagnostics ordered/pending: None     VTE prophylaxis: Mechanical prophylaxis: Place  SCDs  None: Reason for No Pharmacological VTE Prophylaxis: Holding x 24 hours s/p treatment with Thrombolytic therapy    BP parameters: Infarct: Post sucessful thrombectomy, SBP <140        Hyperlipemia  -Stroke risk factor  -LDL 75, goal <70  -Atorvastatin 40 mg daily      Right sided weakness  -Secondary to stroke.   -Aggressive therapy     Dysarthria and anarthria  -Therapy eval and treat    Aphasia  -Secondary to stroke  -Aggressive therapy     Type 2 diabetes mellitus without complication, without long-term current use of insulin  Lab Results   Component Value Date    LABA1C 7.2 (H) 01/29/2018    HGBA1C 7.7 (H) 04/28/2025     Follow up repeat A1c. Hold home antihyperglycemics. SSI for goal -180 while in hospital    Hypertension associated with diabetes  Stroke risk factor  -goal SBP post successful thrombectomy <140    Tobacco dependence  Stroke risk factor  - on cessation  -nicotine patch PRN         06/20/2025 NAEON. S/P thrombectomy. TICI 3. TNK monitoring ended at 0548. Aphasic, follows no commands. RSW remains. MRI revealing for blood products in stroke bed, likely reperfusion injury. OK to start monotherapy with either plavix or ASA 6/21/25 for secondary stroke prevention. ECHO unremarkable. Family member at bedside agreeable to NGT placement. Etiology ESUS.     STROKE DOCUMENTATION   Acute Stroke Times   Last Known Normal Date: 06/19/25  Last Known Normal Time: 0400  Symptom Onset Date: 06/19/25  Symptom Onset Time: 0400  Stroke Team Called Date: 06/19/25  Stroke Team Called Time: 0813  Stroke Team Arrival Date: 06/19/25  Stroke Team Arrival Time: 0813  CT Interpretation Time: 0827  Thrombolytic Therapy Recommended:  (already given prior to transfer)  CTA Interpretation Time:  (already completed prior to transfer)  Thrombectomy Recommended: Yes  Decision to Treat Time for IR: 0832    NIH Scale:  1a. Level of Consciousness: 0-->Alert, keenly responsive  1b. LOC Questions: 2-->Answers neither  question correctly  1c. LOC Commands: 2-->Performs neither task correctly  2. Best Gaze: 0-->Normal  3. Visual: 0-->No visual loss  4. Facial Palsy: 2-->Partial paralysis (total or near-total paralysis of lower face)  5a. Motor Arm, Left: 0-->No drift, limb holds 90 (or 45) degrees for full 10 secs  5b. Motor Arm, Right: 1-->Drift, limb holds 90 (or 45) degrees, but drifts down before full 10 secs, does not hit bed or other support  6a. Motor Leg, Left: 0-->No drift, leg holds 30 degree position for full 5 secs  6b. Motor Leg, Right: 3-->No effort against gravity, leg falls to bed immediately  7. Limb Ataxia: 0-->Absent  8. Sensory: 0-->Normal, no sensory loss  9. Best Language: 2-->Severe aphasia, all communication is through fragmentary expression, great need for inference, questioning, and guessing by the listener. Range of information that can be exchanged is limited, listener carries burden of. . . (see row details)  10. Dysarthria: 2-->Severe dysarthria, patients speech is so slurred as to be unintelligible in the absence of or out of proportion to any dysphasia, or is mute/anarthric  11. Extinction and Inattention (formerly Neglect): 0-->No abnormality  Total (NIH Stroke Scale): 14       Modified Ernest Score: 2  Stephanie Coma Scale:11   ABCD2 Score:    [id number] Score:   HAS -BLED Score:   ICH Score:   Hunt & Kimball Classification:      Hemorrhagic change of an Ischemic Stroke: Does this patient have an ischemic stroke with hemorrhagic changes? Yes, Grading Scale: PH Type 1 (PH-1) = hematoma in < 30% of the infarcted area with some slight space-occupying effect. Is this a symptomatic change?  No - Hemorrhage is not clinically significant     Neurologic Chief Complaint: Embolic Stroke involving Left MCA    Subjective:     Interval History: Patient is seen for follow-up neurological assessment and treatment recommendations: See Hospital Course    HPI, Past Medical, Family, and Social History remains the same  as documented in the initial encounter.     Review of Systems   Unable to perform ROS: Patient nonverbal     Scheduled Meds:   amLODIPine  10 mg Oral QHS    atorvastatin  40 mg Oral Daily    FLUoxetine  20 mg Oral Daily    lisinopriL  40 mg Oral Daily    metoprolol tartrate  50 mg Oral BID    mupirocin   Nasal BID    nicotine  1 patch Transdermal Daily    polyethylene glycol  17 g Oral Daily    senna-docusate  1 tablet Oral BID     Continuous Infusions:   nicardipine  0-15 mg/hr Intravenous Continuous 37.5 mL/hr at 06/20/25 1101 7.5 mg/hr at 06/20/25 1101     PRN Meds:  Current Facility-Administered Medications:     bisacodyL, 10 mg, Rectal, Daily PRN    dextrose 50%, 12.5 g, Intravenous, PRN    dextrose 50%, 25 g, Intravenous, PRN    glucagon (human recombinant), 1 mg, Intramuscular, PRN    insulin aspart U-100, 0-10 Units, Subcutaneous, Q4H PRN    magnesium oxide, 800 mg, Oral, PRN    magnesium oxide, 800 mg, Oral, PRN    ondansetron, 4 mg, Intravenous, Q8H PRN    potassium bicarbonate, 35 mEq, Oral, PRN    potassium bicarbonate, 50 mEq, Oral, PRN    potassium bicarbonate, 60 mEq, Oral, PRN    potassium, sodium phosphates, 2 packet, Oral, PRN    potassium, sodium phosphates, 2 packet, Oral, PRN    potassium, sodium phosphates, 2 packet, Oral, PRN    sodium chloride 0.9%, 10 mL, Intravenous, PRN    sodium chloride 0.9%, 10 mL, Intravenous, PRN    Objective:     Vital Signs (Most Recent):  Temp: 98.2 °F (36.8 °C) (06/20/25 1101)  Pulse: 76 (06/20/25 1415)  Resp: 16 (06/20/25 1101)  BP: 134/63 (06/20/25 1415)  SpO2: (!) 92 % (06/20/25 1415)  BP Location: Right arm    Vital Signs Range (Last 24H):  Temp:  [97.9 °F (36.6 °C)-98.2 °F (36.8 °C)]   Pulse:  []   Resp:  [11-28]   BP: (117-172)/(52-95)   SpO2:  [91 %-100 %]   BP Location: Right arm       Physical Exam  Vitals and nursing note reviewed.   HENT:      Mouth/Throat:      Mouth: Mucous membranes are moist.   Eyes:      Pupils: Pupils are equal, round, and  "reactive to light.   Cardiovascular:      Rate and Rhythm: Normal rate.   Pulmonary:      Effort: No respiratory distress.   Skin:     General: Skin is warm and dry.   Neurological:      Mental Status: He is alert.              Neurological Exam:   LOC: alert  Attention Span: poor  Language: Expressive aphasia, Receptive aphasia  Articulation: Dysarthria  Orientation: Untestable due to severe aphasia   Motor: Arm left  Normal 5/5  Leg left  Normal 5/5  Arm right  Paresis: 2/5  Leg right Paresis: 1/5    Laboratory:  CMP:   Recent Labs   Lab 06/20/25  0354   CALCIUM 8.4*   ALBUMIN 3.6   PROT 6.7      K 4.1   CO2 23      BUN 17   CREATININE 1.1   ALKPHOS 174*   ALT 10   AST 16   BILITOT 0.4     BMP:   Recent Labs   Lab 06/20/25  0354      K 4.1      CO2 23   BUN 17   CREATININE 1.1   CALCIUM 8.4*     CBC:   Recent Labs   Lab 06/20/25  0354   WBC 10.37   RBC 4.05*   HGB 11.4*   HCT 35.7*      MCV 88   MCH 28.1   MCHC 31.9*     Lipid Panel:   Recent Labs   Lab 06/19/25  0514   CHOL 123   LDLCALC 75.2   HDL 38*   TRIG 49     Coagulation:   Recent Labs   Lab 06/19/25  0514   INR 1.1     Platelet Aggregation Study: No results for input(s): "PLTAGG", "PLTAGINTERP", "PLTAGREGLACO", "ADPPLTAGGREG" in the last 168 hours.  Hgb A1C:   Recent Labs   Lab 06/19/25  1113   HGBA1C 7.0*     TSH:   Recent Labs   Lab 06/19/25  0514   TSH 1.310       Diagnostic Results     Brain Imaging   MRI Brain 6/20/25  Impression:     Acute left MCA distribution infarct.  No new large parenchymal hemorrhage.  Susceptibility artifact throughout the infarcted tissue may reflect contrast staining and/or blood products conversion.  Overall mass effect is increased compared to prior with sulcal effacement throughout the left cerebral hemisphere and approximately 4 mm of rightward midline shift.     Stable size of the parenchymal hemorrhage centered in the inferior left temporal lobe.    [facility] 6/20/25: 1242  Impression:   "   Moderate-sized recent left MCA distribution infarct and small intraparenchymal hemorrhage appear grossly unchanged from prior study performed earlier on the same date.     Chronic ischemic change elsewhere with underlying cerebral and cerebellar volume loss, as before.     No new hemorrhage or major vascular distribution infarct elsewhere.  [facility] 6/20/25: 0834  Impression:     Early ischemic changes in the left MCA distribution, new from recent CT.     New left inferior temporal intraparenchymal hematoma.     Chronic ischemic change with cerebral and cerebellar volume loss, as above.     CT 6/19/25  Impression:     Question some early left MCA ischemia corresponding to the patient's known left MCA territory occlusion.  No hemorrhage.     Senescent changes as above.       All CT scans at this facility are performed  using dose modulation techniques as appropriate to performed exam including the following:  automated exposure control; adjustment of mA and/or kV according to the patients size (this includes techniques or standardized protocols for targeted exams where dose is matched to indication/reason for exam: i.e. extremities or head);  iterative reconstruction technique.    Vessel Imaging   CTA head and neck 6/19/25  Impression:     Distal left M1 MCA occlusion with relatively prompt collateralization of the more distal MCA vessels.     Severe stenosis left mid vertebral artery and left PCA.    Cardiac Imaging   TTE 6/19/25    Left Ventricle: The left ventricle is normal in size. Normal wall thickness. There is normal systolic function with a visually estimated ejection fraction of 60 - 65%.    Right Ventricle: The right ventricle is normal in size Wall thickness is normal. Systolic function is normal.    The pulmonary artery pressure could not be estimated.    IVC/SVC: Normal venous pressure at 3 mmHg.       Fabiola Kamara NP  [facility]  Department of Vascular Neurology   [facility]  Critical Care

## 2025-06-20 NOTE — PT/OT/SLP EVAL
Occupational Therapy   Co-Evaluation  Co-treatment performed due to patient's multiple deficits requiring two skilled therapists to appropriately and safely assess patient's strength and endurance while facilitating functional tasks in addition to accommodating for patient's activity tolerance.        Name: Cristi Pulido  MRN: [id number]  Admitting Diagnosis: Embolic stroke involving left middle cerebral artery  Recent Surgery: * No surgery found *      Recommendations:     Discharge Recommendations: High Intensity Therapy  Discharge Equipment Recommendations:  hospital bed, hip kit, wheelchair, slide board  Barriers to discharge:       Assessment:     Cristi Pulido is a 70 y.o. male with a medical diagnosis of Embolic stroke involving left middle cerebral artery.  He presents with the following performance deficits affecting function: weakness, impaired self care skills, impaired functional mobility, impaired cognition, impaired balance, decreased upper extremity function, decreased lower extremity function, decreased safety awareness, impaired coordination.     Pt agreeable to therapy and tolerated fairly. However, pt is significantly limited in ADLs, functional mobility, and functional transfers and is currently not performing tasks at [facility]. Pt was unable to carry out verbal commands except for assisting to stand during standing trial. Pt would continue to benefit from skilled OT services to maximize functional independence with ADLs and functional mobility, reduce caregiver burden, and facilitate safe discharge in the least restrictive environment.      Rehab Prognosis: Good; patient would benefit from acute skilled OT services to address these deficits and reach maximum level of function.       Plan:     Patient to be seen 4 x/week to address the above listed problems via therapeutic activities, therapeutic exercises, self-care/home management  Plan of Care Expires: 07/20/25  Plan of Care Reviewed with: patient,  spouse    Subjective     Chief Complaint: none reported  Patient/Family Comments/goals: pt & spouse agreeable to therapy evaluation    Occupational Profile:  Living Environment: pt lives with wife, son, & grandson  Previous level of function: Mod I  Roles and Routines: pt enjoys watching cowboy movies/shows  Equipment Used at Home: walker, rolling  Assistance upon Discharge: family (but limited physical assistance available)    Pain/Comfort:  Pain Rating 1: other (see comments) (did not indicate)    Patients cultural, spiritual, Gnosticism conflicts given the current situation: no    Objective:     Communicated with: nurse prior to session. Patient found HOB elevated with peripheral IV, telemetry, blood pressure cuff, pulse ox (continuous) upon OT entry to room.    General Precautions: Standard, fall, aspiration  Orthopedic Precautions: N/A  Braces: N/A  Respiratory Status: Room air    Occupational Performance:    Bed Mobility:    Patient completed Scooting to HOB  with maximal assistance and 2 persons    Patient completed Supine to Sit with total assistance and 2 persons    Patient completed Sit to Supine with total assistance and 2 persons    Functional Mobility/Transfers:  Patient completed Sit <> Stand Transfer with maximal assistance and of 2 persons  with  hand-held assist   Once upright, pt required Mod A x2 & then progressed to total A to maintain stand    Functional Mobility: pt unable to ambulate at this time due to significant generalized weakness & inability to carry out voluntary movements upon command    Activities of Daily Living:  Upper Body Dressing: total assistance  Lower Body Dressing: total assistance    Cognitive/Visual Perceptual:  Cognitive/Psychosocial Skills:     -       Follows Commands/attention:Inattentive  -       Communication: dysarthria  -       Safety awareness/insight to disability: impaired   -       Mood/Affect/Coping skills/emotional control: Withdrawn    Physical Exam:  Upper  Extremity Range of Motion:     -       Right Upper Extremity: pt was unable to demonstrate any AROM.  -       Left Upper Extremity: pt was unable to demonstrate any AROM  Upper Extremity Strength:    -       Right Upper Extremity: Deficits: R sided weakness 1/5  -       Left Upper Extremity: Deficits: 1/5   Strength:    -       Right Upper Extremity: unable to demonstrate/complete  -       Left Upper Extremity: unable to demonstrate/complete  Fine Motor Coordination:    -     Unable to complete  Gross motor coordination:   hemiplegia/paresis    AMPA 6 Click ADL:  AMPA Total Score:      Treatment & Education:  -Education on task modification to maximize safety and (I) during bed mobility & transfers  -Provided education regarding role of OT & POC with pt verbalizing understanding.   Pt had no further questions & when asked whether there were any concerns pt reported none.     Patient left sitting edge of bed with all lines intact and call button in reach    GOALS:   Multidisciplinary Problems       Occupational Therapy Goals          Problem: Occupational Therapy    Goal Priority Disciplines Outcome Interventions   Occupational Therapy Goal     OT, PT/OT Progressing    Description: Goals to be met by: 07/20/2025     Patient will increase functional independence with ADLs by performing:    UE Dressing with Moderate Assistance.  LE Dressing with Maximum Assistance.  Grooming while seated with Moderate Assistance.  Toileting from bedside commode with Moderate Assistance for hygiene and clothing management.   Supine to sit with Moderate Assistance.  Stand pivot transfer with Moderate Assistance                         DME Justifications:  Cristi requires a hospital bed due to him requiring positioning of the body in ways not feasible with an ordinary bed due to limited ability and cannot independently make changes in body position without the use of the bed.The positioning of the body cannot be sufficiently resolved  by the use of pillows and wedges.  Cristi Pulido has a mobility limitation that significantly impairs his ability to participate in one or more mobility related activities of daily living (MRADLs) such as toileting, feeding, dressing, grooming, and bathing in customary locations in the home.  The mobility limitation cannot be sufficiently resolved by the use of a cane or walker.   The use of a manual wheelchair will significantly improve the patients ability to participate in MRADLS and the patient will use it on regular basis in the home.  Cristi Pulido has expressed his willingness to use a manual wheelchair in the home. Patients upper body strength is sufficient for propulsion.  He also has a caregiver who is available, willing, and able to provide assistance with the wheelchair when needed.      History:     Past Medical History:   Diagnosis Date    Diabetes mellitus     Hyperlipidemia     Hypertension     Stroke     Stroke     right side weak    Type 2 diabetes mellitus          Past Surgical History:   Procedure Laterality Date    AMPUTATION, LOWER LIMB Bilateral     middle toes    bilateral 3rd toe amputation      COLONOSCOPY  01/2021    FRACTURE SURGERY Left     hip    JOINT REPLACEMENT Left     KOMAL       Time Tracking:     OT Date of Treatment: 06/20/25  OT Start Time: 1013  OT Stop Time: 1037  OT Total Time (min): 24 min    Billable Minutes:Evaluation 24    6/20/2025

## 2025-06-20 NOTE — EICU
MAHINU Night Rounds Checklist  24H Vital Sign Range:  Temp:  [97.1 °F (36.2 °C)-97.6 °F (36.4 °C)]   Pulse:  []   Resp:  [10-40]   BP: (117-321)/()   SpO2:  [91 %-100 %]     Video rounds and LDA reconciliation

## 2025-06-20 NOTE — SUBJECTIVE & OBJECTIVE
Neurologic Chief Complaint: Embolic Stroke involving Left MCA    Subjective:     Interval History: Patient is seen for follow-up neurological assessment and treatment recommendations: See Hospital Course    HPI, Past Medical, Family, and Social History remains the same as documented in the initial encounter.     Review of Systems   Unable to perform ROS: Patient nonverbal     Scheduled Meds:   amLODIPine  10 mg Oral QHS    atorvastatin  40 mg Oral Daily    FLUoxetine  20 mg Oral Daily    lisinopriL  40 mg Oral Daily    metoprolol tartrate  50 mg Oral BID    mupirocin   Nasal BID    nicotine  1 patch Transdermal Daily    polyethylene glycol  17 g Oral Daily    senna-docusate  1 tablet Oral BID     Continuous Infusions:   nicardipine  0-15 mg/hr Intravenous Continuous 37.5 mL/hr at 06/20/25 1101 7.5 mg/hr at 06/20/25 1101     PRN Meds:  Current Facility-Administered Medications:     bisacodyL, 10 mg, Rectal, Daily PRN    dextrose 50%, 12.5 g, Intravenous, PRN    dextrose 50%, 25 g, Intravenous, PRN    glucagon (human recombinant), 1 mg, Intramuscular, PRN    insulin aspart U-100, 0-10 Units, Subcutaneous, Q4H PRN    magnesium oxide, 800 mg, Oral, PRN    magnesium oxide, 800 mg, Oral, PRN    ondansetron, 4 mg, Intravenous, Q8H PRN    potassium bicarbonate, 35 mEq, Oral, PRN    potassium bicarbonate, 50 mEq, Oral, PRN    potassium bicarbonate, 60 mEq, Oral, PRN    potassium, sodium phosphates, 2 packet, Oral, PRN    potassium, sodium phosphates, 2 packet, Oral, PRN    potassium, sodium phosphates, 2 packet, Oral, PRN    sodium chloride 0.9%, 10 mL, Intravenous, PRN    sodium chloride 0.9%, 10 mL, Intravenous, PRN    Objective:     Vital Signs (Most Recent):  Temp: 98.2 °F (36.8 °C) (06/20/25 1101)  Pulse: 76 (06/20/25 1415)  Resp: 16 (06/20/25 1101)  BP: 134/63 (06/20/25 1415)  SpO2: (!) 92 % (06/20/25 1415)  BP Location: Right arm    Vital Signs Range (Last 24H):  Temp:  [97.9 °F (36.6 °C)-98.2 °F (36.8 °C)]   Pulse:   "[]   Resp:  [11-28]   BP: (117-172)/(52-95)   SpO2:  [91 %-100 %]   BP Location: Right arm       Physical Exam  Vitals and nursing note reviewed.   HENT:      Mouth/Throat:      Mouth: Mucous membranes are moist.   Eyes:      Pupils: Pupils are equal, round, and reactive to light.   Cardiovascular:      Rate and Rhythm: Normal rate.   Pulmonary:      Effort: No respiratory distress.   Skin:     General: Skin is warm and dry.   Neurological:      Mental Status: He is alert.              Neurological Exam:   LOC: alert  Attention Span: poor  Language: Expressive aphasia, Receptive aphasia  Articulation: Dysarthria  Orientation: Untestable due to severe aphasia   Motor: Arm left  Normal 5/5  Leg left  Normal 5/5  Arm right  Paresis: 2/5  Leg right Paresis: 1/5    Laboratory:  CMP:   Recent Labs   Lab 06/20/25  0354   CALCIUM 8.4*   ALBUMIN 3.6   PROT 6.7      K 4.1   CO2 23      BUN 17   CREATININE 1.1   ALKPHOS 174*   ALT 10   AST 16   BILITOT 0.4     BMP:   Recent Labs   Lab 06/20/25  0354      K 4.1      CO2 23   BUN 17   CREATININE 1.1   CALCIUM 8.4*     CBC:   Recent Labs   Lab 06/20/25  0354   WBC 10.37   RBC 4.05*   HGB 11.4*   HCT 35.7*      MCV 88   MCH 28.1   MCHC 31.9*     Lipid Panel:   Recent Labs   Lab 06/19/25  0514   CHOL 123   LDLCALC 75.2   HDL 38*   TRIG 49     Coagulation:   Recent Labs   Lab 06/19/25  0514   INR 1.1     Platelet Aggregation Study: No results for input(s): "PLTAGG", "PLTAGINTERP", "PLTAGREGLACO", "ADPPLTAGGREG" in the last 168 hours.  Hgb A1C:   Recent Labs   Lab 06/19/25  1113   HGBA1C 7.0*     TSH:   Recent Labs   Lab 06/19/25  0514   TSH 1.310       Diagnostic Results     Brain Imaging   MRI Brain 6/20/25  Impression:     Acute left MCA distribution infarct.  No new large parenchymal hemorrhage.  Susceptibility artifact throughout the infarcted tissue may reflect contrast staining and/or blood products conversion.  Overall mass effect is " increased compared to prior with sulcal effacement throughout the left cerebral hemisphere and approximately 4 mm of rightward midline shift.     Stable size of the parenchymal hemorrhage centered in the inferior left temporal lobe.    [facility] 6/20/25: 1242  Impression:     Moderate-sized recent left MCA distribution infarct and small intraparenchymal hemorrhage appear grossly unchanged from prior study performed earlier on the same date.     Chronic ischemic change elsewhere with underlying cerebral and cerebellar volume loss, as before.     No new hemorrhage or major vascular distribution infarct elsewhere.  CT 6/20/25: 0834  Impression:     Early ischemic changes in the left MCA distribution, new from recent CT.     New left inferior temporal intraparenchymal hematoma.     Chronic ischemic change with cerebral and cerebellar volume loss, as above.     CT 6/19/25  Impression:     Question some early left MCA ischemia corresponding to the patient's known left MCA territory occlusion.  No hemorrhage.     Senescent changes as above.       All CT scans at this facility are performed  using dose modulation techniques as appropriate to performed exam including the following:  automated exposure control; adjustment of mA and/or kV according to the patients size (this includes techniques or standardized protocols for targeted exams where dose is matched to indication/reason for exam: i.e. extremities or head);  iterative reconstruction technique.    Vessel Imaging   CTA head and neck 6/19/25  Impression:     Distal left M1 MCA occlusion with relatively prompt collateralization of the more distal MCA vessels.     Severe stenosis left mid vertebral artery and left PCA.    Cardiac Imaging   TTE 6/19/25    Left Ventricle: The left ventricle is normal in size. Normal wall thickness. There is normal systolic function with a visually estimated ejection fraction of 60 - 65%.    Right Ventricle: The right ventricle is normal in size  Wall thickness is normal. Systolic function is normal.    The pulmonary artery pressure could not be estimated.    IVC/SVC: Normal venous pressure at 3 mmHg.

## 2025-06-20 NOTE — PLAN OF CARE
[facility] Neuro Critical Care  Initial Discharge Assessment       Primary Care Provider: Rell Winn MD    Admission Diagnosis: Stroke [I63.9]  Acute focal neurological deficit [R29.818]    Admission Date: 6/19/2025  Expected Discharge Date:     Transition of Care Barriers: None    Payor: [employer] MANAGED MEDICARE / Plan: [employer] MEDICARE HMO / Product Type: Capitation /     Extended Emergency Contact Information  Primary Emergency Contact: Jocy Pulido  Address: [address] [place] [address] [place]  Mobile Phone: [phone]  Relation: Spouse    Discharge Plan A: Rehab  Discharge Plan B: Home with [facility] - [place], [place] - [address]  [address] [facility] [address]  Phone: [phone] Fax: [phone]    [id number] - [place] - [address] AT [facility] [address]  [address]  [place] [address]  Phone: [phone] Fax: [phone]      Initial Assessment (most recent)       Adult Discharge Assessment - 06/20/25 1407          Discharge Assessment    Assessment Type Discharge Planning Assessment     Confirmed/corrected address, phone number and insurance Yes     Source of Information family     Communicated ESSIE with patient/caregiver Yes     People in Home spouse     Name(s) of People in Home Jocy Pulido (Spouse)  [phone]     Do you expect to return to your current living situation? Yes     Do you have help at home or someone to help you manage your care at home? No     Prior to hospitilization cognitive status: Unable to Assess     Current cognitive status: Unable to Assess     Walking or Climbing Stairs Difficulty yes     Walking or Climbing Stairs ambulation difficulty, requires equipment     Mobility Management walker     Dressing/Bathing Difficulty no     Home Layout Able to live on 1st floor     Equipment Currently Used at Home walker, standard      Readmission within 30 days? No     Patient currently being followed by outpatient case management? No     Do you currently have service(s) that help you manage your care at home? No     Do you take prescription medications? Yes     Do you have prescription coverage? Yes     Do you have any problems affording any of your prescribed medications? No     Is the patient taking medications as prescribed? yes     Who is going to help you get home at discharge? AshokJocy (Spouse)  [phone]     How do you get to doctors appointments? car, drives self;family or friend will provide     Are you on dialysis? No     Do you take coumadin? No     Discharge Plan A Rehab     Discharge Plan B Home with family     DME Needed Upon Discharge  none     Discharge Plan discussed with: Spouse/sig other     Name(s) and Number(s) Jocy Pulido (Spouse)  [phone]     Transition of Care Barriers None        Physical Activity    On average, how many days per week do you engage in moderate to strenuous exercise (like a brisk walk)? 0 days     On average, how many minutes do you engage in exercise at this level? 0 min        Financial Resource Strain    How hard is it for you to pay for the very basics like food, housing, medical care, and heating? Not hard at all        Housing Stability    In the last 12 months, was there a time when you were not able to pay the mortgage or rent on time? No     At any time in the past 12 months, were you homeless or living in a shelter (including now)? No        Transportation Needs    In the past 12 months, has lack of transportation kept you from medical appointments or from getting medications? No     In the past 12 months, has lack of transportation kept you from meetings, work, or from getting things needed for daily living? No        Food Insecurity    Within the past 12 months, you worried that your food would run out before you got the money to buy more. Never true     Within the past 12  months, the food you bought just didn't last and you didn't have money to get more. Never true        Stress    Do you feel stress - tense, restless, nervous, or anxious, or unable to sleep at night because your mind is troubled all the time - these days? Patient unable to answer        Social Isolation    How often do you feel lonely or isolated from those around you?  Patient unable to answer        Alcohol Use    Q1: How often do you have a drink containing alcohol? Never     Q2: How many drinks containing alcohol do you have on a typical day when you are drinking? Patient does not drink     Q3: How often do you have six or more drinks on one occasion? Never        Utilities    In the past 12 months has the electric, gas, oil, or water company threatened to shut off services in your home? No        Health Literacy    How often do you need to have someone help you when you read instructions, pamphlets, or other written material from your doctor or pharmacy? Patient unable to respond                   The SW met with the patient Jocy Pulido (Spouse)[phone] at bedside. The SW placed name and contact information on the blackboard in the patient's room. Use preferred pharmacy / bedside delivery for any necessary medications at the time of discharge. The patient is independent with all ADLs. The patient is not on Dialysis or Coumadin but takes plavix. The Patient uses a walker but does not use  home oxygen, The patient's spouse will provide assistance to the patient upon discharge. The patient's spouse will provide transportation upon discharge. SW will continue to follow for course of hospitalization.  A discharge planning booklet was left at bedside.        Discharge Plan A and Plan B have been determined by review of patient's clinical status, future medical and therapeutic needs, and coverage/benefits for post-acute care in coordination with multidisciplinary team members.    Ashley YEH, LCSW [facility] [place]  Case Management Dept.                  normal (ped)...

## 2025-06-20 NOTE — SUBJECTIVE & OBJECTIVE
"Review of Systems   Reason unable to perform ROS: aphasia.     Objective:     Vitals:    Temp: 98.6 °F (37 °C)  Pulse: 85  Rhythm: normal sinus rhythm  BP: 113/83  MAP (mmHg): 95  Resp: 14  SpO2: (!) 92 %    Temp  Min: 97.9 °F (36.6 °C)  Max: 98.6 °F (37 °C)  Pulse  Min: 66  Max: 115  BP  Min: 113/83  Max: 172/79  MAP (mmHg)  Min: 80  Max: 113  Resp  Min: 11  Max: 27  SpO2  Min: 90 %  Max: 100 %    06/19 0701 - 06/20 0700  In: 1607.3 [I.V.:607.3]  Out: 1611 [Urine:1600]            Physical Exam  Vitals and nursing note reviewed.   HENT:      Head: Normocephalic and atraumatic.      Right Ear: External ear normal.      Left Ear: External ear normal.      Nose: Nose normal.      Mouth/Throat:      Mouth: Mucous membranes are moist.      Pharynx: Oropharynx is clear.   Eyes:      Pupils: Pupils are equal, round, and reactive to light.   Cardiovascular:      Rate and Rhythm: Normal rate and regular rhythm.   Pulmonary:      Effort: Pulmonary effort is normal. No respiratory distress.   Musculoskeletal:      Cervical back: Normal range of motion.      Right lower leg: No edema.      Left lower leg: No edema.      Comments: 2 toes amputated   Skin:     General: Skin is warm and dry.   Neurological:      Comments: -GCS E4V2M5  -Alert. Nonverbal, some grunts/mumbles to questions. Occasionally sounds like he's saying "yes" or "no" appropriately to questions. Does not follow any commands.   -Cranial nerves:   R facial weakness, left gaze deviation, right hemianopsia  -Motor :   RUE: minimal flexion to noxious stimuli  LUE: Full antigravity movement, no drift  RLE: some spontaneous movement, triple flexion to noxious stimuli  LLE: full antigravity movement   -Sensation: grimacing to painful stimuli in right upper extremity                 Today I personally reviewed pertinent medications, imaging, cardiology results, laboratory results, notably:    - Echo complete, unremarkable  - CT head post-thrombectomy reviewed, hemorrhage " stable  - TSH wnl  - Hemoglobin A1c 7.0     Laboratory:  CBC:  Recent Labs   Lab 06/20/25  0354   WBC 10.37   RBC 4.05*   HGB 11.4*   HCT 35.7*      MCV 88   MCH 28.1   MCHC 31.9*     CMP:  Recent Labs   Lab 06/20/25  0354   CALCIUM 8.4*   ALBUMIN 3.6   PROT 6.7      K 4.1   CO2 23      BUN 17   CREATININE 1.1   ALKPHOS 174*   ALT 10   AST 16   BILITOT 0.4     Recent Labs   Lab 06/20/25  0354   MG 1.5*   PHOS 2.9     Coagulation:  Recent Labs   Lab 06/19/25  0514   INR 1.1     Lipid Panel:  Recent Labs   Lab 06/19/25  0514   CHOL 123   HDL 38*   LDLCALC 75.2   TRIG 49     Endocrine:  Recent Labs     06/19/25  0514 06/19/25  1113   HGBA1C  --  7.0*   TSH 1.310  --

## 2025-06-20 NOTE — HOSPITAL COURSE
06/20/2025 Liberalize SBP goal < 160. SLP eval. NGT placed. RD consult for TF. MRI reviewed w large L MCA infarct w mass effect and 4mm MLS; L temporal IPH stable. Hold on starting ASA and SQH given stroke burden and hemorrhage. Q2h neuro checks.   06/21/2025 Holding AP till 6/23 per VN. Pt is hyperglycemic on impact peptide 1.5, transitioned to glucerna 1.5. Start lovenox.   06/22/2025 ASA 81 added. Hyperglycemic, added scheduled aspart 4U q4h. Stable for step down to vascular neurology.   06/23/2025 Increased aspart 6U q4h. Awaiting step down bed to vascular neurology.

## 2025-06-20 NOTE — PLAN OF CARE
"[facility] Care Plan    POC reviewed with Cristi Pulido and family at 0300. Family verbalized understanding. Questions and concerns addressed. No acute events today. Pt progressing toward goals. Will continue to monitor. See below and flowsheets for full assessment and VS info.     - MRI completed- see results for details   - NAEON        Is this a stroke patient? yes- Stroke booklet reviewed with family and is at bedside.   Care Plan Individualization:     Neuro:  Stephanie Coma Scale  Best Eye Response: 4-->(E4) spontaneous  Best Motor Response: 5-->(M5) localizes pain  Best Verbal Response: 2-->(V2) incomprehensible speech  Bagley Coma Scale Score: 11  Assessment Qualifiers: Patient not sedated/intubated  Pupil PERRLA: yes     24 hr Temp:  [97.1 °F (36.2 °C)-98.1 °F (36.7 °C)]     CV:   Rhythm: normal sinus rhythm  BP goals:   SBP < 140  MAP > 65    Resp:      Oxygen Concentration (%): 6    Plan: N/A    GI/:     Diet/Nutrition Received: NPO  Last Bowel Movement: 06/18/25  Voiding Characteristics: external catheter    Intake/Output Summary (Last 24 hours) at 6/20/2025 0610  Last data filed at 6/20/2025 0325  Gross per 24 hour   Intake 1607.27 ml   Output 611 ml   Net 996.27 ml          Labs/Accuchecks:  Recent Labs   Lab 06/20/25  0354   WBC 10.37   RBC 4.05*   HGB 11.4*   HCT 35.7*         Recent Labs   Lab 06/20/25  0354      K 4.1   CO2 23      BUN 17   CREATININE 1.1   ALKPHOS 174*   ALT 10   AST 16   BILITOT 0.4      Recent Labs   Lab 06/19/25  0514   PROTIME 11.6   INR 1.1    No results for input(s): "CPK", "CPKMB", "TROPONINI", "MB" in the last 168 hours.    Electrolytes: N/A - electrolytes WDL  Accuchecks: Q6H    Gtts:   nicardipine  0-15 mg/hr Intravenous Continuous 50 mL/hr at 06/20/25 0325 10 mg/hr at 06/20/25 0325       LDA/Wounds:    Hilton Risk Assessment  Sensory Perception: 3-->slightly limited  Moisture: 3-->occasionally moist  Activity: 1-->bedfast  Mobility: 2-->very " limited  Nutrition: 3-->adequate  Friction and Shear: 3-->no apparent problem  Hilton Score: 15  Is your hilton score 12 or less? no          Restraints:   Restraint Order  Length of Order: Order good for next 24 hours or when removed.  Date that the current order will : 25  Time that the current order will :   Order Upon Application: Yes    [facility]

## 2025-06-20 NOTE — HOSPITAL COURSE
06/20/2025 NAEON. S/P thrombectomy. TICI 3. TNK monitoring ended at 0548. Aphasic, follows no commands. RSW remains. MRI revealing for blood products in stroke bed, likely reperfusion injury. OK to start monotherapy with either plavix or ASA 6/21/25 for secondary stroke prevention. ECHO unremarkable. Family member at bedside agreeable to NGT placement. Etiology ESUS.   6/21/2025 NAEON. Neuro exam stable. Recommend holding AP therapy for now.   06/22/2025 NAEON. Neuro exam stable. Start AP therapy with ASA.   06/23/2025 NAEON. SLP recs NPO. PEG discussed with family and they are agreeable. Pt S/D to NPU.   06/24/2025 NGT pulled overnight and replaced. Placement confirmed via X ray. IR consulted for PEG placement. JEANNE, Creatinine and BUN uptrending. FWF flushes ordered. Restless during night. EKG repeated. QTc 441. Seroquel 25 mg QHS ordered. Family requesting [facility] when medically ready.   06/25/2025 NAEON. Plans for PEG placement today. BUN and Cr uptrending. Fluids started. Lisinopril held.   06/26/2025 NAEON. PEG placed yesterday, will resume tube feeds this afternoon starting with trickle feeds. BUN and Cr Improved. Increased water boluses to 300.  06/27/2025 NAEON. Neuro exam stable. Patient has tolerated tube feeds well. Will continue to monitor kidney function for now. Trial off restraints today to prepare for rehab placement. Added Lantus 15 units. MBSS today, patient able to have puree for pleasure feeds per speech.   06/28/2025 Na 152. 0.45% NS at 100 ml/hr ordered x 12 hrs. Will repeat sodium draw scheduled at 2100. Pulled PEG. IR unable to replace PEG. Will attempt in coming days. NGT replaced, placement confirmed via Xray. TF and FWF restarted.   06/29/2025 Na 154, BUN and Cr improving. Continuing fluids. NGT coiled overnight. NG Xray showed possible pneumoperitoneum. CT AP without contrast ordered. Showed free air in the abdomen, advised not to use NGT for now. General Surgery consulted to discuss  PEG placement.   06/30/2025 Pulled NG tube overnight. Na stable, Cr and BUN continue to improve. Fluids reordered. Discussed PEG replacement with GI and Gen Surg, deferred to IR for replacement. IR reconsulted this morning. Must wait approximately 1 week to re attempt PEG placement. Placement planned for 7/3. Will need to replace NG tube 7/2. NPO at midnight 7/3.   07/01/2025 Switched 0.45 NS to D5W @60 cc/hr. Hypernatremic at 153. Free water deficit 1.9L   07/02/2025 Hypernatremia improving. Continuing D5W. Plan for G tube tomorrow per IR.   07/03/2025: G tube placement per IR. Decreased D5W to 30 cc/hr.   07/04/2025 S/p G tube placement. Will start tube feeds later, increased statin to 80 mg, added plavix 75 mg, and Niacin 1g nightly.   07/05/2025 NAEON. Started tube feeds. BG optimization ongoing.   07/06/2025 NAEON. Pt with increased creatinine. Gave bolus. Believe JEANNE is combination of prerenal and post renal as he has been retaining urine periodically.   07/07/2025. NAEON. Pt creatinine not improved despite fluids and underwood. Ordered Renal US.   07/08/2025. NAEON. Renal US unremarkable for hydronephrosis. Creatinine normalized. Increased lantus to 20 units, aspart to 8 units.   07/09/2025. NAEON. Pt seems to be eating full tray. Paused tube feeds.   07/10/2025. Pt eating 100% of tray meals. Switch insulin to [facility].   07/11/2025. Pt discharging to [facility]. Increased insulin to Lantus 25 units+ aspart 10 units TIDWM. Outpt referral placed for podiatry for right foot callus. Vascular US of carotids ordered, f/u imaging.

## 2025-06-20 NOTE — PT/OT/SLP EVAL
Physical Therapy Co-Evaluation with OT     Patient Name:  Cristi Pulido   MRN:  [id number]    Co-evaluation with OT performed due to patient complexity and deficits, requiring two skilled therapists to appropriately and safely assess patient's strength and endurance while facilitating functional tasks in addition to accommodating for patient's activity tolerance.    Recommendations:     Discharge Recommendations: High Intensity Therapy   Discharge Equipment Recommendations: wheelchair, lift device, hospital bed   Barriers to discharge: Inaccessible home and Decreased caregiver support    Assessment:     Cristi Pulido is a 70 y.o. male admitted with a medical diagnosis of Embolic stroke involving left middle cerebral artery.  He presents with the following impairments/functional limitations: weakness, impaired sensation, impaired self care skills, impaired functional mobility, impaired balance, impaired cognition, decreased upper extremity function, decreased lower extremity function, decreased safety awareness. Patient did not follow any verbal-only commands throughout session however was able to complete some activity with tactile cueing for initiation. He completed 1 sit to stand transfer with heavy assistance however was unable to attempt ambulation. Some spontaneous movement noted at LLE when seated at EOB, none at RLE however patient did display some activation + stiffness at RLE during bed mobility. Recommending high intensity post-acute therapy after discharge to maximize benefits; patient could tolerate 3xhours/day of combined therapies.      Rehab Prognosis: Good; patient would benefit from acute skilled PT services to address these deficits and reach maximum level of function.    Recent Surgery: * No surgery found *      Plan:     During this hospitalization, patient to be seen 4 x/week to address the identified rehab impairments via gait training, therapeutic activities, therapeutic exercises, neuromuscular  "re-education and progress toward the following goals:    Plan of Care Expires:  07/11/25    Subjective     Chief Complaint: not stated  Patient/Family Comments/goals: Patient intermittently verbalizing   Pain/Comfort:  Pain Rating 1: 0/10 (no indication)  Pain Rating Post-Intervention 1: 0/10    Patients cultural, spiritual, Episcopal conflicts given the current situation: no    Living Environment: Patient lives with wife and son and grandson in [facility] with 0STE. WIS.  Prior Level of Function: independent, driving   DME used: walker, rolling  DME owned (not currently used): none  Assistance upon Discharge: family      Objective:     Communicated with RN prior to session.  Patient found HOB elevated with telemetry, blood pressure cuff, pulse ox (continuous), peripheral IV, restraints (roll belt), condom catheter,  upon PT entry to room. Wife in room.     General Precautions: Standard, fall, aspiration  Orthopedic Precautions:N/A   Braces: N/A  Respiratory Status: Nasal cannula, flow 2 L/min    Cognition:   Affect: cooperative and flat  Alertness: eyes open 100 % of session  Insight: poor insight into deficits and decreased safety awareness  Attention: requires redirection to task  Command Following: patient follows 0 % of 1-step commands  Communication: intermittent verbalizations "yeah", grunting     Exams:  Cognitive Exam:  oriented x0  Sensation: unable to formally assess due to cognition   RLE ROM: WFL  RLE Strength: unable to formally assess due to cognition  LLE ROM: WFL  LLE Strength: unable to formally assess due to cognition, spontaneous movement noted     Functional Mobility:  Bed Mobility:     Rolling Left:  total assistance  Rolling Right: total assistance  Scooting: maximal assistance  Supine to Sit: total assistance and of 2 persons  Sit to Supine: total assistance and of 2 persons  Transfers:     Sit to Stand:  total assistance and of 2 persons with hand-held assist  Balance:   Sitting static: " maxA-Meño  Posterolateral R lean  Sitting dynamic: maxA  Standing static: modAx2      AM-PAC 6 CLICK MOBILITY  Total Score:9       Treatment & Education:  Education: patient educated on POC, need for therapy, safety with mobility, discharge recommendations and equipment recommendations. Patient unable to verbalize understanding.  Cues during rolling and bed mobility for UE reaching and positioning on bed railings, LE management with rolling technique, cues for visual attention to UE for initiation. Assistance as above.   Verbal and tactile cues with assist during STS transfer for optimal LOU prior to transfer, forward weight shift to initiate, to engage LE mm, extend hips forward and bring head and chest up to achieve full upright stance.    EOB balance with cues to engage core mm to decrease assistance needed and increase safety in sitting.     Patient left HOB elevated with all lines intact and call button in reach.    GOALS:   Multidisciplinary Problems       Physical Therapy Goals          Problem: Physical Therapy    Goal Priority Disciplines Outcome Interventions   Physical Therapy Goal     PT, PT/OT Progressing    Description: Goals to be met by: 25     Patient will increase functional independence with mobility by performin. Supine to sit with Contact Guard Assistance  2. Sit to supine with Contact Guard Assistance  3. Sit to stand transfer with Moderate Assistance  4. Bed to chair transfer with Maximum Assistance using No Assistive Device  5. Gait  x 10 feet with Maximum Assistance using No Assistive Device.   6. Sitting at edge of bed x5 minutes with Stand-by Assistance  7. Follow 100% of 1-step commands throughout session                         History:     Past Medical History:   Diagnosis Date    Diabetes mellitus     Hyperlipidemia     Hypertension     Stroke     Stroke     right side weak    Type 2 diabetes mellitus        Past Surgical History:   Procedure Laterality Date    AMPUTATION,  LOWER LIMB Bilateral     middle toes    bilateral 3rd toe amputation      COLONOSCOPY  01/2021    FRACTURE SURGERY Left     hip    JOINT REPLACEMENT Left     KOMAL       Time Tracking:     PT Received On: 06/20/25  PT Start Time: 1015     PT Stop Time: 1037  PT Total Time (min): 22 min     Billable Minutes: Evaluation 10 minutes and Neuromuscular Re-education 12 minutes      06/20/2025

## 2025-06-20 NOTE — ASSESSMENT & PLAN NOTE
71 y/o male with previous CVA with minimal residual right sided weakness, HTN, HLD, DM presents with LM1 occlusion s/p TNK at 0548 on 6/19/25 and thrombectomy (TICI 3 x 1 pass).    Admit to [facility]CC  Q2h neuro checks  Q1h vital signs, I/Os  EKG, Echo, CXR reviewed  A1c, TSH, lipid panel, aPTT, PT/INR, UA reviewed  CBC, CMP, mag, and phos daily  Atorvastatin 40  Liberalize SBP goal < 160  MRI reviewed w large L MCA infarct w mass effect and 4mm MLS; L temporal IPH stable.   Hold on starting ASA and SQH given stroke burden and hemorrhage.   Vascular Neurology following   SCD; hold chemo DVT ppx in acute setting  PT/OT/SLP

## 2025-06-21 LAB
ABSOLUTE EOSINOPHIL (OHS): 0.03 K/UL
ABSOLUTE MONOCYTE (OHS): 0.81 K/UL (ref 0.3–1)
ABSOLUTE NEUTROPHIL COUNT (OHS): 8.56 K/UL (ref 1.8–7.7)
ALBUMIN SERPL BCP-MCNC: 3.8 G/DL (ref 3.5–5.2)
ALP SERPL-CCNC: 189 UNIT/L (ref 40–150)
ALT SERPL W/O P-5'-P-CCNC: 10 UNIT/L (ref 10–44)
ANION GAP (OHS): 13 MMOL/L (ref 8–16)
AST SERPL-CCNC: 23 UNIT/L (ref 11–45)
BASOPHILS # BLD AUTO: 0.04 K/UL
BASOPHILS NFR BLD AUTO: 0.4 %
BILIRUB SERPL-MCNC: 0.6 MG/DL (ref 0.1–1)
BUN SERPL-MCNC: 16 MG/DL (ref 8–23)
CALCIUM SERPL-MCNC: 8.8 MG/DL (ref 8.7–10.5)
CHLORIDE SERPL-SCNC: 108 MMOL/L (ref 95–110)
CO2 SERPL-SCNC: 23 MMOL/L (ref 23–29)
CREAT SERPL-MCNC: 1 MG/DL (ref 0.5–1.4)
ERYTHROCYTE [DISTWIDTH] IN BLOOD BY AUTOMATED COUNT: 13.7 % (ref 11.5–14.5)
GFR SERPLBLD CREATININE-BSD FMLA CKD-EPI: >60 ML/MIN/1.73/M2
GLUCOSE SERPL-MCNC: 190 MG/DL (ref 70–110)
HCT VFR BLD AUTO: 36.8 % (ref 40–54)
HGB BLD-MCNC: 11.9 GM/DL (ref 14–18)
IMM GRANULOCYTES # BLD AUTO: 0.04 K/UL (ref 0–0.04)
IMM GRANULOCYTES NFR BLD AUTO: 0.4 % (ref 0–0.5)
LYMPHOCYTES # BLD AUTO: 1.82 K/UL (ref 1–4.8)
MAGNESIUM SERPL-MCNC: 1.6 MG/DL (ref 1.6–2.6)
MCH RBC QN AUTO: 28.6 PG (ref 27–31)
MCHC RBC AUTO-ENTMCNC: 32.3 G/DL (ref 32–36)
MCV RBC AUTO: 89 FL (ref 82–98)
NUCLEATED RBC (/100WBC) (OHS): 0 /100 WBC
PHOSPHATE SERPL-MCNC: 3 MG/DL (ref 2.7–4.5)
PLATELET # BLD AUTO: 174 K/UL (ref 150–450)
PMV BLD AUTO: 12.8 FL (ref 9.2–12.9)
POCT GLUCOSE: 189 MG/DL (ref 70–110)
POCT GLUCOSE: 194 MG/DL (ref 70–110)
POCT GLUCOSE: 211 MG/DL (ref 70–110)
POCT GLUCOSE: 246 MG/DL (ref 70–110)
POCT GLUCOSE: 264 MG/DL (ref 70–110)
POTASSIUM SERPL-SCNC: 3.9 MMOL/L (ref 3.5–5.1)
PROT SERPL-MCNC: 7.2 GM/DL (ref 6–8.4)
RBC # BLD AUTO: 4.16 M/UL (ref 4.6–6.2)
RELATIVE EOSINOPHIL (OHS): 0.3 %
RELATIVE LYMPHOCYTE (OHS): 16.1 % (ref 18–48)
RELATIVE MONOCYTE (OHS): 7.2 % (ref 4–15)
RELATIVE NEUTROPHIL (OHS): 75.6 % (ref 38–73)
SODIUM SERPL-SCNC: 144 MMOL/L (ref 136–145)
WBC # BLD AUTO: 11.3 K/UL (ref 3.9–12.7)

## 2025-06-21 PROCEDURE — 25000003 PHARM REV CODE 250: Mod: HCNC

## 2025-06-21 PROCEDURE — 25000003 PHARM REV CODE 250: Mod: HCNC | Performed by: PHYSICIAN ASSISTANT

## 2025-06-21 PROCEDURE — 85025 COMPLETE CBC W/AUTO DIFF WBC: CPT | Mod: HCNC | Performed by: PHYSICIAN ASSISTANT

## 2025-06-21 PROCEDURE — 25000003 PHARM REV CODE 250: Mod: HCNC | Performed by: PSYCHIATRY & NEUROLOGY

## 2025-06-21 PROCEDURE — 99291 CRITICAL CARE FIRST HOUR: CPT | Mod: HCNC,FS,, | Performed by: PSYCHIATRY & NEUROLOGY

## 2025-06-21 PROCEDURE — 83735 ASSAY OF MAGNESIUM: CPT | Mod: HCNC | Performed by: PHYSICIAN ASSISTANT

## 2025-06-21 PROCEDURE — 80053 COMPREHEN METABOLIC PANEL: CPT | Mod: HCNC | Performed by: PHYSICIAN ASSISTANT

## 2025-06-21 PROCEDURE — 99499 UNLISTED E&M SERVICE: CPT | Mod: HCNC,,,

## 2025-06-21 PROCEDURE — 51798 US URINE CAPACITY MEASURE: CPT | Mod: HCNC

## 2025-06-21 PROCEDURE — 20000000 HC ICU ROOM: Mod: HCNC

## 2025-06-21 PROCEDURE — 51701 INSERT BLADDER CATHETER: CPT | Mod: HCNC

## 2025-06-21 PROCEDURE — 99291 CRITICAL CARE FIRST HOUR: CPT | Mod: HCNC,,, | Performed by: PSYCHIATRY & NEUROLOGY

## 2025-06-21 PROCEDURE — S4991 NICOTINE PATCH NONLEGEND: HCPCS | Mod: HCNC | Performed by: PHYSICIAN ASSISTANT

## 2025-06-21 PROCEDURE — 63600175 PHARM REV CODE 636 W HCPCS: Mod: HCNC

## 2025-06-21 PROCEDURE — 94761 N-INVAS EAR/PLS OXIMETRY MLT: CPT | Mod: HCNC

## 2025-06-21 PROCEDURE — 84100 ASSAY OF PHOSPHORUS: CPT | Mod: HCNC | Performed by: PHYSICIAN ASSISTANT

## 2025-06-21 RX ORDER — LANOLIN ALCOHOL/MO/W.PET/CERES
800 CREAM (GRAM) TOPICAL
Status: DISCONTINUED | OUTPATIENT
Start: 2025-06-21 | End: 2025-06-24

## 2025-06-21 RX ORDER — METOPROLOL TARTRATE 50 MG/1
50 TABLET ORAL 2 TIMES DAILY
Status: DISCONTINUED | OUTPATIENT
Start: 2025-06-21 | End: 2025-06-26

## 2025-06-21 RX ORDER — ATORVASTATIN CALCIUM 40 MG/1
40 TABLET, FILM COATED ORAL DAILY
Status: DISCONTINUED | OUTPATIENT
Start: 2025-06-22 | End: 2025-06-26

## 2025-06-21 RX ORDER — AMOXICILLIN 250 MG
1 CAPSULE ORAL 2 TIMES DAILY
Status: DISCONTINUED | OUTPATIENT
Start: 2025-06-21 | End: 2025-06-26

## 2025-06-21 RX ORDER — POLYETHYLENE GLYCOL 3350 17 G/17G
17 POWDER, FOR SOLUTION ORAL DAILY
Status: DISCONTINUED | OUTPATIENT
Start: 2025-06-22 | End: 2025-06-22

## 2025-06-21 RX ORDER — ENOXAPARIN SODIUM 100 MG/ML
40 INJECTION SUBCUTANEOUS
Status: DISCONTINUED | OUTPATIENT
Start: 2025-06-21 | End: 2025-06-25

## 2025-06-21 RX ORDER — SODIUM,POTASSIUM PHOSPHATES 280-250MG
2 POWDER IN PACKET (EA) ORAL
Status: DISCONTINUED | OUTPATIENT
Start: 2025-06-21 | End: 2025-06-24

## 2025-06-21 RX ORDER — FLUOXETINE 20 MG/1
20 CAPSULE ORAL DAILY
Status: DISCONTINUED | OUTPATIENT
Start: 2025-06-22 | End: 2025-06-26

## 2025-06-21 RX ORDER — AMLODIPINE BESYLATE 10 MG/1
10 TABLET ORAL NIGHTLY
Status: DISCONTINUED | OUTPATIENT
Start: 2025-06-21 | End: 2025-06-26

## 2025-06-21 RX ORDER — LISINOPRIL 20 MG/1
40 TABLET ORAL DAILY
Status: DISCONTINUED | OUTPATIENT
Start: 2025-06-22 | End: 2025-06-25

## 2025-06-21 RX ADMIN — SENNOSIDES AND DOCUSATE SODIUM 1 TABLET: 50; 8.6 TABLET ORAL at 08:06

## 2025-06-21 RX ADMIN — METOPROLOL TARTRATE 50 MG: 50 TABLET, FILM COATED ORAL at 08:06

## 2025-06-21 RX ADMIN — Medication 1 PATCH: at 08:06

## 2025-06-21 RX ADMIN — INSULIN ASPART 6 UNITS: 100 INJECTION, SOLUTION INTRAVENOUS; SUBCUTANEOUS at 02:06

## 2025-06-21 RX ADMIN — AMLODIPINE BESYLATE 10 MG: 10 TABLET ORAL at 08:06

## 2025-06-21 RX ADMIN — INSULIN ASPART 2 UNITS: 100 INJECTION, SOLUTION INTRAVENOUS; SUBCUTANEOUS at 06:06

## 2025-06-21 RX ADMIN — LISINOPRIL 40 MG: 20 TABLET ORAL at 08:06

## 2025-06-21 RX ADMIN — MUPIROCIN: 20 OINTMENT TOPICAL at 08:06

## 2025-06-21 RX ADMIN — Medication 800 MG: at 07:06

## 2025-06-21 RX ADMIN — ENOXAPARIN SODIUM 40 MG: 40 INJECTION SUBCUTANEOUS at 03:06

## 2025-06-21 RX ADMIN — ATORVASTATIN CALCIUM 40 MG: 40 TABLET, FILM COATED ORAL at 08:06

## 2025-06-21 RX ADMIN — INSULIN ASPART 2 UNITS: 100 INJECTION, SOLUTION INTRAVENOUS; SUBCUTANEOUS at 12:06

## 2025-06-21 RX ADMIN — POLYETHYLENE GLYCOL 3350 17 G: 17 POWDER, FOR SOLUTION ORAL at 08:06

## 2025-06-21 RX ADMIN — FLUOXETINE HYDROCHLORIDE 20 MG: 20 CAPSULE ORAL at 08:06

## 2025-06-21 RX ADMIN — Medication 800 MG: at 01:06

## 2025-06-21 RX ADMIN — Medication 800 MG: at 11:06

## 2025-06-21 RX ADMIN — INSULIN ASPART 4 UNITS: 100 INJECTION, SOLUTION INTRAVENOUS; SUBCUTANEOUS at 05:06

## 2025-06-21 RX ADMIN — INSULIN ASPART 1 UNITS: 100 INJECTION, SOLUTION INTRAVENOUS; SUBCUTANEOUS at 08:06

## 2025-06-21 NOTE — SUBJECTIVE & OBJECTIVE
"Review of Systems   Reason unable to perform ROS: aphasia.     Objective:     Vitals:    Temp: 99.1 °F (37.3 °C)  Pulse: 77  Rhythm: normal sinus rhythm  BP: (!) 168/79  MAP (mmHg): 113  Resp: 13  SpO2: 95 %    Temp  Min: 98.1 °F (36.7 °C)  Max: 99.3 °F (37.4 °C)  Pulse  Min: 69  Max: 95  BP  Min: 111/84  Max: 173/92  MAP (mmHg)  Min: 92  Max: 124  Resp  Min: 13  Max: 180  SpO2  Min: 89 %  Max: 97 %    06/20 0701 - 06/21 0700  In: 568.4 [I.V.:418.4]  Out: 2330 [Urine:2330]            Physical Exam  Vitals and nursing note reviewed.   HENT:      Head: Normocephalic and atraumatic.      Right Ear: External ear normal.      Left Ear: External ear normal.      Nose: Nose normal.      Mouth/Throat:      Mouth: Mucous membranes are moist.      Pharynx: Oropharynx is clear.   Eyes:      Pupils: Pupils are equal, round, and reactive to light.   Cardiovascular:      Rate and Rhythm: Normal rate and regular rhythm.   Pulmonary:      Effort: Pulmonary effort is normal. No respiratory distress.   Musculoskeletal:      Cervical back: Normal range of motion.      Right lower leg: No edema.      Left lower leg: No edema.      Comments: 2 toes amputated   Skin:     General: Skin is warm and dry.   Neurological:      Comments: -GCS E4V2M5  -Alert. Mixed aphasia. More clearly stating "yes" to all questions today, otherwise incomprehensible speech/grunts. Does not follow any commands.   -Cranial nerves:   R facial weakness, left gaze preference, right hemianopsia  -Motor :   RUE: minimal flexion to noxious stimuli  LUE: Full antigravity movement, no drift  RLE: some spontaneous movement, triple flexion to noxious stimuli  LLE: full antigravity movement   -Sensation: grimacing to noxious stimuli x4                 Today I personally reviewed pertinent medications, imaging, cardiology results, laboratory results, notably:    - Echo complete, unremarkable EF 60-65%  - CT head post-thrombectomy reviewed, hemorrhage stable  - Glucose curve "     Laboratory:  CBC:  Recent Labs   Lab 06/21/25  0136   WBC 11.30   RBC 4.16*   HGB 11.9*   HCT 36.8*      MCV 89   MCH 28.6   MCHC 32.3     CMP:  Recent Labs   Lab 06/21/25 0136   CALCIUM 8.8   ALBUMIN 3.8   PROT 7.2      K 3.9   CO2 23      BUN 16   CREATININE 1.0   ALKPHOS 189*   ALT 10   AST 23   BILITOT 0.6     Recent Labs   Lab 06/21/25 0136   MG 1.6   PHOS 3.0     Coagulation:  Recent Labs   Lab 06/19/25  0514   INR 1.1     Lipid Panel:  Recent Labs   Lab 06/19/25  0514   CHOL 123   HDL 38*   LDLCALC 75.2   TRIG 49     Endocrine:  Recent Labs     06/19/25  0514 06/19/25  1113   HGBA1C  --  7.0*   TSH 1.310  --

## 2025-06-21 NOTE — PROGRESS NOTES
[facility]  Neurocritical Care  Progress Note    Admit Date: 6/19/2025  Service Date: 06/21/2025  Length of Stay: 2    Subjective:     Chief Complaint: Embolic stroke involving left middle cerebral artery    History of Present Illness: Mr. Pulido is a 71 y/o male with PMH significant for previous CVA with minimal residual right sided weakness, NIDDM, HTN, HLD who presents to [facility] as a transfer for LMCA stroke. History obtained from patient's family at bedside and chart review. Patient presented to the ED early this morning with acute onset dysarthria and right sided weakness. LKN around 4AM. He was evaluated by telestroke, found to have NIHSS of 19. CT showed probable early ischemic changes in the L insular ribbon and L frontal lobe. CTA demonstrated occlusion of the distal L MCA M1 segment. He was given TNK at 0548 and transferred to [facility] for possible mechanical thrombectomy and further management. On arrival, patient reported to have NIHSS 24. CTH done showed a small left temporal hemorrhage and continued early ischemic changes. He was taken to IR where he had a successful thrombectomy of L M1 occlusion with TICI 3 reperfusion in one pass. He was admitted to [facility] for close monitoring and stroke management.       Hospital Course: 06/20/2025 Liberalize SBP goal < 160. SLP eval. NGT placed. RD consult for TF. MRI reviewed w large L MCA infarct w mass effect and 4mm MLS; L temporal IPH stable. Hold on starting ASA and SQH given stroke burden and hemorrhage. Q2h neuro checks.   06/21/2025 Holding AP till 6/23 per VN. Pt is hyperglycemic on impact peptide 1.5, transitioned to glucerna 1.5 while awaiting RD response for formula/rate adjustment. Start lovenox.     Review of Systems   Reason unable to perform ROS: aphasia.     Objective:     Vitals:    Temp: 99.1 °F (37.3 °C)  Pulse: 77  Rhythm: normal sinus rhythm  BP: (!) 168/79  MAP (mmHg): 113  Resp: 13  SpO2: 95 %    Temp  Min: 98.1 °F (36.7 °C)  Max:  "99.3 °F (37.4 °C)  Pulse  Min: 69  Max: 95  BP  Min: 111/84  Max: 173/92  MAP (mmHg)  Min: 92  Max: 124  Resp  Min: 13  Max: 180  SpO2  Min: 89 %  Max: 97 %    06/20 0701 - 06/21 0700  In: 568.4 [I.V.:418.4]  Out: 2330 [Urine:2330]            Physical Exam  Vitals and nursing note reviewed.   HENT:      Head: Normocephalic and atraumatic.      Right Ear: External ear normal.      Left Ear: External ear normal.      Nose: Nose normal.      Mouth/Throat:      Mouth: Mucous membranes are moist.      Pharynx: Oropharynx is clear.   Eyes:      Pupils: Pupils are equal, round, and reactive to light.   Cardiovascular:      Rate and Rhythm: Normal rate and regular rhythm.   Pulmonary:      Effort: Pulmonary effort is normal. No respiratory distress.   Musculoskeletal:      Cervical back: Normal range of motion.      Right lower leg: No edema.      Left lower leg: No edema.      Comments: 2 toes amputated   Skin:     General: Skin is warm and dry.   Neurological:      Comments: -GCS E4V2M5  -Alert. Mixed aphasia. More clearly stating "yes" to all questions today, otherwise incomprehensible speech/grunts. Does not follow any commands.   -Cranial nerves:   R facial weakness, left gaze preference, right hemianopsia  -Motor :   RUE: minimal flexion to noxious stimuli  LUE: Full antigravity movement, no drift  RLE: some spontaneous movement, triple flexion to noxious stimuli  LLE: full antigravity movement   -Sensation: grimacing to noxious stimuli x4                 Today I personally reviewed pertinent medications, imaging, cardiology results, laboratory results, notably:    - Echo complete, unremarkable EF 60-65%  - CT head post-thrombectomy reviewed, hemorrhage stable  - Glucose curve     Laboratory:  CBC:  Recent Labs   Lab 06/21/25  0136   WBC 11.30   RBC 4.16*   HGB 11.9*   HCT 36.8*      MCV 89   MCH 28.6   MCHC 32.3     CMP:  Recent Labs   Lab 06/21/25 0136   CALCIUM 8.8   ALBUMIN 3.8   PROT 7.2      K 3.9 "   CO2 23      BUN 16   CREATININE 1.0   ALKPHOS 189*   ALT 10   AST 23   BILITOT 0.6     Recent Labs   Lab 06/21/25  0136   MG 1.6   PHOS 3.0     Coagulation:  Recent Labs   Lab 06/19/25  0514   INR 1.1     Lipid Panel:  Recent Labs   Lab 06/19/25  0514   CHOL 123   HDL 38*   LDLCALC 75.2   TRIG 49     Endocrine:  Recent Labs     06/19/25  0514 06/19/25  1113   HGBA1C  --  7.0*   TSH 1.310  --          Assessment/Plan:     Neuro  * Embolic stroke involving left middle cerebral artery  71 y/o male with previous CVA with minimal residual right sided weakness, HTN, HLD, DM presents with LM1 occlusion s/p TNK at 0548 on 6/19/25 and thrombectomy (TICI 3 x 1 pass).    Admit to NCC  Q2h neuro checks  Q1h vital signs, I/Os  EKG, Echo, CXR reviewed  A1c, TSH, lipid panel, aPTT, PT/INR, UA reviewed  CBC, CMP, mag, and phos daily  Atorvastatin 40  Liberalize SBP goal < 160  MRI reviewed w large L MCA infarct w mass effect and 4mm MLS; L temporal IPH stable.   Hold on starting ASA and SQH given stroke burden and hemorrhage. Holding AP till 6/23 per VN.    Vascular Neurology following   SCD; lovenox for VTE ppx  PT/OT/SLP    Right sided weakness  PT/OT consulted     Dysarthria and anarthria  SLP consulted  - Repeat SLP consult fail, placed NGT     Aphasia  SLP consulted and following    NPO   NGT    Cardiac/Vascular  Hyperlipemia  Well controlled with statin, continue   Atorvastatin 40    Hypertension associated with diabetes  Longstanding hypertension   SBP goal 100-160   - PRN hydralazine and labetalol  - Home amlodipine 10, lisinopril 40, metop 50 BID    Endocrine  Type 2 diabetes mellitus without complication, without long-term current use of insulin  Hemoglobin A1c 7  SSI   SLP eval- NPO. NGT placed.   RD consulted for TF recs - recommended impact peptide 1.5 @ 55, but pt is hyperglycemic. Messaged RD and transitioned to glucerna 1.5 @ 40 while awaiting RD response for formula/rate adjustment       Other  Tobacco  dependence  Consider nicotine patch if needed   Pt is a daily smoker, relax SpO2 goal > 90%.           The patient is being Prophylaxed for:  Venous Thromboembolism with: Mechanical or Chemical  Stress Ulcer with: Not Applicable   Ventilator Pneumonia with: not applicable    Activity Orders            Elevate HOB Elevate HOB 30-45 degrees during feeding unless contraindicated starting at 06/20 1749    Progressive Mobility Protocol (mobilize patient to their highest level of functioning at least twice daily) starting at 06/19 2000    Turn patient starting at 06/19 1200    Elevate HOB starting at 06/19 1004    Diet NPO: NPO starting at 06/19 1004          Full Code    Critical care time spent on the evaluation and treatment of severe organ dysfunction, review of pertinent labs and imaging studies, discussions with consulting providers and discussions with patient/family: 35 minutes.    Nir Núñez, PA-C  Neurocritical Care  [facility] - Neuro Critical Care

## 2025-06-21 NOTE — PLAN OF CARE
"[facility] Care Plan  POC reviewed with Cristi Ashok and family at 1400. Family verbalized understanding. Questions and concerns addressed. No acute events today. Pt progressing toward goals. Will continue to monitor. See below and flowsheets for full assessment and VS info.       -Spouse updated at the bedside.   -NGT placed, KUB confirmed. Ok to use NGT per RENAY Dumas  -Restraints continued.        Is this a stroke patient? yes- Stroke booklet reviewed with family and is at bedside.   Care Plan Individualization:     Neuro:  Stephanie Coma Scale  Best Eye Response: 4-->(E4) spontaneous  Best Motor Response: 5-->(M5) localizes pain  Best Verbal Response: 2-->(V2) incomprehensible speech  Stephanie Coma Scale Score: 11  Assessment Qualifiers: No eye obstruction present, Patient not sedated/intubated  Pupil PERRLA: yes     24 hr Temp:  [97.9 °F (36.6 °C)-98.6 °F (37 °C)]     CV:   Rhythm: normal sinus rhythm  BP goals:   SBP <160  MAP > 65    Resp:      Oxygen Concentration (%): 6    Plan: N/A    GI/:     Diet/Nutrition Received: NPO  Last Bowel Movement: 06/18/25  Voiding Characteristics: external catheter    Intake/Output Summary (Last 24 hours) at 6/20/2025 1926  Last data filed at 6/20/2025 1801  Gross per 24 hour   Intake 743.57 ml   Output 1830 ml   Net -1086.43 ml          Labs/Accuchecks:  Recent Labs   Lab 06/20/25  0354   WBC 10.37   RBC 4.05*   HGB 11.4*   HCT 35.7*         Recent Labs   Lab 06/20/25  0354      K 4.1   CO2 23      BUN 17   CREATININE 1.1   ALKPHOS 174*   ALT 10   AST 16   BILITOT 0.4      Recent Labs   Lab 06/19/25  0514   PROTIME 11.6   INR 1.1    No results for input(s): "CPK", "CPKMB", "TROPONINI", "MB" in the last 168 hours.    Electrolytes: Electrolytes replaced  Accuchecks: Q6H    Gtts:   nicardipine  0-15 mg/hr Intravenous Continuous   Stopped at 06/20/25 1417       LDA/Wounds:    Hilton Risk Assessment  Sensory Perception: 3-->slightly limited  Moisture: 4-->rarely " moist  Activity: 1-->bedfast  Mobility: 2-->very limited  Nutrition: 2-->probably inadequate  Friction and Shear: 3-->no apparent problem  Hilton Score: 15    Is your hilton score 12 or less? no            Restraints:   Restraint Order  Length of Order: Order good for next 24 hours or when removed.  Date that the current order will : 25  Time that the current order will : 1109  Order Upon Application: Yes    [facility]

## 2025-06-21 NOTE — ASSESSMENT & PLAN NOTE
Cristi Pulido is a 70 y.o. male w/ PMH of HTN, DM, prior stroke w/ residual RSW who presents as a transfer from OSH after presenting with acute onset of dysarthria and RSW. Telestroke was completed and his NIHSS was 19. CT showed probable early ischemic changes in the L insular ribbon and L frontal lobe. CTA demonstrated occlusion of the distal L MCA M1 segment. He was given TNK at 0548. [place] [facility] [place]. On arrival, repeat NIHSS of 24. Patient taken for repeat CTH which showed small left temporal hemorrhage along with continued early ischemic changes. Patient taken to IR for thrombectomy and to be admitted to [facility] post-procedure.  S/P thrombectomy. TICI 3. MRI revealing for blood products in stroke bed, likely reperfusion injury. Etiology ESUS.     6/21/2025 NAEON. Neuro exam stable. Recommend holding AP therapy for now. Okay to start DVT prophylaxis.    Antithrombotics for secondary stroke prevention: Antiplatelets: None: hold AP therapy for now due to blood products in stroke bed.    Statins for secondary stroke prevention and hyperlipidemia, if present:   Statins: Atorvastatin- 40 mg daily    Aggressive risk factor modification: HTN, Smoking, DM, HLD     Rehab efforts: The patient has been evaluated by a stroke team provider and the therapy needs have been fully considered based off the presenting complaints and exam findings. The following therapy evaluations are needed: PT evaluate and treat, OT evaluate and treat, SLP evaluate and treat, PM&R evaluate for appropriate placement    Diagnostics ordered/pending: None     VTE prophylaxis: Heparin 5000 units SQ every 8 hours  Mechanical prophylaxis: Place SCDs    BP parameters: Infarct: Post sucessful thrombectomy, SBP <140

## 2025-06-21 NOTE — PLAN OF CARE
"[facility] Care Plan  POC reviewed with Cristi Ashok and family at 1400. Family verbalized understanding. Questions and concerns addressed. No acute events today. Pt progressing toward goals. Will continue to monitor. See below and flowsheets for full assessment and VS info.     -Family updated at the bedside.   -Tube feeds running at goal rate.   -Restraints continued due to pulling lines and trying to get out of bed.   -Pt retaining urine despite adequate urine output 100 ml/hr.   -Bladder scanner utilized. Straight cathed, 450 ml urine output at 1330.         Is this a stroke patient? yes- Stroke booklet reviewed with family and is at bedside.   Care Plan Individualization:     Neuro:  Stephanie Coma Scale  Best Eye Response: 4-->(E4) spontaneous  Best Motor Response: 5-->(M5) localizes pain  Best Verbal Response: 2-->(V2) incomprehensible speech  [place] Coma Scale Score: 11  Assessment Qualifiers: No eye obstruction present, Patient not sedated/intubated  Pupil PERRLA: yes     24 hr Temp:  [98.1 °F (36.7 °C)-99.3 °F (37.4 °C)]     CV:   Rhythm: normal sinus rhythm  BP goals:   SBP < 160  MAP > 65    Resp:      Oxygen Concentration (%): 6    Plan: N/A    GI/:     Diet/Nutrition Received: tube feeding  Last Bowel Movement: 06/18/25  Voiding Characteristics: external catheter    Intake/Output Summary (Last 24 hours) at 6/21/2025 1702  Last data filed at 6/21/2025 1601  Gross per 24 hour   Intake 700 ml   Output 3055 ml   Net -2355 ml          Labs/Accuchecks:  Recent Labs   Lab 06/21/25  0136   WBC 11.30   RBC 4.16*   HGB 11.9*   HCT 36.8*         Recent Labs   Lab 06/21/25  0136      K 3.9   CO2 23      BUN 16   CREATININE 1.0   ALKPHOS 189*   ALT 10   AST 23   BILITOT 0.6      Recent Labs   Lab 06/19/25  0514   PROTIME 11.6   INR 1.1    No results for input(s): "CPK", "CPKMB", "TROPONINI", "MB" in the last 168 hours.    Electrolytes: Electrolytes replaced  Accuchecks: " Q4H    Gtts:      LDA/Wounds:    Hilton Risk Assessment  Sensory Perception: 3-->slightly limited  Moisture: 4-->rarely moist  Activity: 1-->bedfast  Mobility: 2-->very limited  Nutrition: 3-->adequate  Friction and Shear: 2-->potential problem  Hilton Score: 15    Is your hilton score 12 or less? no            Restraints:   Restraint Order  Length of Order: Order good for next 24 hours or when removed.  Date that the current order will : 25  Time that the current order will : 1109  Order Upon Application: Yes    [facility]

## 2025-06-21 NOTE — EICU
MAHINU Night Rounds Checklist  24H Vital Sign Range:  Temp:  [97.9 °F (36.6 °C)-98.7 °F (37.1 °C)]   Pulse:  [66-95]   Resp:  [11-22]   BP: (111-172)/(58-98)   SpO2:  [90 %-98 %]     Video rounds and LDA reconciliation

## 2025-06-21 NOTE — ASSESSMENT & PLAN NOTE
Lab Results   Component Value Date    LABA1C 7.2 (H) 01/29/2018    HGBA1C 7.0 (H) 06/19/2025     Follow up repeat A1c. Hold home antihyperglycemics. SSI for goal -180 while in hospital

## 2025-06-21 NOTE — SUBJECTIVE & OBJECTIVE
Neurologic Chief Complaint: L MCA stroke    Subjective:     Interval History: Patient is seen for follow-up neurological assessment and treatment recommendations: See hospital course.    HPI, Past Medical, Family, and Social History remains the same as documented in the initial encounter.     Review of Systems   Reason unable to perform ROS: Severe aphasia.     Scheduled Meds:   amLODIPine  10 mg Oral QHS    atorvastatin  40 mg Oral Daily    FLUoxetine  20 mg Oral Daily    lisinopriL  40 mg Oral Daily    metoprolol tartrate  50 mg Oral BID    mupirocin   Nasal BID    nicotine  1 patch Transdermal Daily    polyethylene glycol  17 g Oral Daily    senna-docusate  1 tablet Oral BID     Continuous Infusions:  PRN Meds:  Current Facility-Administered Medications:     bisacodyL, 10 mg, Rectal, Daily PRN    dextrose 50%, 12.5 g, Intravenous, PRN    dextrose 50%, 25 g, Intravenous, PRN    glucagon (human recombinant), 1 mg, Intramuscular, PRN    hydrALAZINE, 10 mg, Intravenous, Q4H PRN    insulin aspart U-100, 0-10 Units, Subcutaneous, Q4H PRN    labetalol, 10 mg, Intravenous, Q4H PRN    magnesium oxide, 800 mg, Oral, PRN    magnesium oxide, 800 mg, Oral, PRN    ondansetron, 4 mg, Intravenous, Q8H PRN    potassium bicarbonate, 35 mEq, Oral, PRN    potassium bicarbonate, 50 mEq, Oral, PRN    potassium bicarbonate, 60 mEq, Oral, PRN    potassium, sodium phosphates, 2 packet, Oral, PRN    potassium, sodium phosphates, 2 packet, Oral, PRN    potassium, sodium phosphates, 2 packet, Oral, PRN    sodium chloride 0.9%, 10 mL, Intravenous, PRN    Objective:     Vital Signs (Most Recent):  Temp: 99.2 °F (37.3 °C) (06/21/25 0701)  Pulse: 80 (06/21/25 0701)  Resp: 16 (06/21/25 0701)  BP: (!) 151/74 (06/21/25 0701)  SpO2: 97 % (06/21/25 0701)  BP Location: Right arm    Vital Signs Range (Last 24H):  Temp:  [98.1 °F (36.7 °C)-99.2 °F (37.3 °C)]   Pulse:  [73-95]   Resp:  []   BP: (111-172)/(63-98)   SpO2:  [89 %-97 %]   BP Location:  "Right arm       Physical Exam  Constitutional:       General: He is sleeping.   HENT:      Head: Normocephalic and atraumatic.      Mouth/Throat:      Mouth: Mucous membranes are moist.   Cardiovascular:      Rate and Rhythm: Normal rate.   Pulmonary:      Effort: Pulmonary effort is normal.   Skin:     General: Skin is warm and dry.   Neurological:      Motor: Weakness present.              Neurological Exam:   LOC: drowsy  Attention Span: poor  Language: Expressive aphasia, Receptive aphasia  Articulation: Dysarthria  Orientation: Untestable due to severe aphasia   Facial Movement (CN VII): Lower facial weakness on the Right  Motor: Arm left  Normal 5/5  Leg left  Normal 5/5  Arm right  Paresis: 1/5  Leg right Paresis: 1/5  Sensation: Cy-hypoesthesia right    Laboratory:  CMP:   Recent Labs   Lab 06/21/25  0136   CALCIUM 8.8   ALBUMIN 3.8   PROT 7.2      K 3.9   CO2 23      BUN 16   CREATININE 1.0   ALKPHOS 189*   ALT 10   AST 23   BILITOT 0.6     BMP:   Recent Labs   Lab 06/21/25  0136      K 3.9      CO2 23   BUN 16   CREATININE 1.0   CALCIUM 8.8     CBC:   Recent Labs   Lab 06/21/25  0136   WBC 11.30   RBC 4.16*   HGB 11.9*   HCT 36.8*      MCV 89   MCH 28.6   MCHC 32.3     Lipid Panel:   Recent Labs   Lab 06/19/25  0514   CHOL 123   LDLCALC 75.2   HDL 38*   TRIG 49     Coagulation:   Recent Labs   Lab 06/19/25  0514   INR 1.1     Platelet Aggregation Study: No results for input(s): "PLTAGG", "PLTAGINTERP", "PLTAGREGLACO", "ADPPLTAGGREG" in the last 168 hours.  Hgb A1C:   Recent Labs   Lab 06/19/25  1113   HGBA1C 7.0*     TSH:   Recent Labs   Lab 06/19/25  0514   TSH 1.310       Diagnostic Results   Brain Imaging   MRI Brain 6/20/25  Impression:     Acute left MCA distribution infarct.  No new large parenchymal hemorrhage.  Susceptibility artifact throughout the infarcted tissue may reflect contrast staining and/or blood products conversion.  Overall mass effect is increased " compared to prior with sulcal effacement throughout the left cerebral hemisphere and approximately 4 mm of rightward midline shift.     Stable size of the parenchymal hemorrhage centered in the inferior left temporal lobe.     CT 6/20/25: 1242  Impression:     Moderate-sized recent left MCA distribution infarct and small intraparenchymal hemorrhage appear grossly unchanged from prior study performed earlier on the same date.     Chronic ischemic change elsewhere with underlying cerebral and cerebellar volume loss, as before.     No new hemorrhage or major vascular distribution infarct elsewhere.  CT 6/20/25: 0834  Impression:     Early ischemic changes in the left MCA distribution, new from recent CT.     New left inferior temporal intraparenchymal hematoma.     Chronic ischemic change with cerebral and cerebellar volume loss, as above.     CT 6/19/25  Impression:     Question some early left MCA ischemia corresponding to the patient's known left MCA territory occlusion.  No hemorrhage.     Senescent changes as above.        All CT scans at this facility are performed  using dose modulation techniques as appropriate to performed exam including the following:  automated exposure control; adjustment of mA and/or kV according to the patients size (this includes techniques or standardized protocols for targeted exams where dose is matched to indication/reason for exam: i.e. extremities or head);  iterative reconstruction technique.     Vessel Imaging   CTA head and neck 6/19/25  Impression:     Distal left M1 MCA occlusion with relatively prompt collateralization of the more distal MCA vessels.     Severe stenosis left mid vertebral artery and left PCA.     Cardiac Imaging   TTE 6/19/25    Left Ventricle: The left ventricle is normal in size. Normal wall thickness. There is normal systolic function with a visually estimated ejection fraction of 60 - 65%.    Right Ventricle: The right ventricle is normal in size Wall  thickness is normal. Systolic function is normal.    The pulmonary artery pressure could not be estimated.    IVC/SVC: Normal venous pressure at 3 mmHg.

## 2025-06-21 NOTE — ASSESSMENT & PLAN NOTE
71 y/o male with previous CVA with minimal residual right sided weakness, HTN, HLD, DM presents with LM1 occlusion s/p TNK at 0548 on 6/19/25 and thrombectomy (TICI 3 x 1 pass).    Admit to [facility]  Q2h neuro checks  Q1h vital signs, I/Os  EKG, Echo, CXR reviewed  A1c, TSH, lipid panel, aPTT, PT/INR, UA reviewed  CBC, CMP, mag, and phos daily  Atorvastatin 40  Liberalize SBP goal < 160  MRI reviewed w large L MCA infarct w mass effect and 4mm MLS; L temporal IPH stable.   Hold on starting ASA and SQH given stroke burden and hemorrhage. Holding AP till 6/23 per VN.    Vascular Neurology following   SCD; lovenox for VTE ppx  PT/OT/SLP

## 2025-06-21 NOTE — EICU
Virtual ICU Quality Rounds    Admit Date: 6/19/2025  Hospital Day: 2    ICU Day: 1d 23h    24H Vital Sign Range:  Temp:  [98.1 °F (36.7 °C)-99.2 °F (37.3 °C)]   Pulse:  [69-95]   Resp:  []   BP: (111-172)/(58-98)   SpO2:  [89 %-97 %]     VICU Surveillance Screening      LDA reconciliation : Yes

## 2025-06-21 NOTE — PLAN OF CARE
"[facility] Care Plan    POC reviewed with Cristi Pulido and family at 0300. Family verbalized understanding. Questions and concerns addressed. No acute events today. Pt progressing toward goals. Will continue to monitor. See below and flowsheets for full assessment and VS info.     - Feeds started @ 10 and increased to 20.   - goal 55  - Bath complete        Is this a stroke patient? yes- Stroke booklet reviewed with family and is at bedside.   Care Plan Individualization: pt likes tv on    Neuro:  Stephanie Coma Scale  Best Eye Response: 4-->(E4) spontaneous  Best Motor Response: 5-->(M5) localizes pain  Best Verbal Response: 2-->(V2) incomprehensible speech  [place] Coma Scale Score: 11  Assessment Qualifiers: No eye obstruction present, Patient not sedated/intubated  Pupil PERRLA: yes     24 hr Temp:  [98 °F (36.7 °C)-98.7 °F (37.1 °C)]     CV:   Rhythm: normal sinus rhythm  BP goals:   SBP < 160  MAP > 65    Resp:      Oxygen Concentration (%): 6    Plan: N/A    GI/:     Diet/Nutrition Received: tube feeding  Last Bowel Movement: 06/18/25  Voiding Characteristics: external catheter    Intake/Output Summary (Last 24 hours) at 6/21/2025 0534  Last data filed at 6/21/2025 0501  Gross per 24 hour   Intake 548.41 ml   Output 1830 ml   Net -1281.59 ml          Labs/Accuchecks:  Recent Labs   Lab 06/21/25  0136   WBC 11.30   RBC 4.16*   HGB 11.9*   HCT 36.8*         Recent Labs   Lab 06/21/25  0136      K 3.9   CO2 23      BUN 16   CREATININE 1.0   ALKPHOS 189*   ALT 10   AST 23   BILITOT 0.6      Recent Labs   Lab 06/19/25  0514   PROTIME 11.6   INR 1.1    No results for input(s): "CPK", "CPKMB", "TROPONINI", "MB" in the last 168 hours.    Electrolytes: Electrolytes replaced  Accuchecks: Q4H    Gtts:      LDA/Wounds:    Hilton Risk Assessment  Sensory Perception: 2-->very limited  Moisture: 3-->occasionally moist  Activity: 1-->bedfast  Mobility: 3-->slightly limited  Nutrition: 2-->probably " inadequate  Friction and Shear: 2-->potential problem  Hilton Score: 13  Is your hilton score 12 or less? no          Restraints:   Restraint Order  Length of Order: Order good for next 24 hours or when removed.  Date that the current order will : 25  Time that the current order will : 1109  Order Upon Application: Yes    [facility]

## 2025-06-21 NOTE — ASSESSMENT & PLAN NOTE
Longstanding hypertension   SBP goal 100-160   - PRN hydralazine and labetalol  - Home amlodipine 10, lisinopril 40, metop 50 BID

## 2025-06-21 NOTE — ASSESSMENT & PLAN NOTE
Hemoglobin A1c 7  SSI   SLP eval- NPO. NGT placed.   RD consulted for TF recs - recommended impact peptide 1.5 @ 55, but pt is hyperglycemic. Messaged RD and transitioned to glucerna 1.5 @ 40 while awaiting RD response for formula/rate adjustment

## 2025-06-22 LAB
ABSOLUTE EOSINOPHIL (OHS): 0.14 K/UL
ABSOLUTE MONOCYTE (OHS): 1.22 K/UL (ref 0.3–1)
ABSOLUTE NEUTROPHIL COUNT (OHS): 7.32 K/UL (ref 1.8–7.7)
ALBUMIN SERPL BCP-MCNC: 3.8 G/DL (ref 3.5–5.2)
ALP SERPL-CCNC: 163 UNIT/L (ref 40–150)
ALT SERPL W/O P-5'-P-CCNC: 14 UNIT/L (ref 10–44)
ANION GAP (OHS): 12 MMOL/L (ref 8–16)
AST SERPL-CCNC: 26 UNIT/L (ref 11–45)
BASOPHILS # BLD AUTO: 0.03 K/UL
BASOPHILS NFR BLD AUTO: 0.3 %
BILIRUB SERPL-MCNC: 0.6 MG/DL (ref 0.1–1)
BUN SERPL-MCNC: 24 MG/DL (ref 8–23)
CALCIUM SERPL-MCNC: 9.3 MG/DL (ref 8.7–10.5)
CHLORIDE SERPL-SCNC: 107 MMOL/L (ref 95–110)
CO2 SERPL-SCNC: 23 MMOL/L (ref 23–29)
CREAT SERPL-MCNC: 1.1 MG/DL (ref 0.5–1.4)
ERYTHROCYTE [DISTWIDTH] IN BLOOD BY AUTOMATED COUNT: 13.4 % (ref 11.5–14.5)
GFR SERPLBLD CREATININE-BSD FMLA CKD-EPI: >60 ML/MIN/1.73/M2
GLUCOSE SERPL-MCNC: 212 MG/DL (ref 70–110)
HCT VFR BLD AUTO: 38.5 % (ref 40–54)
HGB BLD-MCNC: 12.3 GM/DL (ref 14–18)
IMM GRANULOCYTES # BLD AUTO: 0.03 K/UL (ref 0–0.04)
IMM GRANULOCYTES NFR BLD AUTO: 0.3 % (ref 0–0.5)
LYMPHOCYTES # BLD AUTO: 2.5 K/UL (ref 1–4.8)
MAGNESIUM SERPL-MCNC: 1.9 MG/DL (ref 1.6–2.6)
MCH RBC QN AUTO: 28.2 PG (ref 27–31)
MCHC RBC AUTO-ENTMCNC: 31.9 G/DL (ref 32–36)
MCV RBC AUTO: 88 FL (ref 82–98)
NUCLEATED RBC (/100WBC) (OHS): 0 /100 WBC
PHOSPHATE SERPL-MCNC: 2.7 MG/DL (ref 2.7–4.5)
PLATELET # BLD AUTO: 189 K/UL (ref 150–450)
PMV BLD AUTO: 12.9 FL (ref 9.2–12.9)
POCT GLUCOSE: 156 MG/DL (ref 70–110)
POCT GLUCOSE: 186 MG/DL (ref 70–110)
POCT GLUCOSE: 207 MG/DL (ref 70–110)
POCT GLUCOSE: 212 MG/DL (ref 70–110)
POCT GLUCOSE: 261 MG/DL (ref 70–110)
POCT GLUCOSE: 272 MG/DL (ref 70–110)
POTASSIUM SERPL-SCNC: 4.2 MMOL/L (ref 3.5–5.1)
PROT SERPL-MCNC: 7.3 GM/DL (ref 6–8.4)
RBC # BLD AUTO: 4.36 M/UL (ref 4.6–6.2)
RELATIVE EOSINOPHIL (OHS): 1.2 %
RELATIVE LYMPHOCYTE (OHS): 22.2 % (ref 18–48)
RELATIVE MONOCYTE (OHS): 10.9 % (ref 4–15)
RELATIVE NEUTROPHIL (OHS): 65.1 % (ref 38–73)
SODIUM SERPL-SCNC: 142 MMOL/L (ref 136–145)
WBC # BLD AUTO: 11.24 K/UL (ref 3.9–12.7)

## 2025-06-22 PROCEDURE — 83735 ASSAY OF MAGNESIUM: CPT | Mod: HCNC | Performed by: PHYSICIAN ASSISTANT

## 2025-06-22 PROCEDURE — 99233 SBSQ HOSP IP/OBS HIGH 50: CPT | Mod: HCNC,FS,, | Performed by: PSYCHIATRY & NEUROLOGY

## 2025-06-22 PROCEDURE — 25000003 PHARM REV CODE 250: Mod: HCNC | Performed by: PSYCHIATRY & NEUROLOGY

## 2025-06-22 PROCEDURE — S4991 NICOTINE PATCH NONLEGEND: HCPCS | Mod: HCNC | Performed by: PHYSICIAN ASSISTANT

## 2025-06-22 PROCEDURE — 99499 UNLISTED E&M SERVICE: CPT | Mod: HCNC,,,

## 2025-06-22 PROCEDURE — 84100 ASSAY OF PHOSPHORUS: CPT | Mod: HCNC | Performed by: PHYSICIAN ASSISTANT

## 2025-06-22 PROCEDURE — 51701 INSERT BLADDER CATHETER: CPT | Mod: HCNC

## 2025-06-22 PROCEDURE — 25000003 PHARM REV CODE 250: Mod: HCNC | Performed by: PHYSICIAN ASSISTANT

## 2025-06-22 PROCEDURE — 51798 US URINE CAPACITY MEASURE: CPT | Mod: HCNC

## 2025-06-22 PROCEDURE — 63600175 PHARM REV CODE 636 W HCPCS: Mod: HCNC

## 2025-06-22 PROCEDURE — 85025 COMPLETE CBC W/AUTO DIFF WBC: CPT | Mod: HCNC | Performed by: PHYSICIAN ASSISTANT

## 2025-06-22 PROCEDURE — 20600001 HC STEP DOWN PRIVATE ROOM: Mod: HCNC

## 2025-06-22 PROCEDURE — 80053 COMPREHEN METABOLIC PANEL: CPT | Mod: HCNC | Performed by: PHYSICIAN ASSISTANT

## 2025-06-22 RX ORDER — POLYETHYLENE GLYCOL 3350 17 G/17G
17 POWDER, FOR SOLUTION ORAL 2 TIMES DAILY
Status: DISCONTINUED | OUTPATIENT
Start: 2025-06-22 | End: 2025-06-26

## 2025-06-22 RX ORDER — INSULIN ASPART 100 [IU]/ML
4 INJECTION, SOLUTION INTRAVENOUS; SUBCUTANEOUS EVERY 4 HOURS
Status: DISCONTINUED | OUTPATIENT
Start: 2025-06-22 | End: 2025-06-23

## 2025-06-22 RX ORDER — NAPROXEN SODIUM 220 MG/1
81 TABLET, FILM COATED ORAL DAILY
Status: DISCONTINUED | OUTPATIENT
Start: 2025-06-22 | End: 2025-06-26

## 2025-06-22 RX ADMIN — INSULIN ASPART 4 UNITS: 100 INJECTION, SOLUTION INTRAVENOUS; SUBCUTANEOUS at 09:06

## 2025-06-22 RX ADMIN — LISINOPRIL 40 MG: 20 TABLET ORAL at 08:06

## 2025-06-22 RX ADMIN — POTASSIUM & SODIUM PHOSPHATES POWDER PACK 280-160-250 MG 2 PACKET: 280-160-250 PACK at 07:06

## 2025-06-22 RX ADMIN — INSULIN ASPART 4 UNITS: 100 INJECTION, SOLUTION INTRAVENOUS; SUBCUTANEOUS at 05:06

## 2025-06-22 RX ADMIN — POLYETHYLENE GLYCOL 3350 17 G: 17 POWDER, FOR SOLUTION ORAL at 08:06

## 2025-06-22 RX ADMIN — Medication 1 PATCH: at 08:06

## 2025-06-22 RX ADMIN — FLUOXETINE HYDROCHLORIDE 20 MG: 20 CAPSULE ORAL at 08:06

## 2025-06-22 RX ADMIN — LABETALOL HYDROCHLORIDE 10 MG: 5 INJECTION, SOLUTION INTRAVENOUS at 09:06

## 2025-06-22 RX ADMIN — INSULIN ASPART 4 UNITS: 100 INJECTION, SOLUTION INTRAVENOUS; SUBCUTANEOUS at 01:06

## 2025-06-22 RX ADMIN — ENOXAPARIN SODIUM 40 MG: 40 INJECTION SUBCUTANEOUS at 01:06

## 2025-06-22 RX ADMIN — SENNOSIDES AND DOCUSATE SODIUM 1 TABLET: 50; 8.6 TABLET ORAL at 08:06

## 2025-06-22 RX ADMIN — MUPIROCIN: 20 OINTMENT TOPICAL at 09:06

## 2025-06-22 RX ADMIN — MUPIROCIN: 20 OINTMENT TOPICAL at 08:06

## 2025-06-22 RX ADMIN — ASPIRIN 81 MG CHEWABLE TABLET 81 MG: 81 TABLET CHEWABLE at 01:06

## 2025-06-22 RX ADMIN — AMLODIPINE BESYLATE 10 MG: 10 TABLET ORAL at 09:06

## 2025-06-22 RX ADMIN — INSULIN ASPART 2 UNITS: 100 INJECTION, SOLUTION INTRAVENOUS; SUBCUTANEOUS at 12:06

## 2025-06-22 RX ADMIN — METOPROLOL TARTRATE 50 MG: 50 TABLET, FILM COATED ORAL at 09:06

## 2025-06-22 RX ADMIN — ATORVASTATIN CALCIUM 40 MG: 40 TABLET, FILM COATED ORAL at 08:06

## 2025-06-22 RX ADMIN — INSULIN ASPART 4 UNITS: 100 INJECTION, SOLUTION INTRAVENOUS; SUBCUTANEOUS at 08:06

## 2025-06-22 RX ADMIN — METOPROLOL TARTRATE 50 MG: 50 TABLET, FILM COATED ORAL at 08:06

## 2025-06-22 RX ADMIN — POTASSIUM & SODIUM PHOSPHATES POWDER PACK 280-160-250 MG 2 PACKET: 280-160-250 PACK at 03:06

## 2025-06-22 RX ADMIN — INSULIN ASPART 1 UNITS: 100 INJECTION, SOLUTION INTRAVENOUS; SUBCUTANEOUS at 09:06

## 2025-06-22 RX ADMIN — INSULIN ASPART 6 UNITS: 100 INJECTION, SOLUTION INTRAVENOUS; SUBCUTANEOUS at 03:06

## 2025-06-22 RX ADMIN — HYDRALAZINE HYDROCHLORIDE 10 MG: 20 INJECTION, SOLUTION INTRAMUSCULAR; INTRAVENOUS at 01:06

## 2025-06-22 NOTE — PLAN OF CARE
Recommendations     1.) If EN is medically warranted, recommend Glucerna 1.5 at goal rate of 55ml/hr to provide 1320ml of total formula volume, 1980 kcal, 109g PRO, 176g CHO, and 1001ml FF.                  - FWF to meet RDA, if desired: 160ml Q4hrs to provide an additional 960ml of fluid and 1961 ml TFV.      2.) If bolus feeds are preferred, recommend to bolus 220ml of Glucerna 1.5 Q4hrs to provide 1320ml of total formula volume, 1980 kcal, 109g PRO, 176g CHO, and 1001ml FF.                  - FWF to meet RDA if desired: FWF of 80ml before and after each bolus to provide an additional 960ml FF and 1961ml TFV.      3.) Avoid holding TF for residuals less than 500mL unless associated with other s/s of intolerance such as N/V/D, abd pain/distention.      4.) RD to monitor wt, tolerance, skin, labs.       Goals: Provide nutrition within 48 hours  Nutrition Goal Status: goal met  Communication of RD Recs:  (POC)     Nutrition Discharge Planning      Nutrition Discharge Planning: Too early to determine, pending clinical course

## 2025-06-22 NOTE — EICU
Virtual ICU Quality Rounds    Admit Date: 6/19/2025  Hospital Day: 3    ICU Day: 3d 5h    24H Vital Sign Range:  Temp:  [98.6 °F (37 °C)-99.3 °F (37.4 °C)]   Pulse:  [64-87]   Resp:  [13-19]   BP: (130-170)/(63-95)   SpO2:  [90 %-97 %]     VICU Surveillance Screening    LDA reconciliation : Yes

## 2025-06-22 NOTE — CONSULTS
[facility] - Neuro Critical Care  Adult Nutrition  Consult Note    SUMMARY     Recommendations    1.) If EN is medically warranted, recommend Glucerna 1.5 at goal rate of 55ml/hr to provide 1320ml of total formula volume, 1980 kcal, 109g PRO, 176g CHO, and 1001ml FF.      - FWF to meet RDA, if desired: 160ml Q4hrs to provide an additional 960ml of fluid and 1961 ml TFV.     2.) If bolus feeds are preferred, recommend to bolus 220ml of Glucerna 1.5 Q4hrs to provide 1320ml of total formula volume, 1980 kcal, 109g PRO, 176g CHO, and 1001ml FF.      - FWF to meet RDA if desired: FWF of 80ml before and after each bolus to provide an additional 960ml FF and 1961ml TFV.     3.) Avoid holding TF for residuals less than 500mL unless associated with other s/s of intolerance such as N/V/D, abd pain/distention.     4.) RD to monitor wt, tolerance, skin, labs.      Goals: Provide nutrition within 48 hours  Nutrition Goal Status: goal met  Communication of RD Recs:  (POC)    Nutrition Discharge Planning     Nutrition Discharge Planning: Too early to determine, pending clinical course    Reason for Assessment    Reason For Assessment: consult (TF recs- diabetic formula)  Diagnosis: stroke/CVA (Embolic stroke involving left middle cerebral artery)    General Information Comments: RD consult for tube feed recommendations. Pt is already followed by RD team. TF's were initiated on 6/20 via NGT of Impact Peptide 1.5- BG was running high. Providers changed to diabetic formula earlier today- currently running at 40ml/hr (not meeting needs, < 75% of EEN/EPN). No tolerance issues per RN staff. Noted LBM on 6/18- pt is already on bowel regimen. Wt appears stable at this time. Wt stable x 12 months per chart review. RD team to continue to monitor and f/u.     Nutrition/Diet History    Spiritual, Cultural Beliefs, Uatsdin Practices, Values that Affect Care: no  Food Allergies: NKFA  Factors Affecting Nutritional Intake:  "NPO  Nutrition-related SDOH: Unable to assess at this time    Anthropometrics    Height: 5' 10" (177.8 cm)  Height (inches): 70 in  Height Method: Stated  Weight: 89 kg (196 lb 3.4 oz)  Weight (lb): 196.21 lb  Weight Method: Bed Scale  Ideal Body Weight (IBW), Male: 166 lb  % Ideal Body Weight, Male (lb): 118.2 %  BMI (Calculated): 28.2  BMI Grade: 25 - 29.9 - overweight    Lab/Procedures/Meds    Pertinent Labs Reviewed: reviewed  Pertinent Labs Comments: BUN: 24, gluc: 212, ALP: 163  Pertinent Medications Reviewed: reviewed  Pertinent Medications Comments: Statin, insulin, polyethylene glycol, senna-docusate    Estimated/Assessed Needs    Weight Used For Calorie Calculations: 89 kg (196 lb 3.4 oz)  Energy Calorie Requirements (kcal): 2058 kcal/day (x 1.25 AF)  Energy Need Method: Clif[place]-St Jeor  Protein Requirements: 107- 134g (1.2-1.5g/kg)  Weight Used For Protein Calculations: 89 kg (196 lb 3.4 oz)  Fluid Requirements (mL): as per MD or RDA  Estimated Fluid Requirement Method: RDA Method  RDA Method (mL): 2058  CHO Requirement: 257 g/day    Nutrition Prescription Ordered    Current Diet Order: NPO  Current Nutrition Support Formula Ordered: Glucerna 1.5  Current Nutrition Support Rate Ordered: 40 (ml)  Current Nutrition Support Frequency Ordered: x 24hrs    Evaluation of Received Nutrient/Fluid Intake    Enteral Calories (kcal): 1440  Enteral Protein (gm): 79  Enteral (Free Water) Fluid (mL): 728  I/O: -1.9L since admit  Energy Calories Required: not meeting needs  Protein Required: not meeting needs  Fluid Required:  (Per MD)  Comments: LBM 6/18  Tolerance: tolerating  % Intake of Estimated Energy Needs: 50 - 75 %  % Meal Intake: NPO    PES Statement  Inadequate enteral nutrition infusion related to  (Infusion volume not reached) as evidenced by  (Inadequate EN volume compared to estimated requirements)  Status: Continues    Nutrition Risk    Level of Risk/Frequency of Follow-up: moderate - high     Monitor and " Evaluation    Monitor and Evaluation: Enteral and parenteral nutrition administration, Weight, Electrolyte and renal panel, Gastrointestinal profile, Glucose/endocrine profile, Inflammatory profile, Lipid profile, Nutrition focused physical findings, Skin     Nutrition Follow-Up    RD Follow-up?: Yes

## 2025-06-22 NOTE — ASSESSMENT & PLAN NOTE
Cristi Pulido is a 70 y.o. male w/ PMH of HTN, DM, prior stroke w/ residual RSW who presents as a transfer from OSH after presenting with acute onset of dysarthria and RSW. Telestroke was completed and his NIHSS was 19. CT showed probable early ischemic changes in the L insular ribbon and L frontal lobe. CTA demonstrated occlusion of the distal L MCA M1 segment. He was given TNK at 0548. Patient was transferred to C for further management. On arrival, repeat NIHSS of 24. Patient taken for repeat CTH which showed small left temporal hemorrhage along with continued early ischemic changes. Patient taken to IR for thrombectomy and to be admitted to [facility] post-procedure.  S/P thrombectomy. TICI 3. MRI revealing for blood products in stroke bed, likely reperfusion injury. Etiology ESUS.     06/22/2025 NAEON. Neuro exam stable. Start AP therapy with ASA.    Antithrombotics for secondary stroke prevention: Antiplatelets: Aspirin: 81 mg daily    Statins for secondary stroke prevention and hyperlipidemia, if present:   Statins: Atorvastatin- 40 mg daily    Aggressive risk factor modification: HTN, Smoking, DM, HLD     Rehab efforts: The patient has been evaluated by a stroke team provider and the therapy needs have been fully considered based off the presenting complaints and exam findings. The following therapy evaluations are needed: PT evaluate and treat, OT evaluate and treat, SLP evaluate and treat, PM&R evaluate for appropriate placement, current recommendation HIT    Diagnostics ordered/pending: None     VTE prophylaxis: Enoxaparin 40 mg SQ every 24 hours  Mechanical prophylaxis: Place SCDs    BP parameters: Infarct: Post sucessful thrombectomy, SBP <140

## 2025-06-22 NOTE — SUBJECTIVE & OBJECTIVE
Neurologic Chief Complaint: L MCA stroke    Subjective:     Interval History: Patient is seen for follow-up neurological assessment and treatment recommendations: See hospital course.    HPI, Past Medical, Family, and Social History remains the same as documented in the initial encounter.     Review of Systems   Reason unable to perform ROS: Severe aphasia.     Scheduled Meds:   amLODIPine  10 mg Per NG tube QHS    atorvastatin  40 mg Per NG tube Daily    enoxparin  40 mg Subcutaneous Q24H    FLUoxetine  20 mg Per NG tube Daily    insulin aspart U-100  4 Units Subcutaneous Q4H    lisinopriL  40 mg Per NG tube Daily    metoprolol tartrate  50 mg Per NG tube BID    mupirocin   Nasal BID    nicotine  1 patch Transdermal Daily    polyethylene glycol  17 g Per NG tube Daily    senna-docusate  1 tablet Per NG tube BID     Continuous Infusions:  PRN Meds:  Current Facility-Administered Medications:     bisacodyL, 10 mg, Rectal, Daily PRN    dextrose 50%, 12.5 g, Intravenous, PRN    dextrose 50%, 25 g, Intravenous, PRN    glucagon (human recombinant), 1 mg, Intramuscular, PRN    hydrALAZINE, 10 mg, Intravenous, Q4H PRN    insulin aspart U-100, 0-10 Units, Subcutaneous, Q4H PRN    labetalol, 10 mg, Intravenous, Q4H PRN    magnesium oxide, 800 mg, Per NG tube, PRN    magnesium oxide, 800 mg, Per NG tube, PRN    ondansetron, 4 mg, Intravenous, Q8H PRN    potassium bicarbonate, 35 mEq, Per NG tube, PRN    potassium bicarbonate, 50 mEq, Per NG tube, PRN    potassium bicarbonate, 60 mEq, Per NG tube, PRN    potassium, sodium phosphates, 2 packet, Per NG tube, PRN    potassium, sodium phosphates, 2 packet, Per NG tube, PRN    potassium, sodium phosphates, 2 packet, Per NG tube, PRN    sodium chloride 0.9%, 10 mL, Intravenous, PRN    Objective:     Vital Signs (Most Recent):  Temp: 98.6 °F (37 °C) (06/22/25 0701)  Pulse: 86 (06/22/25 0701)  Resp: 13 (06/22/25 0701)  BP: (!) 153/95 (06/22/25 0701)  SpO2: (!) 94 % (06/22/25 0701)  BP  "Location: Right arm    Vital Signs Range (Last 24H):  Temp:  [98.6 °F (37 °C)-99.3 °F (37.4 °C)]   Pulse:  [64-87]   Resp:  [13-20]   BP: (141-173)/(70-95)   SpO2:  [90 %-97 %]   BP Location: Right arm       Physical Exam  Constitutional:       General: He is sleeping.   HENT:      Head: Normocephalic and atraumatic.      Mouth/Throat:      Mouth: Mucous membranes are moist.   Cardiovascular:      Rate and Rhythm: Normal rate.   Pulmonary:      Effort: Pulmonary effort is normal.   Skin:     General: Skin is warm and dry.   Neurological:      Motor: Weakness present.              Neurological Exam:   LOC: drowsy  Attention Span: poor  Language: Expressive aphasia, Receptive aphasia  Articulation: Dysarthria  Orientation: Untestable due to severe aphasia   Facial Movement (CN VII): Lower facial weakness on the Right  Motor: Arm left  Normal 5/5  Leg left  Normal 5/5  Arm right  Paresis: 1/5  Leg right Paresis: 1/5  Sensation: Cy-hypoesthesia right    Laboratory:  CMP:   Recent Labs   Lab 06/22/25  0031   CALCIUM 9.3   ALBUMIN 3.8   PROT 7.3      K 4.2   CO2 23      BUN 24*   CREATININE 1.1   ALKPHOS 163*   ALT 14   AST 26   BILITOT 0.6     BMP:   Recent Labs   Lab 06/22/25  0031      K 4.2      CO2 23   BUN 24*   CREATININE 1.1   CALCIUM 9.3     CBC:   Recent Labs   Lab 06/22/25  0031   WBC 11.24   RBC 4.36*   HGB 12.3*   HCT 38.5*      MCV 88   MCH 28.2   MCHC 31.9*     Lipid Panel:   Recent Labs   Lab 06/19/25  0514   CHOL 123   LDLCALC 75.2   HDL 38*   TRIG 49     Coagulation:   Recent Labs   Lab 06/19/25  0514   INR 1.1     Platelet Aggregation Study: No results for input(s): "PLTAGG", "PLTAGINTERP", "PLTAGREGLACO", "ADPPLTAGGREG" in the last 168 hours.  Hgb A1C:   Recent Labs   Lab 06/19/25  1113   HGBA1C 7.0*     TSH:   Recent Labs   Lab 06/19/25  0514   TSH 1.310       Diagnostic Results   Brain Imaging   MRI Brain 6/20/25  Impression:     Acute left MCA distribution infarct.  " No new large parenchymal hemorrhage.  Susceptibility artifact throughout the infarcted tissue may reflect contrast staining and/or blood products conversion.  Overall mass effect is increased compared to prior with sulcal effacement throughout the left cerebral hemisphere and approximately 4 mm of rightward midline shift.     Stable size of the parenchymal hemorrhage centered in the inferior left temporal lobe.     [facility] 6/20/25: 1242  Impression:     Moderate-sized recent left MCA distribution infarct and small intraparenchymal hemorrhage appear grossly unchanged from prior study performed earlier on the same date.     Chronic ischemic change elsewhere with underlying cerebral and cerebellar volume loss, as before.     No new hemorrhage or major vascular distribution infarct elsewhere.  [facility] 6/20/25: 0834  Impression:     Early ischemic changes in the left MCA distribution, new from recent CT.     New left inferior temporal intraparenchymal hematoma.     Chronic ischemic change with cerebral and cerebellar volume loss, as above.     [facility] 6/19/25  Impression:     Question some early left MCA ischemia corresponding to the patient's known left MCA territory occlusion.  No hemorrhage.     Senescent changes as above.        All CT scans at this facility are performed  using dose modulation techniques as appropriate to performed exam including the following:  automated exposure control; adjustment of mA and/or kV according to the patients size (this includes techniques or standardized protocols for targeted exams where dose is matched to indication/reason for exam: i.e. extremities or head);  iterative reconstruction technique.     Vessel Imaging   CTA head and neck 6/19/25  Impression:     Distal left M1 MCA occlusion with relatively prompt collateralization of the more distal MCA vessels.     Severe stenosis left mid vertebral artery and left PCA.     Cardiac Imaging   TTE 6/19/25    Left Ventricle: The left ventricle is  normal in size. Normal wall thickness. There is normal systolic function with a visually estimated ejection fraction of 60 - 65%.    Right Ventricle: The right ventricle is normal in size Wall thickness is normal. Systolic function is normal.    The pulmonary artery pressure could not be estimated.    IVC/SVC: Normal venous pressure at 3 mmHg.

## 2025-06-22 NOTE — PROGRESS NOTES
[facility] - Neuro Critical Care  Vascular Neurology  Comprehensive Stroke Center  Progress Note    Assessment/Plan:     * Embolic stroke involving left middle cerebral artery  Cristi Pulido is a 70 y.o. male w/ PMH of HTN, DM, prior stroke w/ residual RSW who presents as a transfer from OSH after presenting with acute onset of dysarthria and RSW. Telestroke was completed and his NIHSS was 19. CT showed probable early ischemic changes in the L insular ribbon and L frontal lobe. CTA demonstrated occlusion of the distal L MCA M1 segment. He was given TNK at 0548. Patient was transferred to [facility] for further management. On arrival, repeat NIHSS of 24. Patient taken for repeat CTH which showed small left temporal hemorrhage along with continued early ischemic changes. Patient taken to IR for thrombectomy and to be admitted to NCC post-procedure.  S/P thrombectomy. TICI 3. MRI revealing for blood products in stroke bed, likely reperfusion injury. Etiology ESUS.     06/22/2025 NAEON. Neuro exam stable. Start AP therapy with ASA.    Antithrombotics for secondary stroke prevention: Antiplatelets: Aspirin: 81 mg daily    Statins for secondary stroke prevention and hyperlipidemia, if present:   Statins: Atorvastatin- 40 mg daily    Aggressive risk factor modification: HTN, Smoking, DM, HLD     Rehab efforts: The patient has been evaluated by a stroke team provider and the therapy needs have been fully considered based off the presenting complaints and exam findings. The following therapy evaluations are needed: PT evaluate and treat, OT evaluate and treat, SLP evaluate and treat, PM&R evaluate for appropriate placement, current recommendation HIT    Diagnostics ordered/pending: None     VTE prophylaxis: Enoxaparin 40 mg SQ every 24 hours  Mechanical prophylaxis: Place SCDs    BP parameters: Infarct: Post sucessful thrombectomy, SBP <140        Hyperlipemia  -Stroke risk factor  -LDL 75, goal <70  -Atorvastatin 40 mg daily      Right  sided weakness  -Secondary to stroke.   -Aggressive therapy     Dysarthria and anarthria  -Therapy eval and treat    Aphasia  -Secondary to stroke  -Aggressive therapy     Type 2 diabetes mellitus without complication, without long-term current use of insulin  Lab Results   Component Value Date    LABA1C 7.2 (H) 01/29/2018    HGBA1C 7.0 (H) 06/19/2025     Follow up repeat A1c. Hold home antihyperglycemics. SSI for goal -180 while in hospital    Hypertension associated with diabetes  Stroke risk factor  -goal SBP post successful thrombectomy <140    Tobacco dependence  Stroke risk factor  - on cessation  -nicotine patch PRN         06/20/2025 NAEON. S/P thrombectomy. TICI 3. TNK monitoring ended at 0548. Aphasic, follows no commands. RSW remains. MRI revealing for blood products in stroke bed, likely reperfusion injury. OK to start monotherapy with either plavix or ASA 6/21/25 for secondary stroke prevention. ECHO unremarkable. Family member at bedside agreeable to NGT placement. Etiology ESUS.   6/21/2025 NAEON. Neuro exam stable. Recommend holding AP therapy for now.   06/22/2025 NAEON. Neuro exam stable. Continue to hold AP therapy.    STROKE DOCUMENTATION   Acute Stroke Times   Last Known Normal Date: 06/19/25  Last Known Normal Time: 0400  Symptom Onset Date: 06/19/25  Symptom Onset Time: 0400  Stroke Team Called Date: 06/19/25  Stroke Team Called Time: 0813  Stroke Team Arrival Date: 06/19/25  Stroke Team Arrival Time: 0813  CT Interpretation Time: 0827  Thrombolytic Therapy Recommended:  (already given prior to transfer)  CTA Interpretation Time:  (already completed prior to transfer)  Thrombectomy Recommended: Yes  Decision to Treat Time for IR: 0832    NIH Scale:  1a. Level of Consciousness: 1-->Not alert, but arousable by minor stimulation to obey, answer, or respond  1b. LOC Questions: 2-->Answers neither question correctly  1c. LOC Commands: 2-->Performs neither task correctly  2. Best Gaze:  0-->Normal  3. Visual: 0-->No visual loss  4. Facial Palsy: 2-->Partial paralysis (total or near-total paralysis of lower face)  5a. Motor Arm, Left: 0-->No drift, limb holds 90 (or 45) degrees for full 10 secs  5b. Motor Arm, Right: 3-->No effort against gravity, limb falls  6a. Motor Leg, Left: 3-->No effort against gravity, leg falls to bed immediately  6b. Motor Leg, Right: 0-->No drift, leg holds 30 degree position for full 5 secs  7. Limb Ataxia: 0-->Absent  8. Sensory: 1-->Mild-to-moderate sensory loss, patient feels pinprick is less sharp or is dull on the affected side, or there is a loss of superficial pain with pinprick, but patient is aware of being touched  9. Best Language: 2-->Severe aphasia, all communication is through fragmentary expression, great need for inference, questioning, and guessing by the listener. Range of information that can be exchanged is limited, listener carries burden of. . . (see row details)  10. Dysarthria: 2-->Severe dysarthria, patients speech is so slurred as to be unintelligible in the absence of or out of proportion to any dysphasia, or is mute/anarthric  11. Extinction and Inattention (formerly Neglect): 0-->No abnormality  Total (NIH Stroke Scale): 18       Modified Mobile Score: 2  [place] Coma Scale:    ABCD2 Score:    [id number] Score:   HAS -BLED Score:   ICH Score:   Hunt & Kimball Classification:      Hemorrhagic change of an Ischemic Stroke: Does this patient have an ischemic stroke with hemorrhagic changes? Yes, Grading Scale: HI Type 1 (HI-1) = small petechiae along the margins of the infarct. Is this a symptomatic change?  No - Hemorrhage is not clinically significant     Neurologic Chief Complaint: L MCA stroke    Subjective:     Interval History: Patient is seen for follow-up neurological assessment and treatment recommendations: See hospital course.    HPI, Past Medical, Family, and Social History remains the same as documented in the initial encounter.      Review of Systems   Reason unable to perform ROS: Severe aphasia.     Scheduled Meds:   amLODIPine  10 mg Per NG tube QHS    atorvastatin  40 mg Per NG tube Daily    enoxparin  40 mg Subcutaneous Q24H    FLUoxetine  20 mg Per NG tube Daily    insulin aspart U-100  4 Units Subcutaneous Q4H    lisinopriL  40 mg Per NG tube Daily    metoprolol tartrate  50 mg Per NG tube BID    mupirocin   Nasal BID    nicotine  1 patch Transdermal Daily    polyethylene glycol  17 g Per NG tube Daily    senna-docusate  1 tablet Per NG tube BID     Continuous Infusions:  PRN Meds:  Current Facility-Administered Medications:     bisacodyL, 10 mg, Rectal, Daily PRN    dextrose 50%, 12.5 g, Intravenous, PRN    dextrose 50%, 25 g, Intravenous, PRN    glucagon (human recombinant), 1 mg, Intramuscular, PRN    hydrALAZINE, 10 mg, Intravenous, Q4H PRN    insulin aspart U-100, 0-10 Units, Subcutaneous, Q4H PRN    labetalol, 10 mg, Intravenous, Q4H PRN    magnesium oxide, 800 mg, Per NG tube, PRN    magnesium oxide, 800 mg, Per NG tube, PRN    ondansetron, 4 mg, Intravenous, Q8H PRN    potassium bicarbonate, 35 mEq, Per NG tube, PRN    potassium bicarbonate, 50 mEq, Per NG tube, PRN    potassium bicarbonate, 60 mEq, Per NG tube, PRN    potassium, sodium phosphates, 2 packet, Per NG tube, PRN    potassium, sodium phosphates, 2 packet, Per NG tube, PRN    potassium, sodium phosphates, 2 packet, Per NG tube, PRN    sodium chloride 0.9%, 10 mL, Intravenous, PRN    Objective:     Vital Signs (Most Recent):  Temp: 98.6 °F (37 °C) (06/22/25 0701)  Pulse: 86 (06/22/25 0701)  Resp: 13 (06/22/25 0701)  BP: (!) 153/95 (06/22/25 0701)  SpO2: (!) 94 % (06/22/25 0701)  BP Location: Right arm    Vital Signs Range (Last 24H):  Temp:  [98.6 °F (37 °C)-99.3 °F (37.4 °C)]   Pulse:  [64-87]   Resp:  [13-20]   BP: (141-173)/(70-95)   SpO2:  [90 %-97 %]   BP Location: Right arm       Physical Exam  Constitutional:       General: He is sleeping.   HENT:      Head:  "Normocephalic and atraumatic.      Mouth/Throat:      Mouth: Mucous membranes are moist.   Cardiovascular:      Rate and Rhythm: Normal rate.   Pulmonary:      Effort: Pulmonary effort is normal.   Skin:     General: Skin is warm and dry.   Neurological:      Motor: Weakness present.              Neurological Exam:   LOC: drowsy  Attention Span: poor  Language: Expressive aphasia, Receptive aphasia  Articulation: Dysarthria  Orientation: Untestable due to severe aphasia   Facial Movement (CN VII): Lower facial weakness on the Right  Motor: Arm left  Normal 5/5  Leg left  Normal 5/5  Arm right  Paresis: 1/5  Leg right Paresis: 1/5  Sensation: Cy-hypoesthesia right    Laboratory:  CMP:   Recent Labs   Lab 06/22/25  0031   CALCIUM 9.3   ALBUMIN 3.8   PROT 7.3      K 4.2   CO2 23      BUN 24*   CREATININE 1.1   ALKPHOS 163*   ALT 14   AST 26   BILITOT 0.6     BMP:   Recent Labs   Lab 06/22/25  0031      K 4.2      CO2 23   BUN 24*   CREATININE 1.1   CALCIUM 9.3     CBC:   Recent Labs   Lab 06/22/25  0031   WBC 11.24   RBC 4.36*   HGB 12.3*   HCT 38.5*      MCV 88   MCH 28.2   MCHC 31.9*     Lipid Panel:   Recent Labs   Lab 06/19/25  0514   CHOL 123   LDLCALC 75.2   HDL 38*   TRIG 49     Coagulation:   Recent Labs   Lab 06/19/25  0514   INR 1.1     Platelet Aggregation Study: No results for input(s): "PLTAGG", "PLTAGINTERP", "PLTAGREGLACO", "ADPPLTAGGREG" in the last 168 hours.  Hgb A1C:   Recent Labs   Lab 06/19/25  1113   HGBA1C 7.0*     TSH:   Recent Labs   Lab 06/19/25  0514   TSH 1.310       Diagnostic Results   Brain Imaging   MRI Brain 6/20/25  Impression:     Acute left MCA distribution infarct.  No new large parenchymal hemorrhage.  Susceptibility artifact throughout the infarcted tissue may reflect contrast staining and/or blood products conversion.  Overall mass effect is increased compared to prior with sulcal effacement throughout the left cerebral hemisphere and " approximately 4 mm of rightward midline shift.     Stable size of the parenchymal hemorrhage centered in the inferior left temporal lobe.     CT 6/20/25: 1242  Impression:     Moderate-sized recent left MCA distribution infarct and small intraparenchymal hemorrhage appear grossly unchanged from prior study performed earlier on the same date.     Chronic ischemic change elsewhere with underlying cerebral and cerebellar volume loss, as before.     No new hemorrhage or major vascular distribution infarct elsewhere.  CT 6/20/25: 0834  Impression:     Early ischemic changes in the left MCA distribution, new from recent CT.     New left inferior temporal intraparenchymal hematoma.     Chronic ischemic change with cerebral and cerebellar volume loss, as above.     CT 6/19/25  Impression:     Question some early left MCA ischemia corresponding to the patient's known left MCA territory occlusion.  No hemorrhage.     Senescent changes as above.        All CT scans at this facility are performed  using dose modulation techniques as appropriate to performed exam including the following:  automated exposure control; adjustment of mA and/or kV according to the patients size (this includes techniques or standardized protocols for targeted exams where dose is matched to indication/reason for exam: i.e. extremities or head);  iterative reconstruction technique.     Vessel Imaging   CTA head and neck 6/19/25  Impression:     Distal left M1 MCA occlusion with relatively prompt collateralization of the more distal MCA vessels.     Severe stenosis left mid vertebral artery and left PCA.     Cardiac Imaging   TTE 6/19/25    Left Ventricle: The left ventricle is normal in size. Normal wall thickness. There is normal systolic function with a visually estimated ejection fraction of 60 - 65%.    Right Ventricle: The right ventricle is normal in size Wall thickness is normal. Systolic function is normal.    The pulmonary artery pressure  could not be estimated.    IVC/SVC: Normal venous pressure at 3 mmHg.       Franci Zambrano PA-C  [facility]  Department of Vascular Neurology   [facility] - Neuro Critical Care

## 2025-06-22 NOTE — PLAN OF CARE
"[facility] Care Plan  POC reviewed with Cristi Pulido and family at 1400. Family verbalized understanding. Questions and concerns addressed. No acute events today. Pt progressing toward goals. Will continue to monitor. See below and flowsheets for full assessment and VS info.     -Daughter updated at the bedside.   -Bladder scanner utilized. Straight cath x1  -BM x2.   -CHG bath completed.   -Restraints continued.           Is this a stroke patient? yes- Stroke booklet reviewed with family and is at bedside.   Care Plan Individualization:     Neuro:  [place] Coma Scale  Best Eye Response: 4-->(E4) spontaneous  Best Motor Response: 5-->(M5) localizes pain  Best Verbal Response: 2-->(V2) incomprehensible speech  [place] Coma Scale Score: 11  Assessment Qualifiers: No eye obstruction present, Patient not sedated/intubated  Pupil PERRLA: yes     24 hr Temp:  [98.6 °F (37 °C)-99.3 °F (37.4 °C)]     CV:   Rhythm: normal sinus rhythm  BP goals:   SBP < 160  MAP > 65    Resp:      Oxygen Concentration (%): 6    Plan: N/A    GI/:     Diet/Nutrition Received: NPO, tube feeding  Last Bowel Movement: 06/22/25  Voiding Characteristics: external catheter    Intake/Output Summary (Last 24 hours) at 6/22/2025 1844  Last data filed at 6/22/2025 1501  Gross per 24 hour   Intake 910 ml   Output 450 ml   Net 460 ml          Labs/Accuchecks:  Recent Labs   Lab 06/22/25  0031   WBC 11.24   RBC 4.36*   HGB 12.3*   HCT 38.5*         Recent Labs   Lab 06/22/25  0031      K 4.2   CO2 23      BUN 24*   CREATININE 1.1   ALKPHOS 163*   ALT 14   AST 26   BILITOT 0.6      Recent Labs   Lab 06/19/25  0514   PROTIME 11.6   INR 1.1    No results for input(s): "CPK", "CPKMB", "TROPONINI", "MB" in the last 168 hours.    Electrolytes: Electrolytes replaced  Accuchecks: Q4H    Gtts:      LDA/Wounds:    Hilton Risk Assessment  Sensory Perception: 2-->very limited  Moisture: 4-->rarely moist  Activity: 1-->bedfast  Mobility: 2-->very " limited  Nutrition: 3-->adequate  Friction and Shear: 3-->no apparent problem  Hilton Score: 15    Is your hilton score 12 or less? no            Restraints:   Restraint Order  Length of Order: Order good for next 24 hours or when removed.  Date that the current order will : 25  Time that the current order will :   Order Upon Application: Yes    [facility]

## 2025-06-22 NOTE — PLAN OF CARE
"[facility] Care Plan    POC reviewed with Cristi Pulido and family at 0300. Patient and Family verbalized understanding. Questions and concerns addressed. No acute events today. Pt progressing toward goals. Will continue to monitor. See below and flowsheets for full assessment and VS info.       - phos replaced  - new NGT placed and verified   - feeds cont'd @ 40      Is this a stroke patient? yes- Stroke booklet reviewed with family and is at bedside.   Care Plan Individualization: pt likes tv on    Neuro:  [place] Coma Scale  Best Eye Response: 4-->(E4) spontaneous  Best Motor Response: 5-->(M5) localizes pain  Best Verbal Response: 2-->(V2) incomprehensible speech  [place] Coma Scale Score: 11  Assessment Qualifiers: No eye obstruction present, Patient not sedated/intubated  Pupil PERRLA: yes     24 hr Temp:  [99 °F (37.2 °C)-99.3 °F (37.4 °C)]     CV:   Rhythm: normal sinus rhythm  BP goals:   SBP < 160  MAP > 65    Resp:      Oxygen Concentration (%): 6    Plan: N/A    GI/:     Diet/Nutrition Received: tube feeding  Last Bowel Movement: 06/18/25  Voiding Characteristics: external catheter    Intake/Output Summary (Last 24 hours) at 6/22/2025 0339  Last data filed at 6/22/2025 0101  Gross per 24 hour   Intake 980 ml   Output 2700 ml   Net -1720 ml          Labs/Accuchecks:  Recent Labs   Lab 06/22/25  0031   WBC 11.24   RBC 4.36*   HGB 12.3*   HCT 38.5*         Recent Labs   Lab 06/22/25  0031      K 4.2   CO2 23      BUN 24*   CREATININE 1.1   ALKPHOS 163*   ALT 14   AST 26   BILITOT 0.6      Recent Labs   Lab 06/19/25  0514   PROTIME 11.6   INR 1.1    No results for input(s): "CPK", "CPKMB", "TROPONINI", "MB" in the last 168 hours.    Electrolytes: Electrolytes replaced  Accuchecks: Q4H    Gtts:      LDA/Wounds:    Hilton Risk Assessment  Sensory Perception: 2-->very limited  Moisture: 3-->occasionally moist  Activity: 1-->bedfast  Mobility: 2-->very limited  Nutrition: 3-->adequate  Friction and " Shear: 2-->potential problem  Hilton Score: 13  Is your hilton score 12 or less? no          Restraints:   Restraint Order  Length of Order: Order good for next 24 hours or when removed.  Date that the current order will : 25  Time that the current order will :   Order Upon Application: Yes    [facility]

## 2025-06-22 NOTE — ASSESSMENT & PLAN NOTE
Hemoglobin A1c 7  SSI   SLP eval- NPO. NGT placed.   RD consulted for TF recs - glucerna 1.5 @ 55  Added scheduled aspart 4U q4h

## 2025-06-22 NOTE — ASSESSMENT & PLAN NOTE
69 y/o male with previous CVA with minimal residual right sided weakness, HTN, HLD, DM presents with LM1 occlusion s/p TNK at 0548 on 6/19/25 and thrombectomy (TICI 3 x 1 pass).    Admit to NCC  Q4h neuro checks  Q1h vital signs, I/Os  EKG, Echo, CXR reviewed  A1c, TSH, lipid panel, aPTT, PT/INR, UA reviewed  CBC, CMP, mag, and phos daily  Atorvastatin 40  SBP goal < 160  MRI reviewed w large L MCA infarct w mass effect and 4mm MLS; L temporal IPH stable.   Started ASA 81  Vascular Neurology following   SCD; lovenox for VTE ppx  PT/OT/SLP  Stable for step down to vascular neurology

## 2025-06-22 NOTE — PROGRESS NOTES
[facility] - Neuro Critical Care  Neurocritical Care  Progress Note    Admit Date: 6/19/2025  Service Date: 06/22/2025  Length of Stay: 3    Subjective:     Chief Complaint: Embolic stroke involving left middle cerebral artery    History of Present Illness: Mr. Pulido is a 71 y/o male with PMH significant for previous CVA with minimal residual right sided weakness, NIDDM, HTN, HLD who presents to [facility] as a transfer for LMCA stroke. History obtained from patient's family at bedside and chart review. Patient presented to the ED early this morning with acute onset dysarthria and right sided weakness. LKN around 4AM. He was evaluated by telestroke, found to have NIHSS of 19. CT showed probable early ischemic changes in the L insular ribbon and L frontal lobe. CTA demonstrated occlusion of the distal L MCA M1 segment. He was given TNK at 0548 and transferred to [facility] for possible mechanical thrombectomy and further management. On arrival, patient reported to have NIHSS 24. CTH done showed a small left temporal hemorrhage and continued early ischemic changes. He was taken to IR where he had a successful thrombectomy of L M1 occlusion with TICI 3 reperfusion in one pass. He was admitted to [facility] for close monitoring and stroke management.       Hospital Course: 06/20/2025 Liberalize SBP goal < 160. SLP eval. NGT placed. RD consult for TF. MRI reviewed w large L MCA infarct w mass effect and 4mm MLS; L temporal IPH stable. Hold on starting ASA and SQH given stroke burden and hemorrhage. Q2h neuro checks.   06/21/2025 Holding AP till 6/23 per VN. Pt is hyperglycemic on impact peptide 1.5, transitioned to glucerna 1.5. Start lovenox.   06/22/2025 ASA 81 added. Hyperglycemic, added scheduled aspart 4U q4h. Stable for step down to vascular neurology.     Review of Systems   Reason unable to perform ROS: aphasia.     Objective:     Vitals:    Temp: 98.9 °F (37.2 °C)  Pulse: 69  Rhythm: normal sinus rhythm  BP: (!) 168/79  MAP (mmHg):  "113  Resp: 13  SpO2: 97 %    Temp  Min: 98.6 °F (37 °C)  Max: 99.3 °F (37.4 °C)  Pulse  Min: 64  Max: 87  BP  Min: 141/70  Max: 170/84  MAP (mmHg)  Min: 100  Max: 120  Resp  Min: 13  Max: 19  SpO2  Min: 90 %  Max: 97 %    06/21 0701 - 06/22 0700  In: 1100   Out: 1200 [Urine:1200]            Physical Exam  Vitals and nursing note reviewed.   HENT:      Head: Normocephalic and atraumatic.      Right Ear: External ear normal.      Left Ear: External ear normal.      Nose: Nose normal.      Mouth/Throat:      Mouth: Mucous membranes are moist.      Pharynx: Oropharynx is clear.   Eyes:      Pupils: Pupils are equal, round, and reactive to light.   Cardiovascular:      Rate and Rhythm: Normal rate and regular rhythm.   Pulmonary:      Effort: Pulmonary effort is normal. No respiratory distress.   Musculoskeletal:      Cervical back: Normal range of motion.      Right lower leg: No edema.      Left lower leg: No edema.      Comments: 2 toes amputated   Skin:     General: Skin is warm and dry.   Neurological:      Comments: -GCS E4V3M5  -Alert. Mixed aphasia. Was able to say "help" while advancing NGT. Stating "yes" to all questions, otherwise incomprehensible speech/grunts. Does not follow any commands.   -Cranial nerves:   R facial weakness  Left gaze preference overcomes midline on own   -Motor :   RUE: minimal flexion to noxious stimuli   LUE: Full antigravity movement, no drift   RLE: some spontaneous movement, withdraws to tickling foot  LLE: full antigravity movement   -Sensation: grimacing to noxious stimuli x4               Today I personally reviewed pertinent medications, imaging, cardiology results, laboratory results, notably:    - Echo complete, unremarkable EF 60-65%  - CT head post-thrombectomy reviewed, hemorrhage stable  - Glucose curve     Laboratory:  CBC:  Recent Labs   Lab 06/22/25  0031   WBC 11.24   RBC 4.36*   HGB 12.3*   HCT 38.5*      MCV 88   MCH 28.2   MCHC 31.9*     CMP:  Recent Labs " "  Lab 06/22/25  0031   CALCIUM 9.3   ALBUMIN 3.8   PROT 7.3      K 4.2   CO2 23      BUN 24*   CREATININE 1.1   ALKPHOS 163*   ALT 14   AST 26   BILITOT 0.6     Recent Labs   Lab 06/22/25  0031   MG 1.9   PHOS 2.7     Coagulation:  Recent Labs   Lab 06/19/25  0514   INR 1.1     Lipid Panel:  Recent Labs   Lab 06/19/25  0514   CHOL 123   HDL 38*   LDLCALC 75.2   TRIG 49     Endocrine:  No results for input(s): "HGBA1C", "TSH" in the last 72 hours.        Assessment/Plan:     Neuro  * Embolic stroke involving left middle cerebral artery  69 y/o male with previous CVA with minimal residual right sided weakness, HTN, HLD, DM presents with LM1 occlusion s/p TNK at 0548 on 6/19/25 and thrombectomy (TICI 3 x 1 pass).    Admit to NCC  Q4h neuro checks  Q1h vital signs, I/Os  EKG, Echo, CXR reviewed  A1c, TSH, lipid panel, aPTT, PT/INR, UA reviewed  CBC, CMP, mag, and phos daily  Atorvastatin 40  SBP goal < 160  MRI reviewed w large L MCA infarct w mass effect and 4mm MLS; L temporal IPH stable.   Started ASA 81  Vascular Neurology following   SCD; lovenox for VTE ppx  PT/OT/SLP  Stable for step down to vascular neurology    Right sided weakness  PT/OT consulted     Dysarthria and anarthria  SLP consulted  - Repeat SLP consult fail, placed NGT     Aphasia  SLP consulted and following    NPO   NGT    Cardiac/Vascular  Hyperlipemia  Well controlled with statin, continue   Atorvastatin 40    Hypertension associated with diabetes  Longstanding hypertension   SBP goal 100-160   - PRN hydralazine and labetalol  - Home amlodipine 10, lisinopril 40, metop 50 BID    Endocrine  Type 2 diabetes mellitus without complication, without long-term current use of insulin  Hemoglobin A1c 7  SSI   SLP eval- NPO. NGT placed.   RD consulted for TF recs - glucerna 1.5 @ 55  Added scheduled aspart 4U q4h    Other  Tobacco dependence  Consider nicotine patch if needed   Pt is a daily smoker, relax SpO2 goal > 90%.           The patient " is being Prophylaxed for:  Venous Thromboembolism with: Mechanical or Chemical  Stress Ulcer with: Not Applicable   Ventilator Pneumonia with: not applicable    Activity Orders            Elevate HOB Elevate HOB 30-45 degrees during feeding unless contraindicated starting at 06/20 1749    Progressive Mobility Protocol (mobilize patient to their highest level of functioning at least twice daily) starting at 06/19 2000    Turn patient starting at 06/19 1200    Elevate HOB starting at 06/19 1004    Diet NPO: NPO starting at 06/19 1004          Full Code    Level III    Piter Leon PA-C  Neurocritical Care  [facility] - Neuro Critical Care

## 2025-06-22 NOTE — SUBJECTIVE & OBJECTIVE
"Review of Systems   Reason unable to perform ROS: aphasia.     Objective:     Vitals:    Temp: 98.9 °F (37.2 °C)  Pulse: 69  Rhythm: normal sinus rhythm  BP: (!) 168/79  MAP (mmHg): 113  Resp: 13  SpO2: 97 %    Temp  Min: 98.6 °F (37 °C)  Max: 99.3 °F (37.4 °C)  Pulse  Min: 64  Max: 87  BP  Min: 141/70  Max: 170/84  MAP (mmHg)  Min: 100  Max: 120  Resp  Min: 13  Max: 19  SpO2  Min: 90 %  Max: 97 %    06/21 0701 - 06/22 0700  In: 1100   Out: 1200 [Urine:1200]            Physical Exam  Vitals and nursing note reviewed.   HENT:      Head: Normocephalic and atraumatic.      Right Ear: External ear normal.      Left Ear: External ear normal.      Nose: Nose normal.      Mouth/Throat:      Mouth: Mucous membranes are moist.      Pharynx: Oropharynx is clear.   Eyes:      Pupils: Pupils are equal, round, and reactive to light.   Cardiovascular:      Rate and Rhythm: Normal rate and regular rhythm.   Pulmonary:      Effort: Pulmonary effort is normal. No respiratory distress.   Musculoskeletal:      Cervical back: Normal range of motion.      Right lower leg: No edema.      Left lower leg: No edema.      Comments: 2 toes amputated   Skin:     General: Skin is warm and dry.   Neurological:      Comments: -GCS E4V3M5  -Alert. Mixed aphasia. Was able to say "help" while advancing NGT. Stating "yes" to all questions, otherwise incomprehensible speech/grunts. Does not follow any commands.   -Cranial nerves:   R facial weakness  Left gaze preference overcomes midline on own   -Motor :   RUE: minimal flexion to noxious stimuli   LUE: Full antigravity movement, no drift   RLE: some spontaneous movement, withdraws to tickling foot  LLE: full antigravity movement   -Sensation: grimacing to noxious stimuli x4               Today I personally reviewed pertinent medications, imaging, cardiology results, laboratory results, notably:    - Echo complete, unremarkable EF 60-65%  - CT head post-thrombectomy reviewed, hemorrhage stable  - " "Glucose curve     Laboratory:  CBC:  Recent Labs   Lab 06/22/25  0031   WBC 11.24   RBC 4.36*   HGB 12.3*   HCT 38.5*      MCV 88   MCH 28.2   MCHC 31.9*     CMP:  Recent Labs   Lab 06/22/25  0031   CALCIUM 9.3   ALBUMIN 3.8   PROT 7.3      K 4.2   CO2 23      BUN 24*   CREATININE 1.1   ALKPHOS 163*   ALT 14   AST 26   BILITOT 0.6     Recent Labs   Lab 06/22/25  0031   MG 1.9   PHOS 2.7     Coagulation:  Recent Labs   Lab 06/19/25  0514   INR 1.1     Lipid Panel:  Recent Labs   Lab 06/19/25  0514   CHOL 123   HDL 38*   LDLCALC 75.2   TRIG 49     Endocrine:  No results for input(s): "HGBA1C", "TSH" in the last 72 hours.        " Gabapentin Counseling: I discussed with the patient the risks of gabapentin including but not limited to dizziness, somnolence, fatigue and ataxia.

## 2025-06-22 NOTE — EICU
[facility] Night Rounds Checklist  24H Vital Sign Range:  Temp:  [98.1 °F (36.7 °C)-99.3 °F (37.4 °C)]   Pulse:  [69-94]   Resp:  [13-20]   BP: (143-173)/(67-98)   SpO2:  [89 %-97 %]     Video rounds and LDA reconciliation

## 2025-06-23 LAB
ABSOLUTE EOSINOPHIL (OHS): 0.21 K/UL
ABSOLUTE MONOCYTE (OHS): 1.16 K/UL (ref 0.3–1)
ABSOLUTE NEUTROPHIL COUNT (OHS): 6.66 K/UL (ref 1.8–7.7)
ALBUMIN SERPL BCP-MCNC: 3.5 G/DL (ref 3.5–5.2)
ALP SERPL-CCNC: 156 UNIT/L (ref 40–150)
ALT SERPL W/O P-5'-P-CCNC: 15 UNIT/L (ref 10–44)
ANION GAP (OHS): 14 MMOL/L (ref 8–16)
AST SERPL-CCNC: 23 UNIT/L (ref 11–45)
BASOPHILS # BLD AUTO: 0.03 K/UL
BASOPHILS NFR BLD AUTO: 0.3 %
BILIRUB SERPL-MCNC: 0.4 MG/DL (ref 0.1–1)
BUN SERPL-MCNC: 28 MG/DL (ref 8–23)
CALCIUM SERPL-MCNC: 9.1 MG/DL (ref 8.7–10.5)
CHLORIDE SERPL-SCNC: 109 MMOL/L (ref 95–110)
CO2 SERPL-SCNC: 22 MMOL/L (ref 23–29)
CREAT SERPL-MCNC: 1.2 MG/DL (ref 0.5–1.4)
ERYTHROCYTE [DISTWIDTH] IN BLOOD BY AUTOMATED COUNT: 13.4 % (ref 11.5–14.5)
GFR SERPLBLD CREATININE-BSD FMLA CKD-EPI: >60 ML/MIN/1.73/M2
GLUCOSE SERPL-MCNC: 169 MG/DL (ref 70–110)
HCT VFR BLD AUTO: 39 % (ref 40–54)
HGB BLD-MCNC: 12.5 GM/DL (ref 14–18)
IMM GRANULOCYTES # BLD AUTO: 0.02 K/UL (ref 0–0.04)
IMM GRANULOCYTES NFR BLD AUTO: 0.2 % (ref 0–0.5)
LYMPHOCYTES # BLD AUTO: 2.5 K/UL (ref 1–4.8)
MAGNESIUM SERPL-MCNC: 2 MG/DL (ref 1.6–2.6)
MCH RBC QN AUTO: 28.3 PG (ref 27–31)
MCHC RBC AUTO-ENTMCNC: 32.1 G/DL (ref 32–36)
MCV RBC AUTO: 88 FL (ref 82–98)
NUCLEATED RBC (/100WBC) (OHS): 0 /100 WBC
PHOSPHATE SERPL-MCNC: 3.8 MG/DL (ref 2.7–4.5)
PLATELET # BLD AUTO: 186 K/UL (ref 150–450)
PMV BLD AUTO: 12.1 FL (ref 9.2–12.9)
POCT GLUCOSE: 165 MG/DL (ref 70–110)
POCT GLUCOSE: 174 MG/DL (ref 70–110)
POCT GLUCOSE: 178 MG/DL (ref 70–110)
POCT GLUCOSE: 194 MG/DL (ref 70–110)
POCT GLUCOSE: 214 MG/DL (ref 70–110)
POCT GLUCOSE: 219 MG/DL (ref 70–110)
POCT GLUCOSE: 229 MG/DL (ref 70–110)
POTASSIUM SERPL-SCNC: 4 MMOL/L (ref 3.5–5.1)
PROT SERPL-MCNC: 7.2 GM/DL (ref 6–8.4)
RBC # BLD AUTO: 4.41 M/UL (ref 4.6–6.2)
RELATIVE EOSINOPHIL (OHS): 2 %
RELATIVE LYMPHOCYTE (OHS): 23.6 % (ref 18–48)
RELATIVE MONOCYTE (OHS): 11 % (ref 4–15)
RELATIVE NEUTROPHIL (OHS): 62.9 % (ref 38–73)
SODIUM SERPL-SCNC: 145 MMOL/L (ref 136–145)
WBC # BLD AUTO: 10.58 K/UL (ref 3.9–12.7)

## 2025-06-23 PROCEDURE — 84100 ASSAY OF PHOSPHORUS: CPT | Mod: HCNC | Performed by: PHYSICIAN ASSISTANT

## 2025-06-23 PROCEDURE — 97110 THERAPEUTIC EXERCISES: CPT | Mod: HCNC,CQ

## 2025-06-23 PROCEDURE — 25000003 PHARM REV CODE 250: Mod: HCNC | Performed by: PSYCHIATRY & NEUROLOGY

## 2025-06-23 PROCEDURE — 97112 NEUROMUSCULAR REEDUCATION: CPT | Mod: HCNC

## 2025-06-23 PROCEDURE — 51798 US URINE CAPACITY MEASURE: CPT | Mod: HCNC

## 2025-06-23 PROCEDURE — 80053 COMPREHEN METABOLIC PANEL: CPT | Mod: HCNC | Performed by: PHYSICIAN ASSISTANT

## 2025-06-23 PROCEDURE — 92507 TX SP LANG VOICE COMM INDIV: CPT | Mod: HCNC

## 2025-06-23 PROCEDURE — 11000001 HC ACUTE MED/SURG PRIVATE ROOM: Mod: HCNC

## 2025-06-23 PROCEDURE — 83735 ASSAY OF MAGNESIUM: CPT | Mod: HCNC | Performed by: PHYSICIAN ASSISTANT

## 2025-06-23 PROCEDURE — 63600175 PHARM REV CODE 636 W HCPCS: Mod: HCNC

## 2025-06-23 PROCEDURE — S4991 NICOTINE PATCH NONLEGEND: HCPCS | Mod: HCNC | Performed by: PHYSICIAN ASSISTANT

## 2025-06-23 PROCEDURE — 92526 ORAL FUNCTION THERAPY: CPT | Mod: HCNC

## 2025-06-23 PROCEDURE — 27000221 HC OXYGEN, UP TO 24 HOURS: Mod: HCNC

## 2025-06-23 PROCEDURE — 25000003 PHARM REV CODE 250: Mod: HCNC | Performed by: INTERNAL MEDICINE

## 2025-06-23 PROCEDURE — 99900035 HC TECH TIME PER 15 MIN (STAT): Mod: HCNC

## 2025-06-23 PROCEDURE — 85025 COMPLETE CBC W/AUTO DIFF WBC: CPT | Mod: HCNC | Performed by: PHYSICIAN ASSISTANT

## 2025-06-23 PROCEDURE — 97530 THERAPEUTIC ACTIVITIES: CPT | Mod: HCNC,CQ

## 2025-06-23 PROCEDURE — 99233 SBSQ HOSP IP/OBS HIGH 50: CPT | Mod: HCNC,FS,, | Performed by: PSYCHIATRY & NEUROLOGY

## 2025-06-23 PROCEDURE — 25000003 PHARM REV CODE 250: Mod: HCNC | Performed by: PHYSICIAN ASSISTANT

## 2025-06-23 PROCEDURE — 99233 SBSQ HOSP IP/OBS HIGH 50: CPT | Mod: HCNC,,,

## 2025-06-23 PROCEDURE — 94761 N-INVAS EAR/PLS OXIMETRY MLT: CPT | Mod: HCNC

## 2025-06-23 RX ORDER — INSULIN ASPART 100 [IU]/ML
6 INJECTION, SOLUTION INTRAVENOUS; SUBCUTANEOUS EVERY 4 HOURS
Status: DISCONTINUED | OUTPATIENT
Start: 2025-06-23 | End: 2025-07-08

## 2025-06-23 RX ADMIN — INSULIN ASPART 4 UNITS: 100 INJECTION, SOLUTION INTRAVENOUS; SUBCUTANEOUS at 01:06

## 2025-06-23 RX ADMIN — INSULIN ASPART 2 UNITS: 100 INJECTION, SOLUTION INTRAVENOUS; SUBCUTANEOUS at 11:06

## 2025-06-23 RX ADMIN — MUPIROCIN: 20 OINTMENT TOPICAL at 08:06

## 2025-06-23 RX ADMIN — INSULIN ASPART 6 UNITS: 100 INJECTION, SOLUTION INTRAVENOUS; SUBCUTANEOUS at 09:06

## 2025-06-23 RX ADMIN — METOPROLOL TARTRATE 50 MG: 50 TABLET, FILM COATED ORAL at 09:06

## 2025-06-23 RX ADMIN — MUPIROCIN: 20 OINTMENT TOPICAL at 09:06

## 2025-06-23 RX ADMIN — SENNOSIDES AND DOCUSATE SODIUM 1 TABLET: 50; 8.6 TABLET ORAL at 08:06

## 2025-06-23 RX ADMIN — INSULIN ASPART 4 UNITS: 100 INJECTION, SOLUTION INTRAVENOUS; SUBCUTANEOUS at 05:06

## 2025-06-23 RX ADMIN — LABETALOL HYDROCHLORIDE 10 MG: 5 INJECTION, SOLUTION INTRAVENOUS at 04:06

## 2025-06-23 RX ADMIN — AMLODIPINE BESYLATE 10 MG: 10 TABLET ORAL at 09:06

## 2025-06-23 RX ADMIN — SENNOSIDES AND DOCUSATE SODIUM 1 TABLET: 50; 8.6 TABLET ORAL at 09:06

## 2025-06-23 RX ADMIN — POLYETHYLENE GLYCOL 3350 17 G: 17 POWDER, FOR SOLUTION ORAL at 09:06

## 2025-06-23 RX ADMIN — ATORVASTATIN CALCIUM 40 MG: 40 TABLET, FILM COATED ORAL at 08:06

## 2025-06-23 RX ADMIN — LISINOPRIL 40 MG: 20 TABLET ORAL at 08:06

## 2025-06-23 RX ADMIN — FLUOXETINE HYDROCHLORIDE 20 MG: 20 CAPSULE ORAL at 08:06

## 2025-06-23 RX ADMIN — INSULIN ASPART 1 UNITS: 100 INJECTION, SOLUTION INTRAVENOUS; SUBCUTANEOUS at 01:06

## 2025-06-23 RX ADMIN — ASPIRIN 81 MG CHEWABLE TABLET 81 MG: 81 TABLET CHEWABLE at 08:06

## 2025-06-23 RX ADMIN — INSULIN ASPART 4 UNITS: 100 INJECTION, SOLUTION INTRAVENOUS; SUBCUTANEOUS at 08:06

## 2025-06-23 RX ADMIN — INSULIN ASPART 4 UNITS: 100 INJECTION, SOLUTION INTRAVENOUS; SUBCUTANEOUS at 03:06

## 2025-06-23 RX ADMIN — POLYETHYLENE GLYCOL 3350 17 G: 17 POWDER, FOR SOLUTION ORAL at 08:06

## 2025-06-23 RX ADMIN — INSULIN ASPART 6 UNITS: 100 INJECTION, SOLUTION INTRAVENOUS; SUBCUTANEOUS at 03:06

## 2025-06-23 RX ADMIN — Medication 1 PATCH: at 08:06

## 2025-06-23 RX ADMIN — METOPROLOL TARTRATE 50 MG: 50 TABLET, FILM COATED ORAL at 08:06

## 2025-06-23 RX ADMIN — ENOXAPARIN SODIUM 40 MG: 40 INJECTION SUBCUTANEOUS at 03:06

## 2025-06-23 NOTE — PLAN OF CARE
06/23/25 1404   Post-Acute Status   Post-Acute Authorization Placement   Post-Acute Placement Status Referrals Sent   Patient choice form signed by patient/caregiver List with quality metrics by geographic area provided   Discharge Delays None known at this time   Discharge Plan   Discharge Plan A Rehab   Discharge Plan B Home with family     Met with pt and pt wife  to review discharge recommendation of REHAB and is agreeable to plan    Patient/family provided list of facilities in-network with patient's payor plan. Providers that are owned, operated, or affiliated with [facility] are included on the list.     Notified that referral sent to below listed facilities from in-network list based on proximity to home/family support:   [facility]   2.  3.  4.  5. (can send more than 5)    Patient/family instructed to identify preference.    Preferred Facility: (if more than 1, listed in order of descending preference)  Rehab     Discharge Plan A and Plan B have been determined by review of patient's clinical status, future medical and therapeutic needs, and coverage/benefits for post-acute care in coordination with multidisciplinary team members.    Ashley Ospina MSW, LCSW  [facility]  Case Management Dept.

## 2025-06-23 NOTE — PLAN OF CARE
"[facility] Care Plan    POC reviewed with Cristi Pulido and family at 0300. Family verbalized understanding. Questions and concerns addressed. No acute events today. Pt progressing toward goals. Will continue to monitor. See below and flowsheets for full assessment and VS info.     - Labetalol x1  - Partial bath given  - Step down orders      Is this a stroke patient? yes- Stroke booklet reviewed with family and is at bedside.   Care Plan Individualization: pt likes tv on.    Neuro:  Stephanie Coma Scale  Best Eye Response: 4-->(E4) spontaneous  Best Motor Response: 5-->(M5) localizes pain  Best Verbal Response: 2-->(V2) incomprehensible speech  Stephanie Coma Scale Score: 11  Assessment Qualifiers: No eye obstruction present, Patient not sedated/intubated  Pupil PERRLA: yes     24 hr Temp:  [98.4 °F (36.9 °C)-98.9 °F (37.2 °C)]     CV:   Rhythm: normal sinus rhythm  BP goals:   SBP < 160  MAP > 65    Resp:      Oxygen Concentration (%): 6    Plan: N/A    GI/:     Diet/Nutrition Received: tube feeding  Last Bowel Movement: 06/22/25  Voiding Characteristics: external catheter    Intake/Output Summary (Last 24 hours) at 6/23/2025 0627  Last data filed at 6/23/2025 0301  Gross per 24 hour   Intake 830 ml   Output 1150 ml   Net -320 ml          Labs/Accuchecks:  Recent Labs   Lab 06/23/25  0116   WBC 10.58   RBC 4.41*   HGB 12.5*   HCT 39.0*         Recent Labs   Lab 06/23/25  0116      K 4.0   CO2 22*      BUN 28*   CREATININE 1.2   ALKPHOS 156*   ALT 15   AST 23   BILITOT 0.4      Recent Labs   Lab 06/19/25  0514   PROTIME 11.6   INR 1.1    No results for input(s): "CPK", "CPKMB", "TROPONINI", "MB" in the last 168 hours.    Electrolytes: N/A - electrolytes WDL  Accuchecks: Q4H    Gtts:      LDA/Wounds:    Hilton Risk Assessment  Sensory Perception: 2-->very limited  Moisture: 3-->occasionally moist  Activity: 1-->bedfast  Mobility: 2-->very limited  Nutrition: 3-->adequate  Friction and Shear: 2-->potential " problem  Hilton Score: 13  Is your hilton score 12 or less? no          Restraints:   Restraint Order  Length of Order: Order good for next 24 hours or when removed.  Date that the current order will : 25  Time that the current order will :   Order Upon Application: Yes    [facility]

## 2025-06-23 NOTE — PROGRESS NOTES
[facility] - Neuro Critical Care  Neurocritical Care  Progress Note    Admit Date: 6/19/2025  Service Date: 06/23/2025  Length of Stay: 4    Subjective:     Chief Complaint: Embolic stroke involving left middle cerebral artery    History of Present Illness: Mr. Pulido is a 69 y/o male with PMH significant for previous CVA with minimal residual right sided weakness, NIDDM, HTN, HLD who presents to [facility] as a transfer for LMCA stroke. History obtained from patient's family at bedside and chart review. Patient presented to the ED early this morning with acute onset dysarthria and right sided weakness. LKN around 4AM. He was evaluated by telestroke, found to have NIHSS of 19. CT showed probable early ischemic changes in the L insular ribbon and L frontal lobe. CTA demonstrated occlusion of the distal L MCA M1 segment. He was given TNK at 0548 and transferred to [facility] for possible mechanical thrombectomy and further management. On arrival, patient reported to have NIHSS 24. CTH done showed a small left temporal hemorrhage and continued early ischemic changes. He was taken to IR where he had a successful thrombectomy of L M1 occlusion with TICI 3 reperfusion in one pass. He was admitted to [facility] for close monitoring and stroke management.       Hospital Course: 06/20/2025 Liberalize SBP goal < 160. SLP eval. NGT placed. RD consult for TF. MRI reviewed w large L MCA infarct w mass effect and 4mm MLS; L temporal IPH stable. Hold on starting ASA and SQH given stroke burden and hemorrhage. Q2h neuro checks.   06/21/2025 Holding AP till 6/23 per VN. Pt is hyperglycemic on impact peptide 1.5, transitioned to glucerna 1.5. Start lovenox.   06/22/2025 ASA 81 added. Hyperglycemic, added scheduled aspart 4U q4h. Stable for step down to vascular neurology.   06/23/2025 Increased aspart 6U q4h. Awaiting step down bed to vascular neurology.     Review of Systems   Reason unable to perform ROS: aphasia.     Objective:     Vitals:    Temp: 98 °F  "(36.7 °C)  Pulse: 60  Rhythm: normal sinus rhythm  BP: 101/71  MAP (mmHg): 81  Resp: 20  SpO2: 95 %    Temp  Min: 98 °F (36.7 °C)  Max: 98.9 °F (37.2 °C)  Pulse  Min: 54  Max: 92  BP  Min: 101/71  Max: 182/87  MAP (mmHg)  Min: 78  Max: 126  Resp  Min: 12  Max: 20  SpO2  Min: 90 %  Max: 99 %    06/22 0701 - 06/23 0700  In: 950 [I.V.:20]  Out: 1550 [Urine:1550]            Physical Exam  Vitals and nursing note reviewed.   HENT:      Head: Normocephalic and atraumatic.      Right Ear: External ear normal.      Left Ear: External ear normal.      Nose: Nose normal.      Mouth/Throat:      Mouth: Mucous membranes are moist.      Pharynx: Oropharynx is clear.   Eyes:      Pupils: Pupils are equal, round, and reactive to light.   Cardiovascular:      Rate and Rhythm: Normal rate and regular rhythm.   Pulmonary:      Effort: Pulmonary effort is normal. No respiratory distress.   Musculoskeletal:      Cervical back: Normal range of motion.      Right lower leg: No edema.      Left lower leg: No edema.      Comments: 2 toes amputated   Skin:     General: Skin is warm and dry.   Neurological:      Comments: -GCS E4V3M5  -Alert. Mixed aphasia. Was able to say "help" while advancing NGT. Stating "yes" to all questions, otherwise incomprehensible speech/grunts. Does not follow any commands.   -Cranial nerves:   R facial weakness  Left gaze preference overcomes midline on own   -Motor :   RUE: minimal flexion to noxious stimuli   LUE: Full antigravity movement, no drift   RLE: some spontaneous movement, withdraws to tickling foot  LLE: full antigravity movement   -Sensation: grimacing to noxious stimuli x4               Today I personally reviewed pertinent medications, imaging, cardiology results, laboratory results, notably:    - Echo complete, unremarkable EF 60-65%  - CT head post-thrombectomy reviewed, hemorrhage stable  - Glucose curve     Laboratory:  CBC:  Recent Labs   Lab 06/23/25  0116   WBC 10.58   RBC 4.41*   HGB " "12.5*   HCT 39.0*      MCV 88   MCH 28.3   MCHC 32.1     CMP:  Recent Labs   Lab 06/23/25  0116   CALCIUM 9.1   ALBUMIN 3.5   PROT 7.2      K 4.0   CO2 22*      BUN 28*   CREATININE 1.2   ALKPHOS 156*   ALT 15   AST 23   BILITOT 0.4     Recent Labs   Lab 06/23/25  0116   MG 2.0   PHOS 3.8     Coagulation:  Recent Labs   Lab 06/19/25  0514   INR 1.1     Lipid Panel:  Recent Labs   Lab 06/19/25  0514   CHOL 123   HDL 38*   LDLCALC 75.2   TRIG 49     Endocrine:  No results for input(s): "HGBA1C", "TSH" in the last 72 hours.        Assessment/Plan:     Neuro  * Embolic stroke involving left middle cerebral artery  69 y/o male with previous CVA with minimal residual right sided weakness, HTN, HLD, DM presents with LM1 occlusion s/p TNK at 0548 on 6/19/25 and thrombectomy (TICI 3 x 1 pass).    Admit to NCC  Q4h neuro checks  Q1h vital signs, I/Os  EKG, Echo, CXR reviewed  A1c, TSH, lipid panel, aPTT, PT/INR, UA reviewed  CBC, CMP, mag, and phos daily  Atorvastatin 40  SBP goal < 160  MRI reviewed w large L MCA infarct w mass effect and 4mm MLS; L temporal IPH stable.   ASA 81mg  Vascular Neurology following   SCD; lovenox for VTE ppx  PT/OT/SLP  Stable for step down to vascular neurology    Right sided weakness  PT/OT consulted     Dysarthria and anarthria  SLP consulted  - Repeat SLP consult fail, placed NGT     Aphasia  SLP consulted and following    NPO   NGT  PEG discussions initiated     Cardiac/Vascular  Hyperlipemia  Well controlled with statin, continue   Atorvastatin 40    Hypertension associated with diabetes  Longstanding hypertension   SBP goal 100-160   - PRN hydralazine and labetalol  - Home amlodipine 10, lisinopril 40, metop 50 BID    Endocrine  Type 2 diabetes mellitus without complication, without long-term current use of insulin  Hemoglobin A1c 7  SSI   SLP eval- NPO. NGT placed.   RD consulted for TF recs - glucerna 1.5 @ 55  Scheduled aspart 6U q4h    Other  Tobacco " dependence  Consider nicotine patch if needed   Pt is a daily smoker, relax SpO2 goal > 90%.           The patient is being Prophylaxed for:  Venous Thromboembolism with: Mechanical or Chemical  Stress Ulcer with: Not Applicable   Ventilator Pneumonia with: not applicable    Activity Orders            Elevate HOB Elevate HOB 30-45 degrees during feeding unless contraindicated starting at 06/20 1749    Progressive Mobility Protocol (mobilize patient to their highest level of functioning at least twice daily) starting at 06/19 2000    Turn patient starting at 06/19 1200    Elevate HOB starting at 06/19 1004    Diet NPO: NPO starting at 06/19 1004          Full Code    Level III    Piter Leon PA-C  Neurocritical Care  [facility] - Neuro Critical Care

## 2025-06-23 NOTE — CARE UPDATE
Nursing Transfer Note       Transfer To NPU    Transfer via bed    Transfer with cardiac monitoring    Transported by JOSIE Nam     Medicines sent: Yes    Chart sent with patient: Yes    Belongings sent with patient: None with family     Notified: spouse    Bedside Neuro assessment performed: Yes    Does the patient have altered skin integrity? no       Bedside Handoff given to: NPU RN     Upon arrival to floor: cardiac monitor applied, patient oriented to room, call bell in reach, and bed in lowest position

## 2025-06-23 NOTE — SUBJECTIVE & OBJECTIVE
"Review of Systems   Reason unable to perform ROS: aphasia.     Objective:     Vitals:    Temp: 98 °F (36.7 °C)  Pulse: 60  Rhythm: normal sinus rhythm  BP: 101/71  MAP (mmHg): 81  Resp: 20  SpO2: 95 %    Temp  Min: 98 °F (36.7 °C)  Max: 98.9 °F (37.2 °C)  Pulse  Min: 54  Max: 92  BP  Min: 101/71  Max: 182/87  MAP (mmHg)  Min: 78  Max: 126  Resp  Min: 12  Max: 20  SpO2  Min: 90 %  Max: 99 %    06/22 0701 - 06/23 0700  In: 950 [I.V.:20]  Out: 1550 [Urine:1550]            Physical Exam  Vitals and nursing note reviewed.   HENT:      Head: Normocephalic and atraumatic.      Right Ear: External ear normal.      Left Ear: External ear normal.      Nose: Nose normal.      Mouth/Throat:      Mouth: Mucous membranes are moist.      Pharynx: Oropharynx is clear.   Eyes:      Pupils: Pupils are equal, round, and reactive to light.   Cardiovascular:      Rate and Rhythm: Normal rate and regular rhythm.   Pulmonary:      Effort: Pulmonary effort is normal. No respiratory distress.   Musculoskeletal:      Cervical back: Normal range of motion.      Right lower leg: No edema.      Left lower leg: No edema.      Comments: 2 toes amputated   Skin:     General: Skin is warm and dry.   Neurological:      Comments: -GCS E4V3M5  -Alert. Mixed aphasia. Was able to say "help" while advancing NGT. Stating "yes" to all questions, otherwise incomprehensible speech/grunts. Does not follow any commands.   -Cranial nerves:   R facial weakness  Left gaze preference overcomes midline on own   -Motor :   RUE: minimal flexion to noxious stimuli   LUE: Full antigravity movement, no drift   RLE: some spontaneous movement, withdraws to tickling foot  LLE: full antigravity movement   -Sensation: grimacing to noxious stimuli x4               Today I personally reviewed pertinent medications, imaging, cardiology results, laboratory results, notably:    - Echo complete, unremarkable EF 60-65%  - CT head post-thrombectomy reviewed, hemorrhage stable  - " "Glucose curve     Laboratory:  CBC:  Recent Labs   Lab 06/23/25  0116   WBC 10.58   RBC 4.41*   HGB 12.5*   HCT 39.0*      MCV 88   MCH 28.3   MCHC 32.1     CMP:  Recent Labs   Lab 06/23/25  0116   CALCIUM 9.1   ALBUMIN 3.5   PROT 7.2      K 4.0   CO2 22*      BUN 28*   CREATININE 1.2   ALKPHOS 156*   ALT 15   AST 23   BILITOT 0.4     Recent Labs   Lab 06/23/25  0116   MG 2.0   PHOS 3.8     Coagulation:  Recent Labs   Lab 06/19/25  0514   INR 1.1     Lipid Panel:  Recent Labs   Lab 06/19/25  0514   CHOL 123   HDL 38*   LDLCALC 75.2   TRIG 49     Endocrine:  No results for input(s): "HGBA1C", "TSH" in the last 72 hours.        "

## 2025-06-23 NOTE — EICU
[facility] Night Rounds Checklist  24H Vital Sign Range:  Temp:  [98.4 °F (36.9 °C)-98.9 °F (37.2 °C)]   Pulse:  [64-92]   Resp:  [13-16]   BP: (130-182)/()   SpO2:  [90 %-99 %]     Video rounds

## 2025-06-23 NOTE — NURSING
Patient Transferred to NPU Room 955 @1625      Upon arrival to the floor, patient greeted and oriented to room. Complete head to toe assessment completed per protocol. VSS, see flowsheet for details. Neuro assessment completed. Cardiac monitoring orders reviewed. Patient added to tele monitor in nursing station, rate and rhythm verified. Primary team notified of patient's transfer to floor. All current and transfer orders reviewed/reconciled per protocol. All emergency equipment set up in patient's room. Fall/seizure precautions initiated per providers orders. 4 Eyes skin assessment performed, see flowsheets for details. Reviewed assessment and rounding frequency with patient and family. Questions were encouraged and addressed. Repositioned patient for comfort with bed locked in lowest position, side rails up x 3, bed alarm set, and call light within reach. Instructed patient to call staff for mobility/assistance, verbalized understanding. No acute signs of distress noted at this time.           NIHSS assessment completed on floor arrival. Patient's NIHSS score is 19 at this time. SCDs applied to patient per provider's orders. Hudson bedside swallow screen completed per stroke protocol. Patient has failed hudson bedside swallow screen, team notified of results. Stroke book individualized to patient and given to patient upon transfer. Reviewed stroke book with the patient at the bedside, see education flowsheets for details.

## 2025-06-23 NOTE — PT/OT/SLP PROGRESS
Speech Language Pathology Treatment    Patient Name:  Cristi Pulido   MRN:  [id number]  Admitting Diagnosis: Embolic stroke involving left middle cerebral artery    Recommendations:     General Recommendations:  Dysphagia therapy, Speech/language therapy, and Cognitive-linguistic therapy  Diet recommendations:  NPO, Liquid Diet Level: NPO, Consideration for long term means of nutrition   Aspiration Precautions: Alternate means of nutrition/hydration, Frequent oral care, Ice chips sparingly, necessary meds crushed in puree, and Strict aspiration precautions   General Precautions: Standard, fall  Communication strategies:  none  Discharge recommendations:  High Intensity Therapy   Barriers to Discharge:  Level of Skilled Assistance Needed      Assessment:     Cristi Pulido is a 70 y.o. male with an SLP diagnosis of Aphasia, Dysphagia, Dysarthria, and Cognitive-Linguistic Impairment.     Subjective     Spoke with nursing prior to session. Pt easily roused upon entry to room. No family present. NGT in place.     Pain/Comfort:  Pain Rating 1:  (unable to indicate)    Respiratory Status: Room air    Objective:     Has the patient been evaluated by SLP for swallowing?   Yes  Keep patient NPO? Yes     Pt seen bedside for ongoing swallow assessment and speech therapy.  He continues to demonstrate poor command following, severe dysarthria, expressive aphasia and right sided facial weakness and droop. Unable to elicit reliable simple Y/N responses. Simple 1 step command following or automatic speech tasks despite clinician modeling and prompting. HOB elevated and pt repositioned to be upright, midline in bed prior to PO trials. Noted white coating on superior lingual surface. Provided oral care with bedside suction kit, damp swabs and mouth wash prior to OP intake. He required feed assist with all PO trials. Worked on intake of ice chip x 2, tsp sips of thin liquids x 1.  Noted adequate oral acceptance, though poor extraction and  stripping from utensil. Pt with poor orolingual control and suspected premature spillage. Pt with overt coughing across all trials, therefore additional PO trials deferred. No volitional cough or throat clear elicited to clear wet vocal quality. Continue to recommend NPO status with consideration for long term means of nutrition at this time.  Recommend frequent oral care.  Pt education to be ongoing. White board current.     Goals:   Multidisciplinary Problems       SLP Goals          Problem: SLP    Goal Priority Disciplines Outcome   SLP Goal     SLP Progressing   Description: Speech Language Pathology Goals  Goals expected to be met by 7/3    1. Pt will participate in ongoing swallow assessment to determine least restrictive PO diet.    2. Pt will participate in ongoing cognitive-linguistic assessment to determine additional therapeutic needs.   3. Pt will follow 1 step commands with 80% acc following model/prompt.   4. Pt will answer simple Y/N questions with 80% acc.                                Plan:     Patient to be seen:  4 x/week   Plan of Care expires:  07/19/25  Plan of Care reviewed with:  patient   SLP Follow-Up:  Yes       Time Tracking:     SLP Treatment Date:   06/23/25  Speech Start Time:  0902  Speech Stop Time:  0918     Speech Total Time (min):  16 min    Billable Minutes: Speech Therapy Individual 8 and Treatment Swallowing Dysfunction 8    06/23/2025

## 2025-06-23 NOTE — ASSESSMENT & PLAN NOTE
Hemoglobin A1c 7  SSI   SLP eval- NPO. NGT placed.   RD consulted for TF recs - glucerna 1.5 @ 55  Scheduled aspart 6U q4h

## 2025-06-23 NOTE — PT/OT/SLP PROGRESS
"Physical Therapy Co-Treatment with OT ([facility])    Patient Name:  Cristi Pulido   MRN:  [id number]  Co-treatment performed for this visit due to need for two skilled therapists to ensure patient safety and to accommodate for patient tolerance/pain management.  Recommendations:     Discharge Recommendations: High Intensity Therapy  Discharge Equipment Recommendations: wheelchair, lift device, hospital bed  Barriers to discharge: Increased level of assist    Assessment:     Cristi Pulido is a 70 y.o. male admitted with a medical diagnosis of Embolic stroke involving left middle cerebral artery.  He presents with the following impairments/functional limitations: weakness, impaired endurance, impaired self care skills, impaired functional mobility, gait instability, impaired balance, visual deficits, impaired cognition, decreased upper extremity function, decreased lower extremity function, impaired coordination, impaired fine motor, impaired cardiopulmonary response to activity, decreased safety awareness. Pt found in bed appearing lethargic, yet agreeable to session. Pt is progressing well tolerating ~15 mins at EOB with no adverse reactions performing exercises this session. Ongoing strength deficits require continuation of total A x 2 at this time. Increased alertness at end of session. Pt would benefit from continued PT to improve functional independence.     Rehab Prognosis: Good; patient would benefit from acute skilled PT services to address these deficits and reach maximum level of function.    Recent Surgery: * No surgery found *      Plan:     During this hospitalization, patient to be seen 4 x/week to address the identified rehab impairments via gait training, therapeutic activities, therapeutic exercises, neuromuscular re-education and progress toward the following goals:    Plan of Care Expires:  07/11/25    Subjective     Chief Complaint: none verbalized  Patient/Family Comments/goals: "Now." Initiates " smiles  Pain/Comfort:  Pain Rating 1:  (Grunting, none verbalized)      Objective:     Communicated with nurse prior to session.  Patient found HOB elevated with bed alarm, blood pressure cuff, Condom Catheter, NG tube, pulse ox (continuous), restraints, telemetry upon PT entry to room.     General Precautions: Standard, fall, aspiration  Orthopedic Precautions: N/A  Braces: N/A  Respiratory Status: Room air     Functional Mobility:  Bed Mobility:  All require assist to hips, LE and trunk  Rolling L/R:  total A x 2  Scooting EOB: total A x 2  Boosting HOB: total A x 2. Bed flat, drawsheet  Supine <> Sit: total A x 2  Transfers:    Sit <> stand: not performed d/t head positioning without assist  Balance: seated balance: total A x 1 for R posterior lateral lean    Therex exercises: seated EOB  8 x each: LAQs, Ankle pumps, elbow flexion, shoulder flexion   R PROM, L AAROM. Assist to L UE/LE for completion of ROM.   4 x modified crunches: AAROM. Mild abdominal activation  3 x R lateral leans onto forearm: total A     Head turns to R, AAROM  Crosses midline    AM-PAC 6 CLICK MOBILITY  Turning over in bed (including adjusting bedclothes, sheets and blankets)?: 2  Sitting down on and standing up from a chair with arms (e.g., wheelchair, bedside commode, etc.): 2  Moving from lying on back to sitting on the side of the bed?: 2  Moving to and from a bed to a chair (including a wheelchair)?: 1  Need to walk in hospital room?: 1  Climbing 3-5 steps with a railing?: 1  Basic Mobility Total Score: 9     Treatment & Education:  Therapist provided instruction and educated for safety during transfers and gait training. As well as proper body mechanics, energy conservation, and fall prevention strategies during tasks listed above, and the effects of prolonged immobility and the importance of performing EOB/OOB activity and exercises to promote healing and reduce recovery time.     Patient left HOB elevated with all lines intact,  call button in reach, bed alarm on, restraints reapplied at end of session, nurse notified, and family present..    GOALS:   Multidisciplinary Problems       Physical Therapy Goals          Problem: Physical Therapy    Goal Priority Disciplines Outcome Interventions   Physical Therapy Goal     PT, PT/OT Progressing    Description: Goals to be met by: 25     Patient will increase functional independence with mobility by performin. Supine to sit with Contact Guard Assistance  2. Sit to supine with Contact Guard Assistance  3. Sit to stand transfer with Moderate Assistance  4. Bed to chair transfer with Maximum Assistance using No Assistive Device  5. Gait  x 10 feet with Maximum Assistance using No Assistive Device.   6. Sitting at edge of bed x5 minutes with Stand-by Assistance  7. Follow 100% of 1-step commands throughout session                         Time Tracking:     PT Received On: 25  PT Start Time: 1120     PT Stop Time: 1152  PT Total Time (min): 32 min     Billable Minutes: Therapeutic Activity 17 and Therapeutic Exercise 15    Treatment Type: Treatment  PT/PTA: PTA     Number of PTA visits since last PT visit: 2025

## 2025-06-23 NOTE — PLAN OF CARE
[facility]  Discharge Reassessment    Primary Care Provider: Rell Winn MD    Expected Discharge Date: 6/25/2025    Reassessment (most recent)       Discharge Reassessment - 06/23/25 1056          Discharge Reassessment    Assessment Type Discharge Planning Reassessment     Did the patient's condition or plan change since previous assessment? No     Discharge Plan discussed with: Spouse/sig other     Name(s) and Number(s) Jocy Pulido      Communicated ESSIE with patient/caregiver Yes     Discharge Plan A Rehab (P)      Discharge Plan B Home with family (P)      DME Needed Upon Discharge  hospital bed;lift device;wheelchair (P)      Transition of Care Barriers None (P)      Why the patient remains in the hospital Requires continued medical care (P)         Post-Acute Status    Discharge Delays None known at this time (P)                    Discharge Plan A and Plan B have been determined by review of patient's clinical status, future medical and therapeutic needs, and coverage/benefits for post-acute care in coordination with multidisciplinary team members.    Ashley Ospina MSW, LCSW  [facility]  Case Management Dept.

## 2025-06-23 NOTE — PT/OT/SLP PROGRESS
Occupational Therapy  Co-Treatment with PTA    Name: Cristi Pulido  MRN: [id number]  Admitting Diagnosis: Embolic stroke involving left middle cerebral artery  Recent Surgery: * No surgery found *      Recommendations:     Discharge Recommendations: High Intensity Therapy  Discharge Equipment Recommendations: hospital bed, lift device, wheelchair  Barriers to discharge: Decreased caregiver support, Other (Comment) (requires increased assistance)    Assessment:     Cristi Pulido is a 70 y.o. male with a medical diagnosis of Embolic stroke involving left middle cerebral artery. He presents with performance deficits affecting function including: weakness, impaired endurance, impaired self care skills, impaired functional mobility, impaired balance, impaired cognition, decreased coordination, decreased upper extremity function, decreased lower extremity function, decreased safety awareness, decreased ROM, impaired fine motor. VSS throughout session. At this time, patient remains limited in ADLs, transfers, and functional mobility and is currently not performing tasks at their reported PLOF. Patient would benefit from continued skilled acute OT services in order to maximize IND with ADLs and functional mobility to ensure safe return to PLOF in the least restrictive environment. OT continuing to recommend High Intensity Therapy once patient is medically appropriate for d/c.    Rehab Prognosis: Good/Fair; patient would benefit from acute OT services to address these deficits and reach maximum level of function.    Plan:     Patient to be seen 4 x/week to address the above listed problems via self-care/home management, therapeutic activities, therapeutic exercises, neuromuscular re-education, cognitive retraining  Plan of Care Expires: 07/20/25  Plan of Care Reviewed with: patient    Subjective     Chief Complaint: Patient agreeable to participate in therapy this AM.  Patient Comments/Goals: None stated by patient at this  time.  Pain/Comfort:  Pain Rating 1: other (see comments) (unrated)  Pain Rating Post-Intervention 1: other (see comments) (unrated)    Objective:     Communicated with RN prior to session. Patient cleared to participate in therapy at this time. Patient found HOB elevated with blood pressure cuff, pulse ox (continuous), Condom Catheter, NG tube, bed alarm, telemetry, restraints (L soft wrist restraint, L mitten) upon OT entry to room. No visitors present in room upon OT entry.    General Precautions: Standard, fall   Orthopedic Precautions: N/A   Braces: N/A  Respiratory Status: Room air    Occupational Performance    Bed Mobility:  Patient completed Rolling/Turning to Left with total assistance and 2 persons  Patient completed Rolling/Turning to Right with total assistance and 2 persons  Patient completed Supine to Sit with total assistance and 2 persons  Patient completed anterior Scooting towards the EOB with total assistance  Patient completed Sit to Supine with total assistance and 2 persons  Patient completed Scooting/Bridging towards the HOB via drawsheet with total assistance and 2 persons    Functional Mobility/Transfers:  Not performed at this time due to patient requiring increased A to maintain sitting balance at EOB, as well as patient noted with increased fatigue following completion of EOB activity.    Activities of Daily Living:  Lower Body Dressing: total assistance to adjust B socks to ensure proper fit at bed-level    Neuromuscular Re-education:  Patient tolerated sitting EOB ~ 15 minutes with total assistance during today's session. Noted, patient with R posterolateral lean, requiring verbal/tactile cues as well as physical assistance to maintain upright, midline posture.   Patient performed 3 reps of weightbearing onto R forearm with 10 second static holds, then requiring total assistance to push body back to upright, midline posture. No tricep activation noted at this time. Weightbearing  activity performed to increase proprioception and overall awareness of RUE.  Patient performed AAROM of LUE/LLE, as well as PROM of RUE/RLE, of the following exercises for 1 set(s) of ~ 8 reps to increase/maintain ROM, increase strength, and increase activity tolerance/endurance needed to participate in ADLs and transfers/functional mobility.  Shoulder flexion/extension  Elbow flexion/extension  Long arc quads  Ankle pumps  Patient performed 4 reps of modified sit ups with total assistance to promote core activation/increase core strength, as well as increase overall activity tolerance/endurance needed to participate in ADLs and transfers/functional mobility.     AMPAC 6 Click ADL:  AMPAC Total Score: 7    Treatment & Education:  Patient educated on:   Role of OT, OT POC, and discharge planning.  Safe transfer techniques and proper body mechanics for fall prevention and improved independence with functional transfers.  Importance of OOB activities to increase endurance and tolerance for increased participation in daily ADLs.  Time provided for therapeutic counseling and discussion of health disposition.  Utilizing the call bell to request for assistance with all functional mobility to ensure safety during hospital stay.  Whiteboard updated.    Patient did not verbalized understanding at this time and would benefit from reinforcement of education provided. Patient without any questions at this time, as it relates to the scope of OT.    Patient left HOB elevated with all lines intact, call button in reach, RN notified, bed alarm on, and 3 visitors present.    GOALS:   Multidisciplinary Problems       Occupational Therapy Goals          Problem: Occupational Therapy    Goal Priority Disciplines Outcome Interventions   Occupational Therapy Goal     OT, PT/OT Progressing    Description: Goals to be met by: 07/20/2025     Patient will increase functional independence with ADLs by performing:    UE Dressing with Moderate  Assistance.  LE Dressing with Maximum Assistance.  Grooming while seated with Moderate Assistance.  Toileting from bedside commode with Moderate Assistance for hygiene and clothing management.   Supine to sit with Moderate Assistance.  Stand pivot transfer with Moderate Assistance                       DME Justifications:  Hospital Bed - Cristi requires a hospital bed due to him requiring positioning of the body in ways not feasible with an ordinary bed due to limited ability and cannot independently make changes in body position without the use of the bed. The positioning of the body cannot be sufficiently resolved by the use of pillows and wedges.  Wheelchair - Patient has a mobility limitation that significantly impairs his/her ability to participate in one or more mobility related activities of daily living (MRADL's) such as toileting, feeding, dressing, grooming, and bathing in customary locations in the home. The mobility limitation cannot be sufficiently resolved by the use of a cane or walker. The use of a manual wheelchair will significantly improve the patient's ability to participate in MRADLS and the patient will use it on regular basis in the home. Patient has expressed his/her willingness to use a manual wheelchair in the home. Patient's upper body strength is sufficient for propulsion. He/She also has a caregiver who is available, willing, and able to provide assistance with the wheelchair when needed.    Time Tracking:     OT Date of Treatment: 06/23/25  OT Start Time: 1120  OT Stop Time: 1152  OT Total Time (min): 32 min    Billable Minutes: Neuromuscular Re-education 30    Co-treatment performed with PTA due to patient's multiple deficits requiring two skilled therapists to appropriately and safely assess patient's strength, endurance, functional mobility, and ADL performance while facilitating functional tasks in addition to accommodating for patient's activity tolerance and medical acuity.      6/23/2025

## 2025-06-23 NOTE — PLAN OF CARE
06/23/25 1427   Rounds   Attendance Provider;;Physical therapist   Discharge Plan A Rehab   Why the patient remains in the hospital Requires continued medical care   Transition of Care Barriers None     Ashley Ospina MSW, LCSW  [facility]  Case Management Dept.

## 2025-06-23 NOTE — ASSESSMENT & PLAN NOTE
69 y/o male with previous CVA with minimal residual right sided weakness, HTN, HLD, DM presents with LM1 occlusion s/p TNK at 0548 on 6/19/25 and thrombectomy (TICI 3 x 1 pass).    Admit to NCC  Q4h neuro checks  Q1h vital signs, I/Os  EKG, Echo, CXR reviewed  A1c, TSH, lipid panel, aPTT, PT/INR, UA reviewed  CBC, CMP, mag, and phos daily  Atorvastatin 40  SBP goal < 160  MRI reviewed w large L MCA infarct w mass effect and 4mm MLS; L temporal IPH stable.   ASA 81mg  Vascular Neurology following   SCD; lovenox for VTE ppx  PT/OT/SLP  Stable for step down to vascular neurology

## 2025-06-24 PROBLEM — R45.1 RESTLESSNESS: Status: ACTIVE | Noted: 2025-06-24

## 2025-06-24 LAB
ABSOLUTE EOSINOPHIL (OHS): 0.22 K/UL
ABSOLUTE MONOCYTE (OHS): 0.58 K/UL (ref 0.3–1)
ABSOLUTE NEUTROPHIL COUNT (OHS): 4.3 K/UL (ref 1.8–7.7)
ALBUMIN SERPL BCP-MCNC: 3.3 G/DL (ref 3.5–5.2)
ALP SERPL-CCNC: 143 UNIT/L (ref 40–150)
ALT SERPL W/O P-5'-P-CCNC: 17 UNIT/L (ref 10–44)
ANION GAP (OHS): 11 MMOL/L (ref 8–16)
AST SERPL-CCNC: 17 UNIT/L (ref 11–45)
BASOPHILS # BLD AUTO: 0.03 K/UL
BASOPHILS NFR BLD AUTO: 0.4 %
BILIRUB SERPL-MCNC: 0.4 MG/DL (ref 0.1–1)
BUN SERPL-MCNC: 35 MG/DL (ref 8–23)
CALCIUM SERPL-MCNC: 8.8 MG/DL (ref 8.7–10.5)
CHLORIDE SERPL-SCNC: 110 MMOL/L (ref 95–110)
CO2 SERPL-SCNC: 26 MMOL/L (ref 23–29)
CREAT SERPL-MCNC: 1.5 MG/DL (ref 0.5–1.4)
ERYTHROCYTE [DISTWIDTH] IN BLOOD BY AUTOMATED COUNT: 13.5 % (ref 11.5–14.5)
GFR SERPLBLD CREATININE-BSD FMLA CKD-EPI: 49 ML/MIN/1.73/M2
GLUCOSE SERPL-MCNC: 223 MG/DL (ref 70–110)
HCT VFR BLD AUTO: 39.9 % (ref 40–54)
HGB BLD-MCNC: 12.6 GM/DL (ref 14–18)
IMM GRANULOCYTES # BLD AUTO: 0.01 K/UL (ref 0–0.04)
IMM GRANULOCYTES NFR BLD AUTO: 0.1 % (ref 0–0.5)
LYMPHOCYTES # BLD AUTO: 2.17 K/UL (ref 1–4.8)
MAGNESIUM SERPL-MCNC: 2.1 MG/DL (ref 1.6–2.6)
MCH RBC QN AUTO: 28.2 PG (ref 27–31)
MCHC RBC AUTO-ENTMCNC: 31.6 G/DL (ref 32–36)
MCV RBC AUTO: 89 FL (ref 82–98)
NUCLEATED RBC (/100WBC) (OHS): 0 /100 WBC
OHS QRS DURATION: 74 MS
OHS QTC CALCULATION: 441 MS
PHOSPHATE SERPL-MCNC: 5 MG/DL (ref 2.7–4.5)
PLATELET # BLD AUTO: 202 K/UL (ref 150–450)
PMV BLD AUTO: 12.8 FL (ref 9.2–12.9)
POCT GLUCOSE: 184 MG/DL (ref 70–110)
POCT GLUCOSE: 223 MG/DL (ref 70–110)
POCT GLUCOSE: 230 MG/DL (ref 70–110)
POCT GLUCOSE: 238 MG/DL (ref 70–110)
POCT GLUCOSE: 272 MG/DL (ref 70–110)
POCT GLUCOSE: 291 MG/DL (ref 70–110)
POTASSIUM SERPL-SCNC: 4.2 MMOL/L (ref 3.5–5.1)
PROT SERPL-MCNC: 6.8 GM/DL (ref 6–8.4)
RBC # BLD AUTO: 4.47 M/UL (ref 4.6–6.2)
RELATIVE EOSINOPHIL (OHS): 3 %
RELATIVE LYMPHOCYTE (OHS): 29.7 % (ref 18–48)
RELATIVE MONOCYTE (OHS): 7.9 % (ref 4–15)
RELATIVE NEUTROPHIL (OHS): 58.9 % (ref 38–73)
SODIUM SERPL-SCNC: 147 MMOL/L (ref 136–145)
WBC # BLD AUTO: 7.31 K/UL (ref 3.9–12.7)

## 2025-06-24 PROCEDURE — 84100 ASSAY OF PHOSPHORUS: CPT | Mod: HCNC | Performed by: PHYSICIAN ASSISTANT

## 2025-06-24 PROCEDURE — 25000003 PHARM REV CODE 250: Mod: HCNC | Performed by: PHYSICIAN ASSISTANT

## 2025-06-24 PROCEDURE — 51798 US URINE CAPACITY MEASURE: CPT | Mod: HCNC

## 2025-06-24 PROCEDURE — 97530 THERAPEUTIC ACTIVITIES: CPT | Mod: HCNC,CQ

## 2025-06-24 PROCEDURE — 93005 ELECTROCARDIOGRAM TRACING: CPT | Mod: HCNC

## 2025-06-24 PROCEDURE — 63600175 PHARM REV CODE 636 W HCPCS: Mod: HCNC

## 2025-06-24 PROCEDURE — 93010 ELECTROCARDIOGRAM REPORT: CPT | Mod: HCNC,,, | Performed by: STUDENT IN AN ORGANIZED HEALTH CARE EDUCATION/TRAINING PROGRAM

## 2025-06-24 PROCEDURE — 97112 NEUROMUSCULAR REEDUCATION: CPT | Mod: HCNC

## 2025-06-24 PROCEDURE — 36415 COLL VENOUS BLD VENIPUNCTURE: CPT | Mod: HCNC | Performed by: PHYSICIAN ASSISTANT

## 2025-06-24 PROCEDURE — 80053 COMPREHEN METABOLIC PANEL: CPT | Mod: HCNC | Performed by: PHYSICIAN ASSISTANT

## 2025-06-24 PROCEDURE — 83735 ASSAY OF MAGNESIUM: CPT | Mod: HCNC | Performed by: PHYSICIAN ASSISTANT

## 2025-06-24 PROCEDURE — 92507 TX SP LANG VOICE COMM INDIV: CPT | Mod: HCNC

## 2025-06-24 PROCEDURE — 85025 COMPLETE CBC W/AUTO DIFF WBC: CPT | Mod: HCNC | Performed by: PHYSICIAN ASSISTANT

## 2025-06-24 PROCEDURE — 99233 SBSQ HOSP IP/OBS HIGH 50: CPT | Mod: HCNC,FS,, | Performed by: PSYCHIATRY & NEUROLOGY

## 2025-06-24 PROCEDURE — S4991 NICOTINE PATCH NONLEGEND: HCPCS | Mod: HCNC | Performed by: PHYSICIAN ASSISTANT

## 2025-06-24 PROCEDURE — 94761 N-INVAS EAR/PLS OXIMETRY MLT: CPT | Mod: HCNC

## 2025-06-24 PROCEDURE — 25000003 PHARM REV CODE 250: Mod: HCNC

## 2025-06-24 PROCEDURE — 99223 1ST HOSP IP/OBS HIGH 75: CPT | Mod: HCNC,,, | Performed by: PHYSICIAN ASSISTANT

## 2025-06-24 PROCEDURE — 99222 1ST HOSP IP/OBS MODERATE 55: CPT | Mod: HCNC,,, | Performed by: NURSE PRACTITIONER

## 2025-06-24 PROCEDURE — 11000001 HC ACUTE MED/SURG PRIVATE ROOM: Mod: HCNC

## 2025-06-24 RX ORDER — SIMETHICONE 80 MG
1 TABLET,CHEWABLE ORAL 3 TIMES DAILY PRN
Status: DISCONTINUED | OUTPATIENT
Start: 2025-06-24 | End: 2025-06-27

## 2025-06-24 RX ORDER — TALC
6 POWDER (GRAM) TOPICAL NIGHTLY PRN
Status: DISCONTINUED | OUTPATIENT
Start: 2025-06-24 | End: 2025-06-27

## 2025-06-24 RX ORDER — QUETIAPINE FUMARATE 25 MG/1
25 TABLET, FILM COATED ORAL NIGHTLY
Status: DISCONTINUED | OUTPATIENT
Start: 2025-06-24 | End: 2025-06-26

## 2025-06-24 RX ADMIN — INSULIN ASPART 6 UNITS: 100 INJECTION, SOLUTION INTRAVENOUS; SUBCUTANEOUS at 01:06

## 2025-06-24 RX ADMIN — INSULIN ASPART 1 UNITS: 100 INJECTION, SOLUTION INTRAVENOUS; SUBCUTANEOUS at 09:06

## 2025-06-24 RX ADMIN — METOPROLOL TARTRATE 50 MG: 50 TABLET, FILM COATED ORAL at 10:06

## 2025-06-24 RX ADMIN — INSULIN ASPART 6 UNITS: 100 INJECTION, SOLUTION INTRAVENOUS; SUBCUTANEOUS at 04:06

## 2025-06-24 RX ADMIN — INSULIN ASPART 6 UNITS: 100 INJECTION, SOLUTION INTRAVENOUS; SUBCUTANEOUS at 09:06

## 2025-06-24 RX ADMIN — INSULIN ASPART 2 UNITS: 100 INJECTION, SOLUTION INTRAVENOUS; SUBCUTANEOUS at 02:06

## 2025-06-24 RX ADMIN — INSULIN ASPART 2 UNITS: 100 INJECTION, SOLUTION INTRAVENOUS; SUBCUTANEOUS at 11:06

## 2025-06-24 RX ADMIN — Medication 1 PATCH: at 09:06

## 2025-06-24 RX ADMIN — INSULIN ASPART 6 UNITS: 100 INJECTION, SOLUTION INTRAVENOUS; SUBCUTANEOUS at 11:06

## 2025-06-24 RX ADMIN — POLYETHYLENE GLYCOL 3350 17 G: 17 POWDER, FOR SOLUTION ORAL at 10:06

## 2025-06-24 RX ADMIN — INSULIN ASPART 6 UNITS: 100 INJECTION, SOLUTION INTRAVENOUS; SUBCUTANEOUS at 02:06

## 2025-06-24 RX ADMIN — BARIUM SULFATE 450 ML: 20 SUSPENSION ORAL at 11:06

## 2025-06-24 RX ADMIN — QUETIAPINE FUMARATE 25 MG: 25 TABLET ORAL at 10:06

## 2025-06-24 RX ADMIN — SENNOSIDES AND DOCUSATE SODIUM 1 TABLET: 50; 8.6 TABLET ORAL at 10:06

## 2025-06-24 RX ADMIN — ENOXAPARIN SODIUM 40 MG: 40 INJECTION SUBCUTANEOUS at 01:06

## 2025-06-24 RX ADMIN — AMLODIPINE BESYLATE 10 MG: 10 TABLET ORAL at 10:06

## 2025-06-24 NOTE — ANESTHESIA POSTPROCEDURE EVALUATION
Anesthesia Post Evaluation    Patient: Cristi Pulido    Procedure(s) Performed: * No procedures listed *    Final Anesthesia Type: general      Patient location during evaluation: ICU  Patient participation: Yes- Able to Participate  Level of consciousness: awake and alert  Post-procedure vital signs: reviewed and stable  Airway patency: patent    PONV status at discharge: No PONV  Anesthetic complications: no      Cardiovascular status: blood pressure returned to baseline  Respiratory status: unassisted  Hydration status: euvolemic  Follow-up not needed.              Vitals Value Taken Time   /75 06/24/25 01:49   Temp 37.4 °C (99.3 °F) 06/24/25 01:49   Pulse 81 06/24/25 03:30   Resp 18 06/24/25 01:49   SpO2 94 % 06/24/25 01:49         No case tracking events are documented in the log.      Pain/Guilherme Score: No data recorded

## 2025-06-24 NOTE — PT/OT/SLP PROGRESS
Speech Language Pathology Treatment    Patient Name:  Cristi Pulido   MRN:  [id number]  Admitting Diagnosis: Embolic stroke involving left middle cerebral artery    Recommendations:                 General Recommendations:  Dysphagia therapy and Speech/language therapy  Diet recommendations:  NPO, Liquid Diet Level: NPO   Aspiration Precautions: Strict aspiration precautions   General Precautions: Standard, fall, aspiration  Communication strategies:  yes/no questions only, provide increased time to answer, and go to room if call light pushed  Discharge recommendations:  High Intensity Therapy     Assessment:     Cristi Pulido is a 70 y.o. male with an SLP diagnosis of Aphasia and Dysphagia.  He presents with lethargy.    Subjective     Pt did not produce any verbalizations  Patient goals: uto     Pain/Comfort:  Pain Rating 1:  (pt in NAD)  Pain Rating Post-Intervention 1:  (no change)    Respiratory Status: Room air    Objective:     Has the patient been evaluated by SLP for swallowing?   Yes  Keep patient NPO? Yes   Current Respiratory Status:        Nursing npo for possible peg tube placement. Nursing and pt's wife reported was awake all night. Pt lethargic and only roused to painful stimuli. Pt did not attempt to count or produce any verbalizations/vocalizations. He did not model any commands. Education provided to pt and his wife re: role of slp, aphasia, and continued dysphagia tx once peg has been placed. Pt unable to maintain alertness for session and session ended.     Goals:   Multidisciplinary Problems       SLP Goals          Problem: SLP    Goal Priority Disciplines Outcome   SLP Goal     SLP Progressing   Description: Speech Language Pathology Goals  Goals expected to be met by 7/3    1. Pt will participate in ongoing swallow assessment to determine least restrictive PO diet.    2. Pt will participate in ongoing cognitive-linguistic assessment to determine additional therapeutic needs.   3. Pt will follow 1  step commands with 80% acc following model/prompt.   4. Pt will answer simple Y/N questions with 80% acc.                                  Plan:     Patient to be seen:  4 x/week   Plan of Care expires:  07/19/25  Plan of Care reviewed with:  patient, spouse   SLP Follow-Up:  Yes       Time Tracking:     SLP Treatment Date:   06/24/25  Speech Start Time:  0947  Speech Stop Time:  0955     Speech Total Time (min):  8 min    Billable Minutes: Speech Therapy Individual 8    06/24/2025

## 2025-06-24 NOTE — PT/OT/SLP PROGRESS
Physical Therapy Co-Treatment with OT (Olga)    Patient Name:  Cristi Pulido   MRN:  [id number]  Co-treatment performed for this visit due to need for two skilled therapists to ensure patient safety and to accommodate for patient tolerance/pain management.  Recommendations:     Discharge Recommendations: High Intensity Therapy  Discharge Equipment Recommendations: wheelchair, lift device, hospital bed  Barriers to discharge: increased level of assist    Assessment:     Cristi Pulido is a 70 y.o. male admitted with a medical diagnosis of Embolic stroke involving left middle cerebral artery.  He presents with the following impairments/functional limitations: weakness, impaired endurance, impaired self care skills, impaired functional mobility, gait instability, impaired balance, visual deficits, impaired cognition, decreased upper extremity function, decreased lower extremity function, decreased safety awareness, impaired coordination, impaired fine motor, impaired cardiopulmonary response to activity. Pt found in bed and lethargic this am. Pt remains high level of assist with bed and functional mobility. Partial eye opening at times. Patient eyes remained closed 80% of session. EOB activity limited d/t increases R lateral pushing with RUE. Pt returned safely back to supine position. Pt would benefit from continued PT to improve functional independence.     Rehab Prognosis: Good; patient would benefit from acute skilled PT services to address these deficits and reach maximum level of function.    Recent Surgery: * No surgery found *      Plan:     During this hospitalization, patient to be seen 4 x/week to address the identified rehab impairments via gait training, therapeutic activities, therapeutic exercises, neuromuscular re-education and progress toward the following goals:    Plan of Care Expires:  07/11/25    Subjective     Chief Complaint: wife states pt got little sleep  Patient/Family Comments/goals: pt physically  trying to return to lying   Pain/Comfort:         Objective:     Communicated with nurse prior to session.  Patient found HOB elevated with bed alarm, blood pressure cuff, Condom Catheter, restraints, telemetry, NG tube, pulse ox (continuous), SCD, mitten upon PT entry to room.     General Precautions: Standard, fall, aspiration  Orthopedic Precautions: N/A  Braces: N/A  Respiratory Status: Room air     Functional Mobility:  Bed Mobility:     Rolling L/R: total A x 2   Scooting EOB: total A x 2  Boosting HOB: total A x 2 Bed flat, drawsheet.  Supine <> Sit: total A x 2 Assist to trunk, LE and hips.  Pt displayed calf and quad muscle activation (LLE) to move toward EOB  Balance: seated balance: total A x 1   R lateral lean 2/2 R lateral pushing, and ant trunk lean  Progressed to CGA with placement of LUE into lap preventing push    AM-PAC 6 CLICK MOBILITY  Turning over in bed (including adjusting bedclothes, sheets and blankets)?: 2  Sitting down on and standing up from a chair with arms (e.g., wheelchair, bedside commode, etc.): 2  Moving from lying on back to sitting on the side of the bed?: 2  Moving to and from a bed to a chair (including a wheelchair)?: 1  Need to walk in hospital room?: 1  Climbing 3-5 steps with a railing?: 1  Basic Mobility Total Score: 9       Treatment & Education:  Patient educated on importance of OOB activity for functional gains.  Patient educated on general safety for decreased risk of falls.  Patient performed exercises for improved strength and endurance.  Restraints untied for duration of session and reapplied at end. Pt's mitten remained on for entire duration.   To address R lateral pushing, mirror introduced for patient to visualize position to reduce lean/push.   All questions answered and patient verbalized understanding.    Patient left HOB elevated with all lines intact, call button in reach, bed alarm on, and restraints reapplied at end of session..    GOALS:    Multidisciplinary Problems       Physical Therapy Goals          Problem: Physical Therapy    Goal Priority Disciplines Outcome Interventions   Physical Therapy Goal     PT, PT/OT Progressing    Description: Goals to be met by: 25     Patient will increase functional independence with mobility by performin. Supine to sit with Contact Guard Assistance  2. Sit to supine with Contact Guard Assistance  3. Sit to stand transfer with Moderate Assistance  4. Bed to chair transfer with Maximum Assistance using No Assistive Device  5. Gait  x 10 feet with Maximum Assistance using No Assistive Device.   6. Sitting at edge of bed x5 minutes with Stand-by Assistance  7. Follow 100% of 1-step commands throughout session                         Time Tracking:     PT Received On: 25  PT Start Time: 926     PT Stop Time: 949  PT Total Time (min): 23 min     Billable Minutes: Therapeutic Activity 23    Treatment Type: Treatment  PT/PTA: PTA     Number of PTA visits since last PT visit: 2     2025

## 2025-06-24 NOTE — PLAN OF CARE
Patient transferred to npu for continued medical management.     CM/SW has been discussing discharge plan    Discharge Plan A: Rehab  Discharge Plan B: Home with family    with patient and Extended Emergency Contact Information  Primary Emergency Contact: Cedric Pulidomirthashelley  Address: [address] [place] [address] [facility] [place]  Mobile Phone: [phone]  Relation: Spouse.     Patient/Family preference is Rehab    Additional Info:     Discharge Plan A and Plan B have been determined by review of patient's clinical status, future medical and therapeutic needs, and coverage/benefits for post-acute care in coordination with multidisciplinary team members.        Ashley Ospina MSW, LCSW  [facility]  Case Management Dept.

## 2025-06-24 NOTE — CONSULTS
[facility] - Neurosurgery ([facility])  Physical Medicine & Rehab  Consult Note    Patient Name: Cristi Pulido  MRN: [id number]  Admission Date: 6/19/2025  Hospital Length of Stay: 5 days  Attending Physician: Eugenia Lofton MD   Consults  Subjective:     Principal Problem: Embolic stroke involving left middle cerebral artery    HPI: Per chart review, Cristi Pulido is a 70 y.o. male w/ PMH of HTN, DM, prior stroke w/ residual RSW who presents as a transfer from OSH after presenting with acute onset of dysarthria and RSW. Telestroke was completed and his NIHSS was 19. CT showed probable early ischemic changes in the L insular ribbon and L frontal lobe. CTA demonstrated occlusion of the distal L MCA M1 segment. He was given TNK. Pt is S/P thrombectomy. Pt 's hospital course was complicated by AMS requiring restraints. Pt was evaluated by PT/OT/SLP. PT/OT recommended high intensity therapies. SLP recommended NPO. NGT was placed. IR was consulted for PEG tube placement. PM &R was consulted to evaluate pt for post acute placement recommendation.       Functional History: Patient lives  with wife and son and grandson  in a single  story home with 0 steps to enter.  Prior to admission, Pt was Mod with RW use.  DME: RW.      Hospital Course: Per chart review,    OT-06/23    Bed Mobility:  Patient completed Rolling/Turning to Left with total assistance and 2 persons  Patient completed Rolling/Turning to Right with total assistance and 2 persons  Patient completed Supine to Sit with total assistance and 2 persons  Patient completed anterior Scooting towards the EOB with total assistance  Patient completed Sit to Supine with total assistance and 2 persons  Patient completed Scooting/Bridging towards the HOB via drawsheet with total assistance and 2 persons     Functional Mobility/Transfers:  Not performed at this time due to patient requiring increased A to maintain sitting balance at EOB, as well as patient noted with increased fatigue  following completion of EOB activity.     Activities of Daily Living:  Lower Body Dressing: total assistance to adjust B socks to ensure proper fit at bed-level    Past Medical History:   Diagnosis Date    Diabetes mellitus     Hyperlipidemia     Hypertension     Stroke     Stroke     right side weak    Type 2 diabetes mellitus      Past Surgical History:   Procedure Laterality Date    AMPUTATION, LOWER LIMB Bilateral     middle toes    bilateral 3rd toe amputation      COLONOSCOPY  01/2021    FRACTURE SURGERY Left     hip    JOINT REPLACEMENT Left     KOMAL     Review of patient's allergies indicates:  No Known Allergies    Scheduled Medications:    amLODIPine  10 mg Per NG tube QHS    aspirin  81 mg Per NG tube Daily    atorvastatin  40 mg Per NG tube Daily    [START ON 6/25/2025] barium sulfate  450 mL Per NG tube Once    [START ON 6/25/2025] barium sulfate  450 mL Per NG tube Once    enoxparin  40 mg Subcutaneous Q24H    FLUoxetine  20 mg Per NG tube Daily    insulin aspart U-100  6 Units Subcutaneous Q4H    lisinopriL  40 mg Per NG tube Daily    metoprolol tartrate  50 mg Per NG tube BID    nicotine  1 patch Transdermal Daily    polyethylene glycol  17 g Per NG tube BID    QUEtiapine  25 mg Per NG tube QHS    senna-docusate  1 tablet Per NG tube BID       PRN Medications:   Current Facility-Administered Medications:     bisacodyL, 10 mg, Rectal, Daily PRN    dextrose 50%, 12.5 g, Intravenous, PRN    dextrose 50%, 25 g, Intravenous, PRN    glucagon (human recombinant), 1 mg, Intramuscular, PRN    hydrALAZINE, 10 mg, Intravenous, Q4H PRN    insulin aspart U-100, 0-10 Units, Subcutaneous, Q4H PRN    labetalol, 10 mg, Intravenous, Q4H PRN    melatonin, 6 mg, Per NG tube, Nightly PRN    simethicone, 1 tablet, Per NG tube, TID PRN    sodium chloride 0.9%, 10 mL, Intravenous, PRN    Family History       Problem Relation (Age of Onset)    Diabetes Mother, Father, Sister, Brother, Daughter, Sister, Sister, Brother     Heart disease Mother    Hypertension Mother, Daughter    No Known Problems Son          Tobacco Use    Smoking status: Every Day     Current packs/day: 0.50     Average packs/day: 0.5 packs/day for 40.0 years (20.0 ttl pk-yrs)     Types: Cigarettes     Passive exposure: Current    Smokeless tobacco: Never    Tobacco comments:     Patient aware of smoking cessation program. He is also aware of health consequences r/t smoking.   Substance and Sexual Activity    Alcohol use: Yes     Comment: Occasionally (football season)    Drug use: Yes     Types: Marijuana    Sexual activity: Not Currently     Partners: Female     Review of Systems   Unable to perform ROS: Other (Pt hard to arouse)     Objective:     Vital Signs (Most Recent):  Temp: 98.8 °F (37.1 °C) (06/24/25 1216)  Pulse: 96 (06/24/25 1216)  Resp: 18 (06/24/25 1216)  BP: (!) 141/85 (06/24/25 1216)  SpO2: (!) 90 % (06/24/25 1216)    Vital Signs (24h Range):  Temp:  [98.3 °F (36.8 °C)-99.5 °F (37.5 °C)] 98.8 °F (37.1 °C)  Pulse:  [72-96] 96  Resp:  [18-20] 18  SpO2:  [90 %-97 %] 90 %  BP: (107-158)/(68-85) 141/85     Body mass index is 28.15 kg/m².     Physical Exam  Vitals and nursing note reviewed.               Diagnostic Results: Labs: Reviewed  Assessment/Plan:     * Embolic stroke involving left middle cerebral artery  -CTH which showed small left temporal hemorrhage along with continued early ischemic changes.   -S/P TNK and thrombectomy.   -Agitation requiring restraints.   -PT/OT rec for high intensity therapies.   -Seroquel was started per VN. Monitor.   -Continue ADA.  -Continue statin.      Right sided weakness  -PT/OT rec for HIT. Follow for progress.     Dysarthria and anarthria  -SLP following.     Aphasia  -SLP following.     Tobacco dependence  - on cessation as appropriate.     PM&R Recommendation:     At this time, the PM&R team has reviewed this patient's ongoing medical case including inpatient diagnosis, medical history, clinical  examination, labs, vitals, current social and functional history. We will continue to follow pt for a potential rehab candidate pending medical stability, improvement in mental status change, not requiring restraints for at least 24 hours, PEG placement with TF tolerance.        Thank you for your consult.     Elaine Mcconnell NP  Department of Physical Medicine & Rehab  [facility] \[facility]\"")

## 2025-06-24 NOTE — SUBJECTIVE & OBJECTIVE
Neurologic Chief Complaint: Embolic Stroke involving Left MCA    Subjective:     Interval History: Patient is seen for follow-up neurological assessment and treatment recommendations: See Hospital Course    HPI, Past Medical, Family, and Social History remains the same as documented in the initial encounter.     Review of Systems   Unable to perform ROS: Acuity of condition     Scheduled Meds:   amLODIPine  10 mg Per NG tube QHS    aspirin  81 mg Per NG tube Daily    atorvastatin  40 mg Per NG tube Daily    [START ON 6/25/2025] barium sulfate  450 mL Per NG tube Once    [START ON 6/25/2025] barium sulfate  450 mL Per NG tube Once    enoxparin  40 mg Subcutaneous Q24H    FLUoxetine  20 mg Per NG tube Daily    insulin aspart U-100  6 Units Subcutaneous Q4H    lisinopriL  40 mg Per NG tube Daily    metoprolol tartrate  50 mg Per NG tube BID    nicotine  1 patch Transdermal Daily    polyethylene glycol  17 g Per NG tube BID    QUEtiapine  25 mg Per NG tube QHS    senna-docusate  1 tablet Per NG tube BID     Continuous Infusions:      PRN Meds:  Current Facility-Administered Medications:     bisacodyL, 10 mg, Rectal, Daily PRN    dextrose 50%, 12.5 g, Intravenous, PRN    dextrose 50%, 25 g, Intravenous, PRN    glucagon (human recombinant), 1 mg, Intramuscular, PRN    hydrALAZINE, 10 mg, Intravenous, Q4H PRN    insulin aspart U-100, 0-10 Units, Subcutaneous, Q4H PRN    labetalol, 10 mg, Intravenous, Q4H PRN    melatonin, 6 mg, Per NG tube, Nightly PRN    simethicone, 1 tablet, Per NG tube, TID PRN    sodium chloride 0.9%, 10 mL, Intravenous, PRN    Objective:     Vital Signs (Most Recent):  Temp: 98.8 °F (37.1 °C) (06/24/25 1216)  Pulse: 96 (06/24/25 1216)  Resp: 18 (06/24/25 1216)  BP: (!) 141/85 (06/24/25 1216)  SpO2: (!) 90 % (06/24/25 1216)  BP Location: Right arm    Vital Signs Range (Last 24H):  Temp:  [98.3 °F (36.8 °C)-99.5 °F (37.5 °C)]   Pulse:  [72-96]   Resp:  [18-20]   BP: (107-158)/(68-85)   SpO2:  [90 %-97  "%]   BP Location: Right arm       Physical Exam  Vitals and nursing note reviewed.   HENT:      Mouth/Throat:      Mouth: Mucous membranes are moist.   Eyes:      Pupils: Pupils are equal, round, and reactive to light.   Cardiovascular:      Rate and Rhythm: Normal rate.   Pulmonary:      Effort: No respiratory distress.   Skin:     General: Skin is warm and dry.   Neurological:      Mental Status: He is alert.              Neurological Exam:   LOC: alert  Attention Span: Good   Language: Expressive aphasia, Receptive aphasia  Articulation: Dysarthria  Orientation: Untestable due to severe aphasia   Motor: Arm left  Normal 5/5  Leg left  Normal 5/5  Arm right  Plegia 0/5  Leg right Paresis: 1/5    Laboratory:  CMP:   Recent Labs   Lab 06/24/25  0239   CALCIUM 8.8   ALBUMIN 3.3*   PROT 6.8   *   K 4.2   CO2 26      BUN 35*   CREATININE 1.5*   ALKPHOS 143   ALT 17   AST 17   BILITOT 0.4     BMP:   Recent Labs   Lab 06/24/25  0239   *   K 4.2      CO2 26   BUN 35*   CREATININE 1.5*   CALCIUM 8.8     CBC:   Recent Labs   Lab 06/24/25  0239   WBC 7.31   RBC 4.47*   HGB 12.6*   HCT 39.9*      MCV 89   MCH 28.2   MCHC 31.6*     Lipid Panel:   Recent Labs   Lab 06/19/25  0514   CHOL 123   LDLCALC 75.2   HDL 38*   TRIG 49     Coagulation:   Recent Labs   Lab 06/19/25  0514   INR 1.1     Platelet Aggregation Study: No results for input(s): "PLTAGG", "PLTAGINTERP", "PLTAGREGLACO", "ADPPLTAGGREG" in the last 168 hours.  Hgb A1C:   Recent Labs   Lab 06/19/25  1113   HGBA1C 7.0*     TSH:   Recent Labs   Lab 06/19/25  0514   TSH 1.310       Diagnostic Results     Brain Imaging   MRI Brain 6/20/25  Impression:     Acute left MCA distribution infarct.  No new large parenchymal hemorrhage.  Susceptibility artifact throughout the infarcted tissue may reflect contrast staining and/or blood products conversion.  Overall mass effect is increased compared to prior with sulcal effacement throughout the left " cerebral hemisphere and approximately 4 mm of rightward midline shift.     Stable size of the parenchymal hemorrhage centered in the inferior left temporal lobe.    CT 6/20/25: 1242  Impression:     Moderate-sized recent left MCA distribution infarct and small intraparenchymal hemorrhage appear grossly unchanged from prior study performed earlier on the same date.     Chronic ischemic change elsewhere with underlying cerebral and cerebellar volume loss, as before.     No new hemorrhage or major vascular distribution infarct elsewhere.  CT 6/20/25: 0834  Impression:     Early ischemic changes in the left MCA distribution, new from recent CT.     New left inferior temporal intraparenchymal hematoma.     Chronic ischemic change with cerebral and cerebellar volume loss, as above.     CT 6/19/25  Impression:     Question some early left MCA ischemia corresponding to the patient's known left MCA territory occlusion.  No hemorrhage.     Senescent changes as above.       All CT scans at this facility are performed  using dose modulation techniques as appropriate to performed exam including the following:  automated exposure control; adjustment of mA and/or kV according to the patients size (this includes techniques or standardized protocols for targeted exams where dose is matched to indication/reason for exam: i.e. extremities or head);  iterative reconstruction technique.    Vessel Imaging   CTA head and neck 6/19/25  Impression:     Distal left M1 MCA occlusion with relatively prompt collateralization of the more distal MCA vessels.     Severe stenosis left mid vertebral artery and left PCA.    Cardiac Imaging   TTE 6/19/25    Left Ventricle: The left ventricle is normal in size. Normal wall thickness. There is normal systolic function with a visually estimated ejection fraction of 60 - 65%.    Right Ventricle: The right ventricle is normal in size Wall thickness is normal. Systolic function is normal.    The  pulmonary artery pressure could not be estimated.    IVC/SVC: Normal venous pressure at 3 mmHg.

## 2025-06-24 NOTE — ASSESSMENT & PLAN NOTE
-CTH which showed small left temporal hemorrhage along with continued early ischemic changes.   -S/P TNK and thrombectomy.   -Agitation requiring restraints.   -PT/OT rec for high intensity therapies.   -Seroquel was started per VN. Monitor.   -Continue ADA.  -Continue statin.

## 2025-06-24 NOTE — PLAN OF CARE
Problem: Adult Inpatient Plan of Care  Goal: Plan of Care Review  Outcome: Ongoing  Goal: Patient-Specific Goal (Individualized)  Outcome: Ongoing  Goal: Absence of Hospital-Acquired Illness or Injury  Outcome: Ongoing  Goal: Optimal Comfort and Wellbeing  Outcome: Ongoing  Goal: Readiness for Transition of Care  Outcome: Ongoing     Problem: Diabetes Comorbidity  Goal: Blood Glucose Level Within Targeted Range  Outcome: Ongoing     Problem: Wound  Goal: Optimal Coping  Outcome: Ongoing  Goal: Optimal Functional Ability  Outcome: Ongoing  Goal: Absence of Infection Signs and Symptoms  Outcome: Ongoing  Goal: Improved Oral Intake  Outcome: Ongoing  Goal: Optimal Pain Control and Function  Outcome: Ongoing  Goal: Skin Health and Integrity  Outcome: Ongoing  Goal: Optimal Wound Healing  Outcome: Ongoing     Problem: Stroke, Ischemic (Includes Transient Ischemic Attack)  Goal: Optimal Coping  Outcome: Ongoing  Goal: Effective Bowel Elimination  Outcome: Ongoing  Goal: Optimal Cerebral Tissue Perfusion  Outcome: Ongoing  Goal: Optimal Cognitive Function  Outcome: Ongoing  Goal: Improved Communication Skills  Outcome: Ongoing  Goal: Optimal Functional Ability  Outcome: Ongoing  Goal: Optimal Nutrition Intake  Outcome: Ongoing  Goal: Effective Oxygenation and Ventilation  Outcome: Ongoing  Goal: Improved Sensorimotor Function  Outcome: Ongoing  Goal: Safe and Effective Swallow  Outcome: Ongoing  Goal: Effective Urinary Elimination  Outcome: Ongoing     Problem: Fall Injury Risk  Goal: Absence of Fall and Fall-Related Injury  Outcome: Ongoing     Problem: Skin Injury Risk Increased  Goal: Skin Health and Integrity  Outcome: Ongoing     Problem: Restraint, Nonviolent  Goal: Absence of Harm or Injury  Outcome: Ongoing     Problem: Infection  Goal: Absence of Infection Signs and Symptoms  Outcome: Ongoing

## 2025-06-24 NOTE — ASSESSMENT & PLAN NOTE
Cristi Pulido is a 70 y.o. male w/ PMH of HTN, DM, prior stroke w/ residual RSW who presents as a transfer from OSH after presenting with acute onset of dysarthria and RSW. Telestroke was completed and his NIHSS was 19. CT showed probable early ischemic changes in the L insular ribbon and L frontal lobe. CTA demonstrated occlusion of the distal L MCA M1 segment. He was given TNK at 0548. [place] [facility] [place]. On arrival, repeat NIHSS of 24. Patient taken for repeat CTH which showed small left temporal hemorrhage along with continued early ischemic changes. Patient taken to IR for thrombectomy and to be admitted to NCC post-procedure.  S/P thrombectomy. TICI 3. MRI revealing for blood products in stroke bed, likely reperfusion injury. Etiology ESUS.     NAEON. SLP recs NPO. PEG discussed with family and they are agreeable. Pt S/D to NPU.    Antithrombotics for secondary stroke prevention: Antiplatelets: Aspirin: 81 mg daily    Statins for secondary stroke prevention and hyperlipidemia, if present:   Statins: Atorvastatin- 40 mg daily    Aggressive risk factor modification: HTN, Smoking, DM, HLD     Rehab efforts: The patient has been evaluated by a stroke team provider and the therapy needs have been fully considered based off the presenting complaints and exam findings. The following therapy evaluations are needed: PT evaluate and treat, OT evaluate and treat, SLP evaluate and treat, PM&R evaluate for appropriate placement, current recommendation HIT    Diagnostics ordered/pending: None     VTE prophylaxis: Enoxaparin 40 mg SQ every 24 hours  Mechanical prophylaxis: Place SCDs    BP parameters: Infarct: Post sucessful thrombectomy, SBP <140

## 2025-06-24 NOTE — PROGRESS NOTES
Zohaib Garrett - Neurosurgery ([facility])  Vascular Neurology  Comprehensive Stroke Center  Progress Note    Assessment/Plan:     * Embolic stroke involving left middle cerebral artery  Cristi Pulido is a 70 y.o. male w/ PMH of HTN, DM, prior stroke w/ residual RSW who presents as a transfer from OSH after presenting with acute onset of dysarthria and RSW. Telestroke was completed and his NIHSS was 19. CT showed probable early ischemic changes in the L insular ribbon and L frontal lobe. CTA demonstrated occlusion of the distal L MCA M1 segment. He was given TNK at 0548. Patient was transferred to [facility] for further management. On arrival, repeat NIHSS of 24. Patient taken for repeat CTH which showed small left temporal hemorrhage along with continued early ischemic changes. Patient taken to IR for thrombectomy and to be admitted to NCC post-procedure.  S/P thrombectomy. TICI 3. MRI revealing for blood products in stroke bed, likely reperfusion injury. Etiology ESUS.     NAEON. SLP recs NPO. PEG discussed with family and they are agreeable. CT A/P ordered. Pt S/D to NPU.    Antithrombotics for secondary stroke prevention: Antiplatelets: Aspirin: 81 mg daily    Statins for secondary stroke prevention and hyperlipidemia, if present:   Statins: Atorvastatin- 40 mg daily    Aggressive risk factor modification: HTN, Smoking, DM, HLD     Rehab efforts: The patient has been evaluated by a stroke team provider and the therapy needs have been fully considered based off the presenting complaints and exam findings. The following therapy evaluations are needed: PT evaluate and treat, OT evaluate and treat, SLP evaluate and treat, PM&R evaluate for appropriate placement, current recommendation HIT    Diagnostics ordered/pending: None     VTE prophylaxis: Enoxaparin 40 mg SQ every 24 hours  Mechanical prophylaxis: Place SCDs    BP parameters: Infarct: Post sucessful thrombectomy, SBP <140        Hyperlipemia  -Stroke risk factor  -LDL 75, goal  <70  -Atorvastatin 40 mg daily      Right sided weakness  -Secondary to stroke.   -Aggressive therapy     Dysarthria and anarthria  -Therapy eval and treat    Aphasia  -Secondary to stroke  -Aggressive therapy     Type 2 diabetes mellitus without complication, without long-term current use of insulin  Lab Results   Component Value Date    LABA1C 7.2 (H) 01/29/2018    HGBA1C 7.0 (H) 06/19/2025     Follow up repeat A1c. Hold home antihyperglycemics. SSI for goal -180 while in hospital    Hypertension associated with diabetes  Stroke risk factor  -goal SBP post successful thrombectomy <140    Tobacco dependence  Stroke risk factor  - on cessation  -nicotine patch PRN         06/20/2025 NAEON. S/P thrombectomy. TICI 3. TNK monitoring ended at 0548. Aphasic, follows no commands. RSW remains. MRI revealing for blood products in stroke bed, likely reperfusion injury. OK to start monotherapy with either plavix or ASA 6/21/25 for secondary stroke prevention. ECHO unremarkable. Family member at bedside agreeable to NGT placement. Etiology ESUS.   6/21/2025 NAEON. Neuro exam stable. Recommend holding AP therapy for now.   06/22/2025 NAEON. Neuro exam stable. Start AP therapy with ASA.   06/23/2025 NAEON. SLP recs NPO. PEG discussed with family and they are agreeable. Pt S/D to NPU.     STROKE DOCUMENTATION   Acute Stroke Times   Last Known Normal Date: 06/19/25  Last Known Normal Time: 0400  Symptom Onset Date: 06/19/25  Symptom Onset Time: 0400  Stroke Team Called Date: 06/19/25  Stroke Team Called Time: 0813  Stroke Team Arrival Date: 06/19/25  Stroke Team Arrival Time: 0813  CT Interpretation Time: 0827  Thrombolytic Therapy Recommended:  (already given prior to transfer)  CTA Interpretation Time:  (already completed prior to transfer)  Thrombectomy Recommended: Yes  Decision to Treat Time for IR: 0832    NIH Scale:  1a. Level of Consciousness: 0-->Alert, keenly responsive  1b. LOC Questions: 2-->Answers  neither question correctly  1c. LOC Commands: 2-->Performs neither task correctly  2. Best Gaze: 0-->Normal  3. Visual: 0-->No visual loss  4. Facial Palsy: 2-->Partial paralysis (total or near-total paralysis of lower face)  5a. Motor Arm, Left: 0-->No drift, limb holds 90 (or 45) degrees for full 10 secs  5b. Motor Arm, Right: 4-->No movement  6a. Motor Leg, Left: 0-->No drift, leg holds 30 degree position for full 5 secs  6b. Motor Leg, Right: 3-->No effort against gravity, leg falls to bed immediately  7. Limb Ataxia: 0-->Absent  8. Sensory: 0-->Normal, no sensory loss  9. Best Language: 2-->Severe aphasia, all communication is through fragmentary expression, great need for inference, questioning, and guessing by the listener. Range of information that can be exchanged is limited, listener carries burden of. . . (see row details)  10. Dysarthria: 2-->Severe dysarthria, patients speech is so slurred as to be unintelligible in the absence of or out of proportion to any dysphasia, or is mute/anarthric  11. Extinction and Inattention (formerly Neglect): 0-->No abnormality  Total (NIH Stroke Scale): 17       Modified [place] Score: 2  [place] Coma Scale:11   ABCD2 Score:    [id number] Score:   HAS -BLED Score:   ICH Score:   Hunt & Kimball Classification:      Hemorrhagic change of an Ischemic Stroke: Does this patient have an ischemic stroke with hemorrhagic changes? Yes, Grading Scale: PH Type 1 (PH-1) = hematoma in < 30% of the infarcted area with some slight space-occupying effect. Is this a symptomatic change?  No - Hemorrhage is not clinically significant     Neurologic Chief Complaint: Embolic Stroke involving Left MCA    Subjective:     Interval History: Patient is seen for follow-up neurological assessment and treatment recommendations: See Hospital Course    HPI, Past Medical, Family, and Social History remains the same as documented in the initial encounter.     Review of Systems   Unable to perform ROS:  Acuity of condition     Scheduled Meds:   amLODIPine  10 mg Per NG tube QHS    aspirin  81 mg Per NG tube Daily    atorvastatin  40 mg Per NG tube Daily    enoxparin  40 mg Subcutaneous Q24H    FLUoxetine  20 mg Per NG tube Daily    insulin aspart U-100  6 Units Subcutaneous Q4H    lisinopriL  40 mg Per NG tube Daily    metoprolol tartrate  50 mg Per NG tube BID    mupirocin   Nasal BID    nicotine  1 patch Transdermal Daily    polyethylene glycol  17 g Per NG tube BID    senna-docusate  1 tablet Per NG tube BID     Continuous Infusions:      PRN Meds:  Current Facility-Administered Medications:     bisacodyL, 10 mg, Rectal, Daily PRN    dextrose 50%, 12.5 g, Intravenous, PRN    dextrose 50%, 25 g, Intravenous, PRN    glucagon (human recombinant), 1 mg, Intramuscular, PRN    hydrALAZINE, 10 mg, Intravenous, Q4H PRN    insulin aspart U-100, 0-10 Units, Subcutaneous, Q4H PRN    labetalol, 10 mg, Intravenous, Q4H PRN    magnesium oxide, 800 mg, Per NG tube, PRN    magnesium oxide, 800 mg, Per NG tube, PRN    ondansetron, 4 mg, Intravenous, Q8H PRN    potassium bicarbonate, 35 mEq, Per NG tube, PRN    potassium bicarbonate, 50 mEq, Per NG tube, PRN    potassium bicarbonate, 60 mEq, Per NG tube, PRN    potassium, sodium phosphates, 2 packet, Per NG tube, PRN    potassium, sodium phosphates, 2 packet, Per NG tube, PRN    potassium, sodium phosphates, 2 packet, Per NG tube, PRN    sodium chloride 0.9%, 10 mL, Intravenous, PRN    Objective:     Vital Signs (Most Recent):  Temp: 98.3 °F (36.8 °C) (06/23/25 1502)  Pulse: 77 (06/23/25 1639)  Resp: 20 (06/23/25 1502)  BP: (!) 146/82 (06/23/25 1639)  SpO2: (!) 94 % (06/23/25 1639)  BP Location: Right arm    Vital Signs Range (Last 24H):  Temp:  [98 °F (36.7 °C)-98.9 °F (37.2 °C)]   Pulse:  [54-81]   Resp:  [12-20]   BP: (101-177)/(62-90)   SpO2:  [90 %-99 %]   BP Location: Right arm       Physical Exam  Vitals and nursing note reviewed.   HENT:      Mouth/Throat:      Mouth:  "Mucous membranes are moist.   Eyes:      Pupils: Pupils are equal, round, and reactive to light.   Cardiovascular:      Rate and Rhythm: Normal rate.   Pulmonary:      Effort: No respiratory distress.   Skin:     General: Skin is warm and dry.   Neurological:      Mental Status: He is alert.              Neurological Exam:   LOC: alert  Attention Span: Good   Language: Expressive aphasia, Receptive aphasia  Articulation: Dysarthria  Orientation: Untestable due to severe aphasia   Motor: Arm left  Normal 5/5  Leg left  Normal 5/5  Arm right  Plegia 0/5  Leg right Paresis: 1/5    Laboratory:  CMP:   Recent Labs   Lab 06/23/25  0116   CALCIUM 9.1   ALBUMIN 3.5   PROT 7.2      K 4.0   CO2 22*      BUN 28*   CREATININE 1.2   ALKPHOS 156*   ALT 15   AST 23   BILITOT 0.4     BMP:   Recent Labs   Lab 06/23/25  0116      K 4.0      CO2 22*   BUN 28*   CREATININE 1.2   CALCIUM 9.1     CBC:   Recent Labs   Lab 06/23/25  0116   WBC 10.58   RBC 4.41*   HGB 12.5*   HCT 39.0*      MCV 88   MCH 28.3   MCHC 32.1     Lipid Panel:   Recent Labs   Lab 06/19/25  0514   CHOL 123   LDLCALC 75.2   HDL 38*   TRIG 49     Coagulation:   Recent Labs   Lab 06/19/25  0514   INR 1.1     Platelet Aggregation Study: No results for input(s): "PLTAGG", "PLTAGINTERP", "PLTAGREGLACO", "ADPPLTAGGREG" in the last 168 hours.  Hgb A1C:   Recent Labs   Lab 06/19/25  1113   HGBA1C 7.0*     TSH:   Recent Labs   Lab 06/19/25  0514   TSH 1.310       Diagnostic Results     Brain Imaging   MRI Brain 6/20/25  Impression:     Acute left MCA distribution infarct.  No new large parenchymal hemorrhage.  Susceptibility artifact throughout the infarcted tissue may reflect contrast staining and/or blood products conversion.  Overall mass effect is increased compared to prior with sulcal effacement throughout the left cerebral hemisphere and approximately 4 mm of rightward midline shift.     Stable size of the parenchymal hemorrhage centered " in the inferior left temporal lobe.    [facility] 6/20/25: 1242  Impression:     Moderate-sized recent left MCA distribution infarct and small intraparenchymal hemorrhage appear grossly unchanged from prior study performed earlier on the same date.     Chronic ischemic change elsewhere with underlying cerebral and cerebellar volume loss, as before.     No new hemorrhage or major vascular distribution infarct elsewhere.  [facility] 6/20/25: 0834  Impression:     Early ischemic changes in the left MCA distribution, new from recent CT.     New left inferior temporal intraparenchymal hematoma.     Chronic ischemic change with cerebral and cerebellar volume loss, as above.     CT 6/19/25  Impression:     Question some early left MCA ischemia corresponding to the patient's known left MCA territory occlusion.  No hemorrhage.     Senescent changes as above.       All CT scans at this facility are performed  using dose modulation techniques as appropriate to performed exam including the following:  automated exposure control; adjustment of mA and/or kV according to the patients size (this includes techniques or standardized protocols for targeted exams where dose is matched to indication/reason for exam: i.e. extremities or head);  iterative reconstruction technique.    Vessel Imaging   CTA head and neck 6/19/25  Impression:     Distal left M1 MCA occlusion with relatively prompt collateralization of the more distal MCA vessels.     Severe stenosis left mid vertebral artery and left PCA.    Cardiac Imaging   TTE 6/19/25    Left Ventricle: The left ventricle is normal in size. Normal wall thickness. There is normal systolic function with a visually estimated ejection fraction of 60 - 65%.    Right Ventricle: The right ventricle is normal in size Wall thickness is normal. Systolic function is normal.    The pulmonary artery pressure could not be estimated.    IVC/SVC: Normal venous pressure at 3 mmHg.       Fabiola Kamara,  NP  Comprehensive Stroke Center  Department of Vascular Neurology   Zohaib Garrett - Neurosurgery ([facility])

## 2025-06-24 NOTE — CONSULTS
Inpatient consult to Physical Medicine Rehab  Consult performed by: Zo Gandhi NP  Consult ordered by: Juana Camilo MD  Reason for consult: Rehab      Consult received.     MIL Martin, FNP-C  Physical Medicine & Rehabilitation   06/24/2025

## 2025-06-24 NOTE — HPI
Per chart review, Cristi Pulido is a 70 y.o. male w/ PMH of HTN, DM, prior stroke w/ residual RSW who presents as a transfer from OSH after presenting with acute onset of dysarthria and RSW. Telestroke was completed and his NIHSS was 19. CT showed probable early ischemic changes in the L insular ribbon and L frontal lobe. CTA demonstrated occlusion of the distal L MCA M1 segment. He was given TNK. Pt is S/P thrombectomy. Pt 's hospital course was complicated by AMS requiring restraints. Pt was evaluated by PT/OT/SLP. PT/OT recommended high intensity therapies. SLP recommended NPO. NGT was placed. IR was consulted for PEG tube placement. PM &R was consulted to evaluate pt for post acute placement recommendation.       Functional History: Patient lives  with wife and son and grandson  in a single  story home with 0 steps to enter.  Prior to admission, Pt was Mod with RW use.  DME: [facility].

## 2025-06-24 NOTE — HOSPITAL COURSE
Per chart review,    PT- 07//08    Functional Mobility:  Bed Mobility:     Rolling Right: total assistance  Scooting: maximal assistance  Supine to Sit: maximal assistance and of 2 persons  Sit to Supine: maximal assistance and of 2 persons     Transfers:     Sit to Stand:  total assistance and of 2 persons with hand-held assist    PT- 07/02    Functional Mobility:  Bed Mobility:     Rolling Left:  total assistance  Rolling Right: total assistance  Scooting at EOB: contact guard assistance (L hip only, to adjust position)   With tactile cueing, patient able to scoot L hip back using LUE/LE to push and control trunk   Supine to Sit: maximal assistance  To R following roll, patient assisting by hooking LUE around therapist to pull trunk into sitting   Sit to Supine: maximal assistance and of 2 persons     Transfers:     Sit to Stand:  maximal assistance and of 2 persons with hand-held assist      OT-06/23    Bed Mobility:  Patient completed Rolling/Turning to Left with total assistance and 2 persons  Patient completed Rolling/Turning to Right with total assistance and 2 persons  Patient completed Supine to Sit with total assistance and 2 persons  Patient completed anterior Scooting towards the EOB with total assistance  Patient completed Sit to Supine with total assistance and 2 persons  Patient completed Scooting/Bridging towards the HOB via drawsheet with total assistance and 2 persons     Functional Mobility/Transfers:  Not performed at this time due to patient requiring increased A to maintain sitting balance at EOB, as well as patient noted with increased fatigue following completion of EOB activity.     Activities of Daily Living:  Lower Body Dressing: total assistance to adjust B socks to ensure proper fit at bed-level

## 2025-06-24 NOTE — PLAN OF CARE
Problem: Adult Inpatient Plan of Care  Goal: Plan of Care Review  Outcome: Progressing  Goal: Patient-Specific Goal (Individualized)  Outcome: Progressing  Goal: Absence of Hospital-Acquired Illness or Injury  Outcome: Progressing  Goal: Optimal Comfort and Wellbeing  Outcome: Progressing  Goal: Readiness for Transition of Care  Outcome: Progressing     Problem: Diabetes Comorbidity  Goal: Blood Glucose Level Within Targeted Range  Outcome: Progressing     Problem: Wound  Goal: Optimal Coping  Outcome: Progressing  Goal: Optimal Functional Ability  Outcome: Progressing  Goal: Absence of Infection Signs and Symptoms  Outcome: Progressing  Goal: Improved Oral Intake  Outcome: Progressing  Goal: Optimal Pain Control and Function  Outcome: Progressing  Goal: Skin Health and Integrity  Outcome: Progressing  Goal: Optimal Wound Healing  Outcome: Progressing     Problem: Stroke, Ischemic (Includes Transient Ischemic Attack)  Goal: Optimal Coping  Outcome: Progressing  Goal: Effective Bowel Elimination  Outcome: Progressing  Goal: Optimal Cerebral Tissue Perfusion  Outcome: Progressing  Goal: Optimal Cognitive Function  Outcome: Progressing  Goal: Improved Communication Skills  Outcome: Progressing  Goal: Optimal Functional Ability  Outcome: Progressing  Goal: Optimal Nutrition Intake  Outcome: Progressing  Goal: Effective Oxygenation and Ventilation  Outcome: Progressing  Goal: Improved Sensorimotor Function  Outcome: Progressing  Goal: Safe and Effective Swallow  Outcome: Progressing  Goal: Effective Urinary Elimination  Outcome: Progressing     Problem: Fall Injury Risk  Goal: Absence of Fall and Fall-Related Injury  Outcome: Progressing     Problem: Skin Injury Risk Increased  Goal: Skin Health and Integrity  Outcome: Progressing     Problem: Restraint, Nonviolent  Goal: Absence of Harm or Injury  Outcome: Progressing       POC updated and reviewed with the patient and wife at the bedside. Questions regarding POC were  encouraged and addressed. VSS, see flowsheets. Tele maintained per provider's order. Patient is Alert at this time. NAEON. Seizure, fall, and safety precautions maintained, no signs of injury noted during shift. Patient repositioned with assistance in bed for comfort. Upon exiting room, patient's bed locked in low position, side rails up x 4, bed alarm on, with call light within reach. Instructed patient to call staff for mobility, verbalized understanding. Stroke book and stroke education reviewed with the patient and wife at the bedside, see education flowsheets for details. No acute signs of distress noted at this time.

## 2025-06-24 NOTE — PROGRESS NOTES
Zohaib Garrett - Neurosurgery ([facility])  Vascular Neurology  Comprehensive Stroke Center  Progress Note    Assessment/Plan:     * Embolic stroke involving left middle cerebral artery  Cristi Pulido is a 70 y.o. male w/ PMH of HTN, DM, prior stroke w/ residual RSW who presents as a transfer from OSH after presenting with acute onset of dysarthria and RSW. Telestroke was completed and his NIHSS was 19. CT showed probable early ischemic changes in the L insular ribbon and L frontal lobe. CTA demonstrated occlusion of the distal L MCA M1 segment. He was given TNK at 0548. Patient was [place] [facility] [place]. On arrival, repeat NIHSS of 24. Patient taken for repeat CTH which showed small left temporal hemorrhage along with continued early ischemic changes. Patient taken to IR for thrombectomy and to be admitted to NCC post-procedure.  S/P thrombectomy. TICI 3. MRI revealing for blood products in stroke bed, likely reperfusion injury. Etiology ESUS.     6/24: NGT pulled overnight and replaced. Placement confirmed via X ray. IR consulted for PEG placement. JEANNE, Creatinine and BUN uptrending. FWF flushes ordered. Restless during night. EKG repeated. QTc 441. Seroquel 25 mg QHS ordered. Family requesting [facility] when medically ready.     Antithrombotics for secondary stroke prevention: Antiplatelets: Aspirin: 81 mg daily    Statins for secondary stroke prevention and hyperlipidemia, if present:   Statins: Atorvastatin- 40 mg daily    Aggressive risk factor modification: HTN, Smoking, DM, HLD     Rehab efforts: The patient has been evaluated by a stroke team provider and the therapy needs have been fully considered based off the presenting complaints and exam findings. The following therapy evaluations are needed: PT evaluate and treat, OT evaluate and treat, SLP evaluate and treat, PM&R evaluate for appropriate placement, current recommendation HIT    Diagnostics ordered/pending: None     VTE prophylaxis:  Enoxaparin 40 mg SQ every 24 hours  Mechanical prophylaxis: Place SCDs    BP parameters: Infarct: Post sucessful thrombectomy, SBP <140        Restlessness  -6/24: QTc 441   -Seroquel 25 mg QHS     Hyperlipemia  -Stroke risk factor  -LDL 75, goal <70  -Atorvastatin 40 mg daily      Right sided weakness  -Secondary to stroke.   -Aggressive therapy     Dysarthria and anarthria  -Therapy eval and treat    Aphasia  -Secondary to stroke  -Aggressive therapy     Type 2 diabetes mellitus without complication, without long-term current use of insulin  Lab Results   Component Value Date    LABA1C 7.2 (H) 01/29/2018    HGBA1C 7.0 (H) 06/19/2025     Follow up repeat A1c. Hold home antihyperglycemics. SSI for goal -180 while in hospital    Hypertension associated with diabetes  Stroke risk factor  -goal SBP post successful thrombectomy <140    Tobacco dependence  Stroke risk factor  - on cessation  -nicotine patch PRN         06/20/2025 NAEON. S/P thrombectomy. TICI 3. TNK monitoring ended at 0548. Aphasic, follows no commands. RSW remains. MRI revealing for blood products in stroke bed, likely reperfusion injury. OK to start monotherapy with either plavix or ASA 6/21/25 for secondary stroke prevention. ECHO unremarkable. Family member at bedside agreeable to NGT placement. Etiology ESUS.   6/21/2025 NAEON. Neuro exam stable. Recommend holding AP therapy for now.   06/22/2025 NAEON. Neuro exam stable. Start AP therapy with ASA.   06/23/2025 NAEON. SLP recs NPO. PEG discussed with family and they are agreeable. Pt S/D to NPU.   06/24/2025 NGT pulled overnight and replaced. Placement confirmed via X ray. IR consulted for PEG placement. JEANNE, Creatinine and BUN uptrending. FWF flushes ordered. Restless during night. EKG repeated. QTc 441. Seroquel 25 mg QHS ordered. Family requesting [facility] when medically ready.     STROKE DOCUMENTATION   Acute Stroke Times   Last Known Normal Date: 06/19/25  Last Known Normal  Time: 0400  Symptom Onset Date: 06/19/25  Symptom Onset Time: 0400  Stroke Team Called Date: 06/19/25  Stroke Team Called Time: 0813  Stroke Team Arrival Date: 06/19/25  Stroke Team Arrival Time: 0813  CT Interpretation Time: 0827  Thrombolytic Therapy Recommended:  (already given prior to transfer)  CTA Interpretation Time:  (already completed prior to transfer)  Thrombectomy Recommended: Yes  Decision to Treat Time for IR: 0832    NIH Scale:  1a. Level of Consciousness: 1-->Not alert, but arousable by minor stimulation to obey, answer, or respond  1b. LOC Questions: 2-->Answers neither question correctly  1c. LOC Commands: 2-->Performs neither task correctly  2. Best Gaze: 0-->Normal  3. Visual: 0-->No visual loss  4. Facial Palsy: 2-->Partial paralysis (total or near-total paralysis of lower face)  5a. Motor Arm, Left: 0-->No drift, limb holds 90 (or 45) degrees for full 10 secs  5b. Motor Arm, Right: 4-->No movement  6a. Motor Leg, Left: 0-->No drift, leg holds 30 degree position for full 5 secs  6b. Motor Leg, Right: 3-->No effort against gravity, leg falls to bed immediately  7. Limb Ataxia: 0-->Absent  8. Sensory: 0-->Normal, no sensory loss  9. Best Language: 2-->Severe aphasia, all communication is through fragmentary expression, great need for inference, questioning, and guessing by the listener. Range of information that can be exchanged is limited, listener carries burden of. . . (see row details)  10. Dysarthria: 1-->Mild-to-moderate dysarthria, patient slurs at least some words and, at worst, can be understood with some difficulty  11. Extinction and Inattention (formerly Neglect): 0-->No abnormality  Total (NIH Stroke Scale): 17       Modified Nadeem Score: 2  [place] Coma Scale:10   ABCD2 Score:    [id number] Score:   HAS -BLED Score:   ICH Score:   Hunt & Kimball Classification:      Hemorrhagic change of an Ischemic Stroke: Does this patient have an ischemic stroke with hemorrhagic changes? Yes,  Grading Scale: PH Type 1 (PH-1) = hematoma in < 30% of the infarcted area with some slight space-occupying effect. Is this a symptomatic change?  No - Hemorrhage is not clinically significant     Neurologic Chief Complaint: Embolic Stroke involving Left MCA    Subjective:     Interval History: Patient is seen for follow-up neurological assessment and treatment recommendations: See Hospital Course    HPI, Past Medical, Family, and Social History remains the same as documented in the initial encounter.     Review of Systems   Unable to perform ROS: Acuity of condition     Scheduled Meds:   amLODIPine  10 mg Per NG tube QHS    aspirin  81 mg Per NG tube Daily    atorvastatin  40 mg Per NG tube Daily    [START ON 6/25/2025] barium sulfate  450 mL Per NG tube Once    [START ON 6/25/2025] barium sulfate  450 mL Per NG tube Once    enoxparin  40 mg Subcutaneous Q24H    FLUoxetine  20 mg Per NG tube Daily    insulin aspart U-100  6 Units Subcutaneous Q4H    lisinopriL  40 mg Per NG tube Daily    metoprolol tartrate  50 mg Per NG tube BID    nicotine  1 patch Transdermal Daily    polyethylene glycol  17 g Per NG tube BID    QUEtiapine  25 mg Per NG tube QHS    senna-docusate  1 tablet Per NG tube BID     Continuous Infusions:      PRN Meds:  Current Facility-Administered Medications:     bisacodyL, 10 mg, Rectal, Daily PRN    dextrose 50%, 12.5 g, Intravenous, PRN    dextrose 50%, 25 g, Intravenous, PRN    glucagon (human recombinant), 1 mg, Intramuscular, PRN    hydrALAZINE, 10 mg, Intravenous, Q4H PRN    insulin aspart U-100, 0-10 Units, Subcutaneous, Q4H PRN    labetalol, 10 mg, Intravenous, Q4H PRN    melatonin, 6 mg, Per NG tube, Nightly PRN    simethicone, 1 tablet, Per NG tube, TID PRN    sodium chloride 0.9%, 10 mL, Intravenous, PRN    Objective:     Vital Signs (Most Recent):  Temp: 98.8 °F (37.1 °C) (06/24/25 1216)  Pulse: 96 (06/24/25 1216)  Resp: 18 (06/24/25 1216)  BP: (!) 141/85 (06/24/25 1216)  SpO2: (!) 90 %  "(06/24/25 1216)  BP Location: Right arm    Vital Signs Range (Last 24H):  Temp:  [98.3 °F (36.8 °C)-99.5 °F (37.5 °C)]   Pulse:  [72-96]   Resp:  [18-20]   BP: (107-158)/(68-85)   SpO2:  [90 %-97 %]   BP Location: Right arm       Physical Exam  Vitals and nursing note reviewed.   HENT:      Mouth/Throat:      Mouth: Mucous membranes are moist.   Eyes:      Pupils: Pupils are equal, round, and reactive to light.   Cardiovascular:      Rate and Rhythm: Normal rate.   Pulmonary:      Effort: No respiratory distress.   Skin:     General: Skin is warm and dry.   Neurological:      Mental Status: He is alert.              Neurological Exam:   LOC: alert  Attention Span: Good   Language: Expressive aphasia, Receptive aphasia  Articulation: Dysarthria  Orientation: Untestable due to severe aphasia   Motor: Arm left  Normal 5/5  Leg left  Normal 5/5  Arm right  Plegia 0/5  Leg right Paresis: 1/5    Laboratory:  CMP:   Recent Labs   Lab 06/24/25  0239   CALCIUM 8.8   ALBUMIN 3.3*   PROT 6.8   *   K 4.2   CO2 26      BUN 35*   CREATININE 1.5*   ALKPHOS 143   ALT 17   AST 17   BILITOT 0.4     BMP:   Recent Labs   Lab 06/24/25  0239   *   K 4.2      CO2 26   BUN 35*   CREATININE 1.5*   CALCIUM 8.8     CBC:   Recent Labs   Lab 06/24/25  0239   WBC 7.31   RBC 4.47*   HGB 12.6*   HCT 39.9*      MCV 89   MCH 28.2   MCHC 31.6*     Lipid Panel:   Recent Labs   Lab 06/19/25  0514   CHOL 123   LDLCALC 75.2   HDL 38*   TRIG 49     Coagulation:   Recent Labs   Lab 06/19/25  0514   INR 1.1     Platelet Aggregation Study: No results for input(s): "PLTAGG", "PLTAGINTERP", "PLTAGREGLACO", "ADPPLTAGGREG" in the last 168 hours.  Hgb A1C:   Recent Labs   Lab 06/19/25  1113   HGBA1C 7.0*     TSH:   Recent Labs   Lab 06/19/25  0514   TSH 1.310       Diagnostic Results     Brain Imaging   MRI Brain 6/20/25  Impression:     Acute left MCA distribution infarct.  No new large parenchymal hemorrhage.  Susceptibility " artifact throughout the infarcted tissue may reflect contrast staining and/or blood products conversion.  Overall mass effect is increased compared to prior with sulcal effacement throughout the left cerebral hemisphere and approximately 4 mm of rightward midline shift.     Stable size of the parenchymal hemorrhage centered in the inferior left temporal lobe.    [facility] 6/20/25: 1242  Impression:     Moderate-sized recent left MCA distribution infarct and small intraparenchymal hemorrhage appear grossly unchanged from prior study performed earlier on the same date.     Chronic ischemic change elsewhere with underlying cerebral and cerebellar volume loss, as before.     No new hemorrhage or major vascular distribution infarct elsewhere.  CT 6/20/25: 0834  Impression:     Early ischemic changes in the left MCA distribution, new from recent CT.     New left inferior temporal intraparenchymal hematoma.     Chronic ischemic change with cerebral and cerebellar volume loss, as above.     [facility] 6/19/25  Impression:     Question some early left MCA ischemia corresponding to the patient's known left MCA territory occlusion.  No hemorrhage.     Senescent changes as above.       All CT scans at this facility are performed  using dose modulation techniques as appropriate to performed exam including the following:  automated exposure control; adjustment of mA and/or kV according to the patients size (this includes techniques or standardized protocols for targeted exams where dose is matched to indication/reason for exam: i.e. extremities or head);  iterative reconstruction technique.    Vessel Imaging   CTA head and neck 6/19/25  Impression:     Distal left M1 MCA occlusion with relatively prompt collateralization of the more distal MCA vessels.     Severe stenosis left mid vertebral artery and left PCA.    Cardiac Imaging   TTE 6/19/25    Left Ventricle: The left ventricle is normal in size. Normal wall thickness. There is normal  systolic function with a visually estimated ejection fraction of 60 - 65%.    Right Ventricle: The right ventricle is normal in size Wall thickness is normal. Systolic function is normal.    The pulmonary artery pressure could not be estimated.    IVC/SVC: Normal venous pressure at 3 mmHg.       Fabiola Kamara NP  [facility]  Department of Vascular Neurology   [facility])

## 2025-06-24 NOTE — CONSULTS
Interventional Radiology  Consult/History & Physical Note    Consult Requested By: Fabiola Kamara NP  Reason for Consult: PEG placement    SUBJECTIVE:     Chief Complaint: dysphagia    History of Present Illness:  Cristi Pulido is a 70 y.o. male with a PMHx of HTN, DM, prior stroke w/ residual RSW who presented to [facility] ED with acute onset of dysarthria and RSW, was found to have L MCA CVA and transferred to [facility] for HLOC on 6/19/25. Hospital course notable for thrombectomy on 6/19. Interventional Radiology has been consulted for percutaneous gastrostomy tube placement for management of long term enteral feeds. Pt unable to tolerate oral feeds due to dysphagia 2/2 recent CVA. He has had recent imaging including a CT abdomen without IV contrast on 6/24/25 which revealed a very small window for G tube placement due to bowel overlying almost the entire stomach. The pt does have an NG tube in place. The pt's WBC is 7.31 from 10.58. Pt is afebrile and hemodynamically stable. He  does not have a history of KANWAL requiring nightly CPAP or difficulty breathing when lying flat. He is currently receiving ASA and lovenox.     Review of Systems   Unable to perform ROS: Critical illness       Scheduled Meds:   amLODIPine  10 mg Per NG tube QHS    aspirin  81 mg Per NG tube Daily    atorvastatin  40 mg Per NG tube Daily    [START ON 6/25/2025] barium sulfate  450 mL Per NG tube Once    [START ON 6/25/2025] barium sulfate  450 mL Per NG tube Once    enoxparin  40 mg Subcutaneous Q24H    FLUoxetine  20 mg Per NG tube Daily    insulin aspart U-100  6 Units Subcutaneous Q4H    lisinopriL  40 mg Per NG tube Daily    metoprolol tartrate  50 mg Per NG tube BID    nicotine  1 patch Transdermal Daily    polyethylene glycol  17 g Per NG tube BID    QUEtiapine  25 mg Per NG tube QHS    senna-docusate  1 tablet Per NG tube BID     Continuous Infusions:  PRN Meds:  Current Facility-Administered Medications:     bisacodyL, 10 mg, Rectal, Daily  PRN    dextrose 50%, 12.5 g, Intravenous, PRN    dextrose 50%, 25 g, Intravenous, PRN    glucagon (human recombinant), 1 mg, Intramuscular, PRN    hydrALAZINE, 10 mg, Intravenous, Q4H PRN    insulin aspart U-100, 0-10 Units, Subcutaneous, Q4H PRN    labetalol, 10 mg, Intravenous, Q4H PRN    melatonin, 6 mg, Per NG tube, Nightly PRN    simethicone, 1 tablet, Per NG tube, TID PRN    sodium chloride 0.9%, 10 mL, Intravenous, PRN    Review of patient's allergies indicates:  No Known Allergies    Past Medical History:   Diagnosis Date    Diabetes mellitus     Hyperlipidemia     Hypertension     Stroke     Stroke     right side weak    Type 2 diabetes mellitus      Past Surgical History:   Procedure Laterality Date    AMPUTATION, LOWER LIMB Bilateral     middle toes    bilateral 3rd toe amputation      COLONOSCOPY  01/2021    FRACTURE SURGERY Left     hip    JOINT REPLACEMENT Left     KOMAL     Family History   Problem Relation Name Age of Onset    Diabetes Mother      Heart disease Mother      Hypertension Mother      Diabetes Father      Diabetes Sister Doreen     Diabetes Brother Marcelo     Diabetes Daughter Siri     No Known Problems Son Cristi Marks.     Diabetes Sister Jimena     Diabetes Sister Karma     Diabetes Brother Constantine     Hypertension Daughter Ciera      Social History[1]    OBJECTIVE:     Vital Signs (Most Recent)  Temp: 98.8 °F (37.1 °C) (06/24/25 1216)  Pulse: 96 (06/24/25 1216)  Resp: 18 (06/24/25 1216)  BP: (!) 141/85 (06/24/25 1216)  SpO2: (!) 90 % (06/24/25 1216)    Physical Exam:  Physical Exam  Vitals and nursing note reviewed.   Constitutional:       General: He is not in acute distress.     Appearance: He is ill-appearing.   HENT:      Head: Normocephalic and atraumatic.      Right Ear: External ear normal.      Left Ear: External ear normal.      Nose:      Comments: NGT  Eyes:      Extraocular Movements: Extraocular movements intact.      Conjunctiva/sclera: Conjunctivae normal.      Pupils:  Pupils are equal, round, and reactive to light.   Cardiovascular:      Rate and Rhythm: Normal rate.   Pulmonary:      Effort: Pulmonary effort is normal. No respiratory distress.   Abdominal:      General: Abdomen is flat. There is no distension.   Skin:     General: Skin is warm and dry.      Coloration: Skin is not jaundiced.   Neurological:      General: No focal deficit present.      Mental Status: He is alert and oriented to person, place, and time.   Psychiatric:         Mood and Affect: Mood normal.         Behavior: Behavior normal.         Thought Content: Thought content normal.         Judgment: Judgment normal.         Laboratory  I have reviewed all pertinent lab results within the past 24 hours.  CBC:   Recent Labs   Lab 06/24/25  0239   WBC 7.31   RBC 4.47*   HGB 12.6*   HCT 39.9*      MCV 89   MCH 28.2   MCHC 31.6*     BMP:   Recent Labs   Lab 06/24/25  0239   *   *   K 4.2      CO2 26   BUN 35*   CREATININE 1.5*   CALCIUM 8.8   MG 2.1     CMP:   Recent Labs   Lab 06/24/25  0239   *   CALCIUM 8.8   ALBUMIN 3.3*   PROT 6.8   *   K 4.2   CO2 26      BUN 35*   CREATININE 1.5*   ALKPHOS 143   ALT 17   AST 17   BILITOT 0.4     LFTs:   Recent Labs   Lab 06/24/25  0239   ALT 17   AST 17   ALKPHOS 143   BILITOT 0.4   PROT 6.8   ALBUMIN 3.3*     Coagulation:   Recent Labs   Lab 06/19/25  0514   INR 1.1     Microbiology Results (last 7 days)       ** No results found for the last 168 hours. **            ASA/Mallampati  ASA: 3  Mallampati: 2    Imaging:  Recent imaging studies including CT abdomen without IV contrast on 6/24/25 which was independently reviewed by Noemi Delgado MD.     EXAMINATION:  CT ABDOMEN PELVIS WITHOUT CONTRAST     CLINICAL HISTORY:  Peg Placement;     TECHNIQUE:  Low dose axial images, sagittal and coronal reformations were obtained from the lung bases to the pubic symphysis.  Contrast was not administered.     COMPARISON:  X-ray 06/24/2025      FINDINGS:  Evaluation of solid organs is limited due to the absence of intravenous contrast.     Examination degraded by patient motion artifact.     Lungs: Bibasilar dependent atelectasis.  Bilateral bandlike scarring versus atelectasis.  No consolidation or pleural effusion in the visualized lung bases.     Heart: Normal size. No pericardial effusion.  Calcification of the aortic valve.  Multi-vessel calcific atherosclerosis of the coronary arteries.     Liver: Normal size. No focal abnormality.     Gallbladder: No calcified gallstones.  No pericholecystic inflammatory changes.     Bile ducts: No intrahepatic or extrahepatic biliary ductal dilatation.     Spleen: Normal size.     Pancreas: No peripancreatic fat stranding.     Adrenals: Nodular thickening of the bilateral adrenal glands, nonspecific     Renal/Ureters: The kidneys are normal in size. Bilateral simple renal cysts measuring up to 3.0 cm.  Right renal stones versus vascular calcifications.  4 mm left renal stone.  No hydroureteronephrosis or stones.  The urinary bladder is unremarkable.     Reproductive: Prostate is without significant abnormality. Scattered pelvic phleboliths.     Stomach/Bowel: Small hiatal hernia.  Stomach is unremarkable.  A portion of the transverse colon is interposed between the stomach in the anterior abdominal wall.  No evidence of obstruction or inflammatory changes.     Peritoneum: No free fluid. No intraperitoneal free air.     Lymph Nodes: No pathologic paulo enlargement in the abdomen or pelvis.     Vasculature: Abdominal aorta tapers normally.  Extensive atherosclerosis of the abdominal aorta and its branches.     Bones: Degenerative changes of the spine.  Degenerative changes of the superior endplate of the L2 vertebral body with probable Schmorl's node.  Left hip arthroplasty.     Soft Tissues: Subcutaneous foci of air in the ventral abdominal wall likely related to subcutaneous injections.  Tiny fat containing  umbilical hernia.  Right fat containing inguinal hernia.     Impression:     Exam performed for procedural purposes.     4 mm left renal stone.  No hydronephrosis.     Additional incidental findings as detailed above.     Electronically signed by resident: Monica Mckenna  Date:                                            06/24/2025  Time:                                           07:31     Electronically signed by: Lm Hillman MD  Date:                                            06/24/2025  Time:                                           09:59    ASSESSMENT/PLAN:     Assessment:  70 y.o. male with a PMHx of HTN, DM, prior stroke w/ residual RSW, currently admitted with L MCA CVA who has been referred to IR for percutaneous gastrostomy tube placement for long term enteral feeds due to dysphagia 2/2 recent CVA. CT reviewed with staff- pt has a very small window for G tube placement due to bowel interposed between the stomach and abdominal wall. Can attempt G tube placement, however there is a significant chance that we will not have a safe window into the stomach, in which case pt would need surgical G tube placement.The procedure was discussed in great detail with the pt/pt's family including thorough explanations of the potential risks and benefits of gastrostomy tube placement. Explained to pt/pt's family and primary team that NG tube placement is required to insufflate the stomach during the procedure and to give barium through prior to the procedure, both of which make G tube placement a safer procedure. Risks include but are not limited to sepsis, severe infection, hemorrhage, bowel injury, catheter dislodgement, catheter blockage and need for additional procedures. Explained to pt/pt's family that opting to forgo gastrostomy tube placement could pose a risk to life or bodily function. The pt is a candidate for percutaneous gastrostromy tube placement under moderate sedation. Plan discussed with ordering physician  and pt/pt's family who verbalized understanding of the plan and would like to proceed. Will obtain consent from pt's wife.    Plan:  Will proceed with percutaneous gastrostomy tube placement under moderate sedation on 6/25/25.   Please place/maintain NG tube prior to barium administration (see #3) and keep in placed for procedure  Administer barium overnight starting at midnight (ordered by IR, see instructions in orders) via NG tube  Please keep pt NPO starting at midnight on 6/25/25 (except for barium administration).   Anticoagulation history reviewed. Pt takes ASA, ok to continue. Please hold PM dose of lovenox.  If starting prophylactic AC following procedure, ok to start lovenox 12 hours following procedure and ok to start SQ heparin 6-8 hours following procedure per SIR guidelines  Coagulation labs reviewed. INR 1.1 and plt count 202.  Thank you for the consult. Please contact with questions via [employer] secure chat or spectra.    Time spent during patient care today was 80 minutes. This includes time spent before the visit reviewing the chart, discussing case with staff physician and ordering provider, time spent during the face to face patient visit, and time spent after the visit on documentation. Time excludes procedure time.     Sendy Tran PA-C  Interventional Radiology  Spectra: [id number]        [1]   Social History  Tobacco Use    Smoking status: Every Day     Current packs/day: 0.50     Average packs/day: 0.5 packs/day for 40.0 years (20.0 ttl pk-yrs)     Types: Cigarettes     Passive exposure: Current    Smokeless tobacco: Never    Tobacco comments:     Patient aware of smoking cessation program. He is also aware of health consequences r/t smoking.   Substance Use Topics    Alcohol use: Yes     Comment: Occasionally (football season)    Drug use: Yes     Types: Marijuana

## 2025-06-24 NOTE — SUBJECTIVE & OBJECTIVE
Neurologic Chief Complaint: Embolic Stroke involving Left MCA    Subjective:     Interval History: Patient is seen for follow-up neurological assessment and treatment recommendations: See Hospital Course    HPI, Past Medical, Family, and Social History remains the same as documented in the initial encounter.     Review of Systems   Unable to perform ROS: Acuity of condition     Scheduled Meds:   amLODIPine  10 mg Per NG tube QHS    aspirin  81 mg Per NG tube Daily    atorvastatin  40 mg Per NG tube Daily    enoxparin  40 mg Subcutaneous Q24H    FLUoxetine  20 mg Per NG tube Daily    insulin aspart U-100  6 Units Subcutaneous Q4H    lisinopriL  40 mg Per NG tube Daily    metoprolol tartrate  50 mg Per NG tube BID    mupirocin   Nasal BID    nicotine  1 patch Transdermal Daily    polyethylene glycol  17 g Per NG tube BID    senna-docusate  1 tablet Per NG tube BID     Continuous Infusions:      PRN Meds:  Current Facility-Administered Medications:     bisacodyL, 10 mg, Rectal, Daily PRN    dextrose 50%, 12.5 g, Intravenous, PRN    dextrose 50%, 25 g, Intravenous, PRN    glucagon (human recombinant), 1 mg, Intramuscular, PRN    hydrALAZINE, 10 mg, Intravenous, Q4H PRN    insulin aspart U-100, 0-10 Units, Subcutaneous, Q4H PRN    labetalol, 10 mg, Intravenous, Q4H PRN    magnesium oxide, 800 mg, Per NG tube, PRN    magnesium oxide, 800 mg, Per NG tube, PRN    ondansetron, 4 mg, Intravenous, Q8H PRN    potassium bicarbonate, 35 mEq, Per NG tube, PRN    potassium bicarbonate, 50 mEq, Per NG tube, PRN    potassium bicarbonate, 60 mEq, Per NG tube, PRN    potassium, sodium phosphates, 2 packet, Per NG tube, PRN    potassium, sodium phosphates, 2 packet, Per NG tube, PRN    potassium, sodium phosphates, 2 packet, Per NG tube, PRN    sodium chloride 0.9%, 10 mL, Intravenous, PRN    Objective:     Vital Signs (Most Recent):  Temp: 98.3 °F (36.8 °C) (06/23/25 1502)  Pulse: 77 (06/23/25 1639)  Resp: 20 (06/23/25 1502)  BP: (!)  "146/82 (06/23/25 1639)  SpO2: (!) 94 % (06/23/25 1639)  BP Location: Right arm    Vital Signs Range (Last 24H):  Temp:  [98 °F (36.7 °C)-98.9 °F (37.2 °C)]   Pulse:  [54-81]   Resp:  [12-20]   BP: (101-177)/(62-90)   SpO2:  [90 %-99 %]   BP Location: Right arm       Physical Exam  Vitals and nursing note reviewed.   HENT:      Mouth/Throat:      Mouth: Mucous membranes are moist.   Eyes:      Pupils: Pupils are equal, round, and reactive to light.   Cardiovascular:      Rate and Rhythm: Normal rate.   Pulmonary:      Effort: No respiratory distress.   Skin:     General: Skin is warm and dry.   Neurological:      Mental Status: He is alert.              Neurological Exam:   LOC: alert  Attention Span: Good   Language: Expressive aphasia, Receptive aphasia  Articulation: Dysarthria  Orientation: Untestable due to severe aphasia   Motor: Arm left  Normal 5/5  Leg left  Normal 5/5  Arm right  Plegia 0/5  Leg right Paresis: 1/5    Laboratory:  CMP:   Recent Labs   Lab 06/23/25  0116   CALCIUM 9.1   ALBUMIN 3.5   PROT 7.2      K 4.0   CO2 22*      BUN 28*   CREATININE 1.2   ALKPHOS 156*   ALT 15   AST 23   BILITOT 0.4     BMP:   Recent Labs   Lab 06/23/25  0116      K 4.0      CO2 22*   BUN 28*   CREATININE 1.2   CALCIUM 9.1     CBC:   Recent Labs   Lab 06/23/25  0116   WBC 10.58   RBC 4.41*   HGB 12.5*   HCT 39.0*      MCV 88   MCH 28.3   MCHC 32.1     Lipid Panel:   Recent Labs   Lab 06/19/25  0514   CHOL 123   LDLCALC 75.2   HDL 38*   TRIG 49     Coagulation:   Recent Labs   Lab 06/19/25  0514   INR 1.1     Platelet Aggregation Study: No results for input(s): "PLTAGG", "PLTAGINTERP", "PLTAGREGLACO", "ADPPLTAGGREG" in the last 168 hours.  Hgb A1C:   Recent Labs   Lab 06/19/25  1113   HGBA1C 7.0*     TSH:   Recent Labs   Lab 06/19/25  0514   TSH 1.310       Diagnostic Results     Brain Imaging   MRI Brain 6/20/25  Impression:     Acute left MCA distribution infarct.  No new large " parenchymal hemorrhage.  Susceptibility artifact throughout the infarcted tissue may reflect contrast staining and/or blood products conversion.  Overall mass effect is increased compared to prior with sulcal effacement throughout the left cerebral hemisphere and approximately 4 mm of rightward midline shift.     Stable size of the parenchymal hemorrhage centered in the inferior left temporal lobe.    [facility] 6/20/25: 1242  Impression:     Moderate-sized recent left MCA distribution infarct and small intraparenchymal hemorrhage appear grossly unchanged from prior study performed earlier on the same date.     Chronic ischemic change elsewhere with underlying cerebral and cerebellar volume loss, as before.     No new hemorrhage or major vascular distribution infarct elsewhere.  [facility] 6/20/25: 0834  Impression:     Early ischemic changes in the left MCA distribution, new from recent CT.     New left inferior temporal intraparenchymal hematoma.     Chronic ischemic change with cerebral and cerebellar volume loss, as above.     [facility] 6/19/25  Impression:     Question some early left MCA ischemia corresponding to the patient's known left MCA territory occlusion.  No hemorrhage.     Senescent changes as above.       All CT scans at this facility are performed  using dose modulation techniques as appropriate to performed exam including the following:  automated exposure control; adjustment of mA and/or kV according to the patients size (this includes techniques or standardized protocols for targeted exams where dose is matched to indication/reason for exam: i.e. extremities or head);  iterative reconstruction technique.    Vessel Imaging   CTA head and neck 6/19/25  Impression:     Distal left M1 MCA occlusion with relatively prompt collateralization of the more distal MCA vessels.     Severe stenosis left mid vertebral artery and left PCA.    Cardiac Imaging   TTE 6/19/25    Left Ventricle: The left ventricle is normal in size.  Normal wall thickness. There is normal systolic function with a visually estimated ejection fraction of 60 - 65%.    Right Ventricle: The right ventricle is normal in size Wall thickness is normal. Systolic function is normal.    The pulmonary artery pressure could not be estimated.    IVC/SVC: Normal venous pressure at 3 mmHg.

## 2025-06-24 NOTE — PT/OT/SLP PROGRESS
Occupational Therapy  Co- Treatment    Name: Cristi Pulido  MRN: [id number]  Admitting Diagnosis:  Embolic stroke involving left middle cerebral artery       Recommendations:     Discharge Recommendations: High Intensity Therapy  Discharge Equipment Recommendations:  hospital bed, lift device, wheelchair  Barriers to discharge:  Inaccessible home environment    Assessment:     Cristi Pulido is a 70 y.o. male with a medical diagnosis of Embolic stroke involving left middle cerebral artery.. Performance deficits affecting function are weakness, edema, impaired self care skills, impaired functional mobility, gait instability, impaired balance, visual deficits, impaired cognition, decreased upper extremity function, decreased lower extremity function, decreased safety awareness, impaired coordination, impaired cardiopulmonary response to activity. Pt agreeable to therapy and tolerated fairly. Pt lethargic, eyes closed ~ 80% with eyes half closed when open despite 1 instance. Mirror placed in front of pt to provide visual cue for postural support but only 1 instance of pt tracking to midline, mostly presenting with R side preference.significant rightward lean this date with pt pushing towards R with LUE. Pt attempting to return himself to bed, session ended for therapist and pt safety.  Pt remains limited in ADLs, functional mobility, and functional transfers. Patient has demonstrated sufficient progression to warrant high intensity therapy evidenced by objectives noted below.    Co-treat performed due to acuity and complexity of pt's medical status with the expectation of 2 skilled disciplines needed to optimize pts occupational performance and to improve activity tolerance.     Rehab Prognosis:  Good; patient would benefit from acute skilled OT services to address these deficits and reach maximum level of function.       Plan:     Patient to be seen 4 x/week to address the above listed problems via self-care/home management,  "therapeutic activities, therapeutic exercises, neuromuscular re-education, cognitive retraining  Plan of Care Expires: 07/20/25  Plan of Care Reviewed with: patient, spouse    Subjective     Chief Complaint: pt non-verbal  Patient/Family Comments/goals: pt non-verbal  Pain/Comfort:  Pain Rating 1:  (aphasic, did not appear in pain)    Objective:     Communicated with: Nurse prior to session.  Patient found HOB elevated with bed alarm, pulse ox (continuous), NG tube, peripheral IV, restraints, telemetry, bed alarm, pulse ox (continuous), NG tube, peripheral IV, restraints, telemetry, Condom Catheter upon OT entry to room.    General Precautions: Standard, fall, aspiration, aphasia    Orthopedic Precautions:N/A  Braces: N/A  Respiratory Status: Room air     Occupational Performance:     Bed Mobility:    Patient completed Rolling/Turning to Left with  total assistance and 2 persons  Patient completed Rolling/Turning to Right with total assistance and 2 persons  Patient completed Scooting/Bridging with total assistance and 2 persons  Patient completed Supine to Sit with total assistance and 2 persons  Patient completed Sit to Supine with total assistance and 2 persons     Functional Mobility/Transfers:  Functional Mobility: unable  to perform OOB mobility 2/2 increased need for physical assistance   Pt sat EOB ~ 8 min with Total A for mobility,  brief period of CGA,multi-directional LOB with pt pushing towards R with L hand, improvements when L hand in lap. Pt leaning forward throughout session, verbal and tactile cues required to correct posture to the L and upright. At end of session, pt with heavy pushing towards R side, unable to redirect. Session ended for pt and therapist safety.   Mirror placed in front of session ` 4 minutes with "X" marking midline. Cued pt verbally, tactile,and visually to right to midline with one instance of pt bringing eyes to mirror and assisting with righting. Pt became fatigued and " attempted multiple times to return himself to bed, activity ended    Activities of Daily Living:  N/a this session 2/2 poor command following and increased need for physical assistance      [facility] 6 Click ADL: 7    Treatment & Education:  -Education on energy conservation and task modification to maximize safety and (I) during ADLs and mobility  -Education on importance of OOB activity to improve overall activity tolerance and promote recovery  -Pt educated to call for assistance and to transfer with hospital staff only  -Provided education regarding role of OT, POC, & discharge recommendations with pt verbalizing understanding.  Pt had no further questions & when asked whether there were any concerns pt reported none.      Patient left HOB elevated with all lines intact, call button in reach, bed alarm on, nurse notified, and nurse present    GOALS:   Multidisciplinary Problems       Occupational Therapy Goals          Problem: Occupational Therapy    Goal Priority Disciplines Outcome Interventions   Occupational Therapy Goal     OT, PT/OT Progressing    Description: Goals to be met by: 07/20/2025     Patient will increase functional independence with ADLs by performing:    UE Dressing with Moderate Assistance.  LE Dressing with Maximum Assistance.  Grooming while seated with Moderate Assistance.  Toileting from bedside commode with Moderate Assistance for hygiene and clothing management.   Supine to sit with Moderate Assistance.  Stand pivot transfer with Moderate Assistance.                         Time Tracking:     OT Date of Treatment: 06/24/25  OT Start Time: 0926  OT Stop Time: 0949  OT Total Time (min): 23 min    Billable Minutes:Neuromuscular Re-education 23 min    OT/JEANCARLOS: OT     Number of JEANCARLOS visits since last OT visit: 0    6/24/2025

## 2025-06-24 NOTE — NURSING
Tube feeding restarted with free water flush bolus attached. Feeding going at 55ml/hr with 240ml of free water flush every 6 hours.

## 2025-06-24 NOTE — ASSESSMENT & PLAN NOTE
Cristi Pulido is a 70 y.o. male w/ PMH of HTN, DM, prior stroke w/ residual RSW who presents as a transfer from OSH after presenting with acute onset of dysarthria and RSW. Telestroke was completed and his NIHSS was 19. CT showed probable early ischemic changes in the L insular ribbon and L frontal lobe. CTA demonstrated occlusion of the distal L MCA M1 segment. He was given TNK at 0548. Patient was transferred to [facility] for further management. On arrival, repeat NIHSS of 24. Patient taken for repeat CTH which showed small left temporal hemorrhage along with continued early ischemic changes. Patient taken to IR for thrombectomy and to be admitted to [facility] post-procedure.  S/P thrombectomy. TICI 3. MRI revealing for blood products in stroke bed, likely reperfusion injury. Etiology ESUS.     6/24: NGT pulled overnight and replaced. Placement confirmed via X ray. IR consulted for PEG placement. JEANNE, Creatinine and BUN uptrending. FWF flushes ordered. Restless during night. EKG repeated. QTc 441. Seroquel 25 mg QHS ordered. Family requesting [facility] when medically ready.     Antithrombotics for secondary stroke prevention: Antiplatelets: Aspirin: 81 mg daily    Statins for secondary stroke prevention and hyperlipidemia, if present:   Statins: Atorvastatin- 40 mg daily    Aggressive risk factor modification: HTN, Smoking, DM, HLD     Rehab efforts: The patient has been evaluated by a stroke team provider and the therapy needs have been fully considered based off the presenting complaints and exam findings. The following therapy evaluations are needed: PT evaluate and treat, OT evaluate and treat, SLP evaluate and treat, PM&R evaluate for appropriate placement, current recommendation HIT    Diagnostics ordered/pending: None     VTE prophylaxis: Enoxaparin 40 mg SQ every 24 hours  Mechanical prophylaxis: Place SCDs    BP parameters: Infarct: Post sucessful thrombectomy, SBP <140

## 2025-06-24 NOTE — SUBJECTIVE & OBJECTIVE
Past Medical History:   Diagnosis Date    Diabetes mellitus     Hyperlipidemia     Hypertension     Stroke     Stroke     right side weak    Type 2 diabetes mellitus      Past Surgical History:   Procedure Laterality Date    AMPUTATION, LOWER LIMB Bilateral     middle toes    bilateral 3rd toe amputation      COLONOSCOPY  01/2021    FRACTURE SURGERY Left     hip    JOINT REPLACEMENT Left     KOMAL     Review of patient's allergies indicates:  No Known Allergies    Scheduled Medications:    amLODIPine  10 mg Per NG tube QHS    aspirin  81 mg Per NG tube Daily    atorvastatin  40 mg Per NG tube Daily    [START ON 6/25/2025] barium sulfate  450 mL Per NG tube Once    [START ON 6/25/2025] barium sulfate  450 mL Per NG tube Once    enoxparin  40 mg Subcutaneous Q24H    FLUoxetine  20 mg Per NG tube Daily    insulin aspart U-100  6 Units Subcutaneous Q4H    lisinopriL  40 mg Per NG tube Daily    metoprolol tartrate  50 mg Per NG tube BID    nicotine  1 patch Transdermal Daily    polyethylene glycol  17 g Per NG tube BID    QUEtiapine  25 mg Per NG tube QHS    senna-docusate  1 tablet Per NG tube BID       PRN Medications:   Current Facility-Administered Medications:     bisacodyL, 10 mg, Rectal, Daily PRN    dextrose 50%, 12.5 g, Intravenous, PRN    dextrose 50%, 25 g, Intravenous, PRN    glucagon (human recombinant), 1 mg, Intramuscular, PRN    hydrALAZINE, 10 mg, Intravenous, Q4H PRN    insulin aspart U-100, 0-10 Units, Subcutaneous, Q4H PRN    labetalol, 10 mg, Intravenous, Q4H PRN    melatonin, 6 mg, Per NG tube, Nightly PRN    simethicone, 1 tablet, Per NG tube, TID PRN    sodium chloride 0.9%, 10 mL, Intravenous, PRN    Family History       Problem Relation (Age of Onset)    Diabetes Mother, Father, Sister, Brother, Daughter, Sister, Sister, Brother    Heart disease Mother    Hypertension Mother, Daughter    No Known Problems Son          Tobacco Use    Smoking status: Every Day     Current packs/day: 0.50     Average  packs/day: 0.5 packs/day for 40.0 years (20.0 ttl pk-yrs)     Types: Cigarettes     Passive exposure: Current    Smokeless tobacco: Never    Tobacco comments:     Patient aware of smoking cessation program. He is also aware of health consequences r/t smoking.   Substance and Sexual Activity    Alcohol use: Yes     Comment: Occasionally (football season)    Drug use: Yes     Types: Marijuana    Sexual activity: Not Currently     Partners: Female     Review of Systems   Unable to perform ROS: Other (Pt hard to arouse)     Objective:     Vital Signs (Most Recent):  Temp: 98.8 °F (37.1 °C) (06/24/25 1216)  Pulse: 96 (06/24/25 1216)  Resp: 18 (06/24/25 1216)  BP: (!) 141/85 (06/24/25 1216)  SpO2: (!) 90 % (06/24/25 1216)    Vital Signs (24h Range):  Temp:  [98.3 °F (36.8 °C)-99.5 °F (37.5 °C)] 98.8 °F (37.1 °C)  Pulse:  [72-96] 96  Resp:  [18-20] 18  SpO2:  [90 %-97 %] 90 %  BP: (107-158)/(68-85) 141/85     Body mass index is 28.15 kg/m².     Physical Exam  Vitals and nursing note reviewed.               Diagnostic Results: Labs: Reviewed

## 2025-06-25 LAB
ABSOLUTE EOSINOPHIL (OHS): 0.15 K/UL
ABSOLUTE MONOCYTE (OHS): 0.93 K/UL (ref 0.3–1)
ABSOLUTE NEUTROPHIL COUNT (OHS): 7.91 K/UL (ref 1.8–7.7)
ALBUMIN SERPL BCP-MCNC: 3.1 G/DL (ref 3.5–5.2)
ALP SERPL-CCNC: 137 UNIT/L (ref 40–150)
ALT SERPL W/O P-5'-P-CCNC: 22 UNIT/L (ref 10–44)
ANION GAP (OHS): 14 MMOL/L (ref 8–16)
AST SERPL-CCNC: 21 UNIT/L (ref 11–45)
BASOPHILS # BLD AUTO: 0.03 K/UL
BASOPHILS NFR BLD AUTO: 0.3 %
BILIRUB SERPL-MCNC: 0.4 MG/DL (ref 0.1–1)
BUN SERPL-MCNC: 50 MG/DL (ref 8–23)
CALCIUM SERPL-MCNC: 8.8 MG/DL (ref 8.7–10.5)
CHLORIDE SERPL-SCNC: 110 MMOL/L (ref 95–110)
CO2 SERPL-SCNC: 23 MMOL/L (ref 23–29)
CREAT SERPL-MCNC: 2.3 MG/DL (ref 0.5–1.4)
ERYTHROCYTE [DISTWIDTH] IN BLOOD BY AUTOMATED COUNT: 13.7 % (ref 11.5–14.5)
GFR SERPLBLD CREATININE-BSD FMLA CKD-EPI: 29 ML/MIN/1.73/M2
GLUCOSE SERPL-MCNC: 145 MG/DL (ref 70–110)
HCT VFR BLD AUTO: 37.7 % (ref 40–54)
HGB BLD-MCNC: 11.8 GM/DL (ref 14–18)
IMM GRANULOCYTES # BLD AUTO: 0.04 K/UL (ref 0–0.04)
IMM GRANULOCYTES NFR BLD AUTO: 0.4 % (ref 0–0.5)
LYMPHOCYTES # BLD AUTO: 2.08 K/UL (ref 1–4.8)
MAGNESIUM SERPL-MCNC: 2.4 MG/DL (ref 1.6–2.6)
MCH RBC QN AUTO: 27.8 PG (ref 27–31)
MCHC RBC AUTO-ENTMCNC: 31.3 G/DL (ref 32–36)
MCV RBC AUTO: 89 FL (ref 82–98)
NUCLEATED RBC (/100WBC) (OHS): 0 /100 WBC
PHOSPHATE SERPL-MCNC: 3.9 MG/DL (ref 2.7–4.5)
PLATELET # BLD AUTO: 197 K/UL (ref 150–450)
PMV BLD AUTO: 13 FL (ref 9.2–12.9)
POCT GLUCOSE: 155 MG/DL (ref 70–110)
POCT GLUCOSE: 168 MG/DL (ref 70–110)
POCT GLUCOSE: 182 MG/DL (ref 70–110)
POCT GLUCOSE: 222 MG/DL (ref 70–110)
POTASSIUM SERPL-SCNC: 3.9 MMOL/L (ref 3.5–5.1)
PROT SERPL-MCNC: 6.9 GM/DL (ref 6–8.4)
RBC # BLD AUTO: 4.24 M/UL (ref 4.6–6.2)
RELATIVE EOSINOPHIL (OHS): 1.3 %
RELATIVE LYMPHOCYTE (OHS): 18.7 % (ref 18–48)
RELATIVE MONOCYTE (OHS): 8.3 % (ref 4–15)
RELATIVE NEUTROPHIL (OHS): 71 % (ref 38–73)
SODIUM SERPL-SCNC: 147 MMOL/L (ref 136–145)
WBC # BLD AUTO: 11.14 K/UL (ref 3.9–12.7)

## 2025-06-25 PROCEDURE — 63600175 PHARM REV CODE 636 W HCPCS: Mod: HCNC

## 2025-06-25 PROCEDURE — 25500020 PHARM REV CODE 255: Mod: HCNC | Performed by: PHYSICIAN ASSISTANT

## 2025-06-25 PROCEDURE — 25000003 PHARM REV CODE 250: Mod: HCNC

## 2025-06-25 PROCEDURE — 84100 ASSAY OF PHOSPHORUS: CPT | Mod: HCNC

## 2025-06-25 PROCEDURE — 36415 COLL VENOUS BLD VENIPUNCTURE: CPT | Mod: HCNC

## 2025-06-25 PROCEDURE — 85025 COMPLETE CBC W/AUTO DIFF WBC: CPT | Mod: HCNC

## 2025-06-25 PROCEDURE — 99233 SBSQ HOSP IP/OBS HIGH 50: CPT | Mod: HCNC,,, | Performed by: PSYCHIATRY & NEUROLOGY

## 2025-06-25 PROCEDURE — 0DH63UZ INSERTION OF FEEDING DEVICE INTO STOMACH, PERCUTANEOUS APPROACH: ICD-10-PCS | Performed by: STUDENT IN AN ORGANIZED HEALTH CARE EDUCATION/TRAINING PROGRAM

## 2025-06-25 PROCEDURE — A9698 NON-RAD CONTRAST MATERIALNOC: HCPCS | Mod: HCNC | Performed by: PHYSICIAN ASSISTANT

## 2025-06-25 PROCEDURE — 83735 ASSAY OF MAGNESIUM: CPT | Mod: HCNC

## 2025-06-25 PROCEDURE — 51798 US URINE CAPACITY MEASURE: CPT | Mod: HCNC

## 2025-06-25 PROCEDURE — 80053 COMPREHEN METABOLIC PANEL: CPT | Mod: HCNC

## 2025-06-25 PROCEDURE — 92507 TX SP LANG VOICE COMM INDIV: CPT | Mod: HCNC

## 2025-06-25 PROCEDURE — 63600175 PHARM REV CODE 636 W HCPCS: Mod: HCNC | Performed by: STUDENT IN AN ORGANIZED HEALTH CARE EDUCATION/TRAINING PROGRAM

## 2025-06-25 PROCEDURE — 11000001 HC ACUTE MED/SURG PRIVATE ROOM: Mod: HCNC

## 2025-06-25 PROCEDURE — S4991 NICOTINE PATCH NONLEGEND: HCPCS | Mod: HCNC

## 2025-06-25 RX ORDER — HEPARIN SODIUM 5000 [USP'U]/ML
5000 INJECTION, SOLUTION INTRAVENOUS; SUBCUTANEOUS EVERY 8 HOURS
Status: DISCONTINUED | OUTPATIENT
Start: 2025-06-25 | End: 2025-07-11 | Stop reason: HOSPADM

## 2025-06-25 RX ORDER — MIDAZOLAM HYDROCHLORIDE 1 MG/ML
INJECTION, SOLUTION INTRAMUSCULAR; INTRAVENOUS
Status: COMPLETED | OUTPATIENT
Start: 2025-06-25 | End: 2025-06-25

## 2025-06-25 RX ORDER — SODIUM CHLORIDE 9 MG/ML
INJECTION, SOLUTION INTRAVENOUS CONTINUOUS
Status: ACTIVE | OUTPATIENT
Start: 2025-06-25 | End: 2025-06-25

## 2025-06-25 RX ORDER — SODIUM CHLORIDE 9 MG/ML
INJECTION, SOLUTION INTRAVENOUS CONTINUOUS
Status: DISCONTINUED | OUTPATIENT
Start: 2025-06-25 | End: 2025-06-25

## 2025-06-25 RX ORDER — FENTANYL CITRATE 50 UG/ML
INJECTION, SOLUTION INTRAMUSCULAR; INTRAVENOUS
Status: COMPLETED | OUTPATIENT
Start: 2025-06-25 | End: 2025-06-25

## 2025-06-25 RX ORDER — LIDOCAINE HYDROCHLORIDE 10 MG/ML
INJECTION, SOLUTION INFILTRATION; PERINEURAL
Status: COMPLETED | OUTPATIENT
Start: 2025-06-25 | End: 2025-06-25

## 2025-06-25 RX ORDER — CEFAZOLIN SODIUM 1 G/3ML
INJECTION, POWDER, FOR SOLUTION INTRAMUSCULAR; INTRAVENOUS
Status: COMPLETED | OUTPATIENT
Start: 2025-06-25 | End: 2025-06-25

## 2025-06-25 RX ORDER — GLUCAGON 1 MG
KIT INJECTION
Status: COMPLETED | OUTPATIENT
Start: 2025-06-25 | End: 2025-06-25

## 2025-06-25 RX ADMIN — BARIUM SULFATE 450 ML: 20 SUSPENSION ORAL at 12:06

## 2025-06-25 RX ADMIN — FLUOXETINE HYDROCHLORIDE 20 MG: 20 CAPSULE ORAL at 08:06

## 2025-06-25 RX ADMIN — GLUCAGON 1 MG: 1 INJECTION, POWDER, LYOPHILIZED, FOR SOLUTION INTRAMUSCULAR; INTRAVENOUS at 03:06

## 2025-06-25 RX ADMIN — FENTANYL CITRATE 50 MCG: 50 INJECTION, SOLUTION INTRAMUSCULAR; INTRAVENOUS at 03:06

## 2025-06-25 RX ADMIN — METOPROLOL TARTRATE 50 MG: 50 TABLET, FILM COATED ORAL at 10:06

## 2025-06-25 RX ADMIN — HEPARIN SODIUM 5000 UNITS: 5000 INJECTION INTRAVENOUS; SUBCUTANEOUS at 10:06

## 2025-06-25 RX ADMIN — QUETIAPINE FUMARATE 25 MG: 25 TABLET ORAL at 10:06

## 2025-06-25 RX ADMIN — METOPROLOL TARTRATE 50 MG: 50 TABLET, FILM COATED ORAL at 08:06

## 2025-06-25 RX ADMIN — MIDAZOLAM HYDROCHLORIDE 1 MG: 2 INJECTION, SOLUTION INTRAMUSCULAR; INTRAVENOUS at 03:06

## 2025-06-25 RX ADMIN — LIDOCAINE HYDROCHLORIDE 5 ML: 10 INJECTION, SOLUTION INFILTRATION; PERINEURAL at 03:06

## 2025-06-25 RX ADMIN — ATORVASTATIN CALCIUM 40 MG: 40 TABLET, FILM COATED ORAL at 08:06

## 2025-06-25 RX ADMIN — CEFAZOLIN 1 G: 330 INJECTION, POWDER, FOR SOLUTION INTRAMUSCULAR; INTRAVENOUS at 03:06

## 2025-06-25 RX ADMIN — FENTANYL CITRATE 25 MCG: 50 INJECTION, SOLUTION INTRAMUSCULAR; INTRAVENOUS at 03:06

## 2025-06-25 RX ADMIN — ASPIRIN 81 MG CHEWABLE TABLET 81 MG: 81 TABLET CHEWABLE at 08:06

## 2025-06-25 RX ADMIN — LISINOPRIL 40 MG: 20 TABLET ORAL at 08:06

## 2025-06-25 RX ADMIN — Medication 1 PATCH: at 09:06

## 2025-06-25 RX ADMIN — AMLODIPINE BESYLATE 10 MG: 10 TABLET ORAL at 10:06

## 2025-06-25 NOTE — PROGRESS NOTES
Zohaib Garrett - Neurosurgery ([facility])  Vascular Neurology  Comprehensive Stroke Center  Progress Note    Assessment/Plan:     * Embolic stroke involving left middle cerebral artery  Cristi Pulido is a 70 y.o. male w/ PMH of HTN, DM, prior stroke w/ residual RSW who presents as a transfer from OSH after presenting with acute onset of dysarthria and RSW. Telestroke was completed and his NIHSS was 19. CT showed probable early ischemic changes in the L insular ribbon and L frontal lobe. CTA demonstrated occlusion of the distal L MCA M1 segment. He was given TNK at 0548. Patient was transferred to [facility] for further management. On arrival, repeat NIHSS of 24. Patient taken for repeat CTH which showed small left temporal hemorrhage along with continued early ischemic changes. Patient taken to IR for thrombectomy and to be admitted to NCC post-procedure.  S/P thrombectomy. TICI 3. MRI revealing for blood products in stroke bed, likely reperfusion injury. Etiology ESUS.     06/25/2025 NAEON. Plans for PEG placement today. BUN and Cr uptrending. Fluids started. Lisinopril held.     Antithrombotics for secondary stroke prevention: Antiplatelets: Aspirin: 81 mg daily    Statins for secondary stroke prevention and hyperlipidemia, if present:   Statins: Atorvastatin- 40 mg daily    Aggressive risk factor modification: HTN, Smoking, DM, HLD     Rehab efforts: The patient has been evaluated by a stroke team provider and the therapy needs have been fully considered based off the presenting complaints and exam findings. The following therapy evaluations are needed: PT evaluate and treat, OT evaluate and treat, SLP evaluate and treat, PM&R evaluate for appropriate placement, current recommendation HIT    Diagnostics ordered/pending: None     VTE prophylaxis: Enoxaparin 40 mg SQ every 24 hours  Mechanical prophylaxis: Place SCDs    BP parameters: Infarct: Post sucessful thrombectomy, SBP <140        Restlessness  -6/24: QTc 441   -Seroquel 25  mg QHS     Hyperlipemia  -Stroke risk factor  -LDL 75, goal <70  -Atorvastatin 40 mg daily      Right sided weakness  -Secondary to stroke.   -Aggressive therapy     Dysarthria and anarthria  -Therapy eval and treat    Aphasia  -Secondary to stroke  -Aggressive therapy     Type 2 diabetes mellitus without complication, without long-term current use of insulin  Lab Results   Component Value Date    LABA1C 7.2 (H) 01/29/2018    HGBA1C 7.0 (H) 06/19/2025     Follow up repeat A1c. Hold home antihyperglycemics. SSI for goal -180 while in hospital    Hypertension associated with diabetes  -Stroke risk factor  -SBP goal <140, maintain MAP >65  -BP range in the last 24 hrs: BP  Min: 116/73  Max: 168/87  -Current antihypertensive regimen:     -Amlodipine 10mg     -Lisinopril Held today    -Metoprolol 50mg BID   --PRN labetalol/hydralazine     Tobacco dependence  Stroke risk factor  - on cessation  -nicotine patch PRN         06/20/2025 NAEON. S/P thrombectomy. TICI 3. TNK monitoring ended at 0548. Aphasic, follows no commands. RSW remains. MRI revealing for blood products in stroke bed, likely reperfusion injury. OK to start monotherapy with either plavix or ASA 6/21/25 for secondary stroke prevention. ECHO unremarkable. Family member at bedside agreeable to NGT placement. Etiology ESUS.   6/21/2025 NAEON. Neuro exam stable. Recommend holding AP therapy for now.   06/22/2025 NAEON. Neuro exam stable. Start AP therapy with ASA.   06/23/2025 NAEON. SLP recs NPO. PEG discussed with family and they are agreeable. Pt S/D to NPU.   06/24/2025 NGT pulled overnight and replaced. Placement confirmed via X ray. IR consulted for PEG placement. JEANNE, Creatinine and BUN uptrending. FWF flushes ordered. Restless during night. EKG repeated. QTc 441. Seroquel 25 mg QHS ordered. Family requesting [facility] IPR when medically ready.   06/25/2025 NAEON. Plans for PEG placement today. BUN and Cr uptrending. Fluids started. Lisinopril  held.     STROKE DOCUMENTATION   Acute Stroke Times   Last Known Normal Date: 06/19/25  Last Known Normal Time: 0400  Symptom Onset Date: 06/19/25  Symptom Onset Time: 0400  Stroke Team Called Date: 06/19/25  Stroke Team Called Time: 0813  Stroke Team Arrival Date: 06/19/25  Stroke Team Arrival Time: 0813  CT Interpretation Time: 0827  Thrombolytic Therapy Recommended:  (already given prior to transfer)  CTA Interpretation Time:  (already completed prior to transfer)  Thrombectomy Recommended: Yes  Decision to Treat Time for IR: 0832    NIH Scale:  1a. Level of Consciousness: 1-->Not alert, but arousable by minor stimulation to obey, answer, or respond  1b. LOC Questions: 2-->Answers neither question correctly  1c. LOC Commands: 2-->Performs neither task correctly  2. Best Gaze: 0-->Normal  3. Visual: 0-->No visual loss  4. Facial Palsy: 2-->Partial paralysis (total or near-total paralysis of lower face)  5a. Motor Arm, Left: 0-->No drift, limb holds 90 (or 45) degrees for full 10 secs  5b. Motor Arm, Right: 4-->No movement  6a. Motor Leg, Left: 0-->No drift, leg holds 30 degree position for full 5 secs  6b. Motor Leg, Right: 3-->No effort against gravity, leg falls to bed immediately  7. Limb Ataxia: 0-->Absent  8. Sensory: 0-->Normal, no sensory loss  9. Best Language: 2-->Severe aphasia, all communication is through fragmentary expression, great need for inference, questioning, and guessing by the listener. Range of information that can be exchanged is limited, listener carries burden of. . . (see row details)  10. Dysarthria: 2-->Severe dysarthria, patients speech is so slurred as to be unintelligible in the absence of or out of proportion to any dysphasia, or is mute/anarthric  11. Extinction and Inattention (formerly Neglect): 0-->No abnormality  Total (NIH Stroke Scale): 18       Modified Randolph Score: 2  Stephanie Coma Scale:    ABCD2 Score:    [id number] Score:   HAS -BLED Score:   ICH Score:   Hunt & Kimball  Classification:      Hemorrhagic change of an Ischemic Stroke: Does this patient have an ischemic stroke with hemorrhagic changes? Yes, Grading Scale: PH Type 1 (PH-1) = hematoma in < 30% of the infarcted area with some slight space-occupying effect. Is this a symptomatic change?  No - Hemorrhage is not clinically significant     Neurologic Chief Complaint: L MCA stroke    Subjective:     Interval History: Patient is seen for follow-up neurological assessment and treatment recommendations: See hospital course.    HPI, Past Medical, Family, and Social History remains the same as documented in the initial encounter.     Review of Systems   Reason unable to perform ROS: Severe aphasia.     Scheduled Meds:   amLODIPine  10 mg Per NG tube QHS    aspirin  81 mg Per NG tube Daily    atorvastatin  40 mg Per NG tube Daily    FLUoxetine  20 mg Per NG tube Daily    heparin (porcine)  5,000 Units Subcutaneous Q8H    insulin aspart U-100  6 Units Subcutaneous Q4H    metoprolol tartrate  50 mg Per NG tube BID    nicotine  1 patch Transdermal Daily    polyethylene glycol  17 g Per NG tube BID    QUEtiapine  25 mg Per NG tube QHS    senna-docusate  1 tablet Per NG tube BID     Continuous Infusions:   0.9% NaCl   Intravenous Continuous   Held at 06/25/25 1400     PRN Meds:  Current Facility-Administered Medications:     bisacodyL, 10 mg, Rectal, Daily PRN    ceFAZolin (Ancef) IV (PEDS and ADULTS), , Intravenous, PRN    dextrose 50%, 12.5 g, Intravenous, PRN    dextrose 50%, 25 g, Intravenous, PRN    fentaNYL, , Intravenous, PRN    glucagon (human recombinant), 1 mg, Intramuscular, PRN    glucagon (human recombinant), , , PRN    hydrALAZINE, 10 mg, Intravenous, Q4H PRN    insulin aspart U-100, 0-10 Units, Subcutaneous, Q4H PRN    labetalol, 10 mg, Intravenous, Q4H PRN    LIDOcaine HCL 10 mg/ml (1%), , Other, PRN    melatonin, 6 mg, Per NG tube, Nightly PRN    midazolam, , , PRN    simethicone, 1 tablet, Per NG tube, TID PRN    sodium  "chloride 0.9%, 10 mL, Intravenous, PRN    Objective:     Vital Signs (Most Recent):  Temp: 98.3 °F (36.8 °C) (06/25/25 1244)  Pulse: 82 (06/25/25 1520)  Resp: 14 (06/25/25 1520)  BP: (!) 157/78 (06/25/25 1520)  SpO2: 98 % (06/25/25 1520)  BP Location: Left arm    Vital Signs Range (Last 24H):  Temp:  [97.3 °F (36.3 °C)-99.4 °F (37.4 °C)]   Pulse:  []   Resp:  [14-18]   BP: (116-159)/(69-87)   SpO2:  [93 %-100 %]   BP Location: Left arm       Physical Exam  Constitutional:       General: He is sleeping.   HENT:      Head: Normocephalic and atraumatic.      Mouth/Throat:      Mouth: Mucous membranes are moist.   Cardiovascular:      Rate and Rhythm: Normal rate.   Pulmonary:      Effort: Pulmonary effort is normal.   Skin:     General: Skin is warm and dry.   Neurological:      Motor: Weakness present.              Neurological Exam:   LOC: drowsy  Attention Span: poor  Language: Expressive aphasia, Receptive aphasia  Articulation: Dysarthria  Orientation: Untestable due to severe aphasia   Facial Movement (CN VII): Lower facial weakness on the Right  Motor: Arm left  Normal 5/5  Leg left  Normal 5/5  Arm right  Paresis: 1/5  Leg right Paresis: 1/5  Sensation: Cy-hypoesthesia right    Laboratory:  CMP:   Recent Labs   Lab 06/25/25  0412   CALCIUM 8.8   ALBUMIN 3.1*   PROT 6.9   *   K 3.9   CO2 23      BUN 50*   CREATININE 2.3*   ALKPHOS 137   ALT 22   AST 21   BILITOT 0.4     BMP:   Recent Labs   Lab 06/25/25  0412   *   K 3.9      CO2 23   BUN 50*   CREATININE 2.3*   CALCIUM 8.8     CBC:   Recent Labs   Lab 06/25/25 0412   WBC 11.14   RBC 4.24*   HGB 11.8*   HCT 37.7*      MCV 89   MCH 27.8   MCHC 31.3*     Lipid Panel:   Recent Labs   Lab 06/19/25  0514   CHOL 123   LDLCALC 75.2   HDL 38*   TRIG 49     Coagulation:   Recent Labs   Lab 06/19/25  0514   INR 1.1     Platelet Aggregation Study: No results for input(s): "PLTAGG", "PLTAGINTERP", "PLTAGREGLACO", "ADPPLTAGGREG" in the " last 168 hours.  Hgb A1C:   Recent Labs   Lab 06/19/25  1113   HGBA1C 7.0*     TSH:   Recent Labs   Lab 06/19/25  0514   TSH 1.310       Diagnostic Results   Brain Imaging   MRI Brain 6/20/25  Impression:     Acute left MCA distribution infarct.  No new large parenchymal hemorrhage.  Susceptibility artifact throughout the infarcted tissue may reflect contrast staining and/or blood products conversion.  Overall mass effect is increased compared to prior with sulcal effacement throughout the left cerebral hemisphere and approximately 4 mm of rightward midline shift.     Stable size of the parenchymal hemorrhage centered in the inferior left temporal lobe.     CT 6/20/25: 1242  Impression:     Moderate-sized recent left MCA distribution infarct and small intraparenchymal hemorrhage appear grossly unchanged from prior study performed earlier on the same date.     Chronic ischemic change elsewhere with underlying cerebral and cerebellar volume loss, as before.     No new hemorrhage or major vascular distribution infarct elsewhere.  [facility] 6/20/25: 0834  Impression:     Early ischemic changes in the left MCA distribution, new from recent CT.     New left inferior temporal intraparenchymal hematoma.     Chronic ischemic change with cerebral and cerebellar volume loss, as above.     [facility] 6/19/25  Impression:     Question some early left MCA ischemia corresponding to the patient's known left MCA territory occlusion.  No hemorrhage.     Senescent changes as above.        All CT scans at this facility are performed  using dose modulation techniques as appropriate to performed exam including the following:  automated exposure control; adjustment of mA and/or kV according to the patients size (this includes techniques or standardized protocols for targeted exams where dose is matched to indication/reason for exam: i.e. extremities or head);  iterative reconstruction technique.     Vessel Imaging   CTA head and neck  6/19/25  Impression:     Distal left M1 MCA occlusion with relatively prompt collateralization of the more distal MCA vessels.     Severe stenosis left mid vertebral artery and left PCA.     Cardiac Imaging   TTE 6/19/25    Left Ventricle: The left ventricle is normal in size. Normal wall thickness. There is normal systolic function with a visually estimated ejection fraction of 60 - 65%.    Right Ventricle: The right ventricle is normal in size Wall thickness is normal. Systolic function is normal.    The pulmonary artery pressure could not be estimated.    IVC/SVC: Normal venous pressure at 3 mmHg.       Franci Zambrano PA-C  [facility]  Department of Vascular Neurology   [facility] Neurosurgery [facility])

## 2025-06-25 NOTE — PLAN OF CARE
Pt arrived to IR for g-tube placement. Pt oriented to unit and staff. Plan of care reviewed with patient, patient verbalizes understanding. Comfort measures utilized. Pt safely transferred from stretcher to procedural table. Fall risk reviewed with patient, fall risk interventions maintained. Safety strap applied, positioner pillows utilized to minimize pressure points. Blankets applied. Pt prepped and draped utilizing standard sterile technique. Patient placed on continuous monitoring, as required by sedation policy. Timeouts completed utilizing standard universal time-out, per department and facility policy. RN to remain at bedside, continuous monitoring maintained. Pt resting comfortably. Denies pain/discomfort. Will continue to monitor. See flow sheets for monitoring, medication administration, and updates.

## 2025-06-25 NOTE — PLAN OF CARE
Problem: Adult Inpatient Plan of Care  Goal: Patient-Specific Goal (Individualized)  Description: Pt will maintain sbp below 160  Outcome: Progressing  Flowsheets (Taken 6/25/2025 0439)  Individualized Care Needs: care clustered  Anxieties, Fears or Concerns: kiersten     Problem: Stroke, Ischemic (Includes Transient Ischemic Attack)  Goal: Optimal Coping  Outcome: Progressing  Intervention: Support Psychosocial Response to Stroke  Flowsheets (Taken 6/25/2025 0439)  Supportive Measures:   active listening utilized   self-care encouraged     VSS. Bed in lowest position, side rails x 3, and call light in use.

## 2025-06-25 NOTE — ASSESSMENT & PLAN NOTE
-Stroke risk factor  -SBP goal <140, maintain MAP >65  -BP range in the last 24 hrs: BP  Min: 116/73  Max: 168/87  -Current antihypertensive regimen:     -Amlodipine 10mg     -Lisinopril still on hold for now    -Metoprolol 50mg BID   --PRN labetalol/hydralazine

## 2025-06-25 NOTE — PLAN OF CARE
Problem: Adult Inpatient Plan of Care  Goal: Patient-Specific Goal (Individualized)  Description: Pt will maintain sbp below 160  Outcome: Progressing  Flowsheets (Taken 6/25/2025 0439)  Individualized Care Needs: care clustered  Anxieties, Fears or Concerns: kiersten     Problem: Stroke, Ischemic (Includes Transient Ischemic Attack)  Goal: Optimal Coping  Outcome: Progressing  Intervention: Support Psychosocial Response to Stroke  Flowsheets (Taken 6/25/2025 0439)  Supportive Measures:   active listening utilized   self-care encouraged     Problem: Restraint, Nonviolent  Goal: Absence of Harm or Injury  Outcome: Progressing  Intervention: Implement Least Restrictive Safety Strategies  Flowsheets (Taken 6/25/2025 0541)  Less Restrictive Alternative: 1:1 observation maintained  Intervention: Protect Dignity, Rights and Personal Wellbeing  Flowsheets (Taken 6/25/2025 0541)  Trust Relationship/Rapport:   care explained   choices provided  Intervention: Protect Skin and Joint Integrity  Flowsheets (Taken 6/25/2025 0541)  Body Position: turned  Range of Motion: ROM (range of motion) performed  Intervention: Education  Flowsheets (Taken 6/25/2025 0541)  Discontinuation Criteria: Absence of behavior that required restraint  Criteria Explained: Yes  Patient's Response: NL  Patient / Family Notification: Patient  Patient / Family Teaching: Need for restraint  Intervention: Restraint Monitoring Q2 hours w/Assessment for Injury  Flowsheets (Taken 6/25/2025 0541)  Observed Emotional State: accepting  Visual Check: SD  Circulation: NS  Range of Motion: P  Fluids: N  Food/Meal: N  Elimination: UC  Pain Rating (0-10): Rest: 0  Comfort Measures: Repositioned  Assessed readiness for DC: Yes  Privacy Maintained: Yes  Vital Signs per MD order: Yes  Intervention: Restraint Injuries Monitor Q2 hours  Flowsheets (Taken 6/25/2025 0541)  Injuries Noted: None

## 2025-06-25 NOTE — PLAN OF CARE
Procedure completed. Patient tolerated well; VSS. Site CDI. Gtube secured and open to gravity drainage. Patient to be transported to MPU for 1 hour recovery; report to be given at the bedside.   Report to be called to inpatient RN.

## 2025-06-25 NOTE — PROCEDURES
Interventional Radiology      Pre Op Diagnosis: Dysphagia    Post Op Diagnosis: Same    Procedure: Gastrostomy Tube Placement    Procedure performed by:  Noemi Delgado MD    Written Informed Consent Obtained: Yes  Specimen Removed: NO  Estimated Blood Loss: Minimal    Findings:     Moderate Sedation      Successful placement of 18 Fr Gastrostomy tube    Patient tolerated procedure well.     Recommendations    Connect tube to gravity drainage bag     Keep NPO with G-tube to gravity drainage for 12 hours. Start with clear liquids, then advance diet as tolerated.    Cut the T-fasteners 10-15 days after placement if they haven't already spontaneously fallen off.     Tube Care instructions:  Flush tube with 20 mL warm water before & after each feed or medication administrations   Avoid administering whole pills through tube   Clean skin & tube daily with mild soap and water. Avoid topical antibacterials as they promote fungus growth    For tube clogging, try the following solutions before reaching out to IR:  Flushing w 50 cc warm water (opens tube 40% of the time)  Instill pancreatic enzymes  Use biopsy brush to clean the tube (can be obtained from endoscopy department)     For minor leaks, slightly tighten the bolster to the skin    For skin irritation and excoriation, consider using saline soaks or zinc oxide and keep skin open to air      Noemi Delgado MD  Interventional Radiology        Propranolol Counseling:  I discussed with the patient the risks of propranolol including but not limited to low heart rate, low blood pressure, low blood sugar, restlessness and increased cold sensitivity. They should call the office if they experience any of these side effects.

## 2025-06-25 NOTE — PLAN OF CARE
Problem: Adult Inpatient Plan of Care  Goal: Plan of Care Review  Outcome: Progressing  Goal: Patient-Specific Goal (Individualized)  Description: Pt will maintain sbp below 160  Outcome: Progressing  Goal: Absence of Hospital-Acquired Illness or Injury  Outcome: Progressing  Goal: Optimal Comfort and Wellbeing  Outcome: Progressing  Goal: Readiness for Transition of Care  Outcome: Progressing     Problem: Diabetes Comorbidity  Goal: Blood Glucose Level Within Targeted Range  Outcome: Progressing     Problem: Wound  Goal: Optimal Coping  Outcome: Progressing  Goal: Optimal Functional Ability  Outcome: Progressing  Goal: Absence of Infection Signs and Symptoms  Outcome: Progressing  Goal: Improved Oral Intake  Outcome: Progressing  Goal: Optimal Pain Control and Function  Outcome: Progressing  Goal: Skin Health and Integrity  Outcome: Progressing  Goal: Optimal Wound Healing  Outcome: Progressing     Problem: Stroke, Ischemic (Includes Transient Ischemic Attack)  Goal: Optimal Coping  Outcome: Progressing  Goal: Effective Bowel Elimination  Outcome: Progressing  Goal: Optimal Cerebral Tissue Perfusion  Outcome: Progressing  Goal: Optimal Cognitive Function  Outcome: Progressing  Goal: Improved Communication Skills  Outcome: Progressing  Goal: Optimal Functional Ability  Outcome: Progressing  Goal: Optimal Nutrition Intake  Outcome: Progressing  Goal: Effective Oxygenation and Ventilation  Outcome: Progressing

## 2025-06-25 NOTE — PT/OT/SLP PROGRESS
"Speech Language Pathology Treatment    Patient Name:  Cristi Pulido   MRN:  [id number]  Admitting Diagnosis: Embolic stroke involving left middle cerebral artery    Recommendations:                 General Recommendations:  Dysphagia therapy and Speech/language therapy  Diet recommendations:  NPO, Liquid Diet Level: NPO   Aspiration Precautions: Continue alternate means of nutrition, Frequent oral care, and Strict aspiration precautions   General Precautions: Standard, fall, aspiration, NPO  Communication strategies:  yes/no questions only, provide increased time to answer, and go to room if call light pushed  Discharge recommendations:  High Intensity Therapy     Assessment:     Cristi Pulido is a 70 y.o. male with an SLP diagnosis of Aphasia and Dysphagia.  Pt NPO this date for PEG placement.     Subjective     Spoke with nursing prior to session. Pt awake in bed with NGT in place.   Patient goals: uto     Pain/Comfort:  Pain Rating 1:  (aphasic, kiersten, no s/sx)    Respiratory Status: Room air    Objective:     Has the patient been evaluated by SLP for swallowing?   Yes  Keep patient NPO? Yes     Pt seen bedside for speech therapy only this date 2/2 NPO for PEG placement this afternoon. Pt awake/alert with NGT and wrist restraint in place. Pt severely aphasic, mumbling "om nom nom mom om" across session in response to everything with varying inflection/intonation with occasional head nodding and shaking. He followed simple 1 step commands with 10% acc this date following clinician model and max verbal & tactile prompting (open mouth). Noted unreliable Y/N answered across session. He answered simple Y/N question with 0% acc this date and was unable to ID objects in a field of 2. Unable to elicit automatic speech tasks despite modeling, prompting and visual aids. Continue to recommend ongoing SLP follow up to address swallowing and speech and language deficits.     Goals:   Multidisciplinary Problems       SLP Goals          " Problem: SLP    Goal Priority Disciplines Outcome   SLP Goal     SLP Progressing   Description: Speech Language Pathology Goals  Goals expected to be met by 7/3    1. Pt will participate in ongoing swallow assessment to determine least restrictive PO diet.    2. Pt will participate in ongoing cognitive-linguistic assessment to determine additional therapeutic needs.   3. Pt will follow 1 step commands with 80% acc following model/prompt.   4. Pt will answer simple Y/N questions with 80% acc.                                Plan:     Patient to be seen:  4 x/week   Plan of Care expires:  07/19/25  Plan of Care reviewed with:  patient   SLP Follow-Up:  Yes       Time Tracking:     SLP Treatment Date:   06/25/25  Speech Start Time:  1031  Speech Stop Time:  1041     Speech Total Time (min):  10 min    Billable Minutes: Speech Therapy Individual 10    06/25/2025

## 2025-06-25 NOTE — PROGRESS NOTES
Post-procedure recovery completed at 1640. Patient AAOx1, no acute distress noted, respirations even and unlabored, VSS. LUQ site clean, dry, and intact; no bleeding or hematoma noted. G-tube remains connected to gravity drainage bag. Mitt remains to left hand, no injury noted. Patient to be transferred to room 955 via patient transporter. Patient stable for transport. Updated report called to JOSIE Moore. All questions and concerns addressed.

## 2025-06-25 NOTE — ASSESSMENT & PLAN NOTE
Cristi Pulido is a 70 y.o. male w/ PMH of HTN, DM, prior stroke w/ residual RSW who presents as a transfer from OSH after presenting with acute onset of dysarthria and RSW. Telestroke was completed and his NIHSS was 19. CT showed probable early ischemic changes in the L insular ribbon and L frontal lobe. CTA demonstrated occlusion of the distal L MCA M1 segment. He was given TNK at 0548. Patient was transferred to C for further management. On arrival, repeat NIHSS of 24. Patient taken for repeat CTH which showed small left temporal hemorrhage along with continued early ischemic changes. Patient taken to IR for thrombectomy and to be admitted to [facility] post-procedure.  S/P thrombectomy. TICI 3. MRI revealing for blood products in stroke bed, likely reperfusion injury. Etiology ESUS.     06/25/2025 NAEON. Plans for PEG placement today. BUN and Cr uptrending. Fluids started. Lisinopril held.     Antithrombotics for secondary stroke prevention: Antiplatelets: Aspirin: 81 mg daily    Statins for secondary stroke prevention and hyperlipidemia, if present:   Statins: Atorvastatin- 40 mg daily    Aggressive risk factor modification: HTN, Smoking, DM, HLD     Rehab efforts: The patient has been evaluated by a stroke team provider and the therapy needs have been fully considered based off the presenting complaints and exam findings. The following therapy evaluations are needed: PT evaluate and treat, OT evaluate and treat, SLP evaluate and treat, PM&R evaluate for appropriate placement, current recommendation HIT    Diagnostics ordered/pending: None     VTE prophylaxis: Enoxaparin 40 mg SQ every 24 hours  Mechanical prophylaxis: Place SCDs    BP parameters: Infarct: Post sucessful thrombectomy, SBP <140

## 2025-06-25 NOTE — PROGRESS NOTES
Patient arrived to [facility] at 1542. Connected to bedside monitor - cardiac monitoring and continuous pulse oximetry applied. Call bell within reach, side rails raised x 2, bed locked and in lowest position. VSS. Patient in no acute distress. Pt initial response was to sternal stimulation, thereafter responded with sounds after name was called. LUE has mitt present, however no response to pain, drift noted upon assessment. BLE and RUE withdraws to pain. No pain noted. Procedure site clean, dry, and intact; no bleeding or hematoma noted. Monitoring per post procedure guidelines in progress.

## 2025-06-25 NOTE — SUBJECTIVE & OBJECTIVE
Neurologic Chief Complaint: L MCA stroke    Subjective:     Interval History: Patient is seen for follow-up neurological assessment and treatment recommendations: See hospital course.    HPI, Past Medical, Family, and Social History remains the same as documented in the initial encounter.     Review of Systems   Reason unable to perform ROS: Severe aphasia.     Scheduled Meds:   amLODIPine  10 mg Per NG tube QHS    aspirin  81 mg Per NG tube Daily    atorvastatin  40 mg Per NG tube Daily    FLUoxetine  20 mg Per NG tube Daily    heparin (porcine)  5,000 Units Subcutaneous Q8H    insulin aspart U-100  6 Units Subcutaneous Q4H    metoprolol tartrate  50 mg Per NG tube BID    nicotine  1 patch Transdermal Daily    polyethylene glycol  17 g Per NG tube BID    QUEtiapine  25 mg Per NG tube QHS    senna-docusate  1 tablet Per NG tube BID     Continuous Infusions:   0.9% NaCl   Intravenous Continuous   Held at 06/25/25 1400     PRN Meds:  Current Facility-Administered Medications:     bisacodyL, 10 mg, Rectal, Daily PRN    ceFAZolin (Ancef) IV (PEDS and ADULTS), , Intravenous, PRN    dextrose 50%, 12.5 g, Intravenous, PRN    dextrose 50%, 25 g, Intravenous, PRN    fentaNYL, , Intravenous, PRN    glucagon (human recombinant), 1 mg, Intramuscular, PRN    glucagon (human recombinant), , , PRN    hydrALAZINE, 10 mg, Intravenous, Q4H PRN    insulin aspart U-100, 0-10 Units, Subcutaneous, Q4H PRN    labetalol, 10 mg, Intravenous, Q4H PRN    LIDOcaine HCL 10 mg/ml (1%), , Other, PRN    melatonin, 6 mg, Per NG tube, Nightly PRN    midazolam, , , PRN    simethicone, 1 tablet, Per NG tube, TID PRN    sodium chloride 0.9%, 10 mL, Intravenous, PRN    Objective:     Vital Signs (Most Recent):  Temp: 98.3 °F (36.8 °C) (06/25/25 1244)  Pulse: 82 (06/25/25 1520)  Resp: 14 (06/25/25 1520)  BP: (!) 157/78 (06/25/25 1520)  SpO2: 98 % (06/25/25 1520)  BP Location: Left arm    Vital Signs Range (Last 24H):  Temp:  [97.3 °F (36.3 °C)-99.4 °F  "(37.4 °C)]   Pulse:  []   Resp:  [14-18]   BP: (116-159)/(69-87)   SpO2:  [93 %-100 %]   BP Location: Left arm       Physical Exam  Constitutional:       General: He is sleeping.   HENT:      Head: Normocephalic and atraumatic.      Mouth/Throat:      Mouth: Mucous membranes are moist.   Cardiovascular:      Rate and Rhythm: Normal rate.   Pulmonary:      Effort: Pulmonary effort is normal.   Skin:     General: Skin is warm and dry.   Neurological:      Motor: Weakness present.              Neurological Exam:   LOC: drowsy  Attention Span: poor  Language: Expressive aphasia, Receptive aphasia  Articulation: Dysarthria  Orientation: Untestable due to severe aphasia   Facial Movement (CN VII): Lower facial weakness on the Right  Motor: Arm left  Normal 5/5  Leg left  Normal 5/5  Arm right  Paresis: 1/5  Leg right Paresis: 1/5  Sensation: Cy-hypoesthesia right    Laboratory:  CMP:   Recent Labs   Lab 06/25/25  0412   CALCIUM 8.8   ALBUMIN 3.1*   PROT 6.9   *   K 3.9   CO2 23      BUN 50*   CREATININE 2.3*   ALKPHOS 137   ALT 22   AST 21   BILITOT 0.4     BMP:   Recent Labs   Lab 06/25/25  0412   *   K 3.9      CO2 23   BUN 50*   CREATININE 2.3*   CALCIUM 8.8     CBC:   Recent Labs   Lab 06/25/25  0412   WBC 11.14   RBC 4.24*   HGB 11.8*   HCT 37.7*      MCV 89   MCH 27.8   MCHC 31.3*     Lipid Panel:   Recent Labs   Lab 06/19/25  0514   CHOL 123   LDLCALC 75.2   HDL 38*   TRIG 49     Coagulation:   Recent Labs   Lab 06/19/25  0514   INR 1.1     Platelet Aggregation Study: No results for input(s): "PLTAGG", "PLTAGINTERP", "PLTAGREGLACO", "ADPPLTAGGREG" in the last 168 hours.  Hgb A1C:   Recent Labs   Lab 06/19/25  1113   HGBA1C 7.0*     TSH:   Recent Labs   Lab 06/19/25  0514   TSH 1.310       Diagnostic Results   Brain Imaging   MRI Brain 6/20/25  Impression:     Acute left MCA distribution infarct.  No new large parenchymal hemorrhage.  Susceptibility artifact throughout the " infarcted tissue may reflect contrast staining and/or blood products conversion.  Overall mass effect is increased compared to prior with sulcal effacement throughout the left cerebral hemisphere and approximately 4 mm of rightward midline shift.     Stable size of the parenchymal hemorrhage centered in the inferior left temporal lobe.     [facility] 6/20/25: 1242  Impression:     Moderate-sized recent left MCA distribution infarct and small intraparenchymal hemorrhage appear grossly unchanged from prior study performed earlier on the same date.     Chronic ischemic change elsewhere with underlying cerebral and cerebellar volume loss, as before.     No new hemorrhage or major vascular distribution infarct elsewhere.  CT 6/20/25: 0834  Impression:     Early ischemic changes in the left MCA distribution, new from recent CT.     New left inferior temporal intraparenchymal hematoma.     Chronic ischemic change with cerebral and cerebellar volume loss, as above.     [facility] 6/19/25  Impression:     Question some early left MCA ischemia corresponding to the patient's known left MCA territory occlusion.  No hemorrhage.     Senescent changes as above.        All CT scans at this facility are performed  using dose modulation techniques as appropriate to performed exam including the following:  automated exposure control; adjustment of mA and/or kV according to the patients size (this includes techniques or standardized protocols for targeted exams where dose is matched to indication/reason for exam: i.e. extremities or head);  iterative reconstruction technique.     Vessel Imaging   CTA head and neck 6/19/25  Impression:     Distal left M1 MCA occlusion with relatively prompt collateralization of the more distal MCA vessels.     Severe stenosis left mid vertebral artery and left PCA.     Cardiac Imaging   TTE 6/19/25    Left Ventricle: The left ventricle is normal in size. Normal wall thickness. There is normal systolic function with  a visually estimated ejection fraction of 60 - 65%.    Right Ventricle: The right ventricle is normal in size Wall thickness is normal. Systolic function is normal.    The pulmonary artery pressure could not be estimated.    IVC/SVC: Normal venous pressure at 3 mmHg.

## 2025-06-26 LAB
ABSOLUTE EOSINOPHIL (OHS): 0.3 K/UL
ABSOLUTE MONOCYTE (OHS): 0.76 K/UL (ref 0.3–1)
ABSOLUTE NEUTROPHIL COUNT (OHS): 6.99 K/UL (ref 1.8–7.7)
ALBUMIN SERPL BCP-MCNC: 2.9 G/DL (ref 3.5–5.2)
ALP SERPL-CCNC: 133 UNIT/L (ref 40–150)
ALT SERPL W/O P-5'-P-CCNC: 16 UNIT/L (ref 10–44)
ANION GAP (OHS): 12 MMOL/L (ref 8–16)
AST SERPL-CCNC: 17 UNIT/L (ref 11–45)
BASOPHILS # BLD AUTO: 0.04 K/UL
BASOPHILS NFR BLD AUTO: 0.4 %
BILIRUB SERPL-MCNC: 0.4 MG/DL (ref 0.1–1)
BUN SERPL-MCNC: 43 MG/DL (ref 8–23)
CALCIUM SERPL-MCNC: 8.6 MG/DL (ref 8.7–10.5)
CHLORIDE SERPL-SCNC: 113 MMOL/L (ref 95–110)
CO2 SERPL-SCNC: 22 MMOL/L (ref 23–29)
CREAT SERPL-MCNC: 1.6 MG/DL (ref 0.5–1.4)
ERYTHROCYTE [DISTWIDTH] IN BLOOD BY AUTOMATED COUNT: 13.8 % (ref 11.5–14.5)
GFR SERPLBLD CREATININE-BSD FMLA CKD-EPI: 45 ML/MIN/1.73/M2
GLUCOSE SERPL-MCNC: 225 MG/DL (ref 70–110)
HCT VFR BLD AUTO: 38.5 % (ref 40–54)
HGB BLD-MCNC: 11.9 GM/DL (ref 14–18)
IMM GRANULOCYTES # BLD AUTO: 0.08 K/UL (ref 0–0.04)
IMM GRANULOCYTES NFR BLD AUTO: 0.8 % (ref 0–0.5)
LYMPHOCYTES # BLD AUTO: 1.45 K/UL (ref 1–4.8)
MAGNESIUM SERPL-MCNC: 2.5 MG/DL (ref 1.6–2.6)
MCH RBC QN AUTO: 28 PG (ref 27–31)
MCHC RBC AUTO-ENTMCNC: 30.9 G/DL (ref 32–36)
MCV RBC AUTO: 91 FL (ref 82–98)
NUCLEATED RBC (/100WBC) (OHS): 0 /100 WBC
PHOSPHATE SERPL-MCNC: 4.4 MG/DL (ref 2.7–4.5)
PLATELET # BLD AUTO: 207 K/UL (ref 150–450)
PMV BLD AUTO: 12.9 FL (ref 9.2–12.9)
POCT GLUCOSE: 189 MG/DL (ref 70–110)
POCT GLUCOSE: 200 MG/DL (ref 70–110)
POCT GLUCOSE: 203 MG/DL (ref 70–110)
POCT GLUCOSE: 283 MG/DL (ref 70–110)
POCT GLUCOSE: 337 MG/DL (ref 70–110)
POTASSIUM SERPL-SCNC: 4.4 MMOL/L (ref 3.5–5.1)
PROT SERPL-MCNC: 6.9 GM/DL (ref 6–8.4)
RBC # BLD AUTO: 4.25 M/UL (ref 4.6–6.2)
RELATIVE EOSINOPHIL (OHS): 3.1 %
RELATIVE LYMPHOCYTE (OHS): 15.1 % (ref 18–48)
RELATIVE MONOCYTE (OHS): 7.9 % (ref 4–15)
RELATIVE NEUTROPHIL (OHS): 72.7 % (ref 38–73)
SODIUM SERPL-SCNC: 147 MMOL/L (ref 136–145)
WBC # BLD AUTO: 9.62 K/UL (ref 3.9–12.7)

## 2025-06-26 PROCEDURE — 80053 COMPREHEN METABOLIC PANEL: CPT | Mod: HCNC

## 2025-06-26 PROCEDURE — 97112 NEUROMUSCULAR REEDUCATION: CPT | Mod: HCNC

## 2025-06-26 PROCEDURE — 92526 ORAL FUNCTION THERAPY: CPT | Mod: HCNC

## 2025-06-26 PROCEDURE — 25000003 PHARM REV CODE 250: Mod: HCNC

## 2025-06-26 PROCEDURE — 84100 ASSAY OF PHOSPHORUS: CPT | Mod: HCNC

## 2025-06-26 PROCEDURE — 11000001 HC ACUTE MED/SURG PRIVATE ROOM: Mod: HCNC

## 2025-06-26 PROCEDURE — S4991 NICOTINE PATCH NONLEGEND: HCPCS | Mod: HCNC

## 2025-06-26 PROCEDURE — 97535 SELF CARE MNGMENT TRAINING: CPT | Mod: HCNC

## 2025-06-26 PROCEDURE — 92507 TX SP LANG VOICE COMM INDIV: CPT | Mod: HCNC

## 2025-06-26 PROCEDURE — 25000003 PHARM REV CODE 250: Mod: HCNC | Performed by: NURSE PRACTITIONER

## 2025-06-26 PROCEDURE — 99233 SBSQ HOSP IP/OBS HIGH 50: CPT | Mod: HCNC,,, | Performed by: PSYCHIATRY & NEUROLOGY

## 2025-06-26 PROCEDURE — 85025 COMPLETE CBC W/AUTO DIFF WBC: CPT | Mod: HCNC

## 2025-06-26 PROCEDURE — 97530 THERAPEUTIC ACTIVITIES: CPT | Mod: HCNC,CQ

## 2025-06-26 PROCEDURE — 83735 ASSAY OF MAGNESIUM: CPT | Mod: HCNC

## 2025-06-26 PROCEDURE — 63600175 PHARM REV CODE 636 W HCPCS: Mod: HCNC

## 2025-06-26 PROCEDURE — 36415 COLL VENOUS BLD VENIPUNCTURE: CPT | Mod: HCNC

## 2025-06-26 RX ORDER — SODIUM CHLORIDE 9 MG/ML
INJECTION, SOLUTION INTRAVENOUS CONTINUOUS
Status: DISCONTINUED | OUTPATIENT
Start: 2025-06-26 | End: 2025-06-26

## 2025-06-26 RX ORDER — ATORVASTATIN CALCIUM 40 MG/1
40 TABLET, FILM COATED ORAL DAILY
Status: DISCONTINUED | OUTPATIENT
Start: 2025-06-26 | End: 2025-07-04

## 2025-06-26 RX ORDER — NYSTATIN 100000 [USP'U]/ML
500000 SUSPENSION ORAL 4 TIMES DAILY
Status: DISCONTINUED | OUTPATIENT
Start: 2025-06-26 | End: 2025-07-11 | Stop reason: HOSPADM

## 2025-06-26 RX ORDER — FLUOXETINE 20 MG/1
20 CAPSULE ORAL DAILY
Status: DISCONTINUED | OUTPATIENT
Start: 2025-06-26 | End: 2025-07-11 | Stop reason: HOSPADM

## 2025-06-26 RX ORDER — METOPROLOL TARTRATE 50 MG/1
50 TABLET ORAL 2 TIMES DAILY
Status: DISCONTINUED | OUTPATIENT
Start: 2025-06-26 | End: 2025-07-11 | Stop reason: HOSPADM

## 2025-06-26 RX ORDER — AMLODIPINE BESYLATE 10 MG/1
10 TABLET ORAL NIGHTLY
Status: DISCONTINUED | OUTPATIENT
Start: 2025-06-26 | End: 2025-07-11 | Stop reason: HOSPADM

## 2025-06-26 RX ORDER — NAPROXEN SODIUM 220 MG/1
81 TABLET, FILM COATED ORAL DAILY
Status: DISCONTINUED | OUTPATIENT
Start: 2025-06-26 | End: 2025-07-01

## 2025-06-26 RX ORDER — AMOXICILLIN 250 MG
1 CAPSULE ORAL 2 TIMES DAILY
Status: DISCONTINUED | OUTPATIENT
Start: 2025-06-26 | End: 2025-07-11 | Stop reason: HOSPADM

## 2025-06-26 RX ORDER — QUETIAPINE FUMARATE 25 MG/1
25 TABLET, FILM COATED ORAL NIGHTLY
Status: DISCONTINUED | OUTPATIENT
Start: 2025-06-26 | End: 2025-07-11 | Stop reason: HOSPADM

## 2025-06-26 RX ORDER — POLYETHYLENE GLYCOL 3350 17 G/17G
17 POWDER, FOR SOLUTION ORAL 2 TIMES DAILY
Status: DISCONTINUED | OUTPATIENT
Start: 2025-06-26 | End: 2025-07-11 | Stop reason: HOSPADM

## 2025-06-26 RX ADMIN — HEPARIN SODIUM 5000 UNITS: 5000 INJECTION INTRAVENOUS; SUBCUTANEOUS at 05:06

## 2025-06-26 RX ADMIN — INSULIN ASPART 4 UNITS: 100 INJECTION, SOLUTION INTRAVENOUS; SUBCUTANEOUS at 08:06

## 2025-06-26 RX ADMIN — METOPROLOL TARTRATE 50 MG: 50 TABLET, FILM COATED ORAL at 10:06

## 2025-06-26 RX ADMIN — HEPARIN SODIUM 5000 UNITS: 5000 INJECTION INTRAVENOUS; SUBCUTANEOUS at 02:06

## 2025-06-26 RX ADMIN — NYSTATIN 500000 UNITS: 100000 SUSPENSION ORAL at 06:06

## 2025-06-26 RX ADMIN — INSULIN ASPART 6 UNITS: 100 INJECTION, SOLUTION INTRAVENOUS; SUBCUTANEOUS at 11:06

## 2025-06-26 RX ADMIN — SENNOSIDES AND DOCUSATE SODIUM 1 TABLET: 50; 8.6 TABLET ORAL at 08:06

## 2025-06-26 RX ADMIN — ASPIRIN 81 MG CHEWABLE TABLET 81 MG: 81 TABLET CHEWABLE at 10:06

## 2025-06-26 RX ADMIN — INSULIN ASPART 6 UNITS: 100 INJECTION, SOLUTION INTRAVENOUS; SUBCUTANEOUS at 09:06

## 2025-06-26 RX ADMIN — FLUOXETINE HYDROCHLORIDE 20 MG: 20 CAPSULE ORAL at 10:06

## 2025-06-26 RX ADMIN — Medication 6 MG: at 08:06

## 2025-06-26 RX ADMIN — HEPARIN SODIUM 5000 UNITS: 5000 INJECTION INTRAVENOUS; SUBCUTANEOUS at 10:06

## 2025-06-26 RX ADMIN — QUETIAPINE FUMARATE 25 MG: 25 TABLET ORAL at 08:06

## 2025-06-26 RX ADMIN — SODIUM CHLORIDE: 9 INJECTION, SOLUTION INTRAVENOUS at 12:06

## 2025-06-26 RX ADMIN — Medication 1 PATCH: at 10:06

## 2025-06-26 RX ADMIN — INSULIN ASPART 6 UNITS: 100 INJECTION, SOLUTION INTRAVENOUS; SUBCUTANEOUS at 05:06

## 2025-06-26 RX ADMIN — METOPROLOL TARTRATE 50 MG: 50 TABLET, FILM COATED ORAL at 08:06

## 2025-06-26 RX ADMIN — NYSTATIN 500000 UNITS: 100000 SUSPENSION ORAL at 08:06

## 2025-06-26 RX ADMIN — ATORVASTATIN CALCIUM 40 MG: 40 TABLET, FILM COATED ORAL at 10:06

## 2025-06-26 RX ADMIN — SENNOSIDES AND DOCUSATE SODIUM 1 TABLET: 50; 8.6 TABLET ORAL at 10:06

## 2025-06-26 RX ADMIN — POLYETHYLENE GLYCOL 3350 17 G: 17 POWDER, FOR SOLUTION ORAL at 08:06

## 2025-06-26 RX ADMIN — INSULIN ASPART 6 UNITS: 100 INJECTION, SOLUTION INTRAVENOUS; SUBCUTANEOUS at 08:06

## 2025-06-26 RX ADMIN — AMLODIPINE BESYLATE 10 MG: 10 TABLET ORAL at 08:06

## 2025-06-26 NOTE — ASSESSMENT & PLAN NOTE
Cristi Pulido is a 70 y.o. male w/ PMH of HTN, DM, prior stroke w/ residual RSW who presents as a transfer from OSH after presenting with acute onset of dysarthria and RSW. Telestroke was completed and his NIHSS was 19. CT showed probable early ischemic changes in the L insular ribbon and L frontal lobe. CTA demonstrated occlusion of the distal L MCA M1 segment. He was given TNK at 0548. Patient was transferred to C for further management. On arrival, repeat NIHSS of 24. Patient taken for repeat CTH which showed small left temporal hemorrhage along with continued early ischemic changes. Patient taken to IR for thrombectomy and to be admitted to [facility] post-procedure.  S/P thrombectomy. TICI 3. MRI revealing for blood products in stroke bed, likely reperfusion injury. Etiology ESUS.     06/26/2025 NAEON. PEG placed yesterday, will resume tube feeds this afternoon starting with trickle feeds. BUN and Cr Improved. Increased water boluses to 300.    Antithrombotics for secondary stroke prevention: Antiplatelets: Aspirin: 81 mg daily    Statins for secondary stroke prevention and hyperlipidemia, if present:   Statins: Atorvastatin- 40 mg daily    Aggressive risk factor modification: HTN, Smoking, DM, HLD     Rehab efforts: The patient has been evaluated by a stroke team provider and the therapy needs have been fully considered based off the presenting complaints and exam findings. The following therapy evaluations are needed: PT evaluate and treat, OT evaluate and treat, SLP evaluate and treat, PM&R evaluate for appropriate placement, current recommendation HIT    Diagnostics ordered/pending: None     VTE prophylaxis: Enoxaparin 40 mg SQ every 24 hours  Mechanical prophylaxis: Place SCDs    BP parameters: Infarct: Post sucessful thrombectomy, SBP <140

## 2025-06-26 NOTE — NURSING
Per stroke team, ng tube to be used overnight regarding medications. No tube feeds until 24 hours after procedure.

## 2025-06-26 NOTE — PT/OT/SLP PROGRESS
"Physical Therapy Treatment    Patient Name:  Cristi Pulido   MRN:  [id number]    Recommendations:     Discharge Recommendations: High Intensity Therapy  Discharge Equipment Recommendations: wheelchair, lift device, hospital bed  Barriers to discharge: increased level of assist    Assessment:     Cristi Pulido is a 70 y.o. male admitted with a medical diagnosis of Embolic stroke involving left middle cerebral artery.  He presents with the following impairments/functional limitations: weakness, impaired endurance, impaired self care skills, impaired functional mobility, gait instability, impaired balance, decreased upper extremity function, decreased lower extremity function, decreased safety awareness, impaired coordination, impaired cardiopulmonary response to activity, impaired fine motor. Pt found in bed and expressed agreement. Pt tolerated session well. Decreased push to the right compared to previous visit. Tolerated 15 minutes seated EOB with moments of trunk control. Longest static balance lasting ~45 sec. Pt alert during session. Fatigue observed at end of session. Pt falling asleep during reset of room. Pt would benefit from continued PT to improve functional independence.     Rehab Prognosis: Good; patient would benefit from acute skilled PT services to address these deficits and reach maximum level of function.    Recent Surgery: * No surgery found *      Plan:     During this hospitalization, patient to be seen 4 x/week to address the identified rehab impairments via gait training, therapeutic activities, therapeutic exercises, neuromuscular re-education and progress toward the following goals:    Plan of Care Expires:  07/11/25    Subjective     Chief Complaint: unable to verbalize, no physical sign or sounds of complaint  Patient/Family Comments/goals: grunts and hmm mmms, mumbled "I don't know."  Pain/Comfort:   None verbalized or distress sounds made      Objective:     Communicated with nurse prior to " session.  Patient found HOB elevated with bed alarm, pulse ox (continuous), NG tube, peripheral IV, telemetry, restraints, SCD, Condom Catheter upon PT entry to room.     General Precautions: Standard, fall, aspiration  Orthopedic Precautions: N/A  Braces: N/A  Respiratory Status: Room air     Functional Mobility:  Bed Mobility:     Scooting EOB: total A x 2  Boosting HOB: total A x 2. Bed flat, trended  Supine <> Sit to L: total A x 2  Balance: Static seated balance:   Initially: total A x 1, posterior lean  During static balance tolerance: CGA  At end: mod A x 1  Dynamic seated balance: mod A x 1 d/t fatigue      AM-PAC 6 CLICK MOBILITY  Turning over in bed (including adjusting bedclothes, sheets and blankets)?: 2  Sitting down on and standing up from a chair with arms (e.g., wheelchair, bedside commode, etc.): 2  Moving from lying on back to sitting on the side of the bed?: 2  Moving to and from a bed to a chair (including a wheelchair)?: 1  Need to walk in hospital room?: 1  Climbing 3-5 steps with a railing?: 1  Basic Mobility Total Score: 9       Treatment & Education:  Therapist provided instruction and educated for safety during transfers and bed mobility. As well as proper body mechanics, energy conservation, and fall prevention strategies during tasks listed above, and the effects of prolonged immobility and the importance of performing EOB/OOB activity and exercises to promote healing and reduce recovery time.   LAQs performed at EOB. 10 reps each. R PROM, L AROM.  Session performed with assist from rehab tech (Jeffy) under the supervision and guidance of licensed PTA.    Patient left HOB elevated with all lines intact, call button in reach, bed alarm on, and nurse notified..    GOALS:   Multidisciplinary Problems       Physical Therapy Goals          Problem: Physical Therapy    Goal Priority Disciplines Outcome Interventions   Physical Therapy Goal     PT, PT/OT Progressing    Description: Goals to be  met by: 25     Patient will increase functional independence with mobility by performin. Supine to sit with Contact Guard Assistance  2. Sit to supine with Contact Guard Assistance  3. Sit to stand transfer with Moderate Assistance  4. Bed to chair transfer with Maximum Assistance using No Assistive Device  5. Gait  x 10 feet with Maximum Assistance using No Assistive Device.   6. Sitting at edge of bed x5 minutes with Stand-by Assistance  7. Follow 100% of 1-step commands throughout session                         Time Tracking:     PT Received On: 25  PT Start Time: 1139     PT Stop Time: 1205  PT Total Time (min): 26 min     Billable Minutes: Therapeutic Activity 26    Treatment Type: Treatment  PT/PTA: PTA     Number of PTA visits since last PT visit: 3     2025

## 2025-06-26 NOTE — PT/OT/SLP PROGRESS
Occupational Therapy   Treatment    Name: Cristi Pulido  MRN: [id number]  Admitting Diagnosis:  Embolic stroke involving left middle cerebral artery       Recommendations:     Discharge Recommendations: High Intensity Therapy  Discharge Equipment Recommendations:  hospital bed, lift device, wheelchair  Barriers to discharge:  Inaccessible home environment, Other (Comment) (increased skilled A needed)    Assessment:     Cristi Pulido is a 70 y.o. male with a medical diagnosis of Embolic stroke involving left middle cerebral artery.  He presents with decreased self-care and functional mobility independence 2/2 AMS. Performance deficits affecting function are weakness, gait instability, decreased upper extremity function, decreased ROM, impaired endurance, impaired balance, decreased lower extremity function, visual deficits, decreased safety awareness, impaired fine motor, impaired self care skills, impaired functional mobility, decreased coordination, abnormal tone. This date pt with eyes open t/o session. Pt remains limited by decreased command following, decreased R attention, RSW, and communication difficulties. Pt with verbalization attempts though with decreased comprehensibility 2/2 expressive communication deficits. Pt with no commands followed this date. Pt also demonstrating continued R lateral lean with decreased spatial awareness and righting reactions observed. Patient has demonstrated sufficient progression to warrant high intensity therapy evidenced by objectives noted below.     Additional Staff Present: Therapy tech utilized during session due to patient complexity and required physical assistance to ensure patient safety     Rehab Prognosis:  Good; patient would benefit from acute skilled OT services to address these deficits and reach maximum level of function.       Plan:     Patient to be seen 4 x/week to address the above listed problems via self-care/home management, therapeutic activities, therapeutic  exercises, neuromuscular re-education, cognitive retraining  Plan of Care Expires: 07/20/25  Plan of Care Reviewed with: patient, family    Subjective     Chief Complaint: n/a  Patient/Family Comments/goals: unknown  Pain/Comfort:  Pain Rating 1: other (see comments) (unable to assess 2/2 aphasia)    Objective:     Communicated with: nursing prior to session.  Patient found HOB elevated with telemetry, G/J tube, NG tube, restraints, PureWick upon OT entry to room.    General Precautions: Standard, fall, aspiration, NPO    Orthopedic Precautions:N/A  Braces: N/A  Respiratory Status: Room air     Occupational Performance:     Bed Mobility:    Patient completed Rolling/Turning to Right with total assistance  Patient completed Scooting/Bridging with total assistance and 2 persons  Patient completed Supine to Sit with total assistance and 2 persons  Patient completed Sit to Supine with total assistance and 2 persons   Pt seated EOB with total assistance 2/2 R lateral and posterior lean with maximal tactile and verbal cues to increase pt's midline orientation  While seated EOB pt completing R/L lateral leans with total assistance, pt completing R lateral leans to increase RUE joint proprioception and L lateral leans to decrease R pushing    Functional Mobility/Transfers:  Not attempted 2/2 poor postural control and decreased command following    Activities of Daily Living:  Pt with no ADL needs this date    Therapeutic exercise:   Therapist assisted RUE PROM in all planes x10 reps ea with pt assisted to increase RUE visual attention  Therapist assisted AAROM LUE shoulder flexion to anterior visual target with maximal multisensory x10 reps with maximal assistance for initiation 2/2 decreased command following    AMPAC 6 Click ADL: 6    Treatment & Education:  Session this date targeted neuromuscular re-education of postural control and increasing R attention as well as therapeutic exercises to increase BUE strength for  self-care  Pt educated on OT roles, POC, call button for assistance      Patient left HOB elevated with all lines intact, call button in reach, bed alarm on, restraints reapplied at end of session, and nursing notified    GOALS:   Multidisciplinary Problems       Occupational Therapy Goals          Problem: Occupational Therapy    Goal Priority Disciplines Outcome Interventions   Occupational Therapy Goal     OT, PT/OT Progressing    Description: Goals to be met by: 07/20/2025     Patient will increase functional independence with ADLs by performing:    UE Dressing with Moderate Assistance.  LE Dressing with Maximum Assistance.  Grooming while seated with Moderate Assistance.  Toileting from bedside commode with Moderate Assistance for hygiene and clothing management.   Supine to sit with Moderate Assistance.  Stand pivot transfer with Moderate Assistance.                         DME Justifications:   Cristi requires a commode for home use because he is confined to a single room.  Cristi requires a hospital bed due to him requiring positioning of the body in ways not feasible with an ordinary bed due to limited ability and cannot independently make changes in body position without the use of the bed.The positioning of the body cannot be sufficiently resolved by the use of pillows and wedges.  Cristi Pulido has a mobility limitation that significantly impairs his ability to participate in one or more mobility related activities of daily living (MRADLs) such as toileting, feeding, dressing, grooming, and bathing in customary locations in the home.  The mobility limitation cannot be sufficiently resolved by the use of a cane or walker.   The use of a manual wheelchair will significantly improve the patients ability to participate in MRADLS and the patient will use it on regular basis in the home.  Cristi Pulido has expressed his willingness to use a manual wheelchair in the home. Patients upper body strength is sufficient for  propulsion.  He also has a caregiver who is available, willing, and able to provide assistance with the wheelchair when needed.      Time Tracking:     OT Date of Treatment: 06/26/25  OT Start Time: 1424  OT Stop Time: 1444  OT Total Time (min): 20 min    Billable Minutes:Neuromuscular Re-education 20    OT/JEANCARLOS: OT     Number of JEANCARLOS visits since last OT visit: 0    6/26/2025

## 2025-06-26 NOTE — PT/OT/SLP PROGRESS
Speech Language Pathology Treatment    Patient Name:  Cristi Pulido   MRN:  [id number]  Admitting Diagnosis: Embolic stroke involving left middle cerebral artery    Recommendations:     General Recommendations:  Dysphagia therapy, Speech/language therapy, and Modified barium swallow study  Diet recommendations:  NPO, Liquid Diet Level: NPO   Aspiration Precautions: Continue alternate means of nutrition, Frequent oral care, and Strict aspiration precautions   General Precautions: Standard, fall, aspiration, NPO  Communication strategies:  yes/no questions only, provide increased time to answer, and go to room if call light pushed  Discharge recommendations:  High Intensity Therapy     Assessment:     Cristi Pulido is a 70 y.o. male with an SLP diagnosis of Aphasia, Dysphagia, and Dysarthria.  Pt with improved tolerance at PO trials at bedside this date. Recommend MBSS prior to diet advancement.     Subjective     Spoke with nursing prior to session. Pt awake in bed with NGT in place and initial PEG tube feed running s/p placement 6/25. Family present at bedside.       Pain/Comfort:  Pain Rating 1:  (none indicated or observed)    Respiratory Status: Room air    Objective:     Has the patient been evaluated by SLP for swallowing?   Yes  Keep patient NPO? Yes     Pt seen bedside for ongoing dysphagia and speech therapies. Pt awake/alert with NGT and PEG tube in place. Pt with ongoing nonsensical humming, babbling and speech with varying inflection/intonation across session. Occasional head nodding and shaking appearing more reliable this date. He continues to present with poor command following (0/8 x) despite clinician model and max prompting. He answered simple Y/N question with ~30% acc this date. Noted inconsistent eye contact and joint attention across session requiring prompting & redirection. Continue to recommend ongoing SLP follow up to address swallowing and speech and language deficits.     Hob elevated and oral care  provided with damp swabs x 4 and oral suction kit x 1. Noted white coating on superior surface of tongue unable to be removed with oral care. RN and MD notified post session. Following oral care worked on PO trials with feed assist. Pt consumed ice chip x 2, thin water via tsp x 4 via cup x 2 and via straw sip x 4 oz. Noted adequate oral acceptance, IND opening mouth to accept all PO trials without cue. However, pt with no oral cavity o labial closure around utensil or cup rim. Able to elicit fair labial closure and extraction from straw after 2-3 attempts, which is an improvement from prior sessions. Noted poor bolus formation/lateralization and overall poor orolingual control with ice chip, therefore PO solids deferred. Noted adequate and timely AP transit and appearance of a timely swallow trigger. Pt demonstrated occasional double swallows and small cough x 1 on consecutive straw sips and wet vocal quality x 1. No volitional cough elicited across trials though noted clear, strong voice with spontaneous utterances. Continue to recommend frequent oral care, NPO status with MBSS to rule out silent aspiration and to determine safest and least restrictive PO diet. Education provided re: role of SLP, diet recs, importance of oral care, swallow/aspiration precs, s/s aspiration, MBSS procedure and protocol and SLP POC.  Family verbalized understanding and agreement. RN and MD notified of SLP recs and impressions post session.     Goals:   Multidisciplinary Problems       SLP Goals          Problem: SLP    Goal Priority Disciplines Outcome   SLP Goal     SLP Progressing   Description: Speech Language Pathology Goals  Goals expected to be met by 7/3    1. Pt will participate in ongoing swallow assessment to determine least restrictive PO diet.    2. Pt will participate in ongoing cognitive-linguistic assessment to determine additional therapeutic needs.   3. Pt will follow 1 step commands with 80% acc following  model/prompt.   4. Pt will answer simple Y/N questions with 80% acc.                                Plan:     Patient to be seen:  4 x/week   Plan of Care expires:  07/19/25  Plan of Care reviewed with:  patient, daughter, spouse   SLP Follow-Up:  Yes       Time Tracking:     SLP Treatment Date:   06/26/25  Speech Start Time:  1356  Speech Stop Time:  1426     Speech Total Time (min):  30 min    Billable Minutes: Speech Therapy Individual 8, Treatment Swallowing Dysfunction 12, and Self Care/Home Management Training 10    06/26/2025

## 2025-06-26 NOTE — SUBJECTIVE & OBJECTIVE
Neurologic Chief Complaint: L MCA stroke    Subjective:     Interval History: Patient is seen for follow-up neurological assessment and treatment recommendations: See hospital course.    HPI, Past Medical, Family, and Social History remains the same as documented in the initial encounter.     Review of Systems   Reason unable to perform ROS: Severe aphasia.     Scheduled Meds:   amLODIPine  10 mg Per G Tube QHS    aspirin  81 mg Per G Tube Daily    atorvastatin  40 mg Per G Tube Daily    FLUoxetine  20 mg Per G Tube Daily    heparin (porcine)  5,000 Units Subcutaneous Q8H    insulin aspart U-100  6 Units Subcutaneous Q4H    metoprolol tartrate  50 mg Per G Tube BID    nicotine  1 patch Transdermal Daily    polyethylene glycol  17 g Per G Tube BID    QUEtiapine  25 mg Per G Tube QHS    senna-docusate  1 tablet Per G Tube BID     Continuous Infusions:      PRN Meds:  Current Facility-Administered Medications:     bisacodyL, 10 mg, Rectal, Daily PRN    dextrose 50%, 12.5 g, Intravenous, PRN    dextrose 50%, 25 g, Intravenous, PRN    glucagon (human recombinant), 1 mg, Intramuscular, PRN    hydrALAZINE, 10 mg, Intravenous, Q4H PRN    insulin aspart U-100, 0-10 Units, Subcutaneous, Q4H PRN    labetalol, 10 mg, Intravenous, Q4H PRN    melatonin, 6 mg, Per NG tube, Nightly PRN    simethicone, 1 tablet, Per NG tube, TID PRN    sodium chloride 0.9%, 10 mL, Intravenous, PRN    Objective:     Vital Signs (Most Recent):  Temp: 98.6 °F (37 °C) (06/26/25 0759)  Pulse: 68 (06/26/25 1114)  Resp: 18 (06/26/25 0759)  BP: (!) 145/87 (06/26/25 0759)  SpO2: 97 % (06/26/25 0759)  BP Location: Left arm    Vital Signs Range (Last 24H):  Temp:  [97 °F (36.1 °C)-98.6 °F (37 °C)]   Pulse:  [67-88]   Resp:  [10-18]   BP: (120-168)/(69-87)   SpO2:  [92 %-99 %]   BP Location: Left arm       Physical Exam  HENT:      Head: Normocephalic and atraumatic.      Mouth/Throat:      Mouth: Mucous membranes are moist.   Eyes:      Conjunctiva/sclera:  "Conjunctivae normal.   Cardiovascular:      Rate and Rhythm: Normal rate.   Pulmonary:      Effort: Pulmonary effort is normal.   Skin:     General: Skin is warm and dry.   Neurological:      Mental Status: He is alert.      Motor: Weakness present.              Neurological Exam:   LOC: drowsy  Attention Span: poor  Language: Expressive aphasia, Receptive aphasia  Articulation: Dysarthria  Orientation: Untestable due to severe aphasia   Facial Movement (CN VII): Lower facial weakness on the Right  Motor: Arm left  Normal 5/5  Leg left  Normal 5/5  Arm right  Plegia 0/5  Leg right Paresis: 1/5  Sensation: Cy-hypoesthesia right    Laboratory:  CMP:   Recent Labs   Lab 06/26/25  1108   CALCIUM 8.6*   ALBUMIN 2.9*   PROT 6.9   *   K 4.4   CO2 22*   *   BUN 43*   CREATININE 1.6*   ALKPHOS 133   ALT 16   AST 17   BILITOT 0.4     BMP:   Recent Labs   Lab 06/26/25  1108   *   K 4.4   *   CO2 22*   BUN 43*   CREATININE 1.6*   CALCIUM 8.6*     CBC:   Recent Labs   Lab 06/26/25  1108   WBC 9.62   RBC 4.25*   HGB 11.9*   HCT 38.5*      MCV 91   MCH 28.0   MCHC 30.9*     Lipid Panel:   No results for input(s): "CHOL", "LDLCALC", "HDL", "TRIG" in the last 168 hours.    Coagulation:   No results for input(s): "PT", "INR", "APTT" in the last 168 hours.    Platelet Aggregation Study: No results for input(s): "PLTAGG", "PLTAGINTERP", "PLTAGREGLACO", "ADPPLTAGGREG" in the last 168 hours.  Hgb A1C:   No results for input(s): "HGBA1C" in the last 168 hours.    TSH:   No results for input(s): "TSH" in the last 168 hours.      Diagnostic Results   Brain Imaging   MRI Brain 6/20/25  Impression:     Acute left MCA distribution infarct.  No new large parenchymal hemorrhage.  Susceptibility artifact throughout the infarcted tissue may reflect contrast staining and/or blood products conversion.  Overall mass effect is increased compared to prior with sulcal effacement throughout the left cerebral hemisphere " and approximately 4 mm of rightward midline shift.     Stable size of the parenchymal hemorrhage centered in the inferior left temporal lobe.     CT 6/20/25: 1242  Impression:     Moderate-sized recent left MCA distribution infarct and small intraparenchymal hemorrhage appear grossly unchanged from prior study performed earlier on the same date.     Chronic ischemic change elsewhere with underlying cerebral and cerebellar volume loss, as before.     No new hemorrhage or major vascular distribution infarct elsewhere.  CT 6/20/25: 0834  Impression:     Early ischemic changes in the left MCA distribution, new from recent CT.     New left inferior temporal intraparenchymal hematoma.     Chronic ischemic change with cerebral and cerebellar volume loss, as above.     CT 6/19/25  Impression:     Question some early left MCA ischemia corresponding to the patient's known left MCA territory occlusion.  No hemorrhage.     Senescent changes as above.        All CT scans at this facility are performed  using dose modulation techniques as appropriate to performed exam including the following:  automated exposure control; adjustment of mA and/or kV according to the patients size (this includes techniques or standardized protocols for targeted exams where dose is matched to indication/reason for exam: i.e. extremities or head);  iterative reconstruction technique.     Vessel Imaging   CTA head and neck 6/19/25  Impression:     Distal left M1 MCA occlusion with relatively prompt collateralization of the more distal MCA vessels.     Severe stenosis left mid vertebral artery and left PCA.     Cardiac Imaging   TTE 6/19/25    Left Ventricle: The left ventricle is normal in size. Normal wall thickness. There is normal systolic function with a visually estimated ejection fraction of 60 - 65%.    Right Ventricle: The right ventricle is normal in size Wall thickness is normal. Systolic function is normal.    The pulmonary artery  pressure could not be estimated.    IVC/SVC: Normal venous pressure at 3 mmHg.

## 2025-06-26 NOTE — PROGRESS NOTES
[facility] - Neurosurgery ([facility])  Vascular Neurology  Comprehensive Stroke Center  Progress Note    Assessment/Plan:     * Embolic stroke involving left middle cerebral artery  Cristi Pulido is a 70 y.o. male w/ PMH of HTN, DM, prior stroke w/ residual RSW who presents as a transfer from OSH after presenting with acute onset of dysarthria and RSW. Telestroke was completed and his NIHSS was 19. CT showed probable early ischemic changes in the L insular ribbon and L frontal lobe. CTA demonstrated occlusion of the distal L MCA M1 segment. He was given TNK at 0548. Patient was transferred to [facility] for further management. On arrival, repeat NIHSS of 24. Patient taken for repeat CTH which showed small left temporal hemorrhage along with continued early ischemic changes. Patient taken to IR for thrombectomy and to be admitted to [facility]CC post-procedure.  S/P thrombectomy. TICI 3. MRI revealing for blood products in stroke bed, likely reperfusion injury. Etiology ESUS.     06/26/2025 NAEON. PEG placed yesterday, will resume tube feeds this afternoon starting with trickle feeds. BUN and Cr Improved. Increased water boluses to 300.    Antithrombotics for secondary stroke prevention: Antiplatelets: Aspirin: 81 mg daily    Statins for secondary stroke prevention and hyperlipidemia, if present:   Statins: Atorvastatin- 40 mg daily    Aggressive risk factor modification: HTN, Smoking, DM, HLD     Rehab efforts: The patient has been evaluated by a stroke team provider and the therapy needs have been fully considered based off the presenting complaints and exam findings. The following therapy evaluations are needed: PT evaluate and treat, OT evaluate and treat, SLP evaluate and treat, PM&R evaluate for appropriate placement, current recommendation HIT    Diagnostics ordered/pending: None     VTE prophylaxis: Enoxaparin 40 mg SQ every 24 hours  Mechanical prophylaxis: Place SCDs    BP parameters: Infarct: Post sucessful thrombectomy, SBP  <140        Restlessness  -6/24: QTc 441   -Seroquel 25 mg QHS     Hyperlipemia  -Stroke risk factor  -LDL 75, goal <70  -Atorvastatin 40 mg daily      Right sided weakness  -Secondary to stroke.   -Aggressive therapy     Dysarthria and anarthria  -Therapy eval and treat    Aphasia  -Secondary to stroke  -Aggressive therapy     Type 2 diabetes mellitus without complication, without long-term current use of insulin  Lab Results   Component Value Date    LABA1C 7.2 (H) 01/29/2018    HGBA1C 7.0 (H) 06/19/2025     Follow up repeat A1c. Hold home antihyperglycemics. SSI for goal -180 while in hospital    Hypertension associated with diabetes  -Stroke risk factor  -SBP goal <140, maintain MAP >65  -BP range in the last 24 hrs: BP  Min: 116/73  Max: 168/87  -Current antihypertensive regimen:     -Amlodipine 10mg     -Lisinopril still on hold for now    -Metoprolol 50mg BID   --PRN labetalol/hydralazine     Tobacco dependence  Stroke risk factor  - on cessation  -nicotine patch PRN         06/20/2025 NAEON. S/P thrombectomy. TICI 3. TNK monitoring ended at 0548. Aphasic, follows no commands. RSW remains. MRI revealing for blood products in stroke bed, likely reperfusion injury. OK to start monotherapy with either plavix or ASA 6/21/25 for secondary stroke prevention. ECHO unremarkable. Family member at bedside agreeable to NGT placement. Etiology ESUS.   6/21/2025 NAEON. Neuro exam stable. Recommend holding AP therapy for now.   06/22/2025 NAEON. Neuro exam stable. Start AP therapy with ASA.   06/23/2025 NAEON. SLP recs NPO. PEG discussed with family and they are agreeable. Pt S/D to NPU.   06/24/2025 NGT pulled overnight and replaced. Placement confirmed via X ray. IR consulted for PEG placement. JEANNE, Creatinine and BUN uptrending. FWF flushes ordered. Restless during night. EKG repeated. QTc 441. Seroquel 25 mg QHS ordered. Family requesting [facility] when medically ready.   06/25/2025 NAEON. Plans for PEG  placement today. BUN and Cr uptrending. Fluids started. Lisinopril held.   06/26/2025 NAEON. PEG placed yesterday, will resume tube feeds this afternoon starting with trickle feeds. BUN and Cr Improved. Increased water boluses to 300.    STROKE DOCUMENTATION   Acute Stroke Times   Last Known Normal Date: 06/19/25  Last Known Normal Time: 0400  Symptom Onset Date: 06/19/25  Symptom Onset Time: 0400  Stroke Team Called Date: 06/19/25  Stroke Team Called Time: 0813  Stroke Team Arrival Date: 06/19/25  Stroke Team Arrival Time: 0813  CT Interpretation Time: 0827  Thrombolytic Therapy Recommended:  (already given prior to transfer)  CTA Interpretation Time:  (already completed prior to transfer)  Thrombectomy Recommended: Yes  Decision to Treat Time for IR: 0832    NIH Scale:  1a. Level of Consciousness: 0-->Alert, keenly responsive  1b. LOC Questions: 2-->Answers neither question correctly  1c. LOC Commands: 2-->Performs neither task correctly  2. Best Gaze: 0-->Normal  3. Visual: 0-->No visual loss  4. Facial Palsy: 2-->Partial paralysis (total or near-total paralysis of lower face)  5a. Motor Arm, Left: 0-->No drift, limb holds 90 (or 45) degrees for full 10 secs  5b. Motor Arm, Right: 4-->No movement  6a. Motor Leg, Left: 0-->No drift, leg holds 30 degree position for full 5 secs  6b. Motor Leg, Right: 3-->No effort against gravity, leg falls to bed immediately  7. Limb Ataxia: 0-->Absent  8. Sensory: 0-->Normal, no sensory loss  9. Best Language: 2-->Severe aphasia, all communication is through fragmentary expression, great need for inference, questioning, and guessing by the listener. Range of information that can be exchanged is limited, listener carries burden of. . . (see row details)  10. Dysarthria: 2-->Severe dysarthria, patients speech is so slurred as to be unintelligible in the absence of or out of proportion to any dysphasia, or is mute/anarthric  11. Extinction and Inattention (formerly Neglect): 0-->No  abnormality  Total (NIH Stroke Scale): 17       Modified [place] Score: 2  [place] Coma Scale:    ABCD2 Score:    [id number] Score:   HAS -BLED Score:   ICH Score:   Hunt & Kimball Classification:      Hemorrhagic change of an Ischemic Stroke: Does this patient have an ischemic stroke with hemorrhagic changes? Yes, Grading Scale: PH Type 1 (PH-1) = hematoma in < 30% of the infarcted area with some slight space-occupying effect. Is this a symptomatic change?  No - Hemorrhage is not clinically significant     Neurologic Chief Complaint: L MCA stroke    Subjective:     Interval History: Patient is seen for follow-up neurological assessment and treatment recommendations: See hospital course.    HPI, Past Medical, Family, and Social History remains the same as documented in the initial encounter.     Review of Systems   Reason unable to perform ROS: Severe aphasia.     Scheduled Meds:   amLODIPine  10 mg Per G Tube QHS    aspirin  81 mg Per G Tube Daily    atorvastatin  40 mg Per G Tube Daily    FLUoxetine  20 mg Per G Tube Daily    heparin (porcine)  5,000 Units Subcutaneous Q8H    insulin aspart U-100  6 Units Subcutaneous Q4H    metoprolol tartrate  50 mg Per G Tube BID    nicotine  1 patch Transdermal Daily    polyethylene glycol  17 g Per G Tube BID    QUEtiapine  25 mg Per G Tube QHS    senna-docusate  1 tablet Per G Tube BID     Continuous Infusions:      PRN Meds:  Current Facility-Administered Medications:     bisacodyL, 10 mg, Rectal, Daily PRN    dextrose 50%, 12.5 g, Intravenous, PRN    dextrose 50%, 25 g, Intravenous, PRN    glucagon (human recombinant), 1 mg, Intramuscular, PRN    hydrALAZINE, 10 mg, Intravenous, Q4H PRN    insulin aspart U-100, 0-10 Units, Subcutaneous, Q4H PRN    labetalol, 10 mg, Intravenous, Q4H PRN    melatonin, 6 mg, Per NG tube, Nightly PRN    simethicone, 1 tablet, Per NG tube, TID PRN    sodium chloride 0.9%, 10 mL, Intravenous, PRN    Objective:     Vital Signs (Most Recent):  Temp:  "98.6 °F (37 °C) (06/26/25 0759)  Pulse: 68 (06/26/25 1114)  Resp: 18 (06/26/25 0759)  BP: (!) 145/87 (06/26/25 0759)  SpO2: 97 % (06/26/25 0759)  BP Location: Left arm    Vital Signs Range (Last 24H):  Temp:  [97 °F (36.1 °C)-98.6 °F (37 °C)]   Pulse:  [67-88]   Resp:  [10-18]   BP: (120-168)/(69-87)   SpO2:  [92 %-99 %]   BP Location: Left arm       Physical Exam  HENT:      Head: Normocephalic and atraumatic.      Mouth/Throat:      Mouth: Mucous membranes are moist.   Eyes:      Conjunctiva/sclera: Conjunctivae normal.   Cardiovascular:      Rate and Rhythm: Normal rate.   Pulmonary:      Effort: Pulmonary effort is normal.   Skin:     General: Skin is warm and dry.   Neurological:      Mental Status: He is alert.      Motor: Weakness present.              Neurological Exam:   LOC: drowsy  Attention Span: poor  Language: Expressive aphasia, Receptive aphasia  Articulation: Dysarthria  Orientation: Untestable due to severe aphasia   Facial Movement (CN VII): Lower facial weakness on the Right  Motor: Arm left  Normal 5/5  Leg left  Normal 5/5  Arm right  Plegia 0/5  Leg right Paresis: 1/5  Sensation: Cy-hypoesthesia right    Laboratory:  CMP:   Recent Labs   Lab 06/26/25  1108   CALCIUM 8.6*   ALBUMIN 2.9*   PROT 6.9   *   K 4.4   CO2 22*   *   BUN 43*   CREATININE 1.6*   ALKPHOS 133   ALT 16   AST 17   BILITOT 0.4     BMP:   Recent Labs   Lab 06/26/25  1108   *   K 4.4   *   CO2 22*   BUN 43*   CREATININE 1.6*   CALCIUM 8.6*     CBC:   Recent Labs   Lab 06/26/25  1108   WBC 9.62   RBC 4.25*   HGB 11.9*   HCT 38.5*      MCV 91   MCH 28.0   MCHC 30.9*     Lipid Panel:   No results for input(s): "CHOL", "LDLCALC", "HDL", "TRIG" in the last 168 hours.    Coagulation:   No results for input(s): "PT", "INR", "APTT" in the last 168 hours.    Platelet Aggregation Study: No results for input(s): "PLTAGG", "PLTAGINTERP", "PLTAGREGLACO", "ADPPLTAGGREG" in the last 168 hours.  Hgb A1C:   No " "results for input(s): "HGBA1C" in the last 168 hours.    TSH:   No results for input(s): "TSH" in the last 168 hours.      Diagnostic Results   Brain Imaging   MRI Brain 6/20/25  Impression:     Acute left MCA distribution infarct.  No new large parenchymal hemorrhage.  Susceptibility artifact throughout the infarcted tissue may reflect contrast staining and/or blood products conversion.  Overall mass effect is increased compared to prior with sulcal effacement throughout the left cerebral hemisphere and approximately 4 mm of rightward midline shift.     Stable size of the parenchymal hemorrhage centered in the inferior left temporal lobe.     [facility] 6/20/25: 1242  Impression:     Moderate-sized recent left MCA distribution infarct and small intraparenchymal hemorrhage appear grossly unchanged from prior study performed earlier on the same date.     Chronic ischemic change elsewhere with underlying cerebral and cerebellar volume loss, as before.     No new hemorrhage or major vascular distribution infarct elsewhere.  [facility] 6/20/25: 0834  Impression:     Early ischemic changes in the left MCA distribution, new from recent CT.     New left inferior temporal intraparenchymal hematoma.     Chronic ischemic change with cerebral and cerebellar volume loss, as above.     [facility] 6/19/25  Impression:     Question some early left MCA ischemia corresponding to the patient's known left MCA territory occlusion.  No hemorrhage.     Senescent changes as above.        All CT scans at this facility are performed  using dose modulation techniques as appropriate to performed exam including the following:  automated exposure control; adjustment of mA and/or kV according to the patients size (this includes techniques or standardized protocols for targeted exams where dose is matched to indication/reason for exam: i.e. extremities or head);  iterative reconstruction technique.     Vessel Imaging   CTA head and neck 6/19/25  Impression:   "   Distal left M1 MCA occlusion with relatively prompt collateralization of the more distal MCA vessels.     Severe stenosis left mid vertebral artery and left PCA.     Cardiac Imaging   TTE 6/19/25    Left Ventricle: The left ventricle is normal in size. Normal wall thickness. There is normal systolic function with a visually estimated ejection fraction of 60 - 65%.    Right Ventricle: The right ventricle is normal in size Wall thickness is normal. Systolic function is normal.    The pulmonary artery pressure could not be estimated.    IVC/SVC: Normal venous pressure at 3 mmHg.       Franci Zambrano PA-C  [facility]  Department of Vascular Neurology   [facility] Neurosurgery [facility])

## 2025-06-26 NOTE — PLAN OF CARE
Problem: Adult Inpatient Plan of Care  Goal: Patient-Specific Goal (Individualized)  Description: Pt will maintain sbp below 160  Outcome: Progressing  Flowsheets (Taken 6/26/2025 0253)  Individualized Care Needs: care clustered  Anxieties, Fears or Concerns: kiersten     Problem: Stroke, Ischemic (Includes Transient Ischemic Attack)  Goal: Optimal Coping  Outcome: Progressing  Intervention: Support Psychosocial Response to Stroke  Flowsheets (Taken 6/26/2025 0253)  Supportive Measures:   active listening utilized   self-care encouraged     Problem: Restraint, Nonviolent  Goal: Absence of Harm or Injury  Outcome: Progressing  Intervention: Implement Least Restrictive Safety Strategies  Flowsheets (Taken 6/26/2025 0253)  Less Restrictive Alternative: 1:1 observation maintained  Intervention: Protect Dignity, Rights and Personal Wellbeing  Flowsheets (Taken 6/26/2025 0253)  Trust Relationship/Rapport:   care explained   choices provided  Intervention: Protect Skin and Joint Integrity  Flowsheets (Taken 6/26/2025 0253)  Body Position: turned  Range of Motion: ROM (range of motion) performed  Intervention: Education  Flowsheets (Taken 6/26/2025 0253)  Discontinuation Criteria: Absence of behavior that required restraint  Criteria Explained: Yes  Patient's Response: NL  Patient / Family Notification: Patient  Patient / Family Teaching: Need for restraint  Intervention: Restraint Monitoring Q2 hours w/Assessment for Injury  Flowsheets (Taken 6/26/2025 0253)  Observed Emotional State: calm  Visual Check: SD  Circulation: NS  Range of Motion: A  Fluids: N  Food/Meal: N  Elimination: UC  Pain Rating (0-10): Rest: 0  Comfort Measures: Repositioned  Assessed readiness for DC: Yes  Privacy Maintained: Yes  Vital Signs per MD order: Yes  Intervention: Restraint Injuries Monitor Q2 hours  Flowsheets (Taken 6/26/2025 0253)  Injuries Noted: None     VSS. Bed in lowest position, side rails x 3, and call light in use.

## 2025-06-27 LAB
ABSOLUTE EOSINOPHIL (OHS): 0.31 K/UL
ABSOLUTE MONOCYTE (OHS): 0.93 K/UL (ref 0.3–1)
ABSOLUTE NEUTROPHIL COUNT (OHS): 6.56 K/UL (ref 1.8–7.7)
ALBUMIN SERPL BCP-MCNC: 3 G/DL (ref 3.5–5.2)
ALP SERPL-CCNC: 149 UNIT/L (ref 40–150)
ALT SERPL W/O P-5'-P-CCNC: 15 UNIT/L (ref 10–44)
ANION GAP (OHS): 12 MMOL/L (ref 8–16)
AST SERPL-CCNC: 20 UNIT/L (ref 11–45)
BASOPHILS # BLD AUTO: 0.02 K/UL
BASOPHILS NFR BLD AUTO: 0.2 %
BILIRUB SERPL-MCNC: 0.3 MG/DL (ref 0.1–1)
BUN SERPL-MCNC: 44 MG/DL (ref 8–23)
CALCIUM SERPL-MCNC: 9 MG/DL (ref 8.7–10.5)
CHLORIDE SERPL-SCNC: 112 MMOL/L (ref 95–110)
CO2 SERPL-SCNC: 25 MMOL/L (ref 23–29)
CREAT SERPL-MCNC: 1.5 MG/DL (ref 0.5–1.4)
ERYTHROCYTE [DISTWIDTH] IN BLOOD BY AUTOMATED COUNT: 13.3 % (ref 11.5–14.5)
GFR SERPLBLD CREATININE-BSD FMLA CKD-EPI: 49 ML/MIN/1.73/M2
GLUCOSE SERPL-MCNC: 234 MG/DL (ref 70–110)
HCT VFR BLD AUTO: 40.4 % (ref 40–54)
HGB BLD-MCNC: 12.7 GM/DL (ref 14–18)
IMM GRANULOCYTES # BLD AUTO: 0.03 K/UL (ref 0–0.04)
IMM GRANULOCYTES NFR BLD AUTO: 0.3 % (ref 0–0.5)
LYMPHOCYTES # BLD AUTO: 1.4 K/UL (ref 1–4.8)
MAGNESIUM SERPL-MCNC: 2.6 MG/DL (ref 1.6–2.6)
MCH RBC QN AUTO: 28.5 PG (ref 27–31)
MCHC RBC AUTO-ENTMCNC: 31.4 G/DL (ref 32–36)
MCV RBC AUTO: 91 FL (ref 82–98)
NUCLEATED RBC (/100WBC) (OHS): 0 /100 WBC
PHOSPHATE SERPL-MCNC: 3.3 MG/DL (ref 2.7–4.5)
PLATELET # BLD AUTO: 201 K/UL (ref 150–450)
PMV BLD AUTO: 13.6 FL (ref 9.2–12.9)
POCT GLUCOSE: 157 MG/DL (ref 70–110)
POCT GLUCOSE: 174 MG/DL (ref 70–110)
POCT GLUCOSE: 240 MG/DL (ref 70–110)
POCT GLUCOSE: 274 MG/DL (ref 70–110)
POCT GLUCOSE: 288 MG/DL (ref 70–110)
POCT GLUCOSE: 292 MG/DL (ref 70–110)
POTASSIUM SERPL-SCNC: 3.9 MMOL/L (ref 3.5–5.1)
PROT SERPL-MCNC: 7.2 GM/DL (ref 6–8.4)
RBC # BLD AUTO: 4.45 M/UL (ref 4.6–6.2)
RELATIVE EOSINOPHIL (OHS): 3.4 %
RELATIVE LYMPHOCYTE (OHS): 15.1 % (ref 18–48)
RELATIVE MONOCYTE (OHS): 10.1 % (ref 4–15)
RELATIVE NEUTROPHIL (OHS): 70.9 % (ref 38–73)
SODIUM SERPL-SCNC: 149 MMOL/L (ref 136–145)
WBC # BLD AUTO: 9.25 K/UL (ref 3.9–12.7)

## 2025-06-27 PROCEDURE — 36415 COLL VENOUS BLD VENIPUNCTURE: CPT | Mod: HCNC

## 2025-06-27 PROCEDURE — 25500020 PHARM REV CODE 255: Mod: HCNC | Performed by: PSYCHIATRY & NEUROLOGY

## 2025-06-27 PROCEDURE — 99233 SBSQ HOSP IP/OBS HIGH 50: CPT | Mod: HCNC,,, | Performed by: PSYCHIATRY & NEUROLOGY

## 2025-06-27 PROCEDURE — 97535 SELF CARE MNGMENT TRAINING: CPT | Mod: HCNC

## 2025-06-27 PROCEDURE — 11000001 HC ACUTE MED/SURG PRIVATE ROOM: Mod: HCNC

## 2025-06-27 PROCEDURE — 85025 COMPLETE CBC W/AUTO DIFF WBC: CPT | Mod: HCNC

## 2025-06-27 PROCEDURE — 80053 COMPREHEN METABOLIC PANEL: CPT | Mod: HCNC

## 2025-06-27 PROCEDURE — A9698 NON-RAD CONTRAST MATERIALNOC: HCPCS | Mod: HCNC | Performed by: PSYCHIATRY & NEUROLOGY

## 2025-06-27 PROCEDURE — 92611 MOTION FLUOROSCOPY/SWALLOW: CPT | Mod: HCNC

## 2025-06-27 PROCEDURE — 63600175 PHARM REV CODE 636 W HCPCS: Mod: HCNC

## 2025-06-27 PROCEDURE — 83735 ASSAY OF MAGNESIUM: CPT | Mod: HCNC

## 2025-06-27 PROCEDURE — S4991 NICOTINE PATCH NONLEGEND: HCPCS | Mod: HCNC

## 2025-06-27 PROCEDURE — 84100 ASSAY OF PHOSPHORUS: CPT | Mod: HCNC

## 2025-06-27 PROCEDURE — 25000003 PHARM REV CODE 250: Mod: HCNC

## 2025-06-27 PROCEDURE — 94761 N-INVAS EAR/PLS OXIMETRY MLT: CPT | Mod: HCNC

## 2025-06-27 RX ORDER — SIMETHICONE 80 MG
1 TABLET,CHEWABLE ORAL 3 TIMES DAILY PRN
Status: DISCONTINUED | OUTPATIENT
Start: 2025-06-27 | End: 2025-07-11 | Stop reason: HOSPADM

## 2025-06-27 RX ORDER — TALC
6 POWDER (GRAM) TOPICAL NIGHTLY PRN
Status: DISCONTINUED | OUTPATIENT
Start: 2025-06-27 | End: 2025-07-11 | Stop reason: HOSPADM

## 2025-06-27 RX ORDER — INSULIN GLARGINE 100 [IU]/ML
15 INJECTION, SOLUTION SUBCUTANEOUS DAILY
Status: DISCONTINUED | OUTPATIENT
Start: 2025-06-27 | End: 2025-07-08

## 2025-06-27 RX ORDER — INSULIN ASPART 100 [IU]/ML
8 INJECTION, SOLUTION INTRAVENOUS; SUBCUTANEOUS EVERY 4 HOURS
Status: CANCELLED | OUTPATIENT
Start: 2025-06-27

## 2025-06-27 RX ADMIN — ATORVASTATIN CALCIUM 40 MG: 40 TABLET, FILM COATED ORAL at 08:06

## 2025-06-27 RX ADMIN — QUETIAPINE FUMARATE 25 MG: 25 TABLET ORAL at 08:06

## 2025-06-27 RX ADMIN — SENNOSIDES AND DOCUSATE SODIUM 1 TABLET: 50; 8.6 TABLET ORAL at 08:06

## 2025-06-27 RX ADMIN — Medication 1 PATCH: at 08:06

## 2025-06-27 RX ADMIN — INSULIN ASPART 6 UNITS: 100 INJECTION, SOLUTION INTRAVENOUS; SUBCUTANEOUS at 12:06

## 2025-06-27 RX ADMIN — ASPIRIN 81 MG CHEWABLE TABLET 81 MG: 81 TABLET CHEWABLE at 08:06

## 2025-06-27 RX ADMIN — NYSTATIN 500000 UNITS: 100000 SUSPENSION ORAL at 06:06

## 2025-06-27 RX ADMIN — FLUOXETINE HYDROCHLORIDE 20 MG: 20 CAPSULE ORAL at 08:06

## 2025-06-27 RX ADMIN — NYSTATIN 500000 UNITS: 100000 SUSPENSION ORAL at 01:06

## 2025-06-27 RX ADMIN — HEPARIN SODIUM 5000 UNITS: 5000 INJECTION INTRAVENOUS; SUBCUTANEOUS at 01:06

## 2025-06-27 RX ADMIN — NYSTATIN 500000 UNITS: 100000 SUSPENSION ORAL at 08:06

## 2025-06-27 RX ADMIN — AMLODIPINE BESYLATE 10 MG: 10 TABLET ORAL at 09:06

## 2025-06-27 RX ADMIN — INSULIN ASPART 6 UNITS: 100 INJECTION, SOLUTION INTRAVENOUS; SUBCUTANEOUS at 08:06

## 2025-06-27 RX ADMIN — INSULIN GLARGINE 15 UNITS: 100 INJECTION, SOLUTION SUBCUTANEOUS at 01:06

## 2025-06-27 RX ADMIN — INSULIN ASPART 6 UNITS: 100 INJECTION, SOLUTION INTRAVENOUS; SUBCUTANEOUS at 04:06

## 2025-06-27 RX ADMIN — Medication 6 MG: at 08:06

## 2025-06-27 RX ADMIN — INSULIN ASPART 6 UNITS: 100 INJECTION, SOLUTION INTRAVENOUS; SUBCUTANEOUS at 11:06

## 2025-06-27 RX ADMIN — METOPROLOL TARTRATE 50 MG: 50 TABLET, FILM COATED ORAL at 08:06

## 2025-06-27 RX ADMIN — HEPARIN SODIUM 5000 UNITS: 5000 INJECTION INTRAVENOUS; SUBCUTANEOUS at 06:06

## 2025-06-27 RX ADMIN — INSULIN ASPART 4 UNITS: 100 INJECTION, SOLUTION INTRAVENOUS; SUBCUTANEOUS at 11:06

## 2025-06-27 RX ADMIN — BARIUM SULFATE 45 ML: 0.81 POWDER, FOR SUSPENSION ORAL at 09:06

## 2025-06-27 RX ADMIN — INSULIN ASPART 2 UNITS: 100 INJECTION, SOLUTION INTRAVENOUS; SUBCUTANEOUS at 04:06

## 2025-06-27 RX ADMIN — POLYETHYLENE GLYCOL 3350 17 G: 17 POWDER, FOR SOLUTION ORAL at 08:06

## 2025-06-27 RX ADMIN — INSULIN ASPART 6 UNITS: 100 INJECTION, SOLUTION INTRAVENOUS; SUBCUTANEOUS at 06:06

## 2025-06-27 RX ADMIN — INSULIN ASPART 2 UNITS: 100 INJECTION, SOLUTION INTRAVENOUS; SUBCUTANEOUS at 08:06

## 2025-06-27 RX ADMIN — INSULIN ASPART 2 UNITS: 100 INJECTION, SOLUTION INTRAVENOUS; SUBCUTANEOUS at 12:06

## 2025-06-27 RX ADMIN — HEPARIN SODIUM 5000 UNITS: 5000 INJECTION INTRAVENOUS; SUBCUTANEOUS at 08:06

## 2025-06-27 NOTE — PLAN OF CARE
[facility] ([facility])  Discharge Reassessment    Primary Care Provider: Rell Winn MD    Expected Discharge Date: 6/30/2025    Reassessment (most recent)       Discharge Reassessment - 06/27/25 1436          Discharge Reassessment    Assessment Type Discharge Planning Reassessment     Did the patient's condition or plan change since previous assessment? No     Discharge Plan discussed with: Patient     Communicated ESSIE with patient/caregiver Yes     Discharge Plan A Rehab     Discharge Plan B Skilled Nursing Facility     DME Needed Upon Discharge  other (see comments)   TBD    Transition of Care Barriers None     Why the patient remains in the hospital Requires continued medical care        Post-Acute Status    Post-Acute Authorization Placement     Post-Acute Placement Status Pending medical clearance/testing                     Discharge Plan A and Plan B have been determined by review of patient's clinical status, future medical and therapeutic needs, and coverage/benefits for post-acute care in coordination with multidisciplinary team members.       Gloria Robertson RN  Ext [phone]

## 2025-06-27 NOTE — PLAN OF CARE
Call placed to patient's spouse Jocy ([phone]) to discuss her 's discharge plan. Left voicemail requesting callback, will continue to follow. Patient is expected to discharge to [facility] when medically stable.    Gloria Robertson RN  Ext [phone]

## 2025-06-27 NOTE — PROGRESS NOTES
"[facility] ([facility])  Adult Nutrition  Progress Note    SUMMARY       Recommendations    Recommendation/Intervention:   1. Continue TF recommendation: Glucerna 1.5 at goal rate of 55ml/hr to provide 1320ml of total formula volume, 1980 kcal, 109g PRO, 176g CHO, and 1001ml FF    - FWF to meet RDA, if desired: 160ml Q4hrs to provide an additional 960ml of fluid and 1961 ml TFV    - please continue documenting TF rate via flowsheets    2. If bolus feeds are preferred, recommend to bolus 220ml of Glucerna 1.5 Q4hrs to provide 1320ml of total formula volume, 1980 kcal, 109g PRO, 176g CHO, and 1001ml FF     - FWF to meet RDA if desired: FWF of 80ml before and after each bolus to provide an additional 960ml FF and 1961ml TFV     3. RD to monitor weight, labs, intake, tolerance     4. Recommend daily weights    Goals:   1. % nutritional needs met with EN during admission     2. Maintain weight during admission  Nutrition Goal Status: progressing towards goal  Communication of RD Recs:  (POC)    Nutrition Discharge Planning    Nutrition Discharge Planning: Too early to determine, pending clinical course    Assessment and Plan    Pending NFPE    Reason for Assessment    Reason For Assessment: RD follow-up  Diagnosis: stroke/CVA (Embolic stroke involving left middle cerebral artery)  General Information Comments: RD team following this pt. Pt admitted with embolic stroke involving left middle cerebral artery. No new surgical hx. No edema noted. No new wounds, current wound stable. No GI s/s - BM: 6/26. TF running at goal rate via PEG, no signs of intolerance. No new wt since 6/20.    Nutrition/Diet History    Spiritual, Cultural Beliefs, Shinto Practices, Values that Affect Care: no  Food Allergies: NKFA  Factors Affecting Nutritional Intake: None identified at this time  Nutrition-related SDOH: Unable to assess at this time    Anthropometrics    Height: 5' 10" (177.8 cm)  Height (inches): 70 in  Height " Method: Stated  Weight: 89 kg (196 lb 3.4 oz)  Weight (lb): 196.21 lb  Weight Method: Bed Scale  Ideal Body Weight (IBW), Male: 166 lb  % Ideal Body Weight, Male (lb): 118.2 %  BMI (Calculated): 28.2  BMI Grade: 25 - 29.9 - overweight    Lab/Procedures/Meds    Pertinent Labs Reviewed: reviewed  Pertinent Labs Comments: hemoglobin 12.7 low, Na 149 high, Cl 112 high, BUN 44 high, creatinine 1.5 high, glucose 234 high, albumin 3.0 low  Pertinent Medications Reviewed: reviewed  Pertinent Medications Comments: amlodipine, aspirin, atorvastatin, fluoxetine, heparin, insulin aspart, metoprolol, quetiapine, senna    Estimated/Assessed Needs    Weight Used For Calorie Calculations: 89 kg (196 lb 3.4 oz)  Energy Calorie Requirements (kcal): 2058 kcal/day (x 1.25 AF)  Energy Need Method: [place]-St Jeor  Protein Requirements: 107- 134g (1.2-1.5g/kg)  Weight Used For Protein Calculations: 89 kg (196 lb 3.4 oz)  Fluid Requirements (mL): as per MD or RDA  Estimated Fluid Requirement Method: RDA Method  RDA Method (mL): 2058  CHO Requirement: 257 g/day    Nutrition Prescription Ordered    Current Diet Order: NPO  Current Nutrition Support Formula Ordered: Glucerna 1.5  Current Nutrition Support Rate Ordered: 55 (ml)  Current Nutrition Support Frequency Ordered: x 24hrs    Evaluation of Received Nutrient/Fluid Intake    Enteral Calories (kcal): 1980  Enteral Protein (gm): 109  Enteral (Free Water) Fluid (mL): 1001  Energy Calories Required: meeting needs  Protein Required: meeting needs  Fluid Required:  (per MD)  Tolerance: tolerating  % Intake of Estimated Energy Needs: 75 - 100 %  % Meal Intake: NPO    PES Statement    Inadequate enteral nutrition infusion related to  (Infusion volume not reached) as evidenced by  (Inadequate EN volume compared to estimated requirements)  Status: Resolved    Nutrition Risk    Level of Risk/Frequency of Follow-up: high (2/week)     Monitor and Evaluation    Monitor and Evaluation: Enteral and  parenteral nutrition administration, Weight, Electrolyte and renal panel, Gastrointestinal profile, Glucose/endocrine profile, Inflammatory profile, Lipid profile, Nutrition focused physical findings, Skin     Nutrition Follow-Up    RD Follow-up?: Yes

## 2025-06-27 NOTE — ASSESSMENT & PLAN NOTE
Cristi Pulido is a 70 y.o. male w/ PMH of HTN, DM, prior stroke w/ residual RSW who presents as a transfer from OSH after presenting with acute onset of dysarthria and RSW. Telestroke was completed and his NIHSS was 19. CT showed probable early ischemic changes in the L insular ribbon and L frontal lobe. CTA demonstrated occlusion of the distal L MCA M1 segment. He was given TNK at 0548. [facility]. On arrival, repeat NIHSS of 24. Patient taken for repeat CTH which showed small left temporal hemorrhage along with continued early ischemic changes. Patient taken to IR for thrombectomy and to be admitted to [facility] post-procedure.  S/P thrombectomy. TICI 3. MRI revealing for blood products in stroke bed, likely reperfusion injury. Etiology ESUS.     06/27/2025 NAEON. Neuro exam stable. Patient has tolerated tube feeds well. Will continue to monitor kidney function for now. Trial off restraints today to prepare for rehab placement. Added Lantus 15 units. MBSS today, patient able to have puree for pleasure feeds per speech.     Antithrombotics for secondary stroke prevention: Antiplatelets: Aspirin: 81 mg daily    Statins for secondary stroke prevention and hyperlipidemia, if present:   Statins: Atorvastatin- 40 mg daily    Aggressive risk factor modification: HTN, Smoking, DM, HLD     Rehab efforts: The patient has been evaluated by a stroke team provider and the therapy needs have been fully considered based off the presenting complaints and exam findings. The following therapy evaluations are needed: PT evaluate and treat, OT evaluate and treat, SLP evaluate and treat, PM&R evaluate for appropriate placement, current recommendation HIT    Diagnostics ordered/pending: None     VTE prophylaxis: Enoxaparin 40 mg SQ every 24 hours  Mechanical prophylaxis: Place SCDs    BP parameters: Infarct: Post sucessful thrombectomy, SBP <140

## 2025-06-27 NOTE — PT/OT/SLP PROGRESS
Occupational Therapy      Patient Name:  Cristi Pulido   MRN:  [id number]    Patient not seen today secondary to Off the floor for procedure/surgery (MBSS). Will follow-up as appropriate.    6/27/2025

## 2025-06-27 NOTE — PLAN OF CARE
Recommendations     Recommendation/Intervention:   1. Continue TF recommendation: Glucerna 1.5 at goal rate of 55ml/hr to provide 1320ml of total formula volume, 1980 kcal, 109g PRO, 176g CHO, and 1001ml FF    - FWF to meet RDA, if desired: 160ml Q4hrs to provide an additional 960ml of fluid and 1961 ml TFV    - please continue documenting TF rate via flowsheets    2. If bolus feeds are preferred, recommend to bolus 220ml of Glucerna 1.5 Q4hrs to provide 1320ml of total formula volume, 1980 kcal, 109g PRO, 176g CHO, and 1001ml FF     - FWF to meet RDA if desired: FWF of 80ml before and after each bolus to provide an additional 960ml FF and 1961ml TFV     3. RD to monitor weight, labs, intake, tolerance     4. Recommend daily weights     Goals:   1. % nutritional needs met with EN during admission     2. Maintain weight during admission  Nutrition Goal Status: progressing towards goal  Communication of RD Recs:  (POC)     Nutrition Discharge Planning     Nutrition Discharge Planning: Too early to determine, pending clinical course

## 2025-06-27 NOTE — ASSESSMENT & PLAN NOTE
-Stroke risk factor  -SBP goal <140, maintain MAP >65  -BP range in the last 24 hrs: BP  Min: 124/75  Max: 168/79  -Current antihypertensive regimen:     -Amlodipine 10mg     -Lisinopril still on hold for now    -Metoprolol 50mg BID   --PRN labetalol/hydralazine    Deep Sutures: 5-0 Vicryl

## 2025-06-27 NOTE — PLAN OF CARE
Problem: Adult Inpatient Plan of Care  Goal: Plan of Care Review  Outcome: Progressing  Goal: Patient-Specific Goal (Individualized)  Description: Pt will maintain sbp below 160  Outcome: Progressing  Goal: Absence of Hospital-Acquired Illness or Injury  Outcome: Progressing  Goal: Optimal Comfort and Wellbeing  Outcome: Progressing  Goal: Readiness for Transition of Care  Outcome: Progressing     Problem: Diabetes Comorbidity  Goal: Blood Glucose Level Within Targeted Range  Outcome: Progressing     Problem: Wound  Goal: Optimal Coping  Outcome: Progressing  Goal: Optimal Functional Ability  Outcome: Progressing  Goal: Absence of Infection Signs and Symptoms  Outcome: Progressing  Goal: Improved Oral Intake  Outcome: Progressing  Goal: Optimal Pain Control and Function  Outcome: Progressing  Goal: Skin Health and Integrity  Outcome: Progressing  Goal: Optimal Wound Healing  Outcome: Progressing     Problem: Stroke, Ischemic (Includes Transient Ischemic Attack)  Goal: Optimal Coping  Outcome: Progressing  Goal: Effective Bowel Elimination  Outcome: Progressing  Goal: Optimal Cerebral Tissue Perfusion  Outcome: Progressing  Goal: Optimal Cognitive Function  Outcome: Progressing  Goal: Improved Communication Skills  Outcome: Progressing  Goal: Optimal Functional Ability  Outcome: Progressing  Goal: Optimal Nutrition Intake  Outcome: Progressing  Goal: Effective Oxygenation and Ventilation  Outcome: Progressing  Goal: Improved Sensorimotor Function  Outcome: Progressing  Goal: Safe and Effective Swallow  Outcome: Progressing  Goal: Effective Urinary Elimination  Outcome: Progressing     Problem: Fall Injury Risk  Goal: Absence of Fall and Fall-Related Injury  Outcome: Progressing     Problem: Skin Injury Risk Increased  Goal: Skin Health and Integrity  Outcome: Progressing     Problem: Restraint, Nonviolent  Goal: Absence of Harm or Injury  Outcome: Progressing     Problem: Infection  Goal: Absence of Infection Signs  and Symptoms  Outcome: Progressing       POC updated and reviewed with the patient at the bedside. Questions regarding POC were encouraged and addressed. VSS, see flowsheets. Tele maintained per provider's order. Patient is AOX self at this time. NAEON. safety precautions maintained, no signs of injury noted during shift. Patient repositioned  with assistance in bed for comfort. Upon exiting room, patient's bed locked in low position, side rails up x 4, bed alarm on, with call light within reach. Instructed patient to call staff for mobility, verbalized understanding. Stroke book and stroke education reviewed with the patient at the bedside, see education flowsheets for details. No acute signs of distress noted at this time.

## 2025-06-27 NOTE — SUBJECTIVE & OBJECTIVE
Neurologic Chief Complaint: L MCA stroke    Subjective:     Interval History: Patient is seen for follow-up neurological assessment and treatment recommendations: See hospital course.    HPI, Past Medical, Family, and Social History remains the same as documented in the initial encounter.     Review of Systems   Reason unable to perform ROS: Severe aphasia.     Scheduled Meds:   amLODIPine  10 mg Per G Tube QHS    aspirin  81 mg Per G Tube Daily    atorvastatin  40 mg Per G Tube Daily    FLUoxetine  20 mg Per G Tube Daily    heparin (porcine)  5,000 Units Subcutaneous Q8H    insulin aspart U-100  6 Units Subcutaneous Q4H    insulin glargine U-100  15 Units Subcutaneous Daily    metoprolol tartrate  50 mg Per G Tube BID    nicotine  1 patch Transdermal Daily    nystatin  500,000 Units Oral QID    polyethylene glycol  17 g Per G Tube BID    QUEtiapine  25 mg Per G Tube QHS    senna-docusate  1 tablet Per G Tube BID     Continuous Infusions:      PRN Meds:  Current Facility-Administered Medications:     bisacodyL, 10 mg, Rectal, Daily PRN    dextrose 50%, 12.5 g, Intravenous, PRN    dextrose 50%, 25 g, Intravenous, PRN    glucagon (human recombinant), 1 mg, Intramuscular, PRN    hydrALAZINE, 10 mg, Intravenous, Q4H PRN    insulin aspart U-100, 0-10 Units, Subcutaneous, Q4H PRN    labetalol, 10 mg, Intravenous, Q4H PRN    melatonin, 6 mg, Per G Tube, Nightly PRN    simethicone, 1 tablet, Per G Tube, TID PRN    sodium chloride 0.9%, 10 mL, Intravenous, PRN    Objective:     Vital Signs (Most Recent):  Temp: 98.8 °F (37.1 °C) (06/27/25 1123)  Pulse: 71 (06/27/25 1123)  Resp: 18 (06/27/25 1123)  BP: 124/75 (06/27/25 1123)  SpO2: (!) 92 % (06/27/25 1123)  BP Location: Left arm    Vital Signs Range (Last 24H):  Temp:  [97.4 °F (36.3 °C)-98.8 °F (37.1 °C)]   Pulse:  [66-86]   Resp:  [18]   BP: (124-168)/(75-88)   SpO2:  [91 %-94 %]   BP Location: Left arm       Physical Exam  HENT:      Head: Normocephalic and atraumatic.      " Mouth/Throat:      Mouth: Mucous membranes are moist.   Eyes:      Conjunctiva/sclera: Conjunctivae normal.   Cardiovascular:      Rate and Rhythm: Normal rate.   Pulmonary:      Effort: Pulmonary effort is normal.   Skin:     General: Skin is warm and dry.   Neurological:      Mental Status: He is alert.      Motor: Weakness present.              Neurological Exam:   LOC: drowsy  Attention Span: poor  Language: Expressive aphasia, Receptive aphasia  Articulation: Dysarthria  Orientation: Untestable due to severe aphasia   Facial Movement (CN VII): Lower facial weakness on the Right  Motor: Arm left  Normal 5/5  Leg left  Normal 5/5  Arm right  Plegia 0/5  Leg right Paresis: 1/5  Sensation: Cy-hypoesthesia right    Laboratory:  CMP:   Recent Labs   Lab 06/27/25  0442   CALCIUM 9.0   ALBUMIN 3.0*   PROT 7.2   *   K 3.9   CO2 25   *   BUN 44*   CREATININE 1.5*   ALKPHOS 149   ALT 15   AST 20   BILITOT 0.3     BMP:   Recent Labs   Lab 06/27/25  0442   *   K 3.9   *   CO2 25   BUN 44*   CREATININE 1.5*   CALCIUM 9.0     CBC:   Recent Labs   Lab 06/27/25  0442   WBC 9.25   RBC 4.45*   HGB 12.7*   HCT 40.4      MCV 91   MCH 28.5   MCHC 31.4*     Lipid Panel:   No results for input(s): "CHOL", "LDLCALC", "HDL", "TRIG" in the last 168 hours.    Coagulation:   No results for input(s): "PT", "INR", "APTT" in the last 168 hours.    Platelet Aggregation Study: No results for input(s): "PLTAGG", "PLTAGINTERP", "PLTAGREGLACO", "ADPPLTAGGREG" in the last 168 hours.  Hgb A1C:   No results for input(s): "HGBA1C" in the last 168 hours.    TSH:   No results for input(s): "TSH" in the last 168 hours.      Diagnostic Results   Brain Imaging   MRI Brain 6/20/25  Impression:     Acute left MCA distribution infarct.  No new large parenchymal hemorrhage.  Susceptibility artifact throughout the infarcted tissue may reflect contrast staining and/or blood products conversion.  Overall mass effect is increased " compared to prior with sulcal effacement throughout the left cerebral hemisphere and approximately 4 mm of rightward midline shift.     Stable size of the parenchymal hemorrhage centered in the inferior left temporal lobe.     CT 6/20/25: 1242  Impression:     Moderate-sized recent left MCA distribution infarct and small intraparenchymal hemorrhage appear grossly unchanged from prior study performed earlier on the same date.     Chronic ischemic change elsewhere with underlying cerebral and cerebellar volume loss, as before.     No new hemorrhage or major vascular distribution infarct elsewhere.  CT 6/20/25: 0834  Impression:     Early ischemic changes in the left MCA distribution, new from recent CT.     New left inferior temporal intraparenchymal hematoma.     Chronic ischemic change with cerebral and cerebellar volume loss, as above.     CT 6/19/25  Impression:     Question some early left MCA ischemia corresponding to the patient's known left MCA territory occlusion.  No hemorrhage.     Senescent changes as above.        All CT scans at this facility are performed  using dose modulation techniques as appropriate to performed exam including the following:  automated exposure control; adjustment of mA and/or kV according to the patients size (this includes techniques or standardized protocols for targeted exams where dose is matched to indication/reason for exam: i.e. extremities or head);  iterative reconstruction technique.     Vessel Imaging   CTA head and neck 6/19/25  Impression:     Distal left M1 MCA occlusion with relatively prompt collateralization of the more distal MCA vessels.     Severe stenosis left mid vertebral artery and left PCA.     Cardiac Imaging   TTE 6/19/25    Left Ventricle: The left ventricle is normal in size. Normal wall thickness. There is normal systolic function with a visually estimated ejection fraction of 60 - 65%.    Right Ventricle: The right ventricle is normal in size Wall  thickness is normal. Systolic function is normal.    The pulmonary artery pressure could not be estimated.    IVC/SVC: Normal venous pressure at 3 mmHg.

## 2025-06-27 NOTE — PROGRESS NOTES
Zohaib Garrett - Neurosurgery ([facility])  Vascular Neurology  Comprehensive Stroke Center  Progress Note    Assessment/Plan:     * Embolic stroke involving left middle cerebral artery  Cristi Pulido is a 70 y.o. male w/ PMH of HTN, DM, prior stroke w/ residual RSW who presents as a transfer from OSH after presenting with acute onset of dysarthria and RSW. Telestroke was completed and his NIHSS was 19. CT showed probable early ischemic changes in the L insular ribbon and L frontal lobe. CTA demonstrated occlusion of the distal L MCA M1 segment. He was given TNK at 0548. Patient was transferred to [facility] for further management. On arrival, repeat NIHSS of 24. Patient taken for repeat CTH which showed small left temporal hemorrhage along with continued early ischemic changes. Patient taken to IR for thrombectomy and to be admitted to NCC post-procedure.  S/P thrombectomy. TICI 3. MRI revealing for blood products in stroke bed, likely reperfusion injury. Etiology ESUS.     06/27/2025 NAEON. Neuro exam stable. Patient has tolerated tube feeds well. Will continue to monitor kidney function for now. Trial off restraints today to prepare for rehab placement. Added Lantus 15 units. MBSS today, patient able to have puree for pleasure feeds per speech.     Antithrombotics for secondary stroke prevention: Antiplatelets: Aspirin: 81 mg daily    Statins for secondary stroke prevention and hyperlipidemia, if present:   Statins: Atorvastatin- 40 mg daily    Aggressive risk factor modification: HTN, Smoking, DM, HLD     Rehab efforts: The patient has been evaluated by a stroke team provider and the therapy needs have been fully considered based off the presenting complaints and exam findings. The following therapy evaluations are needed: PT evaluate and treat, OT evaluate and treat, SLP evaluate and treat, PM&R evaluate for appropriate placement, current recommendation HIT    Diagnostics ordered/pending: None     VTE prophylaxis: Enoxaparin 40  mg SQ every 24 hours  Mechanical prophylaxis: Place SCDs    BP parameters: Infarct: Post sucessful thrombectomy, SBP <140        Restlessness  -6/24: QTc 441   -Seroquel 25 mg QHS     Hyperlipemia  -Stroke risk factor  -LDL 75, goal <70  -Atorvastatin 40 mg daily      Right sided weakness  -Secondary to stroke.   -Aggressive therapy     Dysarthria and anarthria  -Therapy eval and treat    Aphasia  -Secondary to stroke  -Aggressive therapy     Type 2 diabetes mellitus without complication, without long-term current use of insulin  Lab Results   Component Value Date    LABA1C 7.2 (H) 01/29/2018    HGBA1C 7.0 (H) 06/19/2025     Follow up repeat A1c. Hold home antihyperglycemics. SSI for goal -180 while in hospital  -Added Lantus 15u     Hypertension associated with diabetes  -Stroke risk factor  -SBP goal <140, maintain MAP >65  -BP range in the last 24 hrs: BP  Min: 124/75  Max: 168/79  -Current antihypertensive regimen:     -Amlodipine 10mg     -Lisinopril still on hold for now    -Metoprolol 50mg BID   --PRN labetalol/hydralazine     Tobacco dependence  Stroke risk factor  - on cessation  -nicotine patch PRN         06/20/2025 NAEON. S/P thrombectomy. TICI 3. TNK monitoring ended at 0548. Aphasic, follows no commands. RSW remains. MRI revealing for blood products in stroke bed, likely reperfusion injury. OK to start monotherapy with either plavix or ASA 6/21/25 for secondary stroke prevention. ECHO unremarkable. Family member at bedside agreeable to NGT placement. Etiology ESUS.   6/21/2025 NAEON. Neuro exam stable. Recommend holding AP therapy for now.   06/22/2025 NAEON. Neuro exam stable. Start AP therapy with ASA.   06/23/2025 NAEON. SLP recs NPO. PEG discussed with family and they are agreeable. Pt S/D to NPU.   06/24/2025 NGT pulled overnight and replaced. Placement confirmed via X ray. IR consulted for PEG placement. JEANNE, Creatinine and BUN uptrending. FWF flushes ordered. Restless during night.  EKG repeated. QTc 441. Seroquel 25 mg QHS ordered. Family requesting [place] IPR when medically ready.   06/25/2025 NAEON. Plans for PEG placement today. BUN and Cr uptrending. Fluids started. Lisinopril held.   06/26/2025 NAEON. PEG placed yesterday, will resume tube feeds this afternoon starting with trickle feeds. BUN and Cr Improved. Increased water boluses to 300.  06/27/2025 NAEON. Neuro exam stable. Patient has tolerated tube feeds well. Will continue to monitor kidney function for now. Trial off restraints today to prepare for rehab placement. Added Lantus 15 units.    STROKE DOCUMENTATION   Acute Stroke Times   Last Known Normal Date: 06/19/25  Last Known Normal Time: 0400  Symptom Onset Date: 06/19/25  Symptom Onset Time: 0400  Stroke Team Called Date: 06/19/25  Stroke Team Called Time: 0813  Stroke Team Arrival Date: 06/19/25  Stroke Team Arrival Time: 0813  CT Interpretation Time: 0827  Thrombolytic Therapy Recommended:  (already given prior to transfer)  CTA Interpretation Time:  (already completed prior to transfer)  Thrombectomy Recommended: Yes  Decision to Treat Time for IR: 0832    NIH Scale:  1a. Level of Consciousness: 2-->Not alert, requires repeated stimulation to attend, or is obtunded and requires strong or painful stimulation to make movements (not stereotyped)  1b. LOC Questions: 2-->Answers neither question correctly  1c. LOC Commands: 0-->Performs both tasks correctly  2. Best Gaze: 0-->Normal  3. Visual: 0-->No visual loss  4. Facial Palsy: 2-->Partial paralysis (total or near-total paralysis of lower face)  5a. Motor Arm, Left: 0-->No drift, limb holds 90 (or 45) degrees for full 10 secs  5b. Motor Arm, Right: 4-->No movement  6a. Motor Leg, Left: 0-->No drift, leg holds 30 degree position for full 5 secs  6b. Motor Leg, Right: 3-->No effort against gravity, leg falls to bed immediately  7. Limb Ataxia: 0-->Absent  8. Sensory: 0-->Normal, no sensory loss  9. Best Language: 2-->Severe  aphasia, all communication is through fragmentary expression, great need for inference, questioning, and guessing by the listener. Range of information that can be exchanged is limited, listener carries burden of. . . (see row details)  10. Dysarthria: 2-->Severe dysarthria, patients speech is so slurred as to be unintelligible in the absence of or out of proportion to any dysphasia, or is mute/anarthric  11. Extinction and Inattention (formerly Neglect): 0-->No abnormality  Total (NIH Stroke Scale): 17       Modified [place] Score: 2  Stephanie Coma Scale:    ABCD2 Score:    [id number] Score:   HAS -BLED Score:   ICH Score:   Hunt & Kimball Classification:      Hemorrhagic change of an Ischemic Stroke: Does this patient have an ischemic stroke with hemorrhagic changes? Yes, Grading Scale: PH Type 1 (PH-1) = hematoma in < 30% of the infarcted area with some slight space-occupying effect. Is this a symptomatic change?  No - Hemorrhage is not clinically significant     Neurologic Chief Complaint: L MCA stroke    Subjective:     Interval History: Patient is seen for follow-up neurological assessment and treatment recommendations: See hospital course.    HPI, Past Medical, Family, and Social History remains the same as documented in the initial encounter.     Review of Systems   Reason unable to perform ROS: Severe aphasia.     Scheduled Meds:   amLODIPine  10 mg Per G Tube QHS    aspirin  81 mg Per G Tube Daily    atorvastatin  40 mg Per G Tube Daily    FLUoxetine  20 mg Per G Tube Daily    heparin (porcine)  5,000 Units Subcutaneous Q8H    insulin aspart U-100  6 Units Subcutaneous Q4H    insulin glargine U-100  15 Units Subcutaneous Daily    metoprolol tartrate  50 mg Per G Tube BID    nicotine  1 patch Transdermal Daily    nystatin  500,000 Units Oral QID    polyethylene glycol  17 g Per G Tube BID    QUEtiapine  25 mg Per G Tube QHS    senna-docusate  1 tablet Per G Tube BID     Continuous Infusions:      PRN  Meds:  Current Facility-Administered Medications:     bisacodyL, 10 mg, Rectal, Daily PRN    dextrose 50%, 12.5 g, Intravenous, PRN    dextrose 50%, 25 g, Intravenous, PRN    glucagon (human recombinant), 1 mg, Intramuscular, PRN    hydrALAZINE, 10 mg, Intravenous, Q4H PRN    insulin aspart U-100, 0-10 Units, Subcutaneous, Q4H PRN    labetalol, 10 mg, Intravenous, Q4H PRN    melatonin, 6 mg, Per G Tube, Nightly PRN    simethicone, 1 tablet, Per G Tube, TID PRN    sodium chloride 0.9%, 10 mL, Intravenous, PRN    Objective:     Vital Signs (Most Recent):  Temp: 98.8 °F (37.1 °C) (06/27/25 1123)  Pulse: 71 (06/27/25 1123)  Resp: 18 (06/27/25 1123)  BP: 124/75 (06/27/25 1123)  SpO2: (!) 92 % (06/27/25 1123)  BP Location: Left arm    Vital Signs Range (Last 24H):  Temp:  [97.4 °F (36.3 °C)-98.8 °F (37.1 °C)]   Pulse:  [66-86]   Resp:  [18]   BP: (124-168)/(75-88)   SpO2:  [91 %-94 %]   BP Location: Left arm       Physical Exam  HENT:      Head: Normocephalic and atraumatic.      Mouth/Throat:      Mouth: Mucous membranes are moist.   Eyes:      Conjunctiva/sclera: Conjunctivae normal.   Cardiovascular:      Rate and Rhythm: Normal rate.   Pulmonary:      Effort: Pulmonary effort is normal.   Skin:     General: Skin is warm and dry.   Neurological:      Mental Status: He is alert.      Motor: Weakness present.              Neurological Exam:   LOC: drowsy  Attention Span: poor  Language: Expressive aphasia, Receptive aphasia  Articulation: Dysarthria  Orientation: Untestable due to severe aphasia   Facial Movement (CN VII): Lower facial weakness on the Right  Motor: Arm left  Normal 5/5  Leg left  Normal 5/5  Arm right  Plegia 0/5  Leg right Paresis: 1/5  Sensation: Cy-hypoesthesia right    Laboratory:  CMP:   Recent Labs   Lab 06/27/25  0442   CALCIUM 9.0   ALBUMIN 3.0*   PROT 7.2   *   K 3.9   CO2 25   *   BUN 44*   CREATININE 1.5*   ALKPHOS 149   ALT 15   AST 20   BILITOT 0.3     BMP:   Recent Labs   Lab  "06/27/25  0442   *   K 3.9   *   CO2 25   BUN 44*   CREATININE 1.5*   CALCIUM 9.0     CBC:   Recent Labs   Lab 06/27/25  0442   WBC 9.25   RBC 4.45*   HGB 12.7*   HCT 40.4      MCV 91   MCH 28.5   MCHC 31.4*     Lipid Panel:   No results for input(s): "CHOL", "LDLCALC", "HDL", "TRIG" in the last 168 hours.    Coagulation:   No results for input(s): "PT", "INR", "APTT" in the last 168 hours.    Platelet Aggregation Study: No results for input(s): "PLTAGG", "PLTAGINTERP", "PLTAGREGLACO", "ADPPLTAGGREG" in the last 168 hours.  Hgb A1C:   No results for input(s): "HGBA1C" in the last 168 hours.    TSH:   No results for input(s): "TSH" in the last 168 hours.      Diagnostic Results   Brain Imaging   MRI Brain 6/20/25  Impression:     Acute left MCA distribution infarct.  No new large parenchymal hemorrhage.  Susceptibility artifact throughout the infarcted tissue may reflect contrast staining and/or blood products conversion.  Overall mass effect is increased compared to prior with sulcal effacement throughout the left cerebral hemisphere and approximately 4 mm of rightward midline shift.     Stable size of the parenchymal hemorrhage centered in the inferior left temporal lobe.     [facility] 6/20/25: 1242  Impression:     Moderate-sized recent left MCA distribution infarct and small intraparenchymal hemorrhage appear grossly unchanged from prior study performed earlier on the same date.     Chronic ischemic change elsewhere with underlying cerebral and cerebellar volume loss, as before.     No new hemorrhage or major vascular distribution infarct elsewhere.  [facility] 6/20/25: 0834  Impression:     Early ischemic changes in the left MCA distribution, new from recent CT.     New left inferior temporal intraparenchymal hematoma.     Chronic ischemic change with cerebral and cerebellar volume loss, as above.     [facility] 6/19/25  Impression:     Question some early left MCA ischemia corresponding to the patient's known " left MCA territory occlusion.  No hemorrhage.     Senescent changes as above.        All CT scans at this facility are performed  using dose modulation techniques as appropriate to performed exam including the following:  automated exposure control; adjustment of mA and/or kV according to the patients size (this includes techniques or standardized protocols for targeted exams where dose is matched to indication/reason for exam: i.e. extremities or head);  iterative reconstruction technique.     Vessel Imaging   CTA head and neck 6/19/25  Impression:     Distal left M1 MCA occlusion with relatively prompt collateralization of the more distal MCA vessels.     Severe stenosis left mid vertebral artery and left PCA.     Cardiac Imaging   TTE 6/19/25    Left Ventricle: The left ventricle is normal in size. Normal wall thickness. There is normal systolic function with a visually estimated ejection fraction of 60 - 65%.    Right Ventricle: The right ventricle is normal in size Wall thickness is normal. Systolic function is normal.    The pulmonary artery pressure could not be estimated.    IVC/SVC: Normal venous pressure at 3 mmHg.       Franci Zambrano PA-C  Comprehensive Stroke Center  Department of Vascular Neurology   [facility] - Neurosurgery [facility])

## 2025-06-27 NOTE — ASSESSMENT & PLAN NOTE
Lab Results   Component Value Date    LABA1C 7.2 (H) 01/29/2018    HGBA1C 7.0 (H) 06/19/2025     Follow up repeat A1c. Hold home antihyperglycemics. SSI for goal -180 while in hospital  -Added Lantus 15u

## 2025-06-27 NOTE — PROCEDURES
Modified Barium Swallow    Patient Name:  Cristi Pulido   MRN:  [id number]    Recommendations:     General Recommendations:  Dysphagia therapy and Speech/language therapy  Diet recommendations:  NPO, Pleasure Feeding, NPO   Allow pleasure feeds of puree   Allow tsp sips of water & small ice chips for pleasure   Aspiration Precautions: Feed assist, 1 bite/sip at a time, Check for pocketing/oral residue, Continue alternate means of nutrition, Frequent oral care, HOB to 90 degrees, Ice chips sparingly, Meds crushed in puree, Small bites/sips, and Strict aspiration precautions   General Precautions: Standard, aspiration, fall, pureed diet    Referral     Reason for Referral  Patient was referred for a Modified Barium Swallow Study to assess the efficiency of his/her swallow function, rule out aspiration and make recommendations regarding safe dietary consistencies, effective compensatory strategies, and safe eating environment.     Diagnosis: Embolic stroke involving left middle cerebral artery     History:     Past Medical History:   Diagnosis Date    Diabetes mellitus     Hyperlipidemia     Hypertension     Stroke     Stroke     right side weak    Type 2 diabetes mellitus      Objective:     Current Respiratory Status: 06/27/25    Alert: yes    Cooperative: yes    Follows Directions: no    Visualization  Patient was seen in the lateral view    Oral Peripheral Examination  Oral Musculature: gross asymmetry present, right weakness  Dentition: edentulous, rarely or never uses dentures to eat  Secretion Management: adequate  Mucosal Quality: adequate  Mandibular Strength and Mobility: WFL  Oral Labial Strength and Mobility: impaired coordination, impaired seal, impaired pursing, impaired retraction  Lingual Strength and Mobility: impaired protrusion, impaired right lateral movement, impaired left lateral movement  Volitional Cough: unable to elicit  Volitional Swallow: unable to elicit  Voice Prior to PO Intake: clear,  strong    Consistencies Assessed  Thin - tsp x 3, cup x 3, straw attempt x 5, straw x 3  Nectar thick - cup sip x 3  Puree - tsp x 2  Soft solids - marisa cracker finely crushed in puree (~minced and moist texture)- x 1 tsp  Cracker x 1    Oral Preparation/Oral Phase  Anterior loss of all liquids bolus from right corner of mouth  Poor bolus formation and control  prolonged A-P transfer  No attempt to masticate cracker- therefore fished from oral cavity and crushed up in puree for safety.  Orolingual residue present post swallow  Decreased base of tongue mobility  Premature spillage  No presence of naso-pharyngeal reflux    Pharyngeal Phase   Pt with delayed swallow trigger to the pyriform sinus cavities with all liquids, pooling present, exacerbated by oral stage deficits.   Pt with reduced BOT retraction  Pt with adequate hyolaryngeal elevation and excursion patterns  Pt with complete epiglottic inversion patterns, slow epiglottic return, epiglottic atrophy observed   Pt with penetration during the swallow with large cup sips of nectar thick liquids, eliminated with smaller sips.  Pt with deep penetration to the cords w/ subsequent trace aspiration to the underbelly of the cords during the swallow with large, consecutive straw sips of thin liquids, eliminated with smaller tsp sized sips.   Pt without sensory response/airway protective response  Pt unable to follow commands to completed compensatory strategies across study.  Pt with adequate airway protection without penetration or aspiration with puree and soft solid consistencies as well as small tsp sips of thin liquids.    There was presence of mild valleculae and pyriform sinus residue with all liquid consistencies trialed, spilling down from oral cavity.   Pt unable to follow commands to utilize double swallow in order to clear; therefore, provided puree wash to help successfully clear majority of residue.       Cervical Esophageal Phase  UES appeared to  accommodate all bolus types without stasis or retrograde movement observed     Assessment:     Impressions  Patient demonstrates a moderate oral stage dysphagia and mild pharyngeal stage dysphagia. He remains at increased risk for penetration/aspiration during the swallow 2/2 right sided weakness and oral stage deficits, decreased sensation/airway protective response, poor command following, and cognitive impairment. SLP will continue to follow.     Prognosis: Fair/Good    Barriers:  Cognitive status  Dependent feeding  Poor command following  Decreased sensitivity/ airway protection  Right sided/ generalized weakness    Plan  Recommend he continue with PEG tube as primary means of nutrition at this time.   Allow pleasure feeds of puree for pleasure & small tsp sips of thin water  Continue SLP POC    Education  Results were discussed with patient. Results were discussed with Medical Team who was in agreement with plan.     Goals:   Multidisciplinary Problems       SLP Goals          Problem: SLP    Goal Priority Disciplines Outcome   SLP Goal     SLP Progressing   Description: Speech Language Pathology Goals  Goals expected to be met by 7/3    1. Pt will participate in ongoing swallow assessment to determine least restrictive PO diet.    2. Pt will participate in ongoing cognitive-linguistic assessment to determine additional therapeutic needs.   3. Pt will follow 1 step commands with 80% acc following model/prompt.   4. Pt will answer simple Y/N questions with 80% acc.                                Plan:     Patient to be seen:  Therapy Frequency: 4 x/week   Plan of Care expires:  07/19/25  Plan of Care reviewed with:  patient, daughter, spouse        Discharge recommendations:  High Intensity Therapy   Barriers to Discharge:  Level of Skilled Assistance Needed      Time Tracking:     SLP Treatment Date:   06/27/25  Speech Start Time:  0908  Speech Stop Time:  0935     Speech Total Time (min):  27  min    06/27/2025

## 2025-06-28 PROBLEM — Z46.59 ENCOUNTER FOR NASOGASTRIC TUBE PLACEMENT: Status: ACTIVE | Noted: 2025-06-28

## 2025-06-28 LAB
ABSOLUTE EOSINOPHIL (OHS): 0.28 K/UL
ABSOLUTE MONOCYTE (OHS): 0.91 K/UL (ref 0.3–1)
ABSOLUTE NEUTROPHIL COUNT (OHS): 5.29 K/UL (ref 1.8–7.7)
ALBUMIN SERPL BCP-MCNC: 2.8 G/DL (ref 3.5–5.2)
ALP SERPL-CCNC: 130 UNIT/L (ref 40–150)
ALT SERPL W/O P-5'-P-CCNC: 28 UNIT/L (ref 10–44)
ANION GAP (OHS): 12 MMOL/L (ref 8–16)
AST SERPL-CCNC: 36 UNIT/L (ref 11–45)
BASOPHILS # BLD AUTO: 0.04 K/UL
BASOPHILS NFR BLD AUTO: 0.5 %
BILIRUB SERPL-MCNC: 0.3 MG/DL (ref 0.1–1)
BILIRUB UR QL STRIP.AUTO: NEGATIVE
BUN SERPL-MCNC: 58 MG/DL (ref 8–23)
CALCIUM SERPL-MCNC: 8.9 MG/DL (ref 8.7–10.5)
CHLORIDE SERPL-SCNC: 115 MMOL/L (ref 95–110)
CLARITY UR: CLEAR
CO2 SERPL-SCNC: 25 MMOL/L (ref 23–29)
COLOR UR AUTO: YELLOW
CREAT SERPL-MCNC: 2.4 MG/DL (ref 0.5–1.4)
ERYTHROCYTE [DISTWIDTH] IN BLOOD BY AUTOMATED COUNT: 13.3 % (ref 11.5–14.5)
GFR SERPLBLD CREATININE-BSD FMLA CKD-EPI: 28 ML/MIN/1.73/M2
GLUCOSE SERPL-MCNC: 144 MG/DL (ref 70–110)
GLUCOSE UR QL STRIP: NEGATIVE
HCT VFR BLD AUTO: 38.8 % (ref 40–54)
HGB BLD-MCNC: 12.1 GM/DL (ref 14–18)
HGB UR QL STRIP: NEGATIVE
IMM GRANULOCYTES # BLD AUTO: 0.02 K/UL (ref 0–0.04)
IMM GRANULOCYTES NFR BLD AUTO: 0.2 % (ref 0–0.5)
KETONES UR QL STRIP: NEGATIVE
LEUKOCYTE ESTERASE UR QL STRIP: NEGATIVE
LYMPHOCYTES # BLD AUTO: 2.14 K/UL (ref 1–4.8)
MAGNESIUM SERPL-MCNC: 2.7 MG/DL (ref 1.6–2.6)
MCH RBC QN AUTO: 28.3 PG (ref 27–31)
MCHC RBC AUTO-ENTMCNC: 31.2 G/DL (ref 32–36)
MCV RBC AUTO: 91 FL (ref 82–98)
NITRITE UR QL STRIP: NEGATIVE
NUCLEATED RBC (/100WBC) (OHS): 0 /100 WBC
OSMOLALITY SERPL: 268 MOSM/KG (ref 280–300)
OSMOLALITY UR: 576 MOSM/KG (ref 50–1200)
PH UR STRIP: 6 [PH]
PHOSPHATE SERPL-MCNC: 3.3 MG/DL (ref 2.7–4.5)
PLATELET # BLD AUTO: 233 K/UL (ref 150–450)
PMV BLD AUTO: 13.2 FL (ref 9.2–12.9)
POCT GLUCOSE: 134 MG/DL (ref 70–110)
POCT GLUCOSE: 152 MG/DL (ref 70–110)
POCT GLUCOSE: 179 MG/DL (ref 70–110)
POCT GLUCOSE: 200 MG/DL (ref 70–110)
POTASSIUM SERPL-SCNC: 4 MMOL/L (ref 3.5–5.1)
PROT SERPL-MCNC: 6.8 GM/DL (ref 6–8.4)
PROT UR QL STRIP: NEGATIVE
RBC # BLD AUTO: 4.27 M/UL (ref 4.6–6.2)
RELATIVE EOSINOPHIL (OHS): 3.2 %
RELATIVE LYMPHOCYTE (OHS): 24.7 % (ref 18–48)
RELATIVE MONOCYTE (OHS): 10.5 % (ref 4–15)
RELATIVE NEUTROPHIL (OHS): 60.9 % (ref 38–73)
SODIUM SERPL-SCNC: 152 MMOL/L (ref 136–145)
SODIUM SERPL-SCNC: 152 MMOL/L (ref 136–145)
SODIUM UR-SCNC: 24 MMOL/L (ref 20–250)
SP GR UR STRIP: 1.02
UROBILINOGEN UR STRIP-ACNC: NEGATIVE EU/DL
WBC # BLD AUTO: 8.68 K/UL (ref 3.9–12.7)

## 2025-06-28 PROCEDURE — 84300 ASSAY OF URINE SODIUM: CPT | Mod: HCNC

## 2025-06-28 PROCEDURE — 83930 ASSAY OF BLOOD OSMOLALITY: CPT | Mod: HCNC

## 2025-06-28 PROCEDURE — 51701 INSERT BLADDER CATHETER: CPT | Mod: HCNC

## 2025-06-28 PROCEDURE — 83935 ASSAY OF URINE OSMOLALITY: CPT | Mod: HCNC

## 2025-06-28 PROCEDURE — 63600175 PHARM REV CODE 636 W HCPCS: Mod: HCNC

## 2025-06-28 PROCEDURE — S4991 NICOTINE PATCH NONLEGEND: HCPCS | Mod: HCNC

## 2025-06-28 PROCEDURE — 81003 URINALYSIS AUTO W/O SCOPE: CPT | Mod: HCNC

## 2025-06-28 PROCEDURE — 36415 COLL VENOUS BLD VENIPUNCTURE: CPT | Mod: HCNC

## 2025-06-28 PROCEDURE — 80053 COMPREHEN METABOLIC PANEL: CPT | Mod: HCNC

## 2025-06-28 PROCEDURE — 25500020 PHARM REV CODE 255: Mod: HCNC | Performed by: PSYCHIATRY & NEUROLOGY

## 2025-06-28 PROCEDURE — 25000003 PHARM REV CODE 250: Mod: HCNC

## 2025-06-28 PROCEDURE — 84295 ASSAY OF SERUM SODIUM: CPT | Mod: HCNC

## 2025-06-28 PROCEDURE — 51798 US URINE CAPACITY MEASURE: CPT | Mod: HCNC

## 2025-06-28 PROCEDURE — 85025 COMPLETE CBC W/AUTO DIFF WBC: CPT | Mod: HCNC

## 2025-06-28 PROCEDURE — 11000001 HC ACUTE MED/SURG PRIVATE ROOM: Mod: HCNC

## 2025-06-28 PROCEDURE — 84100 ASSAY OF PHOSPHORUS: CPT | Mod: HCNC

## 2025-06-28 PROCEDURE — 63600175 PHARM REV CODE 636 W HCPCS: Mod: HCNC | Performed by: STUDENT IN AN ORGANIZED HEALTH CARE EDUCATION/TRAINING PROGRAM

## 2025-06-28 PROCEDURE — 99233 SBSQ HOSP IP/OBS HIGH 50: CPT | Mod: HCNC,,, | Performed by: PSYCHIATRY & NEUROLOGY

## 2025-06-28 PROCEDURE — 83735 ASSAY OF MAGNESIUM: CPT | Mod: HCNC

## 2025-06-28 PROCEDURE — 94761 N-INVAS EAR/PLS OXIMETRY MLT: CPT | Mod: HCNC

## 2025-06-28 PROCEDURE — 99232 SBSQ HOSP IP/OBS MODERATE 35: CPT | Mod: HCNC,25,, | Performed by: STUDENT IN AN ORGANIZED HEALTH CARE EDUCATION/TRAINING PROGRAM

## 2025-06-28 RX ORDER — DEXTROSE MONOHYDRATE 50 MG/ML
INJECTION, SOLUTION INTRAVENOUS CONTINUOUS
Status: DISCONTINUED | OUTPATIENT
Start: 2025-06-28 | End: 2025-06-28

## 2025-06-28 RX ORDER — SODIUM CHLORIDE 450 MG/100ML
INJECTION, SOLUTION INTRAVENOUS CONTINUOUS
Status: ACTIVE | OUTPATIENT
Start: 2025-06-28 | End: 2025-06-29

## 2025-06-28 RX ORDER — FENTANYL CITRATE 50 UG/ML
INJECTION, SOLUTION INTRAMUSCULAR; INTRAVENOUS
Status: COMPLETED | OUTPATIENT
Start: 2025-06-28 | End: 2025-06-28

## 2025-06-28 RX ORDER — DIPHENHYDRAMINE HYDROCHLORIDE 50 MG/ML
INJECTION, SOLUTION INTRAMUSCULAR; INTRAVENOUS
Status: COMPLETED | OUTPATIENT
Start: 2025-06-28 | End: 2025-06-28

## 2025-06-28 RX ADMIN — INSULIN ASPART 6 UNITS: 100 INJECTION, SOLUTION INTRAVENOUS; SUBCUTANEOUS at 12:06

## 2025-06-28 RX ADMIN — HEPARIN SODIUM 5000 UNITS: 5000 INJECTION INTRAVENOUS; SUBCUTANEOUS at 09:06

## 2025-06-28 RX ADMIN — HEPARIN SODIUM 5000 UNITS: 5000 INJECTION INTRAVENOUS; SUBCUTANEOUS at 02:06

## 2025-06-28 RX ADMIN — SODIUM CHLORIDE: 4.5 INJECTION, SOLUTION INTRAVENOUS at 12:06

## 2025-06-28 RX ADMIN — NYSTATIN 500000 UNITS: 100000 SUSPENSION ORAL at 02:06

## 2025-06-28 RX ADMIN — INSULIN ASPART 6 UNITS: 100 INJECTION, SOLUTION INTRAVENOUS; SUBCUTANEOUS at 05:06

## 2025-06-28 RX ADMIN — NYSTATIN 500000 UNITS: 100000 SUSPENSION ORAL at 06:06

## 2025-06-28 RX ADMIN — HEPARIN SODIUM 5000 UNITS: 5000 INJECTION INTRAVENOUS; SUBCUTANEOUS at 05:06

## 2025-06-28 RX ADMIN — NYSTATIN 500000 UNITS: 100000 SUSPENSION ORAL at 08:06

## 2025-06-28 RX ADMIN — DIPHENHYDRAMINE HYDROCHLORIDE 12.5 MG: 50 INJECTION, SOLUTION INTRAMUSCULAR; INTRAVENOUS at 10:06

## 2025-06-28 RX ADMIN — IOHEXOL 5 ML: 300 INJECTION, SOLUTION INTRAVENOUS at 11:06

## 2025-06-28 RX ADMIN — INSULIN GLARGINE 15 UNITS: 100 INJECTION, SOLUTION SUBCUTANEOUS at 09:06

## 2025-06-28 RX ADMIN — FENTANYL CITRATE 25 MCG: 50 INJECTION, SOLUTION INTRAMUSCULAR; INTRAVENOUS at 10:06

## 2025-06-28 RX ADMIN — Medication 1 PATCH: at 08:06

## 2025-06-28 RX ADMIN — INSULIN ASPART 2 UNITS: 100 INJECTION, SOLUTION INTRAVENOUS; SUBCUTANEOUS at 08:06

## 2025-06-28 NOTE — NURSING
Inform FERNANDA Kamara NP per messaging that bladder scan now = 738 ml and no urine output in male purewick and asking if would like another in and out catheterization.

## 2025-06-28 NOTE — PROCEDURES
"  Pre Op Diagnosis: Dysphagia  Post Op Diagnosis: Same    Procedure: attempt at replacing Gtube    Procedure performed by: Ben    Written Informed Consent Obtained: Yes  Specimen Removed: NO  Estimated Blood Loss: Minimal    Findings:   Unsuccessful attempt at replacing Gtube which patient pulled out last night. Will need to wait until early next week before attempt at placing new gtube can be attempted. Will ne NG tube placed by Sunday evening     Patient tolerated procedure well.    Danial Contreras MD ([facility])  Interventional Radiology  [phone]      "

## 2025-06-28 NOTE — NURSING
Received call from IR nurse informing that the doctor was unable to replace G-tube and that the pt is on his way back to his room.

## 2025-06-28 NOTE — PLAN OF CARE
Pt arrived to IR room 188 for g-tube placement. Pt oriented to unit and staff. Plan of care reviewed with patient, patient verbalizes understanding. Comfort measures utilized. Pt safely transferred from stretcher to procedural table. Fall risk reviewed with patient, fall risk interventions maintained. Safety strap applied, positioner pillows utilized to minimize pressure points. Blankets applied. Pt prepped and draped utilizing standard sterile technique. Patient placed on continuous monitoring, as required by sedation policy. Timeouts completed utilizing standard universal time-out, per department and facility policy. RN to remain at bedside, continuous monitoring maintained. Pt resting comfortably. Denies pain/discomfort. Will continue to monitor. See flow sheets for monitoring, medication administration, and updates.

## 2025-06-28 NOTE — NURSING
Message sent to NP informing Patient has returned from IR and his family, daughter and wife, would like to speak with NP.

## 2025-06-28 NOTE — NURSING
10:10 Bladder scan showing 935 ml in bladder; 10:25 In and out cath done per order and removed 550 ml; pt then left to go to IR PEG replacement at 10:36 with transporter via stretcher. Urine specimen obtained for ordered labs. IVF's to start after IR H. TARSHA Kamara notified.

## 2025-06-28 NOTE — NURSING
Patient pulled  out PEG tube, tele monitor,  external cath.attending NP notified Restraint resumed. .

## 2025-06-28 NOTE — ASSESSMENT & PLAN NOTE
PEG removed per patient.   IR consulted for replacement. Attempt unsuccessful, will attempt again week of 6/30.   NGT replaced. TF and FWF resumed.

## 2025-06-28 NOTE — ASSESSMENT & PLAN NOTE
-Stroke risk factor  -SBP goal <140, maintain MAP >65  -BP range in the last 24 hrs: BP  Min: 115/71  Max: 148/76  -Current antihypertensive regimen:     -Amlodipine 10mg     -Lisinopril still on hold for now    -Metoprolol 50mg BID   --PRN labetalol/hydralazine

## 2025-06-28 NOTE — NURSING
Right heydi ABRAHAMT x-ray completed (@ 13:55) and result pending.   You can access the FollowMyHealth Patient Portal offered by [facility] by registering at the following website: [email or web address]. By joining [facility]’s FollowMyHealth portal, you will also be able to view your health information using other applications (apps) compatible with our system.

## 2025-06-28 NOTE — SUBJECTIVE & OBJECTIVE
Neurologic Chief Complaint: Embolic Stroke Involving left MCA    Subjective:     Interval History: Patient is seen for follow-up neurological assessment and treatment recommendations: See Hospital Course    HPI, Past Medical, Family, and Social History remains the same as documented in the initial encounter.     Review of Systems   Unable to perform ROS: Patient nonverbal     Scheduled Meds:   amLODIPine  10 mg Per G Tube QHS    aspirin  81 mg Per G Tube Daily    atorvastatin  40 mg Per G Tube Daily    FLUoxetine  20 mg Per G Tube Daily    heparin (porcine)  5,000 Units Subcutaneous Q8H    insulin aspart U-100  6 Units Subcutaneous Q4H    insulin glargine U-100  15 Units Subcutaneous Daily    metoprolol tartrate  50 mg Per G Tube BID    nicotine  1 patch Transdermal Daily    nystatin  500,000 Units Oral QID    polyethylene glycol  17 g Per G Tube BID    QUEtiapine  25 mg Per G Tube QHS    senna-docusate  1 tablet Per G Tube BID     Continuous Infusions:   0.45% NaCl   Intravenous Continuous 100 mL/hr at 06/28/25 1201 New Bag at 06/28/25 1201     PRN Meds:  Current Facility-Administered Medications:     bisacodyL, 10 mg, Rectal, Daily PRN    dextrose 50%, 12.5 g, Intravenous, PRN    dextrose 50%, 25 g, Intravenous, PRN    glucagon (human recombinant), 1 mg, Intramuscular, PRN    hydrALAZINE, 10 mg, Intravenous, Q4H PRN    insulin aspart U-100, 0-10 Units, Subcutaneous, Q4H PRN    labetalol, 10 mg, Intravenous, Q4H PRN    melatonin, 6 mg, Per G Tube, Nightly PRN    simethicone, 1 tablet, Per G Tube, TID PRN    sodium chloride 0.9%, 10 mL, Intravenous, PRN    Objective:     Vital Signs (Most Recent):  Temp: 98 °F (36.7 °C) (06/28/25 1506)  Pulse: 96 (06/28/25 1506)  Resp: 18 (06/28/25 1506)  BP: (!) 140/70 (06/28/25 1506)  SpO2: 100 % (06/28/25 1506)  BP Location: Right arm    Vital Signs Range (Last 24H):  Temp:  [97.5 °F (36.4 °C)-99.5 °F (37.5 °C)]   Pulse:  [72-96]   Resp:  [16-20]   BP: (115-148)/(70-81)   SpO2:   "[94 %-100 %]   BP Location: Right arm       Physical Exam  Vitals and nursing note reviewed.   HENT:      Mouth/Throat:      Mouth: Mucous membranes are dry.   Eyes:      Pupils: Pupils are equal, round, and reactive to light.   Cardiovascular:      Rate and Rhythm: Normal rate.   Pulmonary:      Effort: No respiratory distress.   Skin:     General: Skin is warm and dry.   Neurological:      Mental Status: He is alert.              Neurological Exam:   LOC: alert  Attention Span: Good   Language: Expressive aphasia, Receptive aphasia  Articulation: Untestable due to severe aphasia   Orientation: Untestable due to severe aphasia   Motor: Arm left  Normal 5/5  Leg left  Normal 5/5  Arm right  Plegia 0/5  Leg right Paresis: 1/5    Laboratory:  CMP:   Recent Labs   Lab 06/28/25  0434   CALCIUM 8.9   ALBUMIN 2.8*   PROT 6.8   *   K 4.0   CO2 25   *   BUN 58*   CREATININE 2.4*   ALKPHOS 130   ALT 28   AST 36   BILITOT 0.3     BMP:   Recent Labs   Lab 06/28/25  0434   *   K 4.0   *   CO2 25   BUN 58*   CREATININE 2.4*   CALCIUM 8.9     CBC:   Recent Labs   Lab 06/28/25  0434   WBC 8.68   RBC 4.27*   HGB 12.1*   HCT 38.8*      MCV 91   MCH 28.3   MCHC 31.2*     Lipid Panel: No results for input(s): "CHOL", "LDLCALC", "HDL", "TRIG" in the last 168 hours.  Coagulation: No results for input(s): "PT", "INR", "APTT" in the last 168 hours.  Platelet Aggregation Study: No results for input(s): "PLTAGG", "PLTAGINTERP", "PLTAGREGLACO", "ADPPLTAGGREG" in the last 168 hours.  Hgb A1C: No results for input(s): "HGBA1C" in the last 168 hours.  TSH: No results for input(s): "TSH" in the last 168 hours.    Diagnostic Results   Brain Imaging   MRI Brain 6/20/25  Impression:     Acute left MCA distribution infarct.  No new large parenchymal hemorrhage.  Susceptibility artifact throughout the infarcted tissue may reflect contrast staining and/or blood products conversion.  Overall mass effect is increased " compared to prior with sulcal effacement throughout the left cerebral hemisphere and approximately 4 mm of rightward midline shift.     Stable size of the parenchymal hemorrhage centered in the inferior left temporal lobe.     CT 6/20/25: 1242  Impression:     Moderate-sized recent left MCA distribution infarct and small intraparenchymal hemorrhage appear grossly unchanged from prior study performed earlier on the same date.     Chronic ischemic change elsewhere with underlying cerebral and cerebellar volume loss, as before.     No new hemorrhage or major vascular distribution infarct elsewhere.  CT 6/20/25: 0834  Impression:     Early ischemic changes in the left MCA distribution, new from recent CT.     New left inferior temporal intraparenchymal hematoma.     Chronic ischemic change with cerebral and cerebellar volume loss, as above.     CT 6/19/25  Impression:     Question some early left MCA ischemia corresponding to the patient's known left MCA territory occlusion.  No hemorrhage.     Senescent changes as above.        All CT scans at this facility are performed  using dose modulation techniques as appropriate to performed exam including the following:  automated exposure control; adjustment of mA and/or kV according to the patients size (this includes techniques or standardized protocols for targeted exams where dose is matched to indication/reason for exam: i.e. extremities or head);  iterative reconstruction technique.     Vessel Imaging   CTA head and neck 6/19/25  Impression:     Distal left M1 MCA occlusion with relatively prompt collateralization of the more distal MCA vessels.     Severe stenosis left mid vertebral artery and left PCA.     Cardiac Imaging   TTE 6/19/25    Left Ventricle: The left ventricle is normal in size. Normal wall thickness. There is normal systolic function with a visually estimated ejection fraction of 60 - 65%.    Right Ventricle: The right ventricle is normal in size Wall  thickness is normal. Systolic function is normal.    The pulmonary artery pressure could not be estimated.    IVC/SVC: Normal venous pressure at 3 mmHg.

## 2025-06-28 NOTE — PROGRESS NOTES
[facility] - Neurosurgery ([facility])  Vascular Neurology  Comprehensive Stroke Center  Progress Note    Assessment/Plan:     * Embolic stroke involving left middle cerebral artery  Cristi Pulido is a 70 y.o. male w/ PMH of HTN, DM, prior stroke w/ residual RSW who presents as a transfer from OSH after presenting with acute onset of dysarthria and RSW. Telestroke was completed and his NIHSS was 19. CT showed probable early ischemic changes in the L insular ribbon and L frontal lobe. CTA demonstrated occlusion of the distal L MCA M1 segment. He was given TNK at 0548. Patient was transferred to [facility] [place], [place] for further management. On arrival, repeat NIHSS of 24. Patient taken for repeat CTH which showed small left temporal hemorrhage along with continued early ischemic changes. Patient taken to IR for thrombectomy and to be admitted to [facility] post-procedure.  S/P thrombectomy. TICI 3. MRI revealing for blood products in stroke bed, likely reperfusion injury. Etiology ESUS.     6/28: Na 152. 0.45% NS at 100 ml/hr ordered x 12 hrs. Will repeat sodium draw scheduled at 2100. Pulled PEG. IR unable to replace PEG. Will attempt in coming days. NGT replaced, placement confirmed via Xray. TF and FWF restarted.     Antithrombotics for secondary stroke prevention: Antiplatelets: Aspirin: 81 mg daily    Statins for secondary stroke prevention and hyperlipidemia, if present:   Statins: Atorvastatin- 40 mg daily    Aggressive risk factor modification: HTN, Smoking, DM, HLD     Rehab efforts: The patient has been evaluated by a stroke team provider and the therapy needs have been fully considered based off the presenting complaints and exam findings. The following therapy evaluations are needed: PT evaluate and treat, OT evaluate and treat, SLP evaluate and treat, PM&R evaluate for appropriate placement, current recommendation HIT    Diagnostics ordered/pending: None     VTE prophylaxis: Enoxaparin 40 mg SQ every 24 hours  Mechanical prophylaxis:  Place SCDs    BP parameters: Infarct: Post sucessful thrombectomy, SBP <140        Encounter for nasogastric tube placement  PEG removed per patient.   IR consulted for replacement. Attempt unsuccessful, will attempt again week of 6/30.   NGT replaced. TF and FWF resumed.     Restlessness  -6/24: QTc 441   -Seroquel 25 mg QHS     Hyperlipemia  -Stroke risk factor  -LDL 75, goal <70  -Atorvastatin 40 mg daily      Right sided weakness  -Secondary to stroke.   -Aggressive therapy     Dysarthria and anarthria  -Therapy eval and treat    Aphasia  -Secondary to stroke  -Aggressive therapy     Type 2 diabetes mellitus without complication, without long-term current use of insulin  Lab Results   Component Value Date    LABA1C 7.2 (H) 01/29/2018    HGBA1C 7.0 (H) 06/19/2025     Follow up repeat A1c. Hold home antihyperglycemics. SSI for goal -180 while in hospital  -Added Lantus 15u     Hypertension associated with diabetes  -Stroke risk factor  -SBP goal <140, maintain MAP >65  -BP range in the last 24 hrs: BP  Min: 115/71  Max: 148/76  -Current antihypertensive regimen:     -Amlodipine 10mg     -Lisinopril still on hold for now    -Metoprolol 50mg BID   --PRN labetalol/hydralazine     Tobacco dependence  Stroke risk factor  - on cessation  -nicotine patch PRN         06/20/2025 NAEON. S/P thrombectomy. TICI 3. TNK monitoring ended at 0548. Aphasic, follows no commands. RSW remains. MRI revealing for blood products in stroke bed, likely reperfusion injury. OK to start monotherapy with either plavix or ASA 6/21/25 for secondary stroke prevention. ECHO unremarkable. Family member at bedside agreeable to NGT placement. Etiology ESUS.   6/21/2025 NAEON. Neuro exam stable. Recommend holding AP therapy for now.   06/22/2025 NAEON. Neuro exam stable. Start AP therapy with ASA.   06/23/2025 NAEON. SLP recs NPO. PEG discussed with family and they are agreeable. Pt S/D to NPU.   06/24/2025 NGT pulled overnight and  replaced. Placement confirmed via X ray. IR consulted for PEG placement. JEANNE, Creatinine and BUN uptrending. FWF flushes ordered. Restless during night. EKG repeated. QTc 441. Seroquel 25 mg QHS ordered. Family requesting [facility] IPR when medically ready.   06/25/2025 NAEON. Plans for PEG placement today. BUN and Cr uptrending. Fluids started. Lisinopril held.   06/26/2025 NAEON. PEG placed yesterday, will resume tube feeds this afternoon starting with trickle feeds. BUN and Cr Improved. Increased water boluses to 300.  06/27/2025 NAEON. Neuro exam stable. Patient has tolerated tube feeds well. Will continue to monitor kidney function for now. Trial off restraints today to prepare for rehab placement. Added Lantus 15 units. MBSS today, patient able to have puree for pleasure feeds per speech.   06/28/2025 Na 152. 0.45% NS at 100 ml/hr ordered x 12 hrs. Will repeat sodium draw scheduled at 2100. Pulled PEG. IR unable to replace PEG. Will attempt in coming days. NGT replaced, placement confirmed via Xray. TF and FWF restarted.     STROKE DOCUMENTATION   Acute Stroke Times   Last Known Normal Date: 06/19/25  Last Known Normal Time: 0400  Symptom Onset Date: 06/19/25  Symptom Onset Time: 0400  Stroke Team Called Date: 06/19/25  Stroke Team Called Time: 0813  Stroke Team Arrival Date: 06/19/25  Stroke Team Arrival Time: 0813  CT Interpretation Time: 0827  Thrombolytic Therapy Recommended:  (already given prior to transfer)  CTA Interpretation Time:  (already completed prior to transfer)  Thrombectomy Recommended: Yes  Decision to Treat Time for IR: 0832    NIH Scale:  1a. Level of Consciousness: 0-->Alert, keenly responsive  1b. LOC Questions: 2-->Answers neither question correctly  1c. LOC Commands: 2-->Performs neither task correctly  2. Best Gaze: 0-->Normal  3. Visual: 0-->No visual loss  4. Facial Palsy: 1-->Minor paralysis (flattened nasolabial fold, asymmetry on smiling)  5a. Motor Arm, Left: 0-->No drift, limb  holds 90 (or 45) degrees for full 10 secs  5b. Motor Arm, Right: 4-->No movement  6a. Motor Leg, Left: 0-->No drift, leg holds 30 degree position for full 5 secs  6b. Motor Leg, Right: 3-->No effort against gravity, leg falls to bed immediately  7. Limb Ataxia: 0-->Absent  8. Sensory: 0-->Normal, no sensory loss  9. Best Language: 2-->Severe aphasia, all communication is through fragmentary expression, great need for inference, questioning, and guessing by the listener. Range of information that can be exchanged is limited, listener carries burden of. . . (see row details)  10. Dysarthria: 2-->Severe dysarthria, patients speech is so slurred as to be unintelligible in the absence of or out of proportion to any dysphasia, or is mute/anarthric  11. Extinction and Inattention (formerly Neglect): 0-->No abnormality  Total (NIH Stroke Scale): 16       Modified [place] Score: 2  Stephanie Coma Scale:11   ABCD2 Score:    [id number] Score:   HAS -BLED Score:   ICH Score:   Hunt & Kimball Classification:      Hemorrhagic change of an Ischemic Stroke: Does this patient have an ischemic stroke with hemorrhagic changes? No     Neurologic Chief Complaint: Embolic Stroke Involving left MCA    Subjective:     Interval History: Patient is seen for follow-up neurological assessment and treatment recommendations: See Hospital Course    HPI, Past Medical, Family, and Social History remains the same as documented in the initial encounter.     Review of Systems   Unable to perform ROS: Patient nonverbal     Scheduled Meds:   amLODIPine  10 mg Per G Tube QHS    aspirin  81 mg Per G Tube Daily    atorvastatin  40 mg Per G Tube Daily    FLUoxetine  20 mg Per G Tube Daily    heparin (porcine)  5,000 Units Subcutaneous Q8H    insulin aspart U-100  6 Units Subcutaneous Q4H    insulin glargine U-100  15 Units Subcutaneous Daily    metoprolol tartrate  50 mg Per G Tube BID    nicotine  1 patch Transdermal Daily    nystatin  500,000 Units Oral QID     polyethylene glycol  17 g Per G Tube BID    QUEtiapine  25 mg Per G Tube QHS    senna-docusate  1 tablet Per G Tube BID     Continuous Infusions:   0.45% NaCl   Intravenous Continuous 100 mL/hr at 06/28/25 1201 New Bag at 06/28/25 1201     PRN Meds:  Current Facility-Administered Medications:     bisacodyL, 10 mg, Rectal, Daily PRN    dextrose 50%, 12.5 g, Intravenous, PRN    dextrose 50%, 25 g, Intravenous, PRN    glucagon (human recombinant), 1 mg, Intramuscular, PRN    hydrALAZINE, 10 mg, Intravenous, Q4H PRN    insulin aspart U-100, 0-10 Units, Subcutaneous, Q4H PRN    labetalol, 10 mg, Intravenous, Q4H PRN    melatonin, 6 mg, Per G Tube, Nightly PRN    simethicone, 1 tablet, Per G Tube, TID PRN    sodium chloride 0.9%, 10 mL, Intravenous, PRN    Objective:     Vital Signs (Most Recent):  Temp: 98 °F (36.7 °C) (06/28/25 1506)  Pulse: 96 (06/28/25 1506)  Resp: 18 (06/28/25 1506)  BP: (!) 140/70 (06/28/25 1506)  SpO2: 100 % (06/28/25 1506)  BP Location: Right arm    Vital Signs Range (Last 24H):  Temp:  [97.5 °F (36.4 °C)-99.5 °F (37.5 °C)]   Pulse:  [72-96]   Resp:  [16-20]   BP: (115-148)/(70-81)   SpO2:  [94 %-100 %]   BP Location: Right arm       Physical Exam  Vitals and nursing note reviewed.   HENT:      Mouth/Throat:      Mouth: Mucous membranes are dry.   Eyes:      Pupils: Pupils are equal, round, and reactive to light.   Cardiovascular:      Rate and Rhythm: Normal rate.   Pulmonary:      Effort: No respiratory distress.   Skin:     General: Skin is warm and dry.   Neurological:      Mental Status: He is alert.              Neurological Exam:   LOC: alert  Attention Span: Good   Language: Expressive aphasia, Receptive aphasia  Articulation: Untestable due to severe aphasia   Orientation: Untestable due to severe aphasia   Motor: Arm left  Normal 5/5  Leg left  Normal 5/5  Arm right  Plegia 0/5  Leg right Paresis: 1/5    Laboratory:  CMP:   Recent Labs   Lab 06/28/25  0434   CALCIUM 8.9   ALBUMIN 2.8*  "  PROT 6.8   *   K 4.0   CO2 25   *   BUN 58*   CREATININE 2.4*   ALKPHOS 130   ALT 28   AST 36   BILITOT 0.3     BMP:   Recent Labs   Lab 06/28/25  0434   *   K 4.0   *   CO2 25   BUN 58*   CREATININE 2.4*   CALCIUM 8.9     CBC:   Recent Labs   Lab 06/28/25  0434   WBC 8.68   RBC 4.27*   HGB 12.1*   HCT 38.8*      MCV 91   MCH 28.3   MCHC 31.2*     Lipid Panel: No results for input(s): "CHOL", "LDLCALC", "HDL", "TRIG" in the last 168 hours.  Coagulation: No results for input(s): "PT", "INR", "APTT" in the last 168 hours.  Platelet Aggregation Study: No results for input(s): "PLTAGG", "PLTAGINTERP", "PLTAGREGLACO", "ADPPLTAGGREG" in the last 168 hours.  Hgb A1C: No results for input(s): "HGBA1C" in the last 168 hours.  TSH: No results for input(s): "TSH" in the last 168 hours.    Diagnostic Results   Brain Imaging   MRI Brain 6/20/25  Impression:     Acute left MCA distribution infarct.  No new large parenchymal hemorrhage.  Susceptibility artifact throughout the infarcted tissue may reflect contrast staining and/or blood products conversion.  Overall mass effect is increased compared to prior with sulcal effacement throughout the left cerebral hemisphere and approximately 4 mm of rightward midline shift.     Stable size of the parenchymal hemorrhage centered in the inferior left temporal lobe.     [facility] 6/20/25: 1242  Impression:     Moderate-sized recent left MCA distribution infarct and small intraparenchymal hemorrhage appear grossly unchanged from prior study performed earlier on the same date.     Chronic ischemic change elsewhere with underlying cerebral and cerebellar volume loss, as before.     No new hemorrhage or major vascular distribution infarct elsewhere.  [facility] 6/20/25: 0834  Impression:     Early ischemic changes in the left MCA distribution, new from recent CT.     New left inferior temporal intraparenchymal hematoma.     Chronic ischemic change with cerebral and " cerebellar volume loss, as above.     [facility] 6/19/25  Impression:     Question some early left MCA ischemia corresponding to the patient's known left MCA territory occlusion.  No hemorrhage.     Senescent changes as above.        All CT scans at this facility are performed  using dose modulation techniques as appropriate to performed exam including the following:  automated exposure control; adjustment of mA and/or kV according to the patients size (this includes techniques or standardized protocols for targeted exams where dose is matched to indication/reason for exam: i.e. extremities or head);  iterative reconstruction technique.     Vessel Imaging   CTA head and neck 6/19/25  Impression:     Distal left M1 MCA occlusion with relatively prompt collateralization of the more distal MCA vessels.     Severe stenosis left mid vertebral artery and left PCA.     Cardiac Imaging   TTE 6/19/25    Left Ventricle: The left ventricle is normal in size. Normal wall thickness. There is normal systolic function with a visually estimated ejection fraction of 60 - 65%.    Right Ventricle: The right ventricle is normal in size Wall thickness is normal. Systolic function is normal.    The pulmonary artery pressure could not be estimated.    IVC/SVC: Normal venous pressure at 3 mmHg.       Fabiola Kamara, TARSHA  Comprehensive Stroke Center  Department of Vascular Neurology   [facility] Neurosurgery [facility])

## 2025-06-28 NOTE — NURSING
Insert BARD NGT 14F Fr in right nostril per order with attempt X1; secured to nostril center with securement device; no bubbling noted when  end placed in  cup of water; air bolus auscultated.  Pending X-ray confirmation.

## 2025-06-28 NOTE — ASSESSMENT & PLAN NOTE
Cristi Pulido is a 70 y.o. male w/ PMH of HTN, DM, prior stroke w/ residual RSW who presents as a transfer from OSH after presenting with acute onset of dysarthria and RSW. Telestroke was completed and his NIHSS was 19. CT showed probable early ischemic changes in the L insular ribbon and L frontal lobe. CTA demonstrated occlusion of the distal L MCA M1 segment. He was given TNK at 0548. Patient was transferred to [facility] for further management. On arrival, repeat NIHSS of 24. Patient taken for repeat CTH which showed small left temporal hemorrhage along with continued early ischemic changes. Patient taken to IR for thrombectomy and to be admitted to [facility] post-procedure.  S/P thrombectomy. TICI 3. MRI revealing for blood products in stroke bed, likely reperfusion injury. Etiology ESUS.     6/28: Na 152. 0.45% NS at 100 ml/hr ordered x 12 hrs. Will repeat sodium draw scheduled at 2100. Pulled PEG. IR unable to replace PEG. Will attempt in coming days. NGT replaced, placement confirmed via Xray. TF and FWF restarted.     Antithrombotics for secondary stroke prevention: Antiplatelets: Aspirin: 81 mg daily    Statins for secondary stroke prevention and hyperlipidemia, if present:   Statins: Atorvastatin- 40 mg daily    Aggressive risk factor modification: HTN, Smoking, DM, HLD     Rehab efforts: The patient has been evaluated by a stroke team provider and the therapy needs have been fully considered based off the presenting complaints and exam findings. The following therapy evaluations are needed: PT evaluate and treat, OT evaluate and treat, SLP evaluate and treat, PM&R evaluate for appropriate placement, current recommendation HIT    Diagnostics ordered/pending: None     VTE prophylaxis: Enoxaparin 40 mg SQ every 24 hours  Mechanical prophylaxis: Place SCDs    BP parameters: Infarct: Post sucessful thrombectomy, SBP <140

## 2025-06-28 NOTE — CONSULTS
Inpatient Radiology Pre-procedure Note    History of Present Illness:  Cristi Pulido is a 70 y.o. male who presents for Stroke with dislodged Gtube. Patient removed the gtube himself over the evening.  Admission H&P reviewed.  Past Medical History:   Diagnosis Date    Diabetes mellitus     Hyperlipidemia     Hypertension     Stroke     Stroke     right side weak    Type 2 diabetes mellitus      Past Surgical History:   Procedure Laterality Date    AMPUTATION, LOWER LIMB Bilateral     middle toes    bilateral 3rd toe amputation      COLONOSCOPY  01/2021    FRACTURE SURGERY Left     hip    JOINT REPLACEMENT Left     KOMAL       Review of Systems:   As documented in primary team H&P    Home Meds:   Prior to Admission medications    Medication Sig Start Date End Date Taking? Authorizing Provider   amLODIPine (NORVASC) 10 MG tablet Take 1 tablet (10 mg total) by mouth once daily. 4/18/25   Rell Winn MD   aspirin (ECOTRIN) 81 MG EC tablet Take 81 mg by mouth. Pt take 2 tablets daily. When pt runs out of plavix  Patient taking differently: Take 81 mg by mouth as needed. Pt take 2 tablets daily. When pt runs out of plavix    Provider, Historical   atorvastatin (LIPITOR) 80 MG tablet Take 1 tablet (80 mg total) by mouth once daily. 1/14/25   Rell Winn MD   blood sugar diagnostic (TRUE METRIX GLUCOSE TEST STRIP) Strp Test Blood SugarTEST BLOOD SUGAR TWICE DAILY 11/15/24   Rell Winn MD   blood-glucose meter kit Dispense meter  brand covered by insurance 10/15/18   Rell Winn MD   clopidogreL (PLAVIX) 75 mg tablet Take 1 tablet (75 mg total) by mouth once daily. 4/18/25   Rell Winn MD   FLUoxetine 20 MG capsule Take 1 capsule (20 mg total) by mouth once daily. To help mood and anxiety 1/14/25 1/14/26  Rell Winn MD   gabapentin (NEURONTIN) 600 MG tablet Take 1 tablet (600 mg total) by mouth 3 (three) times daily as needed. 4/18/25   Rell Winn MD    glimepiride (AMARYL) 4 MG tablet Take 1 tablet (4 mg total) by mouth before breakfast. Stop when you restart insulin 4/18/25   Rell Winn MD   lactulose (CHRONULAC) 10 gram/15 mL solution TAKE  15ML  1-3  TIMES  A  DAY 4/18/25   Rell Winn MD   lancets (ACCU-CHEK FASTCLIX LANCING DEV) Misc TEST TWICE DAILY 5/25/20   Rell Winn MD   lisinopriL (PRINIVIL,ZESTRIL) 20 MG tablet Take 2 tablets (40 mg total) by mouth once daily. 4/18/25 4/18/26  Rell Winn MD   metFORMIN (GLUCOPHAGE) 1000 MG tablet Take 1 tablet (1,000 mg total) by mouth 2 (two) times daily with meals. 1/14/25   Rell Winn MD   metoprolol tartrate (LOPRESSOR) 50 MG tablet Take 1 tablet (50 mg total) by mouth 2 (two) times daily. 4/18/25 4/18/26  Rell Winn MD   TRUEPLUS LANCETS 33 gauge Misc Use to test sugar/glucose daily. 11/15/24   Rell iWnn MD     Scheduled Meds:    amLODIPine  10 mg Per G Tube QHS    aspirin  81 mg Per G Tube Daily    atorvastatin  40 mg Per G Tube Daily    FLUoxetine  20 mg Per G Tube Daily    heparin (porcine)  5,000 Units Subcutaneous Q8H    insulin aspart U-100  6 Units Subcutaneous Q4H    insulin glargine U-100  15 Units Subcutaneous Daily    metoprolol tartrate  50 mg Per G Tube BID    nicotine  1 patch Transdermal Daily    nystatin  500,000 Units Oral QID    polyethylene glycol  17 g Per G Tube BID    QUEtiapine  25 mg Per G Tube QHS    senna-docusate  1 tablet Per G Tube BID     Continuous Infusions:   PRN Meds:  Current Facility-Administered Medications:     bisacodyL, 10 mg, Rectal, Daily PRN    dextrose 50%, 12.5 g, Intravenous, PRN    dextrose 50%, 25 g, Intravenous, PRN    glucagon (human recombinant), 1 mg, Intramuscular, PRN    hydrALAZINE, 10 mg, Intravenous, Q4H PRN    insulin aspart U-100, 0-10 Units, Subcutaneous, Q4H PRN    labetalol, 10 mg, Intravenous, Q4H PRN    melatonin, 6 mg, Per G Tube, Nightly PRN    simethicone, 1 tablet, Per  "G Tube, TID PRN    sodium chloride 0.9%, 10 mL, Intravenous, PRN  Anticoagulants/Antiplatelets: no anticoagulation    Allergies: Review of patient's allergies indicates:  No Known Allergies  Sedation Hx: have not been any systemic reactions    Labs:  No results for input(s): "INR", "PT", "PTT" in the last 168 hours.    Recent Labs   Lab 06/28/25 0434   WBC 8.68   HGB 12.1*   HCT 38.8*   MCV 91         Recent Labs   Lab 06/28/25 0434   *   *   K 4.0   *   CO2 25   BUN 58*   CREATININE 2.4*   CALCIUM 8.9   MG 2.7*   ALT 28   AST 36   ALBUMIN 2.8*   BILITOT 0.3         Vitals:  Temp: 98 °F (36.7 °C) (06/28/25 0741)  Pulse: 83 (06/28/25 0741)  Resp: 18 (06/28/25 0741)  BP: 121/79 (06/28/25 0741)  SpO2: 95 % (06/28/25 0741)     Physical Exam:  ASA: 2  Mallampati: 2    General: no acute distress  Mental Status: Not alert or oriented to person, place and time  HEENT: normocephalic, atraumatic  Chest: unlabored breathing  Heart: regular heart rate  Abdomen: nondistended  Extremity: moves all extremities    Plan: gtube replacement  Sedation Plan: up to moderate    Danial Contreras MD ([facility])  Interventional Radiology        "

## 2025-06-29 PROBLEM — R33.9 URINARY RETENTION: Status: ACTIVE | Noted: 2025-06-29

## 2025-06-29 LAB
ABSOLUTE EOSINOPHIL (OHS): 0.37 K/UL
ABSOLUTE MONOCYTE (OHS): 0.73 K/UL (ref 0.3–1)
ABSOLUTE NEUTROPHIL COUNT (OHS): 5.24 K/UL (ref 1.8–7.7)
ALBUMIN SERPL BCP-MCNC: 2.6 G/DL (ref 3.5–5.2)
ALP SERPL-CCNC: 116 UNIT/L (ref 40–150)
ALT SERPL W/O P-5'-P-CCNC: 32 UNIT/L (ref 10–44)
ANION GAP (OHS): 15 MMOL/L (ref 8–16)
AST SERPL-CCNC: 37 UNIT/L (ref 11–45)
BASOPHILS # BLD AUTO: 0.04 K/UL
BASOPHILS NFR BLD AUTO: 0.5 %
BILIRUB SERPL-MCNC: 0.4 MG/DL (ref 0.1–1)
BUN SERPL-MCNC: 52 MG/DL (ref 8–23)
CALCIUM SERPL-MCNC: 8.3 MG/DL (ref 8.7–10.5)
CHLORIDE SERPL-SCNC: 115 MMOL/L (ref 95–110)
CO2 SERPL-SCNC: 24 MMOL/L (ref 23–29)
CREAT SERPL-MCNC: 1.9 MG/DL (ref 0.5–1.4)
ERYTHROCYTE [DISTWIDTH] IN BLOOD BY AUTOMATED COUNT: 13.4 % (ref 11.5–14.5)
GFR SERPLBLD CREATININE-BSD FMLA CKD-EPI: 37 ML/MIN/1.73/M2
GLUCOSE SERPL-MCNC: 156 MG/DL (ref 70–110)
HCT VFR BLD AUTO: 36.7 % (ref 40–54)
HGB BLD-MCNC: 11.3 GM/DL (ref 14–18)
HOLD SPECIMEN: NORMAL
IMM GRANULOCYTES # BLD AUTO: 0.01 K/UL (ref 0–0.04)
IMM GRANULOCYTES NFR BLD AUTO: 0.1 % (ref 0–0.5)
LYMPHOCYTES # BLD AUTO: 2.45 K/UL (ref 1–4.8)
MAGNESIUM SERPL-MCNC: 2.5 MG/DL (ref 1.6–2.6)
MCH RBC QN AUTO: 28 PG (ref 27–31)
MCHC RBC AUTO-ENTMCNC: 30.8 G/DL (ref 32–36)
MCV RBC AUTO: 91 FL (ref 82–98)
NUCLEATED RBC (/100WBC) (OHS): 0 /100 WBC
OHS QRS DURATION: 86 MS
OHS QTC CALCULATION: 464 MS
PHOSPHATE SERPL-MCNC: 4.5 MG/DL (ref 2.7–4.5)
PLATELET # BLD AUTO: 223 K/UL (ref 150–450)
PMV BLD AUTO: 13 FL (ref 9.2–12.9)
POCT GLUCOSE: 156 MG/DL (ref 70–110)
POCT GLUCOSE: 159 MG/DL (ref 70–110)
POCT GLUCOSE: 161 MG/DL (ref 70–110)
POCT GLUCOSE: 164 MG/DL (ref 70–110)
POCT GLUCOSE: 167 MG/DL (ref 70–110)
POCT GLUCOSE: 196 MG/DL (ref 70–110)
POCT GLUCOSE: 202 MG/DL (ref 70–110)
POTASSIUM SERPL-SCNC: 4.1 MMOL/L (ref 3.5–5.1)
PROT SERPL-MCNC: 6.6 GM/DL (ref 6–8.4)
RBC # BLD AUTO: 4.04 M/UL (ref 4.6–6.2)
RELATIVE EOSINOPHIL (OHS): 4.2 %
RELATIVE LYMPHOCYTE (OHS): 27.7 % (ref 18–48)
RELATIVE MONOCYTE (OHS): 8.3 % (ref 4–15)
RELATIVE NEUTROPHIL (OHS): 59.2 % (ref 38–73)
SODIUM SERPL-SCNC: 154 MMOL/L (ref 136–145)
WBC # BLD AUTO: 8.84 K/UL (ref 3.9–12.7)

## 2025-06-29 PROCEDURE — 83735 ASSAY OF MAGNESIUM: CPT | Mod: HCNC

## 2025-06-29 PROCEDURE — 25000003 PHARM REV CODE 250: Mod: HCNC | Performed by: NURSE PRACTITIONER

## 2025-06-29 PROCEDURE — 93005 ELECTROCARDIOGRAM TRACING: CPT | Mod: HCNC

## 2025-06-29 PROCEDURE — 82040 ASSAY OF SERUM ALBUMIN: CPT | Mod: HCNC

## 2025-06-29 PROCEDURE — 99233 SBSQ HOSP IP/OBS HIGH 50: CPT | Mod: HCNC,,, | Performed by: PSYCHIATRY & NEUROLOGY

## 2025-06-29 PROCEDURE — S4991 NICOTINE PATCH NONLEGEND: HCPCS | Mod: HCNC

## 2025-06-29 PROCEDURE — 25000003 PHARM REV CODE 250: Mod: HCNC

## 2025-06-29 PROCEDURE — 85025 COMPLETE CBC W/AUTO DIFF WBC: CPT | Mod: HCNC

## 2025-06-29 PROCEDURE — 84100 ASSAY OF PHOSPHORUS: CPT | Mod: HCNC

## 2025-06-29 PROCEDURE — 63600175 PHARM REV CODE 636 W HCPCS: Mod: HCNC

## 2025-06-29 PROCEDURE — 11000001 HC ACUTE MED/SURG PRIVATE ROOM: Mod: HCNC

## 2025-06-29 PROCEDURE — 94761 N-INVAS EAR/PLS OXIMETRY MLT: CPT | Mod: HCNC

## 2025-06-29 PROCEDURE — 36415 COLL VENOUS BLD VENIPUNCTURE: CPT | Mod: HCNC

## 2025-06-29 PROCEDURE — 93010 ELECTROCARDIOGRAM REPORT: CPT | Mod: HCNC,,, | Performed by: INTERNAL MEDICINE

## 2025-06-29 RX ORDER — DEXTROSE MONOHYDRATE 50 MG/ML
INJECTION, SOLUTION INTRAVENOUS CONTINUOUS
Status: DISCONTINUED | OUTPATIENT
Start: 2025-06-29 | End: 2025-06-29

## 2025-06-29 RX ORDER — ASPIRIN 300 MG/1
150 SUPPOSITORY RECTAL ONCE
Status: COMPLETED | OUTPATIENT
Start: 2025-06-29 | End: 2025-06-29

## 2025-06-29 RX ORDER — SODIUM CHLORIDE 450 MG/100ML
INJECTION, SOLUTION INTRAVENOUS CONTINUOUS
Status: ACTIVE | OUTPATIENT
Start: 2025-06-29 | End: 2025-06-29

## 2025-06-29 RX ADMIN — HEPARIN SODIUM 5000 UNITS: 5000 INJECTION INTRAVENOUS; SUBCUTANEOUS at 01:06

## 2025-06-29 RX ADMIN — INSULIN ASPART 6 UNITS: 100 INJECTION, SOLUTION INTRAVENOUS; SUBCUTANEOUS at 04:06

## 2025-06-29 RX ADMIN — HEPARIN SODIUM 5000 UNITS: 5000 INJECTION INTRAVENOUS; SUBCUTANEOUS at 09:06

## 2025-06-29 RX ADMIN — INSULIN GLARGINE 15 UNITS: 100 INJECTION, SOLUTION SUBCUTANEOUS at 11:06

## 2025-06-29 RX ADMIN — SODIUM CHLORIDE: 4.5 INJECTION, SOLUTION INTRAVENOUS at 08:06

## 2025-06-29 RX ADMIN — ASPIRIN 150 MG: 300 SUPPOSITORY RECTAL at 06:06

## 2025-06-29 RX ADMIN — Medication 1 PATCH: at 01:06

## 2025-06-29 RX ADMIN — LABETALOL HYDROCHLORIDE 10 MG: 5 INJECTION, SOLUTION INTRAVENOUS at 03:06

## 2025-06-29 RX ADMIN — NYSTATIN 500000 UNITS: 100000 SUSPENSION ORAL at 03:06

## 2025-06-29 RX ADMIN — NYSTATIN 500000 UNITS: 100000 SUSPENSION ORAL at 08:06

## 2025-06-29 RX ADMIN — NYSTATIN 500000 UNITS: 100000 SUSPENSION ORAL at 01:06

## 2025-06-29 RX ADMIN — NYSTATIN 500000 UNITS: 100000 SUSPENSION ORAL at 09:06

## 2025-06-29 RX ADMIN — HEPARIN SODIUM 5000 UNITS: 5000 INJECTION INTRAVENOUS; SUBCUTANEOUS at 05:06

## 2025-06-29 NOTE — SUBJECTIVE & OBJECTIVE
No current facility-administered medications on file prior to encounter.     Current Outpatient Medications on File Prior to Encounter   Medication Sig    amLODIPine (NORVASC) 10 MG tablet Take 1 tablet (10 mg total) by mouth once daily.    aspirin (ECOTRIN) 81 MG EC tablet Take 81 mg by mouth. Pt take 2 tablets daily. When pt runs out of plavix (Patient taking differently: Take 81 mg by mouth as needed. Pt take 2 tablets daily. When pt runs out of plavix)    atorvastatin (LIPITOR) 80 MG tablet Take 1 tablet (80 mg total) by mouth once daily.    blood sugar diagnostic (TRUE METRIX GLUCOSE TEST STRIP) Strp Test Blood SugarTEST BLOOD SUGAR TWICE DAILY    blood-glucose meter kit Dispense meter  brand covered by insurance    clopidogreL (PLAVIX) 75 mg tablet Take 1 tablet (75 mg total) by mouth once daily.    FLUoxetine 20 MG capsule Take 1 capsule (20 mg total) by mouth once daily. To help mood and anxiety    gabapentin (NEURONTIN) 600 MG tablet Take 1 tablet (600 mg total) by mouth 3 (three) times daily as needed.    glimepiride (AMARYL) 4 MG tablet Take 1 tablet (4 mg total) by mouth before breakfast. Stop when you restart insulin    lactulose (CHRONULAC) 10 gram/15 mL solution TAKE  15ML  1-3  TIMES  A  DAY    lancets (ACCU-CHEK FASTCLIX LANCING DEV) Misc TEST TWICE DAILY    lisinopriL (PRINIVIL,ZESTRIL) 20 MG tablet Take 2 tablets (40 mg total) by mouth once daily.    metFORMIN (GLUCOPHAGE) 1000 MG tablet Take 1 tablet (1,000 mg total) by mouth 2 (two) times daily with meals.    metoprolol tartrate (LOPRESSOR) 50 MG tablet Take 1 tablet (50 mg total) by mouth 2 (two) times daily.    TRUEPLUS LANCETS 33 gauge Misc Use to test sugar/glucose daily.       Review of patient's allergies indicates:  No Known Allergies    Past Medical History:   Diagnosis Date    Diabetes mellitus     Hyperlipidemia     Hypertension     Stroke     Stroke     right side weak    Type 2 diabetes mellitus      Past Surgical History:    Procedure Laterality Date    AMPUTATION, LOWER LIMB Bilateral     middle toes    bilateral 3rd toe amputation      COLONOSCOPY  01/2021    FRACTURE SURGERY Left     hip    JOINT REPLACEMENT Left     KOMAL     Family History       Problem Relation (Age of Onset)    Diabetes Mother, Father, Sister, Brother, Daughter, Sister, Sister, Brother    Heart disease Mother    Hypertension Mother, Daughter    No Known Problems Son          Tobacco Use    Smoking status: Every Day     Current packs/day: 0.50     Average packs/day: 0.5 packs/day for 40.0 years (20.0 ttl pk-yrs)     Types: Cigarettes     Passive exposure: Current    Smokeless tobacco: Never    Tobacco comments:     Patient aware of smoking cessation program. He is also aware of health consequences r/t smoking.   Substance and Sexual Activity    Alcohol use: Yes     Comment: Occasionally (football season)    Drug use: Yes     Types: Marijuana    Sexual activity: Not Currently     Partners: Female     Review of Systems   All other systems reviewed and are negative.    Objective:     Vital Signs (Most Recent):  Temp: 98.4 °F (36.9 °C) (06/29/25 1200)  Pulse: 100 (06/29/25 1511)  Resp: 19 (06/29/25 1200)  BP: 130/68 (06/29/25 1200)  SpO2: (!) 94 % (06/29/25 1200) Vital Signs (24h Range):  Temp:  [98.4 °F (36.9 °C)-99 °F (37.2 °C)] 98.4 °F (36.9 °C)  Pulse:  [] 100  Resp:  [18-19] 19  SpO2:  [93 %-94 %] 94 %  BP: (124-149)/(68-81) 130/68     Weight: 89 kg (196 lb 3.4 oz)  Body mass index is 28.15 kg/m².     Physical Exam  Constitutional:       General: He is not in acute distress.  HENT:      Head: Normocephalic and atraumatic.      Nose: Nose normal.      Mouth/Throat:      Mouth: Mucous membranes are moist.      Pharynx: No oropharyngeal exudate.   Eyes:      Extraocular Movements: Extraocular movements intact.      Conjunctiva/sclera: Conjunctivae normal.   Cardiovascular:      Rate and Rhythm: Normal rate and regular rhythm.   Pulmonary:      Effort:  Pulmonary effort is normal. No respiratory distress.   Abdominal:      General: There is no distension.      Palpations: Abdomen is soft.      Tenderness: There is no abdominal tenderness. There is no guarding or rebound.   Musculoskeletal:         General: Normal range of motion.      Cervical back: Normal range of motion.   Skin:     General: Skin is warm and dry.   Neurological:      Mental Status: He is alert. Mental status is at baseline.            I have reviewed all pertinent lab results within the past 24 hours.    Significant Diagnostics:  I have reviewed all pertinent imaging results/findings within the past 24 hours.

## 2025-06-29 NOTE — ASSESSMENT & PLAN NOTE
-Stroke risk factor  -SBP goal <140, maintain MAP >65  -BP range in the last 24 hrs: BP  Min: 124/81  Max: 149/77  -Current antihypertensive regimen:     -Amlodipine 10mg     -Lisinopril still on hold for now    -Metoprolol 50mg BID   --PRN labetalol/hydralazine

## 2025-06-29 NOTE — HPI
Cristi Pulido is a 70 y.o. male w/ PMH of HTN, DM, prior stroke w/ residual RSW who presents as a transfer from OSH after presenting with acute onset of dysarthria and RSW on 6/19. Transferred to [facility] [place] for evaluation by IR and Vascular Neurology. Patient with inability to urinate on 6/28/25 with bladder scan showing 800 cc's of urine requiring underwood plcaement. Urology consulted for urinary retention and JEANNE.     On assesment the patient is AFVSS. Confused. No Urologic history. No prostate medications on home med list. Underwood in place draining clear, yellow urine.      WBC 8.84, Hg 11.3. Cr 1.9 from 2.4 yesterday (baseline 1-1.4). UA from 6/28 negative for all components.     CT scan from 6/24 with simple cyst on the right. Small non-obstructing renal stones, vascular calcifications. No hydronephrosis, or obstructing stones bilaterally. Bladder distension noted on scan.

## 2025-06-29 NOTE — NURSING
Telemonitoring reported that patient had 10 beat run of v-tach with HR up to 150bpm. Patient noted asymptomatic at this time. JEREMY Beatty DNP from Stroke team notified and responded with new orders for 12 lead EKG and CXR.

## 2025-06-29 NOTE — ASSESSMENT & PLAN NOTE
Cristi Pulido is a 70 y.o. male w/ PMH of HTN, DM, prior stroke w/ residual RSW who presents as a transfer from OSH after presenting with acute onset of dysarthria and RSW on 6/19. [place] [facility] [place] and Vascular Neurology. Patient with inability to urinate on 6/28/25 with bladder scan showing 800 cc's of urine requiring underwood plcaement. Urology consulted for urinary retention and JEANNE.     -- Recommend starting Flomax   -- Monitor for post obstructive diuresis. Correct lytes as necessary.   -- No hydronephrosis, or obstruction on CT. BUN/Cr ratio suggestive of prerenal etiology to JEANNE  -- Trend Cr. Avoid nephrotoxic agents  -- Given patient had 800 cc on bladder scan. Recommend maintain underwood 7-10 days.  -- Will arrange outpatient follow up. If patient still hospitalized, ok for voiding trial by primary.  -- Rest of care per primary  -- Urology to sign off.

## 2025-06-29 NOTE — PLAN OF CARE
Problem: Adult Inpatient Plan of Care  Goal: Optimal Comfort and Wellbeing  Outcome: Progressing  Goal: Readiness for Transition of Care  Outcome: Progressing      POC updated and reviewed with the patient at the bedside. Questions regarding POC were encouraged and addressed. VSS, see flowsheets. Tele maintained per provider's order. Patient has expressive aphasia and is AOX 1 to self at this time. Requires frequent verbal cues/prompts with daily tasks. NAEON. Seizure, fall, and safety precautions maintained, no signs of injury noted during shift. Patient repositioned with assistance x 2 in bed for comfort. Upon exiting room, patient's bed locked in low position, side rails up x 3, bed alarm set, with call light within reach. Instructed patient to call staff for mobility, verbalized understanding. Stroke book and stroke education reviewed with the patient at the bedside, see education flowsheets for details. No acute signs of distress noted at this time.

## 2025-06-29 NOTE — PROGRESS NOTES
Zohaib Garrett - Neurosurgery ([facility])  Vascular Neurology  Comprehensive Stroke Center  Progress Note    Assessment/Plan:     * Embolic stroke involving left middle cerebral artery  Cristi Pulido is a 70 y.o. male w/ PMH of HTN, DM, prior stroke w/ residual RSW who presents as a transfer from OSH after presenting with acute onset of dysarthria and RSW. Telestroke was completed and his NIHSS was 19. CT showed probable early ischemic changes in the L insular ribbon and L frontal lobe. CTA demonstrated occlusion of the distal L MCA M1 segment. He was given TNK at 0548. [place] [facility] [place] On arrival, repeat NIHSS of 24. Patient taken for repeat CTH which showed small left temporal hemorrhage along with continued early ischemic changes. Patient taken to IR for thrombectomy and to be admitted to NCC post-procedure.  S/P thrombectomy. TICI 3. MRI revealing for blood products in stroke bed, likely reperfusion injury. Etiology ESUS.     06/29/2025 Na 154, BUN and Cr improving. Continuing fluids. NGT coiled overnight. NG Xray showed possible pneumoperitoneum. CT AP without contrast ordered. Showed free air in the abdomen, advised not to use NGT for now. General Surgery consulted to discuss PEG placement.     Antithrombotics for secondary stroke prevention: Antiplatelets: Aspirin: 81 mg daily    Statins for secondary stroke prevention and hyperlipidemia, if present:   Statins: Atorvastatin- 40 mg daily    Aggressive risk factor modification: HTN, Smoking, DM, HLD     Rehab efforts: The patient has been evaluated by a stroke team provider and the therapy needs have been fully considered based off the presenting complaints and exam findings. The following therapy evaluations are needed: PT evaluate and treat, OT evaluate and treat, SLP evaluate and treat, PM&R evaluate for appropriate placement, current recommendation HIT    Diagnostics ordered/pending: None     VTE prophylaxis: Enoxaparin 40 mg SQ  every 24 hours  Mechanical prophylaxis: Place SCDs    BP parameters: Infarct: Post sucessful thrombectomy, SBP <140        Encounter for nasogastric tube placement  PEG removed per patient.   IR consulted for replacement. Attempt unsuccessful, will attempt again week of 6/30.   NGT replaced. TF and FWF resumed.     Restlessness  -6/24: QTc 441   -Seroquel 25 mg QHS     Hyperlipemia  -Stroke risk factor  -LDL 75, goal <70  -Atorvastatin 40 mg daily      Right sided weakness  -Secondary to stroke.   -Aggressive therapy     Dysarthria and anarthria  -Therapy eval and treat    Aphasia  -Secondary to stroke  -Aggressive therapy     Type 2 diabetes mellitus without complication, without long-term current use of insulin  Lab Results   Component Value Date    LABA1C 7.2 (H) 01/29/2018    HGBA1C 7.0 (H) 06/19/2025     Follow up repeat A1c. Hold home antihyperglycemics. SSI for goal -180 while in hospital  -Added Lantus 15u     Hypertension associated with diabetes  -Stroke risk factor  -SBP goal <140, maintain MAP >65  -BP range in the last 24 hrs: BP  Min: 124/81  Max: 149/77  -Current antihypertensive regimen:     -Amlodipine 10mg     -Lisinopril still on hold for now    -Metoprolol 50mg BID   --PRN labetalol/hydralazine     Tobacco dependence  Stroke risk factor  - on cessation  -nicotine patch PRN         06/20/2025 NAEON. S/P thrombectomy. TICI 3. TNK monitoring ended at 0548. Aphasic, follows no commands. RSW remains. MRI revealing for blood products in stroke bed, likely reperfusion injury. OK to start monotherapy with either plavix or ASA 6/21/25 for secondary stroke prevention. ECHO unremarkable. Family member at bedside agreeable to NGT placement. Etiology ESUS.   6/21/2025 NAEON. Neuro exam stable. Recommend holding AP therapy for now.   06/22/2025 NAEON. Neuro exam stable. Start AP therapy with ASA.   06/23/2025 NAEON. SLP recs NPO. PEG discussed with family and they are agreeable. Pt S/D to NPU.    06/24/2025 NGT pulled overnight and replaced. Placement confirmed via X ray. IR consulted for PEG placement. JEANNE, Creatinine and BUN uptrending. FWF flushes ordered. Restless during night. EKG repeated. QTc 441. Seroquel 25 mg QHS ordered. Family requesting [facility] IPR when medically ready.   06/25/2025 NAEON. Plans for PEG placement today. BUN and Cr uptrending. Fluids started. Lisinopril held.   06/26/2025 NAEON. PEG placed yesterday, will resume tube feeds this afternoon starting with trickle feeds. BUN and Cr Improved. Increased water boluses to 300.  06/27/2025 NAEON. Neuro exam stable. Patient has tolerated tube feeds well. Will continue to monitor kidney function for now. Trial off restraints today to prepare for rehab placement. Added Lantus 15 units. MBSS today, patient able to have puree for pleasure feeds per speech.   06/28/2025 Na 152. 0.45% NS at 100 ml/hr ordered x 12 hrs. Will repeat sodium draw scheduled at 2100. Pulled PEG. IR unable to replace PEG. Will attempt in coming days. NGT replaced, placement confirmed via Xray. TF and FWF restarted.   06/29/2025 Na 154, BUN and Cr improving. Continuing fluids. NGT coiled overnight. NG Xray showed possible pneumoperitoneum. CT AP without contrast ordered. Showed free air in the abdomen, advised not to use NGT for now. General Surgery consulted to discuss PEG placement.     STROKE DOCUMENTATION   Acute Stroke Times   Last Known Normal Date: 06/19/25  Last Known Normal Time: 0400  Symptom Onset Date: 06/19/25  Symptom Onset Time: 0400  Stroke Team Called Date: 06/19/25  Stroke Team Called Time: 0813  Stroke Team Arrival Date: 06/19/25  Stroke Team Arrival Time: 0813  CT Interpretation Time: 0827  Thrombolytic Therapy Recommended:  (already given prior to transfer)  CTA Interpretation Time:  (already completed prior to transfer)  Thrombectomy Recommended: Yes  Decision to Treat Time for IR: 0832    NIH Scale:  1a. Level of Consciousness: 0-->Alert, keenly  responsive  1b. LOC Questions: 2-->Answers neither question correctly  1c. LOC Commands: 2-->Performs neither task correctly  2. Best Gaze: 0-->Normal  3. Visual: 0-->No visual loss  4. Facial Palsy: 1-->Minor paralysis (flattened nasolabial fold, asymmetry on smiling)  5a. Motor Arm, Left: 0-->No drift, limb holds 90 (or 45) degrees for full 10 secs  5b. Motor Arm, Right: 4-->No movement  6a. Motor Leg, Left: 3-->No effort against gravity, leg falls to bed immediately  6b. Motor Leg, Right: 0-->No drift, leg holds 30 degree position for full 5 secs  7. Limb Ataxia: 0-->Absent  8. Sensory: 0-->Normal, no sensory loss  9. Best Language: 2-->Severe aphasia, all communication is through fragmentary expression, great need for inference, questioning, and guessing by the listener. Range of information that can be exchanged is limited, listener carries burden of. . . (see row details)  10. Dysarthria: 2-->Severe dysarthria, patients speech is so slurred as to be unintelligible in the absence of or out of proportion to any dysphasia, or is mute/anarthric  11. Extinction and Inattention (formerly Neglect): 0-->No abnormality  Total (NIH Stroke Scale): 16       Modified [place] Score: 2  Mulberry Coma Scale:    ABCD2 Score:    [id number] Score:   HAS -BLED Score:   ICH Score:   Hunt & Kimball Classification:      Hemorrhagic change of an Ischemic Stroke: Does this patient have an ischemic stroke with hemorrhagic changes? Yes, Grading Scale: PH Type 1 (PH-1) = hematoma in < 30% of the infarcted area with some slight space-occupying effect. Is this a symptomatic change?  No - Hemorrhage is not clinically significant     Neurologic Chief Complaint: L MCA stroke    Subjective:     Interval History: Patient is seen for follow-up neurological assessment and treatment recommendations: See hospital course.    HPI, Past Medical, Family, and Social History remains the same as documented in the initial encounter.     Review of Systems    Reason unable to perform ROS: Severe aphasia.     Scheduled Meds:   amLODIPine  10 mg Per G Tube QHS    aspirin  81 mg Per G Tube Daily    atorvastatin  40 mg Per G Tube Daily    FLUoxetine  20 mg Per G Tube Daily    heparin (porcine)  5,000 Units Subcutaneous Q8H    insulin aspart U-100  6 Units Subcutaneous Q4H    insulin glargine U-100  15 Units Subcutaneous Daily    metoprolol tartrate  50 mg Per G Tube BID    nicotine  1 patch Transdermal Daily    nystatin  500,000 Units Oral QID    polyethylene glycol  17 g Per G Tube BID    QUEtiapine  25 mg Per G Tube QHS    senna-docusate  1 tablet Per G Tube BID     Continuous Infusions:   0.45% NaCl   Intravenous Continuous 100 mL/hr at 06/29/25 0855 New Bag at 06/29/25 0855       PRN Meds:  Current Facility-Administered Medications:     bisacodyL, 10 mg, Rectal, Daily PRN    dextrose 50%, 12.5 g, Intravenous, PRN    dextrose 50%, 25 g, Intravenous, PRN    glucagon (human recombinant), 1 mg, Intramuscular, PRN    hydrALAZINE, 10 mg, Intravenous, Q4H PRN    insulin aspart U-100, 0-10 Units, Subcutaneous, Q4H PRN    labetalol, 10 mg, Intravenous, Q4H PRN    melatonin, 6 mg, Per G Tube, Nightly PRN    simethicone, 1 tablet, Per G Tube, TID PRN    sodium chloride 0.9%, 10 mL, Intravenous, PRN    Objective:     Vital Signs (Most Recent):  Temp: 98.7 °F (37.1 °C) (06/29/25 0734)  Pulse: 105 (06/29/25 1109)  Resp: 18 (06/29/25 0734)  BP: 126/70 (06/29/25 0734)  SpO2: (!) 94 % (06/29/25 0800)  BP Location: Right arm    Vital Signs Range (Last 24H):  Temp:  [98 °F (36.7 °C)-99 °F (37.2 °C)]   Pulse:  []   Resp:  [18]   BP: (124-149)/(70-81)   SpO2:  [93 %-100 %]   BP Location: Right arm       Physical Exam  HENT:      Head: Normocephalic and atraumatic.      Mouth/Throat:      Mouth: Mucous membranes are moist.   Eyes:      Conjunctiva/sclera: Conjunctivae normal.   Cardiovascular:      Rate and Rhythm: Normal rate.   Pulmonary:      Effort: Pulmonary effort is normal.  "  Skin:     General: Skin is warm and dry.   Neurological:      Mental Status: He is alert.      Motor: Weakness present.              Neurological Exam:   LOC: drowsy  Attention Span: poor  Language: Expressive aphasia, Receptive aphasia  Articulation: Dysarthria  Orientation: Untestable due to severe aphasia   Facial Movement (CN VII): Lower facial weakness on the Right  Motor: Arm left  Normal 5/5  Leg left  Normal 5/5  Arm right  Plegia 0/5  Leg right Paresis: 1/5  Sensation: Cy-hypoesthesia right    Laboratory:  CMP:   Recent Labs   Lab 06/29/25  0446   CALCIUM 8.3*   ALBUMIN 2.6*   PROT 6.6   *   K 4.1   CO2 24   *   BUN 52*   CREATININE 1.9*   ALKPHOS 116   ALT 32   AST 37   BILITOT 0.4     BMP:   Recent Labs   Lab 06/29/25  0446   *   K 4.1   *   CO2 24   BUN 52*   CREATININE 1.9*   CALCIUM 8.3*     CBC:   Recent Labs   Lab 06/29/25  0446   WBC 8.84   RBC 4.04*   HGB 11.3*   HCT 36.7*      MCV 91   MCH 28.0   MCHC 30.8*     Lipid Panel:   No results for input(s): "CHOL", "LDLCALC", "HDL", "TRIG" in the last 168 hours.    Coagulation:   No results for input(s): "PT", "INR", "APTT" in the last 168 hours.    Platelet Aggregation Study: No results for input(s): "PLTAGG", "PLTAGINTERP", "PLTAGREGLACO", "ADPPLTAGGREG" in the last 168 hours.  Hgb A1C:   No results for input(s): "HGBA1C" in the last 168 hours.    TSH:   No results for input(s): "TSH" in the last 168 hours.      Diagnostic Results   Brain Imaging   MRI Brain 6/20/25  Impression:     Acute left MCA distribution infarct.  No new large parenchymal hemorrhage.  Susceptibility artifact throughout the infarcted tissue may reflect contrast staining and/or blood products conversion.  Overall mass effect is increased compared to prior with sulcal effacement throughout the left cerebral hemisphere and approximately 4 mm of rightward midline shift.     Stable size of the parenchymal hemorrhage centered in the inferior left " temporal lobe.     [facility] 6/20/25: 1242  Impression:     Moderate-sized recent left MCA distribution infarct and small intraparenchymal hemorrhage appear grossly unchanged from prior study performed earlier on the same date.     Chronic ischemic change elsewhere with underlying cerebral and cerebellar volume loss, as before.     No new hemorrhage or major vascular distribution infarct elsewhere.  CT 6/20/25: 0834  Impression:     Early ischemic changes in the left MCA distribution, new from recent CT.     New left inferior temporal intraparenchymal hematoma.     Chronic ischemic change with cerebral and cerebellar volume loss, as above.     CT 6/19/25  Impression:     Question some early left MCA ischemia corresponding to the patient's known left MCA territory occlusion.  No hemorrhage.     Senescent changes as above.        All CT scans at this facility are performed  using dose modulation techniques as appropriate to performed exam including the following:  automated exposure control; adjustment of mA and/or kV according to the patients size (this includes techniques or standardized protocols for targeted exams where dose is matched to indication/reason for exam: i.e. extremities or head);  iterative reconstruction technique.     Vessel Imaging   CTA head and neck 6/19/25  Impression:     Distal left M1 MCA occlusion with relatively prompt collateralization of the more distal MCA vessels.     Severe stenosis left mid vertebral artery and left PCA.     Cardiac Imaging   TTE 6/19/25    Left Ventricle: The left ventricle is normal in size. Normal wall thickness. There is normal systolic function with a visually estimated ejection fraction of 60 - 65%.    Right Ventricle: The right ventricle is normal in size Wall thickness is normal. Systolic function is normal.    The pulmonary artery pressure could not be estimated.    IVC/SVC: Normal venous pressure at 3 mmHg.       Franci Zambrano PA-C  Comprehensive Stroke  [place]  Department of Vascular Neurology   Zohaib Garrett - Neurosurgery ([facility])

## 2025-06-29 NOTE — SUBJECTIVE & OBJECTIVE
Past Medical History:   Diagnosis Date    Diabetes mellitus     Hyperlipidemia     Hypertension     Stroke     Stroke     right side weak    Type 2 diabetes mellitus        Past Surgical History:   Procedure Laterality Date    AMPUTATION, LOWER LIMB Bilateral     middle toes    bilateral 3rd toe amputation      COLONOSCOPY  01/2021    FRACTURE SURGERY Left     hip    JOINT REPLACEMENT Left     KOMAL       Review of patient's allergies indicates:  No Known Allergies    Family History       Problem Relation (Age of Onset)    Diabetes Mother, Father, Sister, Brother, Daughter, Sister, Sister, Brother    Heart disease Mother    Hypertension Mother, Daughter    No Known Problems Son            Tobacco Use    Smoking status: Every Day     Current packs/day: 0.50     Average packs/day: 0.5 packs/day for 40.0 years (20.0 ttl pk-yrs)     Types: Cigarettes     Passive exposure: Current    Smokeless tobacco: Never    Tobacco comments:     Patient aware of smoking cessation program. He is also aware of health consequences r/t smoking.   Substance and Sexual Activity    Alcohol use: Yes     Comment: Occasionally (football season)    Drug use: Yes     Types: Marijuana    Sexual activity: Not Currently     Partners: Female       Review of Systems   Unable to perform ROS      Objective:     Temp:  [97.5 °F (36.4 °C)-99 °F (37.2 °C)] 98.7 °F (37.1 °C)  Pulse:  [] 102  Resp:  [16-18] 18  SpO2:  [93 %-100 %] 94 %  BP: (124-149)/(70-81) 126/70  Weight: 89 kg (196 lb 3.4 oz)  Body mass index is 28.15 kg/m².      Bladder Scan Volume (mL): 935 mL (06/28/25 1025)  Post Void Cath Residual Output (mL): 7 mL (06/24/25 1528)    Drains       Drain  Duration                  Gastrostomy/Enterostomy 06/25/25 1525 Gastrostomy tube w/ balloon midline feeding 3 days         NG/OG Tube 06/28/25 1218 nasogastric;[place] sump 14 Fr. Right nostril <1 day         Urethral Catheter 06/28/25 1812 Non-latex;Silicone 16 Fr. <1 day                    "  Physical Exam  Constitutional:       General: He is not in acute distress.  Cardiovascular:      Rate and Rhythm: Normal rate.   Pulmonary:      Effort: No respiratory distress.   Genitourinary:     Comments: Wilson in place draining clear, yellow urine.   Neurological:      Mental Status: He is disoriented.          Significant Labs:    BMP:  Recent Labs   Lab 06/27/25 0442 06/28/25 0434 06/28/25 2113 06/29/25 0446   * 152* 152* 154*   K 3.9 4.0  --  4.1   * 115*  --  115*   CO2 25 25  --  24   BUN 44* 58*  --  52*   CREATININE 1.5* 2.4*  --  1.9*   CALCIUM 9.0 8.9  --  8.3*       CBC:  Recent Labs   Lab 06/27/25 0442 06/28/25 0434 06/29/25 0446   WBC 9.25 8.68 8.84   HGB 12.7* 12.1* 11.3*   HCT 40.4 38.8* 36.7*    233 223       Blood Culture: No results for input(s): "LABBLOO" in the last 168 hours.  CMP:   Recent Labs   Lab 06/27/25 0442 06/28/25 0434 06/28/25 2113 06/29/25  0446   * 144*  --  156*   * 152* 152* 154*   K 3.9 4.0  --  4.1   * 115*  --  115*   CO2 25 25  --  24   BUN 44* 58*  --  52*   CREATININE 1.5* 2.4*  --  1.9*   CALCIUM 9.0 8.9  --  8.3*   MG 2.6 2.7*  --  2.5     Urine Culture: No results for input(s): "LABURIN" in the last 168 hours.  Urine Studies:   Recent Labs   Lab 06/28/25  1055   COLORU Yellow   APPEARANCEUA Clear   PHUR 6.0   SPECGRAV 1.020   PROTEINUA Negative   GLUCUA Negative   BILIRUBINUA Negative   OCCULTUA Negative   NITRITE Negative   UROBILINOGEN Negative   LEUKOCYTESUR Negative       Significant Imaging:  CT: I have reviewed all results within the past 24 hours and my personal findings are:  as noted in HPI                   "

## 2025-06-29 NOTE — PLAN OF CARE
Problem: Adult Inpatient Plan of Care  Goal: Optimal Comfort and Wellbeing  6/29/2025 1540 by Neda Dick RN  Outcome: Progressing  6/29/2025 1539 by Neda Dick RN  Outcome: Progressing  Goal: Readiness for Transition of Care  6/29/2025 1540 by Neda Dick RN  Outcome: Progressing  6/29/2025 1539 by Neda Dick RN  Outcome: Progressing     Problem: Stroke, Ischemic (Includes Transient Ischemic Attack)  Goal: Optimal Coping  Outcome: Progressing  Goal: Effective Bowel Elimination  Outcome: Progressing  Goal: Optimal Cerebral Tissue Perfusion  Outcome: Progressing  Goal: Optimal Cognitive Function  Outcome: Progressing  Goal: Optimal Functional Ability  Outcome: Progressing  Goal: Optimal Nutrition Intake  Outcome: Progressing  Goal: Effective Oxygenation and Ventilation  Outcome: Progressing  Goal: Improved Sensorimotor Function  Outcome: Progressing  Goal: Effective Urinary Elimination  Outcome: Progressing

## 2025-06-29 NOTE — HPI
70yM w/ PMH of HTN, diabetes, and stroke admitted from OSH with new stroke. Underwent PEG tube placement with IR on 6/26. Patient pulled out his tube on 6/28. IR attempted to place it through the previous tract but was unable to thread the tube through the tract. NGT was placed and patient started on TF. KUB for NGT placement with pneumoperitoneum. CT ab/pelvis today also with pneumoperitoneum, likely related to instrumentation by IR. General surgery consulted for evaluation for PEG tube placement and due to pneumoperitoneum. Patient WBC is normal and his vitals are WNL. He is tolerating his TF with no abdominal pain.

## 2025-06-29 NOTE — NURSING
Oncoming RN placed call to Neuro stroke team on call [place]; I messaged MD to notify that the pt. at shift change started pulling out NGT; Restraint on wrist retied and NGT advanced and TF placed on hold and asking Please enter x-ray to confirm placement.

## 2025-06-29 NOTE — CONSULTS
[facility] - Neurosurgery ([facility])  General Surgery  Consult Note    Patient Name: Cristi Pulido  MRN: [id number]  Code Status: Full Code  Admission Date: 6/19/2025  Hospital Length of Stay: 10 days  Attending Physician: Jus Daniels MD  Primary Care Provider: Rell Winn MD    Patient information was obtained from past medical records, ER records, and primary team.     Inpatient consult to General Surgery  Consult performed by: Axel Iglesias MD  Consult ordered by: Franci Zambrano PA-C        Subjective:     Principal Problem: Embolic stroke involving left middle cerebral artery    History of Present Illness: 70yM w/ PMH of HTN, diabetes, and stroke admitted from OSH with new stroke. Underwent G tube placement with IR on 6/26. Patient pulled out his tube on 6/28. IR attempted to place it through the previous tract but was unable to thread the tube through the tract. NGT was placed and patient started on TF. KUB for NGT placement with pneumoperitoneum. CT ab/pelvis today also with pneumoperitoneum, likely related to instrumentation by IR. General surgery consulted for evaluation for PEG tube placement and due to pneumoperitoneum. Patient WBC is normal and his vitals are WNL. He is tolerating his TF with no abdominal pain.    No current facility-administered medications on file prior to encounter.     Current Outpatient Medications on File Prior to Encounter   Medication Sig    amLODIPine (NORVASC) 10 MG tablet Take 1 tablet (10 mg total) by mouth once daily.    aspirin (ECOTRIN) 81 MG EC tablet Take 81 mg by mouth. Pt take 2 tablets daily. When pt runs out of plavix (Patient taking differently: Take 81 mg by mouth as needed. Pt take 2 tablets daily. When pt runs out of plavix)    atorvastatin (LIPITOR) 80 MG tablet Take 1 tablet (80 mg total) by mouth once daily.    blood sugar diagnostic (TRUE METRIX GLUCOSE TEST STRIP) Strp Test Blood SugarTEST BLOOD SUGAR TWICE DAILY    blood-glucose meter kit Dispense  meter  brand covered by insurance    clopidogreL (PLAVIX) 75 mg tablet Take 1 tablet (75 mg total) by mouth once daily.    FLUoxetine 20 MG capsule Take 1 capsule (20 mg total) by mouth once daily. To help mood and anxiety    gabapentin (NEURONTIN) 600 MG tablet Take 1 tablet (600 mg total) by mouth 3 (three) times daily as needed.    glimepiride (AMARYL) 4 MG tablet Take 1 tablet (4 mg total) by mouth before breakfast. Stop when you restart insulin    lactulose (CHRONULAC) 10 gram/15 mL solution TAKE  15ML  1-3  TIMES  A  DAY    lancets (ACCU-CHEK FASTCLIX LANCING DEV) Misc TEST TWICE DAILY    lisinopriL (PRINIVIL,ZESTRIL) 20 MG tablet Take 2 tablets (40 mg total) by mouth once daily.    metFORMIN (GLUCOPHAGE) 1000 MG tablet Take 1 tablet (1,000 mg total) by mouth 2 (two) times daily with meals.    metoprolol tartrate (LOPRESSOR) 50 MG tablet Take 1 tablet (50 mg total) by mouth 2 (two) times daily.    TRUEPLUS LANCETS 33 gauge Misc Use to test sugar/glucose daily.       Review of patient's allergies indicates:  No Known Allergies    Past Medical History:   Diagnosis Date    Diabetes mellitus     Hyperlipidemia     Hypertension     Stroke     Stroke     right side weak    Type 2 diabetes mellitus      Past Surgical History:   Procedure Laterality Date    AMPUTATION, LOWER LIMB Bilateral     middle toes    bilateral 3rd toe amputation      COLONOSCOPY  01/2021    FRACTURE SURGERY Left     hip    JOINT REPLACEMENT Left     KOMAL     Family History       Problem Relation (Age of Onset)    Diabetes Mother, Father, Sister, Brother, Daughter, Sister, Sister, Brother    Heart disease Mother    Hypertension Mother, Daughter    No Known Problems Son          Tobacco Use    Smoking status: Every Day     Current packs/day: 0.50     Average packs/day: 0.5 packs/day for 40.0 years (20.0 ttl pk-yrs)     Types: Cigarettes     Passive exposure: Current    Smokeless tobacco: Never    Tobacco comments:     Patient aware of smoking  cessation program. He is also aware of health consequences r/t smoking.   Substance and Sexual Activity    Alcohol use: Yes     Comment: Occasionally (football season)    Drug use: Yes     Types: Marijuana    Sexual activity: Not Currently     Partners: Female     Review of Systems   All other systems reviewed and are negative.    Objective:     Vital Signs (Most Recent):  Temp: 98.4 °F (36.9 °C) (06/29/25 1200)  Pulse: 100 (06/29/25 1511)  Resp: 19 (06/29/25 1200)  BP: 130/68 (06/29/25 1200)  SpO2: (!) 94 % (06/29/25 1200) Vital Signs (24h Range):  Temp:  [98.4 °F (36.9 °C)-99 °F (37.2 °C)] 98.4 °F (36.9 °C)  Pulse:  [] 100  Resp:  [18-19] 19  SpO2:  [93 %-94 %] 94 %  BP: (124-149)/(68-81) 130/68     Weight: 89 kg (196 lb 3.4 oz)  Body mass index is 28.15 kg/m².     Physical Exam  Constitutional:       General: He is not in acute distress.  HENT:      Head: Normocephalic and atraumatic.      Nose: Nose normal.      Mouth/Throat:      Mouth: Mucous membranes are moist.      Pharynx: No oropharyngeal exudate.   Eyes:      Extraocular Movements: Extraocular movements intact.      Conjunctiva/sclera: Conjunctivae normal.   Cardiovascular:      Rate and Rhythm: Normal rate and regular rhythm.   Pulmonary:      Effort: Pulmonary effort is normal. No respiratory distress.   Abdominal:      General: There is no distension.      Palpations: Abdomen is soft.      Tenderness: There is no abdominal tenderness. There is no guarding or rebound.   Musculoskeletal:         General: Normal range of motion.      Cervical back: Normal range of motion.   Skin:     General: Skin is warm and dry.   Neurological:      Mental Status: He is alert. Mental status is at baseline.            I have reviewed all pertinent lab results within the past 24 hours.    Significant Diagnostics:  I have reviewed all pertinent imaging results/findings within the past 24 hours.    Assessment/Plan:   70yM w/ recent stroke and dysphagia requiring  G-tube placement for enteral access. Patient removed tube and now needs new acces. Also with pneumoperitoneum after removing tube and instrumentation with IR.    - Patient exam benign - pneumoperitoneum likely related to instrumentation  - Would recommend IR or GI evaluation for replacement of G-tube      Thank you for your consult. I will sign off. Please contact us if you have any additional questions.    Axel gIlesias MD  General Surgery  [facility] - Neurosurgery ([facility])

## 2025-06-29 NOTE — CONSULTS
[facility] - Neurosurgery ([facility])  Urology  Consult Note    Patient Name: Cristi Pulido  MRN: [id number]  Admission Date: 6/19/2025  Hospital Length of Stay: 10   Code Status: Full Code   Attending Provider: Jus Daniels MD   Consulting Provider: Adam Alves DO  Primary Care Physician: Rell Winn MD  Principal Problem:Embolic stroke involving left middle cerebral artery    Inpatient consult to Urology  Consult performed by: Adam Alves DO  Consult ordered by: Fabiola Kamara NP  Reason for consult: Urinary retention          Subjective:     HPI:  Cristi Pulido is a 70 y.o. male w/ PMH of HTN, DM, prior stroke w/ residual RSW who presents as a transfer from OSH after presenting with acute onset of dysarthria and RSW on 6/19. Transferred to [facility] [place] for evaluation by IR and Vascular Neurology. Patient with inability to urinate on 6/28/25 with bladder scan showing 800 cc's of urine requiring underwood plcaement. Urology consulted for urinary retention and JEANNE.     On assesment the patient is AFVSS. Confused. No Urologic history. No prostate medications on home med list. Underwood in place draining clear, yellow urine.      WBC 8.84, Hg 11.3. Cr 1.9 from 2.4 yesterday (baseline 1-1.4). UA from 6/28 negative for all components.     CT scan from 6/24 with simple cyst on the right. Small non-obstructing renal stones, vascular calcifications. No hydronephrosis, or obstructing stones bilaterally. Bladder distension noted on scan.     Past Medical History:   Diagnosis Date    Diabetes mellitus     Hyperlipidemia     Hypertension     Stroke     Stroke     right side weak    Type 2 diabetes mellitus        Past Surgical History:   Procedure Laterality Date    AMPUTATION, LOWER LIMB Bilateral     middle toes    bilateral 3rd toe amputation      COLONOSCOPY  01/2021    FRACTURE SURGERY Left     hip    JOINT REPLACEMENT Left     KOMAL       Review of patient's allergies indicates:  No Known Allergies    Family History        Problem Relation (Age of Onset)    Diabetes Mother, Father, Sister, Brother, Daughter, Sister, Sister, Brother    Heart disease Mother    Hypertension Mother, Daughter    No Known Problems Son            Tobacco Use    Smoking status: Every Day     Current packs/day: 0.50     Average packs/day: 0.5 packs/day for 40.0 years (20.0 ttl pk-yrs)     Types: Cigarettes     Passive exposure: Current    Smokeless tobacco: Never    Tobacco comments:     Patient aware of smoking cessation program. He is also aware of health consequences r/t smoking.   Substance and Sexual Activity    Alcohol use: Yes     Comment: Occasionally (football season)    Drug use: Yes     Types: Marijuana    Sexual activity: Not Currently     Partners: Female       Review of Systems   Unable to perform ROS      Objective:     Temp:  [97.5 °F (36.4 °C)-99 °F (37.2 °C)] 98.7 °F (37.1 °C)  Pulse:  [] 102  Resp:  [16-18] 18  SpO2:  [93 %-100 %] 94 %  BP: (124-149)/(70-81) 126/70  Weight: 89 kg (196 lb 3.4 oz)  Body mass index is 28.15 kg/m².      Bladder Scan Volume (mL): 935 mL (06/28/25 1025)  Post Void Cath Residual Output (mL): 7 mL (06/24/25 1528)    Drains       Drain  Duration                  Gastrostomy/Enterostomy 06/25/25 1525 Gastrostomy tube w/ balloon midline feeding 3 days         NG/OG Tube 06/28/25 1218 nasogastric;[place] sump 14 Fr. Right nostril <1 day         Urethral Catheter 06/28/25 1812 Non-latex;Silicone 16 Fr. <1 day                     Physical Exam  Constitutional:       General: He is not in acute distress.  Cardiovascular:      Rate and Rhythm: Normal rate.   Pulmonary:      Effort: No respiratory distress.   Genitourinary:     Comments: Wilson in place draining clear, yellow urine.   Neurological:      Mental Status: He is disoriented.          Significant Labs:    BMP:  Recent Labs   Lab 06/27/25  0442 06/28/25  0434 06/28/25  2113 06/29/25  0446   * 152* 152* 154*   K 3.9 4.0  --  4.1   * 115*  --  115*  "  CO2 25 25  --  24   BUN 44* 58*  --  52*   CREATININE 1.5* 2.4*  --  1.9*   CALCIUM 9.0 8.9  --  8.3*       CBC:  Recent Labs   Lab 06/27/25  0442 06/28/25  0434 06/29/25  0446   WBC 9.25 8.68 8.84   HGB 12.7* 12.1* 11.3*   HCT 40.4 38.8* 36.7*    233 223       Blood Culture: No results for input(s): "LABBLOO" in the last 168 hours.  CMP:   Recent Labs   Lab 06/27/25  0442 06/28/25  0434 06/28/25 2113 06/29/25  0446   * 144*  --  156*   * 152* 152* 154*   K 3.9 4.0  --  4.1   * 115*  --  115*   CO2 25 25  --  24   BUN 44* 58*  --  52*   CREATININE 1.5* 2.4*  --  1.9*   CALCIUM 9.0 8.9  --  8.3*   MG 2.6 2.7*  --  2.5     Urine Culture: No results for input(s): "LABURIN" in the last 168 hours.  Urine Studies:   Recent Labs   Lab 06/28/25  1055   COLORU Yellow   APPEARANCEUA Clear   PHUR 6.0   SPECGRAV 1.020   PROTEINUA Negative   GLUCUA Negative   BILIRUBINUA Negative   OCCULTUA Negative   NITRITE Negative   UROBILINOGEN Negative   LEUKOCYTESUR Negative       Significant Imaging:  CT: I have reviewed all results within the past 24 hours and my personal findings are:  as noted in HPI                     Assessment and Plan:     Urinary retention  Cristi Pulido is a 70 y.o. male w/ PMH of HTN, DM, prior stroke w/ residual RSW who presents as a transfer from OSH after presenting with acute onset of dysarthria and RSW on 6/19. Transferred to [facility] [place] for evaluation by IR and Vascular Neurology. Patient with inability to urinate on 6/28/25 with bladder scan showing 800 cc's of urine requiring underwood plcaement. Urology consulted for urinary retention and JEANNE.     -- Recommend starting Flomax   -- Monitor for post obstructive diuresis. Correct lytes as necessary.   -- No hydronephrosis, or obstruction on CT. BUN/Cr ratio suggestive of prerenal etiology to JEANNE  -- Trend Cr. Avoid nephrotoxic agents  -- Given patient had 800 cc on bladder scan. Recommend maintain underwood 7-10 days.  -- Will arrange " outpatient follow up. If patient still hospitalized, ok for voiding trial by primary.  -- Rest of care per primary  -- Urology to sign off.        VTE Risk Mitigation (From admission, onward)           Ordered     heparin (porcine) injection 5,000 Units  Every 8 hours         06/25/25 0954     IP VTE HIGH RISK PATIENT  Once         06/19/25 1008     Place sequential compression device  Until discontinued         06/19/25 1008                    Thank you for your consult.     Adam Alves DO  Urology  [facility] - Neurosurgery ([facility])

## 2025-06-29 NOTE — SUBJECTIVE & OBJECTIVE
Neurologic Chief Complaint: L MCA stroke    Subjective:     Interval History: Patient is seen for follow-up neurological assessment and treatment recommendations: See hospital course.    HPI, Past Medical, Family, and Social History remains the same as documented in the initial encounter.     Review of Systems   Reason unable to perform ROS: Severe aphasia.     Scheduled Meds:   amLODIPine  10 mg Per G Tube QHS    aspirin  81 mg Per G Tube Daily    atorvastatin  40 mg Per G Tube Daily    FLUoxetine  20 mg Per G Tube Daily    heparin (porcine)  5,000 Units Subcutaneous Q8H    insulin aspart U-100  6 Units Subcutaneous Q4H    insulin glargine U-100  15 Units Subcutaneous Daily    metoprolol tartrate  50 mg Per G Tube BID    nicotine  1 patch Transdermal Daily    nystatin  500,000 Units Oral QID    polyethylene glycol  17 g Per G Tube BID    QUEtiapine  25 mg Per G Tube QHS    senna-docusate  1 tablet Per G Tube BID     Continuous Infusions:   0.45% NaCl   Intravenous Continuous 100 mL/hr at 06/29/25 0855 New Bag at 06/29/25 0855       PRN Meds:  Current Facility-Administered Medications:     bisacodyL, 10 mg, Rectal, Daily PRN    dextrose 50%, 12.5 g, Intravenous, PRN    dextrose 50%, 25 g, Intravenous, PRN    glucagon (human recombinant), 1 mg, Intramuscular, PRN    hydrALAZINE, 10 mg, Intravenous, Q4H PRN    insulin aspart U-100, 0-10 Units, Subcutaneous, Q4H PRN    labetalol, 10 mg, Intravenous, Q4H PRN    melatonin, 6 mg, Per G Tube, Nightly PRN    simethicone, 1 tablet, Per G Tube, TID PRN    sodium chloride 0.9%, 10 mL, Intravenous, PRN    Objective:     Vital Signs (Most Recent):  Temp: 98.7 °F (37.1 °C) (06/29/25 0734)  Pulse: 105 (06/29/25 1109)  Resp: 18 (06/29/25 0734)  BP: 126/70 (06/29/25 0734)  SpO2: (!) 94 % (06/29/25 0800)  BP Location: Right arm    Vital Signs Range (Last 24H):  Temp:  [98 °F (36.7 °C)-99 °F (37.2 °C)]   Pulse:  []   Resp:  [18]   BP: (124-149)/(70-81)   SpO2:  [93 %-100 %]   BP  "Location: Right arm       Physical Exam  HENT:      Head: Normocephalic and atraumatic.      Mouth/Throat:      Mouth: Mucous membranes are moist.   Eyes:      Conjunctiva/sclera: Conjunctivae normal.   Cardiovascular:      Rate and Rhythm: Normal rate.   Pulmonary:      Effort: Pulmonary effort is normal.   Skin:     General: Skin is warm and dry.   Neurological:      Mental Status: He is alert.      Motor: Weakness present.              Neurological Exam:   LOC: drowsy  Attention Span: poor  Language: Expressive aphasia, Receptive aphasia  Articulation: Dysarthria  Orientation: Untestable due to severe aphasia   Facial Movement (CN VII): Lower facial weakness on the Right  Motor: Arm left  Normal 5/5  Leg left  Normal 5/5  Arm right  Plegia 0/5  Leg right Paresis: 1/5  Sensation: Cy-hypoesthesia right    Laboratory:  CMP:   Recent Labs   Lab 06/29/25  0446   CALCIUM 8.3*   ALBUMIN 2.6*   PROT 6.6   *   K 4.1   CO2 24   *   BUN 52*   CREATININE 1.9*   ALKPHOS 116   ALT 32   AST 37   BILITOT 0.4     BMP:   Recent Labs   Lab 06/29/25  0446   *   K 4.1   *   CO2 24   BUN 52*   CREATININE 1.9*   CALCIUM 8.3*     CBC:   Recent Labs   Lab 06/29/25  0446   WBC 8.84   RBC 4.04*   HGB 11.3*   HCT 36.7*      MCV 91   MCH 28.0   MCHC 30.8*     Lipid Panel:   No results for input(s): "CHOL", "LDLCALC", "HDL", "TRIG" in the last 168 hours.    Coagulation:   No results for input(s): "PT", "INR", "APTT" in the last 168 hours.    Platelet Aggregation Study: No results for input(s): "PLTAGG", "PLTAGINTERP", "PLTAGREGLACO", "ADPPLTAGGREG" in the last 168 hours.  Hgb A1C:   No results for input(s): "HGBA1C" in the last 168 hours.    TSH:   No results for input(s): "TSH" in the last 168 hours.      Diagnostic Results   Brain Imaging   MRI Brain 6/20/25  Impression:     Acute left MCA distribution infarct.  No new large parenchymal hemorrhage.  Susceptibility artifact throughout the infarcted tissue may " reflect contrast staining and/or blood products conversion.  Overall mass effect is increased compared to prior with sulcal effacement throughout the left cerebral hemisphere and approximately 4 mm of rightward midline shift.     Stable size of the parenchymal hemorrhage centered in the inferior left temporal lobe.     [facility] 6/20/25: 1242  Impression:     Moderate-sized recent left MCA distribution infarct and small intraparenchymal hemorrhage appear grossly unchanged from prior study performed earlier on the same date.     Chronic ischemic change elsewhere with underlying cerebral and cerebellar volume loss, as before.     No new hemorrhage or major vascular distribution infarct elsewhere.  CT 6/20/25: 0834  Impression:     Early ischemic changes in the left MCA distribution, new from recent CT.     New left inferior temporal intraparenchymal hematoma.     Chronic ischemic change with cerebral and cerebellar volume loss, as above.     CT 6/19/25  Impression:     Question some early left MCA ischemia corresponding to the patient's known left MCA territory occlusion.  No hemorrhage.     Senescent changes as above.        All CT scans at this facility are performed  using dose modulation techniques as appropriate to performed exam including the following:  automated exposure control; adjustment of mA and/or kV according to the patients size (this includes techniques or standardized protocols for targeted exams where dose is matched to indication/reason for exam: i.e. extremities or head);  iterative reconstruction technique.     Vessel Imaging   CTA head and neck 6/19/25  Impression:     Distal left M1 MCA occlusion with relatively prompt collateralization of the more distal MCA vessels.     Severe stenosis left mid vertebral artery and left PCA.     Cardiac Imaging   TTE 6/19/25    Left Ventricle: The left ventricle is normal in size. Normal wall thickness. There is normal systolic function with a visually estimated  ejection fraction of 60 - 65%.    Right Ventricle: The right ventricle is normal in size Wall thickness is normal. Systolic function is normal.    The pulmonary artery pressure could not be estimated.    IVC/SVC: Normal venous pressure at 3 mmHg.

## 2025-06-29 NOTE — ASSESSMENT & PLAN NOTE
Cristi Pulido is a 70 y.o. male w/ PMH of HTN, DM, prior stroke w/ residual RSW who presents as a transfer from OSH after presenting with acute onset of dysarthria and RSW. Telestroke was completed and his NIHSS was 19. CT showed probable early ischemic changes in the L insular ribbon and L frontal lobe. CTA demonstrated occlusion of the distal L MCA M1 segment. He was given TNK at 0548. Patient was transferred to C for further management. On arrival, repeat NIHSS of 24. Patient taken for repeat CTH which showed small left temporal hemorrhage along with continued early ischemic changes. Patient taken to IR for thrombectomy and to be admitted to [facility] post-procedure.  S/P thrombectomy. TICI 3. MRI revealing for blood products in stroke bed, likely reperfusion injury. Etiology ESUS.     06/29/2025 Na 154, BUN and Cr improving. Continuing fluids. NGT coiled overnight. NG Xray showed possible pneumoperitoneum. CT AP without contrast ordered. Showed free air in the abdomen, advised not to use NGT for now. General Surgery consulted to discuss PEG placement.     Antithrombotics for secondary stroke prevention: Antiplatelets: Aspirin: 81 mg daily    Statins for secondary stroke prevention and hyperlipidemia, if present:   Statins: Atorvastatin- 40 mg daily    Aggressive risk factor modification: HTN, Smoking, DM, HLD     Rehab efforts: The patient has been evaluated by a stroke team provider and the therapy needs have been fully considered based off the presenting complaints and exam findings. The following therapy evaluations are needed: PT evaluate and treat, OT evaluate and treat, SLP evaluate and treat, PM&R evaluate for appropriate placement, current recommendation HIT    Diagnostics ordered/pending: None     VTE prophylaxis: Enoxaparin 40 mg SQ every 24 hours  Mechanical prophylaxis: Place SCDs    BP parameters: Infarct: Post sucessful thrombectomy, SBP <140

## 2025-06-30 LAB
ABSOLUTE EOSINOPHIL (OHS): 0.36 K/UL
ABSOLUTE MONOCYTE (OHS): 0.95 K/UL (ref 0.3–1)
ABSOLUTE NEUTROPHIL COUNT (OHS): 7.11 K/UL (ref 1.8–7.7)
ALBUMIN SERPL BCP-MCNC: 2.9 G/DL (ref 3.5–5.2)
ALP SERPL-CCNC: 128 UNIT/L (ref 40–150)
ALT SERPL W/O P-5'-P-CCNC: 25 UNIT/L (ref 10–44)
ANION GAP (OHS): 11 MMOL/L (ref 8–16)
AST SERPL-CCNC: 32 UNIT/L (ref 11–45)
BASOPHILS # BLD AUTO: 0.04 K/UL
BASOPHILS NFR BLD AUTO: 0.4 %
BILIRUB SERPL-MCNC: 0.4 MG/DL (ref 0.1–1)
BUN SERPL-MCNC: 37 MG/DL (ref 8–23)
CALCIUM SERPL-MCNC: 8.7 MG/DL (ref 8.7–10.5)
CHLORIDE SERPL-SCNC: 118 MMOL/L (ref 95–110)
CO2 SERPL-SCNC: 25 MMOL/L (ref 23–29)
CREAT SERPL-MCNC: 1.5 MG/DL (ref 0.5–1.4)
ERYTHROCYTE [DISTWIDTH] IN BLOOD BY AUTOMATED COUNT: 13.2 % (ref 11.5–14.5)
GFR SERPLBLD CREATININE-BSD FMLA CKD-EPI: 49 ML/MIN/1.73/M2
GLUCOSE SERPL-MCNC: 178 MG/DL (ref 70–110)
HCT VFR BLD AUTO: 40.1 % (ref 40–54)
HGB BLD-MCNC: 12.3 GM/DL (ref 14–18)
IMM GRANULOCYTES # BLD AUTO: 0.02 K/UL (ref 0–0.04)
IMM GRANULOCYTES NFR BLD AUTO: 0.2 % (ref 0–0.5)
LYMPHOCYTES # BLD AUTO: 2.37 K/UL (ref 1–4.8)
MAGNESIUM SERPL-MCNC: 2.5 MG/DL (ref 1.6–2.6)
MCH RBC QN AUTO: 28.3 PG (ref 27–31)
MCHC RBC AUTO-ENTMCNC: 30.7 G/DL (ref 32–36)
MCV RBC AUTO: 92 FL (ref 82–98)
NUCLEATED RBC (/100WBC) (OHS): 0 /100 WBC
PHOSPHATE SERPL-MCNC: 4.3 MG/DL (ref 2.7–4.5)
PLATELET # BLD AUTO: 229 K/UL (ref 150–450)
PMV BLD AUTO: 13.1 FL (ref 9.2–12.9)
POCT GLUCOSE: 160 MG/DL (ref 70–110)
POCT GLUCOSE: 189 MG/DL (ref 70–110)
POCT GLUCOSE: 200 MG/DL (ref 70–110)
POCT GLUCOSE: 205 MG/DL (ref 70–110)
POCT GLUCOSE: 222 MG/DL (ref 70–110)
POTASSIUM SERPL-SCNC: 4.3 MMOL/L (ref 3.5–5.1)
PROT SERPL-MCNC: 7 GM/DL (ref 6–8.4)
RBC # BLD AUTO: 4.35 M/UL (ref 4.6–6.2)
RELATIVE EOSINOPHIL (OHS): 3.3 %
RELATIVE LYMPHOCYTE (OHS): 21.8 % (ref 18–48)
RELATIVE MONOCYTE (OHS): 8.8 % (ref 4–15)
RELATIVE NEUTROPHIL (OHS): 65.5 % (ref 38–73)
SODIUM SERPL-SCNC: 154 MMOL/L (ref 136–145)
WBC # BLD AUTO: 10.85 K/UL (ref 3.9–12.7)

## 2025-06-30 PROCEDURE — 36415 COLL VENOUS BLD VENIPUNCTURE: CPT | Mod: HCNC

## 2025-06-30 PROCEDURE — 92507 TX SP LANG VOICE COMM INDIV: CPT | Mod: HCNC

## 2025-06-30 PROCEDURE — 83735 ASSAY OF MAGNESIUM: CPT | Mod: HCNC

## 2025-06-30 PROCEDURE — 84100 ASSAY OF PHOSPHORUS: CPT | Mod: HCNC

## 2025-06-30 PROCEDURE — 11000001 HC ACUTE MED/SURG PRIVATE ROOM: Mod: HCNC

## 2025-06-30 PROCEDURE — 97530 THERAPEUTIC ACTIVITIES: CPT | Mod: HCNC

## 2025-06-30 PROCEDURE — 99232 SBSQ HOSP IP/OBS MODERATE 35: CPT | Mod: HCNC,,, | Performed by: PHYSICIAN ASSISTANT

## 2025-06-30 PROCEDURE — 25000003 PHARM REV CODE 250: Mod: HCNC

## 2025-06-30 PROCEDURE — 82435 ASSAY OF BLOOD CHLORIDE: CPT | Mod: HCNC

## 2025-06-30 PROCEDURE — S4991 NICOTINE PATCH NONLEGEND: HCPCS | Mod: HCNC

## 2025-06-30 PROCEDURE — 63600175 PHARM REV CODE 636 W HCPCS: Mod: HCNC

## 2025-06-30 PROCEDURE — 97112 NEUROMUSCULAR REEDUCATION: CPT | Mod: HCNC

## 2025-06-30 PROCEDURE — 85025 COMPLETE CBC W/AUTO DIFF WBC: CPT | Mod: HCNC

## 2025-06-30 PROCEDURE — 99233 SBSQ HOSP IP/OBS HIGH 50: CPT | Mod: HCNC,,, | Performed by: PSYCHIATRY & NEUROLOGY

## 2025-06-30 PROCEDURE — 92526 ORAL FUNCTION THERAPY: CPT | Mod: HCNC

## 2025-06-30 RX ORDER — ASPIRIN 300 MG/1
150 SUPPOSITORY RECTAL DAILY
Status: DISCONTINUED | OUTPATIENT
Start: 2025-06-30 | End: 2025-07-01

## 2025-06-30 RX ORDER — SODIUM CHLORIDE 450 MG/100ML
INJECTION, SOLUTION INTRAVENOUS CONTINUOUS
Status: DISCONTINUED | OUTPATIENT
Start: 2025-06-30 | End: 2025-07-01

## 2025-06-30 RX ADMIN — SODIUM CHLORIDE: 4.5 INJECTION, SOLUTION INTRAVENOUS at 09:06

## 2025-06-30 RX ADMIN — ASPIRIN 150 MG: 300 SUPPOSITORY RECTAL at 11:06

## 2025-06-30 RX ADMIN — NYSTATIN 500000 UNITS: 100000 SUSPENSION ORAL at 09:06

## 2025-06-30 RX ADMIN — INSULIN ASPART 6 UNITS: 100 INJECTION, SOLUTION INTRAVENOUS; SUBCUTANEOUS at 12:06

## 2025-06-30 RX ADMIN — HEPARIN SODIUM 5000 UNITS: 5000 INJECTION INTRAVENOUS; SUBCUTANEOUS at 09:06

## 2025-06-30 RX ADMIN — NYSTATIN 500000 UNITS: 100000 SUSPENSION ORAL at 01:06

## 2025-06-30 RX ADMIN — INSULIN GLARGINE 15 UNITS: 100 INJECTION, SOLUTION SUBCUTANEOUS at 09:06

## 2025-06-30 RX ADMIN — HEPARIN SODIUM 5000 UNITS: 5000 INJECTION INTRAVENOUS; SUBCUTANEOUS at 01:06

## 2025-06-30 RX ADMIN — NYSTATIN 500000 UNITS: 100000 SUSPENSION ORAL at 05:06

## 2025-06-30 RX ADMIN — Medication 1 PATCH: at 09:06

## 2025-06-30 RX ADMIN — SODIUM CHLORIDE: 4.5 INJECTION, SOLUTION INTRAVENOUS at 11:06

## 2025-06-30 RX ADMIN — HEPARIN SODIUM 5000 UNITS: 5000 INJECTION INTRAVENOUS; SUBCUTANEOUS at 06:06

## 2025-06-30 RX ADMIN — INSULIN ASPART 6 UNITS: 100 INJECTION, SOLUTION INTRAVENOUS; SUBCUTANEOUS at 04:06

## 2025-06-30 NOTE — NURSING
Patient pulled out [facility]. JEREMY Beatty DNP with vascular neurology aware. No new orders at this time.

## 2025-06-30 NOTE — PROGRESS NOTES
Zohaib Garrett - Neurosurgery ([facility])  Vascular Neurology  Comprehensive Stroke Center  Progress Note    Assessment/Plan:     * Embolic stroke involving left middle cerebral artery  Cristi Pulido is a 70 y.o. male w/ PMH of HTN, DM, prior stroke w/ residual RSW who presents as a transfer from OSH after presenting with acute onset of dysarthria and RSW. Telestroke was completed and his NIHSS was 19. CT showed probable early ischemic changes in the L insular ribbon and L frontal lobe. CTA demonstrated occlusion of the distal L MCA M1 segment. He was given TNK at 0548. Patient was [place] [facility] [place]. On arrival, repeat NIHSS of 24. Patient taken for repeat CTH which showed small left temporal hemorrhage along with continued early ischemic changes. Patient taken to IR for thrombectomy and to be admitted to NCC post-procedure.  S/P thrombectomy. TICI 3. MRI revealing for blood products in stroke bed, likely reperfusion injury. Etiology ESUS.     06/30/2025 Pulled NG tube overnight. Na stable, Cr and BUN continue to improve. Fluids reordered. Discussed PEG replacement with GI and Gen Surg, deferred to IR for replacement. IR reconsulted this morning. Must wait approximately 1 week to re attempt PEG placement. Placement planned for 7/3. Will need to replace NG tube 7/2. NPO at midnight 7/3.     Antithrombotics for secondary stroke prevention: Antiplatelets: Aspirin: 81 mg daily    Statins for secondary stroke prevention and hyperlipidemia, if present:   Statins: Atorvastatin- 40 mg daily    Aggressive risk factor modification: HTN, Smoking, DM, HLD     Rehab efforts: The patient has been evaluated by a stroke team provider and the therapy needs have been fully considered based off the presenting complaints and exam findings. The following therapy evaluations are needed: PT evaluate and treat, OT evaluate and treat, SLP evaluate and treat, PM&R evaluate for appropriate placement, current recommendation  HIT    Diagnostics ordered/pending: None     VTE prophylaxis: Enoxaparin 40 mg SQ every 24 hours  Mechanical prophylaxis: Place SCDs    BP parameters: Infarct: Post sucessful thrombectomy, SBP <140        Encounter for nasogastric tube placement  PEG removed per patient.   IR consulted for replacement. Attempt unsuccessful, will attempt again week of 6/30.   NGT replaced. TF and FWF resumed.     Restlessness  -6/24: QTc 441   -Seroquel 25 mg QHS     Hyperlipemia  -Stroke risk factor  -LDL 75, goal <70  -Atorvastatin 40 mg daily      Right sided weakness  -Secondary to stroke.   -Aggressive therapy     Dysarthria and anarthria  -Therapy eval and treat    Aphasia  -Secondary to stroke  -Aggressive therapy     Type 2 diabetes mellitus without complication, without long-term current use of insulin  Lab Results   Component Value Date    LABA1C 7.2 (H) 01/29/2018    HGBA1C 7.0 (H) 06/19/2025     Follow up repeat A1c. Hold home antihyperglycemics. SSI for goal -180 while in hospital  -Added Lantus 15u     Hypertension associated with diabetes  -Stroke risk factor  -SBP goal <140, maintain MAP >65  -BP range in the last 24 hrs: BP  Min: 139/82  Max: 169/93  -Current antihypertensive regimen:     -Amlodipine 10mg     -Lisinopril still on hold for now    -Metoprolol 50mg BID   --PRN labetalol/hydralazine     Tobacco dependence  Stroke risk factor  - on cessation  -nicotine patch PRN         06/20/2025 NAEON. S/P thrombectomy. TICI 3. TNK monitoring ended at 0548. Aphasic, follows no commands. RSW remains. MRI revealing for blood products in stroke bed, likely reperfusion injury. OK to start monotherapy with either plavix or ASA 6/21/25 for secondary stroke prevention. ECHO unremarkable. Family member at bedside agreeable to NGT placement. Etiology ESUS.   6/21/2025 NAEON. Neuro exam stable. Recommend holding AP therapy for now.   06/22/2025 NAEON. Neuro exam stable. Start AP therapy with ASA.   06/23/2025 NAEON.  SLP recs NPO. PEG discussed with family and they are agreeable. Pt S/D to NPU.   06/24/2025 NGT pulled overnight and replaced. Placement confirmed via X ray. IR consulted for PEG placement. JEANNE, Creatinine and BUN uptrending. FWF flushes ordered. Restless during night. EKG repeated. QTc 441. Seroquel 25 mg QHS ordered. Family requesting [facility] I[facility] when medically ready.   06/25/2025 NAEON. Plans for PEG placement today. BUN and Cr uptrending. Fluids started. Lisinopril held.   06/26/2025 NAEON. PEG placed yesterday, will resume tube feeds this afternoon starting with trickle feeds. BUN and Cr Improved. Increased water boluses to 300.  06/27/2025 NAEON. Neuro exam stable. Patient has tolerated tube feeds well. Will continue to monitor kidney function for now. Trial off restraints today to prepare for rehab placement. Added Lantus 15 units. MBSS today, patient able to have puree for pleasure feeds per speech.   06/28/2025 Na 152. 0.45% NS at 100 ml/hr ordered x 12 hrs. Will repeat sodium draw scheduled at 2100. Pulled PEG. IR unable to replace PEG. Will attempt in coming days. NGT replaced, placement confirmed via Xray. TF and FWF restarted.   06/29/2025 Na 154, BUN and Cr improving. Continuing fluids. NGT coiled overnight. NG Xray showed possible pneumoperitoneum. CT AP without contrast ordered. Showed free air in the abdomen, advised not to use NGT for now. General Surgery consulted to discuss PEG placement.   06/30/2025 Pulled NG tube overnight. Na stable, Cr and BUN continue to improve. Fluids reordered. Discussed PEG replacement with GI and Gen Surg, deferred to IR for replacement. IR reconsulted this morning. Must wait approximately 1 week to re attempt PEG placement. Placement planned for 7/3. Will need to replace NG tube 7/2. NPO at midnight 7/3.     STROKE DOCUMENTATION   Acute Stroke Times   Last Known Normal Date: 06/19/25  Last Known Normal Time: 0400  Symptom Onset Date: 06/19/25  Symptom Onset Time:  0400  Stroke Team Called Date: 06/19/25  Stroke Team Called Time: 0813  Stroke Team Arrival Date: 06/19/25  Stroke Team Arrival Time: 0813  CT Interpretation Time: 0827  Thrombolytic Therapy Recommended:  (already given prior to transfer)  CTA Interpretation Time:  (already completed prior to transfer)  Thrombectomy Recommended: Yes  Decision to Treat Time for IR: 0832    NIH Scale:  1a. Level of Consciousness: 0-->Alert, keenly responsive  1b. LOC Questions: 2-->Answers neither question correctly  1c. LOC Commands: 2-->Performs neither task correctly  2. Best Gaze: 0-->Normal  3. Visual: 0-->No visual loss  4. Facial Palsy: 1-->Minor paralysis (flattened nasolabial fold, asymmetry on smiling)  5a. Motor Arm, Left: 0-->No drift, limb holds 90 (or 45) degrees for full 10 secs  5b. Motor Arm, Right: 4-->No movement  6a. Motor Leg, Left: 3-->No effort against gravity, leg falls to bed immediately  6b. Motor Leg, Right: 0-->No drift, leg holds 30 degree position for full 5 secs  7. Limb Ataxia: 0-->Absent  8. Sensory: 0-->Normal, no sensory loss  9. Best Language: 2-->Severe aphasia, all communication is through fragmentary expression, great need for inference, questioning, and guessing by the listener. Range of information that can be exchanged is limited, listener carries burden of. . . (see row details)  10. Dysarthria: 2-->Severe dysarthria, patients speech is so slurred as to be unintelligible in the absence of or out of proportion to any dysphasia, or is mute/anarthric  11. Extinction and Inattention (formerly Neglect): 0-->No abnormality  Total (NIH Stroke Scale): 16       Modified Louv[place] Score: 2  Stephanie Coma Scale:    ABCD2 Score:    [id number] Score:   HAS -BLED Score:   ICH Score:   Hunt & Kimball Classification:      Hemorrhagic change of an Ischemic Stroke: Does this patient have an ischemic stroke with hemorrhagic changes? Yes, Grading Scale: PH Type 1 (PH-1) = hematoma in < 30% of the infarcted area with  some slight space-occupying effect. Is this a symptomatic change?  No - Hemorrhage is not clinically significant     Neurologic Chief Complaint: L MCA stroke    Subjective:     Interval History: Patient is seen for follow-up neurological assessment and treatment recommendations: See hospital course.    HPI, Past Medical, Family, and Social History remains the same as documented in the initial encounter.     Review of Systems   Reason unable to perform ROS: Severe aphasia.     Scheduled Meds:   amLODIPine  10 mg Per G Tube QHS    aspirin  81 mg Per G Tube Daily    atorvastatin  40 mg Per G Tube Daily    [START ON 7/3/2025] barium sulfate  450 mL Per NG tube Once    [START ON 7/3/2025] barium sulfate  450 mL Per NG tube Once    FLUoxetine  20 mg Per G Tube Daily    heparin (porcine)  5,000 Units Subcutaneous Q8H    insulin aspart U-100  6 Units Subcutaneous Q4H    insulin glargine U-100  15 Units Subcutaneous Daily    metoprolol tartrate  50 mg Per G Tube BID    nicotine  1 patch Transdermal Daily    nystatin  500,000 Units Oral QID    polyethylene glycol  17 g Per G Tube BID    QUEtiapine  25 mg Per G Tube QHS    senna-docusate  1 tablet Per G Tube BID     Continuous Infusions:   0.45% NaCl   Intravenous Continuous 100 mL/hr at 06/30/25 1132 New Bag at 06/30/25 1132       PRN Meds:  Current Facility-Administered Medications:     bisacodyL, 10 mg, Rectal, Daily PRN    dextrose 50%, 12.5 g, Intravenous, PRN    dextrose 50%, 25 g, Intravenous, PRN    glucagon (human recombinant), 1 mg, Intramuscular, PRN    hydrALAZINE, 10 mg, Intravenous, Q4H PRN    insulin aspart U-100, 0-10 Units, Subcutaneous, Q4H PRN    labetalol, 10 mg, Intravenous, Q4H PRN    melatonin, 6 mg, Per G Tube, Nightly PRN    simethicone, 1 tablet, Per G Tube, TID PRN    sodium chloride 0.9%, 10 mL, Intravenous, PRN    Objective:     Vital Signs (Most Recent):  Temp: 98 °F (36.7 °C) (06/30/25 1135)  Pulse: 100 (06/30/25 1135)  Resp: 19 (06/30/25  "0745)  BP: (!) 169/93 (06/30/25 1300)  SpO2: 96 % (06/30/25 1135)  BP Location: Right arm    Vital Signs Range (Last 24H):  Temp:  [98 °F (36.7 °C)-98.7 °F (37.1 °C)]   Pulse:  []   Resp:  [16-20]   BP: (139-169)/()   SpO2:  [96 %-99 %]   BP Location: Right arm       Physical Exam  HENT:      Head: Normocephalic and atraumatic.      Mouth/Throat:      Mouth: Mucous membranes are moist.   Eyes:      Conjunctiva/sclera: Conjunctivae normal.   Cardiovascular:      Rate and Rhythm: Normal rate.   Pulmonary:      Effort: Pulmonary effort is normal.   Skin:     General: Skin is warm and dry.   Neurological:      Mental Status: He is alert.      Motor: Weakness present.              Neurological Exam:   LOC: drowsy  Attention Span: poor  Language: Expressive aphasia, Receptive aphasia  Articulation: Dysarthria  Orientation: Untestable due to severe aphasia   Facial Movement (CN VII): Lower facial weakness on the Right  Motor: Arm left  Normal 5/5  Leg left  Normal 5/5  Arm right  Plegia 0/5  Leg right Paresis: 1/5  Sensation: Cy-hypoesthesia right    Laboratory:  CMP:   Recent Labs   Lab 06/30/25  0719   CALCIUM 8.7   ALBUMIN 2.9*   PROT 7.0   *   K 4.3   CO2 25   *   BUN 37*   CREATININE 1.5*   ALKPHOS 128   ALT 25   AST 32   BILITOT 0.4     BMP:   Recent Labs   Lab 06/30/25  0719   *   K 4.3   *   CO2 25   BUN 37*   CREATININE 1.5*   CALCIUM 8.7     CBC:   Recent Labs   Lab 06/30/25  0719   WBC 10.85   RBC 4.35*   HGB 12.3*   HCT 40.1      MCV 92   MCH 28.3   MCHC 30.7*     Lipid Panel:   No results for input(s): "CHOL", "LDLCALC", "HDL", "TRIG" in the last 168 hours.    Coagulation:   No results for input(s): "PT", "INR", "APTT" in the last 168 hours.    Platelet Aggregation Study: No results for input(s): "PLTAGG", "PLTAGINTERP", "PLTAGREGLACO", "ADPPLTAGGREG" in the last 168 hours.  Hgb A1C:   No results for input(s): "HGBA1C" in the last 168 hours.    TSH:   No results for " "input(s): "TSH" in the last 168 hours.      Diagnostic Results   Brain Imaging   MRI Brain 6/20/25  Impression:     Acute left MCA distribution infarct.  No new large parenchymal hemorrhage.  Susceptibility artifact throughout the infarcted tissue may reflect contrast staining and/or blood products conversion.  Overall mass effect is increased compared to prior with sulcal effacement throughout the left cerebral hemisphere and approximately 4 mm of rightward midline shift.     Stable size of the parenchymal hemorrhage centered in the inferior left temporal lobe.     CT 6/20/25: 1242  Impression:     Moderate-sized recent left MCA distribution infarct and small intraparenchymal hemorrhage appear grossly unchanged from prior study performed earlier on the same date.     Chronic ischemic change elsewhere with underlying cerebral and cerebellar volume loss, as before.     No new hemorrhage or major vascular distribution infarct elsewhere.  [facility] 6/20/25: 0834  Impression:     Early ischemic changes in the left MCA distribution, new from recent CT.     New left inferior temporal intraparenchymal hematoma.     Chronic ischemic change with cerebral and cerebellar volume loss, as above.     CT 6/19/25  Impression:     Question some early left MCA ischemia corresponding to the patient's known left MCA territory occlusion.  No hemorrhage.     Senescent changes as above.        All CT scans at this facility are performed  using dose modulation techniques as appropriate to performed exam including the following:  automated exposure control; adjustment of mA and/or kV according to the patients size (this includes techniques or standardized protocols for targeted exams where dose is matched to indication/reason for exam: i.e. extremities or head);  iterative reconstruction technique.     Vessel Imaging   CTA head and neck 6/19/25  Impression:     Distal left M1 MCA occlusion with relatively prompt collateralization of the more " distal MCA vessels.     Severe stenosis left mid vertebral artery and left PCA.     Cardiac Imaging   TTE 6/19/25    Left Ventricle: The left ventricle is normal in size. Normal wall thickness. There is normal systolic function with a visually estimated ejection fraction of 60 - 65%.    Right Ventricle: The right ventricle is normal in size Wall thickness is normal. Systolic function is normal.    The pulmonary artery pressure could not be estimated.    IVC/SVC: Normal venous pressure at 3 mmHg.       Franci Zambrano PA-C  [facility]  Department of Vascular Neurology   [facility])

## 2025-06-30 NOTE — PLAN OF CARE
Problem: Adult Inpatient Plan of Care  Goal: Optimal Comfort and Wellbeing  Outcome: Progressing  Goal: Readiness for Transition of Care  Outcome: Progressing     Problem: Stroke, Ischemic (Includes Transient Ischemic Attack)  Goal: Optimal Coping  Outcome: Progressing  Goal: Effective Bowel Elimination  Outcome: Progressing  Goal: Optimal Cerebral Tissue Perfusion  Outcome: Progressing  Goal: Optimal Cognitive Function  Outcome: Progressing  Goal: Improved Communication Skills  Outcome: Progressing  Goal: Optimal Functional Ability  Outcome: Progressing  Goal: Optimal Nutrition Intake  Outcome: Progressing  Goal: Effective Oxygenation and Ventilation  Outcome: Progressing  Goal: Improved Sensorimotor Function  Outcome: Progressing  Goal: Safe and Effective Swallow  Outcome: Progressing  Goal: Effective Urinary Elimination  Outcome: Progressing

## 2025-06-30 NOTE — SUBJECTIVE & OBJECTIVE
Neurologic Chief Complaint: L MCA stroke    Subjective:     Interval History: Patient is seen for follow-up neurological assessment and treatment recommendations: See hospital course.    HPI, Past Medical, Family, and Social History remains the same as documented in the initial encounter.     Review of Systems   Reason unable to perform ROS: Severe aphasia.     Scheduled Meds:   amLODIPine  10 mg Per G Tube QHS    aspirin  81 mg Per G Tube Daily    atorvastatin  40 mg Per G Tube Daily    [START ON 7/3/2025] barium sulfate  450 mL Per NG tube Once    [START ON 7/3/2025] barium sulfate  450 mL Per NG tube Once    FLUoxetine  20 mg Per G Tube Daily    heparin (porcine)  5,000 Units Subcutaneous Q8H    insulin aspart U-100  6 Units Subcutaneous Q4H    insulin glargine U-100  15 Units Subcutaneous Daily    metoprolol tartrate  50 mg Per G Tube BID    nicotine  1 patch Transdermal Daily    nystatin  500,000 Units Oral QID    polyethylene glycol  17 g Per G Tube BID    QUEtiapine  25 mg Per G Tube QHS    senna-docusate  1 tablet Per G Tube BID     Continuous Infusions:   0.45% NaCl   Intravenous Continuous 100 mL/hr at 06/30/25 1132 New Bag at 06/30/25 1132       PRN Meds:  Current Facility-Administered Medications:     bisacodyL, 10 mg, Rectal, Daily PRN    dextrose 50%, 12.5 g, Intravenous, PRN    dextrose 50%, 25 g, Intravenous, PRN    glucagon (human recombinant), 1 mg, Intramuscular, PRN    hydrALAZINE, 10 mg, Intravenous, Q4H PRN    insulin aspart U-100, 0-10 Units, Subcutaneous, Q4H PRN    labetalol, 10 mg, Intravenous, Q4H PRN    melatonin, 6 mg, Per G Tube, Nightly PRN    simethicone, 1 tablet, Per G Tube, TID PRN    sodium chloride 0.9%, 10 mL, Intravenous, PRN    Objective:     Vital Signs (Most Recent):  Temp: 98 °F (36.7 °C) (06/30/25 1135)  Pulse: 100 (06/30/25 1135)  Resp: 19 (06/30/25 0745)  BP: (!) 169/93 (06/30/25 1300)  SpO2: 96 % (06/30/25 1135)  BP Location: Right arm    Vital Signs Range (Last  "24H):  Temp:  [98 °F (36.7 °C)-98.7 °F (37.1 °C)]   Pulse:  []   Resp:  [16-20]   BP: (139-169)/()   SpO2:  [96 %-99 %]   BP Location: Right arm       Physical Exam  HENT:      Head: Normocephalic and atraumatic.      Mouth/Throat:      Mouth: Mucous membranes are moist.   Eyes:      Conjunctiva/sclera: Conjunctivae normal.   Cardiovascular:      Rate and Rhythm: Normal rate.   Pulmonary:      Effort: Pulmonary effort is normal.   Skin:     General: Skin is warm and dry.   Neurological:      Mental Status: He is alert.      Motor: Weakness present.              Neurological Exam:   LOC: drowsy  Attention Span: poor  Language: Expressive aphasia, Receptive aphasia  Articulation: Dysarthria  Orientation: Untestable due to severe aphasia   Facial Movement (CN VII): Lower facial weakness on the Right  Motor: Arm left  Normal 5/5  Leg left  Normal 5/5  Arm right  Plegia 0/5  Leg right Paresis: 1/5  Sensation: Yc-hypoesthesia right    Laboratory:  CMP:   Recent Labs   Lab 06/30/25  0719   CALCIUM 8.7   ALBUMIN 2.9*   PROT 7.0   *   K 4.3   CO2 25   *   BUN 37*   CREATININE 1.5*   ALKPHOS 128   ALT 25   AST 32   BILITOT 0.4     BMP:   Recent Labs   Lab 06/30/25  0719   *   K 4.3   *   CO2 25   BUN 37*   CREATININE 1.5*   CALCIUM 8.7     CBC:   Recent Labs   Lab 06/30/25  0719   WBC 10.85   RBC 4.35*   HGB 12.3*   HCT 40.1      MCV 92   MCH 28.3   MCHC 30.7*     Lipid Panel:   No results for input(s): "CHOL", "LDLCALC", "HDL", "TRIG" in the last 168 hours.    Coagulation:   No results for input(s): "PT", "INR", "APTT" in the last 168 hours.    Platelet Aggregation Study: No results for input(s): "PLTAGG", "PLTAGINTERP", "PLTAGREGLACO", "ADPPLTAGGREG" in the last 168 hours.  Hgb A1C:   No results for input(s): "HGBA1C" in the last 168 hours.    TSH:   No results for input(s): "TSH" in the last 168 hours.      Diagnostic Results   Brain Imaging   MRI Brain 6/20/25  Impression:   "   Acute left MCA distribution infarct.  No new large parenchymal hemorrhage.  Susceptibility artifact throughout the infarcted tissue may reflect contrast staining and/or blood products conversion.  Overall mass effect is increased compared to prior with sulcal effacement throughout the left cerebral hemisphere and approximately 4 mm of rightward midline shift.     Stable size of the parenchymal hemorrhage centered in the inferior left temporal lobe.     [facility] 6/20/25: 1242  Impression:     Moderate-sized recent left MCA distribution infarct and small intraparenchymal hemorrhage appear grossly unchanged from prior study performed earlier on the same date.     Chronic ischemic change elsewhere with underlying cerebral and cerebellar volume loss, as before.     No new hemorrhage or major vascular distribution infarct elsewhere.  CT 6/20/25: 0834  Impression:     Early ischemic changes in the left MCA distribution, new from recent CT.     New left inferior temporal intraparenchymal hematoma.     Chronic ischemic change with cerebral and cerebellar volume loss, as above.     [facility] 6/19/25  Impression:     Question some early left MCA ischemia corresponding to the patient's known left MCA territory occlusion.  No hemorrhage.     Senescent changes as above.        All CT scans at this facility are performed  using dose modulation techniques as appropriate to performed exam including the following:  automated exposure control; adjustment of mA and/or kV according to the patients size (this includes techniques or standardized protocols for targeted exams where dose is matched to indication/reason for exam: i.e. extremities or head);  iterative reconstruction technique.     Vessel Imaging   CTA head and neck 6/19/25  Impression:     Distal left M1 MCA occlusion with relatively prompt collateralization of the more distal MCA vessels.     Severe stenosis left mid vertebral artery and left PCA.     Cardiac Imaging   TTE 6/19/25     Left Ventricle: The left ventricle is normal in size. Normal wall thickness. There is normal systolic function with a visually estimated ejection fraction of 60 - 65%.    Right Ventricle: The right ventricle is normal in size Wall thickness is normal. Systolic function is normal.    The pulmonary artery pressure could not be estimated.    IVC/SVC: Normal venous pressure at 3 mmHg.

## 2025-06-30 NOTE — PLAN OF CARE
Problem: Physical Therapy  Goal: Physical Therapy Goal  Description: Goals to be met by: 25     Patient will increase functional independence with mobility by performin. Supine to sit with Contact Guard Assistance  2. Sit to supine with Contact Guard Assistance  3. Sit to stand transfer with Moderate Assistance  4. Bed to chair transfer with Maximum Assistance using No Assistive Device  5. Gait  x 10 feet with Maximum Assistance using No Assistive Device.   6. Sitting at edge of bed x5 minutes with Stand-by Assistance  7. Follow 100% of 1-step commands throughout session    Outcome: Progressing

## 2025-06-30 NOTE — PT/OT/SLP PROGRESS
Physical Therapy Treatment    Patient Name:  Cristi Pulido   MRN:  [id number]    Co-treatment with OT performed due to patient complexity and deficits, requiring two skilled therapists to appropriately and safely assess patient's strength and endurance while facilitating functional tasks in addition to accommodating for patient's activity tolerance.    Recommendations:     Discharge Recommendations: High Intensity Therapy  Discharge Equipment Recommendations: wheelchair, lift device, hospital bed  Barriers to discharge: Inaccessible home and Decreased caregiver support    Assessment:     Cristi Pulido is a 70 y.o. male admitted with a medical diagnosis of Embolic stroke involving left middle cerebral artery.  He presents with the following impairments/functional limitations: weakness, impaired endurance, impaired self care skills, impaired functional mobility, gait instability, impaired balance, decreased upper extremity function, decreased lower extremity function, decreased safety awareness, impaired coordination, impaired cardiopulmonary response to activity, impaired fine motor. Patient participated well in session. He continues to require heavy assistance for all mobility however was able to improve seated balance and perform 2 sit to stand transfers this date. Continued R inattention noted as well as expressive aphasia, patient was able to follow ~15% of commands throughout session. No intentional RLE activation noted on command, potentially some noted inadvertently during other activity however not reproducible.     Rehab Prognosis: Good; patient would benefit from acute skilled PT services to address these deficits and reach maximum level of function.    Recent Surgery: * No surgery found *      Plan:     During this hospitalization, patient to be seen 4 x/week to address the identified rehab impairments via gait training, therapeutic activities, therapeutic exercises, neuromuscular re-education and progress toward  the following goals:    Plan of Care Expires:  07/11/25    Subjective     Chief Complaint: not stated  Patient/Family Comments/goals: patient intermittently verbalizing sounds  Pain/Comfort:  Pain Rating 1: 0/10 (not indicated)  Pain Rating Post-Intervention 1: 0/10      Objective:     Communicated with RN prior to session. Patient found HOB elevated with telemetry, G/J tube, restraints, underwood catheter, SCD upon PT entry to room.     General Precautions: Standard, fall, aspiration, pureed diet  Orthopedic Precautions: N/A  Braces: N/A  Respiratory Status: Room air     Cognition:   Affect: pleasant and cooperative  Alertness: eyes open 100 % of session  Insight: poor insight into deficits and decreased safety awareness  R inattention   Attention: requires redirection to task  Command Following: patient follows 15 % of 1-step commands  Communication: verbalizes, unable to express specific words     Functional Mobility:  Bed Mobility:     Rolling Right: total assistance  Scooting: total assistance  Supine to Sit: total assistance and of 2 persons  Sit to Supine: total assistance and of 2 persons  Transfers:     Sit to Stand:  total assistance x2 with no AD  R knee blocked   Balance:   Sitting static: maxA-modA  R lean  Brief CGA intermittently   Sitting dynamic: max  Standing static: maxA    AM-PAC 6 CLICK MOBILITY  Turning over in bed (including adjusting bedclothes, sheets and blankets)?: 2  Sitting down on and standing up from a chair with arms (e.g., wheelchair, bedside commode, etc.): 2  Moving from lying on back to sitting on the side of the bed?: 2  Moving to and from a bed to a chair (including a wheelchair)?: 1  Need to walk in hospital room?: 1  Climbing 3-5 steps with a railing?: 1  Basic Mobility Total Score: 9       Treatment & Education:  Cues during rolling and bed mobility for UE reaching and positioning on bed railings, LE management with rolling technique, cues for visual attention to UE for  initiation. Assistance as above.   Active reorientation provided throughout session with cues for person/place/situation/date with patient assessed for accuracy and carryover of information.    Verbal and tactile cues with assist during STS transfer for optimal LOU prior to transfer, forward weight shift to initiate, to engage LE mm, extend hips forward and bring head and chest up to achieve full upright stance.    Seated BLE kicks, LLE AROM and RLE PROM with cues for mm activation throughout ROM.   EOB balance with cues to engage core mm to decrease assistance needed and increase safety in sitting.     Patient left HOB elevated with all lines intact and call button in reach.    GOALS:   Multidisciplinary Problems       Physical Therapy Goals          Problem: Physical Therapy    Goal Priority Disciplines Outcome Interventions   Physical Therapy Goal     PT, PT/OT Progressing    Description: Goals to be met by: 25     Patient will increase functional independence with mobility by performin. Supine to sit with Contact Guard Assistance  2. Sit to supine with Contact Guard Assistance  3. Sit to stand transfer with Moderate Assistance  4. Bed to chair transfer with Maximum Assistance using No Assistive Device  5. Gait  x 10 feet with Maximum Assistance using No Assistive Device.   6. Sitting at edge of bed x5 minutes with Stand-by Assistance  7. Follow 100% of 1-step commands throughout session                         Time Tracking:     PT Received On: 25  PT Start Time: 1100     PT Stop Time: 1125  PT Total Time (min): 25 min     Billable Minutes: Neuromuscular Re-education 25 minutes    Treatment Type: Treatment  PT/PTA: PT     Number of PTA visits since last PT visit: 0     2025

## 2025-06-30 NOTE — ASSESSMENT & PLAN NOTE
-Stroke risk factor  -SBP goal <140, maintain MAP >65  -BP range in the last 24 hrs: BP  Min: 139/82  Max: 169/93  -Current antihypertensive regimen:     -Amlodipine 10mg     -Lisinopril still on hold for now    -Metoprolol 50mg BID   --PRN labetalol/hydralazine

## 2025-06-30 NOTE — PT/OT/SLP PROGRESS
Speech Language Pathology Treatment    Patient Name:  Cristi Pulido   MRN:  [id number]  Admitting Diagnosis: Embolic stroke involving left middle cerebral artery    Recommendations:     General Recommendations:  Dysphagia therapy and Speech/language therapy  Diet recommendations:  Pleasure Feeding, Puree, Liquid Diet Level: Thin (via tsp sips)   Aspiration Precautions: Continue alternate means of nutrition, Frequent oral care, and Strict aspiration precautions   General Precautions: Standard, fall, aspiration, pureed diet  Communication strategies:  yes/no questions only, provide increased time to answer, and go to room if call light pushed  Discharge recommendations:  High Intensity Therapy     Assessment:     Cristi Pulido is a 70 y.o. male with an SLP diagnosis of Aphasia, Dysphagia, and Dysarthria.  Pt with adequate tolerance of tsp trials of thin and puree for pleasure this date.     Subjective     Pt resting in bed, easily roused. No family present. G-tube in place. No NGT present.     Pain/Comfort:  Pain Rating 1:  (none indictaed/observed)    Respiratory Status: Room air    Objective:     Has the patient been evaluated by SLP for swallowing?   Yes  Keep patient NPO? No     Pt seen bedside for ongoing dysphagia and speech therapies. Pt with ongoing humming, babbling and  nonsensical speech with varying inflection/intonation across session. Occasional head nodding and shaking, though still unreliable. He followed simple 1 step commands with 40% acc this date follow clinician model and tactile prompting.  He answered simple Y/N question with 25% acc this date. Unable to elicit automatic speech responses and pt remains unintelligible when attempting to express himself.     Hob elevated and oral care provided. Pt continues to present with white coating on superior surface of tongue unable to be removed with oral care. Following oral care worked on PO trials with feed assist. Pt consumed ice chip x 1, though swallowed it  whole therefore additional trials deferred. He consumed thin water via tsp x 10 and via cup x 4 and tsp puree x 10. Noted adequate oral acceptance, IND opening mouth to accept all PO trials without cue. However, pt with poor labial closure around utensil and cup rim, requiring thermal and tactile prompting to improve across trials. Noted adequate and timely AP transit and appearance of a timely swallow trigger. Mild oral residue in right cheek present with puree, cleared with tsp sip of thin. No s/sx of aspiration appreciated.  Continue to recommend PEG tube feeds as primary source of nutrition with thin liquids and puree for pleasure. Education provided re: role of SLP, diet recs, importance of oral care, swallow/aspiration precs, s/s aspiration, and SLP POC.  Education to be ongoing.    Goals:   Multidisciplinary Problems       SLP Goals          Problem: SLP    Goal Priority Disciplines Outcome   SLP Goal     SLP Progressing   Description: Speech Language Pathology Goals  Goals expected to be met by 7/3    1. Pt will participate in ongoing swallow assessment to determine least restrictive PO diet.    2. Pt will participate in ongoing cognitive-linguistic assessment to determine additional therapeutic needs.   3. Pt will follow 1 step commands with 80% acc following model/prompt.   4. Pt will answer simple Y/N questions with 80% acc.                                Plan:     Patient to be seen:  4 x/week   Plan of Care expires:  07/19/25  Plan of Care reviewed with:  patient   SLP Follow-Up:  Yes       Time Tracking:     SLP Treatment Date:   06/30/25  Speech Start Time:  1024  Speech Stop Time:  1041     Speech Total Time (min):  17 min    Billable Minutes: Speech Therapy Individual 8 and Treatment Swallowing Dysfunction 9    06/30/2025

## 2025-06-30 NOTE — PLAN OF CARE
Problem: Stroke, Ischemic (Includes Transient Ischemic Attack)  Goal: Optimal Coping  Outcome: Progressing  Goal: Optimal Functional Ability  Outcome: Progressing  Goal: Effective Oxygenation and Ventilation  Outcome: Progressing  Goal: Improved Sensorimotor Function  Outcome: Progressing  Goal: Effective Urinary Elimination  Outcome: Progressing      POC updated and reviewed with the patient at the bedside. Questions regarding POC were encouraged and addressed. VSS, see flowsheets. Tele maintained per provider's order. Patient has expressive aphasia and is AOX 1 to self at this time. Requires frequent verbal cues/prompts with daily tasks. NAEON. Seizure, fall, and safety precautions maintained, no signs of injury noted during shift. Patient repositioned with assistance x 2 in bed for comfort. Upon exiting room, patient's bed locked in low position, side rails up x 3, bed alarm set, with call light within reach. Instructed patient to call staff for mobility, verbalized understanding. Stroke book and stroke education reviewed with the patient at the bedside, see education flowsheets for details. No acute signs of distress noted at this time.

## 2025-06-30 NOTE — ASSESSMENT & PLAN NOTE
Cristi Pulido is a 70 y.o. male w/ PMH of HTN, DM, prior stroke w/ residual RSW who presents as a transfer from OSH after presenting with acute onset of dysarthria and RSW. Telestroke was completed and his NIHSS was 19. CT showed probable early ischemic changes in the L insular ribbon and L frontal lobe. CTA demonstrated occlusion of the distal L MCA M1 segment. He was given TNK at 0548. Patient was transferred to [facility] for further management. On arrival, repeat NIHSS of 24. Patient taken for repeat CTH which showed small left temporal hemorrhage along with continued early ischemic changes. Patient taken to IR for thrombectomy and to be admitted to [facility] post-procedure.  S/P thrombectomy. TICI 3. MRI revealing for blood products in stroke bed, likely reperfusion injury. Etiology ESUS.     06/30/2025 Pulled NG tube overnight. Na stable, Cr and BUN continue to improve. Fluids reordered. Discussed PEG replacement with GI and Gen Surg, deferred to IR for replacement. IR reconsulted this morning. Must wait approximately 1 week to re attempt PEG placement. Placement planned for 7/3. Will need to replace NG tube 7/2. NPO at midnight 7/3.     Antithrombotics for secondary stroke prevention: Antiplatelets: Aspirin: 81 mg daily    Statins for secondary stroke prevention and hyperlipidemia, if present:   Statins: Atorvastatin- 40 mg daily    Aggressive risk factor modification: HTN, Smoking, DM, HLD     Rehab efforts: The patient has been evaluated by a stroke team provider and the therapy needs have been fully considered based off the presenting complaints and exam findings. The following therapy evaluations are needed: PT evaluate and treat, OT evaluate and treat, SLP evaluate and treat, PM&R evaluate for appropriate placement, current recommendation HIT    Diagnostics ordered/pending: None     VTE prophylaxis: Enoxaparin 40 mg SQ every 24 hours  Mechanical prophylaxis: Place SCDs    BP parameters: Infarct: Post sucessful  Rx request    thrombectomy, SBP <140

## 2025-06-30 NOTE — CONSULTS
Interventional Radiology  Consult/History & Physical Note    Consult Requested By: Franci Zambrano PA-C  Reason for Consult: PEG replacement    SUBJECTIVE:     Chief Complaint: dislodged G tube    History of Present Illness:  Cristi Pulido is a 70 y.o. male with a PMHx of HTN, DM, prior stroke w/ residual RSW who presented to [facility] ED with acute onset of dysarthria and RSW, was found to have L MCA CVA and transferred to [facility] [place] for HLOC on 6/19/25. Hospital course notable for thrombectomy on 6/19, s/p IR G tube placement on 6/25, pt self removed the G tube on 6/27, s/p failed attempt to replace the G tube on 6/28. Interventional Radiology has been consulted for percutaneous gastrostomy tube replacement for management of his dislodged G tube. Of note, G tube replacement was attempted on 6/28 and was unsuccessful as the tract was unable to be cannulated. CT a/p obtained which revealed pneumoperitoneum- moderate amount of free air in the upper abdomen. The pt does not have an NG tube in place- he had an NG placed yesterday evening and self removed it. The pt's WBC is 10.85 from 8.84. Pt is afebrile and hemodynamically stable. He  does not have a history of KANWAL requiring nightly CPAP or difficulty breathing when lying flat. He takes ASA and SQ heparin. He has been NPO since midnight.    Review of Systems   Unable to perform ROS: Medical condition       Scheduled Meds:   amLODIPine  10 mg Per G Tube QHS    aspirin  81 mg Per G Tube Daily    atorvastatin  40 mg Per G Tube Daily    FLUoxetine  20 mg Per G Tube Daily    heparin (porcine)  5,000 Units Subcutaneous Q8H    insulin aspart U-100  6 Units Subcutaneous Q4H    insulin glargine U-100  15 Units Subcutaneous Daily    metoprolol tartrate  50 mg Per G Tube BID    nicotine  1 patch Transdermal Daily    nystatin  500,000 Units Oral QID    polyethylene glycol  17 g Per G Tube BID    QUEtiapine  25 mg Per G Tube QHS    senna-docusate  1 tablet Per G Tube BID     Continuous  Infusions:   0.45% NaCl   Intravenous Continuous 100 mL/hr at 06/30/25 1132 New Bag at 06/30/25 1132     PRN Meds:  Current Facility-Administered Medications:     bisacodyL, 10 mg, Rectal, Daily PRN    dextrose 50%, 12.5 g, Intravenous, PRN    dextrose 50%, 25 g, Intravenous, PRN    glucagon (human recombinant), 1 mg, Intramuscular, PRN    hydrALAZINE, 10 mg, Intravenous, Q4H PRN    insulin aspart U-100, 0-10 Units, Subcutaneous, Q4H PRN    labetalol, 10 mg, Intravenous, Q4H PRN    melatonin, 6 mg, Per G Tube, Nightly PRN    simethicone, 1 tablet, Per G Tube, TID PRN    sodium chloride 0.9%, 10 mL, Intravenous, PRN    Review of patient's allergies indicates:  No Known Allergies    Past Medical History:   Diagnosis Date    Diabetes mellitus     Hyperlipidemia     Hypertension     Stroke     Stroke     right side weak    Type 2 diabetes mellitus      Past Surgical History:   Procedure Laterality Date    AMPUTATION, LOWER LIMB Bilateral     middle toes    bilateral 3rd toe amputation      COLONOSCOPY  01/2021    FRACTURE SURGERY Left     hip    JOINT REPLACEMENT Left     KOMAL     Family History   Problem Relation Name Age of Onset    Diabetes Mother      Heart disease Mother      Hypertension Mother      Diabetes Father      Diabetes Sister Foretta     Diabetes Brother Marcelo     Diabetes Daughter Siri     No Known Problems Son Cristi Marks.     Diabetes Sister Jimena     Diabetes Sister Karma     Diabetes Brother Constantine     Hypertension Daughter Ciera      Social History[1]    OBJECTIVE:     Vital Signs (Most Recent)  Temp: 98 °F (36.7 °C) (06/30/25 1135)  Pulse: 100 (06/30/25 1135)  Resp: 19 (06/30/25 0745)  BP: (!) 169/93 (06/30/25 1300)  SpO2: 96 % (06/30/25 1135)    Physical Exam:  Physical Exam  Vitals and nursing note reviewed.   Constitutional:       General: He is not in acute distress.     Appearance: He is ill-appearing.   HENT:      Head: Normocephalic and atraumatic.      Right Ear: External ear normal.     "  Left Ear: External ear normal.   Eyes:      Extraocular Movements: Extraocular movements intact.      Conjunctiva/sclera: Conjunctivae normal.      Pupils: Pupils are equal, round, and reactive to light.   Cardiovascular:      Rate and Rhythm: Normal rate.   Pulmonary:      Effort: Pulmonary effort is normal. No respiratory distress.   Abdominal:      General: Abdomen is flat. There is no distension.      Comments: G tube tract appears healed, T-tacs still in place   Skin:     General: Skin is warm and dry.      Coloration: Skin is not jaundiced.   Neurological:      General: No focal deficit present.      Mental Status: He is alert and oriented to person, place, and time.   Psychiatric:         Mood and Affect: Mood normal.         Behavior: Behavior normal.         Thought Content: Thought content normal.         Judgment: Judgment normal.         Laboratory  I have reviewed all pertinent lab results within the past 24 hours.  CBC:   Recent Labs   Lab 06/30/25  0719   WBC 10.85   RBC 4.35*   HGB 12.3*   HCT 40.1      MCV 92   MCH 28.3   MCHC 30.7*     BMP:   Recent Labs   Lab 06/30/25  0719   *   *   K 4.3   *   CO2 25   BUN 37*   CREATININE 1.5*   CALCIUM 8.7   MG 2.5     CMP:   Recent Labs   Lab 06/30/25  0719   *   CALCIUM 8.7   ALBUMIN 2.9*   PROT 7.0   *   K 4.3   CO2 25   *   BUN 37*   CREATININE 1.5*   ALKPHOS 128   ALT 25   AST 32   BILITOT 0.4     LFTs:   Recent Labs   Lab 06/30/25  0719   ALT 25   AST 32   ALKPHOS 128   BILITOT 0.4   PROT 7.0   ALBUMIN 2.9*     Coagulation: No results for input(s): "LABPROT", "INR", "APTT" in the last 168 hours.  Microbiology Results (last 7 days)       ** No results found for the last 168 hours. **            ASA/Mallampati  ASA: 3  Mallampati: 2    Imaging:  Recent imaging studies including CT a/p without IV contrast on 6/29/25     EXAMINATION:  CT ABDOMEN PELVIS WITHOUT CONTRAST     CLINICAL HISTORY:  pneumoperitoneum;   "   TECHNIQUE:  Axial images of the abdomen and pelvis were acquired without the use of IV contrast.  Coronal and sagittal reconstructions were also obtained     COMPARISON:  CT 06/24/2025     FINDINGS:  Thoracic soft tissues: No significant abnormality.     Heart: No cardiomegaly or pericardial effusion.  Patchy calcific atherosclerosis throughout the coronary arteries.     Jeny/Mediastinum: No significant lymphadenopathy     Lungs: Bibasilar segmental and subsegmental atelectasis.  No consolidation, pneumothorax, or pleural effusion.     Liver: Normal in size without focal hepatic abnormality.     Gallbladder: No calcified gallstones.  No pericholecystic fluid.     Bile Ducts: No intra or extrahepatic biliary ductal dilation.     Pancreas: No pancreatic mass lesion or duct dilatation.     Spleen: Unremarkable.     Adrenals: There is nodularity of the left adrenal gland without a focal mass or discrete nodule.  The right adrenal gland is unremarkable.     Kidneys/ Ureters: Non-obstructing left-sided renal stone measuring 5 mm (series 2, image 55).  No hydroureteronephrosis bilaterally.  Multiple left-sided renal cysts.     Bladder: Wilson catheter in place.  The bladder is decompressed.     Reproductive organs: Unremarkable.     GI Tract/Mesentery: There is a nasogastric tube which courses into the gastric fundus.  The tip of the nasogastric tube approximates the gastric wall, respiratory motion and beam hardening artifact obscures visualization of the tip.  There is no evidence of bowel obstruction.     Peritoneal Space: There is a moderate amount of intraperitoneal free air, in the upper abdomen anterior to the left hepatic lobe, and anterior to the stomach, likely related to recent attempted gastrostomy tube placement.  No free intraperitoneal fluid.     Lymph nodes: No significant adenopathy.     Abdominal wall/extraperitoneal soft tissues: Gastrostomy tube ostomy along the left anterior abdominal wall.      Vasculature: Left-sided aortic arch.  The abdominal aorta is normal in caliber, contour, and course with moderate to severe calcific atherosclerosis throughout the aorta and its branches.     Bones: Left hip arthroplasty.  Degenerative changes without acute displaced fracture.     Impression:     1. In this patient with recent attempted gastrostomy tube replacement, there is a moderate amount of free air in the upper abdomen, likely postprocedural.  2. Nasogastric tube approximates the wall of the gastric fundus, noting detailed evaluation of the tip limited by respiratory motion artifact and barium within the adjacent colon.  3. Bilateral renal stones.  No hydroureteronephrosis.     Electronically signed by resident: Erich Mike  Date:                                            06/29/2025  Time:                                           12:19     Electronically signed by: Lm Hillman MD  Date:                                            06/29/2025  Time:                                           13:58    ASSESSMENT/PLAN:     Assessment:  70 y.o. male with a PMHx of HTN, DM, prior stroke w/ residual RSW  and recent L MCA CVA who has been referred to IR for percutaneous gastrostomy tube replacement due to dislodged G tube. Case discussed with staff. Due to presence of pneumoperitoneum, will need to wait for prior G tube tract to completely heal before we re-attempt G tube placement. Will plan for G tube placement on 7/3, approx 1 week following G tube dislodgement. Pt will need an NGT placed on 7/2 followed by barium administration through the NG tube. If pt is unable to tolerate having an NGT put back in, then he will need surgical G tube placement or PEG placement with GI. Pt's family and team are aware of this. The procedure was discussed in great detail with the pt/pt's family including thorough explanations of the potential risks and benefits of gastrostomy tube replacement. Explained to pt/pt's family and  primary team that NG tube placement is required to insufflate the stomach during the procedure and to give barium through prior to the procedure, both of which make G tube placement a safer procedure. Risks include but are not limited to sepsis, severe infection, hemorrhage, bowel injury, catheter dislodgement, catheter blockage and need for additional procedures. Explained to pt/pt's family that opting to forgo gastrostomy tube replacement could pose a risk to life or bodily function. The pt is a candidate for percutaneous gastrostromy tube replacement under moderate sedation. Plan discussed with ordering physician and pt/pt's family who verbalized understanding of the plan and would like to proceed. Will obtain consent from pt's wife.    Plan:  Will proceed with percutaneous gastrostomy tube replacement under moderate sedation on 7/3/25.   Please place/maintain NG tube prior to barium administration (see #3) and keep in placed for procedure  Administer barium overnight starting at midnight (ordered by IR, see instructions in orders) via NG tube  Please keep pt NPO starting at midnight on 7/3/25 (except for barium administration).   Anticoagulation history reviewed. Pt takes ASA and SQ heparin. Please hold AM dose of SQ heparin on 7/3  If starting prophylactic AC following procedure, ok to start lovenox 12 hours following procedure and ok to start SQ heparin 6-8 hours following procedure per SIR guidelines  Coagulation labs reviewed.  Thank you for the consult. Please contact with questions via [facility] secure chat or spectra.    Time spent during patient care today was 75 minutes. This includes time spent before the visit reviewing the chart, discussing case with staff physician and ordering provider, time spent during the face to face patient visit, and time spent after the visit on documentation. Time excludes procedure time.     Sendy Tran PA-C  Interventional Radiology  Spectra: [id number]         [1]   Social  History  Tobacco Use    Smoking status: Every Day     Current packs/day: 0.50     Average packs/day: 0.5 packs/day for 40.0 years (20.0 ttl pk-yrs)     Types: Cigarettes     Passive exposure: Current    Smokeless tobacco: Never    Tobacco comments:     Patient aware of smoking cessation program. He is also aware of health consequences r/t smoking.   Substance Use Topics    Alcohol use: Yes     Comment: Occasionally (football season)    Drug use: Yes     Types: Marijuana

## 2025-06-30 NOTE — PT/OT/SLP PROGRESS
Occupational Therapy  Co- Treatment    Name: Cristi Pulido  MRN: [id number]  Admitting Diagnosis:  Embolic stroke involving left middle cerebral artery       Recommendations:     Discharge Recommendations: High Intensity Therapy  Discharge Equipment Recommendations:  lift device, hospital bed, wheelchair  Barriers to discharge:  Inaccessible home environment (Increased A needed)    Assessment:     Cristi Pulido is a 70 y.o. male with a medical diagnosis of Embolic stroke involving left middle cerebral artery.  He presents with decrease functional status secondary to medical diagnosis. Performance deficits affecting function are weakness, gait instability, decreased upper extremity function, decreased ROM, impaired endurance, impaired balance, decreased lower extremity function, visual deficits, decreased safety awareness, impaired fine motor, impaired self care skills, impaired functional mobility, decreased coordination, abnormal tone. Patient continues to require increased assistance for all mobility completed including sit > stand transfer. Patient RUE remains impaired but OT observed spontaneous movement when sitting EOB. Patient able to follow 15% of all commands with grunting verbalizations.     Rehab Prognosis:  Good; patient would benefit from acute skilled OT services to address these deficits and reach maximum level of function.       Plan:     Patient to be seen 4 x/week to address the above listed problems via self-care/home management, therapeutic exercises, therapeutic activities, neuromuscular re-education  Plan of Care Expires: 07/20/25  Plan of Care Reviewed with: patient    Subjective     Chief Complaint: none   Patient/Family Comments/goals: DC     Objective:     Communicated with: RN prior to session.  Patient found supine with telemetry upon OT entry to room.    General Precautions: Standard, fall, aspiration, pureed diet    Orthopedic Precautions:N/A  Braces: N/A  Respiratory Status: Room air      Occupational Performance:     Bed Mobility:    Rolling Right: total assistance  Scooting: total assistance  Supine to Sit: total assistance and of 2 persons  Sit to Supine: total assistance and of 2 persons   Patient sat EOB with maximal-moderate assistance; patient needing increased assistance for postural stability and trunk support. While sitting EOB patient completed the following:   Postural corrections via tactile cueing for upright tolerance   Auditory/visual/tactile stimulation; re-orientation, command following  Multi-directional reaching; emphasis on command following and postural stability  AROM/AAROM/PROM BUE at all joints     Functional Mobility/Transfers:  Patient completed Sit <> Stand Transfer with total assistance and of 2 persons  with  no assistive device     Activities of Daily Living:  Grooming: maximal assistance to complete facial hygiene sitting EOB    [facility] 6 Click ADL: 6    Treatment & Education:  Co-Treatment conducted due to patient medical instability and to promote patient safety. Provided education regarding role of OT, POC, & discharge recommendations.  Pt with no further questions/concerns at this time. OT provided education on home recommendations and fall prevention in preparation for D/C.     Patient left supine with all lines intact and call button in reach    GOALS:   Multidisciplinary Problems       Occupational Therapy Goals          Problem: Occupational Therapy    Goal Priority Disciplines Outcome Interventions   Occupational Therapy Goal     OT, PT/OT Progressing    Description: Goals to be met by: 07/20/2025     Patient will increase functional independence with ADLs by performing:    UE Dressing with Moderate Assistance.  LE Dressing with Maximum Assistance.  Grooming while seated with Moderate Assistance.  Toileting from bedside commode with Moderate Assistance for hygiene and clothing management.   Supine to sit with Moderate Assistance.  Stand pivot transfer with  Moderate Assistance.                         DME Justifications:  Cristi requires a hospital bed due to him requiring positioning of the body in ways not feasible with an ordinary bed due to limited ability and cannot independently make changes in body position without the use of the bed.The positioning of the body cannot be sufficiently resolved by the use of pillows and wedges.    Time Tracking:     OT Date of Treatment: 06/30/25  OT Start Time: 1108  OT Stop Time: 1124  OT Total Time (min): 16 min    Billable Minutes:Therapeutic Activity 16    OT/JEANCARLOS: OT     Number of JEANCARLOS visits since last OT visit: 0    6/30/2025

## 2025-07-01 PROBLEM — E87.0 HYPERNATREMIA: Status: ACTIVE | Noted: 2025-07-01

## 2025-07-01 LAB
ABSOLUTE EOSINOPHIL (OHS): 0.35 K/UL
ABSOLUTE MONOCYTE (OHS): 0.8 K/UL (ref 0.3–1)
ABSOLUTE NEUTROPHIL COUNT (OHS): 5.32 K/UL (ref 1.8–7.7)
ALBUMIN SERPL BCP-MCNC: 2.6 G/DL (ref 3.5–5.2)
ALP SERPL-CCNC: 115 UNIT/L (ref 40–150)
ALT SERPL W/O P-5'-P-CCNC: 22 UNIT/L (ref 10–44)
ANION GAP (OHS): 7 MMOL/L (ref 8–16)
ANION GAP (OHS): 9 MMOL/L (ref 8–16)
AST SERPL-CCNC: 30 UNIT/L (ref 11–45)
BASOPHILS # BLD AUTO: 0.04 K/UL
BASOPHILS NFR BLD AUTO: 0.4 %
BILIRUB SERPL-MCNC: 0.3 MG/DL (ref 0.1–1)
BUN SERPL-MCNC: 20 MG/DL (ref 8–23)
BUN SERPL-MCNC: 24 MG/DL (ref 8–23)
CALCIUM SERPL-MCNC: 8.3 MG/DL (ref 8.7–10.5)
CALCIUM SERPL-MCNC: 8.4 MG/DL (ref 8.7–10.5)
CHLORIDE SERPL-SCNC: 119 MMOL/L (ref 95–110)
CHLORIDE SERPL-SCNC: 119 MMOL/L (ref 95–110)
CO2 SERPL-SCNC: 24 MMOL/L (ref 23–29)
CO2 SERPL-SCNC: 25 MMOL/L (ref 23–29)
CREAT SERPL-MCNC: 1.2 MG/DL (ref 0.5–1.4)
CREAT SERPL-MCNC: 1.3 MG/DL (ref 0.5–1.4)
ERYTHROCYTE [DISTWIDTH] IN BLOOD BY AUTOMATED COUNT: 13.2 % (ref 11.5–14.5)
GFR SERPLBLD CREATININE-BSD FMLA CKD-EPI: 58 ML/MIN/1.73/M2
GFR SERPLBLD CREATININE-BSD FMLA CKD-EPI: >60 ML/MIN/1.73/M2
GLUCOSE SERPL-MCNC: 107 MG/DL (ref 70–110)
GLUCOSE SERPL-MCNC: 115 MG/DL (ref 70–110)
HCT VFR BLD AUTO: 36.6 % (ref 40–54)
HGB BLD-MCNC: 11.2 GM/DL (ref 14–18)
IMM GRANULOCYTES # BLD AUTO: 0.02 K/UL (ref 0–0.04)
IMM GRANULOCYTES NFR BLD AUTO: 0.2 % (ref 0–0.5)
LYMPHOCYTES # BLD AUTO: 2.89 K/UL (ref 1–4.8)
MAGNESIUM SERPL-MCNC: 2.1 MG/DL (ref 1.6–2.6)
MCH RBC QN AUTO: 28.3 PG (ref 27–31)
MCHC RBC AUTO-ENTMCNC: 30.6 G/DL (ref 32–36)
MCV RBC AUTO: 92 FL (ref 82–98)
NUCLEATED RBC (/100WBC) (OHS): 0 /100 WBC
PHOSPHATE SERPL-MCNC: 3.5 MG/DL (ref 2.7–4.5)
PLATELET # BLD AUTO: 201 K/UL (ref 150–450)
PMV BLD AUTO: 13.3 FL (ref 9.2–12.9)
POCT GLUCOSE: 107 MG/DL (ref 70–110)
POCT GLUCOSE: 115 MG/DL (ref 70–110)
POCT GLUCOSE: 118 MG/DL (ref 70–110)
POCT GLUCOSE: 128 MG/DL (ref 70–110)
POCT GLUCOSE: 137 MG/DL (ref 70–110)
POCT GLUCOSE: 144 MG/DL (ref 70–110)
POCT GLUCOSE: 163 MG/DL (ref 70–110)
POTASSIUM SERPL-SCNC: 3.6 MMOL/L (ref 3.5–5.1)
POTASSIUM SERPL-SCNC: 3.9 MMOL/L (ref 3.5–5.1)
PROT SERPL-MCNC: 6.2 GM/DL (ref 6–8.4)
RBC # BLD AUTO: 3.96 M/UL (ref 4.6–6.2)
RELATIVE EOSINOPHIL (OHS): 3.7 %
RELATIVE LYMPHOCYTE (OHS): 30.7 % (ref 18–48)
RELATIVE MONOCYTE (OHS): 8.5 % (ref 4–15)
RELATIVE NEUTROPHIL (OHS): 56.5 % (ref 38–73)
SODIUM SERPL-SCNC: 150 MMOL/L (ref 136–145)
SODIUM SERPL-SCNC: 153 MMOL/L (ref 136–145)
WBC # BLD AUTO: 9.42 K/UL (ref 3.9–12.7)

## 2025-07-01 PROCEDURE — 63600175 PHARM REV CODE 636 W HCPCS: Mod: HCNC

## 2025-07-01 PROCEDURE — 36415 COLL VENOUS BLD VENIPUNCTURE: CPT | Mod: HCNC

## 2025-07-01 PROCEDURE — 85025 COMPLETE CBC W/AUTO DIFF WBC: CPT | Mod: HCNC

## 2025-07-01 PROCEDURE — 84100 ASSAY OF PHOSPHORUS: CPT | Mod: HCNC

## 2025-07-01 PROCEDURE — 25000003 PHARM REV CODE 250: Mod: HCNC | Performed by: PHYSICIAN ASSISTANT

## 2025-07-01 PROCEDURE — 99233 SBSQ HOSP IP/OBS HIGH 50: CPT | Mod: HCNC,GC,, | Performed by: PSYCHIATRY & NEUROLOGY

## 2025-07-01 PROCEDURE — S4991 NICOTINE PATCH NONLEGEND: HCPCS | Mod: HCNC

## 2025-07-01 PROCEDURE — 82310 ASSAY OF CALCIUM: CPT | Mod: HCNC

## 2025-07-01 PROCEDURE — 11000001 HC ACUTE MED/SURG PRIVATE ROOM: Mod: HCNC

## 2025-07-01 PROCEDURE — 25000003 PHARM REV CODE 250: Mod: HCNC

## 2025-07-01 PROCEDURE — 80053 COMPREHEN METABOLIC PANEL: CPT | Mod: HCNC

## 2025-07-01 PROCEDURE — 83735 ASSAY OF MAGNESIUM: CPT | Mod: HCNC

## 2025-07-01 PROCEDURE — 97530 THERAPEUTIC ACTIVITIES: CPT | Mod: HCNC

## 2025-07-01 RX ORDER — ASPIRIN 300 MG/1
300 SUPPOSITORY RECTAL DAILY
Status: DISCONTINUED | OUTPATIENT
Start: 2025-07-01 | End: 2025-07-11

## 2025-07-01 RX ORDER — DEXTROSE MONOHYDRATE 50 MG/ML
INJECTION, SOLUTION INTRAVENOUS CONTINUOUS
Status: ACTIVE | OUTPATIENT
Start: 2025-07-01 | End: 2025-07-03

## 2025-07-01 RX ORDER — POTASSIUM CHLORIDE 7.45 MG/ML
10 INJECTION INTRAVENOUS
Status: COMPLETED | OUTPATIENT
Start: 2025-07-01 | End: 2025-07-01

## 2025-07-01 RX ADMIN — POTASSIUM CHLORIDE 10 MEQ: 7.46 INJECTION, SOLUTION INTRAVENOUS at 09:07

## 2025-07-01 RX ADMIN — Medication 1 PATCH: at 08:07

## 2025-07-01 RX ADMIN — DEXTROSE MONOHYDRATE: 50 INJECTION, SOLUTION INTRAVENOUS at 09:07

## 2025-07-01 RX ADMIN — NYSTATIN 500000 UNITS: 100000 SUSPENSION ORAL at 08:07

## 2025-07-01 RX ADMIN — ASPIRIN 300 MG: 300 SUPPOSITORY RECTAL at 09:07

## 2025-07-01 RX ADMIN — HEPARIN SODIUM 5000 UNITS: 5000 INJECTION INTRAVENOUS; SUBCUTANEOUS at 05:07

## 2025-07-01 RX ADMIN — HEPARIN SODIUM 5000 UNITS: 5000 INJECTION INTRAVENOUS; SUBCUTANEOUS at 01:07

## 2025-07-01 RX ADMIN — NYSTATIN 500000 UNITS: 100000 SUSPENSION ORAL at 01:07

## 2025-07-01 RX ADMIN — POTASSIUM CHLORIDE 10 MEQ: 7.46 INJECTION, SOLUTION INTRAVENOUS at 05:07

## 2025-07-01 RX ADMIN — INSULIN GLARGINE 15 UNITS: 100 INJECTION, SOLUTION SUBCUTANEOUS at 09:07

## 2025-07-01 RX ADMIN — POTASSIUM CHLORIDE 10 MEQ: 7.46 INJECTION, SOLUTION INTRAVENOUS at 08:07

## 2025-07-01 RX ADMIN — SODIUM CHLORIDE: 4.5 INJECTION, SOLUTION INTRAVENOUS at 08:07

## 2025-07-01 RX ADMIN — HEPARIN SODIUM 5000 UNITS: 5000 INJECTION INTRAVENOUS; SUBCUTANEOUS at 09:07

## 2025-07-01 RX ADMIN — INSULIN ASPART 6 UNITS: 100 INJECTION, SOLUTION INTRAVENOUS; SUBCUTANEOUS at 12:07

## 2025-07-01 RX ADMIN — NYSTATIN 500000 UNITS: 100000 SUSPENSION ORAL at 05:07

## 2025-07-01 RX ADMIN — NYSTATIN 500000 UNITS: 100000 SUSPENSION ORAL at 09:07

## 2025-07-01 RX ADMIN — POTASSIUM CHLORIDE 10 MEQ: 7.46 INJECTION, SOLUTION INTRAVENOUS at 07:07

## 2025-07-01 NOTE — PROGRESS NOTES
Zohaib Garrett - Neurosurgery ([facility])  Vascular Neurology  Comprehensive Stroke Center  Progress Note    Assessment/Plan:     * Embolic stroke involving left middle cerebral artery  Cristi Pulido is a 70 y.o. male w/ PMH of HTN, DM, prior stroke w/ residual RSW who presents as a transfer from OSH after presenting with acute onset of dysarthria and RSW. Telestroke was completed and his NIHSS was 19. CT showed probable early ischemic changes in the L insular ribbon and L frontal lobe. CTA demonstrated occlusion of the distal L MCA M1 segment. He was given TNK at 0548. Patient was transferred to [facility] for further management. On arrival, repeat NIHSS of 24. Patient taken for repeat CTH which showed small left temporal hemorrhage along with continued early ischemic changes. Patient taken to IR for thrombectomy and to be admitted to NCC post-procedure.  S/P thrombectomy. TICI 3. MRI revealing for blood products in stroke bed, likely reperfusion injury. Etiology ESUS.     07/01/2025 Pt hypernatremic @153, switch 0.45 NS to D5W @60 cc. BMP at 4 pm to recheck Na.     Antithrombotics for secondary stroke prevention: Antiplatelets: Aspirin: 81 mg daily    Statins for secondary stroke prevention and hyperlipidemia, if present:   Statins: Atorvastatin- 40 mg daily    Aggressive risk factor modification: HTN, Smoking, DM, HLD     Rehab efforts: The patient has been evaluated by a stroke team provider and the therapy needs have been fully considered based off the presenting complaints and exam findings. The following therapy evaluations are needed: PT evaluate and treat, OT evaluate and treat, SLP evaluate and treat, PM&R evaluate for appropriate placement, current recommendation HIT    Diagnostics ordered/pending: None     VTE prophylaxis: Enoxaparin 40 mg SQ every 24 hours  Mechanical prophylaxis: Place SCDs    BP parameters: Infarct: Post sucessful thrombectomy, SBP <140        Hypernatremia  Pt hypernatremic at 153. On D5W infusion  @60 cc/hr.     Plan  -f/u BMP  - Monitor for over correction     Encounter for nasogastric tube placement  PEG removed per patient.   IR consulted for replacement. Attempt unsuccessful, will attempt again week of 6/30.   NGT replaced. TF and FWF resumed.     Restlessness  -6/24: QTc 441   -Seroquel 25 mg QHS     Hyperlipemia  -Stroke risk factor  -LDL 75, goal <70  -Atorvastatin 40 mg daily      Right sided weakness  -Secondary to stroke.   -Aggressive therapy     Dysarthria and anarthria  -Therapy eval and treat    Aphasia  -Secondary to stroke  -Aggressive therapy     Type 2 diabetes mellitus without complication, without long-term current use of insulin  Lab Results   Component Value Date    LABA1C 7.2 (H) 01/29/2018    HGBA1C 7.0 (H) 06/19/2025     Follow up repeat A1c. Hold home antihyperglycemics. SSI for goal -180 while in hospital  -Added Lantus 15u     Hypertension associated with diabetes  -Stroke risk factor  -SBP goal <140, maintain MAP >65  -BP range in the last 24 hrs: BP  Min: 139/82  Max: 169/93  -Current antihypertensive regimen:     -Amlodipine 10mg     -Lisinopril still on hold for now    -Metoprolol 50mg BID   --PRN labetalol/hydralazine     Tobacco dependence  Stroke risk factor  - on cessation  -nicotine patch PRN         06/20/2025 NAEON. S/P thrombectomy. TICI 3. TNK monitoring ended at 0548. Aphasic, follows no commands. RSW remains. MRI revealing for blood products in stroke bed, likely reperfusion injury. OK to start monotherapy with either plavix or ASA 6/21/25 for secondary stroke prevention. ECHO unremarkable. Family member at bedside agreeable to NGT placement. Etiology ESUS.   6/21/2025 NAEON. Neuro exam stable. Recommend holding AP therapy for now.   06/22/2025 NAEON. Neuro exam stable. Start AP therapy with ASA.   06/23/2025 NAEON. SLP recs NPO. PEG discussed with family and they are agreeable. Pt S/D to NPU.   06/24/2025 NGT pulled overnight and replaced. Placement  confirmed via X ray. IR consulted for PEG placement. JEANNE, Creatinine and BUN uptrending. FWF flushes ordered. Restless during night. EKG repeated. QTc 441. Seroquel 25 mg QHS ordered. Family requesting [facility] when medically ready.   06/25/2025 NAEON. Plans for PEG placement today. BUN and Cr uptrending. Fluids started. Lisinopril held.   06/26/2025 NAEON. PEG placed yesterday, will resume tube feeds this afternoon starting with trickle feeds. BUN and Cr Improved. Increased water boluses to 300.  06/27/2025 NAEON. Neuro exam stable. Patient has tolerated tube feeds well. Will continue to monitor kidney function for now. Trial off restraints today to prepare for rehab placement. Added Lantus 15 units. MBSS today, patient able to have puree for pleasure feeds per speech.   06/28/2025 Na 152. 0.45% NS at 100 ml/hr ordered x 12 hrs. Will repeat sodium draw scheduled at 2100. Pulled PEG. IR unable to replace PEG. Will attempt in coming days. NGT replaced, placement confirmed via Xray. TF and FWF restarted.   06/29/2025 Na 154, BUN and Cr improving. Continuing fluids. NGT coiled overnight. NG Xray showed possible pneumoperitoneum. CT AP without contrast ordered. Showed free air in the abdomen, advised not to use NGT for now. General Surgery consulted to discuss PEG placement.   06/30/2025 Pulled NG tube overnight. Na stable, Cr and BUN continue to improve. Fluids reordered. Discussed PEG replacement with GI and Gen Surg, deferred to IR for replacement. IR reconsulted this morning. Must wait approximately 1 week to re attempt PEG placement. Placement planned for 7/3. Will need to replace NG tube 7/2. NPO at midnight 7/3.   07/01/2025 Switched 0.45 NS to D5W @60 cc/hr. Hypernatremic at 153. Free water deficit 1.9L     STROKE DOCUMENTATION   Acute Stroke Times   Last Known Normal Date: 06/19/25  Last Known Normal Time: 0400  Symptom Onset Date: 06/19/25  Symptom Onset Time: 0400  Stroke Team Called Date: 06/19/25  Stroke  Team Called Time: 0813  Stroke Team Arrival Date: 06/19/25  Stroke Team Arrival Time: 0813  CT Interpretation Time: 0827  Thrombolytic Therapy Recommended:  (already given prior to transfer)  CTA Interpretation Time:  (already completed prior to transfer)  Thrombectomy Recommended: Yes  Decision to Treat Time for IR: 0832    NIH Scale:  1a. Level of Consciousness: 0-->Alert, keenly responsive  1b. LOC Questions: 2-->Answers neither question correctly  1c. LOC Commands: 2-->Performs neither task correctly  2. Best Gaze: 0-->Normal  3. Visual: 0-->No visual loss  4. Facial Palsy: 1-->Minor paralysis (flattened nasolabial fold, asymmetry on smiling)  5a. Motor Arm, Left: 0-->No drift, limb holds 90 (or 45) degrees for full 10 secs  5b. Motor Arm, Right: 4-->No movement  6a. Motor Leg, Left: 1-->Drift, leg falls by the end of the 5-sec period but does not hit bed  6b. Motor Leg, Right: 4-->No movement  7. Limb Ataxia: 0-->Absent  8. Sensory: 0-->Normal, no sensory loss  9. Best Language: 2-->Severe aphasia, all communication is through fragmentary expression, great need for inference, questioning, and guessing by the listener. Range of information that can be exchanged is limited, listener carries burden of. . . (see row details)  10. Dysarthria: 2-->Severe dysarthria, patients speech is so slurred as to be unintelligible in the absence of or out of proportion to any dysphasia, or is mute/anarthric  11. Extinction and Inattention (formerly Neglect): 0-->No abnormality  Total (NIH Stroke Scale): 18       Modified Monmouth Score: 2  Stephanie Coma Scale:    ABCD2 Score:    [id number] Score:   HAS -BLED Score:   ICH Score:   Hunt & Kimball Classification:      Hemorrhagic change of an Ischemic Stroke: Does this patient have an ischemic stroke with hemorrhagic changes? No     Neurologic Chief Complaint: L MCA stroke    Subjective:     Interval History: Patient is seen for follow-up neurological assessment and treatment  recommendations: See hospital course.    HPI, Past Medical, Family, and Social History remains the same as documented in the initial encounter.     Review of Systems   Reason unable to perform ROS: Severe aphasia.     Scheduled Meds:   amLODIPine  10 mg Per G Tube QHS    aspirin  300 mg Rectal Daily    atorvastatin  40 mg Per G Tube Daily    [START ON 7/3/2025] barium sulfate  450 mL Per NG tube Once    [START ON 7/3/2025] barium sulfate  450 mL Per NG tube Once    FLUoxetine  20 mg Per G Tube Daily    heparin (porcine)  5,000 Units Subcutaneous Q8H    insulin aspart U-100  6 Units Subcutaneous Q4H    insulin glargine U-100  15 Units Subcutaneous Daily    metoprolol tartrate  50 mg Per G Tube BID    nicotine  1 patch Transdermal Daily    nystatin  500,000 Units Oral QID    polyethylene glycol  17 g Per G Tube BID    QUEtiapine  25 mg Per G Tube QHS    senna-docusate  1 tablet Per G Tube BID     Continuous Infusions:   D5W   Intravenous Continuous 60 mL/hr at 07/01/25 0942 New Bag at 07/01/25 0942       PRN Meds:  Current Facility-Administered Medications:     bisacodyL, 10 mg, Rectal, Daily PRN    dextrose 50%, 12.5 g, Intravenous, PRN    dextrose 50%, 25 g, Intravenous, PRN    glucagon (human recombinant), 1 mg, Intramuscular, PRN    hydrALAZINE, 10 mg, Intravenous, Q4H PRN    insulin aspart U-100, 0-10 Units, Subcutaneous, Q4H PRN    labetalol, 10 mg, Intravenous, Q4H PRN    melatonin, 6 mg, Per G Tube, Nightly PRN    simethicone, 1 tablet, Per G Tube, TID PRN    sodium chloride 0.9%, 10 mL, Intravenous, PRN    Objective:     Vital Signs (Most Recent):  Temp: 97.5 °F (36.4 °C) (07/01/25 1102)  Pulse: 76 (07/01/25 1102)  Resp: 18 (07/01/25 1102)  BP: (!) 142/80 (07/01/25 1102)  SpO2: 95 % (07/01/25 1102)  BP Location: Right arm    Vital Signs Range (Last 24H):  Temp:  [97.5 °F (36.4 °C)-98.4 °F (36.9 °C)]   Pulse:  [70-98]   Resp:  [18-19]   BP: (142-168)/(80-98)   SpO2:  [95 %-99 %]   BP Location: Right arm      "  Physical Exam  HENT:      Head: Normocephalic and atraumatic.      Mouth/Throat:      Mouth: Mucous membranes are moist.   Eyes:      Conjunctiva/sclera: Conjunctivae normal.   Cardiovascular:      Rate and Rhythm: Normal rate.   Pulmonary:      Effort: Pulmonary effort is normal.   Skin:     General: Skin is warm and dry.   Neurological:      Mental Status: He is alert.      Motor: Weakness present.              Neurological Exam:   LOC: drowsy  Attention Span: poor  Language: Expressive aphasia, Receptive aphasia  Articulation: Dysarthria  Orientation: Untestable due to severe aphasia   Facial Movement (CN VII): Lower facial weakness on the Right  Motor: Arm left  Normal 5/5  Leg left  Normal 5/5  Arm right  Plegia 0/5  Leg right Paresis: 1/5  Sensation: Cy-hypoesthesia right    Laboratory:  CMP:   Recent Labs   Lab 07/01/25 0522   CALCIUM 8.3*   ALBUMIN 2.6*   PROT 6.2   *   K 3.9   CO2 25   *   BUN 24*   CREATININE 1.3   ALKPHOS 115   ALT 22   AST 30   BILITOT 0.3     BMP:   Recent Labs   Lab 07/01/25 0522   *   K 3.9   *   CO2 25   BUN 24*   CREATININE 1.3   CALCIUM 8.3*     CBC:   Recent Labs   Lab 07/01/25 0522   WBC 9.42   RBC 3.96*   HGB 11.2*   HCT 36.6*      MCV 92   MCH 28.3   MCHC 30.6*     Lipid Panel:   No results for input(s): "CHOL", "LDLCALC", "HDL", "TRIG" in the last 168 hours.    Coagulation:   No results for input(s): "PT", "INR", "APTT" in the last 168 hours.    Platelet Aggregation Study: No results for input(s): "PLTAGG", "PLTAGINTERP", "PLTAGREGLACO", "ADPPLTAGGREG" in the last 168 hours.  Hgb A1C:   No results for input(s): "HGBA1C" in the last 168 hours.    TSH:   No results for input(s): "TSH" in the last 168 hours.      Diagnostic Results   Brain Imaging   MRI Brain 6/20/25  Impression:     Acute left MCA distribution infarct.  No new large parenchymal hemorrhage.  Susceptibility artifact throughout the infarcted tissue may reflect contrast staining " and/or blood products conversion.  Overall mass effect is increased compared to prior with sulcal effacement throughout the left cerebral hemisphere and approximately 4 mm of rightward midline shift.     Stable size of the parenchymal hemorrhage centered in the inferior left temporal lobe.     [facility] 6/20/25: 1242  Impression:     Moderate-sized recent left MCA distribution infarct and small intraparenchymal hemorrhage appear grossly unchanged from prior study performed earlier on the same date.     Chronic ischemic change elsewhere with underlying cerebral and cerebellar volume loss, as before.     No new hemorrhage or major vascular distribution infarct elsewhere.  CT 6/20/25: 0834  Impression:     Early ischemic changes in the left MCA distribution, new from recent CT.     New left inferior temporal intraparenchymal hematoma.     Chronic ischemic change with cerebral and cerebellar volume loss, as above.     [facility] 6/19/25  Impression:     Question some early left MCA ischemia corresponding to the patient's known left MCA territory occlusion.  No hemorrhage.     Senescent changes as above.        All CT scans at this facility are performed  using dose modulation techniques as appropriate to performed exam including the following:  automated exposure control; adjustment of mA and/or kV according to the patients size (this includes techniques or standardized protocols for targeted exams where dose is matched to indication/reason for exam: i.e. extremities or head);  iterative reconstruction technique.     Vessel Imaging   CTA head and neck 6/19/25  Impression:     Distal left M1 MCA occlusion with relatively prompt collateralization of the more distal MCA vessels.     Severe stenosis left mid vertebral artery and left PCA.     Cardiac Imaging   TTE 6/19/25    Left Ventricle: The left ventricle is normal in size. Normal wall thickness. There is normal systolic function with a visually estimated ejection fraction of 60 -  65%.    Right Ventricle: The right ventricle is normal in size Wall thickness is normal. Systolic function is normal.    The pulmonary artery pressure could not be estimated.    IVC/SVC: Normal venous pressure at 3 mmHg.       Cullen Almanzar MD  [facility]  Department of Vascular Neurology   [facility])

## 2025-07-01 NOTE — PLAN OF CARE
Problem: Stroke, Ischemic (Includes Transient Ischemic Attack)  Goal: Optimal Coping  Outcome: Progressing  Goal: Optimal Cerebral Tissue Perfusion  Outcome: Progressing  Goal: Effective Oxygenation and Ventilation  Outcome: Progressing  Goal: Improved Sensorimotor Function  Outcome: Progressing  Goal: Effective Urinary Elimination  Outcome: Progressing     POC updated and reviewed with the patient at the bedside. Questions regarding POC were encouraged and addressed. VSS, see flowsheets. Tele maintained per provider's order. Patient has expressive aphasia and is AOX 1 to self at this time. Requires frequent verbal cues/prompts with daily tasks. NAEON. Seizure, fall, and safety precautions maintained, no signs of injury noted during shift. Patient repositioned with assistance x 2 in bed for comfort. Upon exiting room, patient's bed locked in low position, side rails up x 3, bed alarm set, with call light within reach. Instructed patient to call staff for mobility, verbalized understanding. Stroke book and stroke education reviewed with the patient at the bedside, see education flowsheets for details. No acute signs of distress noted at this time.

## 2025-07-01 NOTE — PLAN OF CARE
[facility] Garrett - Neurosurgery (Hospital)  Discharge Reassessment    Primary Care Provider: Rell Winn MD    Expected Discharge Date: 7/8/2025    Reassessment (most recent)       Discharge Reassessment - 07/01/25 1534          Discharge Reassessment    Assessment Type Discharge Planning Reassessment     Did the patient's condition or plan change since previous assessment? No     Discharge Plan discussed with: Spouse/sig other     Name(s) and Number(s) Jocy Pulido (spouse) [phone]     Communicated ESSIE with patient/caregiver Yes     Discharge Plan A Rehab     Discharge Plan B Skilled Nursing Facility     DME Needed Upon Discharge  other (see comments)   TBD    Transition of Care Barriers None     Why the patient remains in the hospital Requires continued medical care        Post-Acute Status    Post-Acute Authorization Placement     Post-Acute Placement Status Pending medical clearance/testing                   Discharge Plan A and Plan B have been determined by review of patient's clinical status, future medical and therapeutic needs, and coverage/benefits for post-acute care in coordination with multidisciplinary team members.       Gloria Robertson RN  Ext [phone]

## 2025-07-01 NOTE — SUBJECTIVE & OBJECTIVE
Neurologic Chief Complaint: L MCA stroke    Subjective:     Interval History: Patient is seen for follow-up neurological assessment and treatment recommendations: See hospital course.    HPI, Past Medical, Family, and Social History remains the same as documented in the initial encounter.     Review of Systems   Reason unable to perform ROS: Severe aphasia.     Scheduled Meds:   amLODIPine  10 mg Per G Tube QHS    aspirin  300 mg Rectal Daily    atorvastatin  40 mg Per G Tube Daily    [START ON 7/3/2025] barium sulfate  450 mL Per NG tube Once    [START ON 7/3/2025] barium sulfate  450 mL Per NG tube Once    FLUoxetine  20 mg Per G Tube Daily    heparin (porcine)  5,000 Units Subcutaneous Q8H    insulin aspart U-100  6 Units Subcutaneous Q4H    insulin glargine U-100  15 Units Subcutaneous Daily    metoprolol tartrate  50 mg Per G Tube BID    nicotine  1 patch Transdermal Daily    nystatin  500,000 Units Oral QID    polyethylene glycol  17 g Per G Tube BID    QUEtiapine  25 mg Per G Tube QHS    senna-docusate  1 tablet Per G Tube BID     Continuous Infusions:   D5W   Intravenous Continuous 60 mL/hr at 07/01/25 0942 New Bag at 07/01/25 0942       PRN Meds:  Current Facility-Administered Medications:     bisacodyL, 10 mg, Rectal, Daily PRN    dextrose 50%, 12.5 g, Intravenous, PRN    dextrose 50%, 25 g, Intravenous, PRN    glucagon (human recombinant), 1 mg, Intramuscular, PRN    hydrALAZINE, 10 mg, Intravenous, Q4H PRN    insulin aspart U-100, 0-10 Units, Subcutaneous, Q4H PRN    labetalol, 10 mg, Intravenous, Q4H PRN    melatonin, 6 mg, Per G Tube, Nightly PRN    simethicone, 1 tablet, Per G Tube, TID PRN    sodium chloride 0.9%, 10 mL, Intravenous, PRN    Objective:     Vital Signs (Most Recent):  Temp: 97.5 °F (36.4 °C) (07/01/25 1102)  Pulse: 76 (07/01/25 1102)  Resp: 18 (07/01/25 1102)  BP: (!) 142/80 (07/01/25 1102)  SpO2: 95 % (07/01/25 1102)  BP Location: Right arm    Vital Signs Range (Last 24H):  Temp:   "[97.5 °F (36.4 °C)-98.4 °F (36.9 °C)]   Pulse:  [70-98]   Resp:  [18-19]   BP: (142-168)/(80-98)   SpO2:  [95 %-99 %]   BP Location: Right arm       Physical Exam  HENT:      Head: Normocephalic and atraumatic.      Mouth/Throat:      Mouth: Mucous membranes are moist.   Eyes:      Conjunctiva/sclera: Conjunctivae normal.   Cardiovascular:      Rate and Rhythm: Normal rate.   Pulmonary:      Effort: Pulmonary effort is normal.   Skin:     General: Skin is warm and dry.   Neurological:      Mental Status: He is alert.      Motor: Weakness present.              Neurological Exam:   LOC: drowsy  Attention Span: poor  Language: Expressive aphasia, Receptive aphasia  Articulation: Dysarthria  Orientation: Untestable due to severe aphasia   Facial Movement (CN VII): Lower facial weakness on the Right  Motor: Arm left  Normal 5/5  Leg left  Normal 5/5  Arm right  Plegia 0/5  Leg right Paresis: 1/5  Sensation: Cy-hypoesthesia right    Laboratory:  CMP:   Recent Labs   Lab 07/01/25  0522   CALCIUM 8.3*   ALBUMIN 2.6*   PROT 6.2   *   K 3.9   CO2 25   *   BUN 24*   CREATININE 1.3   ALKPHOS 115   ALT 22   AST 30   BILITOT 0.3     BMP:   Recent Labs   Lab 07/01/25  0522   *   K 3.9   *   CO2 25   BUN 24*   CREATININE 1.3   CALCIUM 8.3*     CBC:   Recent Labs   Lab 07/01/25  0522   WBC 9.42   RBC 3.96*   HGB 11.2*   HCT 36.6*      MCV 92   MCH 28.3   MCHC 30.6*     Lipid Panel:   No results for input(s): "CHOL", "LDLCALC", "HDL", "TRIG" in the last 168 hours.    Coagulation:   No results for input(s): "PT", "INR", "APTT" in the last 168 hours.    Platelet Aggregation Study: No results for input(s): "PLTAGG", "PLTAGINTERP", "PLTAGREGLACO", "ADPPLTAGGREG" in the last 168 hours.  Hgb A1C:   No results for input(s): "HGBA1C" in the last 168 hours.    TSH:   No results for input(s): "TSH" in the last 168 hours.      Diagnostic Results   Brain Imaging   MRI Brain 6/20/25  Impression:     Acute left MCA " distribution infarct.  No new large parenchymal hemorrhage.  Susceptibility artifact throughout the infarcted tissue may reflect contrast staining and/or blood products conversion.  Overall mass effect is increased compared to prior with sulcal effacement throughout the left cerebral hemisphere and approximately 4 mm of rightward midline shift.     Stable size of the parenchymal hemorrhage centered in the inferior left temporal lobe.     [facility] 6/20/25: 1242  Impression:     Moderate-sized recent left MCA distribution infarct and small intraparenchymal hemorrhage appear grossly unchanged from prior study performed earlier on the same date.     Chronic ischemic change elsewhere with underlying cerebral and cerebellar volume loss, as before.     No new hemorrhage or major vascular distribution infarct elsewhere.  CT 6/20/25: 0834  Impression:     Early ischemic changes in the left MCA distribution, new from recent CT.     New left inferior temporal intraparenchymal hematoma.     Chronic ischemic change with cerebral and cerebellar volume loss, as above.     [facility] 6/19/25  Impression:     Question some early left MCA ischemia corresponding to the patient's known left MCA territory occlusion.  No hemorrhage.     Senescent changes as above.        All CT scans at this facility are performed  using dose modulation techniques as appropriate to performed exam including the following:  automated exposure control; adjustment of mA and/or kV according to the patients size (this includes techniques or standardized protocols for targeted exams where dose is matched to indication/reason for exam: i.e. extremities or head);  iterative reconstruction technique.     Vessel Imaging   CTA head and neck 6/19/25  Impression:     Distal left M1 MCA occlusion with relatively prompt collateralization of the more distal MCA vessels.     Severe stenosis left mid vertebral artery and left PCA.     Cardiac Imaging   TTE 6/19/25    Left Ventricle:  The left ventricle is normal in size. Normal wall thickness. There is normal systolic function with a visually estimated ejection fraction of 60 - 65%.    Right Ventricle: The right ventricle is normal in size Wall thickness is normal. Systolic function is normal.    The pulmonary artery pressure could not be estimated.    IVC/SVC: Normal venous pressure at 3 mmHg.

## 2025-07-01 NOTE — ASSESSMENT & PLAN NOTE
Pt hypernatremic at 153. On D5W infusion @60 cc/hr.     Plan  -f/u BMP  - Monitor for over correction

## 2025-07-01 NOTE — PLAN OF CARE
Recommendations    1. Continue TF recommendation: Glucerna 1.5 at goal rate of 55ml/hr to provide 1320ml of total formula volume, 1980 kcal, 109g PRO, 176g CHO, and 1001ml FF    - FWF to meet RDA, if desired: 160ml Q4hrs to provide an additional 960ml of fluid and 1961 ml TFV    - please continue documenting TF rate via flowsheets    2. If bolus feeds are preferred, recommend to bolus 220ml of Glucerna 1.5 Q4hrs to provide 1320ml of total formula volume, 1980 kcal, 109g PRO, 176g CHO, and 1001ml FF     - FWF to meet RDA if desired: FWF of 80ml before and after each bolus to provide an additional 960ml FF and 1961ml TFV     3. RD to monitor weight, labs, intake, tolerance     4. Recommend daily weights    Goals:   1. % nutritional needs met with EN during admission     2. Maintain weight during admission    Nutrition Goal Status: goal met  Communication of RD Recs:  (POC)    Nutrition Discharge Planning    Nutrition Discharge Planning: Too early to determine, pending clinical course

## 2025-07-01 NOTE — PROGRESS NOTES
"Zohaib Garrett - Neurosurgery ([facility])  Adult Nutrition  Progress Note    SUMMARY       Recommendations    1. Continue TF recommendation: Glucerna 1.5 at goal rate of 55ml/hr to provide 1320ml of total formula volume, 1980 kcal, 109g PRO, 176g CHO, and 1001ml FF    - FWF to meet RDA, if desired: 160ml Q4hrs to provide an additional 960ml of fluid and 1961 ml TFV    - please continue documenting TF rate via flowsheets    2. If bolus feeds are preferred, recommend to bolus 220ml of Glucerna 1.5 Q4hrs to provide 1320ml of total formula volume, 1980 kcal, 109g PRO, 176g CHO, and 1001ml FF     - FWF to meet RDA if desired: FWF of 80ml before and after each bolus to provide an additional 960ml FF and 1961ml TFV     3. RD to monitor weight, labs, intake, tolerance     4. Recommend daily weights    Goals:   1. % nutritional needs met with EN during admission     2. Maintain weight during admission    Nutrition Goal Status: goal met  Communication of RD Recs:  (POC)    Nutrition Discharge Planning    Nutrition Discharge Planning: Too early to determine, pending clinical course    Reason for Assessment    Reason For Assessment: RD follow-up  Diagnosis: stroke/CVA (Embolic stroke involving left middle cerebral artery)  General Information Comments: Pt tolerating TF, running @55 through NG. Per chart review, he continues to require heavy assistance for all mobility. No wounds or edema present. No other GI s/s. Wt stable since admit.    Nutrition/Diet History    Spiritual, Cultural Beliefs, Catholic Practices, Values that Affect Care: no  Food Allergies: NKFA  Factors Affecting Nutritional Intake: None identified at this time  Nutrition-related SDOH: Unable to assess at this time    Anthropometrics    Height: 5' 10" (177.8 cm)  Height (inches): 70 in  Height Method: Stated  Weight: 88.5 kg (195 lb 1.7 oz)  Weight (lb): 195.11 lb  Weight Method: Bed Scale  Ideal Body Weight (IBW), Male: 166 lb  % Ideal Body Weight, Male (lb): " 118.2 %  BMI (Calculated): 28  BMI Grade: 25 - 29.9 - overweight    Lab/Procedures/Meds    Pertinent Labs Reviewed: reviewed  Pertinent Labs Comments: Na 150, cl 119, ca 8.4, albumin 2.6, A1c 7.0 (on 6/19)  Pertinent Medications Reviewed: reviewed  Pertinent Medications Comments: D5W infusion, bowel reg, statin, insulin, heparin, metoprolol tartrate    Estimated/Assessed Needs    Weight Used For Calorie Calculations: 89 kg (196 lb 3.4 oz)  Energy Calorie Requirements (kcal): 2058 kcal/day (x 1.25 AF)  Energy Need Method: [facility]  Protein Requirements: 107- 134g (1.2-1.5g/kg)  Weight Used For Protein Calculations: 89 kg (196 lb 3.4 oz)  Fluid Requirements (mL): as per MD or RDA  Estimated Fluid Requirement Method: RDA Method  RDA Method (mL): 2058  CHO Requirement: 257 g/day      Nutrition Prescription Ordered    Current Diet Order: NPO  Current Nutrition Support Formula Ordered: Glucerna 1.5  Current Nutrition Support Rate Ordered: 55 (ml)  Current Nutrition Support Frequency Ordered: x 24hrs    Evaluation of Received Nutrient/Fluid Intake    Enteral Calories (kcal): 1980  Enteral Protein (gm): 109  Enteral (Free Water) Fluid (mL): 1001  I/O: -5935 since 6/19  Energy Calories Required: meeting needs  Protein Required: meeting needs  Fluid Required:  (per MD)  Comments: LBM 6/27  Tolerance: tolerating  % Intake of Estimated Energy Needs: 75 - 100 %  % Meal Intake: 75 - 100 %    PES Statement  Inadequate enteral nutrition infusion related to  (Infusion volume not reached) as evidenced by  (Inadequate EN volume compared to estimated requirements)  Status: Resolved    Nutrition Risk    Level of Risk/Frequency of Follow-up: high (2/week)     Monitor and Evaluation    Monitor and Evaluation: Enteral and parenteral nutrition administration, Weight, Electrolyte and renal panel, Gastrointestinal profile, Glucose/endocrine profile, Inflammatory profile, Lipid profile, Nutrition focused physical findings, Skin      Nutrition Follow-Up    RD Follow-up?: Yes

## 2025-07-01 NOTE — ASSESSMENT & PLAN NOTE
Cristi Pulido is a 70 y.o. male w/ PMH of HTN, DM, prior stroke w/ residual RSW who presents as a transfer from OSH after presenting with acute onset of dysarthria and RSW. Telestroke was completed and his NIHSS was 19. CT showed probable early ischemic changes in the L insular ribbon and L frontal lobe. CTA demonstrated occlusion of the distal L MCA M1 segment. He was given TNK at 0548. Patient was transferred to C for further management. On arrival, repeat NIHSS of 24. Patient taken for repeat CTH which showed small left temporal hemorrhage along with continued early ischemic changes. Patient taken to IR for thrombectomy and to be admitted to [facility] post-procedure.  S/P thrombectomy. TICI 3. MRI revealing for blood products in stroke bed, likely reperfusion injury. Etiology ESUS.     07/01/2025 Pt hypernatremic @153, switch 0.45 NS to D5W @60 cc. BMP at 4 pm to recheck Na.     Antithrombotics for secondary stroke prevention: Antiplatelets: Aspirin: 81 mg daily    Statins for secondary stroke prevention and hyperlipidemia, if present:   Statins: Atorvastatin- 40 mg daily    Aggressive risk factor modification: HTN, Smoking, DM, HLD     Rehab efforts: The patient has been evaluated by a stroke team provider and the therapy needs have been fully considered based off the presenting complaints and exam findings. The following therapy evaluations are needed: PT evaluate and treat, OT evaluate and treat, SLP evaluate and treat, PM&R evaluate for appropriate placement, current recommendation HIT    Diagnostics ordered/pending: None     VTE prophylaxis: Enoxaparin 40 mg SQ every 24 hours  Mechanical prophylaxis: Place SCDs    BP parameters: Infarct: Post sucessful thrombectomy, SBP <140

## 2025-07-01 NOTE — PT/OT/SLP PROGRESS
Occupational Therapy   Treatment    Name: Cristi Pulido  MRN: [id number]  Admitting Diagnosis:  Embolic stroke involving left middle cerebral artery       Recommendations:     Discharge Recommendations: High Intensity Therapy  Discharge Equipment Recommendations:  lift device, hospital bed, wheelchair  Barriers to discharge:  Inaccessible home environment (Increased A needed)    Assessment:     Cristi Pulido is a 70 y.o. male with a medical diagnosis of Embolic stroke involving left middle cerebral artery.  He presents with decrease functional status secondary to medical diagnosis. Performance deficits affecting function are weakness, gait instability, decreased upper extremity function, decreased ROM, impaired endurance, impaired balance, decreased lower extremity function, visual deficits, decreased safety awareness, impaired fine motor, impaired self care skills, impaired functional mobility, decreased coordination, abnormal tone. Patient with good tolerance to OT session limited by absent command following and grunting verbalizations. Patient with no AROM noted in RUE this date and with noted perseverate movements in LUE when prompted. Patient remains appropriate candidate for acute OT services.     Rehab Prognosis:  Good; patient would benefit from acute skilled OT services to address these deficits and reach maximum level of function.       Plan:     Patient to be seen 4 x/week to address the above listed problems via self-care/home management, therapeutic activities, therapeutic exercises, neuromuscular re-education  Plan of Care Expires: 07/20/25  Plan of Care Reviewed with: patient    Subjective     Chief Complaint: none   Patient/Family Comments/goals: DC     Objective:     Communicated with: RN prior to session.  Patient found supine with telemetry upon OT entry to room.    General Precautions: Standard, fall, aspiration, pureed diet    Orthopedic Precautions:N/A  Braces: N/A  Respiratory Status: Room air      Occupational Performance:     Bed Mobility:    Patient completed Rolling/Turning to Right with total assistance and 2 persons  Patient completed Scooting/Bridging with total assistance and 2 persons  Patient completed Supine to Sit with total assistance and 2 persons  Patient completed Sit to Supine with total assistance and 2 persons   Patient sat EOB with maximal-CGA 2/2 R sided lean; patient needing increased assistance for postural stability and trunk support. While sitting EOB patient completed the following:   Postural corrections for upright stability  Auditory/visual/tactile stimulation; re-orientation, command following - no response this date   AROM/AAROM/PROM BUE at all joints   RUE forearm WB seated EOB    Functional Mobility/Transfers:  Not completed this date     Activities of Daily Living:  Grooming: maximal assistance for initiation of facial grooming, patient perseverating on movement pattern when initiated ADL       [facility] 6 Click ADL: 6    Treatment & Education:  Provided education regarding role of OT, POC, & discharge recommendations.  Pt with no further questions/concerns at this time. OT provided education on home recommendations and fall prevention in preparation for D/C.     Patient left supine with all lines intact and call button in reach    GOALS:   Multidisciplinary Problems       Occupational Therapy Goals          Problem: Occupational Therapy    Goal Priority Disciplines Outcome Interventions   Occupational Therapy Goal     OT, PT/OT Progressing    Description: Goals to be met by: 07/20/2025     Patient will increase functional independence with ADLs by performing:    UE Dressing with Moderate Assistance.  LE Dressing with Maximum Assistance.  Grooming while seated with Moderate Assistance.  Toileting from bedside commode with Moderate Assistance for hygiene and clothing management.   Supine to sit with Moderate Assistance.  Stand pivot transfer with Moderate Assistance.                          DME Justifications:  Cristi requires a hospital bed due to him requiring positioning of the body in ways not feasible with an ordinary bed due to limited ability and cannot independently make changes in body position without the use of the bed.The positioning of the body cannot be sufficiently resolved by the use of pillows and wedges.  Cristi Pulido has a mobility limitation that significantly impairs his ability to participate in one or more mobility related activities of daily living (MRADLs) such as toileting, feeding, dressing, grooming, and bathing in customary locations in the home.  The mobility limitation cannot be sufficiently resolved by the use of a cane or walker.   The use of a manual wheelchair will significantly improve the patients ability to participate in MRADLS and the patient will use it on regular basis in the home.  Cristi Pulido has expressed his willingness to use a manual wheelchair in the home. Patients upper body strength is sufficient for propulsion.  He also has a caregiver who is available, willing, and able to provide assistance with the wheelchair when needed.      Time Tracking:     OT Date of Treatment: 07/01/25  OT Start Time: 1102  OT Stop Time: 1128  OT Total Time (min): 26 min    Billable Minutes:Therapeutic Activity 26    OT/JEANCARLOS: OT     Number of JEANCARLOS visits since last OT visit: 0    7/1/2025

## 2025-07-01 NOTE — PLAN OF CARE
Problem: Adult Inpatient Plan of Care  Goal: Optimal Comfort and Wellbeing  Outcome: Progressing  Goal: Readiness for Transition of Care  Outcome: Progressing     Problem: Stroke, Ischemic (Includes Transient Ischemic Attack)  Goal: Optimal Coping  Outcome: Progressing  Goal: Effective Bowel Elimination  Outcome: Progressing  Goal: Optimal Cerebral Tissue Perfusion  Outcome: Progressing  Goal: Optimal Cognitive Function  Outcome: Progressing  Goal: Optimal Functional Ability  Outcome: Progressing  Goal: Effective Oxygenation and Ventilation  Outcome: Progressing  Goal: Improved Sensorimotor Function  Outcome: Progressing  Goal: Effective Urinary Elimination  Outcome: Progressing

## 2025-07-02 LAB
ABSOLUTE EOSINOPHIL (OHS): 0.26 K/UL
ABSOLUTE MONOCYTE (OHS): 0.67 K/UL (ref 0.3–1)
ABSOLUTE NEUTROPHIL COUNT (OHS): 4.52 K/UL (ref 1.8–7.7)
ALBUMIN SERPL BCP-MCNC: 2.7 G/DL (ref 3.5–5.2)
ALP SERPL-CCNC: 117 UNIT/L (ref 40–150)
ALT SERPL W/O P-5'-P-CCNC: 39 UNIT/L (ref 10–44)
ANION GAP (OHS): 11 MMOL/L (ref 8–16)
AST SERPL-CCNC: 50 UNIT/L (ref 11–45)
BASOPHILS # BLD AUTO: 0.03 K/UL
BASOPHILS NFR BLD AUTO: 0.4 %
BILIRUB SERPL-MCNC: 0.4 MG/DL (ref 0.1–1)
BUN SERPL-MCNC: 15 MG/DL (ref 8–23)
CALCIUM SERPL-MCNC: 8.5 MG/DL (ref 8.7–10.5)
CHLORIDE SERPL-SCNC: 116 MMOL/L (ref 95–110)
CO2 SERPL-SCNC: 21 MMOL/L (ref 23–29)
CREAT SERPL-MCNC: 1.2 MG/DL (ref 0.5–1.4)
ERYTHROCYTE [DISTWIDTH] IN BLOOD BY AUTOMATED COUNT: 13 % (ref 11.5–14.5)
GFR SERPLBLD CREATININE-BSD FMLA CKD-EPI: >60 ML/MIN/1.73/M2
GLUCOSE SERPL-MCNC: 121 MG/DL (ref 70–110)
HCT VFR BLD AUTO: 39.8 % (ref 40–54)
HGB BLD-MCNC: 12.4 GM/DL (ref 14–18)
IMM GRANULOCYTES # BLD AUTO: 0.03 K/UL (ref 0–0.04)
IMM GRANULOCYTES NFR BLD AUTO: 0.4 % (ref 0–0.5)
LYMPHOCYTES # BLD AUTO: 2.45 K/UL (ref 1–4.8)
MAGNESIUM SERPL-MCNC: 2 MG/DL (ref 1.6–2.6)
MCH RBC QN AUTO: 28.3 PG (ref 27–31)
MCHC RBC AUTO-ENTMCNC: 31.2 G/DL (ref 32–36)
MCV RBC AUTO: 91 FL (ref 82–98)
NUCLEATED RBC (/100WBC) (OHS): 0 /100 WBC
PHOSPHATE SERPL-MCNC: 3.1 MG/DL (ref 2.7–4.5)
PLATELET # BLD AUTO: 182 K/UL (ref 150–450)
PLATELET BLD QL SMEAR: NORMAL
PMV BLD AUTO: 13.1 FL (ref 9.2–12.9)
POCT GLUCOSE: 136 MG/DL (ref 70–110)
POCT GLUCOSE: 144 MG/DL (ref 70–110)
POCT GLUCOSE: 153 MG/DL (ref 70–110)
POCT GLUCOSE: 156 MG/DL (ref 70–110)
POTASSIUM SERPL-SCNC: 3.9 MMOL/L (ref 3.5–5.1)
PROT SERPL-MCNC: 6.3 GM/DL (ref 6–8.4)
RBC # BLD AUTO: 4.38 M/UL (ref 4.6–6.2)
RELATIVE EOSINOPHIL (OHS): 3.3 %
RELATIVE LYMPHOCYTE (OHS): 30.8 % (ref 18–48)
RELATIVE MONOCYTE (OHS): 8.4 % (ref 4–15)
RELATIVE NEUTROPHIL (OHS): 56.7 % (ref 38–73)
SODIUM SERPL-SCNC: 148 MMOL/L (ref 136–145)
WBC # BLD AUTO: 7.96 K/UL (ref 3.9–12.7)

## 2025-07-02 PROCEDURE — 25000003 PHARM REV CODE 250: Mod: HCNC

## 2025-07-02 PROCEDURE — 99233 SBSQ HOSP IP/OBS HIGH 50: CPT | Mod: HCNC,GC,, | Performed by: PSYCHIATRY & NEUROLOGY

## 2025-07-02 PROCEDURE — 85025 COMPLETE CBC W/AUTO DIFF WBC: CPT | Mod: HCNC

## 2025-07-02 PROCEDURE — 80053 COMPREHEN METABOLIC PANEL: CPT | Mod: HCNC

## 2025-07-02 PROCEDURE — 63600175 PHARM REV CODE 636 W HCPCS: Mod: HCNC

## 2025-07-02 PROCEDURE — 97112 NEUROMUSCULAR REEDUCATION: CPT | Mod: HCNC

## 2025-07-02 PROCEDURE — 83735 ASSAY OF MAGNESIUM: CPT | Mod: HCNC

## 2025-07-02 PROCEDURE — 11000001 HC ACUTE MED/SURG PRIVATE ROOM: Mod: HCNC

## 2025-07-02 PROCEDURE — 25000003 PHARM REV CODE 250: Mod: HCNC | Performed by: PHYSICIAN ASSISTANT

## 2025-07-02 PROCEDURE — 94761 N-INVAS EAR/PLS OXIMETRY MLT: CPT | Mod: HCNC

## 2025-07-02 PROCEDURE — 97535 SELF CARE MNGMENT TRAINING: CPT | Mod: HCNC

## 2025-07-02 PROCEDURE — 36415 COLL VENOUS BLD VENIPUNCTURE: CPT | Mod: HCNC

## 2025-07-02 PROCEDURE — 84100 ASSAY OF PHOSPHORUS: CPT | Mod: HCNC

## 2025-07-02 PROCEDURE — 99222 1ST HOSP IP/OBS MODERATE 55: CPT | Mod: HCNC,,, | Performed by: NURSE PRACTITIONER

## 2025-07-02 PROCEDURE — 92526 ORAL FUNCTION THERAPY: CPT | Mod: HCNC

## 2025-07-02 PROCEDURE — 92507 TX SP LANG VOICE COMM INDIV: CPT | Mod: HCNC

## 2025-07-02 RX ORDER — LABETALOL HCL 20 MG/4 ML
2.5 SYRINGE (ML) INTRAVENOUS 3 TIMES DAILY
Status: DISCONTINUED | OUTPATIENT
Start: 2025-07-02 | End: 2025-07-02

## 2025-07-02 RX ORDER — LABETALOL HCL 20 MG/4 ML
2.5 SYRINGE (ML) INTRAVENOUS 3 TIMES DAILY
Status: DISPENSED | OUTPATIENT
Start: 2025-07-02 | End: 2025-07-03

## 2025-07-02 RX ADMIN — LABETALOL HYDROCHLORIDE 2.5 MG: 5 INJECTION, SOLUTION INTRAVENOUS at 10:07

## 2025-07-02 RX ADMIN — DEXTROSE MONOHYDRATE: 50 INJECTION, SOLUTION INTRAVENOUS at 02:07

## 2025-07-02 RX ADMIN — NYSTATIN 500000 UNITS: 100000 SUSPENSION ORAL at 01:07

## 2025-07-02 RX ADMIN — LABETALOL HYDROCHLORIDE 2.5 MG: 5 INJECTION, SOLUTION INTRAVENOUS at 01:07

## 2025-07-02 RX ADMIN — HEPARIN SODIUM 5000 UNITS: 5000 INJECTION INTRAVENOUS; SUBCUTANEOUS at 05:07

## 2025-07-02 RX ADMIN — DEXTROSE MONOHYDRATE: 50 INJECTION, SOLUTION INTRAVENOUS at 10:07

## 2025-07-02 RX ADMIN — HEPARIN SODIUM 5000 UNITS: 5000 INJECTION INTRAVENOUS; SUBCUTANEOUS at 09:07

## 2025-07-02 RX ADMIN — LABETALOL HYDROCHLORIDE 2.5 MG: 5 INJECTION, SOLUTION INTRAVENOUS at 09:07

## 2025-07-02 RX ADMIN — NYSTATIN 500000 UNITS: 100000 SUSPENSION ORAL at 05:07

## 2025-07-02 RX ADMIN — ASPIRIN 300 MG: 300 SUPPOSITORY RECTAL at 08:07

## 2025-07-02 RX ADMIN — NYSTATIN 500000 UNITS: 100000 SUSPENSION ORAL at 09:07

## 2025-07-02 RX ADMIN — INSULIN GLARGINE 15 UNITS: 100 INJECTION, SOLUTION SUBCUTANEOUS at 10:07

## 2025-07-02 RX ADMIN — HEPARIN SODIUM 5000 UNITS: 5000 INJECTION INTRAVENOUS; SUBCUTANEOUS at 01:07

## 2025-07-02 RX ADMIN — BARIUM SULFATE 450 ML: 20 SUSPENSION ORAL at 11:07

## 2025-07-02 NOTE — PROGRESS NOTES
Zohaib Garrett - Neurosurgery ([facility])  Vascular Neurology  Comprehensive Stroke Center  Progress Note    Assessment/Plan:     * Embolic stroke involving left middle cerebral artery  Cristi Pulido is a 70 y.o. male w/ PMH of HTN, DM, prior stroke w/ residual RSW who presents as a transfer from OSH after presenting with acute onset of dysarthria and RSW. Telestroke was completed and his NIHSS was 19. CT showed probable early ischemic changes in the L insular ribbon and L frontal lobe. CTA demonstrated occlusion of the distal L MCA M1 segment. He was given TNK at 0548. Patient was transferred to [facility] for further management. On arrival, repeat NIHSS of 24. Patient taken for repeat CTH which showed small left temporal hemorrhage along with continued early ischemic changes. Patient taken to IR for thrombectomy and to be admitted to NCC post-procedure.  S/P thrombectomy. TICI 3. MRI revealing for blood products in stroke bed, likely reperfusion injury. Etiology ESUS.     07/01/2025 Pt hypernatremia improving. On D5W infusion. Plan for G tube tomorrow.     Antithrombotics for secondary stroke prevention: Antiplatelets: Aspirin: 81 mg daily    Statins for secondary stroke prevention and hyperlipidemia, if present:   Statins: Atorvastatin- 40 mg daily    Aggressive risk factor modification: HTN, Smoking, DM, HLD     Rehab efforts: The patient has been evaluated by a stroke team provider and the therapy needs have been fully considered based off the presenting complaints and exam findings. The following therapy evaluations are needed: PT evaluate and treat, OT evaluate and treat, SLP evaluate and treat, PM&R evaluate for appropriate placement, current recommendation HIT    Diagnostics ordered/pending: None     VTE prophylaxis: Enoxaparin 40 mg SQ every 24 hours  Mechanical prophylaxis: Place SCDs    BP parameters: Infarct: Post sucessful thrombectomy, SBP <140        Hypernatremia  Pt hypernatremic at 153. On D5W infusion @60 cc/hr.      Plan  -f/u BMP  - Monitor for over correction     Encounter for nasogastric tube placement  PEG removed per patient.   IR consulted for replacement. Attempt unsuccessful, will attempt again week of 6/30.   NGT replaced. TF and FWF resumed.     Restlessness  -6/24: QTc 441   -Seroquel 25 mg QHS     Hyperlipemia  -Stroke risk factor  -LDL 75, goal <70  -Atorvastatin 40 mg daily      Right sided weakness  -Secondary to stroke.   -Aggressive therapy     Dysarthria and anarthria  -Therapy eval and treat    Aphasia  -Secondary to stroke  -Aggressive therapy     Type 2 diabetes mellitus without complication, without long-term current use of insulin  Lab Results   Component Value Date    LABA1C 7.2 (H) 01/29/2018    HGBA1C 7.0 (H) 06/19/2025     Follow up repeat A1c. Hold home antihyperglycemics. SSI for goal -180 while in hospital  -Added Lantus 15u     Hypertension associated with diabetes  -Stroke risk factor  -SBP goal <140, maintain MAP >65  -BP range in the last 24 hrs: BP  Min: 139/82  Max: 169/93  -Current antihypertensive regimen:     -Amlodipine 10mg     -Lisinopril still on hold for now    -Metoprolol 50mg BID   --PRN labetalol/hydralazine     Tobacco dependence  Stroke risk factor  - on cessation  -nicotine patch PRN         06/20/2025 NAEON. S/P thrombectomy. TICI 3. TNK monitoring ended at 0548. Aphasic, follows no commands. RSW remains. MRI revealing for blood products in stroke bed, likely reperfusion injury. OK to start monotherapy with either plavix or ASA 6/21/25 for secondary stroke prevention. ECHO unremarkable. Family member at bedside agreeable to NGT placement. Etiology ESUS.   6/21/2025 NAEON. Neuro exam stable. Recommend holding AP therapy for now.   06/22/2025 NAEON. Neuro exam stable. Start AP therapy with ASA.   06/23/2025 NAEON. SLP recs NPO. PEG discussed with family and they are agreeable. Pt S/D to NPU.   06/24/2025 NGT pulled overnight and replaced. Placement confirmed via  X ray. IR consulted for PEG placement. JEANNE, Creatinine and BUN uptrending. FWF flushes ordered. Restless during night. EKG repeated. QTc 441. Seroquel 25 mg QHS ordered. Family requesting [facility] IPR when medically ready.   06/25/2025 NAEON. Plans for PEG placement today. BUN and Cr uptrending. Fluids started. Lisinopril held.   06/26/2025 NAEON. PEG placed yesterday, will resume tube feeds this afternoon starting with trickle feeds. BUN and Cr Improved. Increased water boluses to 300.  06/27/2025 NAEON. Neuro exam stable. Patient has tolerated tube feeds well. Will continue to monitor kidney function for now. Trial off restraints today to prepare for rehab placement. Added Lantus 15 units. MBSS today, patient able to have puree for pleasure feeds per speech.   06/28/2025 Na 152. 0.45% NS at 100 ml/hr ordered x 12 hrs. Will repeat sodium draw scheduled at 2100. Pulled PEG. IR unable to replace PEG. Will attempt in coming days. NGT replaced, placement confirmed via Xray. TF and FWF restarted.   06/29/2025 Na 154, BUN and Cr improving. Continuing fluids. NGT coiled overnight. NG Xray showed possible pneumoperitoneum. CT AP without contrast ordered. Showed free air in the abdomen, advised not to use NGT for now. General Surgery consulted to discuss PEG placement.   06/30/2025 Pulled NG tube overnight. Na stable, Cr and BUN continue to improve. Fluids reordered. Discussed PEG replacement with GI and Gen Surg, deferred to IR for replacement. IR reconsulted this morning. Must wait approximately 1 week to re attempt PEG placement. Placement planned for 7/3. Will need to replace NG tube 7/2. NPO at midnight 7/3.   07/01/2025 Switched 0.45 NS to D5W @60 cc/hr. Hypernatremic at 153. Free water deficit 1.9L   07/02/2025 Hypernatremia improving. Continuing D5W. Plan for G tube tomorrow per IR.     STROKE DOCUMENTATION   Acute Stroke Times   Last Known Normal Date: 06/19/25  Last Known Normal Time: 0400  Symptom Onset Date:  06/19/25  Symptom Onset Time: 0400  Stroke Team Called Date: 06/19/25  Stroke Team Called Time: 0813  Stroke Team Arrival Date: 06/19/25  Stroke Team Arrival Time: 0813  CT Interpretation Time: 0827  Thrombolytic Therapy Recommended:  (already given prior to transfer)  CTA Interpretation Time:  (already completed prior to transfer)  Thrombectomy Recommended: Yes  Decision to Treat Time for IR: 0832    NIH Scale:  1a. Level of Consciousness: 0-->Alert, keenly responsive  1b. LOC Questions: 2-->Answers neither question correctly  1c. LOC Commands: 2-->Performs neither task correctly  2. Best Gaze: 0-->Normal  3. Visual: 0-->No visual loss  4. Facial Palsy: 1-->Minor paralysis (flattened nasolabial fold, asymmetry on smiling)  5a. Motor Arm, Left: 0-->No drift, limb holds 90 (or 45) degrees for full 10 secs  5b. Motor Arm, Right: 4-->No movement  6a. Motor Leg, Left: 0-->No drift, leg holds 30 degree position for full 5 secs  6b. Motor Leg, Right: 3-->No effort against gravity, leg falls to bed immediately  7. Limb Ataxia: 0-->Absent  8. Sensory: 0-->Normal, no sensory loss  9. Best Language: 2-->Severe aphasia, all communication is through fragmentary expression, great need for inference, questioning, and guessing by the listener. Range of information that can be exchanged is limited, listener carries burden of. . . (see row details)  10. Dysarthria: 2-->Severe dysarthria, patients speech is so slurred as to be unintelligible in the absence of or out of proportion to any dysphasia, or is mute/anarthric  11. Extinction and Inattention (formerly Neglect): 0-->No abnormality  Total (NIH Stroke Scale): 16       Modified [place] Score: 2  Stephanie Coma Scale:    ABCD2 Score:    [id number] Score:   HAS -BLED Score:   ICH Score:   Hunt & Kimball Classification:      Hemorrhagic change of an Ischemic Stroke: Does this patient have an ischemic stroke with hemorrhagic changes? No     Neurologic Chief Complaint: L MCA  stroke    Subjective:     Interval History: Patient is seen for follow-up neurological assessment and treatment recommendations: See hospital course.    HPI, Past Medical, Family, and Social History remains the same as documented in the initial encounter.     Review of Systems   Reason unable to perform ROS: Severe aphasia.     Scheduled Meds:   amLODIPine  10 mg Per G Tube QHS    aspirin  300 mg Rectal Daily    atorvastatin  40 mg Per G Tube Daily    [START ON 7/3/2025] barium sulfate  450 mL Per NG tube Once    [START ON 7/3/2025] barium sulfate  450 mL Per NG tube Once    FLUoxetine  20 mg Per G Tube Daily    heparin (porcine)  5,000 Units Subcutaneous Q8H    insulin aspart U-100  6 Units Subcutaneous Q4H    insulin glargine U-100  15 Units Subcutaneous Daily    labetalol  2.5 mg Intravenous TID    metoprolol tartrate  50 mg Per G Tube BID    nicotine  1 patch Transdermal Daily    nystatin  500,000 Units Oral QID    polyethylene glycol  17 g Per G Tube BID    QUEtiapine  25 mg Per G Tube QHS    senna-docusate  1 tablet Per G Tube BID     Continuous Infusions:   D5W   Intravenous Continuous 60 mL/hr at 07/02/25 0236 New Bag at 07/02/25 0236       PRN Meds:  Current Facility-Administered Medications:     bisacodyL, 10 mg, Rectal, Daily PRN    dextrose 50%, 12.5 g, Intravenous, PRN    dextrose 50%, 25 g, Intravenous, PRN    glucagon (human recombinant), 1 mg, Intramuscular, PRN    hydrALAZINE, 10 mg, Intravenous, Q4H PRN    insulin aspart U-100, 0-10 Units, Subcutaneous, Q4H PRN    labetalol, 10 mg, Intravenous, Q4H PRN    melatonin, 6 mg, Per G Tube, Nightly PRN    simethicone, 1 tablet, Per G Tube, TID PRN    sodium chloride 0.9%, 10 mL, Intravenous, PRN    Objective:     Vital Signs (Most Recent):  Temp: 97.5 °F (36.4 °C) (07/02/25 1150)  Pulse: 89 (07/02/25 1150)  Resp: 18 (07/02/25 1150)  BP: (!) 147/89 (07/02/25 1150)  SpO2: 97 % (07/02/25 1150)  BP Location: Right arm    Vital Signs Range (Last 24H):  Temp:   "[97.2 °F (36.2 °C)-98.3 °F (36.8 °C)]   Pulse:  [69-93]   Resp:  [18]   BP: (138-190)/(73-98)   SpO2:  [94 %-98 %]   BP Location: Right arm       Physical Exam  HENT:      Head: Normocephalic and atraumatic.      Mouth/Throat:      Mouth: Mucous membranes are moist.   Eyes:      Conjunctiva/sclera: Conjunctivae normal.   Cardiovascular:      Rate and Rhythm: Normal rate.   Pulmonary:      Effort: Pulmonary effort is normal.   Skin:     General: Skin is warm and dry.   Neurological:      Mental Status: He is alert.      Motor: Weakness present.              Neurological Exam:   LOC: drowsy  Attention Span: poor  Language: Expressive aphasia, Receptive aphasia  Articulation: Dysarthria  Orientation: Untestable due to severe aphasia   Facial Movement (CN VII): Lower facial weakness on the Right  Motor: Arm left  Normal 5/5  Leg left  Normal 5/5  Arm right  Plegia 0/5  Leg right Paresis: 1/5  Sensation: Cy-hypoesthesia right    Laboratory:  CMP:   Recent Labs   Lab 07/02/25  0535   CALCIUM 8.5*   ALBUMIN 2.7*   PROT 6.3   *   K 3.9   CO2 21*   *   BUN 15   CREATININE 1.2   ALKPHOS 117   ALT 39   AST 50*   BILITOT 0.4     BMP:   Recent Labs   Lab 07/02/25  0535   *   K 3.9   *   CO2 21*   BUN 15   CREATININE 1.2   CALCIUM 8.5*     CBC:   Recent Labs   Lab 07/02/25  0535   WBC 7.96   RBC 4.38*   HGB 12.4*   HCT 39.8*      MCV 91   MCH 28.3   MCHC 31.2*     Lipid Panel:   No results for input(s): "CHOL", "LDLCALC", "HDL", "TRIG" in the last 168 hours.    Coagulation:   No results for input(s): "PT", "INR", "APTT" in the last 168 hours.    Platelet Aggregation Study: No results for input(s): "PLTAGG", "PLTAGINTERP", "PLTAGREGLACO", "ADPPLTAGGREG" in the last 168 hours.  Hgb A1C:   No results for input(s): "HGBA1C" in the last 168 hours.    TSH:   No results for input(s): "TSH" in the last 168 hours.      Diagnostic Results   Brain Imaging   MRI Brain 6/20/25  Impression:     Acute left MCA " distribution infarct.  No new large parenchymal hemorrhage.  Susceptibility artifact throughout the infarcted tissue may reflect contrast staining and/or blood products conversion.  Overall mass effect is increased compared to prior with sulcal effacement throughout the left cerebral hemisphere and approximately 4 mm of rightward midline shift.     Stable size of the parenchymal hemorrhage centered in the inferior left temporal lobe.     [facility] 6/20/25: 1242  Impression:     Moderate-sized recent left MCA distribution infarct and small intraparenchymal hemorrhage appear grossly unchanged from prior study performed earlier on the same date.     Chronic ischemic change elsewhere with underlying cerebral and cerebellar volume loss, as before.     No new hemorrhage or major vascular distribution infarct elsewhere.  CT 6/20/25: 0834  Impression:     Early ischemic changes in the left MCA distribution, new from recent CT.     New left inferior temporal intraparenchymal hematoma.     Chronic ischemic change with cerebral and cerebellar volume loss, as above.     [facility] 6/19/25  Impression:     Question some early left MCA ischemia corresponding to the patient's known left MCA territory occlusion.  No hemorrhage.     Senescent changes as above.        All CT scans at this facility are performed  using dose modulation techniques as appropriate to performed exam including the following:  automated exposure control; adjustment of mA and/or kV according to the patients size (this includes techniques or standardized protocols for targeted exams where dose is matched to indication/reason for exam: i.e. extremities or head);  iterative reconstruction technique.     Vessel Imaging   CTA head and neck 6/19/25  Impression:     Distal left M1 MCA occlusion with relatively prompt collateralization of the more distal MCA vessels.     Severe stenosis left mid vertebral artery and left PCA.     Cardiac Imaging   TTE 6/19/25    Left Ventricle:  The left ventricle is normal in size. Normal wall thickness. There is normal systolic function with a visually estimated ejection fraction of 60 - 65%.    Right Ventricle: The right ventricle is normal in size Wall thickness is normal. Systolic function is normal.    The pulmonary artery pressure could not be estimated.    IVC/SVC: Normal venous pressure at 3 mmHg.       Cullen Almanzar MD  [facility]  Department of Vascular Neurology   [facility] [place])

## 2025-07-02 NOTE — ASSESSMENT & PLAN NOTE
-CTH which showed small left temporal hemorrhage along with continued early ischemic changes.   -S/P TNK and thrombectomy.   -Agitation requiring restraints.   -PT/OT rec for high intensity therapies.   -Seroquel was started per VN. Monitor.   -Continue ASA.  -Continue statin.  -07/02- Unable to have meaningful conversation with pt. No family present. No family present at bedside. Pt in LUE restraint.

## 2025-07-02 NOTE — PROGRESS NOTES
Zohaib Garrett - Neurosurgery ([facility])  Physical Medicine & Rehab  Progress Note    Patient Name: Cristi Pulido  MRN: [id number]  Admission Date: 6/19/2025  Length of Stay: 13 days  Attending Physician: Jus Daniels MD    Subjective:     Principal Problem:Embolic stroke involving left middle cerebral artery    Hospital Course:   Per chart review,    PT- 07/02    Functional Mobility:  Bed Mobility:     Rolling Left:  total assistance  Rolling Right: total assistance  Scooting at EOB: contact guard assistance (L hip only, to adjust position)   With tactile cueing, patient able to scoot L hip back using LUE/LE to push and control trunk   Supine to Sit: maximal assistance  To R following roll, patient assisting by hooking LUE around therapist to pull trunk into sitting   Sit to Supine: maximal assistance and of 2 persons     Transfers:     Sit to Stand:  maximal assistance and of 2 persons with hand-held assist      OT-06/23    Bed Mobility:  Patient completed Rolling/Turning to Left with total assistance and 2 persons  Patient completed Rolling/Turning to Right with total assistance and 2 persons  Patient completed Supine to Sit with total assistance and 2 persons  Patient completed anterior Scooting towards the EOB with total assistance  Patient completed Sit to Supine with total assistance and 2 persons  Patient completed Scooting/Bridging towards the HOB via drawsheet with total assistance and 2 persons     Functional Mobility/Transfers:  Not performed at this time due to patient requiring increased A to maintain sitting balance at EOB, as well as patient noted with increased fatigue following completion of EOB activity.     Activities of Daily Living:  Lower Body Dressing: total assistance to adjust B socks to ensure proper fit at bed-level    Interval History 7/2/2025:  Patient is seen for follow-up PM&R evaluation and recommendations: Pt seen at bedside. LUE restraint in place. Pt unable to follow simple commands.  Incomprehensible speech noted.   HPI, Past Medical, Family, and Social History remains the same as documented in the initial encounter.    Scheduled Medications:    amLODIPine  10 mg Per G Tube QHS    aspirin  300 mg Rectal Daily    atorvastatin  40 mg Per G Tube Daily    [START ON 7/3/2025] barium sulfate  450 mL Per NG tube Once    [START ON 7/3/2025] barium sulfate  450 mL Per NG tube Once    FLUoxetine  20 mg Per G Tube Daily    heparin (porcine)  5,000 Units Subcutaneous Q8H    insulin aspart U-100  6 Units Subcutaneous Q4H    insulin glargine U-100  15 Units Subcutaneous Daily    labetalol  2.5 mg Intravenous TID    metoprolol tartrate  50 mg Per G Tube BID    nicotine  1 patch Transdermal Daily    nystatin  500,000 Units Oral QID    polyethylene glycol  17 g Per G Tube BID    QUEtiapine  25 mg Per G Tube QHS    senna-docusate  1 tablet Per G Tube BID       Diagnostic Results: Labs: Reviewed    PRN Medications:   Current Facility-Administered Medications:     bisacodyL, 10 mg, Rectal, Daily PRN    dextrose 50%, 12.5 g, Intravenous, PRN    dextrose 50%, 25 g, Intravenous, PRN    glucagon (human recombinant), 1 mg, Intramuscular, PRN    hydrALAZINE, 10 mg, Intravenous, Q4H PRN    insulin aspart U-100, 0-10 Units, Subcutaneous, Q4H PRN    labetalol, 10 mg, Intravenous, Q4H PRN    melatonin, 6 mg, Per G Tube, Nightly PRN    simethicone, 1 tablet, Per G Tube, TID PRN    sodium chloride 0.9%, 10 mL, Intravenous, PRN    Review of Systems   Unable to perform ROS: Mental status change (Unable to have meaningful conversation)     Objective:     Vital Signs (Most Recent):  Temp: 97.5 °F (36.4 °C) (07/02/25 1150)  Pulse: 89 (07/02/25 1150)  Resp: 18 (07/02/25 1150)  BP: (!) 147/89 (07/02/25 1150)  SpO2: 97 % (07/02/25 1150)    Vital Signs (24h Range):  Temp:  [97.2 °F (36.2 °C)-98.3 °F (36.8 °C)] 97.5 °F (36.4 °C)  Pulse:  [69-93] 89  Resp:  [18] 18  SpO2:  [94 %-98 %] 97 %  BP: (138-190)/(73-98) 147/89         Physical  Exam  Vitals and nursing note reviewed.   Pulmonary:      Effort: Pulmonary effort is normal. No respiratory distress.   Musculoskeletal:      Cervical back: Normal range of motion and neck supple.      Comments: BONNIE. Not following simple commands. Noted to try to move LUE spontaneously.    Skin:     General: Skin is warm and dry.      Capillary Refill: Capillary refill takes 2 to 3 seconds.   Neurological:      General: No focal deficit present.      Mental Status: He is alert.      GCS: GCS eye subscore is 4. GCS verbal subscore is 2. GCS motor subscore is 4.      Cranial Nerves: Dysarthria present.      Motor: Weakness present.      Coordination: Coordination abnormal. Finger-Nose-Finger Test abnormal and Heel to Shin Test abnormal. Impaired rapid alternating movements.      Gait: Gait abnormal.   Psychiatric:         Behavior: Behavior is uncooperative.          NEUROLOGICAL EXAMINATION:     GAIT AND COORDINATION      Coordination   Finger to nose coordination: abnormal      Assessment/Plan:      * Embolic stroke involving left middle cerebral artery  -CTH which showed small left temporal hemorrhage along with continued early ischemic changes.   -S/P TNK and thrombectomy.   -Agitation requiring restraints.   -PT/OT rec for high intensity therapies.   -Seroquel was started per VN. Monitor.   -Continue ASA.  -Continue statin.  -07/02- Unable to have meaningful conversation with pt. No family present. No family present at bedside. Pt in LUE restraint.       Hypernatremia  -Ongoing issue.  -IVF ongoing.  -Monitor CMP daily.     Urinary retention  -Wilson present.     Hyperlipemia  -Continue statin.     Right sided weakness  -PT/OT rec for HIT. Follow for progress.     Dysarthria and anarthria  -SLP following.     Aphasia  -SLP following. \  -07/02- Severe Aphasia. Will need aggressive SLP.     Hypertension associated with diabetes  -Goal for SBP <140. Monitor.     Tobacco dependence  - on cessation as  appropriate.     PM&R Recommendation:     At this time, the PM&R team has reviewed this patient's ongoing medical case including inpatient diagnosis, medical history, clinical examination, labs, vitals, current social and functional history.  Patient continues to require restraints. Pt is not following commands at the moment. Would need to be off restraints and improve with therapies for rehab consideration.        Elaine Mcconnell NP  Department of Physical Medicine & Rehab   [facility])

## 2025-07-02 NOTE — PT/OT/SLP PROGRESS
"Speech Language Pathology Treatment    Patient Name:  Cristi Pulido   MRN:  [id number]  Admitting Diagnosis: Embolic stroke involving left middle cerebral artery    Recommendations:     General Recommendations:  Dysphagia therapy and Speech/language therapy  Diet recommendations: PEG tube as primary source of nutrition+ Pleasure Feeding, Puree Diet - IDDSI Level 4, Liquid Diet Level: NPO (except tsp sips of thin).  Aspiration Precautions: Continue alternate means of nutrition, Frequent oral care, and Strict aspiration precautions   General Precautions: Standard, aspiration, fall  Communication strategies:  yes/no questions only, provide increased time to answer, and go to room if call light pushed  Discharge recommendations:  High Intensity Therapy     Assessment:     Cristi Pulido is a 70 y.o. male with an SLP diagnosis of Aphasia, Dysphagia, and Dysarthria.  Pt with adequate tolerance of tsp trials of thin and puree for pleasure this date.     Subjective     Pt awake in bed. No family present. Left wrist restraint in place.    Pain/Comfort:  Pain Rating 1: 0/10  Pain Rating Post-Intervention 1:  (kiersten)  Pain Rating Post-Intervention 2:  (kiersten)    Respiratory Status: Room air    Objective:     Has the patient been evaluated by SLP for swallowing?   Yes  Keep patient NPO? No     Pt seen bedside for ongoing dysphagia and speech therapies. Pt with ongoing humming, babbling/mumbling and nonsensical speech with varying inflection/intonation across session. Occasional head nodding and shaking to answer Y.N question with 40% acc this date. He followed simple 1 step commands with 25% acc this date follow clinician model and tactile prompting.  He answered simple Y/N question with 40% acc this date, though appeared to demonstrate improved understanding of a situation given known context (I.e. nodding "yes" when asked if he wants sips of water). Unable to elicit automatic speech responses and pt remains unintelligible when attempting " to express himself. Pt grew increasingly frustrated/agitated across session when unable to be understood when attempting to express wants and need verbally. When presented with 2 common object, pt able to ID correct object with 75% acc this date. Pt required frequent education, reassurance and redirection to task throughout session to maintain attention. SLP will continue to follow for ongoing speech & language therapy, working toward finding an accurate way to communicate with pt and for him to express his wants/needs clearly (Y/N question, gesturing, communication board etc.) Provided biofeedback with voice recording of pt to assist with understanding of speech impairment and unintelligibility.    Hob elevated and worked on PO trials of thin liquids from tsp, cup and straw as well as puree solids and soft solids (cracker crushed din puree). Pt continues to present with white coating on superior surface of tongue. Pt demonstrated improved labial seal around spoon this date and overall improved anterior containment across trials. Pt with prolonged/reduced mastication 2/2 edentulous status though adequate with softened solids. Noted timely AP transit and minimal oral residue, easily cleared with subsequent swallows. Pt demonstrated coughing/wet vocal quality w/ thin liquids from straw and tsp following intake of solids.  No s.sx of aspiration appreciate on initial trials of thin liquids prior to solids and no s/sx of aspiration with solids. Continue to recommend PEG tube feeds as primary source of nutrition with purees for pleasure and tsp trials of thin w/ feed assist, no straws.  Education provided re: role of SLP, diet recs, importance of oral care, swallow/aspiration precs, communication strategies, s/s aspiration, and SLP POC.  Education to be ongoing.    Goals:   Multidisciplinary Problems       SLP Goals          Problem: SLP    Goal Priority Disciplines Outcome   SLP Goal     SLP Progressing   Description:  Speech Language Pathology Goals  Goals expected to be met by 7/3    1. Pt will participate in ongoing swallow assessment to determine least restrictive PO diet.    2. Pt will participate in ongoing cognitive-linguistic assessment to determine additional therapeutic needs.   3. Pt will follow 1 step commands with 80% acc following model/prompt.   4. Pt will answer simple Y/N questions with 80% acc.                                Plan:     Patient to be seen:  4 x/week   Plan of Care expires:  07/19/25  Plan of Care reviewed with:  patient   SLP Follow-Up:  Yes       Time Tracking:     SLP Treatment Date:   07/02/25  Speech Start Time:  0951  Speech Stop Time:  1015     Speech Total Time (min):  24 min    Billable Minutes: Speech Therapy Individual 8, Treatment Swallowing Dysfunction 8, and Self Care/Home Management Training 8    07/02/2025

## 2025-07-02 NOTE — PT/OT/SLP PROGRESS
Physical Therapy Treatment    Patient Name:  Cristi Pulido   MRN:  [id number]    Recommendations:     Discharge Recommendations: High Intensity Therapy  Discharge Equipment Recommendations: wheelchair, lift device, hospital bed  Barriers to discharge: Inaccessible home and Decreased caregiver support    Assessment:     Cristi Pulido is a 70 y.o. male admitted with a medical diagnosis of Embolic stroke involving left middle cerebral artery.  He presents with the following impairments/functional limitations: weakness, impaired endurance, impaired self care skills, impaired functional mobility, gait instability, impaired balance, decreased upper extremity function, decreased lower extremity function, decreased safety awareness, impaired coordination, impaired cardiopulmonary response to activity, impaired fine motor. Patient participated well in session. He continues to intermittently verbalize to conversation (unintelligible speech) and followed 1 command this date (scooting L hip back onto bed to reposition). 3 sit to stand transfers completed with good initiation however patient fatiguing quickly. Overall patient is making progress toward goals and remains participatory.     Rehab Prognosis: Good; patient would benefit from acute skilled PT services to address these deficits and reach maximum level of function.    Recent Surgery: * No surgery found *      Plan:     During this hospitalization, patient to be seen 4 x/week to address the identified rehab impairments via gait training, therapeutic activities, therapeutic exercises, neuromuscular re-education and progress toward the following goals:    Plan of Care Expires:  07/11/25    Subjective     Chief Complaint: not stated  Patient/Family Comments/goals: patient shrugging and verbalizing   Pain/Comfort:  Pain Rating 1: 0/10  Pain Rating Post-Intervention 1: 0/10      Objective:     Communicated with RN prior to session.  Patient found HOB elevated with telemetryyasmine  catheter, peripheral IV, bed alarm, restraint, upon PT entry to room.     General Precautions: Standard, fall, aspiration, pureed diet  Orthopedic Precautions: N/A  Braces: N/A  Respiratory Status: Room air     Cognition:   Affect: pleasant and cooperative  Alertness: eyes open 100 % of session  Insight: decreased safety awareness  Attention: attends to task  Command Followin command followed- scooting L hip back at EOB, verbal and tactile cues for initiation   No other commands, demonstrated or verbalized   Communication: verbalizing to conversation (intelligible sounds)      Functional Mobility:  Bed Mobility:     Rolling Left:  total assistance  Rolling Right: total assistance  Scooting at EOB: contact guard assistance (L hip only, to adjust position)   With tactile cueing, patient able to scoot L hip back using LUE/LE to push and control trunk   Supine to Sit: maximal assistance  To R following roll, patient assisting by hooking LUE around therapist to pull trunk into sitting   Sit to Supine: maximal assistance and of 2 persons    Transfers:     Sit to Stand:  maximal assistance and of 2 persons with hand-held assist  RLE blocked  Good initiation, poor endurance with patient quickly progressing to totalx2 requiring transition back to sitting     Balance:   Sitting static: maxA progressing to CGA  R lean  Sitting dynamic: maxA  Standing static: maxAx2  Forward trunk posture       AM-PAC 6 CLICK MOBILITY  Turning over in bed (including adjusting bedclothes, sheets and blankets)?: 2  Sitting down on and standing up from a chair with arms (e.g., wheelchair, bedside commode, etc.): 2  Moving from lying on back to sitting on the side of the bed?: 2  Moving to and from a bed to a chair (including a wheelchair)?: 1  Need to walk in hospital room?: 1  Climbing 3-5 steps with a railing?: 1  Basic Mobility Total Score: 9       Treatment & Education:  Mobility performed with assistance from rehab tech, under direct  supervision and direction of therapist.   EOB balance with cues to engage core mm to decrease assistance needed and increase safety in sitting.   AROM/PROM BLE in sitting.   Verbal and tactile cues with assist during STS transfer for optimal LOU prior to transfer, forward weight shift to initiate, to engage LE mm, extend hips forward and bring head and chest up to achieve full upright stance.    Cues during rolling and bed mobility for UE reaching and positioning on bed railings, LE management with rolling technique, cues for visual attention to UE for initiation. Assistance as above.    Self care tasks: oral care with swab, face washing.     Patient left HOB elevated with all lines intact, call button in reach, bed alarm on, restraints reapplied at end of session, and RN notified.    GOALS:   Multidisciplinary Problems       Physical Therapy Goals          Problem: Physical Therapy    Goal Priority Disciplines Outcome Interventions   Physical Therapy Goal     PT, PT/OT Progressing    Description: Goals to be met by: 25     Patient will increase functional independence with mobility by performin. Supine to sit with Contact Guard Assistance  2. Sit to supine with Contact Guard Assistance  3. Sit to stand transfer with Moderate Assistance  4. Bed to chair transfer with Maximum Assistance using No Assistive Device  5. Gait  x 10 feet with Maximum Assistance using No Assistive Device.   6. Sitting at edge of bed x5 minutes with Stand-by Assistance  7. Follow 100% of 1-step commands throughout session                         Time Tracking:     PT Received On: 25  PT Start Time: 803     PT Stop Time: 827  PT Total Time (min): 24 min     Billable Minutes: Neuromuscular Re-education 24 minutes     Treatment Type: Treatment  PT/PTA: PT     Number of PTA visits since last PT visit: 0     2025

## 2025-07-02 NOTE — PLAN OF CARE
Problem: Stroke, Ischemic (Includes Transient Ischemic Attack)  Goal: Optimal Coping  7/2/2025 0538 by Imelda Bran RN  Outcome: Progressing  7/2/2025 0537 by Imedla Bran RN  Outcome: Progressing  Goal: Optimal Cerebral Tissue Perfusion  7/2/2025 0538 by Imelda Bran RN  Outcome: Progressing  7/2/2025 0537 by Imelda Bran RN  Outcome: Progressing  Goal: Optimal Cognitive Function  Outcome: Progressing  Goal: Improved Communication Skills  Outcome: Progressing  Goal: Optimal Functional Ability  Outcome: Progressing  Goal: Effective Oxygenation and Ventilation  Outcome: Progressing  Goal: Improved Sensorimotor Function  Outcome: Progressing  Goal: Effective Urinary Elimination  7/2/2025 0538 by Imelda Bran RN  Outcome: Progressing  7/2/2025 0537 by Imelda Bran RN  Outcome: Progressing    POC updated and reviewed with the patient at the bedside. Questions regarding POC were encouraged and addressed. VSS, see flowsheets. Tele maintained per provider's order. Patient has expressive aphasia and is AOX 1 to self at this time. Requires frequent verbal cues/prompts with daily tasks. D5W infusing at 60 ml/hr continuous to right forearm IV. No redness/swelling noted to site, drsleah CDIKiana JASON. Seizure, fall, and safety precautions maintained, no signs of injury noted during shift. Patient repositioned with assistance x 2 in bed for comfort. Upon exiting room, patient's bed locked in low position, side rails up x 3, bed alarm set, with call light within reach. Instructed patient to call staff for mobility, verbalized understanding. Stroke book and stroke education reviewed with the patient at the bedside, see education flowsheets for details. No acute signs of distress noted at this time.

## 2025-07-02 NOTE — ASSESSMENT & PLAN NOTE
Cristi Pulido is a 70 y.o. male w/ PMH of HTN, DM, prior stroke w/ residual RSW who presents as a transfer from OSH after presenting with acute onset of dysarthria and RSW. Telestroke was completed and his NIHSS was 19. CT showed probable early ischemic changes in the L insular ribbon and L frontal lobe. CTA demonstrated occlusion of the distal L MCA M1 segment. He was given TNK at 0548. Patient was transferred to C for further management. On arrival, repeat NIHSS of 24. Patient taken for repeat CTH which showed small left temporal hemorrhage along with continued early ischemic changes. Patient taken to IR for thrombectomy and to be admitted to [facility] post-procedure.  S/P thrombectomy. TICI 3. MRI revealing for blood products in stroke bed, likely reperfusion injury. Etiology ESUS.     07/01/2025 Pt hypernatremia improving. On D5W infusion. Plan for G tube tomorrow.     Antithrombotics for secondary stroke prevention: Antiplatelets: Aspirin: 81 mg daily    Statins for secondary stroke prevention and hyperlipidemia, if present:   Statins: Atorvastatin- 40 mg daily    Aggressive risk factor modification: HTN, Smoking, DM, HLD     Rehab efforts: The patient has been evaluated by a stroke team provider and the therapy needs have been fully considered based off the presenting complaints and exam findings. The following therapy evaluations are needed: PT evaluate and treat, OT evaluate and treat, SLP evaluate and treat, PM&R evaluate for appropriate placement, current recommendation HIT    Diagnostics ordered/pending: None     VTE prophylaxis: Enoxaparin 40 mg SQ every 24 hours  Mechanical prophylaxis: Place SCDs    BP parameters: Infarct: Post sucessful thrombectomy, SBP <140

## 2025-07-02 NOTE — SUBJECTIVE & OBJECTIVE
Interval History 7/2/2025:  Patient is seen for follow-up PM&R evaluation and recommendations: Pt seen at bedside. LUE restraint in place. Pt unable to follow simple commands. Incomprehensible speech noted.   HPI, Past Medical, Family, and Social History remains the same as documented in the initial encounter.    Scheduled Medications:    amLODIPine  10 mg Per G Tube QHS    aspirin  300 mg Rectal Daily    atorvastatin  40 mg Per G Tube Daily    [START ON 7/3/2025] barium sulfate  450 mL Per NG tube Once    [START ON 7/3/2025] barium sulfate  450 mL Per NG tube Once    FLUoxetine  20 mg Per G Tube Daily    heparin (porcine)  5,000 Units Subcutaneous Q8H    insulin aspart U-100  6 Units Subcutaneous Q4H    insulin glargine U-100  15 Units Subcutaneous Daily    labetalol  2.5 mg Intravenous TID    metoprolol tartrate  50 mg Per G Tube BID    nicotine  1 patch Transdermal Daily    nystatin  500,000 Units Oral QID    polyethylene glycol  17 g Per G Tube BID    QUEtiapine  25 mg Per G Tube QHS    senna-docusate  1 tablet Per G Tube BID       Diagnostic Results: Labs: Reviewed    PRN Medications:   Current Facility-Administered Medications:     bisacodyL, 10 mg, Rectal, Daily PRN    dextrose 50%, 12.5 g, Intravenous, PRN    dextrose 50%, 25 g, Intravenous, PRN    glucagon (human recombinant), 1 mg, Intramuscular, PRN    hydrALAZINE, 10 mg, Intravenous, Q4H PRN    insulin aspart U-100, 0-10 Units, Subcutaneous, Q4H PRN    labetalol, 10 mg, Intravenous, Q4H PRN    melatonin, 6 mg, Per G Tube, Nightly PRN    simethicone, 1 tablet, Per G Tube, TID PRN    sodium chloride 0.9%, 10 mL, Intravenous, PRN    Review of Systems   Unable to perform ROS: Mental status change (Unable to have meaningful conversation)     Objective:     Vital Signs (Most Recent):  Temp: 97.5 °F (36.4 °C) (07/02/25 1150)  Pulse: 89 (07/02/25 1150)  Resp: 18 (07/02/25 1150)  BP: (!) 147/89 (07/02/25 1150)  SpO2: 97 % (07/02/25 1150)    Vital Signs (24h  Range):  Temp:  [97.2 °F (36.2 °C)-98.3 °F (36.8 °C)] 97.5 °F (36.4 °C)  Pulse:  [69-93] 89  Resp:  [18] 18  SpO2:  [94 %-98 %] 97 %  BP: (138-190)/(73-98) 147/89         Physical Exam  Vitals and nursing note reviewed.   Pulmonary:      Effort: Pulmonary effort is normal. No respiratory distress.   Musculoskeletal:      Cervical back: Normal range of motion and neck supple.      Comments: BONNIE. Not following simple commands. Noted to try to move LUE spontaneously.    Skin:     General: Skin is warm and dry.      Capillary Refill: Capillary refill takes 2 to 3 seconds.   Neurological:      General: No focal deficit present.      Mental Status: He is alert.      GCS: GCS eye subscore is 4. GCS verbal subscore is 2. GCS motor subscore is 4.      Cranial Nerves: Dysarthria present.      Motor: Weakness present.      Coordination: Coordination abnormal. Finger-Nose-Finger Test abnormal and Heel to Shin Test abnormal. Impaired rapid alternating movements.      Gait: Gait abnormal.   Psychiatric:         Behavior: Behavior is uncooperative.          NEUROLOGICAL EXAMINATION:     GAIT AND COORDINATION      Coordination   Finger to nose coordination: abnormal

## 2025-07-02 NOTE — SUBJECTIVE & OBJECTIVE
Neurologic Chief Complaint: L MCA stroke    Subjective:     Interval History: Patient is seen for follow-up neurological assessment and treatment recommendations: See hospital course.    HPI, Past Medical, Family, and Social History remains the same as documented in the initial encounter.     Review of Systems   Reason unable to perform ROS: Severe aphasia.     Scheduled Meds:   amLODIPine  10 mg Per G Tube QHS    aspirin  300 mg Rectal Daily    atorvastatin  40 mg Per G Tube Daily    [START ON 7/3/2025] barium sulfate  450 mL Per NG tube Once    [START ON 7/3/2025] barium sulfate  450 mL Per NG tube Once    FLUoxetine  20 mg Per G Tube Daily    heparin (porcine)  5,000 Units Subcutaneous Q8H    insulin aspart U-100  6 Units Subcutaneous Q4H    insulin glargine U-100  15 Units Subcutaneous Daily    labetalol  2.5 mg Intravenous TID    metoprolol tartrate  50 mg Per G Tube BID    nicotine  1 patch Transdermal Daily    nystatin  500,000 Units Oral QID    polyethylene glycol  17 g Per G Tube BID    QUEtiapine  25 mg Per G Tube QHS    senna-docusate  1 tablet Per G Tube BID     Continuous Infusions:   D5W   Intravenous Continuous 60 mL/hr at 07/02/25 0236 New Bag at 07/02/25 0236       PRN Meds:  Current Facility-Administered Medications:     bisacodyL, 10 mg, Rectal, Daily PRN    dextrose 50%, 12.5 g, Intravenous, PRN    dextrose 50%, 25 g, Intravenous, PRN    glucagon (human recombinant), 1 mg, Intramuscular, PRN    hydrALAZINE, 10 mg, Intravenous, Q4H PRN    insulin aspart U-100, 0-10 Units, Subcutaneous, Q4H PRN    labetalol, 10 mg, Intravenous, Q4H PRN    melatonin, 6 mg, Per G Tube, Nightly PRN    simethicone, 1 tablet, Per G Tube, TID PRN    sodium chloride 0.9%, 10 mL, Intravenous, PRN    Objective:     Vital Signs (Most Recent):  Temp: 97.5 °F (36.4 °C) (07/02/25 1150)  Pulse: 89 (07/02/25 1150)  Resp: 18 (07/02/25 1150)  BP: (!) 147/89 (07/02/25 1150)  SpO2: 97 % (07/02/25 1150)  BP Location: Right  "arm    Vital Signs Range (Last 24H):  Temp:  [97.2 °F (36.2 °C)-98.3 °F (36.8 °C)]   Pulse:  [69-93]   Resp:  [18]   BP: (138-190)/(73-98)   SpO2:  [94 %-98 %]   BP Location: Right arm       Physical Exam  HENT:      Head: Normocephalic and atraumatic.      Mouth/Throat:      Mouth: Mucous membranes are moist.   Eyes:      Conjunctiva/sclera: Conjunctivae normal.   Cardiovascular:      Rate and Rhythm: Normal rate.   Pulmonary:      Effort: Pulmonary effort is normal.   Skin:     General: Skin is warm and dry.   Neurological:      Mental Status: He is alert.      Motor: Weakness present.              Neurological Exam:   LOC: drowsy  Attention Span: poor  Language: Expressive aphasia, Receptive aphasia  Articulation: Dysarthria  Orientation: Untestable due to severe aphasia   Facial Movement (CN VII): Lower facial weakness on the Right  Motor: Arm left  Normal 5/5  Leg left  Normal 5/5  Arm right  Plegia 0/5  Leg right Paresis: 1/5  Sensation: Cy-hypoesthesia right    Laboratory:  CMP:   Recent Labs   Lab 07/02/25  0535   CALCIUM 8.5*   ALBUMIN 2.7*   PROT 6.3   *   K 3.9   CO2 21*   *   BUN 15   CREATININE 1.2   ALKPHOS 117   ALT 39   AST 50*   BILITOT 0.4     BMP:   Recent Labs   Lab 07/02/25  0535   *   K 3.9   *   CO2 21*   BUN 15   CREATININE 1.2   CALCIUM 8.5*     CBC:   Recent Labs   Lab 07/02/25  0535   WBC 7.96   RBC 4.38*   HGB 12.4*   HCT 39.8*      MCV 91   MCH 28.3   MCHC 31.2*     Lipid Panel:   No results for input(s): "CHOL", "LDLCALC", "HDL", "TRIG" in the last 168 hours.    Coagulation:   No results for input(s): "PT", "INR", "APTT" in the last 168 hours.    Platelet Aggregation Study: No results for input(s): "PLTAGG", "PLTAGINTERP", "PLTAGREGLACO", "ADPPLTAGGREG" in the last 168 hours.  Hgb A1C:   No results for input(s): "HGBA1C" in the last 168 hours.    TSH:   No results for input(s): "TSH" in the last 168 hours.      Diagnostic Results   Brain Imaging   MRI " Brain 6/20/25  Impression:     Acute left MCA distribution infarct.  No new large parenchymal hemorrhage.  Susceptibility artifact throughout the infarcted tissue may reflect contrast staining and/or blood products conversion.  Overall mass effect is increased compared to prior with sulcal effacement throughout the left cerebral hemisphere and approximately 4 mm of rightward midline shift.     Stable size of the parenchymal hemorrhage centered in the inferior left temporal lobe.     CT 6/20/25: 1242  Impression:     Moderate-sized recent left MCA distribution infarct and small intraparenchymal hemorrhage appear grossly unchanged from prior study performed earlier on the same date.     Chronic ischemic change elsewhere with underlying cerebral and cerebellar volume loss, as before.     No new hemorrhage or major vascular distribution infarct elsewhere.  CT 6/20/25: 0834  Impression:     Early ischemic changes in the left MCA distribution, new from recent CT.     New left inferior temporal intraparenchymal hematoma.     Chronic ischemic change with cerebral and cerebellar volume loss, as above.     CT 6/19/25  Impression:     Question some early left MCA ischemia corresponding to the patient's known left MCA territory occlusion.  No hemorrhage.     Senescent changes as above.        All CT scans at this facility are performed  using dose modulation techniques as appropriate to performed exam including the following:  automated exposure control; adjustment of mA and/or kV according to the patients size (this includes techniques or standardized protocols for targeted exams where dose is matched to indication/reason for exam: i.e. extremities or head);  iterative reconstruction technique.     Vessel Imaging   CTA head and neck 6/19/25  Impression:     Distal left M1 MCA occlusion with relatively prompt collateralization of the more distal MCA vessels.     Severe stenosis left mid vertebral artery and left PCA.      Cardiac Imaging   TTE 6/19/25    Left Ventricle: The left ventricle is normal in size. Normal wall thickness. There is normal systolic function with a visually estimated ejection fraction of 60 - 65%.    Right Ventricle: The right ventricle is normal in size Wall thickness is normal. Systolic function is normal.    The pulmonary artery pressure could not be estimated.    IVC/SVC: Normal venous pressure at 3 mmHg.

## 2025-07-03 LAB
ABSOLUTE EOSINOPHIL (OHS): 0.27 K/UL
ABSOLUTE MONOCYTE (OHS): 1 K/UL (ref 0.3–1)
ABSOLUTE NEUTROPHIL COUNT (OHS): 4.82 K/UL (ref 1.8–7.7)
ALBUMIN SERPL BCP-MCNC: 2.7 G/DL (ref 3.5–5.2)
ALP SERPL-CCNC: 112 UNIT/L (ref 40–150)
ALT SERPL W/O P-5'-P-CCNC: 49 UNIT/L (ref 10–44)
ANION GAP (OHS): 9 MMOL/L (ref 8–16)
AST SERPL-CCNC: 52 UNIT/L (ref 11–45)
BASOPHILS # BLD AUTO: 0.03 K/UL
BASOPHILS NFR BLD AUTO: 0.4 %
BILIRUB SERPL-MCNC: 0.4 MG/DL (ref 0.1–1)
BUN SERPL-MCNC: 12 MG/DL (ref 8–23)
CALCIUM SERPL-MCNC: 8.2 MG/DL (ref 8.7–10.5)
CHLORIDE SERPL-SCNC: 112 MMOL/L (ref 95–110)
CO2 SERPL-SCNC: 22 MMOL/L (ref 23–29)
CREAT SERPL-MCNC: 1.2 MG/DL (ref 0.5–1.4)
ERYTHROCYTE [DISTWIDTH] IN BLOOD BY AUTOMATED COUNT: 12.7 % (ref 11.5–14.5)
GFR SERPLBLD CREATININE-BSD FMLA CKD-EPI: >60 ML/MIN/1.73/M2
GLUCOSE SERPL-MCNC: 143 MG/DL (ref 70–110)
HCT VFR BLD AUTO: 35.5 % (ref 40–54)
HGB BLD-MCNC: 11.1 GM/DL (ref 14–18)
IMM GRANULOCYTES # BLD AUTO: 0.03 K/UL (ref 0–0.04)
IMM GRANULOCYTES NFR BLD AUTO: 0.4 % (ref 0–0.5)
LYMPHOCYTES # BLD AUTO: 2.35 K/UL (ref 1–4.8)
MAGNESIUM SERPL-MCNC: 1.8 MG/DL (ref 1.6–2.6)
MCH RBC QN AUTO: 28.3 PG (ref 27–31)
MCHC RBC AUTO-ENTMCNC: 31.3 G/DL (ref 32–36)
MCV RBC AUTO: 91 FL (ref 82–98)
NUCLEATED RBC (/100WBC) (OHS): 0 /100 WBC
PHOSPHATE SERPL-MCNC: 3.6 MG/DL (ref 2.7–4.5)
PLATELET # BLD AUTO: 223 K/UL (ref 150–450)
PMV BLD AUTO: 12.4 FL (ref 9.2–12.9)
POCT GLUCOSE: 147 MG/DL (ref 70–110)
POCT GLUCOSE: 156 MG/DL (ref 70–110)
POCT GLUCOSE: 157 MG/DL (ref 70–110)
POCT GLUCOSE: 168 MG/DL (ref 70–110)
POCT GLUCOSE: 174 MG/DL (ref 70–110)
POCT GLUCOSE: 216 MG/DL (ref 70–110)
POTASSIUM SERPL-SCNC: 3.7 MMOL/L (ref 3.5–5.1)
PROT SERPL-MCNC: 6.2 GM/DL (ref 6–8.4)
RBC # BLD AUTO: 3.92 M/UL (ref 4.6–6.2)
RELATIVE EOSINOPHIL (OHS): 3.2 %
RELATIVE LYMPHOCYTE (OHS): 27.6 % (ref 18–48)
RELATIVE MONOCYTE (OHS): 11.8 % (ref 4–15)
RELATIVE NEUTROPHIL (OHS): 56.6 % (ref 38–73)
SODIUM SERPL-SCNC: 143 MMOL/L (ref 136–145)
WBC # BLD AUTO: 8.5 K/UL (ref 3.9–12.7)

## 2025-07-03 PROCEDURE — 63600175 PHARM REV CODE 636 W HCPCS: Mod: HCNC | Performed by: STUDENT IN AN ORGANIZED HEALTH CARE EDUCATION/TRAINING PROGRAM

## 2025-07-03 PROCEDURE — 25500020 PHARM REV CODE 255: Mod: HCNC | Performed by: PHYSICIAN ASSISTANT

## 2025-07-03 PROCEDURE — 0DH63UZ INSERTION OF FEEDING DEVICE INTO STOMACH, PERCUTANEOUS APPROACH: ICD-10-PCS | Performed by: STUDENT IN AN ORGANIZED HEALTH CARE EDUCATION/TRAINING PROGRAM

## 2025-07-03 PROCEDURE — 36415 COLL VENOUS BLD VENIPUNCTURE: CPT | Mod: HCNC

## 2025-07-03 PROCEDURE — 25000003 PHARM REV CODE 250: Mod: HCNC

## 2025-07-03 PROCEDURE — 51798 US URINE CAPACITY MEASURE: CPT | Mod: HCNC

## 2025-07-03 PROCEDURE — 80053 COMPREHEN METABOLIC PANEL: CPT | Mod: HCNC

## 2025-07-03 PROCEDURE — 99233 SBSQ HOSP IP/OBS HIGH 50: CPT | Mod: HCNC,GC,, | Performed by: PSYCHIATRY & NEUROLOGY

## 2025-07-03 PROCEDURE — 85025 COMPLETE CBC W/AUTO DIFF WBC: CPT | Mod: HCNC

## 2025-07-03 PROCEDURE — 11000001 HC ACUTE MED/SURG PRIVATE ROOM: Mod: HCNC

## 2025-07-03 PROCEDURE — 83735 ASSAY OF MAGNESIUM: CPT | Mod: HCNC

## 2025-07-03 PROCEDURE — 63600175 PHARM REV CODE 636 W HCPCS: Mod: HCNC

## 2025-07-03 PROCEDURE — A9698 NON-RAD CONTRAST MATERIALNOC: HCPCS | Mod: HCNC | Performed by: PHYSICIAN ASSISTANT

## 2025-07-03 PROCEDURE — 84100 ASSAY OF PHOSPHORUS: CPT | Mod: HCNC

## 2025-07-03 RX ORDER — MIDAZOLAM HYDROCHLORIDE 1 MG/ML
INJECTION, SOLUTION INTRAMUSCULAR; INTRAVENOUS
Status: COMPLETED | OUTPATIENT
Start: 2025-07-03 | End: 2025-07-03

## 2025-07-03 RX ORDER — DEXTROSE MONOHYDRATE 50 MG/ML
INJECTION, SOLUTION INTRAVENOUS CONTINUOUS
Status: ACTIVE | OUTPATIENT
Start: 2025-07-03 | End: 2025-07-04

## 2025-07-03 RX ORDER — GLUCAGON 1 MG
KIT INJECTION
Status: COMPLETED | OUTPATIENT
Start: 2025-07-03 | End: 2025-07-03

## 2025-07-03 RX ORDER — MORPHINE SULFATE 2 MG/ML
0.5 INJECTION, SOLUTION INTRAMUSCULAR; INTRAVENOUS ONCE
Status: DISCONTINUED | OUTPATIENT
Start: 2025-07-03 | End: 2025-07-03

## 2025-07-03 RX ORDER — FENTANYL CITRATE 50 UG/ML
INJECTION, SOLUTION INTRAMUSCULAR; INTRAVENOUS
Status: COMPLETED | OUTPATIENT
Start: 2025-07-03 | End: 2025-07-03

## 2025-07-03 RX ADMIN — METOPROLOL TARTRATE 50 MG: 50 TABLET, FILM COATED ORAL at 09:07

## 2025-07-03 RX ADMIN — INSULIN ASPART 2 UNITS: 100 INJECTION, SOLUTION INTRAVENOUS; SUBCUTANEOUS at 04:07

## 2025-07-03 RX ADMIN — LABETALOL HYDROCHLORIDE 2.5 MG: 5 INJECTION, SOLUTION INTRAVENOUS at 08:07

## 2025-07-03 RX ADMIN — MIDAZOLAM HYDROCHLORIDE 0.5 MG: 2 INJECTION, SOLUTION INTRAMUSCULAR; INTRAVENOUS at 09:07

## 2025-07-03 RX ADMIN — LABETALOL HYDROCHLORIDE 10 MG: 5 INJECTION, SOLUTION INTRAVENOUS at 03:07

## 2025-07-03 RX ADMIN — GLUCAGON 1 MG: 1 INJECTION, POWDER, LYOPHILIZED, FOR SOLUTION INTRAMUSCULAR; INTRAVENOUS at 09:07

## 2025-07-03 RX ADMIN — NYSTATIN 500000 UNITS: 100000 SUSPENSION ORAL at 09:07

## 2025-07-03 RX ADMIN — POLYETHYLENE GLYCOL 3350 17 G: 17 POWDER, FOR SOLUTION ORAL at 09:07

## 2025-07-03 RX ADMIN — QUETIAPINE FUMARATE 25 MG: 25 TABLET ORAL at 09:07

## 2025-07-03 RX ADMIN — FENTANYL CITRATE 25 MCG: 50 INJECTION, SOLUTION INTRAMUSCULAR; INTRAVENOUS at 09:07

## 2025-07-03 RX ADMIN — HEPARIN SODIUM 5000 UNITS: 5000 INJECTION INTRAVENOUS; SUBCUTANEOUS at 09:07

## 2025-07-03 RX ADMIN — AMLODIPINE BESYLATE 10 MG: 10 TABLET ORAL at 09:07

## 2025-07-03 RX ADMIN — Medication 6 MG: at 09:07

## 2025-07-03 RX ADMIN — SENNOSIDES AND DOCUSATE SODIUM 1 TABLET: 50; 8.6 TABLET ORAL at 09:07

## 2025-07-03 RX ADMIN — HEPARIN SODIUM 5000 UNITS: 5000 INJECTION INTRAVENOUS; SUBCUTANEOUS at 03:07

## 2025-07-03 RX ADMIN — BARIUM SULFATE 450 ML: 20 SUSPENSION ORAL at 12:07

## 2025-07-03 RX ADMIN — DEXTROSE MONOHYDRATE: 50 INJECTION, SOLUTION INTRAVENOUS at 02:07

## 2025-07-03 NOTE — PLAN OF CARE
Problem: Adult Inpatient Plan of Care  Goal: Patient-Specific Goal (Individualized)  Description: Pt will maintain sbp below 160  7/3/2025 0243 by Angelica Sierra RN  Outcome: Progressing  Flowsheets (Taken 7/3/2025 0243)  Individualized Care Needs: care clustered  Anxieties, Fears or Concerns: kiersten  7/3/2025 0243 by Angelica Sierra RN  Reactivated     Problem: Restraint, Nonviolent  Goal: Absence of Harm or Injury  7/3/2025 0243 by Angelica Sierra RN  Outcome: Progressing  7/3/2025 0243 by Angelica Sierra RN  Reactivated  Intervention: Implement Least Restrictive Safety Strategies  Flowsheets (Taken 7/3/2025 0243)  Less Restrictive Alternative: 1:1 observation maintained  Intervention: Protect Dignity, Rights and Personal Wellbeing  Flowsheets (Taken 7/3/2025 0243)  Trust Relationship/Rapport:   care explained   choices provided  Intervention: Protect Skin and Joint Integrity  Flowsheets (Taken 7/3/2025 0243)  Body Position: position changed independently  Range of Motion: ROM (range of motion) performed  Intervention: Education  Flowsheets (Taken 7/3/2025 0243)  Discontinuation Criteria: Absence of behavior that required restraint  Criteria Explained: Yes  Patient's Response: NL  Patient / Family Notification: Patient  Patient / Family Teaching: Need for restraint  Intervention: Restraint Monitoring Q2 hours w/Assessment for Injury  Flowsheets (Taken 7/3/2025 0243)  Observed Emotional State: calm  Visual Check: SD  Circulation: NS  Range of Motion: A  Fluids: N  Food/Meal: N  Elimination: UC  Pain Rating (0-10): Rest: 0  Comfort Measures: Repositioned  Assessed readiness for DC: Yes  Privacy Maintained: Yes  Vital Signs per MD order: Yes  Intervention: Restraint Injuries Monitor Q2 hours  Flowsheets (Taken 7/3/2025 0243)  Injuries Noted: None     Problem: Stroke, Ischemic (Includes Transient Ischemic Attack)  Goal: Optimal Coping  Outcome: Progressing  Intervention: Support Psychosocial Response to  Stroke  Flowsheets (Taken 7/3/2025 8617)  Supportive Measures:   active listening utilized   self-care encouraged     @2000 Per stroke team hold all medications overnight via G tube route including insulin overnight until NG tube placed.    D5W @60 cc/hr. VSS. Bed in lowest position, side rails x 3, and call light in use.

## 2025-07-03 NOTE — CARE UPDATE
Report called to Sendy Werner RN. Pt fully recovered from procedure and transport now here to bring pt back to 082k.

## 2025-07-03 NOTE — PROGRESS NOTES
[facility] - Neurosurgery ([facility])  Vascular Neurology  Comprehensive Stroke Center  Progress Note    Assessment/Plan:     * Embolic stroke involving left middle cerebral artery  Cristi Pulido is a 70 y.o. male w/ PMH of HTN, DM, prior stroke w/ residual RSW who presents as a transfer from OSH after presenting with acute onset of dysarthria and RSW. Telestroke was completed and his NIHSS was 19. CT showed probable early ischemic changes in the L insular ribbon and L frontal lobe. CTA demonstrated occlusion of the distal L MCA M1 segment. He was given TNK at 0548. Patient was transferred to [facility] for further management. On arrival, repeat NIHSS of 24. Patient taken for repeat CTH which showed small left temporal hemorrhage along with continued early ischemic changes. Patient taken to IR for thrombectomy and to be admitted to NCC post-procedure.  S/P thrombectomy. TICI 3. MRI revealing for blood products in stroke bed, likely reperfusion injury. Etiology ESUS.     07/03/2025: G tube placement per IR. Decreased D5W to 30 cc/hr.     Antithrombotics for secondary stroke prevention: Antiplatelets: Aspirin: 81 mg daily    Statins for secondary stroke prevention and hyperlipidemia, if present:   Statins: Atorvastatin- 40 mg daily    Aggressive risk factor modification: HTN, Smoking, DM, HLD     Rehab efforts: The patient has been evaluated by a stroke team provider and the therapy needs have been fully considered based off the presenting complaints and exam findings. The following therapy evaluations are needed: PT evaluate and treat, OT evaluate and treat, SLP evaluate and treat, PM&R evaluate for appropriate placement, current recommendation HIT    Diagnostics ordered/pending: None     VTE prophylaxis: Enoxaparin 40 mg SQ every 24 hours  Mechanical prophylaxis: Place SCDs    BP parameters: Infarct: Post sucessful thrombectomy, SBP <140        Hypernatremia  Pt hypernatremic at 153. On D5W infusion @60 cc/hr.     Plan  -f/u  BMP  - Monitor for over correction     Encounter for nasogastric tube placement  PEG removed per patient.   IR consulted for replacement. Attempt unsuccessful, will attempt again week of 6/30.   NGT replaced. TF and FWF resumed.     Restlessness  -6/24: QTc 441   -Seroquel 25 mg QHS     Hyperlipemia  -Stroke risk factor  -LDL 75, goal <70  -Atorvastatin 40 mg daily      Right sided weakness  -Secondary to stroke.   -Aggressive therapy     Dysarthria and anarthria  -Therapy eval and treat    Aphasia  -Secondary to stroke  -Aggressive therapy     Type 2 diabetes mellitus without complication, without long-term current use of insulin  Lab Results   Component Value Date    LABA1C 7.2 (H) 01/29/2018    HGBA1C 7.0 (H) 06/19/2025     Follow up repeat A1c. Hold home antihyperglycemics. SSI for goal -180 while in hospital  -Added Lantus 15u     Hypertension associated with diabetes  -Stroke risk factor  -SBP goal <140, maintain MAP >65  -BP range in the last 24 hrs: BP  Min: 139/82  Max: 169/93  -Current antihypertensive regimen:     -Amlodipine 10mg     -Lisinopril still on hold for now    -Metoprolol 50mg BID   --PRN labetalol/hydralazine     Tobacco dependence  Stroke risk factor  - on cessation  -nicotine patch PRN         06/20/2025 NAEON. S/P thrombectomy. TICI 3. TNK monitoring ended at 0548. Aphasic, follows no commands. RSW remains. MRI revealing for blood products in stroke bed, likely reperfusion injury. OK to start monotherapy with either plavix or ASA 6/21/25 for secondary stroke prevention. ECHO unremarkable. Family member at bedside agreeable to NGT placement. Etiology ESUS.   6/21/2025 NAEON. Neuro exam stable. Recommend holding AP therapy for now.   06/22/2025 NAEON. Neuro exam stable. Start AP therapy with ASA.   06/23/2025 NAEON. SLP recs NPO. PEG discussed with family and they are agreeable. Pt S/D to NPU.   06/24/2025 NGT pulled overnight and replaced. Placement confirmed via X ray. IR  consulted for PEG placement. JEANNE, Creatinine and BUN uptrending. FWF flushes ordered. Restless during night. EKG repeated. QTc 441. Seroquel 25 mg QHS ordered. Family requesting [facility] IPR when medically ready.   06/25/2025 NAEON. Plans for PEG placement today. BUN and Cr uptrending. Fluids started. Lisinopril held.   06/26/2025 NAEON. PEG placed yesterday, will resume tube feeds this afternoon starting with trickle feeds. BUN and Cr Improved. Increased water boluses to 300.  06/27/2025 NAEON. Neuro exam stable. Patient has tolerated tube feeds well. Will continue to monitor kidney function for now. Trial off restraints today to prepare for rehab placement. Added Lantus 15 units. MBSS today, patient able to have puree for pleasure feeds per speech.   06/28/2025 Na 152. 0.45% NS at 100 ml/hr ordered x 12 hrs. Will repeat sodium draw scheduled at 2100. Pulled PEG. IR unable to replace PEG. Will attempt in coming days. NGT replaced, placement confirmed via Xray. TF and FWF restarted.   06/29/2025 Na 154, BUN and Cr improving. Continuing fluids. NGT coiled overnight. NG Xray showed possible pneumoperitoneum. CT AP without contrast ordered. Showed free air in the abdomen, advised not to use NGT for now. General Surgery consulted to discuss PEG placement.   06/30/2025 Pulled NG tube overnight. Na stable, Cr and BUN continue to improve. Fluids reordered. Discussed PEG replacement with GI and Gen Surg, deferred to IR for replacement. IR reconsulted this morning. Must wait approximately 1 week to re attempt PEG placement. Placement planned for 7/3. Will need to replace NG tube 7/2. NPO at midnight 7/3.   07/01/2025 Switched 0.45 NS to D5W @60 cc/hr. Hypernatremic at 153. Free water deficit 1.9L   07/02/2025 Hypernatremia improving. Continuing D5W. Plan for G tube tomorrow per IR.   07/03/2025: G tube placement per IR. Decreased D5W to 30 cc/hr.     STROKE DOCUMENTATION   Acute Stroke Times   Last Known Normal Date:  06/19/25  Last Known Normal Time: 0400  Symptom Onset Date: 06/19/25  Symptom Onset Time: 0400  Stroke Team Called Date: 06/19/25  Stroke Team Called Time: 0813  Stroke Team Arrival Date: 06/19/25  Stroke Team Arrival Time: 0813  CT Interpretation Time: 0827  Thrombolytic Therapy Recommended:  (already given prior to transfer)  CTA Interpretation Time:  (already completed prior to transfer)  Thrombectomy Recommended: Yes  Decision to Treat Time for IR: 0832    NIH Scale:  1a. Level of Consciousness: 0-->Alert, keenly responsive  1b. LOC Questions: 2-->Answers neither question correctly  1c. LOC Commands: 2-->Performs neither task correctly  2. Best Gaze: 0-->Normal  3. Visual: 0-->No visual loss  4. Facial Palsy: 1-->Minor paralysis (flattened nasolabial fold, asymmetry on smiling)  5a. Motor Arm, Left: 0-->No drift, limb holds 90 (or 45) degrees for full 10 secs  5b. Motor Arm, Right: 3-->No effort against gravity, limb falls  6a. Motor Leg, Left: 0-->No drift, leg holds 30 degree position for full 5 secs  6b. Motor Leg, Right: 3-->No effort against gravity, leg falls to bed immediately  7. Limb Ataxia: 0-->Absent  8. Sensory: 0-->Normal, no sensory loss  9. Best Language: 2-->Severe aphasia, all communication is through fragmentary expression, great need for inference, questioning, and guessing by the listener. Range of information that can be exchanged is limited, listener carries burden of. . . (see row details)  10. Dysarthria: 2-->Severe dysarthria, patients speech is so slurred as to be unintelligible in the absence of or out of proportion to any dysphasia, or is mute/anarthric  11. Extinction and Inattention (formerly Neglect): 0-->No abnormality  Total (NIH Stroke Scale): 15       Modified Gadsden Score: 2  Stephanie Coma Scale:    ABCD2 Score:    [id number] Score:   HAS -BLED Score:   ICH Score:   Hunt & Kimball Classification:      Hemorrhagic change of an Ischemic Stroke: Does this patient have an ischemic  stroke with hemorrhagic changes? No     Neurologic Chief Complaint: L MCA stroke    Subjective:     Interval History: Patient is seen for follow-up neurological assessment and treatment recommendations: See hospital course.    HPI, Past Medical, Family, and Social History remains the same as documented in the initial encounter.     Review of Systems   Reason unable to perform ROS: Severe aphasia.     Scheduled Meds:   amLODIPine  10 mg Per G Tube QHS    aspirin  300 mg Rectal Daily    atorvastatin  40 mg Per G Tube Daily    FLUoxetine  20 mg Per G Tube Daily    heparin (porcine)  5,000 Units Subcutaneous Q8H    insulin aspart U-100  6 Units Subcutaneous Q4H    insulin glargine U-100  15 Units Subcutaneous Daily    labetalol  2.5 mg Intravenous TID    metoprolol tartrate  50 mg Per G Tube BID    nicotine  1 patch Transdermal Daily    nystatin  500,000 Units Oral QID    polyethylene glycol  17 g Per G Tube BID    QUEtiapine  25 mg Per G Tube QHS    senna-docusate  1 tablet Per G Tube BID     Continuous Infusions:   D5W   Intravenous Continuous           PRN Meds:  Current Facility-Administered Medications:     bisacodyL, 10 mg, Rectal, Daily PRN    dextrose 50%, 12.5 g, Intravenous, PRN    dextrose 50%, 25 g, Intravenous, PRN    glucagon (human recombinant), 1 mg, Intramuscular, PRN    hydrALAZINE, 10 mg, Intravenous, Q4H PRN    insulin aspart U-100, 0-10 Units, Subcutaneous, Q4H PRN    labetalol, 10 mg, Intravenous, Q4H PRN    melatonin, 6 mg, Per G Tube, Nightly PRN    simethicone, 1 tablet, Per G Tube, TID PRN    sodium chloride 0.9%, 10 mL, Intravenous, PRN    Objective:     Vital Signs (Most Recent):  Temp: 97.8 °F (36.6 °C) (07/03/25 1000)  Pulse: 62 (07/03/25 1015)  Resp: 14 (07/03/25 1015)  BP: 115/69 (07/03/25 1015)  SpO2: (!) 93 % (07/03/25 1015)  BP Location: Right arm    Vital Signs Range (Last 24H):  Temp:  [97.5 °F (36.4 °C)-98.3 °F (36.8 °C)]   Pulse:  [62-93]   Resp:  [12-20]   BP: (111-163)/()  "  SpO2:  [93 %-100 %]   BP Location: Right arm       Physical Exam  HENT:      Head: Normocephalic and atraumatic.      Mouth/Throat:      Mouth: Mucous membranes are moist.   Eyes:      Conjunctiva/sclera: Conjunctivae normal.   Cardiovascular:      Rate and Rhythm: Normal rate.   Pulmonary:      Effort: Pulmonary effort is normal.   Skin:     General: Skin is warm and dry.   Neurological:      Mental Status: He is alert.      Motor: Weakness present.              Neurological Exam:   LOC: drowsy  Attention Span: poor  Language: Expressive aphasia, Receptive aphasia  Articulation: Dysarthria  Orientation: Untestable due to severe aphasia   Facial Movement (CN VII): Lower facial weakness on the Right  Motor: Arm left  Normal 5/5  Leg left  Normal 5/5  Arm right  Plegia 0/5  Leg right Paresis: 1/5  Sensation: Cy-hypoesthesia right    Laboratory:  CMP:   Recent Labs   Lab 07/03/25  0519   CALCIUM 8.2*   ALBUMIN 2.7*   PROT 6.2      K 3.7   CO2 22*   *   BUN 12   CREATININE 1.2   ALKPHOS 112   ALT 49*   AST 52*   BILITOT 0.4     BMP:   Recent Labs   Lab 07/03/25  0519      K 3.7   *   CO2 22*   BUN 12   CREATININE 1.2   CALCIUM 8.2*     CBC:   Recent Labs   Lab 07/03/25 0519   WBC 8.50   RBC 3.92*   HGB 11.1*   HCT 35.5*      MCV 91   MCH 28.3   MCHC 31.3*     Lipid Panel:   No results for input(s): "CHOL", "LDLCALC", "HDL", "TRIG" in the last 168 hours.    Coagulation:   No results for input(s): "PT", "INR", "APTT" in the last 168 hours.    Platelet Aggregation Study: No results for input(s): "PLTAGG", "PLTAGINTERP", "PLTAGREGLACO", "ADPPLTAGGREG" in the last 168 hours.  Hgb A1C:   No results for input(s): "HGBA1C" in the last 168 hours.    TSH:   No results for input(s): "TSH" in the last 168 hours.      Diagnostic Results   Brain Imaging   MRI Brain 6/20/25  Impression:     Acute left MCA distribution infarct.  No new large parenchymal hemorrhage.  Susceptibility artifact throughout " the infarcted tissue may reflect contrast staining and/or blood products conversion.  Overall mass effect is increased compared to prior with sulcal effacement throughout the left cerebral hemisphere and approximately 4 mm of rightward midline shift.     Stable size of the parenchymal hemorrhage centered in the inferior left temporal lobe.     [facility] 6/20/25: 1242  Impression:     Moderate-sized recent left MCA distribution infarct and small intraparenchymal hemorrhage appear grossly unchanged from prior study performed earlier on the same date.     Chronic ischemic change elsewhere with underlying cerebral and cerebellar volume loss, as before.     No new hemorrhage or major vascular distribution infarct elsewhere.  CT 6/20/25: 0834  Impression:     Early ischemic changes in the left MCA distribution, new from recent CT.     New left inferior temporal intraparenchymal hematoma.     Chronic ischemic change with cerebral and cerebellar volume loss, as above.     [facility] 6/19/25  Impression:     Question some early left MCA ischemia corresponding to the patient's known left MCA territory occlusion.  No hemorrhage.     Senescent changes as above.        All CT scans at this facility are performed  using dose modulation techniques as appropriate to performed exam including the following:  automated exposure control; adjustment of mA and/or kV according to the patients size (this includes techniques or standardized protocols for targeted exams where dose is matched to indication/reason for exam: i.e. extremities or head);  iterative reconstruction technique.     Vessel Imaging   CTA head and neck 6/19/25  Impression:     Distal left M1 MCA occlusion with relatively prompt collateralization of the more distal MCA vessels.     Severe stenosis left mid vertebral artery and left PCA.     Cardiac Imaging   TTE 6/19/25    Left Ventricle: The left ventricle is normal in size. Normal wall thickness. There is normal systolic function  with a visually estimated ejection fraction of 60 - 65%.    Right Ventricle: The right ventricle is normal in size Wall thickness is normal. Systolic function is normal.    The pulmonary artery pressure could not be estimated.    IVC/SVC: Normal venous pressure at 3 mmHg.       Cullen Almanzar MD  [facility]  Department of Vascular Neurology   [facility] Neurosurgery [facility])

## 2025-07-03 NOTE — PLAN OF CARE
[facility] - Neurosurgery (Hospital)  Discharge Reassessment    Primary Care Provider: Rell Winn MD    Expected Discharge Date: 7/8/2025    Reassessment (most recent)       Discharge Reassessment - 07/03/25 1230          Discharge Reassessment    Assessment Type Discharge Planning Reassessment     Did the patient's condition or plan change since previous assessment? No     Discharge Plan discussed with: Spouse/sig other     Name(s) and Number(s) Jocy Pulido (spouse) [phone]     Communicated ESSIE with patient/caregiver Yes     Discharge Plan A Rehab     Discharge Plan B Skilled Nursing Facility     DME Needed Upon Discharge  other (see comments)   TBD    Transition of Care Barriers None     Why the patient remains in the hospital Requires continued medical care        Post-Acute Status    Post-Acute Authorization Placement     Post-Acute Placement Status Pending medical clearance/testing                   Discharge Plan A and Plan B have been determined by review of patient's clinical status, future medical and therapeutic needs, and coverage/benefits for post-acute care in coordination with multidisciplinary team members.     Gloria Robertson RN  Ext [phone]

## 2025-07-03 NOTE — CARE UPDATE
Pt arrived to MPU 4 for recovery of a G tube placement. Pt to recover for 1hr then return to the floor.

## 2025-07-03 NOTE — PT/OT/SLP PROGRESS
Occupational Therapy      Patient Name:  Cristi Pulido   MRN:  [id number]    Patient not seen today secondary to Off the floor for procedure/surgery. Will follow-up as appropriate.    7/3/2025

## 2025-07-03 NOTE — NURSING
@2000 Per stroke team, hold all medications via G tube route including insulin overnight until NG tube placement.

## 2025-07-03 NOTE — PT/OT/SLP PROGRESS
Physical Therapy      Patient Name:  Cristi Pulido   MRN:  [id number]    Patient not seen today secondary to off the floor for PEG . Will follow-up as able.

## 2025-07-03 NOTE — NURSING
Pt given scheduled labetalol for bp per stroke MD request. Scheduled metoprolol held for this reason. MD aware of this.

## 2025-07-03 NOTE — PLAN OF CARE
Procedure completed. Patient tolerated well; VSS. Patient to be transported to MPU 4 accompanied by this RN; report to be given at the bedside.

## 2025-07-03 NOTE — NURSING
Report received from Ivanna in IR. Pt. To be transferred back to unit s/p PEG placement, no issues noted during procedure. New orders for PEG use in EPIC acknowledged.

## 2025-07-03 NOTE — ASSESSMENT & PLAN NOTE
Cristi Pulido is a 70 y.o. male w/ PMH of HTN, DM, prior stroke w/ residual RSW who presents as a transfer from OSH after presenting with acute onset of dysarthria and RSW. Telestroke was completed and his NIHSS was 19. CT showed probable early ischemic changes in the L insular ribbon and L frontal lobe. CTA demonstrated occlusion of the distal L MCA M1 segment. He was given TNK at 0548. Patient was transferred to [facility] for further management. On arrival, repeat NIHSS of 24. Patient taken for repeat CTH which showed small left temporal hemorrhage along with continued early ischemic changes. Patient taken to IR for thrombectomy and to be admitted to NCC post-procedure.  S/P thrombectomy. TICI 3. MRI revealing for blood products in stroke bed, likely reperfusion injury. Etiology ESUS.     07/03/2025: G tube placement per IR. Decreased D5W to 30 cc/hr.     Antithrombotics for secondary stroke prevention: Antiplatelets: Aspirin: 81 mg daily    Statins for secondary stroke prevention and hyperlipidemia, if present:   Statins: Atorvastatin- 40 mg daily    Aggressive risk factor modification: HTN, Smoking, DM, HLD     Rehab efforts: The patient has been evaluated by a stroke team provider and the therapy needs have been fully considered based off the presenting complaints and exam findings. The following therapy evaluations are needed: PT evaluate and treat, OT evaluate and treat, SLP evaluate and treat, PM&R evaluate for appropriate placement, current recommendation HIT    Diagnostics ordered/pending: None     VTE prophylaxis: Enoxaparin 40 mg SQ every 24 hours  Mechanical prophylaxis: Place SCDs    BP parameters: Infarct: Post sucessful thrombectomy, SBP <140

## 2025-07-03 NOTE — SUBJECTIVE & OBJECTIVE
Neurologic Chief Complaint: L MCA stroke    Subjective:     Interval History: Patient is seen for follow-up neurological assessment and treatment recommendations: See hospital course.    HPI, Past Medical, Family, and Social History remains the same as documented in the initial encounter.     Review of Systems   Reason unable to perform ROS: Severe aphasia.     Scheduled Meds:   amLODIPine  10 mg Per G Tube QHS    aspirin  300 mg Rectal Daily    atorvastatin  40 mg Per G Tube Daily    FLUoxetine  20 mg Per G Tube Daily    heparin (porcine)  5,000 Units Subcutaneous Q8H    insulin aspart U-100  6 Units Subcutaneous Q4H    insulin glargine U-100  15 Units Subcutaneous Daily    labetalol  2.5 mg Intravenous TID    metoprolol tartrate  50 mg Per G Tube BID    nicotine  1 patch Transdermal Daily    nystatin  500,000 Units Oral QID    polyethylene glycol  17 g Per G Tube BID    QUEtiapine  25 mg Per G Tube QHS    senna-docusate  1 tablet Per G Tube BID     Continuous Infusions:   D5W   Intravenous Continuous           PRN Meds:  Current Facility-Administered Medications:     bisacodyL, 10 mg, Rectal, Daily PRN    dextrose 50%, 12.5 g, Intravenous, PRN    dextrose 50%, 25 g, Intravenous, PRN    glucagon (human recombinant), 1 mg, Intramuscular, PRN    hydrALAZINE, 10 mg, Intravenous, Q4H PRN    insulin aspart U-100, 0-10 Units, Subcutaneous, Q4H PRN    labetalol, 10 mg, Intravenous, Q4H PRN    melatonin, 6 mg, Per G Tube, Nightly PRN    simethicone, 1 tablet, Per G Tube, TID PRN    sodium chloride 0.9%, 10 mL, Intravenous, PRN    Objective:     Vital Signs (Most Recent):  Temp: 97.8 °F (36.6 °C) (07/03/25 1000)  Pulse: 62 (07/03/25 1015)  Resp: 14 (07/03/25 1015)  BP: 115/69 (07/03/25 1015)  SpO2: (!) 93 % (07/03/25 1015)  BP Location: Right arm    Vital Signs Range (Last 24H):  Temp:  [97.5 °F (36.4 °C)-98.3 °F (36.8 °C)]   Pulse:  [62-93]   Resp:  [12-20]   BP: (111-163)/()   SpO2:  [93 %-100 %]   BP Location:  "Right arm       Physical Exam  HENT:      Head: Normocephalic and atraumatic.      Mouth/Throat:      Mouth: Mucous membranes are moist.   Eyes:      Conjunctiva/sclera: Conjunctivae normal.   Cardiovascular:      Rate and Rhythm: Normal rate.   Pulmonary:      Effort: Pulmonary effort is normal.   Skin:     General: Skin is warm and dry.   Neurological:      Mental Status: He is alert.      Motor: Weakness present.              Neurological Exam:   LOC: drowsy  Attention Span: poor  Language: Expressive aphasia, Receptive aphasia  Articulation: Dysarthria  Orientation: Untestable due to severe aphasia   Facial Movement (CN VII): Lower facial weakness on the Right  Motor: Arm left  Normal 5/5  Leg left  Normal 5/5  Arm right  Plegia 0/5  Leg right Paresis: 1/5  Sensation: Cy-hypoesthesia right    Laboratory:  CMP:   Recent Labs   Lab 07/03/25  0519   CALCIUM 8.2*   ALBUMIN 2.7*   PROT 6.2      K 3.7   CO2 22*   *   BUN 12   CREATININE 1.2   ALKPHOS 112   ALT 49*   AST 52*   BILITOT 0.4     BMP:   Recent Labs   Lab 07/03/25  0519      K 3.7   *   CO2 22*   BUN 12   CREATININE 1.2   CALCIUM 8.2*     CBC:   Recent Labs   Lab 07/03/25  0519   WBC 8.50   RBC 3.92*   HGB 11.1*   HCT 35.5*      MCV 91   MCH 28.3   MCHC 31.3*     Lipid Panel:   No results for input(s): "CHOL", "LDLCALC", "HDL", "TRIG" in the last 168 hours.    Coagulation:   No results for input(s): "PT", "INR", "APTT" in the last 168 hours.    Platelet Aggregation Study: No results for input(s): "PLTAGG", "PLTAGINTERP", "PLTAGREGLACO", "ADPPLTAGGREG" in the last 168 hours.  Hgb A1C:   No results for input(s): "HGBA1C" in the last 168 hours.    TSH:   No results for input(s): "TSH" in the last 168 hours.      Diagnostic Results   Brain Imaging   MRI Brain 6/20/25  Impression:     Acute left MCA distribution infarct.  No new large parenchymal hemorrhage.  Susceptibility artifact throughout the infarcted tissue may reflect " contrast staining and/or blood products conversion.  Overall mass effect is increased compared to prior with sulcal effacement throughout the left cerebral hemisphere and approximately 4 mm of rightward midline shift.     Stable size of the parenchymal hemorrhage centered in the inferior left temporal lobe.     [facility] 6/20/25: 1242  Impression:     Moderate-sized recent left MCA distribution infarct and small intraparenchymal hemorrhage appear grossly unchanged from prior study performed earlier on the same date.     Chronic ischemic change elsewhere with underlying cerebral and cerebellar volume loss, as before.     No new hemorrhage or major vascular distribution infarct elsewhere.  CT 6/20/25: 0834  Impression:     Early ischemic changes in the left MCA distribution, new from recent CT.     New left inferior temporal intraparenchymal hematoma.     Chronic ischemic change with cerebral and cerebellar volume loss, as above.     [facility] 6/19/25  Impression:     Question some early left MCA ischemia corresponding to the patient's known left MCA territory occlusion.  No hemorrhage.     Senescent changes as above.        All CT scans at this facility are performed  using dose modulation techniques as appropriate to performed exam including the following:  automated exposure control; adjustment of mA and/or kV according to the patients size (this includes techniques or standardized protocols for targeted exams where dose is matched to indication/reason for exam: i.e. extremities or head);  iterative reconstruction technique.     Vessel Imaging   CTA head and neck 6/19/25  Impression:     Distal left M1 MCA occlusion with relatively prompt collateralization of the more distal MCA vessels.     Severe stenosis left mid vertebral artery and left PCA.     Cardiac Imaging   TTE 6/19/25    Left Ventricle: The left ventricle is normal in size. Normal wall thickness. There is normal systolic function with a visually estimated ejection  fraction of 60 - 65%.    Right Ventricle: The right ventricle is normal in size Wall thickness is normal. Systolic function is normal.    The pulmonary artery pressure could not be estimated.    IVC/SVC: Normal venous pressure at 3 mmHg.

## 2025-07-03 NOTE — PLAN OF CARE
Pt arrived to IR  for G tube placement. Pt oriented to unit and staff. Plan of care reviewed with patient, patient verbalizes understanding. Comfort measures utilized. Pt safely transferred from stretcher to procedural table. Fall risk reviewed with patient, fall risk interventions maintained. Positioner pillows utilized to minimize pressure points. Blankets applied. Pt prepped and draped utilizing standard sterile technique. Timeouts completed utilizing standard universal time-out, per department and facility policy.

## 2025-07-03 NOTE — PT/OT/SLP PROGRESS
Speech Language Pathology      Cristi Pulido  MRN: [id number]    Patient not seen today secondary to NPO and MARCO for PEG re-placement. SLP will follow-up at a later date/time per POC.

## 2025-07-03 NOTE — NURSING
Pt. urine output via condom cath was only 125 cc. Bladder scanned patient while MD at bedside - showed 450 ml.  Intermittent cath completed per MD order, which put out 350 cc of dark stevan urine. Pt. has a history of urinary retention in the past per spouse. Next bladder scan due 2 am if pt. Urine.

## 2025-07-04 LAB
ABSOLUTE EOSINOPHIL (OHS): 0.26 K/UL
ABSOLUTE MONOCYTE (OHS): 0.74 K/UL (ref 0.3–1)
ABSOLUTE NEUTROPHIL COUNT (OHS): 5.57 K/UL (ref 1.8–7.7)
ALBUMIN SERPL BCP-MCNC: 2.6 G/DL (ref 3.5–5.2)
ALP SERPL-CCNC: 123 UNIT/L (ref 40–150)
ALT SERPL W/O P-5'-P-CCNC: 59 UNIT/L (ref 10–44)
ANION GAP (OHS): 11 MMOL/L (ref 8–16)
AST SERPL-CCNC: 50 UNIT/L (ref 11–45)
BASOPHILS # BLD AUTO: 0.03 K/UL
BASOPHILS NFR BLD AUTO: 0.3 %
BILIRUB SERPL-MCNC: 0.5 MG/DL (ref 0.1–1)
BUN SERPL-MCNC: 13 MG/DL (ref 8–23)
CALCIUM SERPL-MCNC: 8.3 MG/DL (ref 8.7–10.5)
CHLORIDE SERPL-SCNC: 108 MMOL/L (ref 95–110)
CO2 SERPL-SCNC: 22 MMOL/L (ref 23–29)
CREAT SERPL-MCNC: 1.3 MG/DL (ref 0.5–1.4)
ERYTHROCYTE [DISTWIDTH] IN BLOOD BY AUTOMATED COUNT: 12.6 % (ref 11.5–14.5)
GFR SERPLBLD CREATININE-BSD FMLA CKD-EPI: 58 ML/MIN/1.73/M2
GLUCOSE SERPL-MCNC: 140 MG/DL (ref 70–110)
HCT VFR BLD AUTO: 35.7 % (ref 40–54)
HGB BLD-MCNC: 11.2 GM/DL (ref 14–18)
IMM GRANULOCYTES # BLD AUTO: 0.02 K/UL (ref 0–0.04)
IMM GRANULOCYTES NFR BLD AUTO: 0.2 % (ref 0–0.5)
LYMPHOCYTES # BLD AUTO: 2.27 K/UL (ref 1–4.8)
MAGNESIUM SERPL-MCNC: 1.8 MG/DL (ref 1.6–2.6)
MCH RBC QN AUTO: 28.4 PG (ref 27–31)
MCHC RBC AUTO-ENTMCNC: 31.4 G/DL (ref 32–36)
MCV RBC AUTO: 91 FL (ref 82–98)
NUCLEATED RBC (/100WBC) (OHS): 0 /100 WBC
PHOSPHATE SERPL-MCNC: 3.7 MG/DL (ref 2.7–4.5)
PLATELET # BLD AUTO: 213 K/UL (ref 150–450)
PMV BLD AUTO: 13.5 FL (ref 9.2–12.9)
POCT GLUCOSE: 167 MG/DL (ref 70–110)
POCT GLUCOSE: 195 MG/DL (ref 70–110)
POCT GLUCOSE: 196 MG/DL (ref 70–110)
POCT GLUCOSE: 197 MG/DL (ref 70–110)
POCT GLUCOSE: 200 MG/DL (ref 70–110)
POTASSIUM SERPL-SCNC: 4 MMOL/L (ref 3.5–5.1)
PROT SERPL-MCNC: 6.2 GM/DL (ref 6–8.4)
RBC # BLD AUTO: 3.94 M/UL (ref 4.6–6.2)
RELATIVE EOSINOPHIL (OHS): 2.9 %
RELATIVE LYMPHOCYTE (OHS): 25.5 % (ref 18–48)
RELATIVE MONOCYTE (OHS): 8.3 % (ref 4–15)
RELATIVE NEUTROPHIL (OHS): 62.8 % (ref 38–73)
SODIUM SERPL-SCNC: 141 MMOL/L (ref 136–145)
WBC # BLD AUTO: 8.89 K/UL (ref 3.9–12.7)

## 2025-07-04 PROCEDURE — 25000003 PHARM REV CODE 250: Mod: HCNC

## 2025-07-04 PROCEDURE — 99233 SBSQ HOSP IP/OBS HIGH 50: CPT | Mod: HCNC,GC,, | Performed by: PSYCHIATRY & NEUROLOGY

## 2025-07-04 PROCEDURE — 80053 COMPREHEN METABOLIC PANEL: CPT | Mod: HCNC

## 2025-07-04 PROCEDURE — 85025 COMPLETE CBC W/AUTO DIFF WBC: CPT | Mod: HCNC

## 2025-07-04 PROCEDURE — 51701 INSERT BLADDER CATHETER: CPT | Mod: HCNC

## 2025-07-04 PROCEDURE — 25000003 PHARM REV CODE 250: Mod: HCNC | Performed by: PHYSICIAN ASSISTANT

## 2025-07-04 PROCEDURE — 94761 N-INVAS EAR/PLS OXIMETRY MLT: CPT | Mod: HCNC

## 2025-07-04 PROCEDURE — S4991 NICOTINE PATCH NONLEGEND: HCPCS | Mod: HCNC

## 2025-07-04 PROCEDURE — 97535 SELF CARE MNGMENT TRAINING: CPT | Mod: HCNC

## 2025-07-04 PROCEDURE — 51798 US URINE CAPACITY MEASURE: CPT | Mod: HCNC

## 2025-07-04 PROCEDURE — 84100 ASSAY OF PHOSPHORUS: CPT | Mod: HCNC

## 2025-07-04 PROCEDURE — 63600175 PHARM REV CODE 636 W HCPCS: Mod: HCNC

## 2025-07-04 PROCEDURE — 36415 COLL VENOUS BLD VENIPUNCTURE: CPT | Mod: HCNC

## 2025-07-04 PROCEDURE — 11000001 HC ACUTE MED/SURG PRIVATE ROOM: Mod: HCNC

## 2025-07-04 PROCEDURE — 83735 ASSAY OF MAGNESIUM: CPT | Mod: HCNC

## 2025-07-04 RX ORDER — ATORVASTATIN CALCIUM 40 MG/1
80 TABLET, FILM COATED ORAL DAILY
Status: DISCONTINUED | OUTPATIENT
Start: 2025-07-05 | End: 2025-07-11 | Stop reason: HOSPADM

## 2025-07-04 RX ORDER — LANOLIN ALCOHOL/MO/W.PET/CERES
1000 CREAM (GRAM) TOPICAL NIGHTLY
Status: DISCONTINUED | OUTPATIENT
Start: 2025-07-04 | End: 2025-07-06

## 2025-07-04 RX ORDER — CLOPIDOGREL BISULFATE 75 MG/1
75 TABLET ORAL DAILY
Status: DISCONTINUED | OUTPATIENT
Start: 2025-07-04 | End: 2025-07-05

## 2025-07-04 RX ADMIN — ASPIRIN 300 MG: 300 SUPPOSITORY RECTAL at 09:07

## 2025-07-04 RX ADMIN — NYSTATIN 500000 UNITS: 100000 SUSPENSION ORAL at 05:07

## 2025-07-04 RX ADMIN — POLYETHYLENE GLYCOL 3350 17 G: 17 POWDER, FOR SOLUTION ORAL at 08:07

## 2025-07-04 RX ADMIN — POLYETHYLENE GLYCOL 3350 17 G: 17 POWDER, FOR SOLUTION ORAL at 09:07

## 2025-07-04 RX ADMIN — AMLODIPINE BESYLATE 10 MG: 10 TABLET ORAL at 09:07

## 2025-07-04 RX ADMIN — NYSTATIN 500000 UNITS: 100000 SUSPENSION ORAL at 10:07

## 2025-07-04 RX ADMIN — HEPARIN SODIUM 5000 UNITS: 5000 INJECTION INTRAVENOUS; SUBCUTANEOUS at 09:07

## 2025-07-04 RX ADMIN — CLOPIDOGREL 75 MG: 75 TABLET ORAL at 09:07

## 2025-07-04 RX ADMIN — DEXTROSE MONOHYDRATE: 50 INJECTION, SOLUTION INTRAVENOUS at 02:07

## 2025-07-04 RX ADMIN — HEPARIN SODIUM 5000 UNITS: 5000 INJECTION INTRAVENOUS; SUBCUTANEOUS at 01:07

## 2025-07-04 RX ADMIN — FLUOXETINE HYDROCHLORIDE 20 MG: 20 CAPSULE ORAL at 08:07

## 2025-07-04 RX ADMIN — SENNOSIDES AND DOCUSATE SODIUM 1 TABLET: 50; 8.6 TABLET ORAL at 09:07

## 2025-07-04 RX ADMIN — SENNOSIDES AND DOCUSATE SODIUM 1 TABLET: 50; 8.6 TABLET ORAL at 08:07

## 2025-07-04 RX ADMIN — METOPROLOL TARTRATE 50 MG: 50 TABLET, FILM COATED ORAL at 08:07

## 2025-07-04 RX ADMIN — INSULIN ASPART 6 UNITS: 100 INJECTION, SOLUTION INTRAVENOUS; SUBCUTANEOUS at 09:07

## 2025-07-04 RX ADMIN — NYSTATIN 500000 UNITS: 100000 SUSPENSION ORAL at 01:07

## 2025-07-04 RX ADMIN — INSULIN ASPART 1 UNITS: 100 INJECTION, SOLUTION INTRAVENOUS; SUBCUTANEOUS at 09:07

## 2025-07-04 RX ADMIN — NYSTATIN 500000 UNITS: 100000 SUSPENSION ORAL at 08:07

## 2025-07-04 RX ADMIN — ATORVASTATIN CALCIUM 40 MG: 40 TABLET, FILM COATED ORAL at 08:07

## 2025-07-04 RX ADMIN — QUETIAPINE FUMARATE 25 MG: 25 TABLET ORAL at 09:07

## 2025-07-04 RX ADMIN — Medication 1 PATCH: at 08:07

## 2025-07-04 RX ADMIN — METOPROLOL TARTRATE 50 MG: 50 TABLET, FILM COATED ORAL at 09:07

## 2025-07-04 RX ADMIN — HEPARIN SODIUM 5000 UNITS: 5000 INJECTION INTRAVENOUS; SUBCUTANEOUS at 05:07

## 2025-07-04 NOTE — ASSESSMENT & PLAN NOTE
Cristi Pulido is a 70 y.o. male w/ PMH of HTN, DM, prior stroke w/ residual RSW who presents as a transfer from OSH after presenting with acute onset of dysarthria and RSW. Telestroke was completed and his NIHSS was 19. CT showed probable early ischemic changes in the L insular ribbon and L frontal lobe. CTA demonstrated occlusion of the distal L MCA M1 segment. He was given TNK at 0548. Patient was transferred to [facility] for further management. On arrival, repeat NIHSS of 24. Patient taken for repeat CTH which showed small left temporal hemorrhage along with continued early ischemic changes. Patient taken to IR for thrombectomy and to be admitted to [facility] post-procedure.  S/P thrombectomy. TICI 3. MRI revealing for blood products in stroke bed, likely reperfusion injury. Etiology ESUS.     07/04/2025 S/p G tube placement. Will start tube feeds later, increased statin to 80 mg, added plavix 75 mg, and Niacin 1g nightly    Antithrombotics for secondary stroke prevention: Antiplatelets: Aspirin: 81 mg daily    Statins for secondary stroke prevention and hyperlipidemia, if present:   Statins: Atorvastatin- 40 mg daily    Aggressive risk factor modification: HTN, Smoking, DM, HLD     Rehab efforts: The patient has been evaluated by a stroke team provider and the therapy needs have been fully considered based off the presenting complaints and exam findings. The following therapy evaluations are needed: PT evaluate and treat, OT evaluate and treat, SLP evaluate and treat, PM&R evaluate for appropriate placement, current recommendation HIT    Diagnostics ordered/pending: None     VTE prophylaxis: Enoxaparin 40 mg SQ every 24 hours  Mechanical prophylaxis: Place SCDs    BP parameters: Infarct: Post sucessful thrombectomy, SBP <140

## 2025-07-04 NOTE — NURSING
@2020 Per stroke team, hold all overnight insulin until tube feeds started. Additionally, NG tube to be used for medications overnight.

## 2025-07-04 NOTE — ASSESSMENT & PLAN NOTE
Cristi Pulido is a 70 y.o. male w/ PMH of HTN, DM, prior stroke w/ residual RSW who presents as a transfer from OSH after presenting with acute onset of dysarthria and RSW. Telestroke was completed and his NIHSS was 19. CT showed probable early ischemic changes in the L insular ribbon and L frontal lobe. CTA demonstrated occlusion of the distal L MCA M1 segment. He was given TNK at 0548. Patient was transferred to C for further management. On arrival, repeat NIHSS of 24. Patient taken for repeat CTH which showed small left temporal hemorrhage along with continued early ischemic changes. Patient taken to IR for thrombectomy and to be admitted to [facility] post-procedure.  S/P thrombectomy. TICI 3. MRI revealing for blood products in stroke bed, likely reperfusion injury. Etiology ESUS.     07/05/2025 NAEON. Started tube feeds. BG optimization ongoing.     Antithrombotics for secondary stroke prevention: Antiplatelets: Aspirin: 81 mg daily  Clopidogrel: 75 mg daily,      Statins for secondary stroke prevention and hyperlipidemia, if present:   Statins: Atorvastatin- 80 mg daily    Aggressive risk factor modification: HTN, Smoking, DM, HLD     Rehab efforts: The patient has been evaluated by a stroke team provider and the therapy needs have been fully considered based off the presenting complaints and exam findings. The following therapy evaluations are needed: PT evaluate and treat, OT evaluate and treat, SLP evaluate and treat, PM&R evaluate for appropriate placement, current recommendation HIT    Diagnostics ordered/pending: None     VTE prophylaxis: Enoxaparin 40 mg SQ every 24 hours  Mechanical prophylaxis: Place SCDs    BP parameters: Infarct: Post sucessful thrombectomy, SBP <140

## 2025-07-04 NOTE — PLAN OF CARE
Problem: Adult Inpatient Plan of Care  Goal: Patient-Specific Goal (Individualized)  Description: Pt will maintain sbp below 160  Outcome: Progressing  Flowsheets (Taken 7/4/2025 0237)  Individualized Care Needs: quiet environment  Anxieties, Fears or Concerns: kiersten     Problem: Restraint, Nonviolent  Goal: Absence of Harm or Injury  Outcome: Progressing  Intervention: Implement Least Restrictive Safety Strategies  Flowsheets (Taken 7/4/2025 0237)  Less Restrictive Alternative: 1:1 observation maintained  Intervention: Protect Dignity, Rights and Personal Wellbeing  Flowsheets (Taken 7/4/2025 0237)  Trust Relationship/Rapport:   care explained   choices provided  Intervention: Protect Skin and Joint Integrity  Flowsheets (Taken 7/4/2025 0237)  Body Position: position changed independently  Skin Protection: incontinence pads utilized  Range of Motion: ROM (range of motion) performed  Intervention: Education  Flowsheets (Taken 7/4/2025 0237)  Discontinuation Criteria: Absence of behavior that required restraint  Criteria Explained: Yes  Patient's Response: NL  Patient / Family Notification: Patient  Patient / Family Teaching: Need for restraint  Intervention: Restraint Monitoring Q2 hours w/Assessment for Injury  Flowsheets (Taken 7/4/2025 0237)  Observed Emotional State: calm  Visual Check: SD  Circulation: NS  Range of Motion: A  Fluids: N  Food/Meal: N  Elimination: UC  Pain Rating (0-10): Rest: 0  Comfort Measures: Repositioned  Assessed readiness for DC: Yes  Privacy Maintained: Yes  Vital Signs per MD order: Yes  Intervention: Restraint Injuries Monitor Q2 hours  Flowsheets (Taken 7/4/2025 0237)  Injuries Noted: None     Problem: Stroke, Ischemic (Includes Transient Ischemic Attack)  Goal: Optimal Coping  Outcome: Progressing  Intervention: Support Psychosocial Response to Stroke  Flowsheets (Taken 7/4/2025 0237)  Supportive Measures:   active listening utilized   self-care encouraged  Family/Support System  Care: self-care encouraged     @0302 Notified stroke team. Per telemetry, pt had 13 beat run of v-tach. Pt blood pressure 142/75, MAP 97, heart rate 71. Pt not having chest pain or shortness of breath. Will continue to monitor.    @0352 Stroke team notified that pt urine output 175 cc for shift tonight. D5W rate increase to 50 cc/hr.    D5W continued @ 50 cc/hr. VSS. Bed in lowest position, side rails x 3, and call light in use.

## 2025-07-04 NOTE — SUBJECTIVE & OBJECTIVE
Neurologic Chief Complaint: L MCA stroke    Subjective:     Interval History: Patient is seen for follow-up neurological assessment and treatment recommendations: See hospital course.    HPI, Past Medical, Family, and Social History remains the same as documented in the initial encounter.     Review of Systems   Reason unable to perform ROS: Severe aphasia.     Scheduled Meds:   amLODIPine  10 mg Per G Tube QHS    aspirin  300 mg Rectal Daily    [START ON 7/5/2025] atorvastatin  80 mg Per G Tube Daily    clopidogreL  75 mg Oral Daily    FLUoxetine  20 mg Per G Tube Daily    heparin (porcine)  5,000 Units Subcutaneous Q8H    insulin aspart U-100  6 Units Subcutaneous Q4H    insulin glargine U-100  15 Units Subcutaneous Daily    metoprolol tartrate  50 mg Per G Tube BID    niacin  1,000 mg Oral QHS    nicotine  1 patch Transdermal Daily    nystatin  500,000 Units Oral QID    polyethylene glycol  17 g Per G Tube BID    QUEtiapine  25 mg Per G Tube QHS    senna-docusate  1 tablet Per G Tube BID     Continuous Infusions:   D5W   Intravenous Continuous 30 mL/hr at 07/04/25 0734 Rate Change at 07/04/25 0734       PRN Meds:  Current Facility-Administered Medications:     bisacodyL, 10 mg, Rectal, Daily PRN    dextrose 50%, 12.5 g, Intravenous, PRN    dextrose 50%, 25 g, Intravenous, PRN    glucagon (human recombinant), 1 mg, Intramuscular, PRN    hydrALAZINE, 10 mg, Intravenous, Q4H PRN    insulin aspart U-100, 0-10 Units, Subcutaneous, Q4H PRN    labetalol, 10 mg, Intravenous, Q4H PRN    melatonin, 6 mg, Per G Tube, Nightly PRN    simethicone, 1 tablet, Per G Tube, TID PRN    sodium chloride 0.9%, 10 mL, Intravenous, PRN    Objective:     Vital Signs (Most Recent):  Temp: 97.5 °F (36.4 °C) (07/04/25 0732)  Pulse: 78 (07/04/25 0732)  Resp: 18 (07/04/25 0732)  BP: (!) 142/80 (07/04/25 0732)  SpO2: 95 % (07/04/25 0732)  BP Location: Left arm    Vital Signs Range (Last 24H):  Temp:  [97.5 °F (36.4 °C)-98.7 °F (37.1 °C)]  "  Pulse:  [60-89]   Resp:  [11-18]   BP: ()/(60-94)   SpO2:  [93 %-97 %]   BP Location: Left arm       Physical Exam  HENT:      Head: Normocephalic and atraumatic.      Mouth/Throat:      Mouth: Mucous membranes are moist.   Eyes:      Conjunctiva/sclera: Conjunctivae normal.   Cardiovascular:      Rate and Rhythm: Normal rate.   Pulmonary:      Effort: Pulmonary effort is normal.   Skin:     General: Skin is warm and dry.   Neurological:      Mental Status: He is alert.      Motor: Weakness present.              Neurological Exam:   LOC: drowsy  Attention Span: poor  Language: Expressive aphasia, Receptive aphasia  Articulation: Dysarthria  Orientation: Untestable due to severe aphasia   Facial Movement (CN VII): Lower facial weakness on the Right  Motor: Arm left  Normal 5/5  Leg left  Normal 5/5  Arm right  Plegia 0/5  Leg right Paresis: 1/5  Sensation: Cy-hypoesthesia right    Laboratory:  CMP:   Recent Labs   Lab 07/04/25  0353   CALCIUM 8.3*   ALBUMIN 2.6*   PROT 6.2      K 4.0   CO2 22*      BUN 13   CREATININE 1.3   ALKPHOS 123   ALT 59*   AST 50*   BILITOT 0.5     BMP:   Recent Labs   Lab 07/04/25  0353      K 4.0      CO2 22*   BUN 13   CREATININE 1.3   CALCIUM 8.3*     CBC:   Recent Labs   Lab 07/04/25  0353   WBC 8.89   RBC 3.94*   HGB 11.2*   HCT 35.7*      MCV 91   MCH 28.4   MCHC 31.4*     Lipid Panel:   No results for input(s): "CHOL", "LDLCALC", "HDL", "TRIG" in the last 168 hours.    Coagulation:   No results for input(s): "PT", "INR", "APTT" in the last 168 hours.    Platelet Aggregation Study: No results for input(s): "PLTAGG", "PLTAGINTERP", "PLTAGREGLACO", "ADPPLTAGGREG" in the last 168 hours.  Hgb A1C:   No results for input(s): "HGBA1C" in the last 168 hours.    TSH:   No results for input(s): "TSH" in the last 168 hours.      Diagnostic Results   Brain Imaging   MRI Brain 6/20/25  Impression:     Acute left MCA distribution infarct.  No new large " parenchymal hemorrhage.  Susceptibility artifact throughout the infarcted tissue may reflect contrast staining and/or blood products conversion.  Overall mass effect is increased compared to prior with sulcal effacement throughout the left cerebral hemisphere and approximately 4 mm of rightward midline shift.     Stable size of the parenchymal hemorrhage centered in the inferior left temporal lobe.     [facility] 6/20/25: 1242  Impression:     Moderate-sized recent left MCA distribution infarct and small intraparenchymal hemorrhage appear grossly unchanged from prior study performed earlier on the same date.     Chronic ischemic change elsewhere with underlying cerebral and cerebellar volume loss, as before.     No new hemorrhage or major vascular distribution infarct elsewhere.  [facility] 6/20/25: 0834  Impression:     Early ischemic changes in the left MCA distribution, new from recent CT.     New left inferior temporal intraparenchymal hematoma.     Chronic ischemic change with cerebral and cerebellar volume loss, as above.     [facility] 6/19/25  Impression:     Question some early left MCA ischemia corresponding to the patient's known left MCA territory occlusion.  No hemorrhage.     Senescent changes as above.        All CT scans at this facility are performed  using dose modulation techniques as appropriate to performed exam including the following:  automated exposure control; adjustment of mA and/or kV according to the patients size (this includes techniques or standardized protocols for targeted exams where dose is matched to indication/reason for exam: i.e. extremities or head);  iterative reconstruction technique.     Vessel Imaging   CTA head and neck 6/19/25  Impression:     Distal left M1 MCA occlusion with relatively prompt collateralization of the more distal MCA vessels.     Severe stenosis left mid vertebral artery and left PCA.     Cardiac Imaging   TTE 6/19/25    Left Ventricle: The left ventricle is normal in  size. Normal wall thickness. There is normal systolic function with a visually estimated ejection fraction of 60 - 65%.    Right Ventricle: The right ventricle is normal in size Wall thickness is normal. Systolic function is normal.    The pulmonary artery pressure could not be estimated.    IVC/SVC: Normal venous pressure at 3 mmHg.

## 2025-07-04 NOTE — PT/OT/SLP PROGRESS
Speech Language Pathology Treatment    Patient Name:  Cristi Pulido   MRN:  [id number]  Admitting Diagnosis: Embolic stroke involving left middle cerebral artery    Recommendations:     General Recommendations:  Dysphagia therapy and Speech/language therapy  Diet recommendations: PEG tube as primary source of nutrition+ Pleasure Feeding, Puree Diet - IDDSI Level 4, Liquid Diet Level: Thin liquids - IDDSI Level 0 (via tsp).  Aspiration Precautions: Continue alternate means of nutrition, Frequent oral care, and Strict aspiration precautions   General Precautions: Standard, aspiration, fall, pureed diet  Communication strategies:  yes/no questions only, provide increased time to answer, and go to room if call light pushed  Discharge recommendations:  High Intensity Therapy     Assessment:     Cristi Pulido is a 70 y.o. male with an SLP diagnosis of Aphasia, Dysphagia, and Dysarthria.  Pt  remains NPO this date s/p PEG replacement 7/3.     Subjective     Pt asleep with spouse at bedside. NGT in place.     Pain/Comfort:  Pain Rating 1: 0/10  Pain Rating Post-Intervention 1: 0/10    Respiratory Status: Room air    Objective:     Has the patient been evaluated by SLP for swallowing?   Yes  Keep patient NPO? No     Pt seen bedside asleep in bed with NGT in place s/p PEG replacement 7/3. Pt remains NPO and tube feeds have no been started yet, therefore PO trials deferred. Spouse at bedside. Provided education re: ongoing role of SLP, gradual pt progress, ongoing deficits, goals for therapy, diet recs, feeding strategies, aspiration precautions, aspiration risk, communication strategies and ongoing SLP POC. Spouse verbalized understanding and in agreement. She feels he has gotten better in the last couple days and reports compliance with all strategies. SLP will continue to follow.     Goals:   Multidisciplinary Problems       SLP Goals          Problem: SLP    Goal Priority Disciplines Outcome   SLP Goal     SLP Progressing    Description: Speech Language Pathology Goals  Goals expected to be met by 7/3    1. Pt will participate in ongoing swallow assessment to determine least restrictive PO diet.    2. Pt will participate in ongoing cognitive-linguistic assessment to determine additional therapeutic needs.   3. Pt will follow 1 step commands with 80% acc following model/prompt.   4. Pt will answer simple Y/N questions with 80% acc.                                Plan:     Patient to be seen:  4 x/week   Plan of Care expires:  07/19/25  Plan of Care reviewed with:  spouse   SLP Follow-Up:  Yes       Time Tracking:     SLP Treatment Date:   07/04/25  Speech Start Time:  1041  Speech Stop Time:  1053     Speech Total Time (min):  12 min    Billable Minutes: Self Care/Home Management Training 12    07/04/2025

## 2025-07-04 NOTE — NURSING
@0302 Notified stroke team. Per telemetry, pt had 13 beat run of v-tach. Pt blood pressure 142/75, MAP 97, heart rate 71. Pt not having  chest pain or shortness of breath. Will continue to monitor.

## 2025-07-04 NOTE — PLAN OF CARE
Problem: Stroke, Ischemic (Includes Transient Ischemic Attack)  Goal: Improved Communication Skills  Outcome: Not Progressing             Problem: Stroke, Ischemic (Includes Transient Ischemic Attack)  Goal: Optimal Coping  Outcome: Progressing  Goal: Effective Bowel Elimination  Outcome: Progressing  Goal: Optimal Cerebral Tissue Perfusion  Outcome: Progressing  Goal: Optimal Functional Ability  Outcome: Not Progressing  Goal: Effective Oxygenation and Ventilation  Outcome: Progressing  Goal: Improved Sensorimotor Function  Outcome: Not Progressing  Goal: Safe and Effective Swallow  Outcome: Progressing  Goal: Effective Urinary Elimination  Outcome: Not Progressing     Problem: Fall Injury Risk  Goal: Absence of Fall and Fall-Related Injury  Outcome: Progressing     Problem: Skin Injury Risk Increased  Goal: Skin Health and Integrity  Outcome: Progressing     Problem: Wound  Goal: Absence of Infection Signs and Symptoms  Outcome: Progressing  Goal: Skin Health and Integrity  Outcome: Progressing        Pt urine output low. Straight cath done. Monitoring out put.

## 2025-07-04 NOTE — PROGRESS NOTES
Zohaib Garrett - Neurosurgery ([facility])  Vascular Neurology  Comprehensive Stroke Center  Progress Note    Assessment/Plan:     * Embolic stroke involving left middle cerebral artery  Cristi Pulido is a 70 y.o. male w/ PMH of HTN, DM, prior stroke w/ residual RSW who presents as a transfer from OSH after presenting with acute onset of dysarthria and RSW. Telestroke was completed and his NIHSS was 19. CT showed probable early ischemic changes in the L insular ribbon and L frontal lobe. CTA demonstrated occlusion of the distal L MCA M1 segment. He was given TNK at 0548. Patient was transferred to [facility] [place] management. On arrival, repeat NIHSS of 24. Patient taken for repeat CTH which showed small left temporal hemorrhage along with continued early ischemic changes. Patient taken to IR for thrombectomy and to be admitted to NCC post-procedure.  S/P thrombectomy. TICI 3. MRI revealing for blood products in stroke bed, likely reperfusion injury. Etiology ESUS.     07/04/2025 S/p G tube placement. Will start tube feeds later, increased statin to 80 mg, added plavix 75 mg, and Niacin 1g nightly    Antithrombotics for secondary stroke prevention: Antiplatelets: Aspirin: 81 mg daily  Clopidogrel: 75 mg daily,      Statins for secondary stroke prevention and hyperlipidemia, if present:   Statins: Atorvastatin- 80 mg daily    Aggressive risk factor modification: HTN, Smoking, DM, HLD     Rehab efforts: The patient has been evaluated by a stroke team provider and the therapy needs have been fully considered based off the presenting complaints and exam findings. The following therapy evaluations are needed: PT evaluate and treat, OT evaluate and treat, SLP evaluate and treat, PM&R evaluate for appropriate placement, current recommendation HIT    Diagnostics ordered/pending: None     VTE prophylaxis: Enoxaparin 40 mg SQ every 24 hours  Mechanical prophylaxis: Place SCDs    BP parameters: Infarct: Post sucessful thrombectomy, SBP  <140        Hypernatremia  Pt hypernatremic at 153. On D5W infusion @60 cc/hr.     Plan  -f/u BMP  - Monitor for over correction     Encounter for nasogastric tube placement  PEG removed per patient.   IR consulted for replacement. Attempt unsuccessful, will attempt again week of 6/30.   NGT replaced. TF and FWF resumed.     Restlessness  -6/24: QTc 441   -Seroquel 25 mg QHS     Hyperlipemia  -Stroke risk factor  -LDL 75, goal <70  -Atorvastatin 40 mg daily      Right sided weakness  -Secondary to stroke.   -Aggressive therapy     Dysarthria and anarthria  -Therapy eval and treat    Aphasia  -Secondary to stroke  -Aggressive therapy     Type 2 diabetes mellitus without complication, without long-term current use of insulin  Lab Results   Component Value Date    LABA1C 7.2 (H) 01/29/2018    HGBA1C 7.0 (H) 06/19/2025     Follow up repeat A1c. Hold home antihyperglycemics. SSI for goal -180 while in hospital  -Added Lantus 15u     Hypertension associated with diabetes  -Stroke risk factor  -SBP goal <140, maintain MAP >65  -BP range in the last 24 hrs: BP  Min: 139/82  Max: 169/93  -Current antihypertensive regimen:     -Amlodipine 10mg     -Lisinopril still on hold for now    -Metoprolol 50mg BID   --PRN labetalol/hydralazine     Tobacco dependence  Stroke risk factor  - on cessation  -nicotine patch PRN         06/20/2025 NAEON. S/P thrombectomy. TICI 3. TNK monitoring ended at 0548. Aphasic, follows no commands. RSW remains. MRI revealing for blood products in stroke bed, likely reperfusion injury. OK to start monotherapy with either plavix or ASA 6/21/25 for secondary stroke prevention. ECHO unremarkable. Family member at bedside agreeable to NGT placement. Etiology ESUS.   6/21/2025 NAEON. Neuro exam stable. Recommend holding AP therapy for now.   06/22/2025 NAEON. Neuro exam stable. Start AP therapy with ASA.   06/23/2025 NAEON. SLP recs NPO. PEG discussed with family and they are agreeable. Pt S/D  to NPU.   06/24/2025 NGT pulled overnight and replaced. Placement confirmed via X ray. IR consulted for PEG placement. JEANNE, Creatinine and BUN uptrending. FWF flushes ordered. Restless during night. EKG repeated. QTc 441. Seroquel 25 mg QHS ordered. Family requesting [facility] IPR when medically ready.   06/25/2025 NAEON. Plans for PEG placement today. BUN and Cr uptrending. Fluids started. Lisinopril held.   06/26/2025 NAEON. PEG placed yesterday, will resume tube feeds this afternoon starting with trickle feeds. BUN and Cr Improved. Increased water boluses to 300.  06/27/2025 NAEON. Neuro exam stable. Patient has tolerated tube feeds well. Will continue to monitor kidney function for now. Trial off restraints today to prepare for rehab placement. Added Lantus 15 units. MBSS today, patient able to have puree for pleasure feeds per speech.   06/28/2025 Na 152. 0.45% NS at 100 ml/hr ordered x 12 hrs. Will repeat sodium draw scheduled at 2100. Pulled PEG. IR unable to replace PEG. Will attempt in coming days. NGT replaced, placement confirmed via Xray. TF and FWF restarted.   06/29/2025 Na 154, BUN and Cr improving. Continuing fluids. NGT coiled overnight. NG Xray showed possible pneumoperitoneum. CT AP without contrast ordered. Showed free air in the abdomen, advised not to use NGT for now. General Surgery consulted to discuss PEG placement.   06/30/2025 Pulled NG tube overnight. Na stable, Cr and BUN continue to improve. Fluids reordered. Discussed PEG replacement with GI and Gen Surg, deferred to IR for replacement. IR reconsulted this morning. Must wait approximately 1 week to re attempt PEG placement. Placement planned for 7/3. Will need to replace NG tube 7/2. NPO at midnight 7/3.   07/01/2025 Switched 0.45 NS to D5W @60 cc/hr. Hypernatremic at 153. Free water deficit 1.9L   07/02/2025 Hypernatremia improving. Continuing D5W. Plan for G tube tomorrow per IR.   07/03/2025: G tube placement per IR. Decreased D5W to  30 cc/hr.   07/04/2025 S/p G tube placement. Will start tube feeds later, increased statin to 80 mg, added plavix 75 mg, and Niacin 1g nightly.     STROKE DOCUMENTATION   Acute Stroke Times   Last Known Normal Date: 06/19/25  Last Known Normal Time: 0400  Symptom Onset Date: 06/19/25  Symptom Onset Time: 0400  Stroke Team Called Date: 06/19/25  Stroke Team Called Time: 0813  Stroke Team Arrival Date: 06/19/25  Stroke Team Arrival Time: 0813  CT Interpretation Time: 0827  Thrombolytic Therapy Recommended:  (already given prior to transfer)  CTA Interpretation Time:  (already completed prior to transfer)  Thrombectomy Recommended: Yes  Decision to Treat Time for IR: 0832    NIH Scale:  1a. Level of Consciousness: 0-->Alert, keenly responsive  1b. LOC Questions: 2-->Answers neither question correctly  1c. LOC Commands: 2-->Performs neither task correctly  2. Best Gaze: 0-->Normal  3. Visual: 0-->No visual loss  4. Facial Palsy: 1-->Minor paralysis (flattened nasolabial fold, asymmetry on smiling)  5a. Motor Arm, Left: 0-->No drift, limb holds 90 (or 45) degrees for full 10 secs  5b. Motor Arm, Right: 3-->No effort against gravity, limb falls  6a. Motor Leg, Left: 0-->No drift, leg holds 30 degree position for full 5 secs  6b. Motor Leg, Right: 3-->No effort against gravity, leg falls to bed immediately  7. Limb Ataxia: 0-->Absent  8. Sensory: 0-->Normal, no sensory loss  9. Best Language: 2-->Severe aphasia, all communication is through fragmentary expression, great need for inference, questioning, and guessing by the listener. Range of information that can be exchanged is limited, listener carries burden of. . . (see row details)  10. Dysarthria: 2-->Severe dysarthria, patients speech is so slurred as to be unintelligible in the absence of or out of proportion to any dysphasia, or is mute/anarthric  11. Extinction and Inattention (formerly Neglect): 0-->No abnormality  Total (NIH Stroke Scale): 15       Modified  [place] Score: 2  [place] Coma Scale:    ABCD2 Score:    [id number] Score:   HAS -BLED Score:   ICH Score:   Hunt & Kimball Classification:      Hemorrhagic change of an Ischemic Stroke: Does this patient have an ischemic stroke with hemorrhagic changes? No     Neurologic Chief Complaint: L MCA stroke    Subjective:     Interval History: Patient is seen for follow-up neurological assessment and treatment recommendations: See hospital course.    HPI, Past Medical, Family, and Social History remains the same as documented in the initial encounter.     Review of Systems   Reason unable to perform ROS: Severe aphasia.     Scheduled Meds:   amLODIPine  10 mg Per G Tube QHS    aspirin  300 mg Rectal Daily    [START ON 7/5/2025] atorvastatin  80 mg Per G Tube Daily    clopidogreL  75 mg Oral Daily    FLUoxetine  20 mg Per G Tube Daily    heparin (porcine)  5,000 Units Subcutaneous Q8H    insulin aspart U-100  6 Units Subcutaneous Q4H    insulin glargine U-100  15 Units Subcutaneous Daily    metoprolol tartrate  50 mg Per G Tube BID    niacin  1,000 mg Oral QHS    nicotine  1 patch Transdermal Daily    nystatin  500,000 Units Oral QID    polyethylene glycol  17 g Per G Tube BID    QUEtiapine  25 mg Per G Tube QHS    senna-docusate  1 tablet Per G Tube BID     Continuous Infusions:   D5W   Intravenous Continuous 30 mL/hr at 07/04/25 0734 Rate Change at 07/04/25 0734       PRN Meds:  Current Facility-Administered Medications:     bisacodyL, 10 mg, Rectal, Daily PRN    dextrose 50%, 12.5 g, Intravenous, PRN    dextrose 50%, 25 g, Intravenous, PRN    glucagon (human recombinant), 1 mg, Intramuscular, PRN    hydrALAZINE, 10 mg, Intravenous, Q4H PRN    insulin aspart U-100, 0-10 Units, Subcutaneous, Q4H PRN    labetalol, 10 mg, Intravenous, Q4H PRN    melatonin, 6 mg, Per G Tube, Nightly PRN    simethicone, 1 tablet, Per G Tube, TID PRN    sodium chloride 0.9%, 10 mL, Intravenous, PRN    Objective:     Vital Signs (Most  "Recent):  Temp: 97.5 °F (36.4 °C) (07/04/25 0732)  Pulse: 78 (07/04/25 0732)  Resp: 18 (07/04/25 0732)  BP: (!) 142/80 (07/04/25 0732)  SpO2: 95 % (07/04/25 0732)  BP Location: Left arm    Vital Signs Range (Last 24H):  Temp:  [97.5 °F (36.4 °C)-98.7 °F (37.1 °C)]   Pulse:  [60-89]   Resp:  [11-18]   BP: ()/(60-94)   SpO2:  [93 %-97 %]   BP Location: Left arm       Physical Exam  HENT:      Head: Normocephalic and atraumatic.      Mouth/Throat:      Mouth: Mucous membranes are moist.   Eyes:      Conjunctiva/sclera: Conjunctivae normal.   Cardiovascular:      Rate and Rhythm: Normal rate.   Pulmonary:      Effort: Pulmonary effort is normal.   Skin:     General: Skin is warm and dry.   Neurological:      Mental Status: He is alert.      Motor: Weakness present.              Neurological Exam:   LOC: drowsy  Attention Span: poor  Language: Expressive aphasia, Receptive aphasia  Articulation: Dysarthria  Orientation: Untestable due to severe aphasia   Facial Movement (CN VII): Lower facial weakness on the Right  Motor: Arm left  Normal 5/5  Leg left  Normal 5/5  Arm right  Plegia 0/5  Leg right Paresis: 1/5  Sensation: Cy-hypoesthesia right    Laboratory:  CMP:   Recent Labs   Lab 07/04/25  0353   CALCIUM 8.3*   ALBUMIN 2.6*   PROT 6.2      K 4.0   CO2 22*      BUN 13   CREATININE 1.3   ALKPHOS 123   ALT 59*   AST 50*   BILITOT 0.5     BMP:   Recent Labs   Lab 07/04/25  0353      K 4.0      CO2 22*   BUN 13   CREATININE 1.3   CALCIUM 8.3*     CBC:   Recent Labs   Lab 07/04/25 0353   WBC 8.89   RBC 3.94*   HGB 11.2*   HCT 35.7*      MCV 91   MCH 28.4   MCHC 31.4*     Lipid Panel:   No results for input(s): "CHOL", "LDLCALC", "HDL", "TRIG" in the last 168 hours.    Coagulation:   No results for input(s): "PT", "INR", "APTT" in the last 168 hours.    Platelet Aggregation Study: No results for input(s): "PLTAGG", "PLTAGINTERP", "PLTAGREGLACO", "ADPPLTAGGREG" in the last 168 " "hours.  Hgb A1C:   No results for input(s): "HGBA1C" in the last 168 hours.    TSH:   No results for input(s): "TSH" in the last 168 hours.      Diagnostic Results   Brain Imaging   MRI Brain 6/20/25  Impression:     Acute left MCA distribution infarct.  No new large parenchymal hemorrhage.  Susceptibility artifact throughout the infarcted tissue may reflect contrast staining and/or blood products conversion.  Overall mass effect is increased compared to prior with sulcal effacement throughout the left cerebral hemisphere and approximately 4 mm of rightward midline shift.     Stable size of the parenchymal hemorrhage centered in the inferior left temporal lobe.     [facility] 6/20/25: 1242  Impression:     Moderate-sized recent left MCA distribution infarct and small intraparenchymal hemorrhage appear grossly unchanged from prior study performed earlier on the same date.     Chronic ischemic change elsewhere with underlying cerebral and cerebellar volume loss, as before.     No new hemorrhage or major vascular distribution infarct elsewhere.  [facility] 6/20/25: 0834  Impression:     Early ischemic changes in the left MCA distribution, new from recent CT.     New left inferior temporal intraparenchymal hematoma.     Chronic ischemic change with cerebral and cerebellar volume loss, as above.     [facility] 6/19/25  Impression:     Question some early left MCA ischemia corresponding to the patient's known left MCA territory occlusion.  No hemorrhage.     Senescent changes as above.        All CT scans at this facility are performed  using dose modulation techniques as appropriate to performed exam including the following:  automated exposure control; adjustment of mA and/or kV according to the patients size (this includes techniques or standardized protocols for targeted exams where dose is matched to indication/reason for exam: i.e. extremities or head);  iterative reconstruction technique.     Vessel Imaging   CTA head and neck " 6/19/25  Impression:     Distal left M1 MCA occlusion with relatively prompt collateralization of the more distal MCA vessels.     Severe stenosis left mid vertebral artery and left PCA.     Cardiac Imaging   TTE 6/19/25    Left Ventricle: The left ventricle is normal in size. Normal wall thickness. There is normal systolic function with a visually estimated ejection fraction of 60 - 65%.    Right Ventricle: The right ventricle is normal in size Wall thickness is normal. Systolic function is normal.    The pulmonary artery pressure could not be estimated.    IVC/SVC: Normal venous pressure at 3 mmHg.       Cullen Almanzar MD  Comprehensive Stroke Center  Department of Vascular Neurology   [facility] Neurosurgery \[facility] [place] [facility]\"")

## 2025-07-05 PROBLEM — E87.0 HYPERNATREMIA: Status: RESOLVED | Noted: 2025-07-01 | Resolved: 2025-07-05

## 2025-07-05 LAB
ABSOLUTE EOSINOPHIL (OHS): 0.31 K/UL
ABSOLUTE MONOCYTE (OHS): 0.78 K/UL (ref 0.3–1)
ABSOLUTE NEUTROPHIL COUNT (OHS): 5.15 K/UL (ref 1.8–7.7)
ALBUMIN SERPL BCP-MCNC: 2.5 G/DL (ref 3.5–5.2)
ALP SERPL-CCNC: 119 UNIT/L (ref 40–150)
ALT SERPL W/O P-5'-P-CCNC: 73 UNIT/L (ref 10–44)
ANION GAP (OHS): 9 MMOL/L (ref 8–16)
AST SERPL-CCNC: 57 UNIT/L (ref 11–45)
BASOPHILS # BLD AUTO: 0.04 K/UL
BASOPHILS NFR BLD AUTO: 0.5 %
BILIRUB SERPL-MCNC: 0.4 MG/DL (ref 0.1–1)
BUN SERPL-MCNC: 17 MG/DL (ref 8–23)
CALCIUM SERPL-MCNC: 8.4 MG/DL (ref 8.7–10.5)
CHLORIDE SERPL-SCNC: 109 MMOL/L (ref 95–110)
CO2 SERPL-SCNC: 24 MMOL/L (ref 23–29)
CREAT SERPL-MCNC: 1.2 MG/DL (ref 0.5–1.4)
ERYTHROCYTE [DISTWIDTH] IN BLOOD BY AUTOMATED COUNT: 12.7 % (ref 11.5–14.5)
GFR SERPLBLD CREATININE-BSD FMLA CKD-EPI: >60 ML/MIN/1.73/M2
GLUCOSE SERPL-MCNC: 58 MG/DL (ref 70–110)
HCT VFR BLD AUTO: 35.4 % (ref 40–54)
HGB BLD-MCNC: 11.3 GM/DL (ref 14–18)
IMM GRANULOCYTES # BLD AUTO: 0.02 K/UL (ref 0–0.04)
IMM GRANULOCYTES NFR BLD AUTO: 0.2 % (ref 0–0.5)
LYMPHOCYTES # BLD AUTO: 2.01 K/UL (ref 1–4.8)
MAGNESIUM SERPL-MCNC: 1.9 MG/DL (ref 1.6–2.6)
MCH RBC QN AUTO: 28.3 PG (ref 27–31)
MCHC RBC AUTO-ENTMCNC: 31.9 G/DL (ref 32–36)
MCV RBC AUTO: 89 FL (ref 82–98)
NUCLEATED RBC (/100WBC) (OHS): 0 /100 WBC
PHOSPHATE SERPL-MCNC: 3.3 MG/DL (ref 2.7–4.5)
PLATELET # BLD AUTO: 227 K/UL (ref 150–450)
PMV BLD AUTO: 13.4 FL (ref 9.2–12.9)
POCT GLUCOSE: 109 MG/DL (ref 70–110)
POCT GLUCOSE: 121 MG/DL (ref 70–110)
POCT GLUCOSE: 80 MG/DL (ref 70–110)
POTASSIUM SERPL-SCNC: 3.5 MMOL/L (ref 3.5–5.1)
PROT SERPL-MCNC: 6.1 GM/DL (ref 6–8.4)
RBC # BLD AUTO: 3.99 M/UL (ref 4.6–6.2)
RELATIVE EOSINOPHIL (OHS): 3.7 %
RELATIVE LYMPHOCYTE (OHS): 24.2 % (ref 18–48)
RELATIVE MONOCYTE (OHS): 9.4 % (ref 4–15)
RELATIVE NEUTROPHIL (OHS): 62 % (ref 38–73)
SODIUM SERPL-SCNC: 142 MMOL/L (ref 136–145)
WBC # BLD AUTO: 8.31 K/UL (ref 3.9–12.7)

## 2025-07-05 PROCEDURE — 97530 THERAPEUTIC ACTIVITIES: CPT | Mod: HCNC

## 2025-07-05 PROCEDURE — 25000003 PHARM REV CODE 250: Mod: HCNC | Performed by: PSYCHIATRY & NEUROLOGY

## 2025-07-05 PROCEDURE — 84100 ASSAY OF PHOSPHORUS: CPT | Mod: HCNC

## 2025-07-05 PROCEDURE — 51701 INSERT BLADDER CATHETER: CPT | Mod: HCNC

## 2025-07-05 PROCEDURE — 97110 THERAPEUTIC EXERCISES: CPT | Mod: HCNC,CQ

## 2025-07-05 PROCEDURE — 97112 NEUROMUSCULAR REEDUCATION: CPT | Mod: HCNC,CQ

## 2025-07-05 PROCEDURE — 25000003 PHARM REV CODE 250: Mod: HCNC

## 2025-07-05 PROCEDURE — 85025 COMPLETE CBC W/AUTO DIFF WBC: CPT | Mod: HCNC

## 2025-07-05 PROCEDURE — 94761 N-INVAS EAR/PLS OXIMETRY MLT: CPT | Mod: HCNC

## 2025-07-05 PROCEDURE — 83735 ASSAY OF MAGNESIUM: CPT | Mod: HCNC

## 2025-07-05 PROCEDURE — 11000001 HC ACUTE MED/SURG PRIVATE ROOM: Mod: HCNC

## 2025-07-05 PROCEDURE — 80053 COMPREHEN METABOLIC PANEL: CPT | Mod: HCNC

## 2025-07-05 PROCEDURE — 25000003 PHARM REV CODE 250: Mod: HCNC | Performed by: PHYSICIAN ASSISTANT

## 2025-07-05 PROCEDURE — 51798 US URINE CAPACITY MEASURE: CPT | Mod: HCNC

## 2025-07-05 PROCEDURE — 99233 SBSQ HOSP IP/OBS HIGH 50: CPT | Mod: HCNC,GC,, | Performed by: PSYCHIATRY & NEUROLOGY

## 2025-07-05 PROCEDURE — 63600175 PHARM REV CODE 636 W HCPCS: Mod: HCNC

## 2025-07-05 PROCEDURE — S4991 NICOTINE PATCH NONLEGEND: HCPCS | Mod: HCNC

## 2025-07-05 PROCEDURE — 36415 COLL VENOUS BLD VENIPUNCTURE: CPT | Mod: HCNC

## 2025-07-05 RX ORDER — CLOPIDOGREL BISULFATE 75 MG/1
75 TABLET ORAL DAILY
Status: DISCONTINUED | OUTPATIENT
Start: 2025-07-05 | End: 2025-07-11 | Stop reason: HOSPADM

## 2025-07-05 RX ADMIN — ASPIRIN 300 MG: 300 SUPPOSITORY RECTAL at 10:07

## 2025-07-05 RX ADMIN — INSULIN ASPART 6 UNITS: 100 INJECTION, SOLUTION INTRAVENOUS; SUBCUTANEOUS at 08:07

## 2025-07-05 RX ADMIN — POTASSIUM BICARBONATE 40 MEQ: 391 TABLET, EFFERVESCENT ORAL at 12:07

## 2025-07-05 RX ADMIN — INSULIN GLARGINE 15 UNITS: 100 INJECTION, SOLUTION SUBCUTANEOUS at 10:07

## 2025-07-05 RX ADMIN — Medication 1000 MG: at 10:07

## 2025-07-05 RX ADMIN — INSULIN ASPART 6 UNITS: 100 INJECTION, SOLUTION INTRAVENOUS; SUBCUTANEOUS at 10:07

## 2025-07-05 RX ADMIN — NYSTATIN 500000 UNITS: 100000 SUSPENSION ORAL at 10:07

## 2025-07-05 RX ADMIN — POTASSIUM BICARBONATE 40 MEQ: 391 TABLET, EFFERVESCENT ORAL at 10:07

## 2025-07-05 RX ADMIN — HEPARIN SODIUM 5000 UNITS: 5000 INJECTION INTRAVENOUS; SUBCUTANEOUS at 06:07

## 2025-07-05 RX ADMIN — NYSTATIN 500000 UNITS: 100000 SUSPENSION ORAL at 04:07

## 2025-07-05 RX ADMIN — HEPARIN SODIUM 5000 UNITS: 5000 INJECTION INTRAVENOUS; SUBCUTANEOUS at 10:07

## 2025-07-05 RX ADMIN — FLUOXETINE HYDROCHLORIDE 20 MG: 20 CAPSULE ORAL at 10:07

## 2025-07-05 RX ADMIN — INSULIN ASPART 6 UNITS: 100 INJECTION, SOLUTION INTRAVENOUS; SUBCUTANEOUS at 01:07

## 2025-07-05 RX ADMIN — POLYETHYLENE GLYCOL 3350 17 G: 17 POWDER, FOR SOLUTION ORAL at 10:07

## 2025-07-05 RX ADMIN — ATORVASTATIN CALCIUM 80 MG: 40 TABLET, FILM COATED ORAL at 10:07

## 2025-07-05 RX ADMIN — CLOPIDOGREL 75 MG: 75 TABLET ORAL at 10:07

## 2025-07-05 RX ADMIN — SENNOSIDES AND DOCUSATE SODIUM 1 TABLET: 50; 8.6 TABLET ORAL at 10:07

## 2025-07-05 RX ADMIN — INSULIN ASPART 6 UNITS: 100 INJECTION, SOLUTION INTRAVENOUS; SUBCUTANEOUS at 12:07

## 2025-07-05 RX ADMIN — HEPARIN SODIUM 5000 UNITS: 5000 INJECTION INTRAVENOUS; SUBCUTANEOUS at 01:07

## 2025-07-05 RX ADMIN — Medication 1 PATCH: at 10:07

## 2025-07-05 RX ADMIN — NYSTATIN 500000 UNITS: 100000 SUSPENSION ORAL at 01:07

## 2025-07-05 RX ADMIN — METOPROLOL TARTRATE 50 MG: 50 TABLET, FILM COATED ORAL at 10:07

## 2025-07-05 RX ADMIN — QUETIAPINE FUMARATE 25 MG: 25 TABLET ORAL at 10:07

## 2025-07-05 RX ADMIN — INSULIN ASPART 6 UNITS: 100 INJECTION, SOLUTION INTRAVENOUS; SUBCUTANEOUS at 04:07

## 2025-07-05 RX ADMIN — AMLODIPINE BESYLATE 10 MG: 10 TABLET ORAL at 10:07

## 2025-07-05 NOTE — SUBJECTIVE & OBJECTIVE
Neurologic Chief Complaint: L MCA stroke    Subjective:     Interval History: Patient is seen for follow-up neurological assessment and treatment recommendations: See hospital course.    HPI, Past Medical, Family, and Social History remains the same as documented in the initial encounter.     Review of Systems   Reason unable to perform ROS: Severe aphasia.     Scheduled Meds:   amLODIPine  10 mg Per G Tube QHS    aspirin  300 mg Rectal Daily    atorvastatin  80 mg Per G Tube Daily    clopidogreL  75 mg Per G Tube Daily    FLUoxetine  20 mg Per G Tube Daily    heparin (porcine)  5,000 Units Subcutaneous Q8H    insulin aspart U-100  6 Units Subcutaneous Q4H    insulin glargine U-100  15 Units Subcutaneous Daily    metoprolol tartrate  50 mg Per G Tube BID    niacin  1,000 mg Oral QHS    nicotine  1 patch Transdermal Daily    nystatin  500,000 Units Oral QID    polyethylene glycol  17 g Per G Tube BID    potassium bicarbonate  40 mEq Per G Tube Once    QUEtiapine  25 mg Per G Tube QHS    senna-docusate  1 tablet Per G Tube BID     Continuous Infusions:        PRN Meds:  Current Facility-Administered Medications:     bisacodyL, 10 mg, Rectal, Daily PRN    dextrose 50%, 12.5 g, Intravenous, PRN    dextrose 50%, 25 g, Intravenous, PRN    glucagon (human recombinant), 1 mg, Intramuscular, PRN    hydrALAZINE, 10 mg, Intravenous, Q4H PRN    insulin aspart U-100, 0-10 Units, Subcutaneous, Q4H PRN    labetalol, 10 mg, Intravenous, Q4H PRN    melatonin, 6 mg, Per G Tube, Nightly PRN    simethicone, 1 tablet, Per G Tube, TID PRN    sodium chloride 0.9%, 10 mL, Intravenous, PRN    Objective:     Vital Signs (Most Recent):  Temp: 97.3 °F (36.3 °C) (07/05/25 0738)  Pulse: 92 (07/05/25 0738)  Resp: 18 (07/04/25 1157)  BP: 122/73 (07/05/25 0738)  SpO2: 96 % (07/05/25 0738)  BP Location: Right arm    Vital Signs Range (Last 24H):  Temp:  [97.3 °F (36.3 °C)-98.4 °F (36.9 °C)]   Pulse:  [68-92]   Resp:  [18]   BP: ()/(60-83)  "  SpO2:  [93 %-97 %]   BP Location: Right arm       Physical Exam  HENT:      Head: Normocephalic and atraumatic.      Mouth/Throat:      Mouth: Mucous membranes are moist.   Eyes:      Conjunctiva/sclera: Conjunctivae normal.   Cardiovascular:      Rate and Rhythm: Normal rate.   Pulmonary:      Effort: Pulmonary effort is normal.   Skin:     General: Skin is warm and dry.   Neurological:      Mental Status: He is alert.      Motor: Weakness present.              Neurological Exam:   LOC: drowsy  Attention Span: poor  Language: Expressive aphasia, Receptive aphasia  Articulation: Dysarthria  Orientation: Untestable due to severe aphasia   Facial Movement (CN VII): Lower facial weakness on the Right  Motor: Arm left  Normal 5/5  Leg left  Normal 5/5  Arm right  Plegia 0/5  Leg right Paresis: 1/5  Sensation: Cy-hypoesthesia right    Laboratory:  CMP:   Recent Labs   Lab 07/05/25  0350   CALCIUM 8.4*   ALBUMIN 2.5*   PROT 6.1      K 3.5   CO2 24      BUN 17   CREATININE 1.2   ALKPHOS 119   ALT 73*   AST 57*   BILITOT 0.4     BMP:   Recent Labs   Lab 07/05/25  0350      K 3.5      CO2 24   BUN 17   CREATININE 1.2   CALCIUM 8.4*     CBC:   Recent Labs   Lab 07/05/25  0350   WBC 8.31   RBC 3.99*   HGB 11.3*   HCT 35.4*      MCV 89   MCH 28.3   MCHC 31.9*     Lipid Panel:   No results for input(s): "CHOL", "LDLCALC", "HDL", "TRIG" in the last 168 hours.    Coagulation:   No results for input(s): "PT", "INR", "APTT" in the last 168 hours.    Platelet Aggregation Study: No results for input(s): "PLTAGG", "PLTAGINTERP", "PLTAGREGLACO", "ADPPLTAGGREG" in the last 168 hours.  Hgb A1C:   No results for input(s): "HGBA1C" in the last 168 hours.    TSH:   No results for input(s): "TSH" in the last 168 hours.      Diagnostic Results   Brain Imaging   MRI Brain 6/20/25  Impression:     Acute left MCA distribution infarct.  No new large parenchymal hemorrhage.  Susceptibility artifact throughout the " infarcted tissue may reflect contrast staining and/or blood products conversion.  Overall mass effect is increased compared to prior with sulcal effacement throughout the left cerebral hemisphere and approximately 4 mm of rightward midline shift.     Stable size of the parenchymal hemorrhage centered in the inferior left temporal lobe.     [facility] 6/20/25: 1242  Impression:     Moderate-sized recent left MCA distribution infarct and small intraparenchymal hemorrhage appear grossly unchanged from prior study performed earlier on the same date.     Chronic ischemic change elsewhere with underlying cerebral and cerebellar volume loss, as before.     No new hemorrhage or major vascular distribution infarct elsewhere.  CT 6/20/25: 0834  Impression:     Early ischemic changes in the left MCA distribution, new from recent CT.     New left inferior temporal intraparenchymal hematoma.     Chronic ischemic change with cerebral and cerebellar volume loss, as above.     [facility] 6/19/25  Impression:     Question some early left MCA ischemia corresponding to the patient's known left MCA territory occlusion.  No hemorrhage.     Senescent changes as above.        All CT scans at this facility are performed  using dose modulation techniques as appropriate to performed exam including the following:  automated exposure control; adjustment of mA and/or kV according to the patients size (this includes techniques or standardized protocols for targeted exams where dose is matched to indication/reason for exam: i.e. extremities or head);  iterative reconstruction technique.     Vessel Imaging   CTA head and neck 6/19/25  Impression:     Distal left M1 MCA occlusion with relatively prompt collateralization of the more distal MCA vessels.     Severe stenosis left mid vertebral artery and left PCA.     Cardiac Imaging   TTE 6/19/25    Left Ventricle: The left ventricle is normal in size. Normal wall thickness. There is normal systolic function with  a visually estimated ejection fraction of 60 - 65%.    Right Ventricle: The right ventricle is normal in size Wall thickness is normal. Systolic function is normal.    The pulmonary artery pressure could not be estimated.    IVC/SVC: Normal venous pressure at 3 mmHg.

## 2025-07-05 NOTE — PT/OT/SLP PROGRESS
"Physical Therapy Treatment  Co Treatment with Occupational Therapy     Patient Name:  Cristi Pulido   MRN:  [id number]    Recommendations:     Discharge Recommendations: High Intensity Therapy  Discharge Equipment Recommendations: wheelchair, hospital bed, lift device  Barriers to discharge: Inaccessible home and Decreased caregiver support    Assessment:     Cristi Pulido is a 70 y.o. male admitted with a medical diagnosis of Embolic stroke involving left middle cerebral artery.  He presents with the following impairments/functional limitations: weakness, gait instability, decreased upper extremity function, decreased ROM, impaired endurance, impaired balance, decreased lower extremity function, visual deficits, decreased safety awareness, impaired fine motor, impaired self care skills, impaired functional mobility, decreased coordination, abnormal tone.    Pt met with HOB elevated with LUE soft restraint and mitt present. Session focused on bed mobility, sitting balance, therex, and transfers. Pt making progress towards therapy goals as indicated by decreased assistance required with supine to sit transfers and standing transfers. Pt will continue to benefit from skilled therapy services.    Rehab Prognosis: Good; patient would benefit from acute skilled PT services to address these deficits and reach maximum level of function.    Recent Surgery: * No surgery found *      Plan:     During this hospitalization, patient to be seen 4 x/week to address the identified rehab impairments via gait training, therapeutic activities, therapeutic exercises, neuromuscular re-education and progress toward the following goals:    Plan of Care Expires:  07/11/25    Subjective     Chief Complaint: none stated  Patient/Family Comments/goals: none stated, pt with expressive aphasia, attempting to verbalize with "mhmm"  Pain/Comfort:  Pain Rating 1: 0/10  Pain Rating Post-Intervention 1: 0/10      Objective:   Communicated with RN(Cassia) " prior to session.  Patient found HOB elevated with telemetry, bed alarm upon PTA entry to room.     General Precautions: Standard, aspiration, fall, pureed diet  Orthopedic Precautions: N/A  Braces: N/A  Respiratory Status: Room air     Functional Mobility:  Bed Mobility:     Scooting: anteriorly to EOB total assistance x2 persons   Supine to Sit: maximal assistance x2 persons for Trunk/BLEs  Sit to Supine: total assistance x2 persons for Trunk/BLEs  Transfers:     Sit to Stand:  x3 trials from EOB maximal assistance x2 persons with B hand-held assist and BLE blocked  Balance:   Sitting Balance at EOB:  Level of Assist Required: Maximal-Moderate Assistance  Deviations noted: poor trunk control, R lateral lean, intermittent protective response present  Verbal and tactile cue required for upright posture, correction to midline sitting, elicitation of protective response  Total Time Sitting EOB: ~15 minutes    Standing Balance:  Level of Assist Required: Maximum Assistance x2 persons  Deviations noted: flexed posture, R lateral lean, decreased hip extension, decreased weight shift  Verbal and tactile cues required for upright posture, gluteal activation, scapular retraction., weight shift to R  Total Time Standing: ~15 seconds        AM-PAC 6 CLICK MOBILITY  Turning over in bed (including adjusting bedclothes, sheets and blankets)?: 2  Sitting down on and standing up from a chair with arms (e.g., wheelchair, bedside commode, etc.): 2  Moving from lying on back to sitting on the side of the bed?: 2  Moving to and from a bed to a chair (including a wheelchair)?: 1  Need to walk in hospital room?: 1  Climbing 3-5 steps with a railing?: 1  Basic Mobility Total Score: 9       Treatment & Education:  Patient provided with daily orientation and goals of this PT session.     Activities completed at EOB:  X10 AP, LAQ, Hip flexion RLE PROM, LLE AAROM    Pt educated to call for assistance and to transfer with hospital staff only.   Also, pt was educated on the effects of prolonged immobility and the importance of performing OOB activity and exercises to promote healing and reduce recovery time.    Co tx performed with OT due to patient need for 2 skilled therapist to ensure patient and staff safety and to accommondate for activity tolerance.    Patient left HOB elevated with all lines intact, call button in reach, bed alarm on, L soft wrist restraint and Mitt  reapplied at end of session, and RN notified.    GOALS:   Multidisciplinary Problems       Physical Therapy Goals          Problem: Physical Therapy    Goal Priority Disciplines Outcome Interventions   Physical Therapy Goal     PT, PT/OT Progressing    Description: Goals to be met by: 25     Patient will increase functional independence with mobility by performin. Supine to sit with Contact Guard Assistance  2. Sit to supine with Contact Guard Assistance  3. Sit to stand transfer with Moderate Assistance  4. Bed to chair transfer with Maximum Assistance using No Assistive Device  5. Gait  x 10 feet with Maximum Assistance using No Assistive Device.   6. Sitting at edge of bed x5 minutes with Stand-by Assistance  7. Follow 100% of 1-step commands throughout session                         DME Justifications:  Cristi requires a hospital bed due to him requiring positioning of the body in ways not feasible with an ordinary bed to alleviate pain and due to limited ability and cannot independently make changes in body position without the use of the bed.The positioning of the body cannot be sufficiently resolved by the use of pillows and wedges.  Cristi Pulido has a mobility limitation that significantly impairs his ability to participate in one or more mobility related activities of daily living (MRADLs) such as toileting, feeding, dressing, grooming, and bathing in customary locations in the home.  The mobility limitation cannot be sufficiently resolved by the use of a cane or  walker.   The use of a manual wheelchair will significantly improve the patients ability to participate in MRADLS and the patient will use it on regular basis in the home.  Cristi Pulido has expressed his willingness to use a manual wheelchair in the home. Patients upper body strength is sufficient for propulsion.  He also has a caregiver who is available, willing, and able to provide assistance with the wheelchair when needed.      Time Tracking:     PT Received On: 07/05/25  PT Start Time: 1023     PT Stop Time: 1048  PT Total Time (min): 25 min     Billable Minutes: Therapeutic Exercise 10 and Neuromuscular Re-education 15    Treatment Type: Treatment  PT/PTA: PTA     Number of PTA visits since last PT visit: 1 07/05/2025

## 2025-07-05 NOTE — PLAN OF CARE
Problem: Occupational Therapy  Goal: Occupational Therapy Goal  Description: Goals to be met by: 07/20/2025     Patient will increase functional independence with ADLs by performing:    UE Dressing with Moderate Assistance.  LE Dressing with Maximum Assistance.  Grooming while seated with Moderate Assistance.  Toileting from bedside commode with Moderate Assistance for hygiene and clothing management.   Supine to sit with Moderate Assistance.  Stand pivot transfer with Moderate Assistance.  Sit to stand with Moderate Assistance.  Maintain sitting balance at EOB x 10 minutes with Minimal Assistance.    Outcome: Progressing

## 2025-07-05 NOTE — PT/OT/SLP PROGRESS
Occupational Therapy  Co-Treatment with PTA    Name: Cristi Pulido  MRN: [id number]  Admitting Diagnosis: Embolic stroke involving left middle cerebral artery  Recent Surgery: * No surgery found *      Recommendations:     Discharge Recommendations: High Intensity Therapy  Discharge Equipment Recommendations: bedside commode, hospital bed, lift device, wheelchair  Barriers to discharge: Other (Comment) (requires increased assistance)    Assessment:     Cristi Pulido is a 70 y.o. male with a medical diagnosis of Embolic stroke involving left middle cerebral artery. He presents with performance deficits affecting function including: weakness, impaired endurance, impaired self care skills, impaired functional mobility, impaired balance, decreased coordination, decreased upper extremity function, decreased lower extremity function, decreased safety awareness, abnormal tone, decreased ROM, impaired fine motor. Patient agreeable to participate in therapy session this AM. Patient making progress with established OT goals, performing 3 trials of sit to stand transfer with significant assistance of 2 persons. Patient continuing to requiring significant assistance of 2 persons with bed mobility, requiring significant assistance of 1 person for seated self-care tasks. Noted, patient following ~ 15% of simple, one-step commands appropriately and purposefully during today's session. At this time, patient remains limited in ADLs, transfers, and functional mobility and is currently not performing tasks at their reported PLOF. Patient would benefit from continued skilled acute OT services in order to maximize IND with ADLs and functional mobility to ensure safe return to PLOF in the least restrictive environment. OT continuing to recommend High Intensity Therapy once patient is medically appropriate for d/c.    Rehab Prognosis: Good; patient would benefit from acute OT services to address these deficits and reach maximum level of  "function.    Plan:     Patient to be seen 4 x/week to address the above listed problems via self-care/home management, therapeutic activities, therapeutic exercises, neuromuscular re-education  Plan of Care Expires: 07/20/25  Plan of Care Reviewed with: patient    Subjective     Chief Complaint: None stated by patient at this time. Patient agreeable to participate in therapy this AM.  Patient Comments/Goals: "Yeah"  Pain/Comfort:  Pain Rating 1: other (see comments) (unrated)  Pain Rating Post-Intervention 1: other (see comments) (unrated)    Objective:     Communicated with RN prior to session. Patient cleared to participate in therapy at this time. Patient found HOB elevated with bed alarm, telemetry, SCD, PEG Tube, restraints (LUE soft wrist restraint, L hand mitten) upon OT entry to room. No visitors present in room upon OT entry.    General Precautions: Standard, fall, aspiration   Orthopedic Precautions: N/A   Braces: N/A  Respiratory Status: Room air    Occupational Performance    Bed Mobility:  Patient completed Supine to Sit with maximal assistance and 2 persons  Patient completed anterior Scooting towards the EOB with total assistance  Patient completed Sit to Supine with total assistance and 2 persons  Patient completed Scooting/Bridging towards the HOB via drawsheet with total assistance and 2 persons    Functional Mobility/Transfers:  Patient completed 3 trials of Sit <> Stand Transfer from EOB with maximal assistance and of 2 persons with no assistive device.  Functional Mobility: Not performed at this time due to patient requiring increased assistance to maintain static stand at this time.    Activities of Daily Living:  Grooming: total assistance to wash face with washcloth seated EOB   Lower Body Dressing: total assistance to adjust B socks to ensure proper fit seated EOB     Balance:  Static sitting balance: mod A-max A; noted, patient with R lateral lean, requiring verbal/tactile/visual cues as " well as physical A to maintain upright, midline posture; additionally noted, patient with intermittent protective responses present  Dynamic sitting balance: mod A-max A; noted, patient with R lateral lean, requiring verbal/tactile/visual cues as well as physical A to maintain upright, midline posture; additionally noted, patient with intermittent protective responses present  Static standing balance: max A x 2; noted, patient with R lateral lean, requiring verbal/tactile cues as well as physical A to maintain upright, midline posture    AMPAC 6 Click ADL:  AMPAC Total Score: 8    Treatment & Education:  Patient educated on:   Role of OT, OT POC, and discharge planning.  Safe transfer techniques and proper body mechanics for fall prevention and improved independence with functional transfers.  Importance of OOB activities to increase endurance and tolerance for increased participation in daily ADLs.  Time provided for therapeutic counseling and discussion of health disposition.  Utilizing the call bell to request for assistance with all functional mobility to ensure safety during hospital stay.  Whiteboard updated.    Patient did not verbalize understanding and would benefit from reinforcement of education provided. Patient without any questions at this time, as related to/within the scope of OT.    Patient left HOB elevated with all lines intact, call button in reach, RN notified, bed alarm on, and no visitors present.    GOALS:   Multidisciplinary Problems       Occupational Therapy Goals          Problem: Occupational Therapy    Goal Priority Disciplines Outcome Interventions   Occupational Therapy Goal     OT, PT/OT Progressing    Description: Goals to be met by: 07/20/2025     Patient will increase functional independence with ADLs by performing:    UE Dressing with Moderate Assistance.  LE Dressing with Maximum Assistance.  Grooming while seated with Moderate Assistance.  Toileting from bedside commode with  Moderate Assistance for hygiene and clothing management.   Supine to sit with Moderate Assistance.  Stand pivot transfer with Moderate Assistance.  Sit to stand with Moderate Assistance.  Maintain sitting balance at EOB x 10 minutes with Minimal Assistance.                       DME Justifications:  Bedside Commode - Patient requires a commode for home use because he is confined to a single room.  Hospital Bed - Cristi requires a hospital bed due to him requiring positioning of the body in ways not feasible with an ordinary bed due to limited ability and cannot independently make changes in body position without the use of the bed. The positioning of the body cannot be sufficiently resolved by the use of pillows and wedges.  Wheelchair - Patient has a mobility limitation that significantly impairs his/her ability to participate in one or more mobility related activities of daily living (MRADL's) such as toileting, feeding, dressing, grooming, and bathing in customary locations in the home. The mobility limitation cannot be sufficiently resolved by the use of a cane or walker. The use of a manual wheelchair will significantly improve the patient's ability to participate in MRADLS and the patient will use it on regular basis in the home. Patient has expressed his/her willingness to use a manual wheelchair in the home. Patient's upper body strength is sufficient for propulsion. He/She also has a caregiver who is available, willing, and able to provide assistance with the wheelchair when needed.    Time Tracking:     OT Date of Treatment: 07/05/25  OT Start Time: 1023  OT Stop Time: 1048  OT Total Time (min): 25 min    Billable Minutes: Therapeutic Activity 25    Co-treatment performed with PTA due to patient's multiple deficits requiring two skilled therapists to appropriately and safely assess patient's strength, endurance, functional mobility, and ADL performance while facilitating functional tasks in addition to  accommodating for patient's activity tolerance and medical acuity.     7/5/2025

## 2025-07-05 NOTE — PLAN OF CARE
Problem: Adult Inpatient Plan of Care  Goal: Patient-Specific Goal (Individualized)  Description: Pt will maintain sbp below 160  Outcome: Progressing     Problem: Restraint, Nonviolent  Goal: Absence of Harm or Injury  Outcome: Progressing     Problem: Adult Inpatient Plan of Care  Goal: Optimal Comfort and Wellbeing  Outcome: Progressing  Goal: Readiness for Transition of Care  Outcome: Progressing     Problem: Stroke, Ischemic (Includes Transient Ischemic Attack)  Goal: Optimal Coping  Outcome: Progressing  Goal: Effective Bowel Elimination  Outcome: Progressing  Goal: Optimal Cerebral Tissue Perfusion  Outcome: Progressing  Goal: Optimal Cognitive Function  Outcome: Progressing  Goal: Improved Communication Skills  Outcome: Progressing  Goal: Optimal Functional Ability  Outcome: Progressing  Goal: Optimal Nutrition Intake  Outcome: Progressing  Goal: Effective Oxygenation and Ventilation  Outcome: Progressing  Goal: Improved Sensorimotor Function  Outcome: Progressing  Goal: Safe and Effective Swallow  Outcome: Progressing  Goal: Effective Urinary Elimination  Outcome: Progressing    POC updated and reviewed with the patient at the bedside. Questions regarding POC were encouraged and addressed. VSS, see flowsheets. Tele maintained per provider's order. Patient is AOX 1 at this time. No acute events noted during shift. Seizure, fall, and safety precautions maintained, no signs of injury noted during shift. Patient repositioned independently/ with assistance in bed for comfort. Upon exiting room, patient's bed locked in low position, side rails up x 4, bed alarm on, with call light within reach. Instructed patient to call staff for mobility, verbalized understanding. Stroke book and stroke education reviewed with the patient at the bedside, see education flowsheets for details. No acute signs of distress noted at this time.

## 2025-07-05 NOTE — PROGRESS NOTES
[facility] - Neurosurgery ([facility])  Vascular Neurology  Comprehensive Stroke Center  Progress Note    Assessment/Plan:     * Embolic stroke involving left middle cerebral artery  Cristi Pulido is a 70 y.o. male w/ PMH of HTN, DM, prior stroke w/ residual RSW who presents as a transfer from OSH after presenting with acute onset of dysarthria and RSW. Telestroke was completed and his NIHSS was 19. CT showed probable early ischemic changes in the L insular ribbon and L frontal lobe. CTA demonstrated occlusion of the distal L MCA M1 segment. He was given TNK at 0548. Patient was transferred to [facility] for further management. On arrival, repeat NIHSS of 24. Patient taken for repeat CTH which showed small left temporal hemorrhage along with continued early ischemic changes. Patient taken to IR for thrombectomy and to be admitted to [facility]CC post-procedure.  S/P thrombectomy. TICI 3. MRI revealing for blood products in stroke bed, likely reperfusion injury. Etiology ESUS.     07/05/2025 NAEON. Started tube feeds. BG optimization ongoing.     Antithrombotics for secondary stroke prevention: Antiplatelets: Aspirin: 81 mg daily  Clopidogrel: 75 mg daily,      Statins for secondary stroke prevention and hyperlipidemia, if present:   Statins: Atorvastatin- 80 mg daily    Aggressive risk factor modification: HTN, Smoking, DM, HLD     Rehab efforts: The patient has been evaluated by a stroke team provider and the therapy needs have been fully considered based off the presenting complaints and exam findings. The following therapy evaluations are needed: PT evaluate and treat, OT evaluate and treat, SLP evaluate and treat, PM&R evaluate for appropriate placement, current recommendation HIT    Diagnostics ordered/pending: None     VTE prophylaxis: Enoxaparin 40 mg SQ every 24 hours  Mechanical prophylaxis: Place SCDs    BP parameters: Infarct: Post sucessful thrombectomy, SBP <140        Encounter for nasogastric tube placement  PEG removed per  patient.   IR consulted for replacement. Attempt unsuccessful, will attempt again week of 6/30.   NGT replaced. TF and FWF resumed.     Restlessness  -6/24: QTc 441   -Seroquel 25 mg QHS     Hyperlipemia  -Stroke risk factor  -LDL 75, goal <70  -Atorvastatin 40 mg daily      Right sided weakness  -Secondary to stroke.   -Aggressive therapy     Dysarthria and anarthria  -Therapy eval and treat    Aphasia  -Secondary to stroke  -Aggressive therapy     Type 2 diabetes mellitus without complication, without long-term current use of insulin  Lab Results   Component Value Date    LABA1C 7.2 (H) 01/29/2018    HGBA1C 7.0 (H) 06/19/2025     Follow up repeat A1c. Hold home antihyperglycemics. SSI for goal -180 while in hospital  -Added Lantus 15u     Hypertension associated with diabetes  -Stroke risk factor  -SBP goal <140, maintain MAP >65  -BP range in the last 24 hrs: BP  Min: 139/82  Max: 169/93  -Current antihypertensive regimen:     -Amlodipine 10mg     -Lisinopril still on hold for now    -Metoprolol 50mg BID   --PRN labetalol/hydralazine     Tobacco dependence  Stroke risk factor  - on cessation  -nicotine patch PRN         06/20/2025 NAEON. S/P thrombectomy. TICI 3. TNK monitoring ended at 0548. Aphasic, follows no commands. RSW remains. MRI revealing for blood products in stroke bed, likely reperfusion injury. OK to start monotherapy with either plavix or ASA 6/21/25 for secondary stroke prevention. ECHO unremarkable. Family member at bedside agreeable to NGT placement. Etiology ESUS.   6/21/2025 NAEON. Neuro exam stable. Recommend holding AP therapy for now.   06/22/2025 NAEON. Neuro exam stable. Start AP therapy with ASA.   06/23/2025 NAEON. SLP recs NPO. PEG discussed with family and they are agreeable. Pt S/D to NPU.   06/24/2025 NGT pulled overnight and replaced. Placement confirmed via X ray. IR consulted for PEG placement. JEANNE, Creatinine and BUN uptrending. FWF flushes ordered. Restless during  night. EKG repeated. QTc 441. Seroquel 25 mg QHS ordered. Family requesting [facility] I[facility] when medically ready.   06/25/2025 NAEON. Plans for PEG placement today. BUN and Cr uptrending. Fluids started. Lisinopril held.   06/26/2025 NAEON. PEG placed yesterday, will resume tube feeds this afternoon starting with trickle feeds. BUN and Cr Improved. Increased water boluses to 300.  06/27/2025 NAEON. Neuro exam stable. Patient has tolerated tube feeds well. Will continue to monitor kidney function for now. Trial off restraints today to prepare for rehab placement. Added Lantus 15 units. MBSS today, patient able to have puree for pleasure feeds per speech.   06/28/2025 Na 152. 0.45% NS at 100 ml/hr ordered x 12 hrs. Will repeat sodium draw scheduled at 2100. Pulled PEG. IR unable to replace PEG. Will attempt in coming days. NGT replaced, placement confirmed via Xray. TF and FWF restarted.   06/29/2025 Na 154, BUN and Cr improving. Continuing fluids. NGT coiled overnight. NG Xray showed possible pneumoperitoneum. CT AP without contrast ordered. Showed free air in the abdomen, advised not to use NGT for now. General Surgery consulted to discuss PEG placement.   06/30/2025 Pulled NG tube overnight. Na stable, Cr and BUN continue to improve. Fluids reordered. Discussed PEG replacement with GI and Gen Surg, deferred to IR for replacement. IR reconsulted this morning. Must wait approximately 1 week to re attempt PEG placement. Placement planned for 7/3. Will need to replace NG tube 7/2. NPO at midnight 7/3.   07/01/2025 Switched 0.45 NS to D5W @60 cc/hr. Hypernatremic at 153. Free water deficit 1.9L   07/02/2025 Hypernatremia improving. Continuing D5W. Plan for G tube tomorrow per IR.   07/03/2025: G tube placement per IR. Decreased D5W to 30 cc/hr.   07/04/2025 S/p G tube placement. Will start tube feeds later, increased statin to 80 mg, added plavix 75 mg, and Niacin 1g nightly.   07/05/2025 NAEON. Started tube feeds. BG  optimization ongoing.     STROKE DOCUMENTATION   Acute Stroke Times   Last Known Normal Date: 06/19/25  Last Known Normal Time: 0400  Symptom Onset Date: 06/19/25  Symptom Onset Time: 0400  Stroke Team Called Date: 06/19/25  Stroke Team Called Time: 0813  Stroke Team Arrival Date: 06/19/25  Stroke Team Arrival Time: 0813  CT Interpretation Time: 0827  Thrombolytic Therapy Recommended:  (already given prior to transfer)  CTA Interpretation Time:  (already completed prior to transfer)  Thrombectomy Recommended: Yes  Decision to Treat Time for IR: 0832    NIH Scale:  1a. Level of Consciousness: 0-->Alert, keenly responsive  1b. LOC Questions: 2-->Answers neither question correctly  1c. LOC Commands: 2-->Performs neither task correctly  2. Best Gaze: 0-->Normal  3. Visual: 0-->No visual loss  4. Facial Palsy: 1-->Minor paralysis (flattened nasolabial fold, asymmetry on smiling)  5a. Motor Arm, Left: 0-->No drift, limb holds 90 (or 45) degrees for full 10 secs  5b. Motor Arm, Right: 3-->No effort against gravity, limb falls  6a. Motor Leg, Left: 0-->No drift, leg holds 30 degree position for full 5 secs  6b. Motor Leg, Right: 3-->No effort against gravity, leg falls to bed immediately  7. Limb Ataxia: 0-->Absent  8. Sensory: 0-->Normal, no sensory loss  9. Best Language: 2-->Severe aphasia, all communication is through fragmentary expression, great need for inference, questioning, and guessing by the listener. Range of information that can be exchanged is limited, listener carries burden of. . . (see row details)  10. Dysarthria: 2-->Severe dysarthria, patients speech is so slurred as to be unintelligible in the absence of or out of proportion to any dysphasia, or is mute/anarthric  11. Extinction and Inattention (formerly Neglect): 0-->No abnormality  Total (NIH Stroke Scale): 15       Modified [place] Score: 2  [place] Coma Scale:    ABCD2 Score:    [id number] Score:   HAS -BLED Score:   ICH Score:   [employer]  Classification:      Hemorrhagic change of an Ischemic Stroke: Does this patient have an ischemic stroke with hemorrhagic changes? No     Neurologic Chief Complaint: L MCA stroke    Subjective:     Interval History: Patient is seen for follow-up neurological assessment and treatment recommendations: See hospital course.    HPI, Past Medical, Family, and Social History remains the same as documented in the initial encounter.     Review of Systems   Reason unable to perform ROS: Severe aphasia.     Scheduled Meds:   amLODIPine  10 mg Per G Tube QHS    aspirin  300 mg Rectal Daily    atorvastatin  80 mg Per G Tube Daily    clopidogreL  75 mg Per G Tube Daily    FLUoxetine  20 mg Per G Tube Daily    heparin (porcine)  5,000 Units Subcutaneous Q8H    insulin aspart U-100  6 Units Subcutaneous Q4H    insulin glargine U-100  15 Units Subcutaneous Daily    metoprolol tartrate  50 mg Per G Tube BID    niacin  1,000 mg Oral QHS    nicotine  1 patch Transdermal Daily    nystatin  500,000 Units Oral QID    polyethylene glycol  17 g Per G Tube BID    potassium bicarbonate  40 mEq Per G Tube Once    QUEtiapine  25 mg Per G Tube QHS    senna-docusate  1 tablet Per G Tube BID     Continuous Infusions:        PRN Meds:  Current Facility-Administered Medications:     bisacodyL, 10 mg, Rectal, Daily PRN    dextrose 50%, 12.5 g, Intravenous, PRN    dextrose 50%, 25 g, Intravenous, PRN    glucagon (human recombinant), 1 mg, Intramuscular, PRN    hydrALAZINE, 10 mg, Intravenous, Q4H PRN    insulin aspart U-100, 0-10 Units, Subcutaneous, Q4H PRN    labetalol, 10 mg, Intravenous, Q4H PRN    melatonin, 6 mg, Per G Tube, Nightly PRN    simethicone, 1 tablet, Per G Tube, TID PRN    sodium chloride 0.9%, 10 mL, Intravenous, PRN    Objective:     Vital Signs (Most Recent):  Temp: 97.3 °F (36.3 °C) (07/05/25 0738)  Pulse: 92 (07/05/25 0738)  Resp: 18 (07/04/25 1157)  BP: 122/73 (07/05/25 0738)  SpO2: 96 % (07/05/25 0738)  BP Location: Right  "arm    Vital Signs Range (Last 24H):  Temp:  [97.3 °F (36.3 °C)-98.4 °F (36.9 °C)]   Pulse:  [68-92]   Resp:  [18]   BP: ()/(60-83)   SpO2:  [93 %-97 %]   BP Location: Right arm       Physical Exam  HENT:      Head: Normocephalic and atraumatic.      Mouth/Throat:      Mouth: Mucous membranes are moist.   Eyes:      Conjunctiva/sclera: Conjunctivae normal.   Cardiovascular:      Rate and Rhythm: Normal rate.   Pulmonary:      Effort: Pulmonary effort is normal.   Skin:     General: Skin is warm and dry.   Neurological:      Mental Status: He is alert.      Motor: Weakness present.              Neurological Exam:   LOC: drowsy  Attention Span: poor  Language: Expressive aphasia, Receptive aphasia  Articulation: Dysarthria  Orientation: Untestable due to severe aphasia   Facial Movement (CN VII): Lower facial weakness on the Right  Motor: Arm left  Normal 5/5  Leg left  Normal 5/5  Arm right  Plegia 0/5  Leg right Paresis: 1/5  Sensation: Cy-hypoesthesia right    Laboratory:  CMP:   Recent Labs   Lab 07/05/25  0350   CALCIUM 8.4*   ALBUMIN 2.5*   PROT 6.1      K 3.5   CO2 24      BUN 17   CREATININE 1.2   ALKPHOS 119   ALT 73*   AST 57*   BILITOT 0.4     BMP:   Recent Labs   Lab 07/05/25  0350      K 3.5      CO2 24   BUN 17   CREATININE 1.2   CALCIUM 8.4*     CBC:   Recent Labs   Lab 07/05/25  0350   WBC 8.31   RBC 3.99*   HGB 11.3*   HCT 35.4*      MCV 89   MCH 28.3   MCHC 31.9*     Lipid Panel:   No results for input(s): "CHOL", "LDLCALC", "HDL", "TRIG" in the last 168 hours.    Coagulation:   No results for input(s): "PT", "INR", "APTT" in the last 168 hours.    Platelet Aggregation Study: No results for input(s): "PLTAGG", "PLTAGINTERP", "PLTAGREGLACO", "ADPPLTAGGREG" in the last 168 hours.  Hgb A1C:   No results for input(s): "HGBA1C" in the last 168 hours.    TSH:   No results for input(s): "TSH" in the last 168 hours.      Diagnostic Results   Brain Imaging   MRI Brain " 6/20/25  Impression:     Acute left MCA distribution infarct.  No new large parenchymal hemorrhage.  Susceptibility artifact throughout the infarcted tissue may reflect contrast staining and/or blood products conversion.  Overall mass effect is increased compared to prior with sulcal effacement throughout the left cerebral hemisphere and approximately 4 mm of rightward midline shift.     Stable size of the parenchymal hemorrhage centered in the inferior left temporal lobe.     [facility] 6/20/25: 1242  Impression:     Moderate-sized recent left MCA distribution infarct and small intraparenchymal hemorrhage appear grossly unchanged from prior study performed earlier on the same date.     Chronic ischemic change elsewhere with underlying cerebral and cerebellar volume loss, as before.     No new hemorrhage or major vascular distribution infarct elsewhere.  [facility] 6/20/25: 0834  Impression:     Early ischemic changes in the left MCA distribution, new from recent CT.     New left inferior temporal intraparenchymal hematoma.     Chronic ischemic change with cerebral and cerebellar volume loss, as above.     [facility] 6/19/25  Impression:     Question some early left MCA ischemia corresponding to the patient's known left MCA territory occlusion.  No hemorrhage.     Senescent changes as above.        All CT scans at this facility are performed  using dose modulation techniques as appropriate to performed exam including the following:  automated exposure control; adjustment of mA and/or kV according to the patients size (this includes techniques or standardized protocols for targeted exams where dose is matched to indication/reason for exam: i.e. extremities or head);  iterative reconstruction technique.     Vessel Imaging   CTA head and neck 6/19/25  Impression:     Distal left M1 MCA occlusion with relatively prompt collateralization of the more distal MCA vessels.     Severe stenosis left mid vertebral artery and left PCA.     Cardiac  Imaging   TTE 6/19/25    Left Ventricle: The left ventricle is normal in size. Normal wall thickness. There is normal systolic function with a visually estimated ejection fraction of 60 - 65%.    Right Ventricle: The right ventricle is normal in size Wall thickness is normal. Systolic function is normal.    The pulmonary artery pressure could not be estimated.    IVC/SVC: Normal venous pressure at 3 mmHg.       Cullen Almanzar MD  [facility] Stroke Center  Department of Vascular Neurology   [facility])

## 2025-07-06 PROBLEM — N17.9 AKI (ACUTE KIDNEY INJURY): Status: ACTIVE | Noted: 2025-07-06

## 2025-07-06 LAB
ABSOLUTE EOSINOPHIL (OHS): 0.32 K/UL
ABSOLUTE MONOCYTE (OHS): 0.72 K/UL (ref 0.3–1)
ABSOLUTE NEUTROPHIL COUNT (OHS): 6.22 K/UL (ref 1.8–7.7)
ALBUMIN SERPL BCP-MCNC: 2.5 G/DL (ref 3.5–5.2)
ALP SERPL-CCNC: 112 UNIT/L (ref 40–150)
ALT SERPL W/O P-5'-P-CCNC: 63 UNIT/L (ref 10–44)
ANION GAP (OHS): 11 MMOL/L (ref 8–16)
ANION GAP (OHS): 13 MMOL/L (ref 8–16)
AST SERPL-CCNC: 43 UNIT/L (ref 11–45)
BASOPHILS # BLD AUTO: 0.04 K/UL
BASOPHILS NFR BLD AUTO: 0.4 %
BILIRUB SERPL-MCNC: 0.3 MG/DL (ref 0.1–1)
BUN SERPL-MCNC: 25 MG/DL (ref 8–23)
BUN SERPL-MCNC: 26 MG/DL (ref 8–23)
CALCIUM SERPL-MCNC: 8.6 MG/DL (ref 8.7–10.5)
CALCIUM SERPL-MCNC: 8.9 MG/DL (ref 8.7–10.5)
CHLORIDE SERPL-SCNC: 104 MMOL/L (ref 95–110)
CHLORIDE SERPL-SCNC: 110 MMOL/L (ref 95–110)
CO2 SERPL-SCNC: 24 MMOL/L (ref 23–29)
CO2 SERPL-SCNC: 25 MMOL/L (ref 23–29)
CREAT SERPL-MCNC: 1.6 MG/DL (ref 0.5–1.4)
CREAT SERPL-MCNC: 1.6 MG/DL (ref 0.5–1.4)
ERYTHROCYTE [DISTWIDTH] IN BLOOD BY AUTOMATED COUNT: 13.1 % (ref 11.5–14.5)
GFR SERPLBLD CREATININE-BSD FMLA CKD-EPI: 45 ML/MIN/1.73/M2
GFR SERPLBLD CREATININE-BSD FMLA CKD-EPI: 45 ML/MIN/1.73/M2
GLUCOSE SERPL-MCNC: 120 MG/DL (ref 70–110)
GLUCOSE SERPL-MCNC: 140 MG/DL (ref 70–110)
HCT VFR BLD AUTO: 36 % (ref 40–54)
HGB BLD-MCNC: 11.6 GM/DL (ref 14–18)
IMM GRANULOCYTES # BLD AUTO: 0.03 K/UL (ref 0–0.04)
IMM GRANULOCYTES NFR BLD AUTO: 0.3 % (ref 0–0.5)
LYMPHOCYTES # BLD AUTO: 2.23 K/UL (ref 1–4.8)
MAGNESIUM SERPL-MCNC: 2 MG/DL (ref 1.6–2.6)
MCH RBC QN AUTO: 28.6 PG (ref 27–31)
MCHC RBC AUTO-ENTMCNC: 32.2 G/DL (ref 32–36)
MCV RBC AUTO: 89 FL (ref 82–98)
NUCLEATED RBC (/100WBC) (OHS): 0 /100 WBC
PHOSPHATE SERPL-MCNC: 3.5 MG/DL (ref 2.7–4.5)
PLATELET # BLD AUTO: 232 K/UL (ref 150–450)
PMV BLD AUTO: 12.9 FL (ref 9.2–12.9)
POCT GLUCOSE: 121 MG/DL (ref 70–110)
POCT GLUCOSE: 135 MG/DL (ref 70–110)
POCT GLUCOSE: 140 MG/DL (ref 70–110)
POCT GLUCOSE: 177 MG/DL (ref 70–110)
POCT GLUCOSE: 179 MG/DL (ref 70–110)
POCT GLUCOSE: 248 MG/DL (ref 70–110)
POCT GLUCOSE: 274 MG/DL (ref 70–110)
POTASSIUM SERPL-SCNC: 3.6 MMOL/L (ref 3.5–5.1)
POTASSIUM SERPL-SCNC: 3.7 MMOL/L (ref 3.5–5.1)
PROT SERPL-MCNC: 6.2 GM/DL (ref 6–8.4)
RBC # BLD AUTO: 4.06 M/UL (ref 4.6–6.2)
RELATIVE EOSINOPHIL (OHS): 3.3 %
RELATIVE LYMPHOCYTE (OHS): 23.3 % (ref 18–48)
RELATIVE MONOCYTE (OHS): 7.5 % (ref 4–15)
RELATIVE NEUTROPHIL (OHS): 65.2 % (ref 38–73)
SODIUM SERPL-SCNC: 142 MMOL/L (ref 136–145)
SODIUM SERPL-SCNC: 145 MMOL/L (ref 136–145)
WBC # BLD AUTO: 9.56 K/UL (ref 3.9–12.7)

## 2025-07-06 PROCEDURE — S4991 NICOTINE PATCH NONLEGEND: HCPCS | Mod: HCNC

## 2025-07-06 PROCEDURE — 63600175 PHARM REV CODE 636 W HCPCS: Mod: HCNC

## 2025-07-06 PROCEDURE — 25000003 PHARM REV CODE 250: Mod: HCNC | Performed by: PHYSICIAN ASSISTANT

## 2025-07-06 PROCEDURE — 85025 COMPLETE CBC W/AUTO DIFF WBC: CPT | Mod: HCNC

## 2025-07-06 PROCEDURE — 25000003 PHARM REV CODE 250: Mod: HCNC

## 2025-07-06 PROCEDURE — 51798 US URINE CAPACITY MEASURE: CPT | Mod: HCNC

## 2025-07-06 PROCEDURE — 11000001 HC ACUTE MED/SURG PRIVATE ROOM: Mod: HCNC

## 2025-07-06 PROCEDURE — 83735 ASSAY OF MAGNESIUM: CPT | Mod: HCNC

## 2025-07-06 PROCEDURE — 94761 N-INVAS EAR/PLS OXIMETRY MLT: CPT | Mod: HCNC

## 2025-07-06 PROCEDURE — 36415 COLL VENOUS BLD VENIPUNCTURE: CPT | Mod: HCNC

## 2025-07-06 PROCEDURE — 84100 ASSAY OF PHOSPHORUS: CPT | Mod: HCNC

## 2025-07-06 PROCEDURE — 51702 INSERT TEMP BLADDER CATH: CPT | Mod: HCNC

## 2025-07-06 PROCEDURE — 25000003 PHARM REV CODE 250: Mod: HCNC | Performed by: NURSE PRACTITIONER

## 2025-07-06 PROCEDURE — 80053 COMPREHEN METABOLIC PANEL: CPT | Mod: HCNC

## 2025-07-06 PROCEDURE — A4216 STERILE WATER/SALINE, 10 ML: HCPCS | Mod: HCNC

## 2025-07-06 PROCEDURE — 99233 SBSQ HOSP IP/OBS HIGH 50: CPT | Mod: HCNC,GC,, | Performed by: PSYCHIATRY & NEUROLOGY

## 2025-07-06 RX ORDER — SODIUM CHLORIDE, SODIUM LACTATE, POTASSIUM CHLORIDE, CALCIUM CHLORIDE 600; 310; 30; 20 MG/100ML; MG/100ML; MG/100ML; MG/100ML
INJECTION, SOLUTION INTRAVENOUS CONTINUOUS
Status: DISCONTINUED | OUTPATIENT
Start: 2025-07-06 | End: 2025-07-06

## 2025-07-06 RX ORDER — MULTIVIT WITH IRON,MINERALS
500 TABLET ORAL 2 TIMES DAILY WITH MEALS
Status: DISCONTINUED | OUTPATIENT
Start: 2025-07-07 | End: 2025-07-06

## 2025-07-06 RX ORDER — MULTIVIT WITH IRON,MINERALS
1000 TABLET ORAL NIGHTLY
Status: DISCONTINUED | OUTPATIENT
Start: 2025-07-06 | End: 2025-07-06

## 2025-07-06 RX ORDER — SODIUM CHLORIDE, SODIUM LACTATE, POTASSIUM CHLORIDE, CALCIUM CHLORIDE 600; 310; 30; 20 MG/100ML; MG/100ML; MG/100ML; MG/100ML
INJECTION, SOLUTION INTRAVENOUS CONTINUOUS
Status: ACTIVE | OUTPATIENT
Start: 2025-07-06 | End: 2025-07-07

## 2025-07-06 RX ORDER — MULTIVIT WITH IRON,MINERALS
500 TABLET ORAL 2 TIMES DAILY WITH MEALS
Status: DISCONTINUED | OUTPATIENT
Start: 2025-07-06 | End: 2025-07-11 | Stop reason: HOSPADM

## 2025-07-06 RX ADMIN — Medication 10 ML: at 08:07

## 2025-07-06 RX ADMIN — NYSTATIN 500000 UNITS: 100000 SUSPENSION ORAL at 02:07

## 2025-07-06 RX ADMIN — QUETIAPINE FUMARATE 25 MG: 25 TABLET ORAL at 08:07

## 2025-07-06 RX ADMIN — INSULIN ASPART 6 UNITS: 100 INJECTION, SOLUTION INTRAVENOUS; SUBCUTANEOUS at 07:07

## 2025-07-06 RX ADMIN — Medication 1 PATCH: at 07:07

## 2025-07-06 RX ADMIN — SENNOSIDES AND DOCUSATE SODIUM 1 TABLET: 50; 8.6 TABLET ORAL at 09:07

## 2025-07-06 RX ADMIN — NYSTATIN 500000 UNITS: 100000 SUSPENSION ORAL at 05:07

## 2025-07-06 RX ADMIN — METOPROLOL TARTRATE 50 MG: 50 TABLET, FILM COATED ORAL at 07:07

## 2025-07-06 RX ADMIN — ATORVASTATIN CALCIUM 80 MG: 40 TABLET, FILM COATED ORAL at 07:07

## 2025-07-06 RX ADMIN — NYSTATIN 500000 UNITS: 100000 SUSPENSION ORAL at 08:07

## 2025-07-06 RX ADMIN — INSULIN ASPART 6 UNITS: 100 INJECTION, SOLUTION INTRAVENOUS; SUBCUTANEOUS at 04:07

## 2025-07-06 RX ADMIN — INSULIN ASPART 2 UNITS: 100 INJECTION, SOLUTION INTRAVENOUS; SUBCUTANEOUS at 02:07

## 2025-07-06 RX ADMIN — INSULIN ASPART 2 UNITS: 100 INJECTION, SOLUTION INTRAVENOUS; SUBCUTANEOUS at 11:07

## 2025-07-06 RX ADMIN — SODIUM CHLORIDE, POTASSIUM CHLORIDE, SODIUM LACTATE AND CALCIUM CHLORIDE: 600; 310; 30; 20 INJECTION, SOLUTION INTRAVENOUS at 08:07

## 2025-07-06 RX ADMIN — INSULIN ASPART 6 UNITS: 100 INJECTION, SOLUTION INTRAVENOUS; SUBCUTANEOUS at 11:07

## 2025-07-06 RX ADMIN — FLUOXETINE HYDROCHLORIDE 20 MG: 20 CAPSULE ORAL at 07:07

## 2025-07-06 RX ADMIN — NYSTATIN 500000 UNITS: 100000 SUSPENSION ORAL at 07:07

## 2025-07-06 RX ADMIN — Medication 500 MG: at 10:07

## 2025-07-06 RX ADMIN — Medication 6 MG: at 08:07

## 2025-07-06 RX ADMIN — SODIUM CHLORIDE, POTASSIUM CHLORIDE, SODIUM LACTATE AND CALCIUM CHLORIDE 500 ML: 600; 310; 30; 20 INJECTION, SOLUTION INTRAVENOUS at 01:07

## 2025-07-06 RX ADMIN — INSULIN ASPART 6 UNITS: 100 INJECTION, SOLUTION INTRAVENOUS; SUBCUTANEOUS at 08:07

## 2025-07-06 RX ADMIN — INSULIN GLARGINE 15 UNITS: 100 INJECTION, SOLUTION SUBCUTANEOUS at 07:07

## 2025-07-06 RX ADMIN — AMLODIPINE BESYLATE 10 MG: 10 TABLET ORAL at 08:07

## 2025-07-06 RX ADMIN — INSULIN ASPART 6 UNITS: 100 INJECTION, SOLUTION INTRAVENOUS; SUBCUTANEOUS at 12:07

## 2025-07-06 RX ADMIN — HEPARIN SODIUM 5000 UNITS: 5000 INJECTION INTRAVENOUS; SUBCUTANEOUS at 01:07

## 2025-07-06 RX ADMIN — INSULIN ASPART 6 UNITS: 100 INJECTION, SOLUTION INTRAVENOUS; SUBCUTANEOUS at 02:07

## 2025-07-06 RX ADMIN — HEPARIN SODIUM 5000 UNITS: 5000 INJECTION INTRAVENOUS; SUBCUTANEOUS at 05:07

## 2025-07-06 RX ADMIN — ASPIRIN 300 MG: 300 SUPPOSITORY RECTAL at 07:07

## 2025-07-06 RX ADMIN — POLYETHYLENE GLYCOL 3350 17 G: 17 POWDER, FOR SOLUTION ORAL at 08:07

## 2025-07-06 RX ADMIN — CLOPIDOGREL 75 MG: 75 TABLET ORAL at 08:07

## 2025-07-06 RX ADMIN — INSULIN ASPART 3 UNITS: 100 INJECTION, SOLUTION INTRAVENOUS; SUBCUTANEOUS at 08:07

## 2025-07-06 RX ADMIN — HEPARIN SODIUM 5000 UNITS: 5000 INJECTION INTRAVENOUS; SUBCUTANEOUS at 09:07

## 2025-07-06 RX ADMIN — METOPROLOL TARTRATE 50 MG: 50 TABLET, FILM COATED ORAL at 08:07

## 2025-07-06 RX ADMIN — INSULIN ASPART 2 UNITS: 100 INJECTION, SOLUTION INTRAVENOUS; SUBCUTANEOUS at 07:07

## 2025-07-06 RX ADMIN — INSULIN ASPART 6 UNITS: 100 INJECTION, SOLUTION INTRAVENOUS; SUBCUTANEOUS at 05:07

## 2025-07-06 RX ADMIN — POLYETHYLENE GLYCOL 3350 17 G: 17 POWDER, FOR SOLUTION ORAL at 07:07

## 2025-07-06 NOTE — ASSESSMENT & PLAN NOTE
Cristi Pulido is a 70 y.o. male w/ PMH of HTN, DM, prior stroke w/ residual RSW who presents as a transfer from OSH after presenting with acute onset of dysarthria and RSW. Telestroke was completed and his NIHSS was 19. CT showed probable early ischemic changes in the L insular ribbon and L frontal lobe. CTA demonstrated occlusion of the distal L MCA M1 segment. He was given TNK at 0548. Patient was transferred to C for further management. On arrival, repeat NIHSS of 24. Patient taken for repeat CTH which showed small left temporal hemorrhage along with continued early ischemic changes. Patient taken to IR for thrombectomy and to be admitted to [facility] post-procedure.  S/P thrombectomy. TICI 3. MRI revealing for blood products in stroke bed, likely reperfusion injury. Etiology ESUS.     07/06/2025 NAEON. Pt with increased creatinine. Gave bolus. Believe JEANNE is combination of prerenal and post renal as he has been retaining urine periodically.     Antithrombotics for secondary stroke prevention: Antiplatelets: Aspirin: 81 mg daily  Clopidogrel: 75 mg daily,      Statins for secondary stroke prevention and hyperlipidemia, if present:   Statins: Atorvastatin- 80 mg daily    Aggressive risk factor modification: HTN, Smoking, DM, HLD     Rehab efforts: The patient has been evaluated by a stroke team provider and the therapy needs have been fully considered based off the presenting complaints and exam findings. The following therapy evaluations are needed: PT evaluate and treat, OT evaluate and treat, SLP evaluate and treat, PM&R evaluate for appropriate placement, current recommendation HIT    Diagnostics ordered/pending: None     VTE prophylaxis: Enoxaparin 40 mg SQ every 24 hours  Mechanical prophylaxis: Place SCDs    BP parameters: Infarct: Post sucessful thrombectomy, SBP <140

## 2025-07-06 NOTE — SUBJECTIVE & OBJECTIVE
Neurologic Chief Complaint: L MCA stroke    Subjective:     Interval History: Patient is seen for follow-up neurological assessment and treatment recommendations: See hospital course.    HPI, Past Medical, Family, and Social History remains the same as documented in the initial encounter.     Review of Systems   Reason unable to perform ROS: Severe aphasia.     Scheduled Meds:   amLODIPine  10 mg Per G Tube QHS    aspirin  300 mg Rectal Daily    atorvastatin  80 mg Per G Tube Daily    clopidogreL  75 mg Per G Tube Daily    FLUoxetine  20 mg Per G Tube Daily    heparin (porcine)  5,000 Units Subcutaneous Q8H    insulin aspart U-100  6 Units Subcutaneous Q4H    insulin glargine U-100  15 Units Subcutaneous Daily    lactated ringers  500 mL Intravenous Once    metoprolol tartrate  50 mg Per G Tube BID    niacin  1,000 mg Oral QHS    nicotine  1 patch Transdermal Daily    nystatin  500,000 Units Oral QID    polyethylene glycol  17 g Per G Tube BID    QUEtiapine  25 mg Per G Tube QHS    senna-docusate  1 tablet Per G Tube BID     Continuous Infusions:        PRN Meds:  Current Facility-Administered Medications:     bisacodyL, 10 mg, Rectal, Daily PRN    dextrose 50%, 12.5 g, Intravenous, PRN    dextrose 50%, 25 g, Intravenous, PRN    glucagon (human recombinant), 1 mg, Intramuscular, PRN    hydrALAZINE, 10 mg, Intravenous, Q4H PRN    insulin aspart U-100, 0-10 Units, Subcutaneous, Q4H PRN    labetalol, 10 mg, Intravenous, Q4H PRN    melatonin, 6 mg, Per G Tube, Nightly PRN    simethicone, 1 tablet, Per G Tube, TID PRN    sodium chloride 0.9%, 10 mL, Intravenous, PRN    Objective:     Vital Signs (Most Recent):  Temp: 98 °F (36.7 °C) (07/06/25 0741)  Pulse: 74 (07/06/25 1151)  Resp: 18 (07/06/25 0832)  BP: 113/75 (07/06/25 0741)  SpO2: 95 % (07/06/25 0832)  BP Location: Right arm    Vital Signs Range (Last 24H):  Temp:  [97.8 °F (36.6 °C)-98.6 °F (37 °C)]   Pulse:  []   Resp:  [18]   BP: (100-166)/(61-79)   SpO2:   "[93 %-99 %]   BP Location: Right arm       Physical Exam  HENT:      Head: Normocephalic and atraumatic.      Mouth/Throat:      Mouth: Mucous membranes are moist.   Eyes:      Conjunctiva/sclera: Conjunctivae normal.   Cardiovascular:      Rate and Rhythm: Normal rate.   Pulmonary:      Effort: Pulmonary effort is normal.   Skin:     General: Skin is warm and dry.   Neurological:      Mental Status: He is alert.      Motor: Weakness present.              Neurological Exam:   LOC: drowsy  Attention Span: poor  Language: Expressive aphasia, Receptive aphasia  Articulation: Dysarthria  Orientation: Untestable due to severe aphasia   Facial Movement (CN VII): Lower facial weakness on the Right  Motor: Arm left  Normal 5/5  Leg left  Normal 5/5  Arm right  Plegia 0/5  Leg right Paresis: 1/5  Sensation: Cy-hypoesthesia right    Laboratory:  CMP:   Recent Labs   Lab 07/06/25  0459   CALCIUM 8.6*   ALBUMIN 2.5*   PROT 6.2      K 3.6   CO2 24      BUN 25*   CREATININE 1.6*   ALKPHOS 112   ALT 63*   AST 43   BILITOT 0.3     BMP:   Recent Labs   Lab 07/06/25  0459      K 3.6      CO2 24   BUN 25*   CREATININE 1.6*   CALCIUM 8.6*     CBC:   Recent Labs   Lab 07/06/25  0459   WBC 9.56   RBC 4.06*   HGB 11.6*   HCT 36.0*      MCV 89   MCH 28.6   MCHC 32.2     Lipid Panel:   No results for input(s): "CHOL", "LDLCALC", "HDL", "TRIG" in the last 168 hours.    Coagulation:   No results for input(s): "PT", "INR", "APTT" in the last 168 hours.    Platelet Aggregation Study: No results for input(s): "PLTAGG", "PLTAGINTERP", "PLTAGREGLACO", "ADPPLTAGGREG" in the last 168 hours.  Hgb A1C:   No results for input(s): "HGBA1C" in the last 168 hours.    TSH:   No results for input(s): "TSH" in the last 168 hours.      Diagnostic Results   Brain Imaging   MRI Brain 6/20/25  Impression:     Acute left MCA distribution infarct.  No new large parenchymal hemorrhage.  Susceptibility artifact throughout the " infarcted tissue may reflect contrast staining and/or blood products conversion.  Overall mass effect is increased compared to prior with sulcal effacement throughout the left cerebral hemisphere and approximately 4 mm of rightward midline shift.     Stable size of the parenchymal hemorrhage centered in the inferior left temporal lobe.     [facility] 6/20/25: 1242  Impression:     Moderate-sized recent left MCA distribution infarct and small intraparenchymal hemorrhage appear grossly unchanged from prior study performed earlier on the same date.     Chronic ischemic change elsewhere with underlying cerebral and cerebellar volume loss, as before.     No new hemorrhage or major vascular distribution infarct elsewhere.  CT 6/20/25: 0834  Impression:     Early ischemic changes in the left MCA distribution, new from recent CT.     New left inferior temporal intraparenchymal hematoma.     Chronic ischemic change with cerebral and cerebellar volume loss, as above.     [facility] 6/19/25  Impression:     Question some early left MCA ischemia corresponding to the patient's known left MCA territory occlusion.  No hemorrhage.     Senescent changes as above.        All CT scans at this facility are performed  using dose modulation techniques as appropriate to performed exam including the following:  automated exposure control; adjustment of mA and/or kV according to the patients size (this includes techniques or standardized protocols for targeted exams where dose is matched to indication/reason for exam: i.e. extremities or head);  iterative reconstruction technique.     Vessel Imaging   CTA head and neck 6/19/25  Impression:     Distal left M1 MCA occlusion with relatively prompt collateralization of the more distal MCA vessels.     Severe stenosis left mid vertebral artery and left PCA.     Cardiac Imaging   TTE 6/19/25    Left Ventricle: The left ventricle is normal in size. Normal wall thickness. There is normal systolic function with  a visually estimated ejection fraction of 60 - 65%.    Right Ventricle: The right ventricle is normal in size Wall thickness is normal. Systolic function is normal.    The pulmonary artery pressure could not be estimated.    IVC/SVC: Normal venous pressure at 3 mmHg.

## 2025-07-06 NOTE — ASSESSMENT & PLAN NOTE
Pt with periodic episodes of urinary retention likely contributing to JEANNE.     Plan  - Straight cath prn

## 2025-07-06 NOTE — PROGRESS NOTES
[facility]oha[facility] Garrett - Neurosurgery ([facility])  Vascular Neurology  Comprehensive Stroke Center  Progress Note    Assessment/Plan:     * Embolic stroke involving left middle cerebral artery  Cristi Pulido is a 70 y.o. male w/ PMH of HTN, DM, prior stroke w/ residual RSW who presents as a transfer from OSH after presenting with acute onset of dysarthria and RSW. Telestroke was completed and his NIHSS was 19. CT showed probable early ischemic changes in the L insular ribbon and L frontal lobe. CTA demonstrated occlusion of the distal L MCA M1 segment. He was given TNK at 0548. Patient was transferred to [facility] for further management. On arrival, repeat NIHSS of 24. Patient taken for repeat CTH which showed small left temporal hemorrhage along with continued early ischemic changes. Patient taken to IR for thrombectomy and to be admitted to NCC post-procedure.  S/P thrombectomy. TICI 3. MRI revealing for blood products in stroke bed, likely reperfusion injury. Etiology ESUS.     07/06/2025 NAEON. Pt with increased creatinine. Gave bolus. Believe EJANNE is combination of prerenal and post renal as he has been retaining urine periodically.     Antithrombotics for secondary stroke prevention: Antiplatelets: Aspirin: 81 mg daily  Clopidogrel: 75 mg daily,      Statins for secondary stroke prevention and hyperlipidemia, if present:   Statins: Atorvastatin- 80 mg daily    Aggressive risk factor modification: HTN, Smoking, DM, HLD     Rehab efforts: The patient has been evaluated by a stroke team provider and the therapy needs have been fully considered based off the presenting complaints and exam findings. The following therapy evaluations are needed: PT evaluate and treat, OT evaluate and treat, SLP evaluate and treat, PM&R evaluate for appropriate placement, current recommendation HIT    Diagnostics ordered/pending: None     VTE prophylaxis: Enoxaparin 40 mg SQ every 24 hours  Mechanical prophylaxis: Place SCDs    BP parameters: Infarct: Post  sucessful thrombectomy, SBP <140        JEANNE (acute kidney injury)  Pt with JEANNE from combination of post renal and prerenal. Episodes of periodic retention.     Plan  - Straight cath prn  - fluids prn     Urinary retention  Pt with periodic episodes of urinary retention likely contributing to JEANNE.     Plan  - Straight cath prn    Encounter for nasogastric tube placement  PEG removed per patient.   IR consulted for replacement. Attempt unsuccessful, will attempt again week of 6/30.   NGT replaced. TF and FWF resumed.     Restlessness  -6/24: QTc 441   -Seroquel 25 mg QHS     Hyperlipemia  -Stroke risk factor  -LDL 75, goal <70  -Atorvastatin 40 mg daily      Right sided weakness  -Secondary to stroke.   -Aggressive therapy     Dysarthria and anarthria  -Therapy eval and treat    Aphasia  -Secondary to stroke  -Aggressive therapy     Type 2 diabetes mellitus without complication, without long-term current use of insulin  Lab Results   Component Value Date    LABA1C 7.2 (H) 01/29/2018    HGBA1C 7.0 (H) 06/19/2025     Follow up repeat A1c. Hold home antihyperglycemics. SSI for goal -180 while in hospital  -Added Lantus 15u     Hypertension associated with diabetes  -Stroke risk factor  -SBP goal <140, maintain MAP >65  -BP range in the last 24 hrs: BP  Min: 139/82  Max: 169/93  -Current antihypertensive regimen:     -Amlodipine 10mg     -Lisinopril still on hold for now    -Metoprolol 50mg BID   --PRN labetalol/hydralazine     Tobacco dependence  Stroke risk factor  - on cessation  -nicotine patch PRN         06/20/2025 NAEON. S/P thrombectomy. TICI 3. TNK monitoring ended at 0548. Aphasic, follows no commands. RSW remains. MRI revealing for blood products in stroke bed, likely reperfusion injury. OK to start monotherapy with either plavix or ASA 6/21/25 for secondary stroke prevention. ECHO unremarkable. Family member at bedside agreeable to NGT placement. Etiology ESUS.   6/21/2025 NAEON. Neuro exam  stable. Recommend holding AP therapy for now.   06/22/2025 NAEON. Neuro exam stable. Start AP therapy with ASA.   06/23/2025 NAEON. SLP recs NPO. PEG discussed with family and they are agreeable. Pt S/D to NPU.   06/24/2025 NGT pulled overnight and replaced. Placement confirmed via X ray. IR consulted for PEG placement. JEANNE, Creatinine and BUN uptrending. FWF flushes ordered. Restless during night. EKG repeated. QTc 441. Seroquel 25 mg QHS ordered. Family requesting [facility] IPR when medically ready.   06/25/2025 NAEON. Plans for PEG placement today. BUN and Cr uptrending. Fluids started. Lisinopril held.   06/26/2025 NAEON. PEG placed yesterday, will resume tube feeds this afternoon starting with trickle feeds. BUN and Cr Improved. Increased water boluses to 300.  06/27/2025 NAEON. Neuro exam stable. Patient has tolerated tube feeds well. Will continue to monitor kidney function for now. Trial off restraints today to prepare for rehab placement. Added Lantus 15 units. MBSS today, patient able to have puree for pleasure feeds per speech.   06/28/2025 Na 152. 0.45% NS at 100 ml/hr ordered x 12 hrs. Will repeat sodium draw scheduled at 2100. Pulled PEG. IR unable to replace PEG. Will attempt in coming days. NGT replaced, placement confirmed via Xray. TF and FWF restarted.   06/29/2025 Na 154, BUN and Cr improving. Continuing fluids. NGT coiled overnight. NG Xray showed possible pneumoperitoneum. CT AP without contrast ordered. Showed free air in the abdomen, advised not to use NGT for now. General Surgery consulted to discuss PEG placement.   06/30/2025 Pulled NG tube overnight. Na stable, Cr and BUN continue to improve. Fluids reordered. Discussed PEG replacement with GI and Gen Surg, deferred to IR for replacement. IR reconsulted this morning. Must wait approximately 1 week to re attempt PEG placement. Placement planned for 7/3. Will need to replace NG tube 7/2. NPO at midnight 7/3.   07/01/2025 Switched 0.45 NS to  D5W @60 cc/hr. Hypernatremic at 153. Free water deficit 1.9L   07/02/2025 Hypernatremia improving. Continuing D5W. Plan for G tube tomorrow per IR.   07/03/2025: G tube placement per IR. Decreased D5W to 30 cc/hr.   07/04/2025 S/p G tube placement. Will start tube feeds later, increased statin to 80 mg, added plavix 75 mg, and Niacin 1g nightly.   07/05/2025 NAEON. Started tube feeds. BG optimization ongoing.   07/06/2025 NAEON. Pt with increased creatinine. Gave bolus. Believe JEANNE is combination of prerenal and post renal as he has been retaining urine periodically.     STROKE DOCUMENTATION   Acute Stroke Times   Last Known Normal Date: 06/19/25  Last Known Normal Time: 0400  Symptom Onset Date: 06/19/25  Symptom Onset Time: 0400  Stroke Team Called Date: 06/19/25  Stroke Team Called Time: 0813  Stroke Team Arrival Date: 06/19/25  Stroke Team Arrival Time: 0813  CT Interpretation Time: 0827  Thrombolytic Therapy Recommended:  (already given prior to transfer)  CTA Interpretation Time:  (already completed prior to transfer)  Thrombectomy Recommended: Yes  Decision to Treat Time for IR: 0832    NIH Scale:  1a. Level of Consciousness: 0-->Alert, keenly responsive  1b. LOC Questions: 2-->Answers neither question correctly  1c. LOC Commands: 2-->Performs neither task correctly  2. Best Gaze: 0-->Normal  3. Visual: 0-->No visual loss  4. Facial Palsy: 0-->Normal symmetrical movements  5a. Motor Arm, Left: 0-->No drift, limb holds 90 (or 45) degrees for full 10 secs  5b. Motor Arm, Right: 3-->No effort against gravity, limb falls  6a. Motor Leg, Left: 0-->No drift, leg holds 30 degree position for full 5 secs  6b. Motor Leg, Right: 3-->No effort against gravity, leg falls to bed immediately  7. Limb Ataxia: 0-->Absent  8. Sensory: 0-->Normal, no sensory loss  9. Best Language: 2-->Severe aphasia, all communication is through fragmentary expression, great need for inference, questioning, and guessing by the listener. Range  of information that can be exchanged is limited, listener carries burden of. . . (see row details)  10. Dysarthria: 2-->Severe dysarthria, patients speech is so slurred as to be unintelligible in the absence of or out of proportion to any dysphasia, or is mute/anarthric  11. Extinction and Inattention (formerly Neglect): 0-->No abnormality  Total (NIH Stroke Scale): 14       Modified Nadeem Score: 2  Stephanie Coma Scale:    ABCD2 Score:    [id number] Score:   HAS -BLED Score:   ICH Score:   Hunt & Kimball Classification:      Hemorrhagic change of an Ischemic Stroke: Does this patient have an ischemic stroke with hemorrhagic changes? No     Neurologic Chief Complaint: L MCA stroke    Subjective:     Interval History: Patient is seen for follow-up neurological assessment and treatment recommendations: See hospital course.    HPI, Past Medical, Family, and Social History remains the same as documented in the initial encounter.     Review of Systems   Reason unable to perform ROS: Severe aphasia.     Scheduled Meds:   amLODIPine  10 mg Per G Tube QHS    aspirin  300 mg Rectal Daily    atorvastatin  80 mg Per G Tube Daily    clopidogreL  75 mg Per G Tube Daily    FLUoxetine  20 mg Per G Tube Daily    heparin (porcine)  5,000 Units Subcutaneous Q8H    insulin aspart U-100  6 Units Subcutaneous Q4H    insulin glargine U-100  15 Units Subcutaneous Daily    lactated ringers  500 mL Intravenous Once    metoprolol tartrate  50 mg Per G Tube BID    niacin  1,000 mg Oral QHS    nicotine  1 patch Transdermal Daily    nystatin  500,000 Units Oral QID    polyethylene glycol  17 g Per G Tube BID    QUEtiapine  25 mg Per G Tube QHS    senna-docusate  1 tablet Per G Tube BID     Continuous Infusions:        PRN Meds:  Current Facility-Administered Medications:     bisacodyL, 10 mg, Rectal, Daily PRN    dextrose 50%, 12.5 g, Intravenous, PRN    dextrose 50%, 25 g, Intravenous, PRN    glucagon (human recombinant), 1 mg, Intramuscular,  PRN    hydrALAZINE, 10 mg, Intravenous, Q4H PRN    insulin aspart U-100, 0-10 Units, Subcutaneous, Q4H PRN    labetalol, 10 mg, Intravenous, Q4H PRN    melatonin, 6 mg, Per G Tube, Nightly PRN    simethicone, 1 tablet, Per G Tube, TID PRN    sodium chloride 0.9%, 10 mL, Intravenous, PRN    Objective:     Vital Signs (Most Recent):  Temp: 98 °F (36.7 °C) (07/06/25 0741)  Pulse: 74 (07/06/25 1151)  Resp: 18 (07/06/25 0832)  BP: 113/75 (07/06/25 0741)  SpO2: 95 % (07/06/25 0832)  BP Location: Right arm    Vital Signs Range (Last 24H):  Temp:  [97.8 °F (36.6 °C)-98.6 °F (37 °C)]   Pulse:  []   Resp:  [18]   BP: (100-166)/(61-79)   SpO2:  [93 %-99 %]   BP Location: Right arm       Physical Exam  HENT:      Head: Normocephalic and atraumatic.      Mouth/Throat:      Mouth: Mucous membranes are moist.   Eyes:      Conjunctiva/sclera: Conjunctivae normal.   Cardiovascular:      Rate and Rhythm: Normal rate.   Pulmonary:      Effort: Pulmonary effort is normal.   Skin:     General: Skin is warm and dry.   Neurological:      Mental Status: He is alert.      Motor: Weakness present.              Neurological Exam:   LOC: drowsy  Attention Span: poor  Language: Expressive aphasia, Receptive aphasia  Articulation: Dysarthria  Orientation: Untestable due to severe aphasia   Facial Movement (CN VII): Lower facial weakness on the Right  Motor: Arm left  Normal 5/5  Leg left  Normal 5/5  Arm right  Plegia 0/5  Leg right Paresis: 1/5  Sensation: Cy-hypoesthesia right    Laboratory:  CMP:   Recent Labs   Lab 07/06/25  0459   CALCIUM 8.6*   ALBUMIN 2.5*   PROT 6.2      K 3.6   CO2 24      BUN 25*   CREATININE 1.6*   ALKPHOS 112   ALT 63*   AST 43   BILITOT 0.3     BMP:   Recent Labs   Lab 07/06/25  0459      K 3.6      CO2 24   BUN 25*   CREATININE 1.6*   CALCIUM 8.6*     CBC:   Recent Labs   Lab 07/06/25  0459   WBC 9.56   RBC 4.06*   HGB 11.6*   HCT 36.0*      MCV 89   MCH 28.6   MCHC 32.2  "    Lipid Panel:   No results for input(s): "CHOL", "LDLCALC", "HDL", "TRIG" in the last 168 hours.    Coagulation:   No results for input(s): "PT", "INR", "APTT" in the last 168 hours.    Platelet Aggregation Study: No results for input(s): "PLTAGG", "PLTAGINTERP", "PLTAGREGLACO", "ADPPLTAGGREG" in the last 168 hours.  Hgb A1C:   No results for input(s): "HGBA1C" in the last 168 hours.    TSH:   No results for input(s): "TSH" in the last 168 hours.      Diagnostic Results   Brain Imaging   MRI Brain 6/20/25  Impression:     Acute left MCA distribution infarct.  No new large parenchymal hemorrhage.  Susceptibility artifact throughout the infarcted tissue may reflect contrast staining and/or blood products conversion.  Overall mass effect is increased compared to prior with sulcal effacement throughout the left cerebral hemisphere and approximately 4 mm of rightward midline shift.     Stable size of the parenchymal hemorrhage centered in the inferior left temporal lobe.     [facility] 6/20/25: 1242  Impression:     Moderate-sized recent left MCA distribution infarct and small intraparenchymal hemorrhage appear grossly unchanged from prior study performed earlier on the same date.     Chronic ischemic change elsewhere with underlying cerebral and cerebellar volume loss, as before.     No new hemorrhage or major vascular distribution infarct elsewhere.  [facility] 6/20/25: 0834  Impression:     Early ischemic changes in the left MCA distribution, new from recent CT.     New left inferior temporal intraparenchymal hematoma.     Chronic ischemic change with cerebral and cerebellar volume loss, as above.     [facility] 6/19/25  Impression:     Question some early left MCA ischemia corresponding to the patient's known left MCA territory occlusion.  No hemorrhage.     Senescent changes as above.        All CT scans at this facility are performed  using dose modulation techniques as appropriate to performed exam including the following:  " automated exposure control; adjustment of mA and/or kV according to the patients size (this includes techniques or standardized protocols for targeted exams where dose is matched to indication/reason for exam: i.e. extremities or head);  iterative reconstruction technique.     Vessel Imaging   CTA head and neck 6/19/25  Impression:     Distal left M1 MCA occlusion with relatively prompt collateralization of the more distal MCA vessels.     Severe stenosis left mid vertebral artery and left PCA.     Cardiac Imaging   TTE 6/19/25    Left Ventricle: The left ventricle is normal in size. Normal wall thickness. There is normal systolic function with a visually estimated ejection fraction of 60 - 65%.    Right Ventricle: The right ventricle is normal in size Wall thickness is normal. Systolic function is normal.    The pulmonary artery pressure could not be estimated.    IVC/SVC: Normal venous pressure at 3 mmHg.       Cullen Almanzar MD  Comprehensive Stroke Center  Department of Vascular Neurology   [facility])

## 2025-07-06 NOTE — ASSESSMENT & PLAN NOTE
Pt with JEANNE from combination of post renal and prerenal. Episodes of periodic retention.     Plan  - Straight cath prn  - fluids prn

## 2025-07-07 ENCOUNTER — PATIENT OUTREACH (OUTPATIENT)
Dept: ADMINISTRATIVE | Facility: HOSPITAL | Age: 70
End: 2025-07-07
Payer: MEDICARE

## 2025-07-07 LAB
ABSOLUTE EOSINOPHIL (OHS): 0.2 K/UL
ABSOLUTE MONOCYTE (OHS): 0.84 K/UL (ref 0.3–1)
ABSOLUTE NEUTROPHIL COUNT (OHS): 10.72 K/UL (ref 1.8–7.7)
ALBUMIN SERPL BCP-MCNC: 2.4 G/DL (ref 3.5–5.2)
ALP SERPL-CCNC: 109 UNIT/L (ref 40–150)
ALT SERPL W/O P-5'-P-CCNC: 48 UNIT/L (ref 10–44)
ANION GAP (OHS): 8 MMOL/L (ref 8–16)
AST SERPL-CCNC: 29 UNIT/L (ref 11–45)
BASOPHILS # BLD AUTO: 0.04 K/UL
BASOPHILS NFR BLD AUTO: 0.3 %
BILIRUB SERPL-MCNC: 0.2 MG/DL (ref 0.1–1)
BUN SERPL-MCNC: 29 MG/DL (ref 8–23)
CALCIUM SERPL-MCNC: 8.3 MG/DL (ref 8.7–10.5)
CHLORIDE SERPL-SCNC: 107 MMOL/L (ref 95–110)
CO2 SERPL-SCNC: 25 MMOL/L (ref 23–29)
CREAT SERPL-MCNC: 1.6 MG/DL (ref 0.5–1.4)
CREAT UR-MCNC: 119 MG/DL (ref 23–375)
ERYTHROCYTE [DISTWIDTH] IN BLOOD BY AUTOMATED COUNT: 12.8 % (ref 11.5–14.5)
GFR SERPLBLD CREATININE-BSD FMLA CKD-EPI: 45 ML/MIN/1.73/M2
GLUCOSE SERPL-MCNC: 191 MG/DL (ref 70–110)
HCT VFR BLD AUTO: 32.2 % (ref 40–54)
HGB BLD-MCNC: 10.4 GM/DL (ref 14–18)
IMM GRANULOCYTES # BLD AUTO: 0.04 K/UL (ref 0–0.04)
IMM GRANULOCYTES NFR BLD AUTO: 0.3 % (ref 0–0.5)
LYMPHOCYTES # BLD AUTO: 1.77 K/UL (ref 1–4.8)
MAGNESIUM SERPL-MCNC: 1.9 MG/DL (ref 1.6–2.6)
MCH RBC QN AUTO: 29.1 PG (ref 27–31)
MCHC RBC AUTO-ENTMCNC: 32.3 G/DL (ref 32–36)
MCV RBC AUTO: 90 FL (ref 82–98)
NUCLEATED RBC (/100WBC) (OHS): 0 /100 WBC
PHOSPHATE SERPL-MCNC: 2.9 MG/DL (ref 2.7–4.5)
PLATELET # BLD AUTO: 229 K/UL (ref 150–450)
PMV BLD AUTO: 12.6 FL (ref 9.2–12.9)
POCT GLUCOSE: 220 MG/DL (ref 70–110)
POCT GLUCOSE: 225 MG/DL (ref 70–110)
POCT GLUCOSE: 237 MG/DL (ref 70–110)
POCT GLUCOSE: 282 MG/DL (ref 70–110)
POCT GLUCOSE: 292 MG/DL (ref 70–110)
POCT GLUCOSE: 304 MG/DL (ref 70–110)
POCT GLUCOSE: 349 MG/DL (ref 70–110)
POTASSIUM SERPL-SCNC: 4.1 MMOL/L (ref 3.5–5.1)
PROT SERPL-MCNC: 5.9 GM/DL (ref 6–8.4)
PROT UR-MCNC: 17 MG/DL
PROT/CREAT UR: 0.14 MG/G{CREAT}
RBC # BLD AUTO: 3.58 M/UL (ref 4.6–6.2)
RELATIVE EOSINOPHIL (OHS): 1.5 %
RELATIVE LYMPHOCYTE (OHS): 13 % (ref 18–48)
RELATIVE MONOCYTE (OHS): 6.2 % (ref 4–15)
RELATIVE NEUTROPHIL (OHS): 78.7 % (ref 38–73)
SODIUM SERPL-SCNC: 140 MMOL/L (ref 136–145)
WBC # BLD AUTO: 13.61 K/UL (ref 3.9–12.7)

## 2025-07-07 PROCEDURE — 25000003 PHARM REV CODE 250: Mod: HCNC

## 2025-07-07 PROCEDURE — 92507 TX SP LANG VOICE COMM INDIV: CPT | Mod: HCNC

## 2025-07-07 PROCEDURE — 36415 COLL VENOUS BLD VENIPUNCTURE: CPT | Mod: HCNC

## 2025-07-07 PROCEDURE — 84100 ASSAY OF PHOSPHORUS: CPT | Mod: HCNC

## 2025-07-07 PROCEDURE — 83735 ASSAY OF MAGNESIUM: CPT | Mod: HCNC

## 2025-07-07 PROCEDURE — 84156 ASSAY OF PROTEIN URINE: CPT | Mod: HCNC

## 2025-07-07 PROCEDURE — 94761 N-INVAS EAR/PLS OXIMETRY MLT: CPT | Mod: HCNC

## 2025-07-07 PROCEDURE — 25000003 PHARM REV CODE 250: Mod: HCNC | Performed by: NURSE PRACTITIONER

## 2025-07-07 PROCEDURE — 25000003 PHARM REV CODE 250: Mod: HCNC | Performed by: PHYSICIAN ASSISTANT

## 2025-07-07 PROCEDURE — 11000001 HC ACUTE MED/SURG PRIVATE ROOM: Mod: HCNC

## 2025-07-07 PROCEDURE — 80053 COMPREHEN METABOLIC PANEL: CPT | Mod: HCNC

## 2025-07-07 PROCEDURE — 92526 ORAL FUNCTION THERAPY: CPT | Mod: HCNC

## 2025-07-07 PROCEDURE — 99233 SBSQ HOSP IP/OBS HIGH 50: CPT | Mod: HCNC,GC,, | Performed by: PSYCHIATRY & NEUROLOGY

## 2025-07-07 PROCEDURE — 63600175 PHARM REV CODE 636 W HCPCS: Mod: HCNC

## 2025-07-07 PROCEDURE — S4991 NICOTINE PATCH NONLEGEND: HCPCS | Mod: HCNC

## 2025-07-07 PROCEDURE — A4216 STERILE WATER/SALINE, 10 ML: HCPCS | Mod: HCNC

## 2025-07-07 PROCEDURE — 85025 COMPLETE CBC W/AUTO DIFF WBC: CPT | Mod: HCNC

## 2025-07-07 RX ADMIN — METOPROLOL TARTRATE 50 MG: 50 TABLET, FILM COATED ORAL at 08:07

## 2025-07-07 RX ADMIN — INSULIN ASPART 4 UNITS: 100 INJECTION, SOLUTION INTRAVENOUS; SUBCUTANEOUS at 01:07

## 2025-07-07 RX ADMIN — SODIUM CHLORIDE, POTASSIUM CHLORIDE, SODIUM LACTATE AND CALCIUM CHLORIDE: 600; 310; 30; 20 INJECTION, SOLUTION INTRAVENOUS at 05:07

## 2025-07-07 RX ADMIN — NYSTATIN 500000 UNITS: 100000 SUSPENSION ORAL at 08:07

## 2025-07-07 RX ADMIN — NYSTATIN 500000 UNITS: 100000 SUSPENSION ORAL at 09:07

## 2025-07-07 RX ADMIN — INSULIN ASPART 2 UNITS: 100 INJECTION, SOLUTION INTRAVENOUS; SUBCUTANEOUS at 03:07

## 2025-07-07 RX ADMIN — INSULIN ASPART 6 UNITS: 100 INJECTION, SOLUTION INTRAVENOUS; SUBCUTANEOUS at 08:07

## 2025-07-07 RX ADMIN — INSULIN ASPART 6 UNITS: 100 INJECTION, SOLUTION INTRAVENOUS; SUBCUTANEOUS at 04:07

## 2025-07-07 RX ADMIN — ASPIRIN 300 MG: 300 SUPPOSITORY RECTAL at 09:07

## 2025-07-07 RX ADMIN — ATORVASTATIN CALCIUM 80 MG: 40 TABLET, FILM COATED ORAL at 09:07

## 2025-07-07 RX ADMIN — Medication 1 PATCH: at 09:07

## 2025-07-07 RX ADMIN — Medication 500 MG: at 09:07

## 2025-07-07 RX ADMIN — Medication 500 MG: at 04:07

## 2025-07-07 RX ADMIN — NYSTATIN 500000 UNITS: 100000 SUSPENSION ORAL at 12:07

## 2025-07-07 RX ADMIN — HEPARIN SODIUM 5000 UNITS: 5000 INJECTION INTRAVENOUS; SUBCUTANEOUS at 09:07

## 2025-07-07 RX ADMIN — INSULIN ASPART 8 UNITS: 100 INJECTION, SOLUTION INTRAVENOUS; SUBCUTANEOUS at 04:07

## 2025-07-07 RX ADMIN — METOPROLOL TARTRATE 50 MG: 50 TABLET, FILM COATED ORAL at 09:07

## 2025-07-07 RX ADMIN — CLOPIDOGREL 75 MG: 75 TABLET ORAL at 09:07

## 2025-07-07 RX ADMIN — Medication 6 MG: at 08:07

## 2025-07-07 RX ADMIN — POLYETHYLENE GLYCOL 3350 17 G: 17 POWDER, FOR SOLUTION ORAL at 08:07

## 2025-07-07 RX ADMIN — INSULIN ASPART 6 UNITS: 100 INJECTION, SOLUTION INTRAVENOUS; SUBCUTANEOUS at 11:07

## 2025-07-07 RX ADMIN — NYSTATIN 500000 UNITS: 100000 SUSPENSION ORAL at 04:07

## 2025-07-07 RX ADMIN — INSULIN ASPART 6 UNITS: 100 INJECTION, SOLUTION INTRAVENOUS; SUBCUTANEOUS at 01:07

## 2025-07-07 RX ADMIN — AMLODIPINE BESYLATE 10 MG: 10 TABLET ORAL at 08:07

## 2025-07-07 RX ADMIN — SENNOSIDES AND DOCUSATE SODIUM 1 TABLET: 50; 8.6 TABLET ORAL at 08:07

## 2025-07-07 RX ADMIN — INSULIN GLARGINE 15 UNITS: 100 INJECTION, SOLUTION SUBCUTANEOUS at 09:07

## 2025-07-07 RX ADMIN — HEPARIN SODIUM 5000 UNITS: 5000 INJECTION INTRAVENOUS; SUBCUTANEOUS at 05:07

## 2025-07-07 RX ADMIN — INSULIN ASPART 2 UNITS: 100 INJECTION, SOLUTION INTRAVENOUS; SUBCUTANEOUS at 11:07

## 2025-07-07 RX ADMIN — FLUOXETINE HYDROCHLORIDE 20 MG: 20 CAPSULE ORAL at 09:07

## 2025-07-07 RX ADMIN — INSULIN ASPART 6 UNITS: 100 INJECTION, SOLUTION INTRAVENOUS; SUBCUTANEOUS at 03:07

## 2025-07-07 RX ADMIN — Medication 10 ML: at 09:07

## 2025-07-07 RX ADMIN — QUETIAPINE FUMARATE 25 MG: 25 TABLET ORAL at 08:07

## 2025-07-07 RX ADMIN — INSULIN ASPART 6 UNITS: 100 INJECTION, SOLUTION INTRAVENOUS; SUBCUTANEOUS at 09:07

## 2025-07-07 RX ADMIN — HEPARIN SODIUM 5000 UNITS: 5000 INJECTION INTRAVENOUS; SUBCUTANEOUS at 02:07

## 2025-07-07 RX ADMIN — INSULIN ASPART 4 UNITS: 100 INJECTION, SOLUTION INTRAVENOUS; SUBCUTANEOUS at 08:07

## 2025-07-07 NOTE — SUBJECTIVE & OBJECTIVE
Neurologic Chief Complaint: L MCA stroke    Subjective:     Interval History: Patient is seen for follow-up neurological assessment and treatment recommendations: See hospital course.    HPI, Past Medical, Family, and Social History remains the same as documented in the initial encounter.     Review of Systems   Reason unable to perform ROS: Severe aphasia.     Scheduled Meds:   amLODIPine  10 mg Per G Tube QHS    aspirin  300 mg Rectal Daily    atorvastatin  80 mg Per G Tube Daily    clopidogreL  75 mg Per G Tube Daily    FLUoxetine  20 mg Per G Tube Daily    heparin (porcine)  5,000 Units Subcutaneous Q8H    insulin aspart U-100  6 Units Subcutaneous Q4H    insulin glargine U-100  15 Units Subcutaneous Daily    metoprolol tartrate  50 mg Per G Tube BID    niacin  500 mg Per G Tube BID WM    nicotine  1 patch Transdermal Daily    nystatin  500,000 Units Oral QID    polyethylene glycol  17 g Per G Tube BID    QUEtiapine  25 mg Per G Tube QHS    senna-docusate  1 tablet Per G Tube BID     Continuous Infusions:        PRN Meds:  Current Facility-Administered Medications:     bisacodyL, 10 mg, Rectal, Daily PRN    dextrose 50%, 12.5 g, Intravenous, PRN    dextrose 50%, 25 g, Intravenous, PRN    glucagon (human recombinant), 1 mg, Intramuscular, PRN    hydrALAZINE, 10 mg, Intravenous, Q4H PRN    insulin aspart U-100, 0-10 Units, Subcutaneous, Q4H PRN    labetalol, 10 mg, Intravenous, Q4H PRN    melatonin, 6 mg, Per G Tube, Nightly PRN    simethicone, 1 tablet, Per G Tube, TID PRN    sodium chloride 0.9%, 10 mL, Intravenous, PRN    Objective:     Vital Signs (Most Recent):  Temp: 98.9 °F (37.2 °C) (07/07/25 0812)  Pulse: 76 (07/07/25 1058)  Resp: 16 (07/07/25 0341)  BP: 130/74 (07/07/25 0812)  SpO2: (!) 91 % (07/07/25 0812)  BP Location: Left arm    Vital Signs Range (Last 24H):  Temp:  [97.8 °F (36.6 °C)-98.9 °F (37.2 °C)]   Pulse:  [68-93]   Resp:  [16-19]   BP: (111-159)/(65-89)   SpO2:  [91 %-100 %]   BP Location:  "Left arm       Physical Exam  HENT:      Head: Normocephalic and atraumatic.      Mouth/Throat:      Mouth: Mucous membranes are moist.   Eyes:      Conjunctiva/sclera: Conjunctivae normal.   Cardiovascular:      Rate and Rhythm: Normal rate.   Pulmonary:      Effort: Pulmonary effort is normal.   Skin:     General: Skin is warm and dry.   Neurological:      Mental Status: He is alert.      Motor: Weakness present.              Neurological Exam:   LOC: drowsy  Attention Span: poor  Language: Expressive aphasia, Receptive aphasia  Articulation: Dysarthria  Orientation: Untestable due to severe aphasia   Facial Movement (CN VII): Lower facial weakness on the Right  Motor: Arm left  Normal 5/5  Leg left  Normal 5/5  Arm right  Plegia 0/5  Leg right Paresis: 1/5  Sensation: Cy-hypoesthesia right    Laboratory:  CMP:   Recent Labs   Lab 07/07/25 0448   CALCIUM 8.3*   ALBUMIN 2.4*   PROT 5.9*      K 4.1   CO2 25      BUN 29*   CREATININE 1.6*   ALKPHOS 109   ALT 48*   AST 29   BILITOT 0.2     BMP:   Recent Labs   Lab 07/07/25 0448      K 4.1      CO2 25   BUN 29*   CREATININE 1.6*   CALCIUM 8.3*     CBC:   Recent Labs   Lab 07/07/25  0448   WBC 13.61*   RBC 3.58*   HGB 10.4*   HCT 32.2*      MCV 90   MCH 29.1   MCHC 32.3     Lipid Panel:   No results for input(s): "CHOL", "LDLCALC", "HDL", "TRIG" in the last 168 hours.    Coagulation:   No results for input(s): "PT", "INR", "APTT" in the last 168 hours.    Platelet Aggregation Study: No results for input(s): "PLTAGG", "PLTAGINTERP", "PLTAGREGLACO", "ADPPLTAGGREG" in the last 168 hours.  Hgb A1C:   No results for input(s): "HGBA1C" in the last 168 hours.    TSH:   No results for input(s): "TSH" in the last 168 hours.      Diagnostic Results   Brain Imaging   MRI Brain 6/20/25  Impression:     Acute left MCA distribution infarct.  No new large parenchymal hemorrhage.  Susceptibility artifact throughout the infarcted tissue may reflect " contrast staining and/or blood products conversion.  Overall mass effect is increased compared to prior with sulcal effacement throughout the left cerebral hemisphere and approximately 4 mm of rightward midline shift.     Stable size of the parenchymal hemorrhage centered in the inferior left temporal lobe.     [facility] 6/20/25: 1242  Impression:     Moderate-sized recent left MCA distribution infarct and small intraparenchymal hemorrhage appear grossly unchanged from prior study performed earlier on the same date.     Chronic ischemic change elsewhere with underlying cerebral and cerebellar volume loss, as before.     No new hemorrhage or major vascular distribution infarct elsewhere.  CT 6/20/25: 0834  Impression:     Early ischemic changes in the left MCA distribution, new from recent CT.     New left inferior temporal intraparenchymal hematoma.     Chronic ischemic change with cerebral and cerebellar volume loss, as above.     [facility] 6/19/25  Impression:     Question some early left MCA ischemia corresponding to the patient's known left MCA territory occlusion.  No hemorrhage.     Senescent changes as above.        All CT scans at this facility are performed  using dose modulation techniques as appropriate to performed exam including the following:  automated exposure control; adjustment of mA and/or kV according to the patients size (this includes techniques or standardized protocols for targeted exams where dose is matched to indication/reason for exam: i.e. extremities or head);  iterative reconstruction technique.     Vessel Imaging   CTA head and neck 6/19/25  Impression:     Distal left M1 MCA occlusion with relatively prompt collateralization of the more distal MCA vessels.     Severe stenosis left mid vertebral artery and left PCA.     Cardiac Imaging   TTE 6/19/25    Left Ventricle: The left ventricle is normal in size. Normal wall thickness. There is normal systolic function with a visually estimated ejection  fraction of 60 - 65%.    Right Ventricle: The right ventricle is normal in size Wall thickness is normal. Systolic function is normal.    The pulmonary artery pressure could not be estimated.    IVC/SVC: Normal venous pressure at 3 mmHg.

## 2025-07-07 NOTE — PLAN OF CARE
Problem: Adult Inpatient Plan of Care  Goal: Patient-Specific Goal (Individualized)  Problem: Adult Inpatient Plan of Care  Goal: Optimal Comfort and Wellbeing  Outcome: Progressing  Goal: Readiness for Transition of Care  Outcome: Progressing     Problem: Restraint, Nonviolent  Goal: Absence of Harm or Injury  Outcome: Progressing     Outcome: Progressing         POC updated and reviewed with the patient at the bedside. Questions regarding POC were encouraged and addressed. VSS, see flowsheets. Tele maintained per provider's order.  NAEON. Seizure, fall, and safety precautions maintained, no signs of injury noted during shift. Patient repositioned independently/ with assistance in bed for comfort. Upon exiting room, patient's bed locked in low position, side rails up x 3, bed alarm set, with call light within reach. Instructed patient to call staff for mobility. Stroke book and stroke education reviewed with the patient at the bedside, see education flowsheets for details. No acute signs of distress noted at this time.

## 2025-07-07 NOTE — PLAN OF CARE
[facility] - Neurosurgery (Hospital)  Discharge Reassessment    Primary Care Provider: Rell Winn MD    Expected Discharge Date: 7/8/2025    Reassessment (most recent)       Discharge Reassessment - 07/07/25 1545          Discharge Reassessment    Assessment Type Discharge Planning Reassessment     Did the patient's condition or plan change since previous assessment? No     Discharge Plan discussed with: Spouse/sig other     Communicated ESSIE with patient/caregiver Yes     Discharge Plan A Rehab     Discharge Plan B Skilled Nursing Facility     DME Needed Upon Discharge  other (see comments)   TBD    Transition of Care Barriers None     Why the patient remains in the hospital Requires continued medical care        Post-Acute Status    Post-Acute Authorization Placement     Post-Acute Placement Status Pending medical clearance/testing                     Discharge Plan A and Plan B have been determined by review of patient's clinical status, future medical and therapeutic needs, and coverage/benefits for post-acute care in coordination with multidisciplinary team members.       Gloria Robertson RN  Ext [phone]

## 2025-07-07 NOTE — ASSESSMENT & PLAN NOTE
Pt with periodic episodes of urinary retention likely contributing to JEANNE.     Plan  - [place]

## 2025-07-07 NOTE — PT/OT/SLP PROGRESS
Speech Language Pathology Treatment    Patient Name:  Cristi Pulido   MRN:  [id number]  Admitting Diagnosis: Embolic stroke involving left middle cerebral artery    Recommendations:     General Recommendations:  Dysphagia therapy and Speech/language therapy  Diet recommendations: PEG tube as primary source of nutrition+ Puree Diet - IDDSI Level 4, Pleasure Feeding, Liquid Diet Level: Thin liquids - IDDSI Level 0 (via tsp).  Aspiration Precautions: Continue alternate means of nutrition, Frequent oral care, and Strict aspiration precautions   General Precautions: Standard, aspiration, fall, pureed diet  Communication strategies:  yes/no questions only, provide increased time to answer, and go to room if call light pushed  Discharge recommendations:  High Intensity Therapy     Assessment:     Cristi Pulido is a 70 y.o. male with an SLP diagnosis of Aphasia, Dysphagia, and Dysarthria.      Subjective     Pt awake in bed. PEG tube in place. RN at bedside.     Pain/Comfort:   None observed    Respiratory Status: Room air    Objective:     Has the patient been evaluated by SLP for swallowing?   Yes  Keep patient NPO? No     Pt seen bedside for ongoing dysphagia and speech therapies.  Pt with ongoing humming, babbling/mumbling and nonsensical speech with varying inflection/intonation across session. Pt answered simple Y/N questions with 50% acc this date, though continues to be unreliable. He followed simple 1 step commands with 10% acc following clinician model and max cuing. Unable to elicit automatic speech responses and pt remains highly unintelligible when attempting to express himself.  Pt not able to ID common objects by pointing or grabbing or eye gaze from FC x 2 this date. HOB elevated and feed assist provided with trials of thin liquids x 6 oz from tsp, cup and straw. Pt with ongoing significant anterior loss from cup rim and coughing from straw sips. No s/sx of airway compromise with tsp trials of thin liquids and tsp  trials of puree. He consumed entire pudding cup and per RN completed entire puree breakfast tray. Continue his current diet of PEG tube feeds + pleasure feeds of puree and thin via tsp.     Goals:   Multidisciplinary Problems       SLP Goals          Problem: SLP    Goal Priority Disciplines Outcome   SLP Goal     SLP Progressing   Description: Speech Language Pathology Goals  Goals expected to be met by 7/3    1. Pt will participate in ongoing swallow assessment to determine least restrictive PO diet.    2. Pt will participate in ongoing cognitive-linguistic assessment to determine additional therapeutic needs.   3. Pt will follow 1 step commands with 80% acc following model/prompt.   4. Pt will answer simple Y/N questions with 80% acc.                                Plan:     Patient to be seen:  4 x/week   Plan of Care expires:  07/19/25  Plan of Care reviewed with:  patient (RN)   SLP Follow-Up:  Yes       Time Tracking:     SLP Treatment Date:   07/07/25  Speech Start Time:  1023  Speech Stop Time:  1039     Speech Total Time (min):  16 min    Billable Minutes: Speech Therapy Individual 8 and Treatment Swallowing Dysfunction 8    07/07/2025

## 2025-07-07 NOTE — PT/OT/SLP PROGRESS
Occupational Therapy      Patient Name:  Cristi Pulido   MRN:  [id number]    Patient not seen today secondary to having EMG completed. OT unable to make PM attempt. Will follow-up per POC.    7/7/2025

## 2025-07-07 NOTE — PROGRESS NOTES
[facility] - Neurosurgery ([facility])  Vascular Neurology  Comprehensive Stroke Center  Progress Note    Assessment/Plan:     * Embolic stroke involving left middle cerebral artery  Cristi Pulido is a 70 y.o. male w/ PMH of HTN, DM, prior stroke w/ residual RSW who presents as a transfer from OSH after presenting with acute onset of dysarthria and RSW. Telestroke was completed and his NIHSS was 19. CT showed probable early ischemic changes in the L insular ribbon and L frontal lobe. CTA demonstrated occlusion of the distal L MCA M1 segment. He was given TNK at 0548. Patient was transferred to [facility] [place] management. On arrival, repeat NIHSS of 24. Patient taken for repeat CTH which showed small left temporal hemorrhage along with continued early ischemic changes. Patient taken to IR for thrombectomy and to be admitted to NCC post-procedure.  S/P thrombectomy. TICI 3. MRI revealing for blood products in stroke bed, likely reperfusion injury. Etiology ESUS.     07/07/2025 NAEON. Pt creatinine not improved despite fluids and underwood. Ordered Renal US.   Antithrombotics for secondary stroke prevention: Antiplatelets: Aspirin: 81 mg daily  Clopidogrel: 75 mg daily,      Statins for secondary stroke prevention and hyperlipidemia, if present:   Statins: Atorvastatin- 80 mg daily    Aggressive risk factor modification: HTN, Smoking, DM, HLD     Rehab efforts: The patient has been evaluated by a stroke team provider and the therapy needs have been fully considered based off the presenting complaints and exam findings. The following therapy evaluations are needed: PT evaluate and treat, OT evaluate and treat, SLP evaluate and treat, PM&R evaluate for appropriate placement, current recommendation HIT    Diagnostics ordered/pending: None     VTE prophylaxis: Enoxaparin 40 mg SQ every 24 hours  Mechanical prophylaxis: Place SCDs    BP parameters: Infarct: Post sucessful thrombectomy, SBP <140        JEANNE (acute kidney injury)  Pt  with JEANNE from combination of post renal and prerenal. Episodes of periodic retention.     Plan  - f/u renal US    Urinary retention  Pt with periodic episodes of urinary retention likely contributing to JEANNE.     Plan  - underwood    Encounter for nasogastric tube placement  PEG removed per patient.   IR consulted for replacement. Attempt unsuccessful, will attempt again week of 6/30.   NGT replaced. TF and FWF resumed.     Restlessness  -6/24: QTc 441   -Seroquel 25 mg QHS     Hyperlipemia  -Stroke risk factor  -LDL 75, goal <70  -Atorvastatin 40 mg daily      Right sided weakness  -Secondary to stroke.   -Aggressive therapy     Dysarthria and anarthria  -Therapy eval and treat    Aphasia  -Secondary to stroke  -Aggressive therapy     Type 2 diabetes mellitus without complication, without long-term current use of insulin  Lab Results   Component Value Date    LABA1C 7.2 (H) 01/29/2018    HGBA1C 7.0 (H) 06/19/2025     Follow up repeat A1c. Hold home antihyperglycemics. SSI for goal -180 while in hospital  -Added Lantus 15u     Hypertension associated with diabetes  -Stroke risk factor  -SBP goal <140, maintain MAP >65  -BP range in the last 24 hrs: BP  Min: 139/82  Max: 169/93  -Current antihypertensive regimen:     -Amlodipine 10mg     -Lisinopril still on hold for now    -Metoprolol 50mg BID   --PRN labetalol/hydralazine     Tobacco dependence  Stroke risk factor  - on cessation  -nicotine patch PRN         06/20/2025 NAEON. S/P thrombectomy. TICI 3. TNK monitoring ended at 0548. Aphasic, follows no commands. RSW remains. MRI revealing for blood products in stroke bed, likely reperfusion injury. OK to start monotherapy with either plavix or ASA 6/21/25 for secondary stroke prevention. ECHO unremarkable. Family member at bedside agreeable to NGT placement. Etiology ESUS.   6/21/2025 NAEON. Neuro exam stable. Recommend holding AP therapy for now.   06/22/2025 NAEON. Neuro exam stable. Start AP therapy with  ASA.   06/23/2025 NAEON. SLP recs NPO. PEG discussed with family and they are agreeable. Pt S/D to NPU.   06/24/2025 NGT pulled overnight and replaced. Placement confirmed via X ray. IR consulted for PEG placement. JEANNE, Creatinine and BUN uptrending. FWF flushes ordered. Restless during night. EKG repeated. QTc 441. Seroquel 25 mg QHS ordered. Family requesting [facility] IPR when medically ready.   06/25/2025 NAEON. Plans for PEG placement today. BUN and Cr uptrending. Fluids started. Lisinopril held.   06/26/2025 NAEON. PEG placed yesterday, will resume tube feeds this afternoon starting with trickle feeds. BUN and Cr Improved. Increased water boluses to 300.  06/27/2025 NAEON. Neuro exam stable. Patient has tolerated tube feeds well. Will continue to monitor kidney function for now. Trial off restraints today to prepare for rehab placement. Added Lantus 15 units. MBSS today, patient able to have puree for pleasure feeds per speech.   06/28/2025 Na 152. 0.45% NS at 100 ml/hr ordered x 12 hrs. Will repeat sodium draw scheduled at 2100. Pulled PEG. IR unable to replace PEG. Will attempt in coming days. NGT replaced, placement confirmed via Xray. TF and FWF restarted.   06/29/2025 Na 154, BUN and Cr improving. Continuing fluids. NGT coiled overnight. NG Xray showed possible pneumoperitoneum. CT AP without contrast ordered. Showed free air in the abdomen, advised not to use NGT for now. General Surgery consulted to discuss PEG placement.   06/30/2025 Pulled NG tube overnight. Na stable, Cr and BUN continue to improve. Fluids reordered. Discussed PEG replacement with GI and Gen Surg, deferred to IR for replacement. IR reconsulted this morning. Must wait approximately 1 week to re attempt PEG placement. Placement planned for 7/3. Will need to replace NG tube 7/2. NPO at midnight 7/3.   07/01/2025 Switched 0.45 NS to D5W @60 cc/hr. Hypernatremic at 153. Free water deficit 1.9L   07/02/2025 Hypernatremia improving.  Continuing D5W. Plan for G tube tomorrow per IR.   07/03/2025: G tube placement per IR. Decreased D5W to 30 cc/hr.   07/04/2025 S/p G tube placement. Will start tube feeds later, increased statin to 80 mg, added plavix 75 mg, and Niacin 1g nightly.   07/05/2025 NAEON. Started tube feeds. BG optimization ongoing.   07/06/2025 NAEON. Pt with increased creatinine. Gave bolus. Believe JEANNE is combination of prerenal and post renal as he has been retaining urine periodically.   07/07/2025. NAEON. Pt creatinine not improved despite fluids and underwood. Ordered Renal US.     STROKE DOCUMENTATION   Acute Stroke Times   Last Known Normal Date: 06/19/25  Last Known Normal Time: 0400  Symptom Onset Date: 06/19/25  Symptom Onset Time: 0400  Stroke Team Called Date: 06/19/25  Stroke Team Called Time: 0813  Stroke Team Arrival Date: 06/19/25  Stroke Team Arrival Time: 0813  CT Interpretation Time: 0827  Thrombolytic Therapy Recommended:  (already given prior to transfer)  CTA Interpretation Time:  (already completed prior to transfer)  Thrombectomy Recommended: Yes  Decision to Treat Time for IR: 0832    NIH Scale:  1a. Level of Consciousness: 0-->Alert, keenly responsive  1b. LOC Questions: 2-->Answers neither question correctly  1c. LOC Commands: 2-->Performs neither task correctly  2. Best Gaze: 0-->Normal  3. Visual: 0-->No visual loss  4. Facial Palsy: 1-->Minor paralysis (flattened nasolabial fold, asymmetry on smiling)  5a. Motor Arm, Left: 0-->No drift, limb holds 90 (or 45) degrees for full 10 secs  5b. Motor Arm, Right: 3-->No effort against gravity, limb falls  6a. Motor Leg, Left: 0-->No drift, leg holds 30 degree position for full 5 secs  6b. Motor Leg, Right: 3-->No effort against gravity, leg falls to bed immediately  7. Limb Ataxia: 0-->Absent  8. Sensory: 0-->Normal, no sensory loss  9. Best Language: 2-->Severe aphasia, all communication is through fragmentary expression, great need for inference, questioning, and  guessing by the listener. Range of information that can be exchanged is limited, listener carries burden of. . . (see row details)  10. Dysarthria: 2-->Severe dysarthria, patients speech is so slurred as to be unintelligible in the absence of or out of proportion to any dysphasia, or is mute/anarthric  11. Extinction and Inattention (formerly Neglect): 0-->No abnormality  Total (NIH Stroke Scale): 15       Modified [place] Score: 2  [place] Coma Scale:    ABCD2 Score:    [id number] Score:   HAS -BLED Score:   ICH Score:   Hunt & Kimball Classification:      Hemorrhagic change of an Ischemic Stroke: Does this patient have an ischemic stroke with hemorrhagic changes? No     Neurologic Chief Complaint: L MCA stroke    Subjective:     Interval History: Patient is seen for follow-up neurological assessment and treatment recommendations: See hospital course.    HPI, Past Medical, Family, and Social History remains the same as documented in the initial encounter.     Review of Systems   Reason unable to perform ROS: Severe aphasia.     Scheduled Meds:   amLODIPine  10 mg Per G Tube QHS    aspirin  300 mg Rectal Daily    atorvastatin  80 mg Per G Tube Daily    clopidogreL  75 mg Per G Tube Daily    FLUoxetine  20 mg Per G Tube Daily    heparin (porcine)  5,000 Units Subcutaneous Q8H    insulin aspart U-100  6 Units Subcutaneous Q4H    insulin glargine U-100  15 Units Subcutaneous Daily    metoprolol tartrate  50 mg Per G Tube BID    niacin  500 mg Per G Tube BID WM    nicotine  1 patch Transdermal Daily    nystatin  500,000 Units Oral QID    polyethylene glycol  17 g Per G Tube BID    QUEtiapine  25 mg Per G Tube QHS    senna-docusate  1 tablet Per G Tube BID     Continuous Infusions:        PRN Meds:  Current Facility-Administered Medications:     bisacodyL, 10 mg, Rectal, Daily PRN    dextrose 50%, 12.5 g, Intravenous, PRN    dextrose 50%, 25 g, Intravenous, PRN    glucagon (human recombinant), 1 mg, Intramuscular, PRN     hydrALAZINE, 10 mg, Intravenous, Q4H PRN    insulin aspart U-100, 0-10 Units, Subcutaneous, Q4H PRN    labetalol, 10 mg, Intravenous, Q4H PRN    melatonin, 6 mg, Per G Tube, Nightly PRN    simethicone, 1 tablet, Per G Tube, TID PRN    sodium chloride 0.9%, 10 mL, Intravenous, PRN    Objective:     Vital Signs (Most Recent):  Temp: 98.9 °F (37.2 °C) (07/07/25 0812)  Pulse: 76 (07/07/25 1058)  Resp: 16 (07/07/25 0341)  BP: 130/74 (07/07/25 0812)  SpO2: (!) 91 % (07/07/25 0812)  BP Location: Left arm    Vital Signs Range (Last 24H):  Temp:  [97.8 °F (36.6 °C)-98.9 °F (37.2 °C)]   Pulse:  [68-93]   Resp:  [16-19]   BP: (111-159)/(65-89)   SpO2:  [91 %-100 %]   BP Location: Left arm       Physical Exam  HENT:      Head: Normocephalic and atraumatic.      Mouth/Throat:      Mouth: Mucous membranes are moist.   Eyes:      Conjunctiva/sclera: Conjunctivae normal.   Cardiovascular:      Rate and Rhythm: Normal rate.   Pulmonary:      Effort: Pulmonary effort is normal.   Skin:     General: Skin is warm and dry.   Neurological:      Mental Status: He is alert.      Motor: Weakness present.              Neurological Exam:   LOC: drowsy  Attention Span: poor  Language: Expressive aphasia, Receptive aphasia  Articulation: Dysarthria  Orientation: Untestable due to severe aphasia   Facial Movement (CN VII): Lower facial weakness on the Right  Motor: Arm left  Normal 5/5  Leg left  Normal 5/5  Arm right  Plegia 0/5  Leg right Paresis: 1/5  Sensation: Cy-hypoesthesia right    Laboratory:  CMP:   Recent Labs   Lab 07/07/25  0448   CALCIUM 8.3*   ALBUMIN 2.4*   PROT 5.9*      K 4.1   CO2 25      BUN 29*   CREATININE 1.6*   ALKPHOS 109   ALT 48*   AST 29   BILITOT 0.2     BMP:   Recent Labs   Lab 07/07/25 0448      K 4.1      CO2 25   BUN 29*   CREATININE 1.6*   CALCIUM 8.3*     CBC:   Recent Labs   Lab 07/07/25  0448   WBC 13.61*   RBC 3.58*   HGB 10.4*   HCT 32.2*      MCV 90   MCH 29.1   MCHC 32.3  "    Lipid Panel:   No results for input(s): "CHOL", "LDLCALC", "HDL", "TRIG" in the last 168 hours.    Coagulation:   No results for input(s): "PT", "INR", "APTT" in the last 168 hours.    Platelet Aggregation Study: No results for input(s): "PLTAGG", "PLTAGINTERP", "PLTAGREGLACO", "ADPPLTAGGREG" in the last 168 hours.  Hgb A1C:   No results for input(s): "HGBA1C" in the last 168 hours.    TSH:   No results for input(s): "TSH" in the last 168 hours.      Diagnostic Results   Brain Imaging   MRI Brain 6/20/25  Impression:     Acute left MCA distribution infarct.  No new large parenchymal hemorrhage.  Susceptibility artifact throughout the infarcted tissue may reflect contrast staining and/or blood products conversion.  Overall mass effect is increased compared to prior with sulcal effacement throughout the left cerebral hemisphere and approximately 4 mm of rightward midline shift.     Stable size of the parenchymal hemorrhage centered in the inferior left temporal lobe.     [facility] 6/20/25: 1242  Impression:     Moderate-sized recent left MCA distribution infarct and small intraparenchymal hemorrhage appear grossly unchanged from prior study performed earlier on the same date.     Chronic ischemic change elsewhere with underlying cerebral and cerebellar volume loss, as before.     No new hemorrhage or major vascular distribution infarct elsewhere.  [facility] 6/20/25: 0834  Impression:     Early ischemic changes in the left MCA distribution, new from recent CT.     New left inferior temporal intraparenchymal hematoma.     Chronic ischemic change with cerebral and cerebellar volume loss, as above.     [facility] 6/19/25  Impression:     Question some early left MCA ischemia corresponding to the patient's known left MCA territory occlusion.  No hemorrhage.     Senescent changes as above.        All CT scans at this facility are performed  using dose modulation techniques as appropriate to performed exam including the following:  " automated exposure control; adjustment of mA and/or kV according to the patients size (this includes techniques or standardized protocols for targeted exams where dose is matched to indication/reason for exam: i.e. extremities or head);  iterative reconstruction technique.     Vessel Imaging   CTA head and neck 6/19/25  Impression:     Distal left M1 MCA occlusion with relatively prompt collateralization of the more distal MCA vessels.     Severe stenosis left mid vertebral artery and left PCA.     Cardiac Imaging   TTE 6/19/25    Left Ventricle: The left ventricle is normal in size. Normal wall thickness. There is normal systolic function with a visually estimated ejection fraction of 60 - 65%.    Right Ventricle: The right ventricle is normal in size Wall thickness is normal. Systolic function is normal.    The pulmonary artery pressure could not be estimated.    IVC/SVC: Normal venous pressure at 3 mmHg.       Cullen Almanzar MD  Comprehensive Stroke Center  Department of Vascular Neurology   [facility])

## 2025-07-07 NOTE — ASSESSMENT & PLAN NOTE
Pt with JEANNE from combination of post renal and prerenal. Episodes of periodic retention.     Plan  - f/u renal US

## 2025-07-07 NOTE — ASSESSMENT & PLAN NOTE
Cristi Pulido is a 70 y.o. male w/ PMH of HTN, DM, prior stroke w/ residual RSW who presents as a transfer from OSH after presenting with acute onset of dysarthria and RSW. Telestroke was completed and his NIHSS was 19. CT showed probable early ischemic changes in the L insular ribbon and L frontal lobe. CTA demonstrated occlusion of the distal L MCA M1 segment. He was given TNK at 0548. Patient was transferred to C for further management. On arrival, repeat NIHSS of 24. Patient taken for repeat CTH which showed small left temporal hemorrhage along with continued early ischemic changes. Patient taken to IR for thrombectomy and to be admitted to NCC post-procedure.  S/P thrombectomy. TICI 3. MRI revealing for blood products in stroke bed, likely reperfusion injury. Etiology ESUS.     07/07/2025 NAEON. Pt creatinine not improved despite fluids and underwood. Ordered Renal US.   Antithrombotics for secondary stroke prevention: Antiplatelets: Aspirin: 81 mg daily  Clopidogrel: 75 mg daily,      Statins for secondary stroke prevention and hyperlipidemia, if present:   Statins: Atorvastatin- 80 mg daily    Aggressive risk factor modification: HTN, Smoking, DM, HLD     Rehab efforts: The patient has been evaluated by a stroke team provider and the therapy needs have been fully considered based off the presenting complaints and exam findings. The following therapy evaluations are needed: PT evaluate and treat, OT evaluate and treat, SLP evaluate and treat, PM&R evaluate for appropriate placement, current recommendation HIT    Diagnostics ordered/pending: None     VTE prophylaxis: Enoxaparin 40 mg SQ every 24 hours  Mechanical prophylaxis: Place SCDs    BP parameters: Infarct: Post sucessful thrombectomy, SBP <140

## 2025-07-08 LAB
ABSOLUTE EOSINOPHIL (OHS): 0.22 K/UL
ABSOLUTE MONOCYTE (OHS): 0.94 K/UL (ref 0.3–1)
ABSOLUTE NEUTROPHIL COUNT (OHS): 10.65 K/UL (ref 1.8–7.7)
ALBUMIN SERPL BCP-MCNC: 2.2 G/DL (ref 3.5–5.2)
ALP SERPL-CCNC: 103 UNIT/L (ref 40–150)
ALT SERPL W/O P-5'-P-CCNC: 33 UNIT/L (ref 10–44)
ANION GAP (OHS): 7 MMOL/L (ref 8–16)
AST SERPL-CCNC: 20 UNIT/L (ref 11–45)
BASOPHILS # BLD AUTO: 0.03 K/UL
BASOPHILS NFR BLD AUTO: 0.2 %
BILIRUB SERPL-MCNC: 0.2 MG/DL (ref 0.1–1)
BUN SERPL-MCNC: 25 MG/DL (ref 8–23)
CALCIUM SERPL-MCNC: 8.1 MG/DL (ref 8.7–10.5)
CHLORIDE SERPL-SCNC: 105 MMOL/L (ref 95–110)
CO2 SERPL-SCNC: 26 MMOL/L (ref 23–29)
CREAT SERPL-MCNC: 1.2 MG/DL (ref 0.5–1.4)
ERYTHROCYTE [DISTWIDTH] IN BLOOD BY AUTOMATED COUNT: 12.8 % (ref 11.5–14.5)
GFR SERPLBLD CREATININE-BSD FMLA CKD-EPI: >60 ML/MIN/1.73/M2
GLUCOSE SERPL-MCNC: 206 MG/DL (ref 70–110)
HCT VFR BLD AUTO: 29.6 % (ref 40–54)
HGB BLD-MCNC: 9.5 GM/DL (ref 14–18)
IMM GRANULOCYTES # BLD AUTO: 0.05 K/UL (ref 0–0.04)
IMM GRANULOCYTES NFR BLD AUTO: 0.4 % (ref 0–0.5)
LYMPHOCYTES # BLD AUTO: 1.62 K/UL (ref 1–4.8)
MAGNESIUM SERPL-MCNC: 1.7 MG/DL (ref 1.6–2.6)
MCH RBC QN AUTO: 28.5 PG (ref 27–31)
MCHC RBC AUTO-ENTMCNC: 32.1 G/DL (ref 32–36)
MCV RBC AUTO: 89 FL (ref 82–98)
NUCLEATED RBC (/100WBC) (OHS): 0 /100 WBC
PHOSPHATE SERPL-MCNC: 2.5 MG/DL (ref 2.7–4.5)
PLATELET # BLD AUTO: 216 K/UL (ref 150–450)
PMV BLD AUTO: 13.2 FL (ref 9.2–12.9)
POCT GLUCOSE: 244 MG/DL (ref 70–110)
POCT GLUCOSE: 268 MG/DL (ref 70–110)
POCT GLUCOSE: 277 MG/DL (ref 70–110)
POCT GLUCOSE: 291 MG/DL (ref 70–110)
POCT GLUCOSE: 307 MG/DL (ref 70–110)
POCT GLUCOSE: 351 MG/DL (ref 70–110)
POTASSIUM SERPL-SCNC: 4 MMOL/L (ref 3.5–5.1)
PROT SERPL-MCNC: 5.4 GM/DL (ref 6–8.4)
RBC # BLD AUTO: 3.33 M/UL (ref 4.6–6.2)
RELATIVE EOSINOPHIL (OHS): 1.6 %
RELATIVE LYMPHOCYTE (OHS): 12 % (ref 18–48)
RELATIVE MONOCYTE (OHS): 7 % (ref 4–15)
RELATIVE NEUTROPHIL (OHS): 78.8 % (ref 38–73)
SODIUM SERPL-SCNC: 138 MMOL/L (ref 136–145)
WBC # BLD AUTO: 13.51 K/UL (ref 3.9–12.7)

## 2025-07-08 PROCEDURE — 99232 SBSQ HOSP IP/OBS MODERATE 35: CPT | Mod: HCNC,,, | Performed by: NURSE PRACTITIONER

## 2025-07-08 PROCEDURE — 84100 ASSAY OF PHOSPHORUS: CPT | Mod: HCNC

## 2025-07-08 PROCEDURE — 36415 COLL VENOUS BLD VENIPUNCTURE: CPT | Mod: HCNC

## 2025-07-08 PROCEDURE — 83735 ASSAY OF MAGNESIUM: CPT | Mod: HCNC

## 2025-07-08 PROCEDURE — 92507 TX SP LANG VOICE COMM INDIV: CPT | Mod: HCNC

## 2025-07-08 PROCEDURE — 94761 N-INVAS EAR/PLS OXIMETRY MLT: CPT | Mod: HCNC

## 2025-07-08 PROCEDURE — 99233 SBSQ HOSP IP/OBS HIGH 50: CPT | Mod: HCNC,GC,, | Performed by: PSYCHIATRY & NEUROLOGY

## 2025-07-08 PROCEDURE — 80053 COMPREHEN METABOLIC PANEL: CPT | Mod: HCNC

## 2025-07-08 PROCEDURE — 25000003 PHARM REV CODE 250: Mod: HCNC | Performed by: PHYSICIAN ASSISTANT

## 2025-07-08 PROCEDURE — S4991 NICOTINE PATCH NONLEGEND: HCPCS | Mod: HCNC

## 2025-07-08 PROCEDURE — 25000003 PHARM REV CODE 250: Mod: HCNC

## 2025-07-08 PROCEDURE — 92526 ORAL FUNCTION THERAPY: CPT | Mod: HCNC

## 2025-07-08 PROCEDURE — 25000003 PHARM REV CODE 250: Mod: HCNC | Performed by: NURSE PRACTITIONER

## 2025-07-08 PROCEDURE — 97112 NEUROMUSCULAR REEDUCATION: CPT | Mod: HCNC

## 2025-07-08 PROCEDURE — 85025 COMPLETE CBC W/AUTO DIFF WBC: CPT | Mod: HCNC

## 2025-07-08 PROCEDURE — 11000001 HC ACUTE MED/SURG PRIVATE ROOM: Mod: HCNC

## 2025-07-08 PROCEDURE — 63600175 PHARM REV CODE 636 W HCPCS: Mod: HCNC

## 2025-07-08 RX ORDER — CHLORHEXIDINE GLUCONATE ORAL RINSE 1.2 MG/ML
15 SOLUTION DENTAL 2 TIMES DAILY
Status: DISCONTINUED | OUTPATIENT
Start: 2025-07-08 | End: 2025-07-11 | Stop reason: HOSPADM

## 2025-07-08 RX ORDER — INSULIN ASPART 100 [IU]/ML
8 INJECTION, SOLUTION INTRAVENOUS; SUBCUTANEOUS EVERY 4 HOURS
Status: DISCONTINUED | OUTPATIENT
Start: 2025-07-08 | End: 2025-07-10

## 2025-07-08 RX ORDER — MUPIROCIN 20 MG/G
OINTMENT TOPICAL 2 TIMES DAILY
Status: DISCONTINUED | OUTPATIENT
Start: 2025-07-08 | End: 2025-07-11 | Stop reason: HOSPADM

## 2025-07-08 RX ORDER — INSULIN GLARGINE 100 [IU]/ML
20 INJECTION, SOLUTION SUBCUTANEOUS DAILY
Status: DISCONTINUED | OUTPATIENT
Start: 2025-07-08 | End: 2025-07-10

## 2025-07-08 RX ADMIN — POLYETHYLENE GLYCOL 3350 17 G: 17 POWDER, FOR SOLUTION ORAL at 08:07

## 2025-07-08 RX ADMIN — HEPARIN SODIUM 5000 UNITS: 5000 INJECTION INTRAVENOUS; SUBCUTANEOUS at 06:07

## 2025-07-08 RX ADMIN — INSULIN ASPART 3 UNITS: 100 INJECTION, SOLUTION INTRAVENOUS; SUBCUTANEOUS at 11:07

## 2025-07-08 RX ADMIN — INSULIN ASPART 8 UNITS: 100 INJECTION, SOLUTION INTRAVENOUS; SUBCUTANEOUS at 08:07

## 2025-07-08 RX ADMIN — Medication 500 MG: at 04:07

## 2025-07-08 RX ADMIN — INSULIN GLARGINE 20 UNITS: 100 INJECTION, SOLUTION SUBCUTANEOUS at 08:07

## 2025-07-08 RX ADMIN — NYSTATIN 500000 UNITS: 100000 SUSPENSION ORAL at 08:07

## 2025-07-08 RX ADMIN — NYSTATIN 500000 UNITS: 100000 SUSPENSION ORAL at 04:07

## 2025-07-08 RX ADMIN — CHLORHEXIDINE GLUCONATE 0.12% ORAL RINSE 15 ML: 1.2 LIQUID ORAL at 08:07

## 2025-07-08 RX ADMIN — MUPIROCIN: 20 OINTMENT TOPICAL at 08:07

## 2025-07-08 RX ADMIN — INSULIN ASPART 6 UNITS: 100 INJECTION, SOLUTION INTRAVENOUS; SUBCUTANEOUS at 04:07

## 2025-07-08 RX ADMIN — METOPROLOL TARTRATE 50 MG: 50 TABLET, FILM COATED ORAL at 08:07

## 2025-07-08 RX ADMIN — Medication 6 MG: at 08:07

## 2025-07-08 RX ADMIN — Medication 500 MG: at 08:07

## 2025-07-08 RX ADMIN — CLOPIDOGREL 75 MG: 75 TABLET ORAL at 08:07

## 2025-07-08 RX ADMIN — Medication 1 PATCH: at 08:07

## 2025-07-08 RX ADMIN — INSULIN ASPART 2 UNITS: 100 INJECTION, SOLUTION INTRAVENOUS; SUBCUTANEOUS at 04:07

## 2025-07-08 RX ADMIN — INSULIN ASPART 6 UNITS: 100 INJECTION, SOLUTION INTRAVENOUS; SUBCUTANEOUS at 08:07

## 2025-07-08 RX ADMIN — INSULIN ASPART 8 UNITS: 100 INJECTION, SOLUTION INTRAVENOUS; SUBCUTANEOUS at 11:07

## 2025-07-08 RX ADMIN — ATORVASTATIN CALCIUM 80 MG: 40 TABLET, FILM COATED ORAL at 08:07

## 2025-07-08 RX ADMIN — INSULIN ASPART 5 UNITS: 100 INJECTION, SOLUTION INTRAVENOUS; SUBCUTANEOUS at 08:07

## 2025-07-08 RX ADMIN — CHLORHEXIDINE GLUCONATE 0.12% ORAL RINSE 15 ML: 1.2 LIQUID ORAL at 12:07

## 2025-07-08 RX ADMIN — AMLODIPINE BESYLATE 10 MG: 10 TABLET ORAL at 08:07

## 2025-07-08 RX ADMIN — NYSTATIN 500000 UNITS: 100000 SUSPENSION ORAL at 12:07

## 2025-07-08 RX ADMIN — HEPARIN SODIUM 5000 UNITS: 5000 INJECTION INTRAVENOUS; SUBCUTANEOUS at 09:07

## 2025-07-08 RX ADMIN — INSULIN ASPART 8 UNITS: 100 INJECTION, SOLUTION INTRAVENOUS; SUBCUTANEOUS at 12:07

## 2025-07-08 RX ADMIN — FLUOXETINE HYDROCHLORIDE 20 MG: 20 CAPSULE ORAL at 08:07

## 2025-07-08 RX ADMIN — QUETIAPINE FUMARATE 25 MG: 25 TABLET ORAL at 08:07

## 2025-07-08 RX ADMIN — SENNOSIDES AND DOCUSATE SODIUM 1 TABLET: 50; 8.6 TABLET ORAL at 08:07

## 2025-07-08 RX ADMIN — HEPARIN SODIUM 5000 UNITS: 5000 INJECTION INTRAVENOUS; SUBCUTANEOUS at 02:07

## 2025-07-08 RX ADMIN — ASPIRIN 300 MG: 300 SUPPOSITORY RECTAL at 08:07

## 2025-07-08 RX ADMIN — INSULIN ASPART 8 UNITS: 100 INJECTION, SOLUTION INTRAVENOUS; SUBCUTANEOUS at 04:07

## 2025-07-08 NOTE — PROGRESS NOTES
"Zohaib Garrett - Neurosurgery ([facility])  Adult Nutrition  Progress Note    SUMMARY     Recommendations  1. Continue TF recommendation: Glucerna 1.5 at goal rate of 55ml/hr to provide 1320ml of total formula volume, 1980 kcal, 109g PRO, 176g CHO, and 1001ml FF    - FWF to meet RDA, if desired: 160ml Q4hrs to provide an additional 960ml of fluid and 1961 ml TFV    - please continue documenting TF rate via flowsheets    2. If bolus feeds are preferred, recommend to bolus 220ml of Glucerna 1.5 Q4hrs to provide 1320ml of total formula volume, 1980 kcal, 109g PRO, 176g CHO, and 1001ml FF     - FWF to meet RDA if desired: FWF of 80ml before and after each bolus to provide an additional 960ml FF and 1961ml TFV     3. RD to monitor weight, labs, intake, tolerance     4. Recommend daily weights    Goals: 1. % nutritional needs met with EN during admission   2. Maintain weight during admission  Nutrition Goal Status: goal met  Communication of RD Recs:  (POC)    Nutrition Discharge Planning    Nutrition Discharge Planning: Too early to determine, pending clinical course    Reason for Assessment    Reason For Assessment: RD follow-up  Diagnosis: stroke/CVA (Embolic stroke involving left middle cerebral artery)  General Information Comments: RD f/u. Tube feeding infusing at 55 mL/hr via G-tube. SLP rec'd pureed diet yesterday. 100% intake today. 4# wt loss since admission. RD to f/u and monitor.    Nutrition/Diet History    Spiritual, Cultural Beliefs, Moravian Practices, Values that Affect Care: no  Food Allergies: NKFA  Factors Affecting Nutritional Intake: None identified at this time  Nutrition-related SDOH: Unable to assess at this time    Anthropometrics    Height: 5' 10" (177.8 cm)  Height (inches): 70 in  Height Method: Stated  Weight: 87.3 kg (192 lb 7.4 oz)  Weight (lb): 192.46 lb  Weight Method: Bed Scale  Ideal Body Weight (IBW), Male: 166 lb  % Ideal Body Weight, Male (lb): 118.2 %  BMI (Calculated): 27.6  BMI " Grade: 25 - 29.9 - overweight       Lab/Procedures/Meds    Pertinent Labs Reviewed: reviewed  Pertinent Labs Comments: BUN 25 (H), Glu 206 (H), Phos 2.5 (L)  Pertinent Medications Reviewed: reviewed  Pertinent Medications Comments: Atorvastatin, clopidogrel, heparin, insulin, lopressor, bowel reg    Estimated/Assessed Needs    Weight Used For Calorie Calculations: 89 kg (196 lb 3.4 oz)  Energy Calorie Requirements (kcal): 2058 kcal/day (x 1.25 AF)  Energy Need Method: Cameron-St Jeor  Protein Requirements: 107- 134g (1.2-1.5g/kg)  Weight Used For Protein Calculations: 89 kg (196 lb 3.4 oz)  Fluid Requirements (mL): as per MD or RDA  Estimated Fluid Requirement Method: RDA Method  RDA Method (mL): 2058  CHO Requirement: 257 g/day      Nutrition Prescription Ordered    Current Diet Order: NPO  Current Nutrition Support Formula Ordered: Glucerna 1.5  Current Nutrition Support Rate Ordered: 55 (ml)  Current Nutrition Support Frequency Ordered: x 24hrs    Evaluation of Received Nutrient/Fluid Intake    Enteral Calories (kcal): 1980  Enteral Protein (gm): 109  Enteral (Free Water) Fluid (mL): 1001  % Kcal Needs: 96  % Protein Needs: 100  I/O: -5.8 L since admit  Energy Calories Required: meeting needs  Protein Required: meeting needs  Fluid Required:  (per MD)  Comments: LBM 7/8  Tolerance: tolerating  % Intake of Estimated Energy Needs: 75 - 100 %  % Meal Intake: 75 - 100 %    PES Statement  Inadequate enteral nutrition infusion related to  (Infusion volume not reached) as evidenced by  (Inadequate EN volume compared to estimated requirements)  Status: Resolved    Nutrition Risk    Level of Risk/Frequency of Follow-up: high (2/week)     Monitor and Evaluation    Monitor and Evaluation: Enteral and parenteral nutrition administration, Weight, Electrolyte and renal panel, Gastrointestinal profile, Glucose/endocrine profile, Inflammatory profile, Lipid profile, Nutrition focused physical findings, Skin     Nutrition  Follow-Up    RD Follow-up?: Yes

## 2025-07-08 NOTE — PLAN OF CARE
Problem: Adult Inpatient Plan of Care  Goal: Optimal Comfort and Wellbeing  Outcome: Progressing  Goal: Readiness for Transition of Care  Outcome: Progressing     Problem: Acute Kidney Injury/Impairment  Goal: Fluid and Electrolyte Balance  Outcome: Progressing  Goal: Improved Oral Intake  Outcome: Progressing  Goal: Effective Renal Function  Outcome: Progressing       POC updated and reviewed with the patient at the bedside. Questions regarding POC were encouraged and addressed. VSS, see flowsheets. Tele maintained per provider's order. NAEON. Seizure, fall, and safety precautions maintained, no signs of injury noted during shift. Patient repositioned independently/ with assistance in bed for comfort. Upon exiting room, patient's bed locked in low position, side rails up x 3, bed alarm set, with call light within reach. Stroke book and stroke education reviewed with the patient at the bedside, see education flowsheets for details. No acute signs of distress noted at this time.

## 2025-07-08 NOTE — CARE UPDATE
Unit [facility] Care Support Interaction      I have reviewed the chart of Cristi Pulido who is hospitalized for Embolic stroke involving left middle cerebral artery. The patient is currently located in the following unit: npu             I have seen and examined the patient and provided the following support:     MRSA Decolonization - Mupirocin ordered and CHG ordered       Marisa Leal NP    Unit Based [facility]

## 2025-07-08 NOTE — PT/OT/SLP PROGRESS
Physical Therapy Treatment    Patient Name:  Cristi Pulido   MRN:  [id number]    Recommendations:     Discharge Recommendations: High Intensity Therapy  Discharge Equipment Recommendations: wheelchair, hospital bed, lift device  Barriers to discharge: Inaccessible home and Decreased caregiver support    Assessment:     Cristi Pulido is a 70 y.o. male admitted with a medical diagnosis of Embolic stroke involving left middle cerebral artery.  He presents with the following impairments/functional limitations: weakness, gait instability, decreased upper extremity function, decreased ROM, impaired endurance, impaired balance, decreased lower extremity function, visual deficits, decreased safety awareness, impaired fine motor, impaired self care skills, impaired functional mobility, decreased coordination, abnormal tone. Patient continues to require heavy assistance for all mobility. This date he displayed increased pushing with LUE in sitting leading to difficulty with seated balance, however able to progress to short periods of CGA following intervention. Overall he remains participatory and is able to follow some commands with tactile cueing and modeling. Recommending high intensity post-acute therapy after discharge to maximize benefits; patient could tolerate 3xhours/day of combined therapies.      Rehab Prognosis: Good; patient would benefit from acute skilled PT services to address these deficits and reach maximum level of function.    Recent Surgery: * No surgery found *      Plan:     During this hospitalization, patient to be seen 4 x/week to address the identified rehab impairments via gait training, therapeutic activities, therapeutic exercises, neuromuscular re-education and progress toward the following goals:    Plan of Care Expires:  07/22/25    Subjective     Chief Complaint: not stated   Patient/Family Comments/goals: Patient wanting to sit upright and stop lateral leaning activity   Pain/Comfort:  Pain Rating 1:   (not indicated)      Objective:     Communicated with RN prior to session.  Patient found HOB elevated with telemetry, bed alarm, SCD, underwood catheter, G/J tube upon PT entry to room.     General Precautions: Standard, aspiration, fall, pureed diet  Orthopedic Precautions: N/A  Braces: N/A  Respiratory Status: Room air     Cognition:   Affect: pleasant, cooperative, and confused  Alertness: eyes open 100 % of session  Insight: poor insight into deficits, decreased safety awareness, and impulsive   Attention: requires redirection to task  Command Following: patient follows no verbal-only commands  Improved success with tactile cueing/modeling commands   Communication: intermittent verbalizations however unintelligible     Functional Mobility:  Bed Mobility:     Rolling Right: total assistance  Scooting: maximal assistance  Supine to Sit: maximal assistance and of 2 persons  Sit to Supine: maximal assistance and of 2 persons    Transfers:     Sit to Stand:  total assistance and of 2 persons with hand-held assist  Patient lifting RLE up off ground, unable to block knee or facilitate WB  R lean in standing, only able to tolerate ~10 seconds standing     Balance:   Sitting static: totalA-CGA  R lean and pushing with LUE  Sitting dynamic: totalA-maxA  Standing static: totalA of 2      AM-PAC 6 CLICK MOBILITY  Turning over in bed (including adjusting bedclothes, sheets and blankets)?: 2  Sitting down on and standing up from a chair with arms (e.g., wheelchair, bedside commode, etc.): 2  Moving from lying on back to sitting on the side of the bed?: 2  Moving to and from a bed to a chair (including a wheelchair)?: 1  Need to walk in hospital room?: 1  Climbing 3-5 steps with a railing?: 1  Basic Mobility Total Score: 9       Treatment & Education:  Active reorientation provided throughout session with cues for person/place/situation/date with patient assessed for accuracy and carryover of information.    Cues during rolling  and bed mobility for UE reaching and positioning on bed railings, LE management with rolling technique, cues for visual attention to UE for initiation. Assistance as above.   EOB balance with cues to engage core mm to decrease assistance needed and increase safety in sitting.  Verbal and tactile cues with assist during STS transfer for optimal LOU prior to transfer, forward weight shift to initiate, to engage LE mm, extend hips forward and bring head and chest up to achieve full upright stance.    Seated leaning to L to decrease LUE pushing.   Mobility performed with assistance from rehab tech, under direct supervision and direction of therapist.   Seated BLE kicks with tactile cueing and AAROM to initiate.    Patient left HOB elevated with all lines intact, call button in reach, bed alarm on, and RN notified.    GOALS:   Multidisciplinary Problems       Physical Therapy Goals          Problem: Physical Therapy    Goal Priority Disciplines Outcome Interventions   Physical Therapy Goal     PT, PT/OT Progressing    Description: Goals to be met by: 25 ext 25    Patient will increase functional independence with mobility by performin. Supine to sit with Contact Guard Assistance  2. Sit to supine with Contact Guard Assistance  3. Sit to stand transfer with Moderate Assistance  4. Bed to chair transfer with Maximum Assistance using No Assistive Device  5. Gait  x 10 feet with Maximum Assistance using No Assistive Device.   6. Sitting at edge of bed x5 minutes with Stand-by Assistance  7. Follow 100% of 1-step commands throughout session                         Time Tracking:     PT Received On: 25  PT Start Time: 907     PT Stop Time: 938  PT Total Time (min): 31 min     Billable Minutes: Neuromuscular Re-education 31 minutes    Treatment Type: Treatment  PT/PTA: PT     Number of PTA visits since last PT visit: 0     2025

## 2025-07-08 NOTE — PROGRESS NOTES
Zohaib Garrett - Neurosurgery ([facility])  Physical Medicine & Rehab  Progress Note    Patient Name: Cristi Pulido  MRN: [id number]  Admission Date: 6/19/2025  Length of Stay: 19 days  Attending Physician: Tom Castaneda MD    Subjective:     Principal Problem:Embolic stroke involving left middle cerebral artery    Hospital Course:   Per chart review,    PT- 07//08    Functional Mobility:  Bed Mobility:     Rolling Right: total assistance  Scooting: maximal assistance  Supine to Sit: maximal assistance and of 2 persons  Sit to Supine: maximal assistance and of 2 persons     Transfers:     Sit to Stand:  total assistance and of 2 persons with hand-held assist    PT- 07/02    Functional Mobility:  Bed Mobility:     Rolling Left:  total assistance  Rolling Right: total assistance  Scooting at EOB: contact guard assistance (L hip only, to adjust position)   With tactile cueing, patient able to scoot L hip back using LUE/LE to push and control trunk   Supine to Sit: maximal assistance  To R following roll, patient assisting by hooking LUE around therapist to pull trunk into sitting   Sit to Supine: maximal assistance and of 2 persons     Transfers:     Sit to Stand:  maximal assistance and of 2 persons with hand-held assist      OT-06/23    Bed Mobility:  Patient completed Rolling/Turning to Left with total assistance and 2 persons  Patient completed Rolling/Turning to Right with total assistance and 2 persons  Patient completed Supine to Sit with total assistance and 2 persons  Patient completed anterior Scooting towards the EOB with total assistance  Patient completed Sit to Supine with total assistance and 2 persons  Patient completed Scooting/Bridging towards the HOB via drawsheet with total assistance and 2 persons     Functional Mobility/Transfers:  Not performed at this time due to patient requiring increased A to maintain sitting balance at EOB, as well as patient noted with increased fatigue following completion of EOB  activity.     Activities of Daily Living:  Lower Body Dressing: total assistance to adjust B socks to ensure proper fit at bed-level    Interval History 7/8/2025:  Patient is seen for follow-up PM&R evaluation and recommendations:  Pt seen at bedside. No family present. Pt with incomprehensible speech. Was roldan to squeeze my hand with left hand. Unable to move RUE and LLE. Pt is very aphasic. Unable to decipher words. Telesitter present. Wilson present.   HPI, Past Medical, Family, and Social History remains the same as documented in the initial encounter.    Scheduled Medications:    amLODIPine  10 mg Per G Tube QHS    aspirin  300 mg Rectal Daily    atorvastatin  80 mg Per G Tube Daily    chlorhexidine  15 mL Mouth/Throat BID    clopidogreL  75 mg Per G Tube Daily    FLUoxetine  20 mg Per G Tube Daily    heparin (porcine)  5,000 Units Subcutaneous Q8H    insulin aspart U-100  8 Units Subcutaneous Q4H    insulin glargine U-100  20 Units Subcutaneous Daily    metoprolol tartrate  50 mg Per G Tube BID    mupirocin   Nasal BID    niacin  500 mg Per G Tube BID WM    nicotine  1 patch Transdermal Daily    nystatin  500,000 Units Oral QID    polyethylene glycol  17 g Per G Tube BID    QUEtiapine  25 mg Per G Tube QHS    senna-docusate  1 tablet Per G Tube BID       Diagnostic Results: Labs: Reviewed    PRN Medications:   Current Facility-Administered Medications:     bisacodyL, 10 mg, Rectal, Daily PRN    dextrose 50%, 12.5 g, Intravenous, PRN    dextrose 50%, 25 g, Intravenous, PRN    glucagon (human recombinant), 1 mg, Intramuscular, PRN    hydrALAZINE, 10 mg, Intravenous, Q4H PRN    insulin aspart U-100, 0-10 Units, Subcutaneous, Q4H PRN    labetalol, 10 mg, Intravenous, Q4H PRN    melatonin, 6 mg, Per G Tube, Nightly PRN    simethicone, 1 tablet, Per G Tube, TID PRN    sodium chloride 0.9%, 10 mL, Intravenous, PRN    Review of Systems   Unable to perform ROS: Mental status change     Objective:     Vital Signs (Most  Recent):  Temp: 98 °F (36.7 °C) (07/08/25 1159)  Pulse: 85 (07/08/25 1159)  Resp: 18 (07/08/25 1159)  BP: 129/68 (07/08/25 1159)  SpO2: 95 % (07/08/25 1256)    Vital Signs (24h Range):  Temp:  [97.9 °F (36.6 °C)-99.6 °F (37.6 °C)] 98 °F (36.7 °C)  Pulse:  [74-89] 85  Resp:  [16-18] 18  SpO2:  [90 %-100 %] 95 %  BP: (116-155)/(64-79) 129/68         Physical Exam  Vitals and nursing note reviewed.   Constitutional:       General: He is awake.   Pulmonary:      Effort: Pulmonary effort is normal. No respiratory distress.   Abdominal:      General: There is no distension.      Palpations: Abdomen is soft.      Comments: PRG tube present with TF infusing.    Musculoskeletal:      Cervical back: Normal range of motion and neck supple.      Comments: Appears with RSW. Barely able to squeeze with  left hand.   Skin:     General: Skin is warm and dry.   Neurological:      Mental Status: He is alert.      GCS: GCS eye subscore is 4. GCS verbal subscore is 2. GCS motor subscore is 5.      Cranial Nerves: Dysarthria present.      Motor: Weakness present.      Coordination: Coordination abnormal. Finger-Nose-Finger Test abnormal and Heel to Shin Test abnormal. Impaired rapid alternating movements.      Gait: Gait abnormal.   Psychiatric:         Behavior: Behavior is cooperative.         Cognition and Memory: Cognition is impaired. Memory is impaired.          NEUROLOGICAL EXAMINATION:     GAIT AND COORDINATION      Coordination   Finger to nose coordination: abnormal      Assessment/Plan:      * Embolic stroke involving left middle cerebral artery  -CTH which showed small left temporal hemorrhage along with continued early ischemic changes.   -S/P TNK and thrombectomy.   -Agitation requiring restraints.   -PT/OT rec for high intensity therapies.   -Seroquel was started per VN. Monitor.   -Continue ASA.  -Continue statin.  -07/02- Unable to have meaningful conversation with pt.  No family present at bedside. Pt in LUE restraint.    -07/08- Pt is severely aphasic. Pt barely was able to squeeze my hand with right hand with repeated cues. PEG present. TF infusing. Wilson in place.  Telesitter in place. Reviewed chart, pt off restraints since 07/07.       JEANNE (acute kidney injury)  -Monitor CMP daily.  -Avoid nephrotoxins.     Urinary retention  -Wilson present.   -Urology was consulted.      Hyperlipemia  -Continue statin.     Right sided weakness  -PT/OT rec for HIT. Follow for progress.     Dysarthria and anarthria  -SLP following.     Aphasia  -SLP following.   -07/02- Severe Aphasia. Will need aggressive SLP.     Hypertension associated with diabetes  -Goal for SBP <140. Monitor.     Tobacco dependence  - on cessation as appropriate.     PM&R Recommendation:     At this time, the PM&R team has reviewed this patient's ongoing medical case including inpatient diagnosis, medical history, clinical examination, labs, vitals, current social and functional history. We will continue to follow pt for now for therapies tolerance and progress, continued mental status improvement.        Elaine Mcconnell NP  Department of Physical Medicine & Rehab   [facility] - Neurosurgery ([facility])

## 2025-07-08 NOTE — PT/OT/SLP PROGRESS
"Speech Language Pathology Treatment    Patient Name:  Cristi Pulido   MRN:  [id number]  Admitting Diagnosis: Embolic stroke involving left middle cerebral artery    Recommendations:     General Recommendations:  Dysphagia therapy and Speech/language therapy  Diet recommendations: PEG tube as primary source of nutrition+ Puree Diet - IDDSI Level 4, Liquid Diet Level: NPO (tsp trials of thin with SLP and RN only).  Aspiration Precautions: Continue alternate means of nutrition, Frequent oral care, and Strict aspiration precautions   General Precautions: Standard, pureed diet, aspiration, fall  Communication strategies:  yes/no questions only, provide increased time to answer, and go to room if call light pushed  Discharge recommendations:  High Intensity Therapy     Assessment:     Cristi Pulido is a 70 y.o. male with an SLP diagnosis of Aphasia, Dysphagia, and Dysarthria.      Subjective     Pt awake, restless and leaning to the right in bed upon entry to room. PEG tube in place. RN at bedside.     Pain/Comfort:  Pain Rating 1:  (none indicated though increased s/sx appreciated)None observed    Respiratory Status: Room air    Objective:     Has the patient been evaluated by SLP for swallowing?   Yes  Keep patient NPO? No     Pt seen bedside for ongoing dysphagia and speech therapies. RN reporting spontaneous words such as "yogurt" and "shit" across the last 2 days. Pt with ongoing dysarthric speech characterized by humming, babbling, and highly unintelligible mumbling with varying inflection/intonation across session. Pt answered simple Y/N questions with 10% acc this date and followed simple 1 step commands with 0% acc following clinician model and max cuing. Unable to elicit automatic speech responses and pt remains highly unintelligible when attempting to express himself.  Pt not able to ID common objects by pointing or grabbing or eye gaze from  x 2 this date. Noted decreased attention and eye contact on right side of " room/bed. HOB elevated and attempted to reposition pt upright, midline in bed, though he kept slumping over to the right side. Feed assist provided with trials of thin liquids x 6 oz from tsp x 5, and straw x 3.  Pt with increased anterior loss from right corner of mouth across thin liquids trials this date and overt coughing/choking with straw sips 2/2 impulsivity/chugging from straw.  No s/sx of airway compromise with tsp trials of thin liquids and tsp trials of puree.  Continue his current diet of PEG tube feeds + pleasure feeds of puree ONLY and thin via tsp with SLP or RN only. RN updated post session.     Goals:   Multidisciplinary Problems       SLP Goals          Problem: SLP    Goal Priority Disciplines Outcome   SLP Goal     SLP Progressing   Description: Speech Language Pathology Goals  Goals expected to be met by 7/3    1. Pt will participate in ongoing swallow assessment to determine least restrictive PO diet.    2. Pt will participate in ongoing cognitive-linguistic assessment to determine additional therapeutic needs.   3. Pt will follow 1 step commands with 80% acc following model/prompt.   4. Pt will answer simple Y/N questions with 80% acc.                                Plan:     Patient to be seen:  4 x/week   Plan of Care expires:  07/19/25  Plan of Care reviewed with:  patient (RN)   SLP Follow-Up:  Yes       Time Tracking:     SLP Treatment Date:   07/08/25  Speech Start Time:  1116  Speech Stop Time:  1133     Speech Total Time (min):  17 min    Billable Minutes: Speech Therapy Individual 7 and Treatment Swallowing Dysfunction 10    07/08/2025

## 2025-07-08 NOTE — PLAN OF CARE
Problem: Physical Therapy  Goal: Physical Therapy Goal  Description: Goals to be met by: 25 ext 25    Patient will increase functional independence with mobility by performin. Supine to sit with Contact Guard Assistance  2. Sit to supine with Contact Guard Assistance  3. Sit to stand transfer with Moderate Assistance  4. Bed to chair transfer with Maximum Assistance using No Assistive Device  5. Gait  x 10 feet with Maximum Assistance using No Assistive Device.   6. Sitting at edge of bed x5 minutes with Stand-by Assistance  7. Follow 100% of 1-step commands throughout session    Outcome: Progressing

## 2025-07-08 NOTE — SUBJECTIVE & OBJECTIVE
Neurologic Chief Complaint: L MCA stroke    Subjective:     Interval History: Patient is seen for follow-up neurological assessment and treatment recommendations: See hospital course.    HPI, Past Medical, Family, and Social History remains the same as documented in the initial encounter.     Review of Systems   Reason unable to perform ROS: Severe aphasia.     Scheduled Meds:   amLODIPine  10 mg Per G Tube QHS    aspirin  300 mg Rectal Daily    atorvastatin  80 mg Per G Tube Daily    chlorhexidine  15 mL Mouth/Throat BID    clopidogreL  75 mg Per G Tube Daily    FLUoxetine  20 mg Per G Tube Daily    heparin (porcine)  5,000 Units Subcutaneous Q8H    insulin aspart U-100  8 Units Subcutaneous Q4H    insulin glargine U-100  20 Units Subcutaneous Daily    metoprolol tartrate  50 mg Per G Tube BID    mupirocin   Nasal BID    niacin  500 mg Per G Tube BID WM    nicotine  1 patch Transdermal Daily    nystatin  500,000 Units Oral QID    polyethylene glycol  17 g Per G Tube BID    QUEtiapine  25 mg Per G Tube QHS    senna-docusate  1 tablet Per G Tube BID     Continuous Infusions:        PRN Meds:  Current Facility-Administered Medications:     bisacodyL, 10 mg, Rectal, Daily PRN    dextrose 50%, 12.5 g, Intravenous, PRN    dextrose 50%, 25 g, Intravenous, PRN    glucagon (human recombinant), 1 mg, Intramuscular, PRN    hydrALAZINE, 10 mg, Intravenous, Q4H PRN    insulin aspart U-100, 0-10 Units, Subcutaneous, Q4H PRN    labetalol, 10 mg, Intravenous, Q4H PRN    melatonin, 6 mg, Per G Tube, Nightly PRN    simethicone, 1 tablet, Per G Tube, TID PRN    sodium chloride 0.9%, 10 mL, Intravenous, PRN    Objective:     Vital Signs (Most Recent):  Temp: 97.9 °F (36.6 °C) (07/08/25 0714)  Pulse: 83 (07/08/25 0714)  Resp: 18 (07/08/25 0714)  BP: 123/69 (07/08/25 0714)  SpO2: 100 % (07/08/25 0714)  BP Location: Right arm    Vital Signs Range (Last 24H):  Temp:  [97.9 °F (36.6 °C)-100 °F (37.8 °C)]   Pulse:  [74-89]   Resp:  [16-18]  "  BP: (116-155)/(40-81)   SpO2:  [91 %-100 %]   BP Location: Right arm       Physical Exam  HENT:      Head: Normocephalic and atraumatic.      Mouth/Throat:      Mouth: Mucous membranes are moist.   Eyes:      Conjunctiva/sclera: Conjunctivae normal.   Cardiovascular:      Rate and Rhythm: Normal rate.   Pulmonary:      Effort: Pulmonary effort is normal.   Skin:     General: Skin is warm and dry.   Neurological:      Mental Status: He is alert.      Motor: Weakness present.              Neurological Exam:   LOC: drowsy  Attention Span: poor  Language: Expressive aphasia, Receptive aphasia  Articulation: Dysarthria  Orientation: Untestable due to severe aphasia   Facial Movement (CN VII): Lower facial weakness on the Right  Motor: Arm left  Normal 5/5  Leg left  Normal 5/5  Arm right  Plegia 0/5  Leg right Paresis: 1/5  Sensation: Cy-hypoesthesia right    Laboratory:  CMP:   Recent Labs   Lab 07/08/25  0309   CALCIUM 8.1*   ALBUMIN 2.2*   PROT 5.4*      K 4.0   CO2 26      BUN 25*   CREATININE 1.2   ALKPHOS 103   ALT 33   AST 20   BILITOT 0.2     BMP:   Recent Labs   Lab 07/08/25  0309      K 4.0      CO2 26   BUN 25*   CREATININE 1.2   CALCIUM 8.1*     CBC:   Recent Labs   Lab 07/08/25  0309   WBC 13.51*   RBC 3.33*   HGB 9.5*   HCT 29.6*      MCV 89   MCH 28.5   MCHC 32.1     Lipid Panel:   No results for input(s): "CHOL", "LDLCALC", "HDL", "TRIG" in the last 168 hours.    Coagulation:   No results for input(s): "PT", "INR", "APTT" in the last 168 hours.    Platelet Aggregation Study: No results for input(s): "PLTAGG", "PLTAGINTERP", "PLTAGREGLACO", "ADPPLTAGGREG" in the last 168 hours.  Hgb A1C:   No results for input(s): "HGBA1C" in the last 168 hours.    TSH:   No results for input(s): "TSH" in the last 168 hours.      Diagnostic Results   Brain Imaging   MRI Brain 6/20/25  Impression:     Acute left MCA distribution infarct.  No new large parenchymal hemorrhage.  " Susceptibility artifact throughout the infarcted tissue may reflect contrast staining and/or blood products conversion.  Overall mass effect is increased compared to prior with sulcal effacement throughout the left cerebral hemisphere and approximately 4 mm of rightward midline shift.     Stable size of the parenchymal hemorrhage centered in the inferior left temporal lobe.     [facility] 6/20/25: 1242  Impression:     Moderate-sized recent left MCA distribution infarct and small intraparenchymal hemorrhage appear grossly unchanged from prior study performed earlier on the same date.     Chronic ischemic change elsewhere with underlying cerebral and cerebellar volume loss, as before.     No new hemorrhage or major vascular distribution infarct elsewhere.  CT 6/20/25: 0834  Impression:     Early ischemic changes in the left MCA distribution, new from recent CT.     New left inferior temporal intraparenchymal hematoma.     Chronic ischemic change with cerebral and cerebellar volume loss, as above.     [facility] 6/19/25  Impression:     Question some early left MCA ischemia corresponding to the patient's known left MCA territory occlusion.  No hemorrhage.     Senescent changes as above.        All CT scans at this facility are performed  using dose modulation techniques as appropriate to performed exam including the following:  automated exposure control; adjustment of mA and/or kV according to the patients size (this includes techniques or standardized protocols for targeted exams where dose is matched to indication/reason for exam: i.e. extremities or head);  iterative reconstruction technique.     Vessel Imaging   CTA head and neck 6/19/25  Impression:     Distal left M1 MCA occlusion with relatively prompt collateralization of the more distal MCA vessels.     Severe stenosis left mid vertebral artery and left PCA.     Cardiac Imaging   TTE 6/19/25    Left Ventricle: The left ventricle is normal in size. Normal wall thickness.  There is normal systolic function with a visually estimated ejection fraction of 60 - 65%.    Right Ventricle: The right ventricle is normal in size Wall thickness is normal. Systolic function is normal.    The pulmonary artery pressure could not be estimated.    IVC/SVC: Normal venous pressure at 3 mmHg.

## 2025-07-08 NOTE — ASSESSMENT & PLAN NOTE
Cristi Pulido is a 70 y.o. male w/ PMH of HTN, DM, prior stroke w/ residual RSW who presents as a transfer from OSH after presenting with acute onset of dysarthria and RSW. Telestroke was completed and his NIHSS was 19. CT showed probable early ischemic changes in the L insular ribbon and L frontal lobe. CTA demonstrated occlusion of the distal L MCA M1 segment. He was given TNK at 0548. Patient was transferred to C for further management. On arrival, repeat NIHSS of 24. Patient taken for repeat CTH which showed small left temporal hemorrhage along with continued early ischemic changes. Patient taken to IR for thrombectomy and to be admitted to [facility] post-procedure.  S/P thrombectomy. TICI 3. MRI revealing for blood products in stroke bed, likely reperfusion injury. Etiology ESUS.     07/07/2025 NAEON. Renal US unremarkable for hydronephrosis. Creatinine normalized. Increased lantus to 20 units, aspart to 8 units.   Antithrombotics for secondary stroke prevention: Antiplatelets: Aspirin: 81 mg daily  Clopidogrel: 75 mg daily,      Statins for secondary stroke prevention and hyperlipidemia, if present:   Statins: Atorvastatin- 80 mg daily    Aggressive risk factor modification: HTN, Smoking, DM, HLD     Rehab efforts: The patient has been evaluated by a stroke team provider and the therapy needs have been fully considered based off the presenting complaints and exam findings. The following therapy evaluations are needed: PT evaluate and treat, OT evaluate and treat, SLP evaluate and treat, PM&R evaluate for appropriate placement, current recommendation HIT    Diagnostics ordered/pending: None     VTE prophylaxis: Enoxaparin 40 mg SQ every 24 hours  Mechanical prophylaxis: Place SCDs    BP parameters: Infarct: Post sucessful thrombectomy, SBP <140

## 2025-07-08 NOTE — ASSESSMENT & PLAN NOTE
-CTH which showed small left temporal hemorrhage along with continued early ischemic changes.   -S/P TNK and thrombectomy.   -Agitation requiring restraints.   -PT/OT rec for high intensity therapies.   -Seroquel was started per VN. Monitor.   -Continue ASA.  -Continue statin.  -07/02- Unable to have meaningful conversation with pt.  No family present at bedside. Pt in LUE restraint.   -07/08- Pt is severely aphasic. Pt barely was able to squeeze my hand with right hand with repeated cues. PEG present. TF infusing. Wilson in place.  Telesitter in place. Reviewed chart, pt off restraints since 07/07.

## 2025-07-08 NOTE — SUBJECTIVE & OBJECTIVE
Interval History 7/8/2025:  Patient is seen for follow-up PM&R evaluation and recommendations:  Pt seen at bedside. No family present. Pt with incomprehensible speech. Was roldan to squeeze my hand with left hand. Unable to move RUE and LLE. Pt is very aphasic. Unable to decipher words. Telesitter present. Wilson present.   HPI, Past Medical, Family, and Social History remains the same as documented in the initial encounter.    Scheduled Medications:    amLODIPine  10 mg Per G Tube QHS    aspirin  300 mg Rectal Daily    atorvastatin  80 mg Per G Tube Daily    chlorhexidine  15 mL Mouth/Throat BID    clopidogreL  75 mg Per G Tube Daily    FLUoxetine  20 mg Per G Tube Daily    heparin (porcine)  5,000 Units Subcutaneous Q8H    insulin aspart U-100  8 Units Subcutaneous Q4H    insulin glargine U-100  20 Units Subcutaneous Daily    metoprolol tartrate  50 mg Per G Tube BID    mupirocin   Nasal BID    niacin  500 mg Per G Tube BID WM    nicotine  1 patch Transdermal Daily    nystatin  500,000 Units Oral QID    polyethylene glycol  17 g Per G Tube BID    QUEtiapine  25 mg Per G Tube QHS    senna-docusate  1 tablet Per G Tube BID       Diagnostic Results: Labs: Reviewed    PRN Medications:   Current Facility-Administered Medications:     bisacodyL, 10 mg, Rectal, Daily PRN    dextrose 50%, 12.5 g, Intravenous, PRN    dextrose 50%, 25 g, Intravenous, PRN    glucagon (human recombinant), 1 mg, Intramuscular, PRN    hydrALAZINE, 10 mg, Intravenous, Q4H PRN    insulin aspart U-100, 0-10 Units, Subcutaneous, Q4H PRN    labetalol, 10 mg, Intravenous, Q4H PRN    melatonin, 6 mg, Per G Tube, Nightly PRN    simethicone, 1 tablet, Per G Tube, TID PRN    sodium chloride 0.9%, 10 mL, Intravenous, PRN    Review of Systems   Unable to perform ROS: Mental status change     Objective:     Vital Signs (Most Recent):  Temp: 98 °F (36.7 °C) (07/08/25 1159)  Pulse: 85 (07/08/25 1159)  Resp: 18 (07/08/25 1159)  BP: 129/68 (07/08/25 1159)  SpO2: 95  % (07/08/25 1256)    Vital Signs (24h Range):  Temp:  [97.9 °F (36.6 °C)-99.6 °F (37.6 °C)] 98 °F (36.7 °C)  Pulse:  [74-89] 85  Resp:  [16-18] 18  SpO2:  [90 %-100 %] 95 %  BP: (116-155)/(64-79) 129/68         Physical Exam  Vitals and nursing note reviewed.   Constitutional:       General: He is awake.   Pulmonary:      Effort: Pulmonary effort is normal. No respiratory distress.   Abdominal:      General: There is no distension.      Palpations: Abdomen is soft.      Comments: PRG tube present with TF infusing.    Musculoskeletal:      Cervical back: Normal range of motion and neck supple.      Comments: Appears with RSW. Barely able to squeeze with  left hand.   Skin:     General: Skin is warm and dry.   Neurological:      Mental Status: He is alert.      GCS: GCS eye subscore is 4. GCS verbal subscore is 2. GCS motor subscore is 5.      Cranial Nerves: Dysarthria present.      Motor: Weakness present.      Coordination: Coordination abnormal. Finger-Nose-Finger Test abnormal and Heel to Shin Test abnormal. Impaired rapid alternating movements.      Gait: Gait abnormal.   Psychiatric:         Behavior: Behavior is cooperative.         Cognition and Memory: Cognition is impaired. Memory is impaired.          NEUROLOGICAL EXAMINATION:     GAIT AND COORDINATION      Coordination   Finger to nose coordination: abnormal

## 2025-07-08 NOTE — PLAN OF CARE
Recommendations  1. Continue TF recommendation: Glucerna 1.5 at goal rate of 55ml/hr to provide 1320ml of total formula volume, 1980 kcal, 109g PRO, 176g CHO, and 1001ml FF    - FWF to meet RDA, if desired: 160ml Q4hrs to provide an additional 960ml of fluid and 1961 ml TFV    - please continue documenting TF rate via flowsheets    2. If bolus feeds are preferred, recommend to bolus 220ml of Glucerna 1.5 Q4hrs to provide 1320ml of total formula volume, 1980 kcal, 109g PRO, 176g CHO, and 1001ml FF     - FWF to meet RDA if desired: FWF of 80ml before and after each bolus to provide an additional 960ml FF and 1961ml TFV     3. RD to monitor weight, labs, intake, tolerance     4. Recommend daily weights    Goals: 1. % nutritional needs met with EN during admission   2. Maintain weight during admission  Nutrition Goal Status: goal met  Communication of RD Recs:  (POC)

## 2025-07-08 NOTE — PROGRESS NOTES
Zohaib Garrett - Neurosurgery ([facility])  Vascular Neurology  Comprehensive Stroke Center  Progress Note    Assessment/Plan:     * Embolic stroke involving left middle cerebral artery  Cristi Pulido is a 70 y.o. male w/ PMH of HTN, DM, prior stroke w/ residual RSW who presents as a transfer from OSH after presenting with acute onset of dysarthria and RSW. Telestroke was completed and his NIHSS was 19. CT showed probable early ischemic changes in the L insular ribbon and L frontal lobe. CTA demonstrated occlusion of the distal L MCA M1 segment. He was given TNK at 0548. [place] [facility] [place]. On arrival, repeat NIHSS of 24. Patient taken for repeat CTH which showed small left temporal hemorrhage along with continued early ischemic changes. Patient taken to IR for thrombectomy and to be admitted to [facility] post-procedure.  S/P thrombectomy. TICI 3. MRI revealing for blood products in stroke bed, likely reperfusion injury. Etiology ESUS.     07/07/2025 NAEON. Renal US unremarkable for hydronephrosis. Creatinine normalized. Increased lantus to 20 units, aspart to 8 units.   Antithrombotics for secondary stroke prevention: Antiplatelets: Aspirin: 81 mg daily  Clopidogrel: 75 mg daily,      Statins for secondary stroke prevention and hyperlipidemia, if present:   Statins: Atorvastatin- 80 mg daily    Aggressive risk factor modification: HTN, Smoking, DM, HLD     Rehab efforts: The patient has been evaluated by a stroke team provider and the therapy needs have been fully considered based off the presenting complaints and exam findings. The following therapy evaluations are needed: PT evaluate and treat, OT evaluate and treat, SLP evaluate and treat, PM&R evaluate for appropriate placement, current recommendation HIT    Diagnostics ordered/pending: None     VTE prophylaxis: Enoxaparin 40 mg SQ every 24 hours  Mechanical prophylaxis: Place SCDs    BP parameters: Infarct: Post sucessful thrombectomy, SBP  <140        JEANNE (acute kidney injury)  Pt with JEANNE from combination of post renal and prerenal. Episodes of periodic retention.     Plan  - f/u renal US    Urinary retention  Pt with periodic episodes of urinary retention likely contributing to JEANNE.     Plan  - underwood    Encounter for nasogastric tube placement  PEG removed per patient.   IR consulted for replacement. Attempt unsuccessful, will attempt again week of 6/30.   NGT replaced. TF and FWF resumed.     Restlessness  -6/24: QTc 441   -Seroquel 25 mg QHS     Hyperlipemia  -Stroke risk factor  -LDL 75, goal <70  -Atorvastatin 40 mg daily      Right sided weakness  -Secondary to stroke.   -Aggressive therapy     Dysarthria and anarthria  -Therapy eval and treat    Aphasia  -Secondary to stroke  -Aggressive therapy     Type 2 diabetes mellitus without complication, without long-term current use of insulin  Lab Results   Component Value Date    LABA1C 7.2 (H) 01/29/2018    HGBA1C 7.0 (H) 06/19/2025     Follow up repeat A1c. Hold home antihyperglycemics. SSI for goal -180 while in hospital  -Added Lantus 15u     Hypertension associated with diabetes  -Stroke risk factor  -SBP goal <140, maintain MAP >65  -BP range in the last 24 hrs: BP  Min: 139/82  Max: 169/93  -Current antihypertensive regimen:     -Amlodipine 10mg     -Lisinopril still on hold for now    -Metoprolol 50mg BID   --PRN labetalol/hydralazine     Tobacco dependence  Stroke risk factor  - on cessation  -nicotine patch PRN         06/20/2025 NAEON. S/P thrombectomy. TICI 3. TNK monitoring ended at 0548. Aphasic, follows no commands. RSW remains. MRI revealing for blood products in stroke bed, likely reperfusion injury. OK to start monotherapy with either plavix or ASA 6/21/25 for secondary stroke prevention. ECHO unremarkable. Family member at bedside agreeable to NGT placement. Etiology ESUS.   6/21/2025 NAEON. Neuro exam stable. Recommend holding AP therapy for now.   06/22/2025 NAEON.  Neuro exam stable. Start AP therapy with ASA.   06/23/2025 NAEON. SLP recs NPO. PEG discussed with family and they are agreeable. Pt S/D to NPU.   06/24/2025 NGT pulled overnight and replaced. Placement confirmed via X ray. IR consulted for PEG placement. JEANNE, Creatinine and BUN uptrending. FWF flushes ordered. Restless during night. EKG repeated. QTc 441. Seroquel 25 mg QHS ordered. Family requesting [facility] when medically ready.   06/25/2025 NAEON. Plans for PEG placement today. BUN and Cr uptrending. Fluids started. Lisinopril held.   06/26/2025 NAEON. PEG placed yesterday, will resume tube feeds this afternoon starting with trickle feeds. BUN and Cr Improved. Increased water boluses to 300.  06/27/2025 NAEON. Neuro exam stable. Patient has tolerated tube feeds well. Will continue to monitor kidney function for now. Trial off restraints today to prepare for rehab placement. Added Lantus 15 units. MBSS today, patient able to have puree for pleasure feeds per speech.   06/28/2025 Na 152. 0.45% NS at 100 ml/hr ordered x 12 hrs. Will repeat sodium draw scheduled at 2100. Pulled PEG. IR unable to replace PEG. Will attempt in coming days. NGT replaced, placement confirmed via Xray. TF and FWF restarted.   06/29/2025 Na 154, BUN and Cr improving. Continuing fluids. NGT coiled overnight. NG Xray showed possible pneumoperitoneum. CT AP without contrast ordered. Showed free air in the abdomen, advised not to use NGT for now. General Surgery consulted to discuss PEG placement.   06/30/2025 Pulled NG tube overnight. Na stable, Cr and BUN continue to improve. Fluids reordered. Discussed PEG replacement with GI and Gen Surg, deferred to IR for replacement. IR reconsulted this morning. Must wait approximately 1 week to re attempt PEG placement. Placement planned for 7/3. Will need to replace NG tube 7/2. NPO at midnight 7/3.   07/01/2025 Switched 0.45 NS to D5W @60 cc/hr. Hypernatremic at 153. Free water deficit 1.9L    07/02/2025 Hypernatremia improving. Continuing D5W. Plan for G tube tomorrow per IR.   07/03/2025: G tube placement per IR. Decreased D5W to 30 cc/hr.   07/04/2025 S/p G tube placement. Will start tube feeds later, increased statin to 80 mg, added plavix 75 mg, and Niacin 1g nightly.   07/05/2025 NAEON. Started tube feeds. BG optimization ongoing.   07/06/2025 NAEON. Pt with increased creatinine. Gave bolus. Believe JEANNE is combination of prerenal and post renal as he has been retaining urine periodically.   07/07/2025. NAEON. Pt creatinine not improved despite fluids and underwood. Ordered Renal US.   07/08/2025. NAEON. Renal US unremarkable for hydronephrosis. Creatinine normalized. Increased lantus to 20 units, aspart to 8 units.     STROKE DOCUMENTATION   Acute Stroke Times   Last Known Normal Date: 06/19/25  Last Known Normal Time: 0400  Symptom Onset Date: 06/19/25  Symptom Onset Time: 0400  Stroke Team Called Date: 06/19/25  Stroke Team Called Time: 0813  Stroke Team Arrival Date: 06/19/25  Stroke Team Arrival Time: 0813  CT Interpretation Time: 0827  Thrombolytic Therapy Recommended:  (already given prior to transfer)  CTA Interpretation Time:  (already completed prior to transfer)  Thrombectomy Recommended: Yes  Decision to Treat Time for IR: 0832    NIH Scale:          Modified Nadeem Score: 2  Stephanie Coma Scale:    ABCD2 Score:    [id number] Score:   HAS -BLED Score:   ICH Score:   Hunt & Kimball Classification:      Hemorrhagic change of an Ischemic Stroke: Does this patient have an ischemic stroke with hemorrhagic changes? No     Neurologic Chief Complaint: L MCA stroke    Subjective:     Interval History: Patient is seen for follow-up neurological assessment and treatment recommendations: See hospital course.    HPI, Past Medical, Family, and Social History remains the same as documented in the initial encounter.     Review of Systems   Reason unable to perform ROS: Severe aphasia.     Scheduled Meds:    amLODIPine  10 mg Per G Tube QHS    aspirin  300 mg Rectal Daily    atorvastatin  80 mg Per G Tube Daily    chlorhexidine  15 mL Mouth/Throat BID    clopidogreL  75 mg Per G Tube Daily    FLUoxetine  20 mg Per G Tube Daily    heparin (porcine)  5,000 Units Subcutaneous Q8H    insulin aspart U-100  8 Units Subcutaneous Q4H    insulin glargine U-100  20 Units Subcutaneous Daily    metoprolol tartrate  50 mg Per G Tube BID    mupirocin   Nasal BID    niacin  500 mg Per G Tube BID WM    nicotine  1 patch Transdermal Daily    nystatin  500,000 Units Oral QID    polyethylene glycol  17 g Per G Tube BID    QUEtiapine  25 mg Per G Tube QHS    senna-docusate  1 tablet Per G Tube BID     Continuous Infusions:        PRN Meds:  Current Facility-Administered Medications:     bisacodyL, 10 mg, Rectal, Daily PRN    dextrose 50%, 12.5 g, Intravenous, PRN    dextrose 50%, 25 g, Intravenous, PRN    glucagon (human recombinant), 1 mg, Intramuscular, PRN    hydrALAZINE, 10 mg, Intravenous, Q4H PRN    insulin aspart U-100, 0-10 Units, Subcutaneous, Q4H PRN    labetalol, 10 mg, Intravenous, Q4H PRN    melatonin, 6 mg, Per G Tube, Nightly PRN    simethicone, 1 tablet, Per G Tube, TID PRN    sodium chloride 0.9%, 10 mL, Intravenous, PRN    Objective:     Vital Signs (Most Recent):  Temp: 97.9 °F (36.6 °C) (07/08/25 0714)  Pulse: 83 (07/08/25 0714)  Resp: 18 (07/08/25 0714)  BP: 123/69 (07/08/25 0714)  SpO2: 100 % (07/08/25 0714)  BP Location: Right arm    Vital Signs Range (Last 24H):  Temp:  [97.9 °F (36.6 °C)-100 °F (37.8 °C)]   Pulse:  [74-89]   Resp:  [16-18]   BP: (116-155)/(40-81)   SpO2:  [91 %-100 %]   BP Location: Right arm       Physical Exam  HENT:      Head: Normocephalic and atraumatic.      Mouth/Throat:      Mouth: Mucous membranes are moist.   Eyes:      Conjunctiva/sclera: Conjunctivae normal.   Cardiovascular:      Rate and Rhythm: Normal rate.   Pulmonary:      Effort: Pulmonary effort is normal.   Skin:     General:  "Skin is warm and dry.   Neurological:      Mental Status: He is alert.      Motor: Weakness present.              Neurological Exam:   LOC: drowsy  Attention Span: poor  Language: Expressive aphasia, Receptive aphasia  Articulation: Dysarthria  Orientation: Untestable due to severe aphasia   Facial Movement (CN VII): Lower facial weakness on the Right  Motor: Arm left  Normal 5/5  Leg left  Normal 5/5  Arm right  Plegia 0/5  Leg right Paresis: 1/5  Sensation: Cy-hypoesthesia right    Laboratory:  CMP:   Recent Labs   Lab 07/08/25  0309   CALCIUM 8.1*   ALBUMIN 2.2*   PROT 5.4*      K 4.0   CO2 26      BUN 25*   CREATININE 1.2   ALKPHOS 103   ALT 33   AST 20   BILITOT 0.2     BMP:   Recent Labs   Lab 07/08/25  0309      K 4.0      CO2 26   BUN 25*   CREATININE 1.2   CALCIUM 8.1*     CBC:   Recent Labs   Lab 07/08/25  0309   WBC 13.51*   RBC 3.33*   HGB 9.5*   HCT 29.6*      MCV 89   MCH 28.5   MCHC 32.1     Lipid Panel:   No results for input(s): "CHOL", "LDLCALC", "HDL", "TRIG" in the last 168 hours.    Coagulation:   No results for input(s): "PT", "INR", "APTT" in the last 168 hours.    Platelet Aggregation Study: No results for input(s): "PLTAGG", "PLTAGINTERP", "PLTAGREGLACO", "ADPPLTAGGREG" in the last 168 hours.  Hgb A1C:   No results for input(s): "HGBA1C" in the last 168 hours.    TSH:   No results for input(s): "TSH" in the last 168 hours.      Diagnostic Results   Brain Imaging   MRI Brain 6/20/25  Impression:     Acute left MCA distribution infarct.  No new large parenchymal hemorrhage.  Susceptibility artifact throughout the infarcted tissue may reflect contrast staining and/or blood products conversion.  Overall mass effect is increased compared to prior with sulcal effacement throughout the left cerebral hemisphere and approximately 4 mm of rightward midline shift.     Stable size of the parenchymal hemorrhage centered in the inferior left temporal lobe.     CTH 6/20/25: " 1242  Impression:     Moderate-sized recent left MCA distribution infarct and small intraparenchymal hemorrhage appear grossly unchanged from prior study performed earlier on the same date.     Chronic ischemic change elsewhere with underlying cerebral and cerebellar volume loss, as before.     No new hemorrhage or major vascular distribution infarct elsewhere.  [facility] 6/20/25: 0834  Impression:     Early ischemic changes in the left MCA distribution, new from recent CT.     New left inferior temporal intraparenchymal hematoma.     Chronic ischemic change with cerebral and cerebellar volume loss, as above.     CT 6/19/25  Impression:     Question some early left MCA ischemia corresponding to the patient's known left MCA territory occlusion.  No hemorrhage.     Senescent changes as above.        All CT scans at this facility are performed  using dose modulation techniques as appropriate to performed exam including the following:  automated exposure control; adjustment of mA and/or kV according to the patients size (this includes techniques or standardized protocols for targeted exams where dose is matched to indication/reason for exam: i.e. extremities or head);  iterative reconstruction technique.     Vessel Imaging   CTA head and neck 6/19/25  Impression:     Distal left M1 MCA occlusion with relatively prompt collateralization of the more distal MCA vessels.     Severe stenosis left mid vertebral artery and left PCA.     Cardiac Imaging   TTE 6/19/25    Left Ventricle: The left ventricle is normal in size. Normal wall thickness. There is normal systolic function with a visually estimated ejection fraction of 60 - 65%.    Right Ventricle: The right ventricle is normal in size Wall thickness is normal. Systolic function is normal.    The pulmonary artery pressure could not be estimated.    IVC/SVC: Normal venous pressure at 3 mmHg.       Cullen Almanzar MD  [facility] Stroke Center  Department of Vascular  Neurology   Zohaib Garrett - Neurosurgery ([facility])

## 2025-07-09 LAB
POCT GLUCOSE: 204 MG/DL (ref 70–110)
POCT GLUCOSE: 206 MG/DL (ref 70–110)
POCT GLUCOSE: 211 MG/DL (ref 70–110)
POCT GLUCOSE: 243 MG/DL (ref 70–110)
POCT GLUCOSE: 253 MG/DL (ref 70–110)

## 2025-07-09 PROCEDURE — 99233 SBSQ HOSP IP/OBS HIGH 50: CPT | Mod: HCNC,GC,, | Performed by: PSYCHIATRY & NEUROLOGY

## 2025-07-09 PROCEDURE — 63600175 PHARM REV CODE 636 W HCPCS: Mod: HCNC

## 2025-07-09 PROCEDURE — A4216 STERILE WATER/SALINE, 10 ML: HCPCS | Mod: HCNC

## 2025-07-09 PROCEDURE — 25000003 PHARM REV CODE 250: Mod: HCNC

## 2025-07-09 PROCEDURE — 25000003 PHARM REV CODE 250: Mod: HCNC | Performed by: NURSE PRACTITIONER

## 2025-07-09 PROCEDURE — 11000001 HC ACUTE MED/SURG PRIVATE ROOM: Mod: HCNC

## 2025-07-09 PROCEDURE — 97112 NEUROMUSCULAR REEDUCATION: CPT | Mod: HCNC

## 2025-07-09 PROCEDURE — S4991 NICOTINE PATCH NONLEGEND: HCPCS | Mod: HCNC

## 2025-07-09 PROCEDURE — 92507 TX SP LANG VOICE COMM INDIV: CPT | Mod: HCNC

## 2025-07-09 PROCEDURE — 92526 ORAL FUNCTION THERAPY: CPT | Mod: HCNC

## 2025-07-09 RX ADMIN — FLUOXETINE HYDROCHLORIDE 20 MG: 20 CAPSULE ORAL at 09:07

## 2025-07-09 RX ADMIN — INSULIN GLARGINE 20 UNITS: 100 INJECTION, SOLUTION SUBCUTANEOUS at 09:07

## 2025-07-09 RX ADMIN — HEPARIN SODIUM 5000 UNITS: 5000 INJECTION INTRAVENOUS; SUBCUTANEOUS at 06:07

## 2025-07-09 RX ADMIN — MUPIROCIN: 20 OINTMENT TOPICAL at 09:07

## 2025-07-09 RX ADMIN — INSULIN ASPART 8 UNITS: 100 INJECTION, SOLUTION INTRAVENOUS; SUBCUTANEOUS at 03:07

## 2025-07-09 RX ADMIN — ATORVASTATIN CALCIUM 80 MG: 40 TABLET, FILM COATED ORAL at 09:07

## 2025-07-09 RX ADMIN — METOPROLOL TARTRATE 50 MG: 50 TABLET, FILM COATED ORAL at 09:07

## 2025-07-09 RX ADMIN — HEPARIN SODIUM 5000 UNITS: 5000 INJECTION INTRAVENOUS; SUBCUTANEOUS at 01:07

## 2025-07-09 RX ADMIN — POLYETHYLENE GLYCOL 3350 17 G: 17 POWDER, FOR SOLUTION ORAL at 09:07

## 2025-07-09 RX ADMIN — AMLODIPINE BESYLATE 10 MG: 10 TABLET ORAL at 09:07

## 2025-07-09 RX ADMIN — INSULIN ASPART 4 UNITS: 100 INJECTION, SOLUTION INTRAVENOUS; SUBCUTANEOUS at 03:07

## 2025-07-09 RX ADMIN — CHLORHEXIDINE GLUCONATE 0.12% ORAL RINSE 15 ML: 1.2 LIQUID ORAL at 09:07

## 2025-07-09 RX ADMIN — HEPARIN SODIUM 5000 UNITS: 5000 INJECTION INTRAVENOUS; SUBCUTANEOUS at 09:07

## 2025-07-09 RX ADMIN — INSULIN ASPART 2 UNITS: 100 INJECTION, SOLUTION INTRAVENOUS; SUBCUTANEOUS at 03:07

## 2025-07-09 RX ADMIN — QUETIAPINE FUMARATE 25 MG: 25 TABLET ORAL at 09:07

## 2025-07-09 RX ADMIN — NYSTATIN 500000 UNITS: 100000 SUSPENSION ORAL at 01:07

## 2025-07-09 RX ADMIN — Medication 500 MG: at 09:07

## 2025-07-09 RX ADMIN — SENNOSIDES AND DOCUSATE SODIUM 1 TABLET: 50; 8.6 TABLET ORAL at 09:07

## 2025-07-09 RX ADMIN — INSULIN ASPART 8 UNITS: 100 INJECTION, SOLUTION INTRAVENOUS; SUBCUTANEOUS at 12:07

## 2025-07-09 RX ADMIN — Medication 1 PATCH: at 09:07

## 2025-07-09 RX ADMIN — NYSTATIN 500000 UNITS: 100000 SUSPENSION ORAL at 05:07

## 2025-07-09 RX ADMIN — INSULIN ASPART 6 UNITS: 100 INJECTION, SOLUTION INTRAVENOUS; SUBCUTANEOUS at 09:07

## 2025-07-09 RX ADMIN — INSULIN ASPART 8 UNITS: 100 INJECTION, SOLUTION INTRAVENOUS; SUBCUTANEOUS at 09:07

## 2025-07-09 RX ADMIN — NYSTATIN 500000 UNITS: 100000 SUSPENSION ORAL at 09:07

## 2025-07-09 RX ADMIN — Medication 500 MG: at 05:07

## 2025-07-09 RX ADMIN — INSULIN ASPART 2 UNITS: 100 INJECTION, SOLUTION INTRAVENOUS; SUBCUTANEOUS at 09:07

## 2025-07-09 RX ADMIN — CLOPIDOGREL 75 MG: 75 TABLET ORAL at 09:07

## 2025-07-09 RX ADMIN — Medication 10 ML: at 09:07

## 2025-07-09 NOTE — ASSESSMENT & PLAN NOTE
Cristi Pulido is a 70 y.o. male w/ PMH of HTN, DM, prior stroke w/ residual RSW who presents as a transfer from OSH after presenting with acute onset of dysarthria and RSW. Telestroke was completed and his NIHSS was 19. CT showed probable early ischemic changes in the L insular ribbon and L frontal lobe. CTA demonstrated occlusion of the distal L MCA M1 segment. He was given TNK at 0548. Patient was transferred to [facility] for further management. On arrival, repeat NIHSS of 24. Patient taken for repeat CTH which showed small left temporal hemorrhage along with continued early ischemic changes. Patient taken to IR for thrombectomy and to be admitted to [facility] post-procedure.  S/P thrombectomy. TICI 3. MRI revealing for blood products in stroke bed, likely reperfusion injury. Etiology ESUS.     07/11/2025. Pt discharging to [facility]. Increased insulin to Lantus 25 units+ aspart 10 units TIDWM. Outpt referral placed for podiatry for right foot callus. Vascular US of carotids ordered, f/u imaging.   Antithrombotics for secondary stroke prevention: Antiplatelets: Aspirin: 81 mg daily  Clopidogrel: 75 mg daily,      Statins for secondary stroke prevention and hyperlipidemia, if present:   Statins: Atorvastatin- 80 mg daily    Aggressive risk factor modification: HTN, Smoking, DM, HLD     Rehab efforts: The patient has been evaluated by a stroke team provider and the therapy needs have been fully considered based off the presenting complaints and exam findings. The following therapy evaluations are needed: PT evaluate and treat, OT evaluate and treat, SLP evaluate and treat, PM&R evaluate for appropriate placement, current recommendation HIT    Diagnostics ordered/pending: None     VTE prophylaxis: Enoxaparin 40 mg SQ every 24 hours  Mechanical prophylaxis: Place SCDs    BP parameters: Infarct: Post sucessful thrombectomy, SBP <140

## 2025-07-09 NOTE — PT/OT/SLP PROGRESS
Occupational Therapy   Treatment    Name: Cristi Pulido  MRN: [id number]  Admitting Diagnosis:  Embolic stroke involving left middle cerebral artery       Recommendations:     Discharge Recommendations: High Intensity Therapy  Discharge Equipment Recommendations:  wheelchair, hospital bed, lift device  Barriers to discharge:  Other (Comment) (increased skilled A required)    Assessment:     Cristi Pulido is a 70 y.o. male with a medical diagnosis of Embolic stroke involving left middle cerebral artery.  He presents with poor participation and impaired cognition. Pt able to follow >10% of simple commands this date with no intelligible speech noted. Pt with prominent R sided push/lean upon seated EOB this date and greatly impaired midline orientation. Pt is currently not at his PLOF and would greatly benefit from continued skilled OT intervention in efforts to participate in meaningful occupations of choice at the highest level of independence. Performance deficits affecting function are weakness, impaired endurance, impaired self care skills, impaired functional mobility, gait instability, impaired balance, decreased safety awareness, decreased lower extremity function, decreased upper extremity function, impaired fine motor, edema, decreased coordination, impaired coordination, impaired cognition, decreased ROM, visual deficits, impaired joint extensibility, impaired muscle length.     Rehab Prognosis:  Good; patient would benefit from acute skilled OT services to address these deficits and reach maximum level of function.       Plan:     Patient to be seen 4 x/week to address the above listed problems via self-care/home management, therapeutic exercises, therapeutic activities, neuromuscular re-education, cognitive retraining  Plan of Care Expires: 07/20/25  Plan of Care Reviewed with: patient    Subjective     Chief Complaint: none stated  Patient/Family Comments/goals: pt mumbling/grunting throughout her session    Pain/Comfort:  Pain Rating 1: 0/10  Pain Rating Post-Intervention 1: 0/10    Objective:   Therapy tech utilized during this treatment due to patient complexity and physical assistance required to ensure safe mobilization    Communicated with: RN prior to session.  Patient found supine with telemetry, bed alarm, SCD, G/J tube, underwood catheter upon OT entry to room.    General Precautions: Standard, aspiration, aphasia, fall    Orthopedic Precautions:N/A  Braces: N/A  Respiratory Status: Room air     Occupational Performance:     Bed Mobility:    Patient completed Rolling/Turning to Right with maximal assistance and 2 persons  Patient completed Scooting/Bridging with total assistance and 2 persons  Patient completed Supine to Sit with maximal assistance and 2 persons  Patient completed Sit to Supine with maximal assistance and 2 persons     Functional Mobility/Transfers:  Pt able to sit EOB for ~10 mins with MAX A, R sided pushing, poor midline orientation. Pt able to respond to visual cues to come to midline and sit EOB with MOD A for 2 min.    [facility] 6 Click ADL: 8    Treatment & Education:  Pt participated in neuromuscular re-education to promote tone normalization required to facilitate functional use of affected RUE. Pt participated in weightbearing, PROM, and PNF patterns.     PROM and AAROM to RUE: shoulder flexion/ext, elbow flex/ext, wrist flex/ext, full digit flex/ext    Pt provided with cognitive retraining interventions in efforts to increase pt's orientation needed to participate in preferred occupations of choice. Pt with poor carry over at this time.     Patient left HOB elevated with all lines intact and call button in reach    GOALS:   Multidisciplinary Problems       Occupational Therapy Goals          Problem: Occupational Therapy    Goal Priority Disciplines Outcome Interventions   Occupational Therapy Goal     OT, PT/OT Progressing    Description: Goals to be met by: 07/20/2025     Patient will  increase functional independence with ADLs by performing:    UE Dressing with Moderate Assistance.  LE Dressing with Maximum Assistance.  Grooming while seated with Moderate Assistance.  Toileting from bedside commode with Moderate Assistance for hygiene and clothing management.   Supine to sit with Moderate Assistance.  Stand pivot transfer with Moderate Assistance.  Sit to stand with Moderate Assistance.  Maintain sitting balance at EOB x 10 minutes with Minimal Assistance.                         DME Justifications:  Cristi requires a hospital bed due to him requiring positioning of the body in ways not feasible with an ordinary bed due to being completely immobile and cannot independently make changes in body position without the use of the bed.The positioning of the body cannot be sufficiently resolved by the use of pillows and wedges.  Cristi Pulido has a mobility limitation that significantly impairs his ability to participate in one or more mobility related activities of daily living (MRADLs) such as toileting, feeding, dressing, grooming, and bathing in customary locations in the home.  The mobility limitation cannot be sufficiently resolved by the use of a cane or walker.   The use of a manual wheelchair will significantly improve the patients ability to participate in MRADLS and the patient will use it on regular basis in the home.  Cristi Pulido has expressed his willingness to use a manual wheelchair in the home. Patients upper body strength is sufficient for propulsion.  He also has a caregiver who is available, willing, and able to provide assistance with the wheelchair when needed.      Time Tracking:     OT Date of Treatment: 07/09/25  OT Start Time: 1346  OT Stop Time: 1400  OT Total Time (min): 14 min    Billable Minutes:Neuromuscular Re-education 14    OT/JEANCARLOS: OT     Number of JEANCARLOS visits since last OT visit: 0    7/9/2025

## 2025-07-09 NOTE — PROGRESS NOTES
PM&R consult follow up.  Please see original consult for detailed note.      PM&R Recommendation:     At this time, the PM&R team has reviewed this patient's ongoing medical case including inpatient diagnosis, medical history, clinical examination, labs, vitals, current social and functional history to provide the post-acute recommendation as follows:     RECOMMENDATIONS: inpatient rehabilitation due to fair to good potential for improvement with therapies and need of physician oversight for management of ongoing active medical issues, once medically stable.       The patient will be admitted for comprehensive interdisciplinary inpatient rehabilitation to address the impairments due to his  medical diagnosis of  Embolic stroke involving left middle cerebral artery. The patient will benefit from an inpatient rehabilitation program to promote functional recovery, implement compensatory strategies and will undergo assessment for needs for durable medical equipment for safe discharge to the community. This patient will benefit from a coordinated interdisciplinary rehabilitation program services that require close monitoring and treatment with 24-hour rehabilitative nursing and  physical/occupational/speech therapies for 3 hours/day for 5 days/week. This interdisciplinary program will be performed under the direction of a physiatrist.        We will sign off with the transfer of the patient to [facility] liaison who will continue to follow going forward until admission to [facility].          MIL Ballard, [facility]  Physical Medicine & Rehabilitation   07/09/2025

## 2025-07-09 NOTE — PROGRESS NOTES
Zohaib Garrett - Neurosurgery ([facility])  Vascular Neurology  Comprehensive Stroke Center  Progress Note    Assessment/Plan:     * Embolic stroke involving left middle cerebral artery  Cristi Pulido is a 70 y.o. male w/ PMH of HTN, DM, prior stroke w/ residual RSW who presents as a transfer from OSH after presenting with acute onset of dysarthria and RSW. Telestroke was completed and his NIHSS was 19. CT showed probable early ischemic changes in the L insular ribbon and L frontal lobe. CTA demonstrated occlusion of the distal L MCA M1 segment. He was given TNK at 0548. Patient was transferred to [facility] [place]. On arrival, repeat NIHSS of 24. Patient taken for repeat CTH which showed small left temporal hemorrhage along with continued early ischemic changes. Patient taken to IR for thrombectomy and to be admitted to NCC post-procedure.  S/P thrombectomy. TICI 3. MRI revealing for blood products in stroke bed, likely reperfusion injury. Etiology ESUS.     07/09/2025. NAEON. Pt seems to be eating full tray. Paused tube feeds.  Antithrombotics for secondary stroke prevention: Antiplatelets: Aspirin: 81 mg daily  Clopidogrel: 75 mg daily,      Statins for secondary stroke prevention and hyperlipidemia, if present:   Statins: Atorvastatin- 80 mg daily    Aggressive risk factor modification: HTN, Smoking, DM, HLD     Rehab efforts: The patient has been evaluated by a stroke team provider and the therapy needs have been fully considered based off the presenting complaints and exam findings. The following therapy evaluations are needed: PT evaluate and treat, OT evaluate and treat, SLP evaluate and treat, PM&R evaluate for appropriate placement, current recommendation HIT    Diagnostics ordered/pending: None     VTE prophylaxis: Enoxaparin 40 mg SQ every 24 hours  Mechanical prophylaxis: Place SCDs    BP parameters: Infarct: Post sucessful thrombectomy, SBP <140        JEANNE (acute kidney injury)  Pt with JEANNE from  combination of post renal and prerenal. Episodes of periodic retention.     Plan  - f/u renal US    Urinary retention  Pt with periodic episodes of urinary retention likely contributing to JEANNE.     Plan  - underwood    Encounter for nasogastric tube placement  PEG removed per patient.   IR consulted for replacement. Attempt unsuccessful, will attempt again week of 6/30.   NGT replaced. TF and FWF resumed.     Restlessness  -6/24: QTc 441   -Seroquel 25 mg QHS     Hyperlipemia  -Stroke risk factor  -LDL 75, goal <70  -Atorvastatin 40 mg daily      Right sided weakness  -Secondary to stroke.   -Aggressive therapy     Dysarthria and anarthria  -Therapy eval and treat    Aphasia  -Secondary to stroke  -Aggressive therapy     Type 2 diabetes mellitus without complication, without long-term current use of insulin  Lab Results   Component Value Date    LABA1C 7.2 (H) 01/29/2018    HGBA1C 7.0 (H) 06/19/2025     Follow up repeat A1c. Hold home antihyperglycemics. SSI for goal -180 while in hospital  -Added Lantus 15u     Hypertension associated with diabetes  -Stroke risk factor  -SBP goal <140, maintain MAP >65  -BP range in the last 24 hrs: BP  Min: 139/82  Max: 169/93  -Current antihypertensive regimen:     -Amlodipine 10mg     -Lisinopril still on hold for now    -Metoprolol 50mg BID   --PRN labetalol/hydralazine     Tobacco dependence  Stroke risk factor  - on cessation  -nicotine patch PRN         06/20/2025 NAEON. S/P thrombectomy. TICI 3. TNK monitoring ended at 0548. Aphasic, follows no commands. RSW remains. MRI revealing for blood products in stroke bed, likely reperfusion injury. OK to start monotherapy with either plavix or ASA 6/21/25 for secondary stroke prevention. ECHO unremarkable. Family member at bedside agreeable to NGT placement. Etiology ESUS.   6/21/2025 NAEON. Neuro exam stable. Recommend holding AP therapy for now.   06/22/2025 NAEON. Neuro exam stable. Start AP therapy with ASA.    06/23/2025 NAEON. SLP recs NPO. PEG discussed with family and they are agreeable. Pt S/D to NPU.   06/24/2025 NGT pulled overnight and replaced. Placement confirmed via X ray. IR consulted for PEG placement. JEANNE, Creatinine and BUN uptrending. FWF flushes ordered. Restless during night. EKG repeated. QTc 441. Seroquel 25 mg QHS ordered. Family requesting [facility] when medically ready.   06/25/2025 NAEON. Plans for PEG placement today. BUN and Cr uptrending. Fluids started. Lisinopril held.   06/26/2025 NAEON. PEG placed yesterday, will resume tube feeds this afternoon starting with trickle feeds. BUN and Cr Improved. Increased water boluses to 300.  06/27/2025 NAEON. Neuro exam stable. Patient has tolerated tube feeds well. Will continue to monitor kidney function for now. Trial off restraints today to prepare for rehab placement. Added Lantus 15 units. MBSS today, patient able to have puree for pleasure feeds per speech.   06/28/2025 Na 152. 0.45% NS at 100 ml/hr ordered x 12 hrs. Will repeat sodium draw scheduled at 2100. Pulled PEG. IR unable to replace PEG. Will attempt in coming days. NGT replaced, placement confirmed via Xray. TF and FWF restarted.   06/29/2025 Na 154, BUN and Cr improving. Continuing fluids. NGT coiled overnight. NG Xray showed possible pneumoperitoneum. CT AP without contrast ordered. Showed free air in the abdomen, advised not to use NGT for now. General Surgery consulted to discuss PEG placement.   06/30/2025 Pulled NG tube overnight. Na stable, Cr and BUN continue to improve. Fluids reordered. Discussed PEG replacement with GI and Gen Surg, deferred to IR for replacement. IR reconsulted this morning. Must wait approximately 1 week to re attempt PEG placement. Placement planned for 7/3. Will need to replace NG tube 7/2. NPO at midnight 7/3.   07/01/2025 Switched 0.45 NS to D5W @60 cc/hr. Hypernatremic at 153. Free water deficit 1.9L   07/02/2025 Hypernatremia improving. Continuing  D5W. Plan for G tube tomorrow per IR.   07/03/2025: G tube placement per IR. Decreased D5W to 30 cc/hr.   07/04/2025 S/p G tube placement. Will start tube feeds later, increased statin to 80 mg, added plavix 75 mg, and Niacin 1g nightly.   07/05/2025 NAEON. Started tube feeds. BG optimization ongoing.   07/06/2025 NAEON. Pt with increased creatinine. Gave bolus. Believe JEANNE is combination of prerenal and post renal as he has been retaining urine periodically.   07/07/2025. NAEON. Pt creatinine not improved despite fluids and underwood. Ordered Renal US.   07/08/2025. NAEON. Renal US unremarkable for hydronephrosis. Creatinine normalized. Increased lantus to 20 units, aspart to 8 units.   07/09/2025. NAEON. Pt seems to be eating full tray. Paused tube feeds.     STROKE DOCUMENTATION   Acute Stroke Times   Last Known Normal Date: 06/19/25  Last Known Normal Time: 0400  Symptom Onset Date: 06/19/25  Symptom Onset Time: 0400  Stroke Team Called Date: 06/19/25  Stroke Team Called Time: 0813  Stroke Team Arrival Date: 06/19/25  Stroke Team Arrival Time: 0813  CT Interpretation Time: 0827  Thrombolytic Therapy Recommended:  (already given prior to transfer)  CTA Interpretation Time:  (already completed prior to transfer)  Thrombectomy Recommended: Yes  Decision to Treat Time for IR: 0832    NIH Scale:  1a. Level of Consciousness: 0-->Alert, keenly responsive  1b. LOC Questions: 2-->Answers neither question correctly  1c. LOC Commands: 2-->Performs neither task correctly  2. Best Gaze: 0-->Normal  3. Visual: 0-->No visual loss  4. Facial Palsy: 1-->Minor paralysis (flattened nasolabial fold, asymmetry on smiling)  5a. Motor Arm, Left: 0-->No drift, limb holds 90 (or 45) degrees for full 10 secs  5b. Motor Arm, Right: 3-->No effort against gravity, limb falls  6a. Motor Leg, Left: 0-->No drift, leg holds 30 degree position for full 5 secs  6b. Motor Leg, Right: 3-->No effort against gravity, leg falls to bed immediately  7. Limb  Ataxia: 0-->Absent  8. Sensory: 0-->Normal, no sensory loss  9. Best Language: 2-->Severe aphasia, all communication is through fragmentary expression, great need for inference, questioning, and guessing by the listener. Range of information that can be exchanged is limited, listener carries burden of. . . (see row details)  10. Dysarthria: 2-->Severe dysarthria, patients speech is so slurred as to be unintelligible in the absence of or out of proportion to any dysphasia, or is mute/anarthric  11. Extinction and Inattention (formerly Neglect): 0-->No abnormality  Total (NIH Stroke Scale): 15       Modified [place] Score: 2  [place] Coma Scale:    ABCD2 Score:    [id number] Score:   HAS -BLED Score:   ICH Score:   Hunt & Kimball Classification:      Hemorrhagic change of an Ischemic Stroke: Does this patient have an ischemic stroke with hemorrhagic changes? No     Neurologic Chief Complaint: L MCA stroke    Subjective:     Interval History: Patient is seen for follow-up neurological assessment and treatment recommendations: See hospital course.    HPI, Past Medical, Family, and Social History remains the same as documented in the initial encounter.     Review of Systems   Reason unable to perform ROS: Severe aphasia.     Scheduled Meds:   amLODIPine  10 mg Per G Tube QHS    aspirin  300 mg Rectal Daily    atorvastatin  80 mg Per G Tube Daily    chlorhexidine  15 mL Mouth/Throat BID    clopidogreL  75 mg Per G Tube Daily    FLUoxetine  20 mg Per G Tube Daily    heparin (porcine)  5,000 Units Subcutaneous Q8H    insulin aspart U-100  8 Units Subcutaneous Q4H    insulin glargine U-100  20 Units Subcutaneous Daily    metoprolol tartrate  50 mg Per G Tube BID    mupirocin   Nasal BID    niacin  500 mg Per G Tube BID WM    nicotine  1 patch Transdermal Daily    nystatin  500,000 Units Oral QID    polyethylene glycol  17 g Per G Tube BID    QUEtiapine  25 mg Per G Tube QHS    senna-docusate  1 tablet Per G Tube BID  "    Continuous Infusions:        PRN Meds:  Current Facility-Administered Medications:     bisacodyL, 10 mg, Rectal, Daily PRN    dextrose 50%, 12.5 g, Intravenous, PRN    dextrose 50%, 25 g, Intravenous, PRN    glucagon (human recombinant), 1 mg, Intramuscular, PRN    hydrALAZINE, 10 mg, Intravenous, Q4H PRN    insulin aspart U-100, 0-10 Units, Subcutaneous, Q4H PRN    labetalol, 10 mg, Intravenous, Q4H PRN    melatonin, 6 mg, Per G Tube, Nightly PRN    simethicone, 1 tablet, Per G Tube, TID PRN    sodium chloride 0.9%, 10 mL, Intravenous, PRN    Objective:     Vital Signs (Most Recent):  Temp: 99.3 °F (37.4 °C) (07/09/25 1114)  Pulse: 82 (07/09/25 1457)  Resp: 18 (07/09/25 1114)  BP: 120/67 (07/09/25 1114)  SpO2: (!) 94 % (07/09/25 1114)  BP Location: Left arm    Vital Signs Range (Last 24H):  Temp:  [98 °F (36.7 °C)-99.8 °F (37.7 °C)]   Pulse:  [71-91]   Resp:  [16-19]   BP: (120-158)/(66-85)   SpO2:  [94 %-96 %]   BP Location: Left arm       Physical Exam  HENT:      Head: Normocephalic and atraumatic.      Mouth/Throat:      Mouth: Mucous membranes are moist.   Eyes:      Conjunctiva/sclera: Conjunctivae normal.   Cardiovascular:      Rate and Rhythm: Normal rate.   Pulmonary:      Effort: Pulmonary effort is normal.   Skin:     General: Skin is warm and dry.   Neurological:      Mental Status: He is alert.      Motor: Weakness present.              Neurological Exam:   LOC: drowsy  Attention Span: poor  Language: Expressive aphasia, Receptive aphasia  Articulation: Dysarthria  Orientation: Untestable due to severe aphasia   Facial Movement (CN VII): Lower facial weakness on the Right  Motor: Arm left  Normal 5/5  Leg left  Normal 5/5  Arm right  Plegia 0/5  Leg right Paresis: 1/5  Sensation: Cy-hypoesthesia right    Laboratory:  CMP:   No results for input(s): "GLUCOSE", "CALCIUM", "ALBUMIN", "PROT", "NA", "K", "CO2", "CL", "BUN", "CREATININE", "ALKPHOS", "ALT", "AST", "BILITOT" in the last 24 hours.    BMP: " "  Recent Labs   Lab 07/08/25  0309      K 4.0      CO2 26   BUN 25*   CREATININE 1.2   CALCIUM 8.1*     CBC:   Recent Labs   Lab 07/08/25  0309   WBC 13.51*   RBC 3.33*   HGB 9.5*   HCT 29.6*      MCV 89   MCH 28.5   MCHC 32.1     Lipid Panel:   No results for input(s): "CHOL", "LDLCALC", "HDL", "TRIG" in the last 168 hours.    Coagulation:   No results for input(s): "PT", "INR", "APTT" in the last 168 hours.    Platelet Aggregation Study: No results for input(s): "PLTAGG", "PLTAGINTERP", "PLTAGREGLACO", "ADPPLTAGGREG" in the last 168 hours.  Hgb A1C:   No results for input(s): "HGBA1C" in the last 168 hours.    TSH:   No results for input(s): "TSH" in the last 168 hours.      Diagnostic Results   Brain Imaging   MRI Brain 6/20/25  Impression:     Acute left MCA distribution infarct.  No new large parenchymal hemorrhage.  Susceptibility artifact throughout the infarcted tissue may reflect contrast staining and/or blood products conversion.  Overall mass effect is increased compared to prior with sulcal effacement throughout the left cerebral hemisphere and approximately 4 mm of rightward midline shift.     Stable size of the parenchymal hemorrhage centered in the inferior left temporal lobe.     [facility] 6/20/25: 1242  Impression:     Moderate-sized recent left MCA distribution infarct and small intraparenchymal hemorrhage appear grossly unchanged from prior study performed earlier on the same date.     Chronic ischemic change elsewhere with underlying cerebral and cerebellar volume loss, as before.     No new hemorrhage or major vascular distribution infarct elsewhere.  [facility] 6/20/25: 0834  Impression:     Early ischemic changes in the left MCA distribution, new from recent CT.     New left inferior temporal intraparenchymal hematoma.     Chronic ischemic change with cerebral and cerebellar volume loss, as above.     [facility] 6/19/25  Impression:     Question some early left MCA ischemia corresponding to the " patient's known left MCA territory occlusion.  No hemorrhage.     Senescent changes as above.        All CT scans at this facility are performed  using dose modulation techniques as appropriate to performed exam including the following:  automated exposure control; adjustment of mA and/or kV according to the patients size (this includes techniques or standardized protocols for targeted exams where dose is matched to indication/reason for exam: i.e. extremities or head);  iterative reconstruction technique.     Vessel Imaging   CTA head and neck 6/19/25  Impression:     Distal left M1 MCA occlusion with relatively prompt collateralization of the more distal MCA vessels.     Severe stenosis left mid vertebral artery and left PCA.     Cardiac Imaging   TTE 6/19/25    Left Ventricle: The left ventricle is normal in size. Normal wall thickness. There is normal systolic function with a visually estimated ejection fraction of 60 - 65%.    Right Ventricle: The right ventricle is normal in size Wall thickness is normal. Systolic function is normal.    The pulmonary artery pressure could not be estimated.    IVC/SVC: Normal venous pressure at 3 mmHg.       Cullen Almanzar MD  Comprehensive Stroke Center  Department of Vascular Neurology   [facility])

## 2025-07-09 NOTE — PLAN OF CARE
Message received from [facility] informing this CM that they have submitted for insurance authorization. Will continue to follow.    Gloria Robertson RN  Ext [phone]

## 2025-07-09 NOTE — PLAN OF CARE
Problem: Adult Inpatient Plan of Care  Goal: Optimal Comfort and Wellbeing  Outcome: Progressing  Goal: Readiness for Transition of Care  Outcome: Progressing     Problem: Acute Kidney Injury/Impairment  Goal: Fluid and Electrolyte Balance  Outcome: Progressing  Goal: Improved Oral Intake  Outcome: Progressing  Goal: Effective Renal Function  Outcome: Progressing         POC updated and reviewed with the patient/wife at the bedside. Questions regarding POC were encouraged and addressed.  Tele maintained per provider's order. NAEON. Seizure, fall, and safety precautions maintained, no signs of injury noted during shift. Patient repositioned independently/with assistance in bed for comfort. Upon exiting room, patient's bed locked in low position, side rails up x 3, bed alarm set, with call light within reach.. Stroke book and stroke education reviewed with the patient/wife at the bedside, see education flowsheets for details. No acute signs of distress noted at this time.

## 2025-07-09 NOTE — PLAN OF CARE
Problem: Adult Inpatient Plan of Care  Goal: Patient-Specific Goal (Individualized)  Description: Pt will maintain sbp below 160  Outcome: Progressing     Problem: Restraint, Nonviolent  Goal: Absence of Harm or Injury  Outcome: Progressing     Problem: Adult Inpatient Plan of Care  Goal: Optimal Comfort and Wellbeing  Outcome: Progressing  Goal: Readiness for Transition of Care  Outcome: Progressing     Problem: Stroke, Ischemic (Includes Transient Ischemic Attack)  Goal: Optimal Coping  Outcome: Progressing  Goal: Effective Bowel Elimination  Outcome: Progressing  Goal: Optimal Cerebral Tissue Perfusion  Outcome: Progressing  Goal: Optimal Cognitive Function  Outcome: Progressing  Goal: Improved Communication Skills  Outcome: Progressing  Goal: Optimal Functional Ability  Outcome: Progressing  Goal: Optimal Nutrition Intake  Outcome: Progressing  Goal: Effective Oxygenation and Ventilation  Outcome: Progressing  Goal: Improved Sensorimotor Function  Outcome: Progressing  Goal: Safe and Effective Swallow  Outcome: Progressing  Goal: Effective Urinary Elimination  Outcome: Progressing     Problem: Acute Kidney Injury/Impairment  Goal: Fluid and Electrolyte Balance  Outcome: Progressing  Goal: Improved Oral Intake  Outcome: Progressing  Goal: Effective Renal Function  Outcome: Progressing

## 2025-07-09 NOTE — PT/OT/SLP PROGRESS
"Speech Language Pathology Treatment    Patient Name:  Cristi Pulido   MRN:  [id number]  Admitting Diagnosis: Embolic stroke involving left middle cerebral artery    Recommendations:                 General Recommendations:  Dysphagia therapy and Speech/language therapy  Diet recommendations:  Minced & Moist Diet - IDDSI Level 5, Liquid Diet Level: Thin (NO straws)   Aspiration Precautions: Assistance with meals, HOB to 90 degrees, Meds crushed in puree, No straws, Small bites/sips, and Standard aspiration precautions   General Precautions: Standard, aspiration, fall  Communication strategies:  none  Discharge recommendations:  High Intensity Therapy   Barriers to Discharge:  Decreased Care Giver Support    Assessment:     Cristi Pulido is a 70 y.o. male with an SLP diagnosis of Aphasia, Dysphagia, and Cognitive-Linguistic Impairment.    Subjective     "I fed him the whole tray" per wife  Patient goals: kiersten     Pain/Comfort:  Pain Rating 1: 0/10  Pain Rating Post-Intervention 1: 0/10    Respiratory Status: Room air    Objective:     Has the patient been evaluated by SLP for swallowing?   Yes  Keep patient NPO? No   Current Respiratory Status:        Pt. Seen at bedside and hob was raised to 90 degree angle.  Per wife, pta pt. Ate regular diet with thin liquids despite being endentulous.  Mr. Pulido did not follow or model any simple commands and responses to simple yes/no questions were undifferentiated.  Verbalizations elicited were unintelligible in spontaneous context and on automatic speech tasks.  Vocal quality and intensity were wfl.  When presented with teaspoons of water and food, pt. Readily opened his mouth to remove all bolus' from the spoon.  Pt. Was fed 4 ounces of pudding with crushed marisa/cracker and fed teaspoons of water x3 ounces.  Oral phase of swallow was wfl with timely oral transit and no delay in initiation of swallow.  Min-no anterior loss of bolus noted with min-no oral residue following the swallow. "  No coughing,throat clearing or change in vocal quality noted following the swallow.    Recommend considering calorie count to assess po intake.  Ongoing education provided to spouse re communication deficits, safe swallow strategies and aspiration precautions.       Goals:   Multidisciplinary Problems       SLP Goals          Problem: SLP    Goal Priority Disciplines Outcome   SLP Goal     SLP Progressing   Description: Speech Language Pathology Goals  Goals expected to be met by 7/3    1. Pt will participate in ongoing swallow assessment to determine least restrictive PO diet.    2. Pt will participate in ongoing cognitive-linguistic assessment to determine additional therapeutic needs.   3. Pt will follow 1 step commands with 80% acc following model/prompt.   4. Pt will answer simple Y/N questions with 80% acc.                                  Plan:     Patient to be seen:  4 x/week   Plan of Care expires:  07/19/25  Plan of Care reviewed with:  patient, spouse   SLP Follow-Up:  Yes       Time Tracking:     SLP Treatment Date:   07/09/25  Speech Start Time:  1110  Speech Stop Time:  1127     Speech Total Time (min):  17 min    Billable Minutes: Speech Therapy Individual 8 and Treatment Swallowing Dysfunction 9    07/09/2025

## 2025-07-09 NOTE — SUBJECTIVE & OBJECTIVE
Neurologic Chief Complaint: L MCA stroke    Subjective:     Interval History: Patient is seen for follow-up neurological assessment and treatment recommendations: See hospital course.    HPI, Past Medical, Family, and Social History remains the same as documented in the initial encounter.     Review of Systems   Reason unable to perform ROS: Severe aphasia.     Scheduled Meds:   amLODIPine  10 mg Per G Tube QHS    aspirin  300 mg Rectal Daily    atorvastatin  80 mg Per G Tube Daily    chlorhexidine  15 mL Mouth/Throat BID    clopidogreL  75 mg Per G Tube Daily    FLUoxetine  20 mg Per G Tube Daily    heparin (porcine)  5,000 Units Subcutaneous Q8H    insulin aspart U-100  10 Units Subcutaneous TIDWM    [START ON 7/11/2025] insulin glargine U-100  25 Units Subcutaneous Daily    metoprolol tartrate  50 mg Per G Tube BID    mupirocin   Nasal BID    niacin  500 mg Per G Tube BID WM    nicotine  1 patch Transdermal Daily    nystatin  500,000 Units Oral QID    polyethylene glycol  17 g Per G Tube BID    QUEtiapine  25 mg Per G Tube QHS    senna-docusate  1 tablet Per G Tube BID     Continuous Infusions:        PRN Meds:  Current Facility-Administered Medications:     bisacodyL, 10 mg, Rectal, Daily PRN    dextrose 50%, 12.5 g, Intravenous, PRN    dextrose 50%, 25 g, Intravenous, PRN    glucagon (human recombinant), 1 mg, Intramuscular, PRN    glucose, 16 g, Oral, PRN    glucose, 24 g, Oral, PRN    hydrALAZINE, 10 mg, Intravenous, Q4H PRN    labetalol, 10 mg, Intravenous, Q4H PRN    melatonin, 6 mg, Per G Tube, Nightly PRN    simethicone, 1 tablet, Per G Tube, TID PRN    sodium chloride 0.9%, 10 mL, Intravenous, PRN    Objective:     Vital Signs (Most Recent):  Temp: 99.2 °F (37.3 °C) (07/10/25 0731)  Pulse: 67 (07/10/25 1118)  Resp: 18 (07/10/25 0731)  BP: 125/64 (07/10/25 0731)  SpO2: (!) 93 % (07/10/25 0731)  BP Location: Left arm    Vital Signs Range (Last 24H):  Temp:  [98.1 °F (36.7 °C)-99.9 °F (37.7 °C)]   Pulse:   "[67-94]   Resp:  [18]   BP: (125-149)/(63-73)   SpO2:  [93 %-94 %]   BP Location: Left arm       Physical Exam  HENT:      Head: Normocephalic and atraumatic.      Mouth/Throat:      Mouth: Mucous membranes are moist.   Eyes:      Conjunctiva/sclera: Conjunctivae normal.   Cardiovascular:      Rate and Rhythm: Normal rate.   Pulmonary:      Effort: Pulmonary effort is normal.   Skin:     General: Skin is warm and dry.   Neurological:      Mental Status: He is alert.      Motor: Weakness present.              Neurological Exam:   LOC: alert  Attention Span: poor  Language: Expressive aphasia, Receptive aphasia  Articulation: Dysarthria  Orientation: Untestable due to severe aphasia   Facial Movement (CN VII): Lower facial weakness on the Right  Motor: Arm left  Normal 5/5  Leg left  Normal 5/5  Arm right  Plegia 0/5  Leg right Paresis: 1/5  Sensation: Cy-hypoesthesia right    Laboratory:  CMP:   No results for input(s): "GLUCOSE", "CALCIUM", "ALBUMIN", "PROT", "NA", "K", "CO2", "CL", "BUN", "CREATININE", "ALKPHOS", "ALT", "AST", "BILITOT" in the last 24 hours.    BMP:   Recent Labs   Lab 07/08/25  0309      K 4.0      CO2 26   BUN 25*   CREATININE 1.2   CALCIUM 8.1*     CBC:   Recent Labs   Lab 07/08/25  0309   WBC 13.51*   RBC 3.33*   HGB 9.5*   HCT 29.6*      MCV 89   MCH 28.5   MCHC 32.1     Lipid Panel:   No results for input(s): "CHOL", "LDLCALC", "HDL", "TRIG" in the last 168 hours.    Coagulation:   No results for input(s): "PT", "INR", "APTT" in the last 168 hours.    Platelet Aggregation Study: No results for input(s): "PLTAGG", "PLTAGINTERP", "PLTAGREGLACO", "ADPPLTAGGREG" in the last 168 hours.  Hgb A1C:   No results for input(s): "HGBA1C" in the last 168 hours.    TSH:   No results for input(s): "TSH" in the last 168 hours.      Diagnostic Results   Brain Imaging   MRI Brain 6/20/25  Impression:     Acute left MCA distribution infarct.  No new large parenchymal hemorrhage.  " Susceptibility artifact throughout the infarcted tissue may reflect contrast staining and/or blood products conversion.  Overall mass effect is increased compared to prior with sulcal effacement throughout the left cerebral hemisphere and approximately 4 mm of rightward midline shift.     Stable size of the parenchymal hemorrhage centered in the inferior left temporal lobe.     [facility] 6/20/25: 1242  Impression:     Moderate-sized recent left MCA distribution infarct and small intraparenchymal hemorrhage appear grossly unchanged from prior study performed earlier on the same date.     Chronic ischemic change elsewhere with underlying cerebral and cerebellar volume loss, as before.     No new hemorrhage or major vascular distribution infarct elsewhere.  CT 6/20/25: 0834  Impression:     Early ischemic changes in the left MCA distribution, new from recent CT.     New left inferior temporal intraparenchymal hematoma.     Chronic ischemic change with cerebral and cerebellar volume loss, as above.     [facility] 6/19/25  Impression:     Question some early left MCA ischemia corresponding to the patient's known left MCA territory occlusion.  No hemorrhage.     Senescent changes as above.        All CT scans at this facility are performed  using dose modulation techniques as appropriate to performed exam including the following:  automated exposure control; adjustment of mA and/or kV according to the patients size (this includes techniques or standardized protocols for targeted exams where dose is matched to indication/reason for exam: i.e. extremities or head);  iterative reconstruction technique.     Vessel Imaging   CTA head and neck 6/19/25  Impression:     Distal left M1 MCA occlusion with relatively prompt collateralization of the more distal MCA vessels.     Severe stenosis left mid vertebral artery and left PCA.     Cardiac Imaging   TTE 6/19/25    Left Ventricle: The left ventricle is normal in size. Normal wall thickness.  There is normal systolic function with a visually estimated ejection fraction of 60 - 65%.    Right Ventricle: The right ventricle is normal in size Wall thickness is normal. Systolic function is normal.    The pulmonary artery pressure could not be estimated.    IVC/SVC: Normal venous pressure at 3 mmHg.

## 2025-07-09 NOTE — PT/OT/SLP PROGRESS
"Physical Therapy Treatment    Patient Name:  Cristi Pulido   MRN:  [id number]    Recommendations:     Discharge Recommendations: High Intensity Therapy  Discharge Equipment Recommendations: wheelchair, hospital bed, lift device  Barriers to discharge: Inaccessible home and Decreased caregiver support    Assessment:     Cristi Pulido is a 70 y.o. male admitted with a medical diagnosis of Embolic stroke involving left middle cerebral artery.  He presents with the following impairments/functional limitations: weakness, gait instability, decreased upper extremity function, decreased ROM, impaired endurance, impaired balance, decreased lower extremity function, visual deficits, decreased safety awareness, impaired fine motor, impaired self care skills, impaired functional mobility, decreased coordination, abnormal tone. Patient participated well in session. Seated balance displayed improvement with mirror in front of patient to provide visual feedback, with patient visually attending to mirror for majority of session. 3 STS transfers attempted, patient achieved ~75% upright at best trial. This date patient was able to verbalize 1x "yes" and "no" appropriate to situation. Recommending high intensity post-acute therapy after discharge to maximize benefits; patient could tolerate 3xhours/day of combined therapies.      Rehab Prognosis: Good; patient would benefit from acute skilled PT services to address these deficits and reach maximum level of function.    Recent Surgery: * No surgery found *      Plan:     During this hospitalization, patient to be seen 4 x/week to address the identified rehab impairments via gait training, therapeutic activities, therapeutic exercises, neuromuscular re-education and progress toward the following goals:    Plan of Care Expires:  07/22/25    Subjective     Chief Complaint: not stated  Patient/Family Comments/goals: "yes" when asked if he wanted to try and stand.  Pain/Comfort:  Pain Rating 1: " "0/10  Pain Rating Post-Intervention 1: 0/10      Objective:     Communicated with RN prior to session.  Patient found HOB elevated with telemetry, bed alarm, SCD, G/J tube, underwood catheter upon PT entry to room. Wife in room.     General Precautions: Standard, fall, aspiration  Orthopedic Precautions: N/A  Braces: N/A  Respiratory Status: Room air     Cognition:   Affect: pleasant, cooperative, and confused  Alertness: eyes open 100 % of session  Insight: poor insight into deficits, decreased safety awareness, and impulsive   Attention: requires redirection to task  Command Following: patient follows no verbal-only commands  Improved success with tactile cueing/modeling commands   Communication: intermittent verbalizations however unintelligible   "Yes" and "no" 2x this date     Functional Mobility:  Bed Mobility:     Scooting: moderate assistance  Supine to Sit: maximal assistance and of 2 persons  To L, HOB elevated   Sit to Supine: maximal assistance and of 2 persons  Transfers:     Sit to Stand:  maximal assistance and of 2 persons with hand-held assist  3 trials, achieved ~75% upright at best trial   RLE blocked   Balance:   Sitting static: maxA to CGA  R lean, LUE pushing, cues to attend to mirror to adjust position   Sitting dynamic: maxA  Standing static: totalA  RLE blocked, forward stooped posture    AM-PAC 6 CLICK MOBILITY  Turning over in bed (including adjusting bedclothes, sheets and blankets)?: 2  Sitting down on and standing up from a chair with arms (e.g., wheelchair, bedside commode, etc.): 2  Moving from lying on back to sitting on the side of the bed?: 2  Moving to and from a bed to a chair (including a wheelchair)?: 1  Need to walk in hospital room?: 1  Climbing 3-5 steps with a railing?: 1  Basic Mobility Total Score: 9       Treatment & Education:  Mobility performed with assistance from rehab tech, under direct supervision and direction of therapist.   Mirror placed in front of patient to " provide visual input to movement, patient cued to visually attend to task in mirror.    EOB balance with cues to engage core mm to decrease assistance needed and increase safety in sitting.  Verbal and tactile cues with assist during STS transfer for optimal LOU prior to transfer, forward weight shift to initiate, to engage LE mm, extend hips forward and bring head and chest up to achieve full upright stance.    Seated UE self care tasks hand over hand assistance, seated LE kicks AROM LLE PROM RLE.   Cues during rolling and bed mobility for UE reaching and positioning on bed railings, LE management with rolling technique, cues for visual attention to UE for initiation. Assistance as above.      Patient left HOB elevated with all lines intact, call button in reach, and bed alarm on.    GOALS:   Multidisciplinary Problems       Physical Therapy Goals          Problem: Physical Therapy    Goal Priority Disciplines Outcome Interventions   Physical Therapy Goal     PT, PT/OT Progressing    Description: Goals to be met by: 25 ext 25    Patient will increase functional independence with mobility by performin. Supine to sit with Contact Guard Assistance  2. Sit to supine with Contact Guard Assistance  3. Sit to stand transfer with Moderate Assistance  4. Bed to chair transfer with Maximum Assistance using No Assistive Device  5. Gait  x 10 feet with Maximum Assistance using No Assistive Device.   6. Sitting at edge of bed x5 minutes with Stand-by Assistance  7. Follow 100% of 1-step commands throughout session                         Time Tracking:     PT Received On: 25  PT Start Time: 808     PT Stop Time: 842  PT Total Time (min): 34 min     Billable Minutes: Neuromuscular Re-education 34 minutes    Treatment Type: Treatment  PT/PTA: PT     Number of PTA visits since last PT visit: 0     2025

## 2025-07-10 LAB
POCT GLUCOSE: 179 MG/DL (ref 70–110)
POCT GLUCOSE: 190 MG/DL (ref 70–110)
POCT GLUCOSE: 191 MG/DL (ref 70–110)
POCT GLUCOSE: 322 MG/DL (ref 70–110)
POCT GLUCOSE: 324 MG/DL (ref 70–110)
POCT GLUCOSE: 336 MG/DL (ref 70–110)

## 2025-07-10 PROCEDURE — 25000003 PHARM REV CODE 250: Mod: HCNC

## 2025-07-10 PROCEDURE — 11000001 HC ACUTE MED/SURG PRIVATE ROOM: Mod: HCNC

## 2025-07-10 PROCEDURE — 25000003 PHARM REV CODE 250: Mod: HCNC | Performed by: NURSE PRACTITIONER

## 2025-07-10 PROCEDURE — 63600175 PHARM REV CODE 636 W HCPCS: Mod: HCNC

## 2025-07-10 PROCEDURE — 99233 SBSQ HOSP IP/OBS HIGH 50: CPT | Mod: HCNC,GC,, | Performed by: PSYCHIATRY & NEUROLOGY

## 2025-07-10 PROCEDURE — 63600175 PHARM REV CODE 636 W HCPCS: Mod: HCNC | Performed by: NURSE PRACTITIONER

## 2025-07-10 PROCEDURE — S4991 NICOTINE PATCH NONLEGEND: HCPCS | Mod: HCNC

## 2025-07-10 PROCEDURE — 97112 NEUROMUSCULAR REEDUCATION: CPT | Mod: HCNC

## 2025-07-10 PROCEDURE — A4216 STERILE WATER/SALINE, 10 ML: HCPCS | Mod: HCNC

## 2025-07-10 PROCEDURE — 25000003 PHARM REV CODE 250: Mod: HCNC | Performed by: PHYSICIAN ASSISTANT

## 2025-07-10 RX ORDER — INSULIN GLARGINE 100 [IU]/ML
25 INJECTION, SOLUTION SUBCUTANEOUS DAILY
Status: DISCONTINUED | OUTPATIENT
Start: 2025-07-11 | End: 2025-07-11 | Stop reason: HOSPADM

## 2025-07-10 RX ORDER — GLUCAGON 1 MG
1 KIT INJECTION
Status: DISCONTINUED | OUTPATIENT
Start: 2025-07-10 | End: 2025-07-11 | Stop reason: HOSPADM

## 2025-07-10 RX ORDER — IBUPROFEN 200 MG
16 TABLET ORAL
Status: DISCONTINUED | OUTPATIENT
Start: 2025-07-10 | End: 2025-07-11 | Stop reason: HOSPADM

## 2025-07-10 RX ORDER — IBUPROFEN 200 MG
24 TABLET ORAL
Status: DISCONTINUED | OUTPATIENT
Start: 2025-07-10 | End: 2025-07-11 | Stop reason: HOSPADM

## 2025-07-10 RX ORDER — INSULIN ASPART 100 [IU]/ML
10 INJECTION, SOLUTION INTRAVENOUS; SUBCUTANEOUS
Status: DISCONTINUED | OUTPATIENT
Start: 2025-07-10 | End: 2025-07-11 | Stop reason: HOSPADM

## 2025-07-10 RX ORDER — INSULIN ASPART 100 [IU]/ML
0-5 INJECTION, SOLUTION INTRAVENOUS; SUBCUTANEOUS
Status: DISCONTINUED | OUTPATIENT
Start: 2025-07-10 | End: 2025-07-11

## 2025-07-10 RX ADMIN — MUPIROCIN: 20 OINTMENT TOPICAL at 09:07

## 2025-07-10 RX ADMIN — HEPARIN SODIUM 5000 UNITS: 5000 INJECTION INTRAVENOUS; SUBCUTANEOUS at 05:07

## 2025-07-10 RX ADMIN — METOPROLOL TARTRATE 50 MG: 50 TABLET, FILM COATED ORAL at 10:07

## 2025-07-10 RX ADMIN — INSULIN ASPART 8 UNITS: 100 INJECTION, SOLUTION INTRAVENOUS; SUBCUTANEOUS at 12:07

## 2025-07-10 RX ADMIN — INSULIN ASPART 8 UNITS: 100 INJECTION, SOLUTION INTRAVENOUS; SUBCUTANEOUS at 09:07

## 2025-07-10 RX ADMIN — NYSTATIN 500000 UNITS: 100000 SUSPENSION ORAL at 12:07

## 2025-07-10 RX ADMIN — Medication 500 MG: at 05:07

## 2025-07-10 RX ADMIN — INSULIN GLARGINE 20 UNITS: 100 INJECTION, SOLUTION SUBCUTANEOUS at 09:07

## 2025-07-10 RX ADMIN — INSULIN ASPART 2 UNITS: 100 INJECTION, SOLUTION INTRAVENOUS; SUBCUTANEOUS at 04:07

## 2025-07-10 RX ADMIN — INSULIN ASPART 10 UNITS: 100 INJECTION, SOLUTION INTRAVENOUS; SUBCUTANEOUS at 12:07

## 2025-07-10 RX ADMIN — FLUOXETINE HYDROCHLORIDE 20 MG: 20 CAPSULE ORAL at 09:07

## 2025-07-10 RX ADMIN — SENNOSIDES AND DOCUSATE SODIUM 1 TABLET: 50; 8.6 TABLET ORAL at 09:07

## 2025-07-10 RX ADMIN — HEPARIN SODIUM 5000 UNITS: 5000 INJECTION INTRAVENOUS; SUBCUTANEOUS at 02:07

## 2025-07-10 RX ADMIN — ATORVASTATIN CALCIUM 80 MG: 40 TABLET, FILM COATED ORAL at 09:07

## 2025-07-10 RX ADMIN — ASPIRIN 300 MG: 300 SUPPOSITORY RECTAL at 09:07

## 2025-07-10 RX ADMIN — AMLODIPINE BESYLATE 10 MG: 10 TABLET ORAL at 10:07

## 2025-07-10 RX ADMIN — Medication 500 MG: at 09:07

## 2025-07-10 RX ADMIN — Medication 1 PATCH: at 10:07

## 2025-07-10 RX ADMIN — NYSTATIN 500000 UNITS: 100000 SUSPENSION ORAL at 05:07

## 2025-07-10 RX ADMIN — CHLORHEXIDINE GLUCONATE 0.12% ORAL RINSE 15 ML: 1.2 LIQUID ORAL at 09:07

## 2025-07-10 RX ADMIN — INSULIN ASPART 8 UNITS: 100 INJECTION, SOLUTION INTRAVENOUS; SUBCUTANEOUS at 04:07

## 2025-07-10 RX ADMIN — INSULIN ASPART 10 UNITS: 100 INJECTION, SOLUTION INTRAVENOUS; SUBCUTANEOUS at 05:07

## 2025-07-10 RX ADMIN — NYSTATIN 500000 UNITS: 100000 SUSPENSION ORAL at 09:07

## 2025-07-10 RX ADMIN — MUPIROCIN: 20 OINTMENT TOPICAL at 10:07

## 2025-07-10 RX ADMIN — QUETIAPINE FUMARATE 25 MG: 25 TABLET ORAL at 10:07

## 2025-07-10 RX ADMIN — CHLORHEXIDINE GLUCONATE 0.12% ORAL RINSE 15 ML: 1.2 LIQUID ORAL at 10:07

## 2025-07-10 RX ADMIN — HEPARIN SODIUM 5000 UNITS: 5000 INJECTION INTRAVENOUS; SUBCUTANEOUS at 10:07

## 2025-07-10 RX ADMIN — NYSTATIN 500000 UNITS: 100000 SUSPENSION ORAL at 10:07

## 2025-07-10 RX ADMIN — POLYETHYLENE GLYCOL 3350 17 G: 17 POWDER, FOR SOLUTION ORAL at 09:07

## 2025-07-10 RX ADMIN — CLOPIDOGREL 75 MG: 75 TABLET ORAL at 09:07

## 2025-07-10 RX ADMIN — Medication 10 ML: at 10:07

## 2025-07-10 RX ADMIN — METOPROLOL TARTRATE 50 MG: 50 TABLET, FILM COATED ORAL at 09:07

## 2025-07-10 RX ADMIN — INSULIN ASPART 2 UNITS: 100 INJECTION, SOLUTION INTRAVENOUS; SUBCUTANEOUS at 10:07

## 2025-07-10 NOTE — PT/OT/SLP PROGRESS
Occupational Therapy   Treatment    Name: Cirsti Pulido  MRN: [id number]  Admitting Diagnosis:  Embolic stroke involving left middle cerebral artery       Recommendations:     Discharge Recommendations: High Intensity Therapy  Discharge Equipment Recommendations:  wheelchair, hospital bed, lift device  Barriers to discharge:  Decreased caregiver support    Assessment:     Cristi Pulido is a 70 y.o. male with a medical diagnosis of Embolic stroke involving left middle cerebral artery.  He presents with R side weakness and severe aphasia. Performance deficits affecting function are weakness, impaired endurance, impaired cognition, decreased ROM, decreased coordination, impaired fine motor, decreased upper extremity function, impaired self care skills, impaired functional mobility, decreased lower extremity function, impaired balance. Pt transferred to EOB and participated in PROM/AAROM for all UE joints. ~10 degrees active shoulder flexion was noted in LUE. Pt then performed 1 sit to stand transfer before returning to bed. Patient has demonstrated sufficient progression to warrant high intensity therapy evidenced by objectives noted below.      Rehab Prognosis:  Good; patient would benefit from acute skilled OT services to address these deficits and reach maximum level of function.       Plan:     Patient to be seen 4 x/week to address the above listed problems via therapeutic activities, therapeutic exercises, neuromuscular re-education  Plan of Care Expires: 07/20/25  Plan of Care Reviewed with: patient    Subjective     Chief Complaint: N/A due to aphasia  Patient/Family Comments/goals: N/A  Pain/Comfort:  Pain Rating 1:  (Unable to assess due to aphasia)    Objective:     Communicated with: RN prior to session.  Patient found HOB elevated with telemetry, PEG Tube, SCD, bed alarm upon OT entry to room.    General Precautions: Standard, fall, aspiration, aphasia    Orthopedic Precautions:N/A  Braces: N/A  Respiratory Status:  Room air     Occupational Performance:     Bed Mobility:    Patient completed Rolling/Turning to Left with  maximal assistance  Patient completed Rolling/Turning to Right with maximal assistance  Patient completed Scooting/Bridging with maximal assistance  Patient completed Supine to Sit with maximal assistance  Patient completed Sit to Supine with maximal assistance   Sitting balance: EOB assistance ranged primarily from Min to Mod A w some CGA and some leaning requiring Max A    Functional Mobility/Transfers:  Patient completed Sit <> Stand Transfer with maximal assistance of 2 persons with  no assistive device   Standing balance: Max A of 1 person with significant R side lean during standing.    Activities of Daily Living:  ADLs not attempted      [facility] 6 Click ADL: 8    Treatment & Education:  -Pt educated on hand placement in sit to stand  -AAROM/PROM was performed in hand, elbow, and shoulder joints x10 reps  - Maximal tactile, gestural, visual, and verbal cues required to redirect attention and complete ROM activity    Patient left supine with call button in reach and physical therapy present    GOALS:   Multidisciplinary Problems       Occupational Therapy Goals          Problem: Occupational Therapy    Goal Priority Disciplines Outcome Interventions   Occupational Therapy Goal     OT, PT/OT Progressing    Description: Goals to be met by: 07/20/2025     Patient will increase functional independence with ADLs by performing:    UE Dressing with Moderate Assistance.  LE Dressing with Maximum Assistance.  Grooming while seated with Moderate Assistance.  Toileting from bedside commode with Moderate Assistance for hygiene and clothing management.   Supine to sit with Moderate Assistance.  Stand pivot transfer with Moderate Assistance.  Sit to stand with Moderate Assistance.  Maintain sitting balance at EOB x 10 minutes with Minimal Assistance.                         DME Justifications:  Cristi requires a [facility]  bed due to him requiring positioning of the body in ways not feasible with an ordinary bed due to limited ability and cannot independently make changes in body position without the use of the bed.The positioning of the body cannot be sufficiently resolved by the use of pillows and wedges.  Cristi Pulido has a mobility limitation that significantly impairs his ability to participate in one or more mobility related activities of daily living (MRADLs) such as toileting, feeding, dressing, grooming, and bathing in customary locations in the home.  The mobility limitation cannot be sufficiently resolved by the use of a cane or walker.   The use of a manual wheelchair will significantly improve the patients ability to participate in MRADLS and the patient will use it on regular basis in the home.  Cristi Pulido has expressed his willingness to use a manual wheelchair in the home. Patients upper body strength is sufficient for propulsion.  He also has a caregiver who is available, willing, and able to provide assistance with the wheelchair when needed.      Time Tracking:     OT Date of Treatment: 07/10/25  OT Start Time: 1444  OT Stop Time: 1507  OT Total Time (min): 23 min    Billable Minutes:Neuromuscular Re-education 23    OT/JEANCARLOS: OT     Number of JEANCARLOS visits since last OT visit: 0    7/10/2025

## 2025-07-10 NOTE — PLAN OF CARE
Message received from [place] informing this CM that [facility] is offering a predetermination P2P for Mr. Pulido. Call placed to [employer] ([phone]) to arrange P2P, left voicemail requesting callback. Will continue to follow.    11:25AM  Call received from [employer] and P2P arranged for 8:00AM tomorrow morning. Vascular Neurology Team notified of above.    Gloria Robertson RN  Ext [phone]

## 2025-07-10 NOTE — PLAN OF CARE
Problem: Adult Inpatient Plan of Care  Goal: Patient-Specific Goal (Individualized)  Description: Pt will maintain sbp below 160  Outcome: Progressing     Problem: Restraint, Nonviolent  Goal: Absence of Harm or Injury  Outcome: Progressing     Problem: Adult Inpatient Plan of Care  Goal: Optimal Comfort and Wellbeing  Outcome: Progressing  Goal: Readiness for Transition of Care  Outcome: Progressing     Problem: Stroke, Ischemic (Includes Transient Ischemic Attack)  Goal: Optimal Coping  Outcome: Progressing  Goal: Effective Bowel Elimination  Outcome: Progressing  Goal: Optimal Cerebral Tissue Perfusion  Outcome: Progressing  Goal: Optimal Cognitive Function  Outcome: Progressing  Goal: Improved Communication Skills  Outcome: Progressing  Goal: Optimal Functional Ability  Outcome: Progressing  Goal: Optimal Nutrition Intake  Outcome: Progressing  Goal: Effective Oxygenation and Ventilation  Outcome: Progressing  Goal: Improved Sensorimotor Function  Outcome: Progressing  Goal: Safe and Effective Swallow  Outcome: Progressing  Goal: Effective Urinary Elimination  Outcome: Progressing     Problem: Acute Kidney Injury/Impairment  Goal: Fluid and Electrolyte Balance  Outcome: Progressing  Goal: Improved Oral Intake  Outcome: Progressing  Goal: Effective Renal Function  Outcome: Progressing    POC updated and reviewed with the patient at the bedside. Questions regarding POC were encouraged and addressed. VSS, see flowsheets. Tele maintained per provider's order. Patient is AOX 1 at this time. No acute events noted during shift. Seizure, fall, and safety precautions maintained, no signs of injury noted during shift. Patient repositioned independently/ with assistance in bed for comfort. Upon exiting room, patient's bed locked in low position, side rails up x 4, bed alarm on, with call light within reach. Instructed patient to call staff for mobility, verbalized understanding. Stroke book and stroke education reviewed  with the patient at the bedside, see education flowsheets for details. No acute signs of distress noted at this time.

## 2025-07-10 NOTE — PROGRESS NOTES
Zohaib Garrett - Neurosurgery ([facility])  Vascular Neurology  Comprehensive Stroke Center  Progress Note    Assessment/Plan:     * Embolic stroke involving left middle cerebral artery  Cristi Pulido is a 70 y.o. male w/ PMH of HTN, DM, prior stroke w/ residual RSW who presents as a transfer from OSH after presenting with acute onset of dysarthria and RSW. Telestroke was completed and his NIHSS was 19. CT showed probable early ischemic changes in the L insular ribbon and L frontal lobe. CTA demonstrated occlusion of the distal L MCA M1 segment. He was given TNK at 0548. Patient was transferred to [facility] for further management. On arrival, repeat NIHSS of 24. Patient taken for repeat CTH which showed small left temporal hemorrhage along with continued early ischemic changes. Patient taken to IR for thrombectomy and to be admitted to NCC post-procedure.  S/P thrombectomy. TICI 3. MRI revealing for blood products in stroke bed, likely reperfusion injury. Etiology ESUS.     07/09/2025. NAEON. Pt seems to be eating full tray. Paused tube feeds.  Antithrombotics for secondary stroke prevention: Antiplatelets: Aspirin: 81 mg daily  Clopidogrel: 75 mg daily,      Statins for secondary stroke prevention and hyperlipidemia, if present:   Statins: Atorvastatin- 80 mg daily    Aggressive risk factor modification: HTN, Smoking, DM, HLD     Rehab efforts: The patient has been evaluated by a stroke team provider and the therapy needs have been fully considered based off the presenting complaints and exam findings. The following therapy evaluations are needed: PT evaluate and treat, OT evaluate and treat, SLP evaluate and treat, PM&R evaluate for appropriate placement, current recommendation HIT    Diagnostics ordered/pending: None     VTE prophylaxis: Enoxaparin 40 mg SQ every 24 hours  Mechanical prophylaxis: Place SCDs    BP parameters: Infarct: Post sucessful thrombectomy, SBP <140        JEANNE (acute kidney injury)  Pt with JEANNE from  combination of post renal and prerenal. Episodes of periodic retention.     Plan  - f/u renal US    Urinary retention  Pt with periodic episodes of urinary retention likely contributing to JEANNE.     Plan  - underwood    Encounter for nasogastric tube placement  PEG removed per patient.   IR consulted for replacement. Attempt unsuccessful, will attempt again week of 6/30.   NGT replaced. TF and FWF resumed.     Restlessness  -6/24: QTc 441   -Seroquel 25 mg QHS     Hyperlipemia  -Stroke risk factor  -LDL 75, goal <70  -Atorvastatin 40 mg daily      Right sided weakness  -Secondary to stroke.   -Aggressive therapy     Dysarthria and anarthria  -Therapy eval and treat    Aphasia  -Secondary to stroke  -Aggressive therapy     Type 2 diabetes mellitus without complication, without long-term current use of insulin  Lab Results   Component Value Date    LABA1C 7.2 (H) 01/29/2018    HGBA1C 7.0 (H) 06/19/2025     Follow up repeat A1c. Hold home antihyperglycemics. SSI for goal -180 while in hospital  -Added Lantus 15u     Hypertension associated with diabetes  -Stroke risk factor  -SBP goal <140, maintain MAP >65  -BP range in the last 24 hrs: BP  Min: 139/82  Max: 169/93  -Current antihypertensive regimen:     -Amlodipine 10mg     -Lisinopril still on hold for now    -Metoprolol 50mg BID   --PRN labetalol/hydralazine     Tobacco dependence  Stroke risk factor  - on cessation  -nicotine patch PRN         06/20/2025 NAEON. S/P thrombectomy. TICI 3. TNK monitoring ended at 0548. Aphasic, follows no commands. RSW remains. MRI revealing for blood products in stroke bed, likely reperfusion injury. OK to start monotherapy with either plavix or ASA 6/21/25 for secondary stroke prevention. ECHO unremarkable. Family member at bedside agreeable to NGT placement. Etiology ESUS.   6/21/2025 NAEON. Neuro exam stable. Recommend holding AP therapy for now.   06/22/2025 NAEON. Neuro exam stable. Start AP therapy with ASA.    06/23/2025 NAEON. SLP recs NPO. PEG discussed with family and they are agreeable. Pt S/D to NPU.   06/24/2025 NGT pulled overnight and replaced. Placement confirmed via X ray. IR consulted for PEG placement. JEANNE, Creatinine and BUN uptrending. FWF flushes ordered. Restless during night. EKG repeated. QTc 441. Seroquel 25 mg QHS ordered. Family requesting [facility] IPR when medically ready.   06/25/2025 NAEON. Plans for PEG placement today. BUN and Cr uptrending. Fluids started. Lisinopril held.   06/26/2025 NAEON. PEG placed yesterday, will resume tube feeds this afternoon starting with trickle feeds. BUN and Cr Improved. Increased water boluses to 300.  06/27/2025 NAEON. Neuro exam stable. Patient has tolerated tube feeds well. Will continue to monitor kidney function for now. Trial off restraints today to prepare for rehab placement. Added Lantus 15 units. MBSS today, patient able to have puree for pleasure feeds per speech.   06/28/2025 Na 152. 0.45% NS at 100 ml/hr ordered x 12 hrs. Will repeat sodium draw scheduled at 2100. Pulled PEG. IR unable to replace PEG. Will attempt in coming days. NGT replaced, placement confirmed via Xray. TF and FWF restarted.   06/29/2025 Na 154, BUN and Cr improving. Continuing fluids. NGT coiled overnight. NG Xray showed possible pneumoperitoneum. CT AP without contrast ordered. Showed free air in the abdomen, advised not to use NGT for now. General Surgery consulted to discuss PEG placement.   06/30/2025 Pulled NG tube overnight. Na stable, Cr and BUN continue to improve. Fluids reordered. Discussed PEG replacement with GI and Gen Surg, deferred to IR for replacement. IR reconsulted this morning. Must wait approximately 1 week to re attempt PEG placement. Placement planned for 7/3. Will need to replace NG tube 7/2. NPO at midnight 7/3.   07/01/2025 Switched 0.45 NS to D5W @60 cc/hr. Hypernatremic at 153. Free water deficit 1.9L   07/02/2025 Hypernatremia improving. Continuing  D5W. Plan for G tube tomorrow per IR.   07/03/2025: G tube placement per IR. Decreased D5W to 30 cc/hr.   07/04/2025 S/p G tube placement. Will start tube feeds later, increased statin to 80 mg, added plavix 75 mg, and Niacin 1g nightly.   07/05/2025 NAEON. Started tube feeds. BG optimization ongoing.   07/06/2025 NAEON. Pt with increased creatinine. Gave bolus. Believe JEANNE is combination of prerenal and post renal as he has been retaining urine periodically.   07/07/2025. NAEON. Pt creatinine not improved despite fluids and underwood. Ordered Renal US.   07/08/2025. NAEON. Renal US unremarkable for hydronephrosis. Creatinine normalized. Increased lantus to 20 units, aspart to 8 units.   07/09/2025. NAEON. Pt seems to be eating full tray. Paused tube feeds.   07/10/2025. Pt eating 100% of tray meals. Switch insulin to achs.     STROKE DOCUMENTATION   Acute Stroke Times   Last Known Normal Date: 06/19/25  Last Known Normal Time: 0400  Symptom Onset Date: 06/19/25  Symptom Onset Time: 0400  Stroke Team Called Date: 06/19/25  Stroke Team Called Time: 0813  Stroke Team Arrival Date: 06/19/25  Stroke Team Arrival Time: 0813  CT Interpretation Time: 0827  Thrombolytic Therapy Recommended:  (already given prior to transfer)  CTA Interpretation Time:  (already completed prior to transfer)  Thrombectomy Recommended: Yes  Decision to Treat Time for IR: 0832    NIH Scale:  1a. Level of Consciousness: 0-->Alert, keenly responsive  1b. LOC Questions: 2-->Answers neither question correctly  1c. LOC Commands: 2-->Performs neither task correctly  2. Best Gaze: 0-->Normal  3. Visual: 0-->No visual loss  4. Facial Palsy: 1-->Minor paralysis (flattened nasolabial fold, asymmetry on smiling)  5a. Motor Arm, Left: 0-->No drift, limb holds 90 (or 45) degrees for full 10 secs  5b. Motor Arm, Right: 3-->No effort against gravity, limb falls  6a. Motor Leg, Left: 0-->No drift, leg holds 30 degree position for full 5 secs  6b. Motor Leg, Right:  3-->No effort against gravity, leg falls to bed immediately  7. Limb Ataxia: 0-->Absent  8. Sensory: 0-->Normal, no sensory loss  9. Best Language: 2-->Severe aphasia, all communication is through fragmentary expression, great need for inference, questioning, and guessing by the listener. Range of information that can be exchanged is limited, listener carries burden of. . . (see row details)  10. Dysarthria: 2-->Severe dysarthria, patients speech is so slurred as to be unintelligible in the absence of or out of proportion to any dysphasia, or is mute/anarthric  11. Extinction and Inattention (formerly Neglect): 0-->No abnormality  Total (NIH Stroke Scale): 15       Modified Nadeem Score: 2  Stephanie Coma Scale:    ABCD2 Score:    [id number] Score:   HAS -BLED Score:   ICH Score:   Hunt & Kimball Classification:      Hemorrhagic change of an Ischemic Stroke: Does this patient have an ischemic stroke with hemorrhagic changes? No     Neurologic Chief Complaint: L MCA stroke    Subjective:     Interval History: Patient is seen for follow-up neurological assessment and treatment recommendations: See hospital course.    HPI, Past Medical, Family, and Social History remains the same as documented in the initial encounter.     Review of Systems   Reason unable to perform ROS: Severe aphasia.     Scheduled Meds:   amLODIPine  10 mg Per G Tube QHS    aspirin  300 mg Rectal Daily    atorvastatin  80 mg Per G Tube Daily    chlorhexidine  15 mL Mouth/Throat BID    clopidogreL  75 mg Per G Tube Daily    FLUoxetine  20 mg Per G Tube Daily    heparin (porcine)  5,000 Units Subcutaneous Q8H    insulin aspart U-100  10 Units Subcutaneous TIDWM    [START ON 7/11/2025] insulin glargine U-100  25 Units Subcutaneous Daily    metoprolol tartrate  50 mg Per G Tube BID    mupirocin   Nasal BID    niacin  500 mg Per G Tube BID WM    nicotine  1 patch Transdermal Daily    nystatin  500,000 Units Oral QID    polyethylene glycol  17 g Per G Tube  "BID    QUEtiapine  25 mg Per G Tube QHS    senna-docusate  1 tablet Per G Tube BID     Continuous Infusions:        PRN Meds:  Current Facility-Administered Medications:     bisacodyL, 10 mg, Rectal, Daily PRN    dextrose 50%, 12.5 g, Intravenous, PRN    dextrose 50%, 25 g, Intravenous, PRN    glucagon (human recombinant), 1 mg, Intramuscular, PRN    glucose, 16 g, Oral, PRN    glucose, 24 g, Oral, PRN    hydrALAZINE, 10 mg, Intravenous, Q4H PRN    labetalol, 10 mg, Intravenous, Q4H PRN    melatonin, 6 mg, Per G Tube, Nightly PRN    simethicone, 1 tablet, Per G Tube, TID PRN    sodium chloride 0.9%, 10 mL, Intravenous, PRN    Objective:     Vital Signs (Most Recent):  Temp: 99.2 °F (37.3 °C) (07/10/25 0731)  Pulse: 67 (07/10/25 1118)  Resp: 18 (07/10/25 0731)  BP: 125/64 (07/10/25 0731)  SpO2: (!) 93 % (07/10/25 0731)  BP Location: Left arm    Vital Signs Range (Last 24H):  Temp:  [98.1 °F (36.7 °C)-99.9 °F (37.7 °C)]   Pulse:  [67-94]   Resp:  [18]   BP: (125-149)/(63-73)   SpO2:  [93 %-94 %]   BP Location: Left arm       Physical Exam  HENT:      Head: Normocephalic and atraumatic.      Mouth/Throat:      Mouth: Mucous membranes are moist.   Eyes:      Conjunctiva/sclera: Conjunctivae normal.   Cardiovascular:      Rate and Rhythm: Normal rate.   Pulmonary:      Effort: Pulmonary effort is normal.   Skin:     General: Skin is warm and dry.   Neurological:      Mental Status: He is alert.      Motor: Weakness present.              Neurological Exam:   LOC: drowsy  Attention Span: poor  Language: Expressive aphasia, Receptive aphasia  Articulation: Dysarthria  Orientation: Untestable due to severe aphasia   Facial Movement (CN VII): Lower facial weakness on the Right  Motor: Arm left  Normal 5/5  Leg left  Normal 5/5  Arm right  Plegia 0/5  Leg right Paresis: 1/5  Sensation: Cy-hypoesthesia right    Laboratory:  CMP:   No results for input(s): "GLUCOSE", "CALCIUM", "ALBUMIN", "PROT", "NA", "K", "CO2", "CL", "BUN", " ""CREATININE", "ALKPHOS", "ALT", "AST", "BILITOT" in the last 24 hours.    BMP:   Recent Labs   Lab 07/08/25  0309      K 4.0      CO2 26   BUN 25*   CREATININE 1.2   CALCIUM 8.1*     CBC:   Recent Labs   Lab 07/08/25  0309   WBC 13.51*   RBC 3.33*   HGB 9.5*   HCT 29.6*      MCV 89   MCH 28.5   MCHC 32.1     Lipid Panel:   No results for input(s): "CHOL", "LDLCALC", "HDL", "TRIG" in the last 168 hours.    Coagulation:   No results for input(s): "PT", "INR", "APTT" in the last 168 hours.    Platelet Aggregation Study: No results for input(s): "PLTAGG", "PLTAGINTERP", "PLTAGREGLACO", "ADPPLTAGGREG" in the last 168 hours.  Hgb A1C:   No results for input(s): "HGBA1C" in the last 168 hours.    TSH:   No results for input(s): "TSH" in the last 168 hours.      Diagnostic Results   Brain Imaging   MRI Brain 6/20/25  Impression:     Acute left MCA distribution infarct.  No new large parenchymal hemorrhage.  Susceptibility artifact throughout the infarcted tissue may reflect contrast staining and/or blood products conversion.  Overall mass effect is increased compared to prior with sulcal effacement throughout the left cerebral hemisphere and approximately 4 mm of rightward midline shift.     Stable size of the parenchymal hemorrhage centered in the inferior left temporal lobe.     [facility] 6/20/25: 1242  Impression:     Moderate-sized recent left MCA distribution infarct and small intraparenchymal hemorrhage appear grossly unchanged from prior study performed earlier on the same date.     Chronic ischemic change elsewhere with underlying cerebral and cerebellar volume loss, as before.     No new hemorrhage or major vascular distribution infarct elsewhere.  CT 6/20/25: 0834  Impression:     Early ischemic changes in the left MCA distribution, new from recent CT.     New left inferior temporal intraparenchymal hematoma.     Chronic ischemic change with cerebral and cerebellar volume loss, as above.     CT " 6/19/25  Impression:     Question some early left MCA ischemia corresponding to the patient's known left MCA territory occlusion.  No hemorrhage.     Senescent changes as above.        All CT scans at this facility are performed  using dose modulation techniques as appropriate to performed exam including the following:  automated exposure control; adjustment of mA and/or kV according to the patients size (this includes techniques or standardized protocols for targeted exams where dose is matched to indication/reason for exam: i.e. extremities or head);  iterative reconstruction technique.     Vessel Imaging   CTA head and neck 6/19/25  Impression:     Distal left M1 MCA occlusion with relatively prompt collateralization of the more distal MCA vessels.     Severe stenosis left mid vertebral artery and left PCA.     Cardiac Imaging   TTE 6/19/25    Left Ventricle: The left ventricle is normal in size. Normal wall thickness. There is normal systolic function with a visually estimated ejection fraction of 60 - 65%.    Right Ventricle: The right ventricle is normal in size Wall thickness is normal. Systolic function is normal.    The pulmonary artery pressure could not be estimated.    IVC/SVC: Normal venous pressure at 3 mmHg.       Cullen Almanzar MD  Comprehensive Stroke Center  Department of Vascular Neurology   [facility] Neurosurgery [facility])

## 2025-07-10 NOTE — PT/OT/SLP PROGRESS
Physical Therapy Treatment    Patient Name:  Cristi Pulido   MRN:  [id number]    Recommendations:     Discharge Recommendations: High Intensity Therapy  Discharge Equipment Recommendations: wheelchair, hospital bed, lift device  Barriers to discharge: Inaccessible home and Decreased caregiver support    Assessment:     Cristi Pulido is a 70 y.o. male admitted with a medical diagnosis of Embolic stroke involving left middle cerebral artery.  He presents with the following impairments/functional limitations: weakness, gait instability, decreased upper extremity function, decreased ROM, impaired endurance, impaired balance, decreased lower extremity function, visual deficits, decreased safety awareness, impaired fine motor, impaired self care skills, impaired functional mobility, decreased coordination, abnormal tone. Short session conducted this date due to scheduling. Patient with 2x clear yes/no nodding at start of session to questioning about fatigue and wanting to work on standing. He was able to complete 2 full sit to stand transfers this date, improved from previous session. He additionally displayed improved seated balance with decreased pushing response and able to self-correct leaning several times. Overall he continues to require heavy assistance for mobility and follows few commands, even with modeling and tactile cueing for assistance. Recommending high intensity post-acute therapy after discharge to maximize benefits; patient could tolerate 3xhours/day of combined therapies.      Rehab Prognosis: Good; patient would benefit from acute skilled PT services to address these deficits and reach maximum level of function.    Recent Surgery: * No surgery found *      Plan:     During this hospitalization, patient to be seen 4 x/week to address the identified rehab impairments via gait training, therapeutic activities, therapeutic exercises, neuromuscular re-education and progress toward the following goals:    Plan of  Care Expires:  07/22/25    Subjective     Chief Complaint: not stated  Patient/Family Comments/goals: Patient with unintelligible verbalizations to questioning    Pain/Comfort:  Pain Rating 1: 0/10  Pain Rating Post-Intervention 1: 0/10 (rubbing G/J site after session)      Objective:     Patient found supine with OT with telemetry, bed alarm, G/J tube upon PT entry to room.     General Precautions: Standard, fall, aspiration, aphasia  Orthopedic Precautions: N/A  Braces: N/A  Respiratory Status: Room air     Cognition:   Affect: pleasant, cooperative, and confused  Alertness: eyes open 100 % of session  Insight: poor insight into deficits, decreased safety awareness, and impulsive   Attention: requires redirection to task  Command Following: patient follows no verbal-only commands  Improved success with tactile cueing/modeling commands   Communication: intermittent verbalizations however unintelligible   Some nodding yes/no    Functional Mobility:  Bed Mobility:     Rolling Right: maximal assistance  Scooting: maximal assistance  Supine to Sit: maximal assistance  Sit to Supine: maximal assistance  Transfers:     Sit to Stand:  maximal assistance and of 2 persons with no AD  R knee blocked, able to achieve 100% upright stand   Balance:   Sitting static: CGA-modA  R lean  Sitting dynamic: modA  Standing static: maxA of 2 RLE blocked    AM-PAC 6 CLICK MOBILITY  Turning over in bed (including adjusting bedclothes, sheets and blankets)?: 2  Sitting down on and standing up from a chair with arms (e.g., wheelchair, bedside commode, etc.): 2  Moving from lying on back to sitting on the side of the bed?: 2  Moving to and from a bed to a chair (including a wheelchair)?: 1  Need to walk in hospital room?: 1  Climbing 3-5 steps with a railing?: 1  Basic Mobility Total Score: 9       Treatment & Education:  Mirror placed in front of patient to provide visual input to movement, patient cued to visually attend to task in mirror.     EOB balance with cues to engage core mm to decrease assistance needed and increase safety in sitting.   Verbal and tactile cues with assist during STS transfer for optimal LOU prior to transfer, forward weight shift to initiate, to engage LE mm, extend hips forward and bring head and chest up to achieve full upright stance.    Active reorientation provided throughout session with cues for person/place/situation/date with patient assessed for accuracy and carryover of information.    Seated UE/LE PROM (R) with cueing for activation.     Patient left HOB elevated with all lines intact, call button in reach, and bed alarm on.    GOALS:   Multidisciplinary Problems       Physical Therapy Goals          Problem: Physical Therapy    Goal Priority Disciplines Outcome Interventions   Physical Therapy Goal     PT, PT/OT Progressing    Description: Goals to be met by: 25 ext 25    Patient will increase functional independence with mobility by performin. Supine to sit with Contact Guard Assistance  2. Sit to supine with Contact Guard Assistance  3. Sit to stand transfer with Moderate Assistance  4. Bed to chair transfer with Maximum Assistance using No Assistive Device  5. Gait  x 10 feet with Maximum Assistance using No Assistive Device.   6. Sitting at edge of bed x5 minutes with Stand-by Assistance  7. Follow 100% of 1-step commands throughout session                         Time Tracking:     PT Received On: 07/10/25  PT Start Time: 1509     PT Stop Time: 1519  PT Total Time (min): 10 min     Billable Minutes: Neuromuscular Re-education 10 minutes    Treatment Type: Treatment  PT/PTA: PT     Number of PTA visits since last PT visit: 0     07/10/2025

## 2025-07-11 VITALS
RESPIRATION RATE: 18 BRPM | HEART RATE: 67 BPM | WEIGHT: 199.06 LBS | DIASTOLIC BLOOD PRESSURE: 62 MMHG | OXYGEN SATURATION: 91 % | HEIGHT: 70 IN | BODY MASS INDEX: 28.5 KG/M2 | SYSTOLIC BLOOD PRESSURE: 125 MMHG | TEMPERATURE: 97 F

## 2025-07-11 PROBLEM — N17.9 AKI (ACUTE KIDNEY INJURY): Status: RESOLVED | Noted: 2025-07-06 | Resolved: 2025-07-11

## 2025-07-11 PROBLEM — R33.9 URINARY RETENTION: Status: RESOLVED | Noted: 2025-06-29 | Resolved: 2025-07-11

## 2025-07-11 PROBLEM — I65.23 BILATERAL CAROTID ARTERY STENOSIS: Status: ACTIVE | Noted: 2025-07-11

## 2025-07-11 PROBLEM — L84 CALLUS OF FOOT: Status: ACTIVE | Noted: 2025-07-11

## 2025-07-11 LAB
ABSOLUTE EOSINOPHIL (OHS): 0.54 K/UL
ABSOLUTE MONOCYTE (OHS): 0.96 K/UL (ref 0.3–1)
ABSOLUTE NEUTROPHIL COUNT (OHS): 6.55 K/UL (ref 1.8–7.7)
ALBUMIN SERPL BCP-MCNC: 2 G/DL (ref 3.5–5.2)
ALP SERPL-CCNC: 133 UNIT/L (ref 40–150)
ALT SERPL W/O P-5'-P-CCNC: 29 UNIT/L (ref 10–44)
ANION GAP (OHS): 6 MMOL/L (ref 8–16)
AST SERPL-CCNC: 21 UNIT/L (ref 11–45)
BASOPHILS # BLD AUTO: 0.03 K/UL
BASOPHILS NFR BLD AUTO: 0.3 %
BILIRUB SERPL-MCNC: 0.2 MG/DL (ref 0.1–1)
BUN SERPL-MCNC: 17 MG/DL (ref 8–23)
CALCIUM SERPL-MCNC: 8.4 MG/DL (ref 8.7–10.5)
CHLORIDE SERPL-SCNC: 106 MMOL/L (ref 95–110)
CO2 SERPL-SCNC: 26 MMOL/L (ref 23–29)
CREAT SERPL-MCNC: 1.1 MG/DL (ref 0.5–1.4)
ERYTHROCYTE [DISTWIDTH] IN BLOOD BY AUTOMATED COUNT: 12.7 % (ref 11.5–14.5)
GFR SERPLBLD CREATININE-BSD FMLA CKD-EPI: >60 ML/MIN/1.73/M2
GLUCOSE SERPL-MCNC: 209 MG/DL (ref 70–110)
HCT VFR BLD AUTO: 29.8 % (ref 40–54)
HGB BLD-MCNC: 9.7 GM/DL (ref 14–18)
IMM GRANULOCYTES # BLD AUTO: 0.03 K/UL (ref 0–0.04)
IMM GRANULOCYTES NFR BLD AUTO: 0.3 % (ref 0–0.5)
LYMPHOCYTES # BLD AUTO: 1.89 K/UL (ref 1–4.8)
MAGNESIUM SERPL-MCNC: 1.8 MG/DL (ref 1.6–2.6)
MCH RBC QN AUTO: 28.5 PG (ref 27–31)
MCHC RBC AUTO-ENTMCNC: 32.6 G/DL (ref 32–36)
MCV RBC AUTO: 88 FL (ref 82–98)
NUCLEATED RBC (/100WBC) (OHS): 0 /100 WBC
PHOSPHATE SERPL-MCNC: 2.9 MG/DL (ref 2.7–4.5)
PLATELET # BLD AUTO: 262 K/UL (ref 150–450)
PMV BLD AUTO: 13 FL (ref 9.2–12.9)
POCT GLUCOSE: 224 MG/DL (ref 70–110)
POCT GLUCOSE: 234 MG/DL (ref 70–110)
POTASSIUM SERPL-SCNC: 4 MMOL/L (ref 3.5–5.1)
PROT SERPL-MCNC: 5.9 GM/DL (ref 6–8.4)
RBC # BLD AUTO: 3.4 M/UL (ref 4.6–6.2)
RELATIVE EOSINOPHIL (OHS): 5.4 %
RELATIVE LYMPHOCYTE (OHS): 18.9 % (ref 18–48)
RELATIVE MONOCYTE (OHS): 9.6 % (ref 4–15)
RELATIVE NEUTROPHIL (OHS): 65.5 % (ref 38–73)
SODIUM SERPL-SCNC: 138 MMOL/L (ref 136–145)
WBC # BLD AUTO: 10 K/UL (ref 3.9–12.7)

## 2025-07-11 PROCEDURE — 25000003 PHARM REV CODE 250: Mod: HCNC | Performed by: NURSE PRACTITIONER

## 2025-07-11 PROCEDURE — 80053 COMPREHEN METABOLIC PANEL: CPT | Mod: HCNC

## 2025-07-11 PROCEDURE — 84100 ASSAY OF PHOSPHORUS: CPT | Mod: HCNC

## 2025-07-11 PROCEDURE — 25000003 PHARM REV CODE 250: Mod: HCNC

## 2025-07-11 PROCEDURE — 93880 EXTRACRANIAL BILAT STUDY: CPT | Mod: HCNC | Performed by: STUDENT IN AN ORGANIZED HEALTH CARE EDUCATION/TRAINING PROGRAM

## 2025-07-11 PROCEDURE — 25000003 PHARM REV CODE 250: Mod: HCNC | Performed by: PHYSICIAN ASSISTANT

## 2025-07-11 PROCEDURE — 36415 COLL VENOUS BLD VENIPUNCTURE: CPT | Mod: HCNC

## 2025-07-11 PROCEDURE — S4991 NICOTINE PATCH NONLEGEND: HCPCS | Mod: HCNC

## 2025-07-11 PROCEDURE — 99233 SBSQ HOSP IP/OBS HIGH 50: CPT | Mod: HCNC,GC,, | Performed by: STUDENT IN AN ORGANIZED HEALTH CARE EDUCATION/TRAINING PROGRAM

## 2025-07-11 PROCEDURE — 63600175 PHARM REV CODE 636 W HCPCS: Mod: HCNC

## 2025-07-11 PROCEDURE — 99223 1ST HOSP IP/OBS HIGH 75: CPT | Mod: HCNC,,, | Performed by: PODIATRIST

## 2025-07-11 PROCEDURE — 83735 ASSAY OF MAGNESIUM: CPT | Mod: HCNC

## 2025-07-11 PROCEDURE — 85025 COMPLETE CBC W/AUTO DIFF WBC: CPT | Mod: HCNC

## 2025-07-11 RX ORDER — POLYETHYLENE GLYCOL 3350 17 G/17G
17 POWDER, FOR SOLUTION ORAL 2 TIMES DAILY
Status: ON HOLD
Start: 2025-07-11

## 2025-07-11 RX ORDER — CHLORHEXIDINE GLUCONATE ORAL RINSE 1.2 MG/ML
15 SOLUTION DENTAL 2 TIMES DAILY
Status: ON HOLD
Start: 2025-07-11 | End: 2025-07-25

## 2025-07-11 RX ORDER — INSULIN ASPART 100 [IU]/ML
10 INJECTION, SOLUTION INTRAVENOUS; SUBCUTANEOUS 3 TIMES DAILY
Status: ON HOLD
Start: 2025-07-11 | End: 2026-07-11

## 2025-07-11 RX ORDER — NYSTATIN 100000 [USP'U]/ML
500000 SUSPENSION ORAL 4 TIMES DAILY
Status: ON HOLD
Start: 2025-07-11 | End: 2025-07-21

## 2025-07-11 RX ORDER — INSULIN ASPART 100 [IU]/ML
0-10 INJECTION, SOLUTION INTRAVENOUS; SUBCUTANEOUS
Status: DISCONTINUED | OUTPATIENT
Start: 2025-07-11 | End: 2025-07-11 | Stop reason: HOSPADM

## 2025-07-11 RX ORDER — QUETIAPINE FUMARATE 25 MG/1
25 TABLET, FILM COATED ORAL NIGHTLY
Status: ON HOLD
Start: 2025-07-11 | End: 2026-07-11

## 2025-07-11 RX ORDER — INSULIN GLARGINE 100 [IU]/ML
25 INJECTION, SOLUTION SUBCUTANEOUS DAILY
Status: ON HOLD
Start: 2025-07-12 | End: 2026-07-12

## 2025-07-11 RX ORDER — GLUCOSAMINE/CHONDR SU A SOD 167-133 MG
500 CAPSULE ORAL 2 TIMES DAILY WITH MEALS
Status: ON HOLD
Start: 2025-07-11 | End: 2026-07-11

## 2025-07-11 RX ORDER — NAPROXEN SODIUM 220 MG/1
81 TABLET, FILM COATED ORAL DAILY
Status: DISCONTINUED | OUTPATIENT
Start: 2025-07-12 | End: 2025-07-11 | Stop reason: HOSPADM

## 2025-07-11 RX ORDER — IBUPROFEN 200 MG
1 TABLET ORAL DAILY
Status: ON HOLD
Start: 2025-07-12

## 2025-07-11 RX ORDER — NAPROXEN SODIUM 220 MG/1
81 TABLET, FILM COATED ORAL DAILY
Status: ON HOLD
Start: 2025-07-12 | End: 2026-07-12

## 2025-07-11 RX ADMIN — ASPIRIN 300 MG: 300 SUPPOSITORY RECTAL at 08:07

## 2025-07-11 RX ADMIN — METOPROLOL TARTRATE 50 MG: 50 TABLET, FILM COATED ORAL at 08:07

## 2025-07-11 RX ADMIN — FLUOXETINE HYDROCHLORIDE 20 MG: 20 CAPSULE ORAL at 08:07

## 2025-07-11 RX ADMIN — Medication 500 MG: at 08:07

## 2025-07-11 RX ADMIN — INSULIN ASPART 10 UNITS: 100 INJECTION, SOLUTION INTRAVENOUS; SUBCUTANEOUS at 08:07

## 2025-07-11 RX ADMIN — SENNOSIDES AND DOCUSATE SODIUM 1 TABLET: 50; 8.6 TABLET ORAL at 08:07

## 2025-07-11 RX ADMIN — ATORVASTATIN CALCIUM 80 MG: 40 TABLET, FILM COATED ORAL at 08:07

## 2025-07-11 RX ADMIN — HEPARIN SODIUM 5000 UNITS: 5000 INJECTION INTRAVENOUS; SUBCUTANEOUS at 05:07

## 2025-07-11 RX ADMIN — INSULIN GLARGINE 25 UNITS: 100 INJECTION, SOLUTION SUBCUTANEOUS at 08:07

## 2025-07-11 RX ADMIN — Medication 1 PATCH: at 08:07

## 2025-07-11 RX ADMIN — CLOPIDOGREL 75 MG: 75 TABLET ORAL at 08:07

## 2025-07-11 RX ADMIN — NYSTATIN 500000 UNITS: 100000 SUSPENSION ORAL at 08:07

## 2025-07-11 RX ADMIN — POLYETHYLENE GLYCOL 3350 17 G: 17 POWDER, FOR SOLUTION ORAL at 08:07

## 2025-07-11 RX ADMIN — MUPIROCIN: 20 OINTMENT TOPICAL at 08:07

## 2025-07-11 RX ADMIN — CHLORHEXIDINE GLUCONATE 0.12% ORAL RINSE 15 ML: 1.2 LIQUID ORAL at 08:07

## 2025-07-11 NOTE — ASSESSMENT & PLAN NOTE
Presenting for RSW. Discharging to rehab today. Podiatry consulted for possible right foot ulceration. VSS, Afebrile. WBC wnl. Clinically no ulceration or infective process to right foot. Large callus present, stable. Patient to benefit from outpatient wound debridement.    Plan:  - No surgical intervention at this time  - No wound to culture or dress  - Recommend moisturizer to right plantar foot to soften callus  - Recommend off loading boots at all times while laying in bed  - Weight bearing status: WBAT  - Podiatry will sign off    Discharge Recommendations  - Patient to follow up in outpatient Podiatry clinic with Dr Hagen. Podiatry will schedule  - Recommend moisturizer to right plantar foot to soften callus  - Recommend off loading boots at all times while laying in bed

## 2025-07-11 NOTE — PLAN OF CARE
[facility]ay - Neurosurgery (Hospital)  Discharge Final Note    Primary Care Provider: Rell Winn MD    Expected Discharge Date: 7/11/2025      Future Appointments   Date Time Provider Department Center   11/11/2025  8:00 AM SAME DAY LAB, AD[facility]   11/18/2025  8:40 AM Rell Winn MD [facility]   5/12/2026  8:00 AM LAB, [facility]   5/12/2026  8:10 AM LAB, [facility]   5/19/2026  9:20 AM Rell Winn MD [facility]          Final Discharge Note (most recent)       Final Note - 07/11/25 1120          Final Note    Assessment Type Final Discharge Note     Anticipated Discharge Disposition Rehab Facility     What phone number can be called within the next 1-3 days to see how you are doing after discharge? [phone]     Hospital Resources/Appts/Education Provided Appointments scheduled and added to AVS        Post-Acute Status    Post-Acute Authorization Placement                     Important Message from Medicare             After-discharge care                Destination       *[facility]   Service: Inpatient Rehabilitation    [address]  [place] [address]   Phone: [phone]                       Gloria Robertson RN  Ext [phone]

## 2025-07-11 NOTE — HPI
Cristi Pulido is a 70 y.o. male w/ PMH of HTN, DM, prior stroke w/ residual RSW who presents as a transfer from OSH after presenting with acute onset of dysarthria and RSW. Telestroke was completed and his NIHSS was 19. CT showed probable early ischemic changes in the L insular ribbon and L frontal lobe. CTA demonstrated occlusion of the distal L MCA M1 segment. He was given TNK at 0548. Patient was transferred to [facility] for further management. Podiatry consulted to evaluate for possible Right foot ulceration. Poor historian due to severe aphasia. Full ambulatory status unclear.

## 2025-07-11 NOTE — DISCHARGE SUMMARY
Zohaib Garrett - Neurosurgery ([facility])  Vascular Neurology  Comprehensive Stroke Center  Discharge Summary     Summary:     Admit Date: 6/19/2025  8:18 AM    Discharge Date and Time: 07/11/2025 11:16 AM    Attending Physician: Rehana Saini MD     Discharge Provider: Cullen Almanzar MD    History of Present Illness: Cristi Pulido is a 70 y.o. male w/ PMH of HTN, DM, prior stroke w/ residual RSW who presents as a transfer from OSH after presenting with acute onset of dysarthria and RSW. Telestroke was completed and his NIHSS was 19. CT showed probable early ischemic changes in the L insular ribbon and L frontal lobe. CTA demonstrated occlusion of the distal L MCA M1 segment. Patient was transferred to [facility] [place] for further management. On arrival, patient taken for repeat CTH which showed small left temporal hemorrhage along with continued early ischemic transferred. Patient taken to IR for thrombectomy and to be admitted to [facility] post-procedure.    Hospital Course (synopsis of major diagnoses, care, treatment, and services provided during the course of the hospital stay): 06/20/2025 NAEON. S/P thrombectomy. TICI 3. TNK monitoring ended at 0548. Aphasic, follows no commands. RSW remains. MRI revealing for blood products in stroke bed, likely reperfusion injury. OK to start monotherapy with either plavix or ASA 6/21/25 for secondary stroke prevention. ECHO unremarkable. Family member at bedside agreeable to NGT placement. Etiology ESUS.   6/21/2025 NAEON. Neuro exam stable. Recommend holding AP therapy for now.   06/22/2025 NAEON. Neuro exam stable. Start AP therapy with ASA.   06/23/2025 NAEON. SLP recs NPO. PEG discussed with family and they are agreeable. Pt S/D to NPU.   06/24/2025 NGT pulled overnight and replaced. Placement confirmed via X ray. IR consulted for PEG placement. JEANNE, Creatinine and BUN uptrending. FWF flushes ordered. Restless during night. EKG repeated. QTc 441. Seroquel 25 mg QHS ordered. Family requesting  [facility] when medically ready.   06/25/2025 NAEON. Plans for PEG placement today. BUN and Cr uptrending. Fluids started. Lisinopril held.   06/26/2025 NAEON. PEG placed yesterday, will resume tube feeds this afternoon starting with trickle feeds. BUN and Cr Improved. Increased water boluses to 300.  06/27/2025 NAEON. Neuro exam stable. Patient has tolerated tube feeds well. Will continue to monitor kidney function for now. Trial off restraints today to prepare for rehab placement. Added Lantus 15 units. MBSS today, patient able to have puree for pleasure feeds per speech.   06/28/2025 Na 152. 0.45% NS at 100 ml/hr ordered x 12 hrs. Will repeat sodium draw scheduled at 2100. Pulled PEG. IR unable to replace PEG. Will attempt in coming days. NGT replaced, placement confirmed via Xray. TF and FWF restarted.   06/29/2025 Na 154, BUN and Cr improving. Continuing fluids. NGT coiled overnight. NG Xray showed possible pneumoperitoneum. CT AP without contrast ordered. Showed free air in the abdomen, advised not to use NGT for now. General Surgery consulted to discuss PEG placement.   06/30/2025 Pulled NG tube overnight. Na stable, Cr and BUN continue to improve. Fluids reordered. Discussed PEG replacement with GI and Gen Surg, deferred to IR for replacement. IR reconsulted this morning. Must wait approximately 1 week to re attempt PEG placement. Placement planned for 7/3. Will need to replace NG tube 7/2. NPO at midnight 7/3.   07/01/2025 Switched 0.45 NS to D5W @60 cc/hr. Hypernatremic at 153. Free water deficit 1.9L   07/02/2025 Hypernatremia improving. Continuing D5W. Plan for G tube tomorrow per IR.   07/03/2025: G tube placement per IR. Decreased D5W to 30 cc/hr.   07/04/2025 S/p G tube placement. Will start tube feeds later, increased statin to 80 mg, added plavix 75 mg, and Niacin 1g nightly.   07/05/2025 NAEON. Started tube feeds. BG optimization ongoing.   07/06/2025 NAEON. Pt with increased creatinine. Gave  bolus. Believe JEANNE is combination of prerenal and post renal as he has been retaining urine periodically.   07/07/2025. NAEON. Pt creatinine not improved despite fluids and underwood. Ordered Renal US.   07/08/2025. NAEON. Renal US unremarkable for hydronephrosis. Creatinine normalized. Increased lantus to 20 units, aspart to 8 units.   07/09/2025. NAEON. Pt seems to be eating full tray. Paused tube feeds.   07/10/2025. Pt eating 100% of tray meals. Switch insulin to achs.   07/11/2025. Pt discharging to [facility]. Increased insulin to Lantus 25 units+ aspart 10 units TIDWM. Outpt referral placed for podiatry for right foot callus. Vascular US of carotids ordered, f/u imaging.     Goals of Care Treatment Preferences:  Code Status: Full Code      Stroke Etiology: Ischemic Undetermined Cause Cryptogenic Embolism / ESUS (Embolic Stroke of Undetermined Source)  Embolic stroke to L MCA.  ESUS, but has carotid plaque.    STROKE DOCUMENTATION   Acute Stroke Times   Last Known Normal Date: 06/19/25  Last Known Normal Time: 0400  Symptom Onset Date: 06/19/25  Symptom Onset Time: 0400  Stroke Team Called Date: 06/19/25  Stroke Team Called Time: 0813  Stroke Team Arrival Date: 06/19/25  Stroke Team Arrival Time: 0813  CT Interpretation Time: 0827  Thrombolytic Therapy Recommended:  (already given prior to transfer)  CTA Interpretation Time:  (already completed prior to transfer)  Thrombectomy Recommended: Yes  Decision to Treat Time for IR: 0832     NIH Scale:  1a. Level of Consciousness: 0-->Alert, keenly responsive  1b. LOC Questions: 2-->Answers neither question correctly  1c. LOC Commands: 2-->Performs neither task correctly  2. Best Gaze: 0-->Normal  3. Visual: 0-->No visual loss  4. Facial Palsy: 1-->Minor paralysis (flattened nasolabial fold, asymmetry on smiling)  5a. Motor Arm, Left: 0-->No drift, limb holds 90 (or 45) degrees for full 10 secs  5b. Motor Arm, Right: 3-->No effort against gravity, limb falls  6a. Motor  Leg, Left: 0-->No drift, leg holds 30 degree position for full 5 secs  6b. Motor Leg, Right: 3-->No effort against gravity, leg falls to bed immediately  7. Limb Ataxia: 0-->Absent  8. Sensory: 0-->Normal, no sensory loss  9. Best Language: 2-->Severe aphasia, all communication is through fragmentary expression, great need for inference, questioning, and guessing by the listener. Range of information that can be exchanged is limited, listener carries burden of. . . (see row details)  10. Dysarthria: 2-->Severe dysarthria, patients speech is so slurred as to be unintelligible in the absence of or out of proportion to any dysphasia, or is mute/anarthric  11. Extinction and Inattention (formerly Neglect): 0-->No abnormality  Total (NIH Stroke Scale): 15        Modified Nadeem Score: 2  Hood Coma Scale:    ABCD2 Score:    [id number] Score:   HAS -BLED Score:   ICH Score:   Hunt & Kimball Classification:       Assessment/Plan:     Diagnostic Results:      MRI Brain 6/20/25  Impression:     Acute left MCA distribution infarct.  No new large parenchymal hemorrhage.  Susceptibility artifact throughout the infarcted tissue may reflect contrast staining and/or blood products conversion.  Overall mass effect is increased compared to prior with sulcal effacement throughout the left cerebral hemisphere and approximately 4 mm of rightward midline shift.     Stable size of the parenchymal hemorrhage centered in the inferior left temporal lobe.     CT 6/20/25: 1242  Impression:     Moderate-sized recent left MCA distribution infarct and small intraparenchymal hemorrhage appear grossly unchanged from prior study performed earlier on the same date.     Chronic ischemic change elsewhere with underlying cerebral and cerebellar volume loss, as before.     No new hemorrhage or major vascular distribution infarct elsewhere.  CT 6/20/25: 3547  Impression:     Early ischemic changes in the left MCA distribution, new from recent CT.      New left inferior temporal intraparenchymal hematoma.     Chronic ischemic change with cerebral and cerebellar volume loss, as above.     CTH 6/19/25  Impression:     Question some early left MCA ischemia corresponding to the patient's known left MCA territory occlusion.  No hemorrhage.     Senescent changes as above.        All CT scans at this facility are performed  using dose modulation techniques as appropriate to performed exam including the following:  automated exposure control; adjustment of mA and/or kV according to the patients size (this includes techniques or standardized protocols for targeted exams where dose is matched to indication/reason for exam: i.e. extremities or head);  iterative reconstruction technique.     Vessel Imaging   CTA head and neck 6/19/25  Impression:     Distal left M1 MCA occlusion with relatively prompt collateralization of the more distal MCA vessels.     Severe stenosis left mid vertebral artery and left PCA.     Cardiac Imaging   TTE 6/19/25    Left Ventricle: The left ventricle is normal in size. Normal wall thickness. There is normal systolic function with a visually estimated ejection fraction of 60 - 65%.    Right Ventricle: The right ventricle is normal in size Wall thickness is normal. Systolic function is normal.    The pulmonary artery pressure could not be estimated.    IVC/SVC: Normal venous pressure at 3 mmHg.       Interventions: Thrombectomy    Complications: None    Disposition: Rehab Facility    Final Active Diagnoses:    Diagnosis Date Noted POA    PRINCIPAL PROBLEM:  Embolic stroke involving left middle cerebral artery [I63.412] 06/19/2025 Yes    Bilateral carotid artery stenosis [I65.23] 07/11/2025 Yes    Encounter for nasogastric tube placement [Z46.59] 06/28/2025 Not Applicable    Restlessness [R45.1] 06/24/2025 Yes    Aphasia [R47.01] 06/19/2025 Yes    Dysarthria and anarthria [R47.1] 06/19/2025 Yes    Right sided weakness [R53.1] 06/19/2025 Yes     Hyperlipemia [E78.5] 06/19/2025 Yes    Type 2 diabetes mellitus without complication, without long-term current use of insulin [E11.9] 04/05/2019 Yes    Tobacco dependence [F17.200] 11/18/2014 Yes    Hypertension associated with diabetes [E11.59, I15.2]  Yes      Problems Resolved During this Admission:    Diagnosis Date Noted Date Resolved POA    JEANNE (acute kidney injury) [N17.9] 07/06/2025 07/11/2025 Yes    Hypernatremia [E87.0] 07/01/2025 07/05/2025 No    Urinary retention [R33.9] 06/29/2025 07/11/2025 No     No new Assessment & Plan notes have been filed under this hospital service since the last note was generated.  Service: Vascular Neurology      Recommendations:     Post-discharge complication risks: None    Stroke Education given to: patient    Follow-up in Stroke Clinic in 4-6 weeks.     Discharge Plan:  Antithrombotics: Aspirin 81mg, Clopidogrel 75mg  Statin: Atorvastatin 80mg  Smoking Cessation  Aggresive risk factor modification:  Hypertension  Smoking  Diabetes    Follow Up:      Patient Instructions:      IR Gastrostomy Replace Tube w Fluoro & Imaging   Standing Status: Future Standing Exp. Date: 06/28/26     Order Specific Question Answer Comments   Reason for Exam: Gtube pulled out    Has the patient had a prior contrast or iodine reaction? No    Is the patient currently on any blood thinners like Aspirin, Coumadin, or Plavix? No    Is the patient on any Metformin drug, such as Glucophage or Glucovance? No    May the Radiologist modify the order per protocol to meet the clinical needs of the patient? Yes    Release to patient Immediate      Ambulatory referral/consult to Vascular Neurology   Standing Status: Future   Referral Priority: Routine Referral Type: Consultation   Referral Reason: Specialty Services Required   Requested Specialty: Vascular Neurology   Number of Visits Requested: 1     Ambulatory referral/consult to Family Practice   Standing Status: Future   Referral Priority: Routine  Referral Type: Consultation   Referral Reason: Specialty Services Required   Referred to Provider: ARISTEO TRINH Requested Specialty: Family Medicine   Number of Visits Requested: 1     Ambulatory referral/consult to Podiatry   Standing Status: Future   Referral Priority: Routine Referral Type: Consultation   Referral Reason: Specialty Services Required   Requested Specialty: Podiatry   Number of Visits Requested: 1     Diet Cardiac   Order Comments: See Stroke Patient Education Guide Booklet for details.     Call 911 for any of the following:   Order Comments: Call 911  right away if any of the following warning signs come on suddenly, even if the symptoms only last for a few minutes. With stroke, timing is very important.   - Warning Signs of Stroke:  - Weakness: You may feel a sudden weakness, tingling or loss of feeling on one side of your face or body.  - Vision Problems: You may have sudden double vision or trouble seeing in one or both eyes.  - Speech Problems: You may have sudden trouble talking, slured speech, or problems understanding others.  - Headache: You may have sudden, severe headache.  - Movement Problems: You may experience dizziness, a feeling of spinning, a loss of balance, a feeling of falling or blackouts.       Medications:  Reconciled Home Medications:      Medication List        START taking these medications      aspirin 81 MG Chew  Take 1 tablet (81 mg total) by mouth once daily.  Start taking on: July 12, 2025  Replaces: aspirin 81 MG EC tablet     chlorhexidine 0.12 % solution  Commonly known as: PERIDEX  Use as directed 15 mLs in the mouth or throat 2 (two) times daily. for 14 days     insulin aspart U-100 100 unit/mL (3 mL) Inpn pen  Commonly known as: NovoLOG  Inject 10 Units into the skin 3 (three) times daily.     insulin glargine U-100 (Lantus) 100 unit/mL (3 mL) Inpn pen  Inject 25 Units into the skin once daily.  Start taking on: July 12, 2025     niacin 500 MG Tab  1  tablet (500 mg total) by Per G Tube route 2 (two) times daily with meals.     nicotine 21 mg/24 hr  Commonly known as: NICODERM CQ  Place 1 patch onto the skin once daily.  Start taking on: July 12, 2025     nystatin 100,000 unit/mL suspension  Commonly known as: MYCOSTATIN  Take 5 mLs (500,000 Units total) by mouth 4 (four) times daily. for 10 days     polyethylene glycol 17 gram Pwpk  Commonly known as: GLYCOLAX  17 g by Per G Tube route 2 (two) times daily.     QUEtiapine 25 MG Tab  Commonly known as: SEROQUEL  1 tablet (25 mg total) by Per G Tube route every evening.            CONTINUE taking these medications      amLODIPine 10 MG tablet  Commonly known as: NORVASC  Take 1 tablet (10 mg total) by mouth once daily.     atorvastatin 80 MG tablet  Commonly known as: LIPITOR  Take 1 tablet (80 mg total) by mouth once daily.     blood sugar diagnostic Strp  Commonly known as: TRUE METRIX GLUCOSE TEST STRIP  Test Blood SugarTEST BLOOD SUGAR TWICE DAILY     blood-glucose meter kit  Dispense meter  brand covered by insurance     clopidogreL 75 mg tablet  Commonly known as: PLAVIX  Take 1 tablet (75 mg total) by mouth once daily.     FLUoxetine 20 MG capsule  Take 1 capsule (20 mg total) by mouth once daily. To help mood and anxiety     gabapentin 600 MG tablet  Commonly known as: NEURONTIN  Take 1 tablet (600 mg total) by mouth 3 (three) times daily as needed.     glimepiride 4 MG tablet  Commonly known as: AMARYL  Take 1 tablet (4 mg total) by mouth before breakfast. Stop when you restart insulin     lactulose 10 gram/15 mL solution  Commonly known as: CHRONULAC  TAKE  15ML  1-3  TIMES  A  DAY     * lancets Misc  Commonly known as: ACCU-CHEK FASTCLIX LANCING DEV  TEST TWICE DAILY     * TRUEPLUS LANCETS 33 gauge Misc  Generic drug: lancets  Use to test sugar/glucose daily.     lisinopriL 20 MG tablet  Commonly known as: PRINIVIL,ZESTRIL  Take 2 tablets (40 mg total) by mouth once daily.     metFORMIN 1000 MG  tablet  Commonly known as: GLUCOPHAGE  Take 1 tablet (1,000 mg total) by mouth 2 (two) times daily with meals.     metoprolol tartrate 50 MG tablet  Commonly known as: LOPRESSOR  Take 1 tablet (50 mg total) by mouth 2 (two) times daily.           * This list has 2 medication(s) that are the same as other medications prescribed for you. Read the directions carefully, and ask your doctor or other care provider to review them with you.                STOP taking these medications      aspirin 81 MG EC tablet  Commonly known as: ECOTRIN  Replaced by: aspirin 81 MG Chew              Cullen Almanzar MD  Comprehensive Stroke Center  Department of Vascular Neurology   [facility])

## 2025-07-11 NOTE — ASSESSMENT & PLAN NOTE
Lab Results   Component Value Date    LABA1C 7.2 (H) 01/29/2018    HGBA1C 7.0 (H) 06/19/2025     Follow up repeat A1c. Hold home antihyperglycemics. SSI for goal -180 while in hospital  -optimizing regimen

## 2025-07-11 NOTE — NURSING
IV site removed, tolerated well. No c/o pain nor discomfort, no distress noted. Report called to receiving facility.

## 2025-07-11 NOTE — CONSULTS
Zohaib Garrett - Neurosurgery ([facility])  Podiatry  Consult Note    Patient Name: Cristi Pulido  MRN: [id number]  Admission Date: 6/19/2025  Hospital Length of Stay: 22 days  Attending Physician: Rehana Saini MD  Primary Care Provider: Rell Winn MD     Inpatient consult to Podiatry  Consult performed by: Marialuisa Topete MD  Consult ordered by: Cullen Almanzar MD  Reason for consult: right foot wound        Subjective:     History of Present Illness:  Cristi Pulido is a 70 y.o. male w/ PMH of HTN, DM, prior stroke w/ residual RSW who presents as a transfer from [facility] after presenting with acute onset of dysarthria and RSW. Telestroke was completed and his NIHSS was 19. CT showed probable early ischemic changes in the L insular ribbon and L frontal lobe. CTA demonstrated occlusion of the distal L MCA M1 segment. He was given TNK at 0548. Patient was transferred to [facility] [place] for further management. Podiatry consulted to evaluate for possible Right foot ulceration. Poor historian due to severe aphasia. Full ambulatory status unclear. Seen in vascular lab.    ----Scheduled Meds:   amLODIPine  10 mg Per G Tube QHS    [START ON 7/12/2025] aspirin  81 mg Oral Daily    atorvastatin  80 mg Per G Tube Daily    chlorhexidine  15 mL Mouth/Throat BID    clopidogreL  75 mg Per G Tube Daily    FLUoxetine  20 mg Per G Tube Daily    heparin (porcine)  5,000 Units Subcutaneous Q8H    insulin aspart U-100  10 Units Subcutaneous TIDWM    insulin glargine U-100  25 Units Subcutaneous Daily    metoprolol tartrate  50 mg Per G Tube BID    mupirocin   Nasal BID    niacin  500 mg Per G Tube BID WM    nicotine  1 patch Transdermal Daily    nystatin  500,000 Units Oral QID    polyethylene glycol  17 g Per G Tube BID    QUEtiapine  25 mg Per G Tube QHS    senna-docusate  1 tablet Per G Tube BID     Continuous Infusions:  PRN Meds:  Current Facility-Administered Medications:     bisacodyL, 10 mg, Rectal, Daily PRN    dextrose 50%, 12.5 g, Intravenous,  PRN    dextrose 50%, 25 g, Intravenous, PRN    glucagon (human recombinant), 1 mg, Intramuscular, PRN    glucose, 16 g, Oral, PRN    glucose, 24 g, Oral, PRN    hydrALAZINE, 10 mg, Intravenous, Q4H PRN    insulin aspart U-100, 0-10 Units, Subcutaneous, QID (AC + HS) PRN    labetalol, 10 mg, Intravenous, Q4H PRN    melatonin, 6 mg, Per G Tube, Nightly PRN    simethicone, 1 tablet, Per G Tube, TID PRN    sodium chloride 0.9%, 10 mL, Intravenous, PRN    Review of patient's allergies indicates:  No Known Allergies     Past Medical History:   Diagnosis Date    Diabetes mellitus     Hyperlipidemia     Hypertension     Stroke     Stroke     right side weak    Type 2 diabetes mellitus      Past Surgical History:   Procedure Laterality Date    AMPUTATION, LOWER LIMB Bilateral     middle toes    bilateral 3rd toe amputation      COLONOSCOPY  01/2021    FRACTURE SURGERY Left     hip    JOINT REPLACEMENT Left     KOMAL       Family History       Problem Relation (Age of Onset)    Diabetes Mother, Father, Sister, Brother, Daughter, Sister, Sister, Brother    Heart disease Mother    Hypertension Mother, Daughter    No Known Problems Son          Tobacco Use    Smoking status: Every Day     Current packs/day: 0.50     Average packs/day: 0.5 packs/day for 40.0 years (20.0 ttl pk-yrs)     Types: Cigarettes     Passive exposure: Current    Smokeless tobacco: Never    Tobacco comments:     Patient aware of smoking cessation program. He is also aware of health consequences r/t smoking.   Substance and Sexual Activity    Alcohol use: Yes     Comment: Occasionally (football season)    Drug use: Yes     Types: Marijuana    Sexual activity: Not Currently     Partners: Female     Review of Systems   Reason unable to perform ROS: Expressive aphasia, Receptive aphasia.     Objective:     Vital Signs (Most Recent):  Temp: 97.2 °F (36.2 °C) (07/11/25 0757)  Pulse: 67 (07/11/25 1100)  Resp: 18 (07/11/25 0757)  BP: 125/62 (07/11/25 0757)  SpO2: (!)  91 % (07/11/25 0757) Vital Signs (24h Range):  Temp:  [97.2 °F (36.2 °C)-99.7 °F (37.6 °C)] 97.2 °F (36.2 °C)  Pulse:  [67-94] 67  Resp:  [17-18] 18  SpO2:  [91 %-97 %] 91 %  BP: (125-161)/(60-83) 125/62     Weight: 90.3 kg (199 lb 1.2 oz)  Body mass index is 28.56 kg/m².    Foot Exam    General  General Appearance: (severe asphasia)  Orientation: unable to assess       Right Foot/Ankle     Inspection and Palpation  Ecchymosis: none  Tenderness: none   Swelling: none   Skin Exam: callus and dry skin; no drainage, no maceration, no ulcer and no erythema     Neurovascular  Dorsalis pedis: absent  Posterior tibial: absent    Comments  Right foot with large sub 1st MTPJ callus without drainage, wound, malodor, bleeding, purulence, fluctuance.  ?3rd digit amputation. Hammer toeing of less digits.    Left Foot/Ankle      Inspection and Palpation  Ecchymosis: none  Tenderness: none   Swelling: none   Skin Exam: dry skin; no callus, no drainage, no maceration, no ulcer and no erythema     Neurovascular  Dorsalis pedis: absent  Posterior tibial: absent    Comments  ?3rd digit amputation. Hammer toeing of lesser digits. Skin largerly intact  Clinical media:        Left:          Laboratory:  All pertinent labs reviewed within the last 24 hours.    Diagnostic Results:  I have reviewed all pertinent imaging results/findings within the past 24 hours.  Assessment/Plan:     Derm  -  Presenting for RSW. Discharging to rehab today. Podiatry consulted for possible right foot ulceration. VSS, Afebrile. WBC wnl. Clinically no ulceration or infective process to right foot. Large callus present, stable. Patient to benefit from outpatient callus debridement.    Plan:  - No surgical intervention at this time  - No wound to culture or dress  - Recommend moisturizer to right plantar foot to soften callus  - Recommend off loading boots at all times while laying in bed  - Weight bearing status: WBAT  - Podiatry will sign off    Discharge  Recommendations  - Patient to follow up in outpatient Podiatry clinic with Dr Hagen. Podiatry will schedule  - Recommend moisturizer to right plantar foot to soften callus  - Recommend off loading boots at all times while laying in bed          Thank you for your consult. I will sign off. Please contact us if you have any additional questions.    Marialuisa Topete MD  Podiatry  Zohaib Garrett - Neurosurgery ([facility])

## 2025-07-11 NOTE — SUBJECTIVE & OBJECTIVE
----Scheduled Meds:   amLODIPine  10 mg Per G Tube QHS    [START ON 7/12/2025] aspirin  81 mg Oral Daily    atorvastatin  80 mg Per G Tube Daily    chlorhexidine  15 mL Mouth/Throat BID    clopidogreL  75 mg Per G Tube Daily    FLUoxetine  20 mg Per G Tube Daily    heparin (porcine)  5,000 Units Subcutaneous Q8H    insulin aspart U-100  10 Units Subcutaneous TIDWM    insulin glargine U-100  25 Units Subcutaneous Daily    metoprolol tartrate  50 mg Per G Tube BID    mupirocin   Nasal BID    niacin  500 mg Per G Tube BID WM    nicotine  1 patch Transdermal Daily    nystatin  500,000 Units Oral QID    polyethylene glycol  17 g Per G Tube BID    QUEtiapine  25 mg Per G Tube QHS    senna-docusate  1 tablet Per G Tube BID     Continuous Infusions:  PRN Meds:  Current Facility-Administered Medications:     bisacodyL, 10 mg, Rectal, Daily PRN    dextrose 50%, 12.5 g, Intravenous, PRN    dextrose 50%, 25 g, Intravenous, PRN    glucagon (human recombinant), 1 mg, Intramuscular, PRN    glucose, 16 g, Oral, PRN    glucose, 24 g, Oral, PRN    hydrALAZINE, 10 mg, Intravenous, Q4H PRN    insulin aspart U-100, 0-10 Units, Subcutaneous, QID (AC + HS) PRN    labetalol, 10 mg, Intravenous, Q4H PRN    melatonin, 6 mg, Per G Tube, Nightly PRN    simethicone, 1 tablet, Per G Tube, TID PRN    sodium chloride 0.9%, 10 mL, Intravenous, PRN    Review of patient's allergies indicates:  No Known Allergies     Past Medical History:   Diagnosis Date    Diabetes mellitus     Hyperlipidemia     Hypertension     Stroke     Stroke     right side weak    Type 2 diabetes mellitus      Past Surgical History:   Procedure Laterality Date    AMPUTATION, LOWER LIMB Bilateral     middle toes    bilateral 3rd toe amputation      COLONOSCOPY  01/2021    FRACTURE SURGERY Left     hip    JOINT REPLACEMENT Left     KOMAL       Family History       Problem Relation (Age of Onset)    Diabetes Mother, Father, Sister, Brother, Daughter, Sister, Sister, Brother     Heart disease Mother    Hypertension Mother, Daughter    No Known Problems Son          Tobacco Use    Smoking status: Every Day     Current packs/day: 0.50     Average packs/day: 0.5 packs/day for 40.0 years (20.0 ttl pk-yrs)     Types: Cigarettes     Passive exposure: Current    Smokeless tobacco: Never    Tobacco comments:     Patient aware of smoking cessation program. He is also aware of health consequences r/t smoking.   Substance and Sexual Activity    Alcohol use: Yes     Comment: Occasionally (football season)    Drug use: Yes     Types: Marijuana    Sexual activity: Not Currently     Partners: Female     Review of Systems   Reason unable to perform ROS: Expressive aphasia, Receptive aphasia.     Objective:     Vital Signs (Most Recent):  Temp: 97.2 °F (36.2 °C) (07/11/25 0757)  Pulse: 67 (07/11/25 1100)  Resp: 18 (07/11/25 0757)  BP: 125/62 (07/11/25 0757)  SpO2: (!) 91 % (07/11/25 0757) Vital Signs (24h Range):  Temp:  [97.2 °F (36.2 °C)-99.7 °F (37.6 °C)] 97.2 °F (36.2 °C)  Pulse:  [67-94] 67  Resp:  [17-18] 18  SpO2:  [91 %-97 %] 91 %  BP: (125-161)/(60-83) 125/62     Weight: 90.3 kg (199 lb 1.2 oz)  Body mass index is 28.56 kg/m².    Foot Exam    General  General Appearance: (severe asphasia)  Orientation: unable to assess       Right Foot/Ankle     Inspection and Palpation  Ecchymosis: none  Tenderness: none   Swelling: none   Skin Exam: callus and dry skin; no drainage, no maceration, no ulcer and no erythema     Neurovascular  Dorsalis pedis: absent  Posterior tibial: absent    Comments  Right foot with large sub 1st MTPJ callus without drainage, wound, malodor, bleeding, purulence, fluctuance.  ?3rd digit amputation. Hammer toeing of less digits.    Left Foot/Ankle      Inspection and Palpation  Ecchymosis: none  Tenderness: none   Swelling: none   Skin Exam: dry skin; no callus, no drainage, no maceration, no ulcer and no erythema     Neurovascular  Dorsalis pedis: absent  Posterior tibial:  absent    Comments  ?3rd digit amputation. Hammer toeing of lesser digits. Skin largerly intact  Clinical media:        Left:          Laboratory:  All pertinent labs reviewed within the last 24 hours.    Diagnostic Results:  I have reviewed all pertinent imaging results/findings within the past 24 hours.

## 2025-07-11 NOTE — PROGRESS NOTES
[facility] - Neurosurgery ([facility]  Vascular Neurology  Comprehensive Stroke Center  Progress Note    Assessment/Plan:     * Embolic stroke involving left middle cerebral artery  Cristi Pulido is a 70 y.o. male w/ PMH of HTN, DM, prior stroke w/ residual RSW who presents as a transfer from OSH after presenting with acute onset of dysarthria and RSW. Telestroke was completed and his NIHSS was 19. CT showed probable early ischemic changes in the L insular ribbon and L frontal lobe. CTA demonstrated occlusion of the distal L MCA M1 segment. He was given TNK at 0548. Patient was transferred to [facility] [place]. On arrival, repeat NIHSS of 24. Patient taken for repeat CTH which showed small left temporal hemorrhage along with continued early ischemic changes. Patient taken to IR for thrombectomy and to be admitted to [facility] post-procedure.  S/P thrombectomy. TICI 3. MRI revealing for blood products in stroke bed, likely reperfusion injury. Etiology ESUS.     07/11/2025. Pt discharging to [facility]. Increased insulin to Lantus 25 units+ aspart 10 units TIDWM. Outpt referral placed for podiatry for right foot callus. Vascular US of carotids ordered, f/u imaging.   Antithrombotics for secondary stroke prevention: Antiplatelets: Aspirin: 81 mg daily  Clopidogrel: 75 mg daily,      Statins for secondary stroke prevention and hyperlipidemia, if present:   Statins: Atorvastatin- 80 mg daily    Aggressive risk factor modification: HTN, Smoking, DM, HLD     Rehab efforts: The patient has been evaluated by a stroke team provider and the therapy needs have been fully considered based off the presenting complaints and exam findings. The following therapy evaluations are needed: PT evaluate and treat, OT evaluate and treat, SLP evaluate and treat, PM&R evaluate for appropriate placement, current recommendation HIT    Diagnostics ordered/pending: None     VTE prophylaxis: Enoxaparin 40 mg SQ every 24 hours  Mechanical  prophylaxis: Place SCDs    BP parameters: Infarct: Post sucessful thrombectomy, SBP <140        Bilateral carotid artery stenosis  F/u vascular US.    Encounter for nasogastric tube placement  PEG removed per patient.   IR consulted for replacement. Attempt unsuccessful, will attempt again week of 6/30.   NGT replaced. TF and FWF resumed.     Restlessness  -6/24: QTc 441   -Seroquel 25 mg QHS     Hyperlipemia  -Stroke risk factor  -LDL 75, goal <70  -Atorvastatin 40 mg daily      Right sided weakness  -Secondary to stroke.   -Aggressive therapy     Dysarthria and anarthria  -Therapy eval and treat    Aphasia  -Secondary to stroke  -Aggressive therapy     Type 2 diabetes mellitus without complication, without long-term current use of insulin  Lab Results   Component Value Date    LABA1C 7.2 (H) 01/29/2018    HGBA1C 7.0 (H) 06/19/2025     Follow up repeat A1c. Hold home antihyperglycemics. SSI for goal -180 while in hospital  -optimizing regimen    Hypertension associated with diabetes  -Stroke risk factor  -SBP goal <140, maintain MAP >65  -BP range in the last 24 hrs: BP  Min: 139/82  Max: 169/93  -Current antihypertensive regimen:     -Amlodipine 10mg     -Lisinopril still on hold for now    -Metoprolol 50mg BID   --PRN labetalol/hydralazine     Tobacco dependence  Stroke risk factor  - on cessation  -nicotine patch PRN         06/20/2025 NAEON. S/P thrombectomy. TICI 3. TNK monitoring ended at 0548. Aphasic, follows no commands. RSW remains. MRI revealing for blood products in stroke bed, likely reperfusion injury. OK to start monotherapy with either plavix or ASA 6/21/25 for secondary stroke prevention. ECHO unremarkable. Family member at bedside agreeable to NGT placement. Etiology ESUS.   6/21/2025 NAEON. Neuro exam stable. Recommend holding AP therapy for now.   06/22/2025 NAEON. Neuro exam stable. Start AP therapy with ASA.   06/23/2025 NAEON. SLP recs NPO. PEG discussed with family and they are  agreeable. Pt S/D to NPU.   06/24/2025 NGT pulled overnight and replaced. Placement confirmed via X ray. IR consulted for PEG placement. JEANNE, Creatinine and BUN uptrending. FWF flushes ordered. Restless during night. EKG repeated. QTc 441. Seroquel 25 mg QHS ordered. Family requesting [facility] when medically ready.   06/25/2025 NAEON. Plans for PEG placement today. BUN and Cr uptrending. Fluids started. Lisinopril held.   06/26/2025 NAEON. PEG placed yesterday, will resume tube feeds this afternoon starting with trickle feeds. BUN and Cr Improved. Increased water boluses to 300.  06/27/2025 NAEON. Neuro exam stable. Patient has tolerated tube feeds well. Will continue to monitor kidney function for now. Trial off restraints today to prepare for rehab placement. Added Lantus 15 units. MBSS today, patient able to have puree for pleasure feeds per speech.   06/28/2025 Na 152. 0.45% NS at 100 ml/hr ordered x 12 hrs. Will repeat sodium draw scheduled at 2100. Pulled PEG. IR unable to replace PEG. Will attempt in coming days. NGT replaced, placement confirmed via Xray. TF and FWF restarted.   06/29/2025 Na 154, BUN and Cr improving. Continuing fluids. NGT coiled overnight. NG Xray showed possible pneumoperitoneum. CT AP without contrast ordered. Showed free air in the abdomen, advised not to use NGT for now. General Surgery consulted to discuss PEG placement.   06/30/2025 Pulled NG tube overnight. Na stable, Cr and BUN continue to improve. Fluids reordered. Discussed PEG replacement with GI and Gen Surg, deferred to IR for replacement. IR reconsulted this morning. Must wait approximately 1 week to re attempt PEG placement. Placement planned for 7/3. Will need to replace NG tube 7/2. NPO at midnight 7/3.   07/01/2025 Switched 0.45 NS to D5W @60 cc/hr. Hypernatremic at 153. Free water deficit 1.9L   07/02/2025 Hypernatremia improving. Continuing D5W. Plan for G tube tomorrow per IR.   07/03/2025: G tube placement per IR.  Decreased D5W to 30 cc/hr.   07/04/2025 S/p G tube placement. Will start tube feeds later, increased statin to 80 mg, added plavix 75 mg, and Niacin 1g nightly.   07/05/2025 NAEON. Started tube feeds. BG optimization ongoing.   07/06/2025 NAEON. Pt with increased creatinine. Gave bolus. Believe JEANNE is combination of prerenal and post renal as he has been retaining urine periodically.   07/07/2025. NAEON. Pt creatinine not improved despite fluids and underwood. Ordered Renal US.   07/08/2025. NAEON. Renal US unremarkable for hydronephrosis. Creatinine normalized. Increased lantus to 20 units, aspart to 8 units.   07/09/2025. NAEON. Pt seems to be eating full tray. Paused tube feeds.   07/10/2025. Pt eating 100% of tray meals. Switch insulin to [facility].   07/11/2025. Pt discharging to [facility]. Increased insulin to Lantus 25 units+ aspart 10 units TIDWM. Outpt referral placed for podiatry for right foot callus. Vascular US of carotids ordered, f/u imaging.     STROKE DOCUMENTATION   Acute Stroke Times   Last Known Normal Date: 06/19/25  Last Known Normal Time: 0400  Symptom Onset Date: 06/19/25  Symptom Onset Time: 0400  Stroke Team Called Date: 06/19/25  Stroke Team Called Time: 0813  Stroke Team Arrival Date: 06/19/25  Stroke Team Arrival Time: 0813  CT Interpretation Time: 0827  Thrombolytic Therapy Recommended:  (already given prior to transfer)  CTA Interpretation Time:  (already completed prior to transfer)  Thrombectomy Recommended: Yes  Decision to Treat Time for IR: 0832    NIH Scale:  1a. Level of Consciousness: 0-->Alert, keenly responsive  1b. LOC Questions: 2-->Answers neither question correctly  1c. LOC Commands: 2-->Performs neither task correctly  2. Best Gaze: 0-->Normal  3. Visual: 0-->No visual loss  4. Facial Palsy: 1-->Minor paralysis (flattened nasolabial fold, asymmetry on smiling)  5a. Motor Arm, Left: 0-->No drift, limb holds 90 (or 45) degrees for full 10 secs  5b. Motor Arm, Right: 3-->No  effort against gravity, limb falls  6a. Motor Leg, Left: 0-->No drift, leg holds 30 degree position for full 5 secs  6b. Motor Leg, Right: 3-->No effort against gravity, leg falls to bed immediately  7. Limb Ataxia: 0-->Absent  8. Sensory: 0-->Normal, no sensory loss  9. Best Language: 2-->Severe aphasia, all communication is through fragmentary expression, great need for inference, questioning, and guessing by the listener. Range of information that can be exchanged is limited, listener carries burden of. . . (see row details)  10. Dysarthria: 2-->Severe dysarthria, patients speech is so slurred as to be unintelligible in the absence of or out of proportion to any dysphasia, or is mute/anarthric  11. Extinction and Inattention (formerly Neglect): 0-->No abnormality  Total (NIH Stroke Scale): 15       Modified Paisley Score: 2  Dewitt Coma Scale:    ABCD2 Score:    [id number] Score:   HAS -BLED Score:   ICH Score:   Hunt & Kimball Classification:      Hemorrhagic change of an Ischemic Stroke: Does this patient have an ischemic stroke with hemorrhagic changes? Yes, Grading Scale: PH Type 1 (PH-1) = hematoma in < 30% of the infarcted area with some slight space-occupying effect. Is this a symptomatic change?  No - Hemorrhage is not clinically significant     Neurologic Chief Complaint: L MCA stroke    Subjective:     Interval History: Patient is seen for follow-up neurological assessment and treatment recommendations: See hospital course.    HPI, Past Medical, Family, and Social History remains the same as documented in the initial encounter.     Review of Systems   Reason unable to perform ROS: Severe aphasia.     Scheduled Meds:   amLODIPine  10 mg Per G Tube QHS    aspirin  300 mg Rectal Daily    atorvastatin  80 mg Per G Tube Daily    chlorhexidine  15 mL Mouth/Throat BID    clopidogreL  75 mg Per G Tube Daily    FLUoxetine  20 mg Per G Tube Daily    heparin (porcine)  5,000 Units Subcutaneous Q8H    insulin aspart  U-100  10 Units Subcutaneous TIDWM    [START ON 7/11/2025] insulin glargine U-100  25 Units Subcutaneous Daily    metoprolol tartrate  50 mg Per G Tube BID    mupirocin   Nasal BID    niacin  500 mg Per G Tube BID WM    nicotine  1 patch Transdermal Daily    nystatin  500,000 Units Oral QID    polyethylene glycol  17 g Per G Tube BID    QUEtiapine  25 mg Per G Tube QHS    senna-docusate  1 tablet Per G Tube BID     Continuous Infusions:        PRN Meds:  Current Facility-Administered Medications:     bisacodyL, 10 mg, Rectal, Daily PRN    dextrose 50%, 12.5 g, Intravenous, PRN    dextrose 50%, 25 g, Intravenous, PRN    glucagon (human recombinant), 1 mg, Intramuscular, PRN    glucose, 16 g, Oral, PRN    glucose, 24 g, Oral, PRN    hydrALAZINE, 10 mg, Intravenous, Q4H PRN    labetalol, 10 mg, Intravenous, Q4H PRN    melatonin, 6 mg, Per G Tube, Nightly PRN    simethicone, 1 tablet, Per G Tube, TID PRN    sodium chloride 0.9%, 10 mL, Intravenous, PRN    Objective:     Vital Signs (Most Recent):  Temp: 99.2 °F (37.3 °C) (07/10/25 0731)  Pulse: 67 (07/10/25 1118)  Resp: 18 (07/10/25 0731)  BP: 125/64 (07/10/25 0731)  SpO2: (!) 93 % (07/10/25 0731)  BP Location: Left arm    Vital Signs Range (Last 24H):  Temp:  [98.1 °F (36.7 °C)-99.9 °F (37.7 °C)]   Pulse:  [67-94]   Resp:  [18]   BP: (125-149)/(63-73)   SpO2:  [93 %-94 %]   BP Location: Left arm       Physical Exam  HENT:      Head: Normocephalic and atraumatic.      Mouth/Throat:      Mouth: Mucous membranes are moist.   Eyes:      Conjunctiva/sclera: Conjunctivae normal.   Cardiovascular:      Rate and Rhythm: Normal rate.   Pulmonary:      Effort: Pulmonary effort is normal.   Skin:     General: Skin is warm and dry.   Neurological:      Mental Status: He is alert.      Motor: Weakness present.              Neurological Exam:   LOC: alert  Attention Span: poor  Language: Expressive aphasia, Receptive aphasia  Articulation: Dysarthria  Orientation: Untestable due to  "severe aphasia   Facial Movement (CN VII): Lower facial weakness on the Right  Motor: Arm left  Normal 5/5  Leg left  Normal 5/5  Arm right  Plegia 0/5  Leg right Paresis: 1/5  Sensation: Cy-hypoesthesia right    Laboratory:  CMP:   No results for input(s): "GLUCOSE", "CALCIUM", "ALBUMIN", "PROT", "NA", "K", "CO2", "CL", "BUN", "CREATININE", "ALKPHOS", "ALT", "AST", "BILITOT" in the last 24 hours.    BMP:   Recent Labs   Lab 07/08/25  0309      K 4.0      CO2 26   BUN 25*   CREATININE 1.2   CALCIUM 8.1*     CBC:   Recent Labs   Lab 07/08/25  0309   WBC 13.51*   RBC 3.33*   HGB 9.5*   HCT 29.6*      MCV 89   MCH 28.5   MCHC 32.1     Lipid Panel:   No results for input(s): "CHOL", "LDLCALC", "HDL", "TRIG" in the last 168 hours.    Coagulation:   No results for input(s): "PT", "INR", "APTT" in the last 168 hours.    Platelet Aggregation Study: No results for input(s): "PLTAGG", "PLTAGINTERP", "PLTAGREGLACO", "ADPPLTAGGREG" in the last 168 hours.  Hgb A1C:   No results for input(s): "HGBA1C" in the last 168 hours.    TSH:   No results for input(s): "TSH" in the last 168 hours.      Diagnostic Results   Brain Imaging   MRI Brain 6/20/25  Impression:     Acute left MCA distribution infarct.  No new large parenchymal hemorrhage.  Susceptibility artifact throughout the infarcted tissue may reflect contrast staining and/or blood products conversion.  Overall mass effect is increased compared to prior with sulcal effacement throughout the left cerebral hemisphere and approximately 4 mm of rightward midline shift.     Stable size of the parenchymal hemorrhage centered in the inferior left temporal lobe.     CT 6/20/25: 1242  Impression:     Moderate-sized recent left MCA distribution infarct and small intraparenchymal hemorrhage appear grossly unchanged from prior study performed earlier on the same date.     Chronic ischemic change elsewhere with underlying cerebral and cerebellar volume loss, as " before.     No new hemorrhage or major vascular distribution infarct elsewhere.  CT 6/20/25: 0834  Impression:     Early ischemic changes in the left MCA distribution, new from recent CT.     New left inferior temporal intraparenchymal hematoma.     Chronic ischemic change with cerebral and cerebellar volume loss, as above.     CTH 6/19/25  Impression:     Question some early left MCA ischemia corresponding to the patient's known left MCA territory occlusion.  No hemorrhage.     Senescent changes as above.        All CT scans at this facility are performed  using dose modulation techniques as appropriate to performed exam including the following:  automated exposure control; adjustment of mA and/or kV according to the patients size (this includes techniques or standardized protocols for targeted exams where dose is matched to indication/reason for exam: i.e. extremities or head);  iterative reconstruction technique.     Vessel Imaging   CTA head and neck 6/19/25  Impression:     Distal left M1 MCA occlusion with relatively prompt collateralization of the more distal MCA vessels.     Severe stenosis left mid vertebral artery and left PCA.     Cardiac Imaging   TTE 6/19/25    Left Ventricle: The left ventricle is normal in size. Normal wall thickness. There is normal systolic function with a visually estimated ejection fraction of 60 - 65%.    Right Ventricle: The right ventricle is normal in size Wall thickness is normal. Systolic function is normal.    The pulmonary artery pressure could not be estimated.    IVC/SVC: Normal venous pressure at 3 mmHg.       Cullen Almanzar MD  Comprehensive Stroke Center  Department of Vascular Neurology   [facility])

## 2025-07-11 NOTE — PLAN OF CARE
[facility]    FACILITY TRANSFER ORDERS      Patient Name: Cristi Pulido  YOB: 1955    PCP: Rell Winn MD   PCP Address: [address] / [place] [address]  PCP Phone Number: [phone]  PCP Fax: [phone]    Encounter Date: 07/11/2025    Admit to: [facility]    Vital Signs:  Routine    Diagnoses:   Active Hospital Problems    Diagnosis  POA    *Embolic stroke involving left middle cerebral artery [I63.412]  Yes    Bilateral carotid artery stenosis [I65.23]  Unknown    JEANNE (acute kidney injury) [N17.9]  Yes    Urinary retention [R33.9]  No    Encounter for nasogastric tube placement [Z46.59]  Not Applicable    Restlessness [R45.1]  Yes    Aphasia [R47.01]  Yes    Dysarthria and anarthria [R47.1]  Yes    Right sided weakness [R53.1]  Yes    Hyperlipemia [E78.5]  Yes    Type 2 diabetes mellitus without complication, without long-term current use of insulin [E11.9]  Yes    Tobacco dependence [F17.200]  Yes    Hypertension associated with diabetes [E11.59, I15.2]  Yes      Resolved Hospital Problems    Diagnosis Date Resolved POA    Hypernatremia [E87.0] 07/05/2025 No       Allergies:Review of patient's allergies indicates:  No Known Allergies    Diet: Diet Minced & Moist (IDDSI Level 5) Consistent Carbohydrate; 2000 Calories (up to 75 gm per meal); Thin     Activities: Activity as tolerated    Goals of Care Treatment Preferences:  Code Status: Full Code      Nursing: Per Facility     Labs: Per facility       CONSULTS:    Physical Therapy to evaluate and treat. , Occupational Therapy to evaluate and treat., and Speech Therapy to evaluate and treat for Language, Swallowing, and Cognition.    MISCELLANEOUS CARE:  PEG Care: Clean site every 24 hours.     WOUND CARE ORDERS  None    Medications: Review discharge medications with patient and family and provide education.         Medication List        START taking these medications      aspirin 81 MG Chew  Take 1 tablet (81 mg total)  by mouth once daily.  Start taking on: July 12, 2025  Replaces: aspirin 81 MG EC tablet     chlorhexidine 0.12 % solution  Commonly known as: PERIDEX  Use as directed 15 mLs in the mouth or throat 2 (two) times daily. for 14 days     insulin aspart U-100 100 unit/mL (3 mL) Inpn pen  Commonly known as: NovoLOG  Inject 10 Units into the skin 3 (three) times daily.     insulin glargine U-100 (Lantus) 100 unit/mL (3 mL) Inpn pen  Inject 25 Units into the skin once daily.  Start taking on: July 12, 2025     niacin 500 MG Tab  1 tablet (500 mg total) by Per G Tube route 2 (two) times daily with meals.     nicotine 21 mg/24 hr  Commonly known as: NICODERM CQ  Place 1 patch onto the skin once daily.  Start taking on: July 12, 2025     nystatin 100,000 unit/mL suspension  Commonly known as: MYCOSTATIN  Take 5 mLs (500,000 Units total) by mouth 4 (four) times daily. for 10 days     polyethylene glycol 17 gram Pwpk  Commonly known as: GLYCOLAX  17 g by Per G Tube route 2 (two) times daily.     QUEtiapine 25 MG Tab  Commonly known as: SEROQUEL  1 tablet (25 mg total) by Per G Tube route every evening.            CONTINUE taking these medications      amLODIPine 10 MG tablet  Commonly known as: NORVASC  Take 1 tablet (10 mg total) by mouth once daily.     atorvastatin 80 MG tablet  Commonly known as: LIPITOR  Take 1 tablet (80 mg total) by mouth once daily.     blood sugar diagnostic Strp  Commonly known as: TRUE METRIX GLUCOSE TEST STRIP  Test Blood SugarTEST BLOOD SUGAR TWICE DAILY     blood-glucose meter kit  Dispense meter  brand covered by insurance     clopidogreL 75 mg tablet  Commonly known as: PLAVIX  Take 1 tablet (75 mg total) by mouth once daily.     FLUoxetine 20 MG capsule  Take 1 capsule (20 mg total) by mouth once daily. To help mood and anxiety     gabapentin 600 MG tablet  Commonly known as: NEURONTIN  Take 1 tablet (600 mg total) by mouth 3 (three) times daily as needed.     glimepiride 4 MG tablet  Commonly  known as: AMARYL  Take 1 tablet (4 mg total) by mouth before breakfast. Stop when you restart insulin     lactulose 10 gram/15 mL solution  Commonly known as: CHRONULAC  TAKE  15ML  1-3  TIMES  A  DAY     * lancets Misc  Commonly known as: ACCU-CHEK FASTCLIX LANCING DEV  TEST TWICE DAILY     * TRUEPLUS LANCETS 33 gauge Misc  Generic drug: lancets  Use to test sugar/glucose daily.     lisinopriL 20 MG tablet  Commonly known as: PRINIVIL,ZESTRIL  Take 2 tablets (40 mg total) by mouth once daily.     metFORMIN 1000 MG tablet  Commonly known as: GLUCOPHAGE  Take 1 tablet (1,000 mg total) by mouth 2 (two) times daily with meals.     metoprolol tartrate 50 MG tablet  Commonly known as: LOPRESSOR  Take 1 tablet (50 mg total) by mouth 2 (two) times daily.           * This list has 2 medication(s) that are the same as other medications prescribed for you. Read the directions carefully, and ask your doctor or other care provider to review them with you.                STOP taking these medications      aspirin 81 MG EC tablet  Commonly known as: ECOTRIN  Replaced by: aspirin 81 MG Chew                Immunizations Administered as of 7/11/2025       Name Date Dose VIS Date Route Exp Date    COVID-19, MRNA, LN-S, PF (Moderna) 4/8/2021 0.5 mL 12/1/2020 Intramuscular --    Site: Left arm     : Modern[employer]     Lot: [id number]     Comment: Adminis     COVID-19, MRNA, LN-S, PF (Moderna) 3/11/2021 0.5 mL 2/3/2021 Intramuscular --    Site: Left arm     : Modern[employer]     Lot: [id number]     Comment: Adminis             This patient has had both covid vaccinations    Some patients may experience side effects after vaccination.  These may include fever, headache, muscle or joint aches.  Most symptoms resolve with 24-48 hours and do not require urgent medical evaluation unless they persist for more than 72 hours or symptoms are concerning for an unrelated medical condition.           _________________________________  Cullen Almanzar MD  07/11/2025

## 2025-07-28 ENCOUNTER — TELEPHONE (OUTPATIENT)
Dept: FAMILY MEDICINE | Facility: CLINIC | Age: 70
End: 2025-07-28
Payer: MEDICARE

## 2025-07-28 NOTE — TELEPHONE ENCOUNTER
Copied from CRM #[id number]. Topic: Appointments - Appointment Scheduling  >> Jul 28, 2025  3:41 PM Cristel wrote:  Type: Patient Call Back    Who called: Cass[facility]- [facility]     What is the request in detail: Requesting a call back to schedule a hospital follow up for the pt. Pt will be discharged on 8/1. Please advise     Would the patient rather a call back or a response via [facility]?  Call     Best call back number: [phone] ( [facility])     Additional Information:

## 2025-07-29 ENCOUNTER — TELEPHONE (OUTPATIENT)
Dept: FAMILY MEDICINE | Facility: CLINIC | Age: 70
End: 2025-07-29
Payer: MEDICARE

## 2025-07-29 NOTE — TELEPHONE ENCOUNTER
Copied from CRM #[id number]. Topic: Appointments - Appointment Access  >> Jul 29, 2025  8:47 AM Priyanka wrote:  Type: Patient call    Who called: Lynda from [facility]    Does the patient know what this is regarding? Requesting a call back in regards to returning a call from [facility] to get patient an appt scheduled ; please advise     Would the patient rather a call back or response via [facility]? Call    Best call back number: [phone]    Additional information:

## 2025-08-01 DIAGNOSIS — I63.412 EMBOLIC STROKE INVOLVING LEFT MIDDLE CEREBRAL ARTERY: Primary | ICD-10-CM

## 2025-08-05 ENCOUNTER — PATIENT OUTREACH (OUTPATIENT)
Dept: ADMINISTRATIVE | Facility: HOSPITAL | Age: 70
End: 2025-08-05
Payer: MEDICARE

## 2025-08-05 NOTE — PROGRESS NOTES
08/05/2025  VB chart audit performed. Care Everywhere updates requested and reviewed  Overdue HM topic chart audit and/or requested. LINKS triggered and reconciled. Media reviewed Lvm/portal sent regarding overdue health topics

## 2025-08-07 ENCOUNTER — HOSPITAL ENCOUNTER (EMERGENCY)
Facility: HOSPITAL | Age: 70
Discharge: HOME | End: 2025-08-08
Attending: EMERGENCY MEDICINE
Payer: MEDICARE

## 2025-08-07 DIAGNOSIS — R10.9 ABDOMINAL PAIN: ICD-10-CM

## 2025-08-07 DIAGNOSIS — N39.0 URINARY TRACT INFECTION ASSOCIATED WITH INDWELLING URETHRAL CATHETER, INITIAL ENCOUNTER: ICD-10-CM

## 2025-08-07 DIAGNOSIS — K59.00 CONSTIPATION, UNSPECIFIED CONSTIPATION TYPE: ICD-10-CM

## 2025-08-07 DIAGNOSIS — Z97.8 FOLEY CATHETER IN PLACE: Primary | ICD-10-CM

## 2025-08-07 DIAGNOSIS — T83.511A URINARY TRACT INFECTION ASSOCIATED WITH INDWELLING URETHRAL CATHETER, INITIAL ENCOUNTER: ICD-10-CM

## 2025-08-07 LAB
ABSOLUTE EOSINOPHIL (OHS): 0.34 K/UL
ABSOLUTE MONOCYTE (OHS): 0.8 K/UL (ref 0.3–1)
ABSOLUTE NEUTROPHIL COUNT (OHS): 5.14 K/UL (ref 1.8–7.7)
ALBUMIN SERPL BCP-MCNC: 3.7 G/DL (ref 3.5–5.2)
ALP SERPL-CCNC: 149 UNIT/L (ref 38–126)
ALT SERPL W/O P-5'-P-CCNC: 15 UNIT/L (ref 10–44)
ANION GAP (OHS): 10 MMOL/L (ref 8–16)
AST SERPL-CCNC: 22 UNIT/L (ref 15–46)
BACTERIA #/AREA URNS HPF: ABNORMAL /HPF
BASOPHILS # BLD AUTO: 0.02 K/UL
BASOPHILS NFR BLD AUTO: 0.3 %
BILIRUB SERPL-MCNC: 0.7 MG/DL (ref 0.1–1)
BILIRUB UR QL STRIP.AUTO: NEGATIVE
BUN SERPL-MCNC: 12 MG/DL (ref 2–20)
CALCIUM SERPL-MCNC: 8.7 MG/DL (ref 8.7–10.5)
CHLORIDE SERPL-SCNC: 101 MMOL/L (ref 95–110)
CLARITY UR: ABNORMAL
CO2 SERPL-SCNC: 26 MMOL/L (ref 23–29)
COLOR UR AUTO: YELLOW
CREAT SERPL-MCNC: 1.1 MG/DL (ref 0.5–1.4)
ERYTHROCYTE [DISTWIDTH] IN BLOOD BY AUTOMATED COUNT: 14.3 % (ref 11.5–14.5)
GFR SERPLBLD CREATININE-BSD FMLA CKD-EPI: >60 ML/MIN/1.73/M2
GLUCOSE SERPL-MCNC: 167 MG/DL (ref 70–110)
GLUCOSE UR QL STRIP: NEGATIVE
HCT VFR BLD AUTO: 33.7 % (ref 40–54)
HGB BLD-MCNC: 11 GM/DL (ref 14–18)
HGB UR QL STRIP: ABNORMAL
HYALINE CASTS #/AREA URNS LPF: 0 /LPF (ref 0–1)
IMM GRANULOCYTES # BLD AUTO: 0.02 K/UL (ref 0–0.04)
IMM GRANULOCYTES NFR BLD AUTO: 0.3 % (ref 0–0.5)
KETONES UR QL STRIP: NEGATIVE
LEUKOCYTE ESTERASE UR QL STRIP: ABNORMAL
LIPASE SERPL-CCNC: 48 U/L (ref 23–300)
LYMPHOCYTES # BLD AUTO: 1.18 K/UL (ref 1–4.8)
MCH RBC QN AUTO: 28.9 PG (ref 27–31)
MCHC RBC AUTO-ENTMCNC: 32.6 G/DL (ref 32–36)
MCV RBC AUTO: 89 FL (ref 82–98)
MICROSCOPIC COMMENT: ABNORMAL
NITRITE UR QL STRIP: NEGATIVE
NUCLEATED RBC (/100WBC) (OHS): 0 /100 WBC
PH UR STRIP: 8 [PH]
PLATELET # BLD AUTO: 305 K/UL (ref 150–450)
PMV BLD AUTO: 11.9 FL (ref 9.2–12.9)
POTASSIUM SERPL-SCNC: 4.2 MMOL/L (ref 3.5–5.1)
PROT SERPL-MCNC: 7.8 GM/DL (ref 6–8.4)
PROT UR QL STRIP: ABNORMAL
RBC # BLD AUTO: 3.8 M/UL (ref 4.6–6.2)
RBC #/AREA URNS HPF: 60 /HPF (ref 0–4)
RELATIVE EOSINOPHIL (OHS): 4.5 %
RELATIVE LYMPHOCYTE (OHS): 15.7 % (ref 18–48)
RELATIVE MONOCYTE (OHS): 10.7 % (ref 4–15)
RELATIVE NEUTROPHIL (OHS): 68.5 % (ref 38–73)
SODIUM SERPL-SCNC: 137 MMOL/L (ref 136–145)
SP GR UR STRIP: 1.02
UROBILINOGEN UR STRIP-ACNC: ABNORMAL EU/DL
WBC # BLD AUTO: 7.5 K/UL (ref 3.9–12.7)
WBC #/AREA URNS HPF: 15 /HPF (ref 0–5)
WBC CLUMPS URNS QL MICRO: ABNORMAL

## 2025-08-07 PROCEDURE — 80053 COMPREHEN METABOLIC PANEL: CPT | Mod: HCNC,ER | Performed by: NURSE PRACTITIONER

## 2025-08-07 PROCEDURE — 99284 EMERGENCY DEPT VISIT MOD MDM: CPT | Mod: 25,HCNC,ER

## 2025-08-07 PROCEDURE — 81003 URINALYSIS AUTO W/O SCOPE: CPT | Mod: HCNC,ER | Performed by: NURSE PRACTITIONER

## 2025-08-07 PROCEDURE — 83690 ASSAY OF LIPASE: CPT | Mod: HCNC,ER | Performed by: NURSE PRACTITIONER

## 2025-08-07 PROCEDURE — 87086 URINE CULTURE/COLONY COUNT: CPT | Mod: HCNC,ER | Performed by: NURSE PRACTITIONER

## 2025-08-07 PROCEDURE — 85025 COMPLETE CBC W/AUTO DIFF WBC: CPT | Mod: HCNC,ER | Performed by: NURSE PRACTITIONER

## 2025-08-07 RX ORDER — CEPHALEXIN 500 MG/1
500 CAPSULE ORAL EVERY 12 HOURS
Qty: 14 CAPSULE | Refills: 0 | Status: ON HOLD | OUTPATIENT
Start: 2025-08-07 | End: 2025-08-15 | Stop reason: HOSPADM

## 2025-08-07 RX ORDER — CEPHALEXIN 500 MG/1
500 CAPSULE ORAL EVERY 12 HOURS
Qty: 14 CAPSULE | Refills: 0 | Status: SHIPPED | OUTPATIENT
Start: 2025-08-07 | End: 2025-08-07

## 2025-08-08 VITALS
BODY MASS INDEX: 25.8 KG/M2 | RESPIRATION RATE: 15 BRPM | DIASTOLIC BLOOD PRESSURE: 77 MMHG | HEART RATE: 79 BPM | TEMPERATURE: 98 F | SYSTOLIC BLOOD PRESSURE: 166 MMHG | OXYGEN SATURATION: 95 % | HEIGHT: 70 IN | WEIGHT: 180.19 LBS

## 2025-08-09 LAB — BACTERIA UR CULT: NO GROWTH

## 2025-08-11 ENCOUNTER — OFFICE VISIT (OUTPATIENT)
Dept: FAMILY MEDICINE | Facility: CLINIC | Age: 70
End: 2025-08-11
Payer: MEDICARE

## 2025-08-11 VITALS
DIASTOLIC BLOOD PRESSURE: 84 MMHG | HEART RATE: 62 BPM | HEIGHT: 70 IN | BODY MASS INDEX: 25.86 KG/M2 | OXYGEN SATURATION: 93 % | SYSTOLIC BLOOD PRESSURE: 124 MMHG

## 2025-08-11 DIAGNOSIS — I63.412 EMBOLIC STROKE INVOLVING LEFT MIDDLE CEREBRAL ARTERY: ICD-10-CM

## 2025-08-11 PROCEDURE — 3074F SYST BP LT 130 MM HG: CPT | Mod: CPTII,S$GLB,, | Performed by: FAMILY MEDICINE

## 2025-08-11 PROCEDURE — 1125F AMNT PAIN NOTED PAIN PRSNT: CPT | Mod: CPTII,S$GLB,, | Performed by: FAMILY MEDICINE

## 2025-08-11 PROCEDURE — 3008F BODY MASS INDEX DOCD: CPT | Mod: CPTII,S$GLB,, | Performed by: FAMILY MEDICINE

## 2025-08-11 PROCEDURE — 4010F ACE/ARB THERAPY RXD/TAKEN: CPT | Mod: CPTII,S$GLB,, | Performed by: FAMILY MEDICINE

## 2025-08-11 PROCEDURE — 99215 OFFICE O/P EST HI 40 MIN: CPT | Mod: S$GLB,,, | Performed by: FAMILY MEDICINE

## 2025-08-11 PROCEDURE — 3051F HG A1C>EQUAL 7.0%<8.0%: CPT | Mod: CPTII,S$GLB,, | Performed by: FAMILY MEDICINE

## 2025-08-11 PROCEDURE — 3061F NEG MICROALBUMINURIA REV: CPT | Mod: CPTII,S$GLB,, | Performed by: FAMILY MEDICINE

## 2025-08-11 PROCEDURE — 1101F PT FALLS ASSESS-DOCD LE1/YR: CPT | Mod: CPTII,S$GLB,, | Performed by: FAMILY MEDICINE

## 2025-08-11 PROCEDURE — 3066F NEPHROPATHY DOC TX: CPT | Mod: CPTII,S$GLB,, | Performed by: FAMILY MEDICINE

## 2025-08-11 PROCEDURE — 3288F FALL RISK ASSESSMENT DOCD: CPT | Mod: CPTII,S$GLB,, | Performed by: FAMILY MEDICINE

## 2025-08-11 PROCEDURE — 3079F DIAST BP 80-89 MM HG: CPT | Mod: CPTII,S$GLB,, | Performed by: FAMILY MEDICINE

## 2025-08-11 RX ORDER — CIPROFLOXACIN 500 MG/1
500 TABLET, FILM COATED ORAL 2 TIMES DAILY
Status: ON HOLD | COMMUNITY
Start: 2025-08-01 | End: 2025-08-15 | Stop reason: HOSPADM

## 2025-08-11 RX ORDER — TAMSULOSIN HYDROCHLORIDE 0.4 MG/1
0.8 CAPSULE ORAL
COMMUNITY
Start: 2025-08-01

## 2025-08-11 RX ORDER — FLUOXETINE 20 MG/5ML
20 SOLUTION ORAL DAILY
COMMUNITY

## 2025-08-11 RX ORDER — BACLOFEN 5 MG/1
5 TABLET ORAL 3 TIMES DAILY
COMMUNITY
Start: 2025-08-01

## 2025-08-11 RX ORDER — INSULIN LISPRO 100 [IU]/ML
0-12 INJECTION, SOLUTION INTRAVENOUS; SUBCUTANEOUS
COMMUNITY
Start: 2025-08-01

## 2025-08-12 ENCOUNTER — TELEPHONE (OUTPATIENT)
Dept: FAMILY MEDICINE | Facility: CLINIC | Age: 70
End: 2025-08-12
Payer: MEDICARE

## 2025-08-12 ENCOUNTER — HOSPITAL ENCOUNTER (INPATIENT)
Facility: HOSPITAL | Age: 70
LOS: 3 days | Discharge: HOME HEALTH | DRG: 177 | End: 2025-08-15
Attending: EMERGENCY MEDICINE | Admitting: STUDENT IN AN ORGANIZED HEALTH CARE EDUCATION/TRAINING PROGRAM
Payer: MEDICARE

## 2025-08-12 DIAGNOSIS — J18.9 PNEUMONIA OF RIGHT LOWER LOBE DUE TO INFECTIOUS ORGANISM: Primary | ICD-10-CM

## 2025-08-12 DIAGNOSIS — U07.1 COVID-19: ICD-10-CM

## 2025-08-12 DIAGNOSIS — I69.359 CVA, OLD, HEMIPARESIS: ICD-10-CM

## 2025-08-12 DIAGNOSIS — I63.412 EMBOLIC STROKE INVOLVING LEFT MIDDLE CEREBRAL ARTERY: ICD-10-CM

## 2025-08-12 DIAGNOSIS — R06.02 SHORTNESS OF BREATH: ICD-10-CM

## 2025-08-12 DIAGNOSIS — R07.9 CHEST PAIN: ICD-10-CM

## 2025-08-12 DIAGNOSIS — R53.1 RIGHT SIDED WEAKNESS: ICD-10-CM

## 2025-08-12 PROBLEM — J96.01 ACUTE RESPIRATORY FAILURE WITH HYPOXIA: Status: ACTIVE | Noted: 2025-08-12

## 2025-08-12 PROBLEM — N40.0 BPH (BENIGN PROSTATIC HYPERPLASIA): Status: ACTIVE | Noted: 2025-08-12

## 2025-08-12 PROBLEM — Z79.4 TYPE 2 DIABETES MELLITUS, WITH LONG-TERM CURRENT USE OF INSULIN: Status: ACTIVE | Noted: 2019-04-05

## 2025-08-12 PROBLEM — D64.9 ANEMIA: Status: ACTIVE | Noted: 2025-08-12

## 2025-08-12 PROBLEM — J69.0 ASPIRATION PNEUMONIA OF RIGHT LOWER LOBE: Status: ACTIVE | Noted: 2025-08-12

## 2025-08-12 LAB
ABSOLUTE EOSINOPHIL (OHS): 0.22 K/UL
ABSOLUTE MONOCYTE (OHS): 0.63 K/UL (ref 0.3–1)
ABSOLUTE NEUTROPHIL COUNT (OHS): 5.11 K/UL (ref 1.8–7.7)
ALBUMIN SERPL BCP-MCNC: 2.5 G/DL (ref 3.5–5.2)
ALP SERPL-CCNC: 118 UNIT/L (ref 40–150)
ALT SERPL W/O P-5'-P-CCNC: <8 UNIT/L (ref 10–44)
ANION GAP (OHS): 10 MMOL/L (ref 8–16)
AST SERPL-CCNC: 15 UNIT/L (ref 11–45)
BASOPHILS # BLD AUTO: 0.02 K/UL
BASOPHILS NFR BLD AUTO: 0.3 %
BILIRUB SERPL-MCNC: 0.3 MG/DL (ref 0.1–1)
BUN SERPL-MCNC: 11 MG/DL (ref 8–23)
CALCIUM SERPL-MCNC: 8.3 MG/DL (ref 8.7–10.5)
CHLORIDE SERPL-SCNC: 106 MMOL/L (ref 95–110)
CO2 SERPL-SCNC: 23 MMOL/L (ref 23–29)
CREAT SERPL-MCNC: 1.2 MG/DL (ref 0.5–1.4)
CTP QC/QA: YES
CTP QC/QA: YES
ERYTHROCYTE [DISTWIDTH] IN BLOOD BY AUTOMATED COUNT: 14.2 % (ref 11.5–14.5)
GFR SERPLBLD CREATININE-BSD FMLA CKD-EPI: >60 ML/MIN/1.73/M2
GLUCOSE SERPL-MCNC: 308 MG/DL (ref 70–110)
HCT VFR BLD AUTO: 31.2 % (ref 40–54)
HGB BLD-MCNC: 9.9 GM/DL (ref 14–18)
IMM GRANULOCYTES # BLD AUTO: 0.04 K/UL (ref 0–0.04)
IMM GRANULOCYTES NFR BLD AUTO: 0.5 % (ref 0–0.5)
LACTATE SERPL-SCNC: 1.7 MMOL/L (ref 0.5–2.2)
LYMPHOCYTES # BLD AUTO: 1.6 K/UL (ref 1–4.8)
MCH RBC QN AUTO: 28 PG (ref 27–31)
MCHC RBC AUTO-ENTMCNC: 31.7 G/DL (ref 32–36)
MCV RBC AUTO: 88 FL (ref 82–98)
NT-PROBNP SERPL-MCNC: 660 PG/ML
NUCLEATED RBC (/100WBC) (OHS): 0 /100 WBC
OHS QRS DURATION: 76 MS
OHS QTC CALCULATION: 398 MS
PLATELET # BLD AUTO: 232 K/UL (ref 150–450)
PMV BLD AUTO: 11 FL (ref 9.2–12.9)
POC MOLECULAR INFLUENZA A AGN: NEGATIVE
POC MOLECULAR INFLUENZA B AGN: NEGATIVE
POCT GLUCOSE: 325 MG/DL (ref 70–110)
POTASSIUM SERPL-SCNC: 4.3 MMOL/L (ref 3.5–5.1)
PROT SERPL-MCNC: 6.7 GM/DL (ref 6–8.4)
RBC # BLD AUTO: 3.54 M/UL (ref 4.6–6.2)
RELATIVE EOSINOPHIL (OHS): 2.9 %
RELATIVE LYMPHOCYTE (OHS): 21 % (ref 18–48)
RELATIVE MONOCYTE (OHS): 8.3 % (ref 4–15)
RELATIVE NEUTROPHIL (OHS): 67 % (ref 38–73)
SARS-COV-2 RDRP RESP QL NAA+PROBE: POSITIVE
SODIUM SERPL-SCNC: 139 MMOL/L (ref 136–145)
TROPONIN I SERPL HS-MCNC: 5 NG/L
WBC # BLD AUTO: 7.62 K/UL (ref 3.9–12.7)

## 2025-08-12 PROCEDURE — 83605 ASSAY OF LACTIC ACID: CPT | Mod: HCNC | Performed by: EMERGENCY MEDICINE

## 2025-08-12 PROCEDURE — 84484 ASSAY OF TROPONIN QUANT: CPT | Mod: HCNC | Performed by: EMERGENCY MEDICINE

## 2025-08-12 PROCEDURE — 87040 BLOOD CULTURE FOR BACTERIA: CPT | Mod: HCNC | Performed by: EMERGENCY MEDICINE

## 2025-08-12 PROCEDURE — 87502 INFLUENZA DNA AMP PROBE: CPT | Mod: HCNC

## 2025-08-12 PROCEDURE — 63600175 PHARM REV CODE 636 W HCPCS: Mod: HCNC

## 2025-08-12 PROCEDURE — 96365 THER/PROPH/DIAG IV INF INIT: CPT | Mod: HCNC

## 2025-08-12 PROCEDURE — 93010 ELECTROCARDIOGRAM REPORT: CPT | Mod: HCNC,,, | Performed by: INTERNAL MEDICINE

## 2025-08-12 PROCEDURE — 96375 TX/PRO/DX INJ NEW DRUG ADDON: CPT | Mod: HCNC

## 2025-08-12 PROCEDURE — 84075 ASSAY ALKALINE PHOSPHATASE: CPT | Mod: HCNC | Performed by: EMERGENCY MEDICINE

## 2025-08-12 PROCEDURE — 27000207 HC ISOLATION: Mod: HCNC

## 2025-08-12 PROCEDURE — 85025 COMPLETE CBC W/AUTO DIFF WBC: CPT | Mod: HCNC | Performed by: EMERGENCY MEDICINE

## 2025-08-12 PROCEDURE — 11000001 HC ACUTE MED/SURG PRIVATE ROOM: Mod: HCNC

## 2025-08-12 PROCEDURE — 87635 SARS-COV-2 COVID-19 AMP PRB: CPT | Mod: HCNC | Performed by: EMERGENCY MEDICINE

## 2025-08-12 PROCEDURE — 25000003 PHARM REV CODE 250: Mod: HCNC

## 2025-08-12 PROCEDURE — 83880 ASSAY OF NATRIURETIC PEPTIDE: CPT | Mod: HCNC | Performed by: EMERGENCY MEDICINE

## 2025-08-12 PROCEDURE — 99285 EMERGENCY DEPT VISIT HI MDM: CPT | Mod: 25,HCNC

## 2025-08-12 PROCEDURE — 93005 ELECTROCARDIOGRAM TRACING: CPT | Mod: HCNC

## 2025-08-12 RX ORDER — HEPARIN SODIUM 5000 [USP'U]/ML
5000 INJECTION, SOLUTION INTRAVENOUS; SUBCUTANEOUS EVERY 8 HOURS
Status: DISCONTINUED | OUTPATIENT
Start: 2025-08-12 | End: 2025-08-15 | Stop reason: HOSPADM

## 2025-08-12 RX ORDER — CEFTRIAXONE 2 G/1
2 INJECTION, POWDER, FOR SOLUTION INTRAMUSCULAR; INTRAVENOUS
Status: DISCONTINUED | OUTPATIENT
Start: 2025-08-12 | End: 2025-08-15 | Stop reason: HOSPADM

## 2025-08-12 RX ORDER — GLUCAGON 1 MG
1 KIT INJECTION
Status: DISCONTINUED | OUTPATIENT
Start: 2025-08-12 | End: 2025-08-15 | Stop reason: HOSPADM

## 2025-08-12 RX ORDER — NALOXONE HCL 0.4 MG/ML
0.02 VIAL (ML) INJECTION
Status: DISCONTINUED | OUTPATIENT
Start: 2025-08-12 | End: 2025-08-15 | Stop reason: HOSPADM

## 2025-08-12 RX ORDER — IBUPROFEN 200 MG
24 TABLET ORAL
Status: DISCONTINUED | OUTPATIENT
Start: 2025-08-12 | End: 2025-08-15 | Stop reason: HOSPADM

## 2025-08-12 RX ORDER — IPRATROPIUM BROMIDE AND ALBUTEROL SULFATE 2.5; .5 MG/3ML; MG/3ML
3 SOLUTION RESPIRATORY (INHALATION) EVERY 4 HOURS PRN
Status: DISCONTINUED | OUTPATIENT
Start: 2025-08-12 | End: 2025-08-15 | Stop reason: HOSPADM

## 2025-08-12 RX ORDER — IBUPROFEN 200 MG
16 TABLET ORAL
Status: DISCONTINUED | OUTPATIENT
Start: 2025-08-12 | End: 2025-08-15 | Stop reason: HOSPADM

## 2025-08-12 RX ORDER — CEFTRIAXONE 2 G/1
2 INJECTION, POWDER, FOR SOLUTION INTRAMUSCULAR; INTRAVENOUS ONCE
Status: DISCONTINUED | OUTPATIENT
Start: 2025-08-12 | End: 2025-08-12

## 2025-08-12 RX ORDER — INSULIN ASPART 100 [IU]/ML
0-5 INJECTION, SOLUTION INTRAVENOUS; SUBCUTANEOUS EVERY 6 HOURS PRN
Status: DISCONTINUED | OUTPATIENT
Start: 2025-08-12 | End: 2025-08-15 | Stop reason: HOSPADM

## 2025-08-12 RX ADMIN — CEFTRIAXONE SODIUM 2 G: 2 INJECTION, POWDER, FOR SOLUTION INTRAMUSCULAR; INTRAVENOUS at 03:08

## 2025-08-12 RX ADMIN — INSULIN ASPART 4 UNITS: 100 INJECTION, SOLUTION INTRAVENOUS; SUBCUTANEOUS at 04:08

## 2025-08-12 RX ADMIN — AZITHROMYCIN MONOHYDRATE 500 MG: 500 INJECTION, POWDER, LYOPHILIZED, FOR SOLUTION INTRAVENOUS at 03:08

## 2025-08-12 RX ADMIN — HEPARIN SODIUM 5000 UNITS: 5000 INJECTION INTRAVENOUS; SUBCUTANEOUS at 10:08

## 2025-08-13 LAB
ABSOLUTE EOSINOPHIL (OHS): 0.18 K/UL
ABSOLUTE MONOCYTE (OHS): 0.75 K/UL (ref 0.3–1)
ABSOLUTE NEUTROPHIL COUNT (OHS): 6.54 K/UL (ref 1.8–7.7)
ANION GAP (OHS): 8 MMOL/L (ref 8–16)
BASOPHILS # BLD AUTO: 0.02 K/UL
BASOPHILS NFR BLD AUTO: 0.2 %
BUN SERPL-MCNC: 8 MG/DL (ref 8–23)
CALCIUM SERPL-MCNC: 8.4 MG/DL (ref 8.7–10.5)
CHLORIDE SERPL-SCNC: 106 MMOL/L (ref 95–110)
CO2 SERPL-SCNC: 26 MMOL/L (ref 23–29)
CREAT SERPL-MCNC: 1 MG/DL (ref 0.5–1.4)
ERYTHROCYTE [DISTWIDTH] IN BLOOD BY AUTOMATED COUNT: 14 % (ref 11.5–14.5)
GFR SERPLBLD CREATININE-BSD FMLA CKD-EPI: >60 ML/MIN/1.73/M2
GLUCOSE SERPL-MCNC: 212 MG/DL (ref 70–110)
HCT VFR BLD AUTO: 33 % (ref 40–54)
HGB BLD-MCNC: 10.4 GM/DL (ref 14–18)
IMM GRANULOCYTES # BLD AUTO: 0.03 K/UL (ref 0–0.04)
IMM GRANULOCYTES NFR BLD AUTO: 0.3 % (ref 0–0.5)
LYMPHOCYTES # BLD AUTO: 1.64 K/UL (ref 1–4.8)
MAGNESIUM SERPL-MCNC: 1.6 MG/DL (ref 1.6–2.6)
MCH RBC QN AUTO: 27.8 PG (ref 27–31)
MCHC RBC AUTO-ENTMCNC: 31.5 G/DL (ref 32–36)
MCV RBC AUTO: 88 FL (ref 82–98)
NUCLEATED RBC (/100WBC) (OHS): 0 /100 WBC
PLATELET # BLD AUTO: 249 K/UL (ref 150–450)
PMV BLD AUTO: 11.6 FL (ref 9.2–12.9)
POCT GLUCOSE: 222 MG/DL (ref 70–110)
POCT GLUCOSE: 244 MG/DL (ref 70–110)
POCT GLUCOSE: 263 MG/DL (ref 70–110)
POTASSIUM SERPL-SCNC: 4.2 MMOL/L (ref 3.5–5.1)
RBC # BLD AUTO: 3.74 M/UL (ref 4.6–6.2)
RELATIVE EOSINOPHIL (OHS): 2 %
RELATIVE LYMPHOCYTE (OHS): 17.9 % (ref 18–48)
RELATIVE MONOCYTE (OHS): 8.2 % (ref 4–15)
RELATIVE NEUTROPHIL (OHS): 71.4 % (ref 38–73)
SODIUM SERPL-SCNC: 140 MMOL/L (ref 136–145)
WBC # BLD AUTO: 9.16 K/UL (ref 3.9–12.7)

## 2025-08-13 PROCEDURE — 83735 ASSAY OF MAGNESIUM: CPT | Mod: HCNC

## 2025-08-13 PROCEDURE — 92610 EVALUATE SWALLOWING FUNCTION: CPT | Mod: HCNC

## 2025-08-13 PROCEDURE — 92523 SPEECH SOUND LANG COMPREHEN: CPT | Mod: HCNC

## 2025-08-13 PROCEDURE — 27000221 HC OXYGEN, UP TO 24 HOURS: Mod: HCNC

## 2025-08-13 PROCEDURE — 36415 COLL VENOUS BLD VENIPUNCTURE: CPT | Mod: HCNC

## 2025-08-13 PROCEDURE — 63600175 PHARM REV CODE 636 W HCPCS: Mod: HCNC

## 2025-08-13 PROCEDURE — 27000207 HC ISOLATION: Mod: HCNC

## 2025-08-13 PROCEDURE — 11000001 HC ACUTE MED/SURG PRIVATE ROOM: Mod: HCNC

## 2025-08-13 PROCEDURE — 85025 COMPLETE CBC W/AUTO DIFF WBC: CPT | Mod: HCNC

## 2025-08-13 PROCEDURE — 99900035 HC TECH TIME PER 15 MIN (STAT): Mod: HCNC

## 2025-08-13 PROCEDURE — 25000003 PHARM REV CODE 250: Mod: HCNC

## 2025-08-13 PROCEDURE — 94761 N-INVAS EAR/PLS OXIMETRY MLT: CPT | Mod: HCNC

## 2025-08-13 PROCEDURE — 80048 BASIC METABOLIC PNL TOTAL CA: CPT | Mod: HCNC

## 2025-08-13 RX ORDER — POLYETHYLENE GLYCOL 3350 17 G/17G
17 POWDER, FOR SOLUTION ORAL 2 TIMES DAILY PRN
Status: DISCONTINUED | OUTPATIENT
Start: 2025-08-13 | End: 2025-08-15 | Stop reason: HOSPADM

## 2025-08-13 RX ADMIN — AZITHROMYCIN MONOHYDRATE 500 MG: 500 INJECTION, POWDER, LYOPHILIZED, FOR SOLUTION INTRAVENOUS at 03:08

## 2025-08-13 RX ADMIN — HEPARIN SODIUM 5000 UNITS: 5000 INJECTION INTRAVENOUS; SUBCUTANEOUS at 02:08

## 2025-08-13 RX ADMIN — INSULIN ASPART 3 UNITS: 100 INJECTION, SOLUTION INTRAVENOUS; SUBCUTANEOUS at 12:08

## 2025-08-13 RX ADMIN — HEPARIN SODIUM 5000 UNITS: 5000 INJECTION INTRAVENOUS; SUBCUTANEOUS at 06:08

## 2025-08-13 RX ADMIN — CEFTRIAXONE SODIUM 2 G: 2 INJECTION, POWDER, FOR SOLUTION INTRAMUSCULAR; INTRAVENOUS at 03:08

## 2025-08-13 RX ADMIN — INSULIN ASPART 1 UNITS: 100 INJECTION, SOLUTION INTRAVENOUS; SUBCUTANEOUS at 12:08

## 2025-08-13 RX ADMIN — INSULIN ASPART 2 UNITS: 100 INJECTION, SOLUTION INTRAVENOUS; SUBCUTANEOUS at 06:08

## 2025-08-13 RX ADMIN — HEPARIN SODIUM 5000 UNITS: 5000 INJECTION INTRAVENOUS; SUBCUTANEOUS at 09:08

## 2025-08-14 ENCOUNTER — TELEPHONE (OUTPATIENT)
Dept: UROLOGY | Facility: CLINIC | Age: 70
End: 2025-08-14
Payer: MEDICARE

## 2025-08-14 LAB
ABSOLUTE EOSINOPHIL (OHS): 0.2 K/UL
ABSOLUTE MONOCYTE (OHS): 0.91 K/UL (ref 0.3–1)
ABSOLUTE NEUTROPHIL COUNT (OHS): 6.31 K/UL (ref 1.8–7.7)
ANION GAP (OHS): 8 MMOL/L (ref 8–16)
BASOPHILS # BLD AUTO: 0.02 K/UL
BASOPHILS NFR BLD AUTO: 0.2 %
BUN SERPL-MCNC: 10 MG/DL (ref 8–23)
CALCIUM SERPL-MCNC: 8.5 MG/DL (ref 8.7–10.5)
CHLORIDE SERPL-SCNC: 105 MMOL/L (ref 95–110)
CO2 SERPL-SCNC: 26 MMOL/L (ref 23–29)
CREAT SERPL-MCNC: 0.9 MG/DL (ref 0.5–1.4)
ERYTHROCYTE [DISTWIDTH] IN BLOOD BY AUTOMATED COUNT: 14 % (ref 11.5–14.5)
GFR SERPLBLD CREATININE-BSD FMLA CKD-EPI: >60 ML/MIN/1.73/M2
GLUCOSE SERPL-MCNC: 225 MG/DL (ref 70–110)
HCT VFR BLD AUTO: 33.2 % (ref 40–54)
HGB BLD-MCNC: 10.4 GM/DL (ref 14–18)
IMM GRANULOCYTES # BLD AUTO: 0.03 K/UL (ref 0–0.04)
IMM GRANULOCYTES NFR BLD AUTO: 0.3 % (ref 0–0.5)
LYMPHOCYTES # BLD AUTO: 2.15 K/UL (ref 1–4.8)
MAGNESIUM SERPL-MCNC: 1.7 MG/DL (ref 1.6–2.6)
MCH RBC QN AUTO: 27.4 PG (ref 27–31)
MCHC RBC AUTO-ENTMCNC: 31.3 G/DL (ref 32–36)
MCV RBC AUTO: 87 FL (ref 82–98)
NUCLEATED RBC (/100WBC) (OHS): 0 /100 WBC
PLATELET # BLD AUTO: 256 K/UL (ref 150–450)
PMV BLD AUTO: 11.9 FL (ref 9.2–12.9)
POCT GLUCOSE: 273 MG/DL (ref 70–110)
POCT GLUCOSE: 281 MG/DL (ref 70–110)
POCT GLUCOSE: 284 MG/DL (ref 70–110)
POCT GLUCOSE: 408 MG/DL (ref 70–110)
POTASSIUM SERPL-SCNC: 3.9 MMOL/L (ref 3.5–5.1)
RBC # BLD AUTO: 3.8 M/UL (ref 4.6–6.2)
RELATIVE EOSINOPHIL (OHS): 2.1 %
RELATIVE LYMPHOCYTE (OHS): 22.3 % (ref 18–48)
RELATIVE MONOCYTE (OHS): 9.5 % (ref 4–15)
RELATIVE NEUTROPHIL (OHS): 65.6 % (ref 38–73)
SODIUM SERPL-SCNC: 139 MMOL/L (ref 136–145)
WBC # BLD AUTO: 9.62 K/UL (ref 3.9–12.7)

## 2025-08-14 PROCEDURE — 11000001 HC ACUTE MED/SURG PRIVATE ROOM: Mod: HCNC

## 2025-08-14 PROCEDURE — 25000003 PHARM REV CODE 250: Mod: HCNC | Performed by: HOSPITALIST

## 2025-08-14 PROCEDURE — 85025 COMPLETE CBC W/AUTO DIFF WBC: CPT | Mod: HCNC

## 2025-08-14 PROCEDURE — 63600175 PHARM REV CODE 636 W HCPCS: Mod: HCNC | Performed by: HOSPITALIST

## 2025-08-14 PROCEDURE — 99900035 HC TECH TIME PER 15 MIN (STAT): Mod: HCNC

## 2025-08-14 PROCEDURE — 25000003 PHARM REV CODE 250: Mod: HCNC

## 2025-08-14 PROCEDURE — 27000207 HC ISOLATION: Mod: HCNC

## 2025-08-14 PROCEDURE — 94761 N-INVAS EAR/PLS OXIMETRY MLT: CPT | Mod: HCNC

## 2025-08-14 PROCEDURE — 36415 COLL VENOUS BLD VENIPUNCTURE: CPT | Mod: HCNC

## 2025-08-14 PROCEDURE — 63600175 PHARM REV CODE 636 W HCPCS: Mod: HCNC

## 2025-08-14 PROCEDURE — 80048 BASIC METABOLIC PNL TOTAL CA: CPT | Mod: HCNC

## 2025-08-14 PROCEDURE — 83735 ASSAY OF MAGNESIUM: CPT | Mod: HCNC

## 2025-08-14 PROCEDURE — 27000221 HC OXYGEN, UP TO 24 HOURS: Mod: HCNC

## 2025-08-14 PROCEDURE — 97165 OT EVAL LOW COMPLEX 30 MIN: CPT | Mod: HCNC

## 2025-08-14 RX ORDER — ATORVASTATIN CALCIUM 40 MG/1
80 TABLET, FILM COATED ORAL DAILY
Status: DISCONTINUED | OUTPATIENT
Start: 2025-08-14 | End: 2025-08-15 | Stop reason: HOSPADM

## 2025-08-14 RX ORDER — METOPROLOL TARTRATE 50 MG/1
50 TABLET ORAL 2 TIMES DAILY
Status: DISCONTINUED | OUTPATIENT
Start: 2025-08-14 | End: 2025-08-15 | Stop reason: HOSPADM

## 2025-08-14 RX ORDER — AMLODIPINE BESYLATE 5 MG/1
10 TABLET ORAL DAILY
Status: DISCONTINUED | OUTPATIENT
Start: 2025-08-14 | End: 2025-08-15 | Stop reason: HOSPADM

## 2025-08-14 RX ORDER — INSULIN ASPART 100 [IU]/ML
5 INJECTION, SOLUTION INTRAVENOUS; SUBCUTANEOUS
Status: DISCONTINUED | OUTPATIENT
Start: 2025-08-15 | End: 2025-08-15 | Stop reason: HOSPADM

## 2025-08-14 RX ORDER — CLOPIDOGREL BISULFATE 75 MG/1
75 TABLET ORAL DAILY
Status: DISCONTINUED | OUTPATIENT
Start: 2025-08-14 | End: 2025-08-15 | Stop reason: HOSPADM

## 2025-08-14 RX ORDER — INSULIN GLARGINE 100 [IU]/ML
12 INJECTION, SOLUTION SUBCUTANEOUS DAILY
Status: DISCONTINUED | OUTPATIENT
Start: 2025-08-14 | End: 2025-08-14

## 2025-08-14 RX ORDER — BACLOFEN 5 MG/1
5 TABLET ORAL 3 TIMES DAILY
Status: DISCONTINUED | OUTPATIENT
Start: 2025-08-14 | End: 2025-08-15 | Stop reason: HOSPADM

## 2025-08-14 RX ORDER — TAMSULOSIN HYDROCHLORIDE 0.4 MG/1
0.8 CAPSULE ORAL DAILY
Status: DISCONTINUED | OUTPATIENT
Start: 2025-08-14 | End: 2025-08-15 | Stop reason: HOSPADM

## 2025-08-14 RX ORDER — FLUOXETINE 20 MG/5ML
20 SOLUTION ORAL DAILY
Status: DISCONTINUED | OUTPATIENT
Start: 2025-08-14 | End: 2025-08-15 | Stop reason: HOSPADM

## 2025-08-14 RX ORDER — INSULIN GLARGINE 100 [IU]/ML
10 INJECTION, SOLUTION SUBCUTANEOUS NIGHTLY
Status: DISCONTINUED | OUTPATIENT
Start: 2025-08-14 | End: 2025-08-14

## 2025-08-14 RX ORDER — LISINOPRIL 20 MG/1
40 TABLET ORAL DAILY
Status: DISCONTINUED | OUTPATIENT
Start: 2025-08-14 | End: 2025-08-15 | Stop reason: HOSPADM

## 2025-08-14 RX ORDER — NAPROXEN SODIUM 220 MG/1
81 TABLET, FILM COATED ORAL DAILY
Status: DISCONTINUED | OUTPATIENT
Start: 2025-08-14 | End: 2025-08-15 | Stop reason: HOSPADM

## 2025-08-14 RX ORDER — INSULIN GLARGINE 100 [IU]/ML
12 INJECTION, SOLUTION SUBCUTANEOUS 2 TIMES DAILY
Status: DISCONTINUED | OUTPATIENT
Start: 2025-08-14 | End: 2025-08-15 | Stop reason: HOSPADM

## 2025-08-14 RX ADMIN — AZITHROMYCIN MONOHYDRATE 500 MG: 500 INJECTION, POWDER, LYOPHILIZED, FOR SOLUTION INTRAVENOUS at 04:08

## 2025-08-14 RX ADMIN — INSULIN GLARGINE 12 UNITS: 100 INJECTION, SOLUTION SUBCUTANEOUS at 09:08

## 2025-08-14 RX ADMIN — TAMSULOSIN HYDROCHLORIDE 0.8 MG: 0.4 CAPSULE ORAL at 09:08

## 2025-08-14 RX ADMIN — ATORVASTATIN CALCIUM 80 MG: 40 TABLET, FILM COATED ORAL at 09:08

## 2025-08-14 RX ADMIN — INSULIN ASPART 1 UNITS: 100 INJECTION, SOLUTION INTRAVENOUS; SUBCUTANEOUS at 09:08

## 2025-08-14 RX ADMIN — BACLOFEN 5 MG: 5 TABLET ORAL at 09:08

## 2025-08-14 RX ADMIN — HEPARIN SODIUM 5000 UNITS: 5000 INJECTION INTRAVENOUS; SUBCUTANEOUS at 02:08

## 2025-08-14 RX ADMIN — LISINOPRIL 40 MG: 20 TABLET ORAL at 09:08

## 2025-08-14 RX ADMIN — INSULIN ASPART 1 UNITS: 100 INJECTION, SOLUTION INTRAVENOUS; SUBCUTANEOUS at 01:08

## 2025-08-14 RX ADMIN — HEPARIN SODIUM 5000 UNITS: 5000 INJECTION INTRAVENOUS; SUBCUTANEOUS at 09:08

## 2025-08-14 RX ADMIN — METOPROLOL TARTRATE 50 MG: 50 TABLET, FILM COATED ORAL at 09:08

## 2025-08-14 RX ADMIN — INSULIN ASPART 3 UNITS: 100 INJECTION, SOLUTION INTRAVENOUS; SUBCUTANEOUS at 06:08

## 2025-08-14 RX ADMIN — INSULIN ASPART 3 UNITS: 100 INJECTION, SOLUTION INTRAVENOUS; SUBCUTANEOUS at 09:08

## 2025-08-14 RX ADMIN — FLUOXETINE 20 MG: 20 SOLUTION ORAL at 10:08

## 2025-08-14 RX ADMIN — AMLODIPINE BESYLATE 10 MG: 5 TABLET ORAL at 09:08

## 2025-08-14 RX ADMIN — POLYETHYLENE GLYCOL (3350) 17 G: 17 POWDER, FOR SOLUTION ORAL at 09:08

## 2025-08-14 RX ADMIN — CLOPIDOGREL 75 MG: 75 TABLET ORAL at 09:08

## 2025-08-14 RX ADMIN — ASPIRIN 81 MG CHEWABLE TABLET 81 MG: 81 TABLET CHEWABLE at 09:08

## 2025-08-14 RX ADMIN — HEPARIN SODIUM 5000 UNITS: 5000 INJECTION INTRAVENOUS; SUBCUTANEOUS at 06:08

## 2025-08-14 RX ADMIN — CEFTRIAXONE SODIUM 2 G: 2 INJECTION, POWDER, FOR SOLUTION INTRAMUSCULAR; INTRAVENOUS at 03:08

## 2025-08-14 RX ADMIN — INSULIN ASPART 5 UNITS: 100 INJECTION, SOLUTION INTRAVENOUS; SUBCUTANEOUS at 05:08

## 2025-08-14 RX ADMIN — BACLOFEN 5 MG: 5 TABLET ORAL at 02:08

## 2025-08-15 VITALS
WEIGHT: 187.63 LBS | HEART RATE: 102 BPM | RESPIRATION RATE: 18 BRPM | SYSTOLIC BLOOD PRESSURE: 156 MMHG | TEMPERATURE: 99 F | OXYGEN SATURATION: 95 % | BODY MASS INDEX: 26.86 KG/M2 | HEIGHT: 70 IN | DIASTOLIC BLOOD PRESSURE: 79 MMHG

## 2025-08-15 LAB
ABSOLUTE EOSINOPHIL (OHS): 0.33 K/UL
ABSOLUTE MONOCYTE (OHS): 0.67 K/UL (ref 0.3–1)
ABSOLUTE NEUTROPHIL COUNT (OHS): 5.14 K/UL (ref 1.8–7.7)
ANION GAP (OHS): 9 MMOL/L (ref 8–16)
BASOPHILS # BLD AUTO: 0.03 K/UL
BASOPHILS NFR BLD AUTO: 0.3 %
BUN SERPL-MCNC: 13 MG/DL (ref 8–23)
CALCIUM SERPL-MCNC: 8.5 MG/DL (ref 8.7–10.5)
CHLORIDE SERPL-SCNC: 105 MMOL/L (ref 95–110)
CO2 SERPL-SCNC: 25 MMOL/L (ref 23–29)
CREAT SERPL-MCNC: 1 MG/DL (ref 0.5–1.4)
ERYTHROCYTE [DISTWIDTH] IN BLOOD BY AUTOMATED COUNT: 13.7 % (ref 11.5–14.5)
GFR SERPLBLD CREATININE-BSD FMLA CKD-EPI: >60 ML/MIN/1.73/M2
GLUCOSE SERPL-MCNC: 85 MG/DL (ref 70–110)
HCT VFR BLD AUTO: 29.2 % (ref 40–54)
HGB BLD-MCNC: 9.3 GM/DL (ref 14–18)
IMM GRANULOCYTES # BLD AUTO: 0.03 K/UL (ref 0–0.04)
IMM GRANULOCYTES NFR BLD AUTO: 0.3 % (ref 0–0.5)
LYMPHOCYTES # BLD AUTO: 2.68 K/UL (ref 1–4.8)
MAGNESIUM SERPL-MCNC: 1.7 MG/DL (ref 1.6–2.6)
MCH RBC QN AUTO: 27.2 PG (ref 27–31)
MCHC RBC AUTO-ENTMCNC: 31.8 G/DL (ref 32–36)
MCV RBC AUTO: 85 FL (ref 82–98)
NUCLEATED RBC (/100WBC) (OHS): 0 /100 WBC
PLATELET # BLD AUTO: 281 K/UL (ref 150–450)
PMV BLD AUTO: 12.1 FL (ref 9.2–12.9)
POCT GLUCOSE: 124 MG/DL (ref 70–110)
POCT GLUCOSE: 206 MG/DL (ref 70–110)
POCT GLUCOSE: 238 MG/DL (ref 70–110)
POCT GLUCOSE: 88 MG/DL (ref 70–110)
POTASSIUM SERPL-SCNC: 3.8 MMOL/L (ref 3.5–5.1)
RBC # BLD AUTO: 3.42 M/UL (ref 4.6–6.2)
RELATIVE EOSINOPHIL (OHS): 3.7 %
RELATIVE LYMPHOCYTE (OHS): 30.2 % (ref 18–48)
RELATIVE MONOCYTE (OHS): 7.5 % (ref 4–15)
RELATIVE NEUTROPHIL (OHS): 58 % (ref 38–73)
SODIUM SERPL-SCNC: 139 MMOL/L (ref 136–145)
WBC # BLD AUTO: 8.88 K/UL (ref 3.9–12.7)

## 2025-08-15 PROCEDURE — 92526 ORAL FUNCTION THERAPY: CPT | Mod: HCNC

## 2025-08-15 PROCEDURE — 97162 PT EVAL MOD COMPLEX 30 MIN: CPT | Mod: HCNC

## 2025-08-15 PROCEDURE — 83735 ASSAY OF MAGNESIUM: CPT | Mod: HCNC

## 2025-08-15 PROCEDURE — 85025 COMPLETE CBC W/AUTO DIFF WBC: CPT | Mod: HCNC

## 2025-08-15 PROCEDURE — 99900035 HC TECH TIME PER 15 MIN (STAT): Mod: HCNC

## 2025-08-15 PROCEDURE — 94761 N-INVAS EAR/PLS OXIMETRY MLT: CPT | Mod: HCNC

## 2025-08-15 PROCEDURE — 82374 ASSAY BLOOD CARBON DIOXIDE: CPT | Mod: HCNC

## 2025-08-15 PROCEDURE — 25000003 PHARM REV CODE 250: Mod: HCNC | Performed by: HOSPITALIST

## 2025-08-15 PROCEDURE — 27000221 HC OXYGEN, UP TO 24 HOURS: Mod: HCNC

## 2025-08-15 PROCEDURE — 36415 COLL VENOUS BLD VENIPUNCTURE: CPT | Mod: HCNC

## 2025-08-15 PROCEDURE — 63600175 PHARM REV CODE 636 W HCPCS: Mod: HCNC

## 2025-08-15 PROCEDURE — 63600175 PHARM REV CODE 636 W HCPCS: Mod: HCNC | Performed by: HOSPITALIST

## 2025-08-15 PROCEDURE — 97530 THERAPEUTIC ACTIVITIES: CPT | Mod: HCNC

## 2025-08-15 RX ORDER — AZITHROMYCIN 250 MG/1
250 TABLET, FILM COATED ORAL DAILY
Qty: 3 TABLET | Refills: 0 | Status: SHIPPED | OUTPATIENT
Start: 2025-08-15 | End: 2025-08-18

## 2025-08-15 RX ORDER — CEFUROXIME AXETIL 500 MG/1
500 TABLET ORAL EVERY 12 HOURS
Qty: 6 TABLET | Refills: 0 | Status: SHIPPED | OUTPATIENT
Start: 2025-08-15 | End: 2025-08-18

## 2025-08-15 RX ADMIN — HEPARIN SODIUM 5000 UNITS: 5000 INJECTION INTRAVENOUS; SUBCUTANEOUS at 06:08

## 2025-08-15 RX ADMIN — ASPIRIN 81 MG CHEWABLE TABLET 81 MG: 81 TABLET CHEWABLE at 09:08

## 2025-08-15 RX ADMIN — LISINOPRIL 40 MG: 20 TABLET ORAL at 09:08

## 2025-08-15 RX ADMIN — TAMSULOSIN HYDROCHLORIDE 0.8 MG: 0.4 CAPSULE ORAL at 09:08

## 2025-08-15 RX ADMIN — ATORVASTATIN CALCIUM 80 MG: 40 TABLET, FILM COATED ORAL at 09:08

## 2025-08-15 RX ADMIN — INSULIN ASPART 2 UNITS: 100 INJECTION, SOLUTION INTRAVENOUS; SUBCUTANEOUS at 04:08

## 2025-08-15 RX ADMIN — INSULIN ASPART 2 UNITS: 100 INJECTION, SOLUTION INTRAVENOUS; SUBCUTANEOUS at 11:08

## 2025-08-15 RX ADMIN — AMLODIPINE BESYLATE 10 MG: 5 TABLET ORAL at 09:08

## 2025-08-15 RX ADMIN — CLOPIDOGREL 75 MG: 75 TABLET ORAL at 09:08

## 2025-08-15 RX ADMIN — FLUOXETINE 20 MG: 20 SOLUTION ORAL at 09:08

## 2025-08-15 RX ADMIN — INSULIN ASPART 5 UNITS: 100 INJECTION, SOLUTION INTRAVENOUS; SUBCUTANEOUS at 04:08

## 2025-08-15 RX ADMIN — INSULIN ASPART 5 UNITS: 100 INJECTION, SOLUTION INTRAVENOUS; SUBCUTANEOUS at 11:08

## 2025-08-15 RX ADMIN — BACLOFEN 5 MG: 5 TABLET ORAL at 09:08

## 2025-08-15 RX ADMIN — METOPROLOL TARTRATE 50 MG: 50 TABLET, FILM COATED ORAL at 09:08

## 2025-08-16 DIAGNOSIS — U07.1 COVID-19 VIRUS DETECTED: ICD-10-CM

## 2025-08-17 LAB
BACTERIA BLD CULT: NORMAL
BACTERIA BLD CULT: NORMAL

## 2025-08-18 ENCOUNTER — PATIENT OUTREACH (OUTPATIENT)
Dept: ADMINISTRATIVE | Facility: CLINIC | Age: 70
End: 2025-08-18
Payer: MEDICARE

## 2025-08-18 ENCOUNTER — PATIENT MESSAGE (OUTPATIENT)
Dept: HOME HEALTH SERVICES | Facility: CLINIC | Age: 70
End: 2025-08-18
Payer: MEDICARE

## 2025-08-18 ENCOUNTER — TELEPHONE (OUTPATIENT)
Dept: FAMILY MEDICINE | Facility: CLINIC | Age: 70
End: 2025-08-18
Payer: MEDICARE

## 2025-08-18 ENCOUNTER — TELEPHONE (OUTPATIENT)
Dept: HOME HEALTH SERVICES | Facility: CLINIC | Age: 70
End: 2025-08-18
Payer: MEDICARE

## 2025-08-18 DIAGNOSIS — J69.0 ASPIRATION PNEUMONIA OF RIGHT LOWER LOBE, UNSPECIFIED ASPIRATION PNEUMONIA TYPE: Primary | ICD-10-CM

## 2025-08-18 DIAGNOSIS — U07.1 COVID-19: ICD-10-CM

## 2025-08-19 ENCOUNTER — TELEPHONE (OUTPATIENT)
Dept: HOME HEALTH SERVICES | Facility: CLINIC | Age: 70
End: 2025-08-19
Payer: MEDICARE

## 2025-08-20 ENCOUNTER — TELEPHONE (OUTPATIENT)
Dept: ADMINISTRATIVE | Facility: CLINIC | Age: 70
End: 2025-08-20
Payer: MEDICARE

## 2025-08-22 ENCOUNTER — OFFICE VISIT (OUTPATIENT)
Dept: HOME HEALTH SERVICES | Facility: CLINIC | Age: 70
End: 2025-08-22
Payer: MEDICARE

## 2025-08-22 VITALS
SYSTOLIC BLOOD PRESSURE: 98 MMHG | DIASTOLIC BLOOD PRESSURE: 58 MMHG | OXYGEN SATURATION: 90 % | RESPIRATION RATE: 17 BRPM | TEMPERATURE: 97 F | HEART RATE: 75 BPM

## 2025-08-22 DIAGNOSIS — N40.0 BENIGN PROSTATIC HYPERPLASIA, UNSPECIFIED WHETHER LOWER URINARY TRACT SYMPTOMS PRESENT: ICD-10-CM

## 2025-08-22 DIAGNOSIS — E11.9 TYPE 2 DIABETES MELLITUS WITHOUT COMPLICATION, WITH LONG-TERM CURRENT USE OF INSULIN: ICD-10-CM

## 2025-08-22 DIAGNOSIS — R53.1 RIGHT SIDED WEAKNESS: ICD-10-CM

## 2025-08-22 DIAGNOSIS — I10 HYPERTENSION, UNSPECIFIED TYPE: ICD-10-CM

## 2025-08-22 DIAGNOSIS — R47.01 APHASIA: Primary | ICD-10-CM

## 2025-08-22 DIAGNOSIS — E78.2 MIXED HYPERLIPIDEMIA: ICD-10-CM

## 2025-08-22 DIAGNOSIS — Z79.4 TYPE 2 DIABETES MELLITUS WITHOUT COMPLICATION, WITH LONG-TERM CURRENT USE OF INSULIN: ICD-10-CM

## 2025-08-22 PROCEDURE — 3078F DIAST BP <80 MM HG: CPT | Mod: CPTII,S$GLB,, | Performed by: NURSE PRACTITIONER

## 2025-08-22 PROCEDURE — 99495 TRANSJ CARE MGMT MOD F2F 14D: CPT | Mod: S$GLB,,, | Performed by: NURSE PRACTITIONER

## 2025-08-22 PROCEDURE — 3066F NEPHROPATHY DOC TX: CPT | Mod: CPTII,S$GLB,, | Performed by: NURSE PRACTITIONER

## 2025-08-22 PROCEDURE — 3051F HG A1C>EQUAL 7.0%<8.0%: CPT | Mod: CPTII,S$GLB,, | Performed by: NURSE PRACTITIONER

## 2025-08-22 PROCEDURE — 3061F NEG MICROALBUMINURIA REV: CPT | Mod: CPTII,S$GLB,, | Performed by: NURSE PRACTITIONER

## 2025-08-22 PROCEDURE — 3074F SYST BP LT 130 MM HG: CPT | Mod: CPTII,S$GLB,, | Performed by: NURSE PRACTITIONER

## 2025-08-22 PROCEDURE — 4010F ACE/ARB THERAPY RXD/TAKEN: CPT | Mod: CPTII,S$GLB,, | Performed by: NURSE PRACTITIONER

## 2025-08-24 ENCOUNTER — HOSPITAL ENCOUNTER (INPATIENT)
Facility: HOSPITAL | Age: 70
LOS: 2 days | Discharge: HOME HEALTH | DRG: 871 | End: 2025-08-27
Attending: EMERGENCY MEDICINE
Payer: MEDICARE

## 2025-08-24 DIAGNOSIS — N17.9 AKI (ACUTE KIDNEY INJURY): Primary | ICD-10-CM

## 2025-08-24 DIAGNOSIS — R07.9 CHEST PAIN: ICD-10-CM

## 2025-08-24 DIAGNOSIS — R33.8 ACUTE URINARY RETENTION: ICD-10-CM

## 2025-08-24 DIAGNOSIS — R33.9 URINARY RETENTION: ICD-10-CM

## 2025-08-24 PROBLEM — J18.9 PNEUMONIA: Status: ACTIVE | Noted: 2025-08-12

## 2025-08-24 LAB
ABSOLUTE EOSINOPHIL (OHS): 0.28 K/UL
ABSOLUTE MONOCYTE (OHS): 0.96 K/UL (ref 0.3–1)
ABSOLUTE NEUTROPHIL COUNT (OHS): 7.12 K/UL (ref 1.8–7.7)
ALBUMIN SERPL BCP-MCNC: 2.4 G/DL (ref 3.5–5.2)
ALP SERPL-CCNC: 101 UNIT/L (ref 40–150)
ALT SERPL W/O P-5'-P-CCNC: 8 UNIT/L (ref 10–44)
ANION GAP (OHS): 10 MMOL/L (ref 8–16)
AST SERPL-CCNC: 13 UNIT/L (ref 11–45)
BASOPHILS # BLD AUTO: 0.05 K/UL
BASOPHILS NFR BLD AUTO: 0.5 %
BILIRUB SERPL-MCNC: 0.4 MG/DL (ref 0.1–1)
BILIRUB UR QL STRIP.AUTO: NEGATIVE
BUN SERPL-MCNC: 27 MG/DL (ref 8–23)
CALCIUM SERPL-MCNC: 8.7 MG/DL (ref 8.7–10.5)
CHLORIDE SERPL-SCNC: 109 MMOL/L (ref 95–110)
CLARITY UR: CLEAR
CO2 SERPL-SCNC: 23 MMOL/L (ref 23–29)
COLOR UR AUTO: YELLOW
CREAT SERPL-MCNC: 1.7 MG/DL (ref 0.5–1.4)
ERYTHROCYTE [DISTWIDTH] IN BLOOD BY AUTOMATED COUNT: 14.4 % (ref 11.5–14.5)
FIO2: 36 %
FLUAV AG UPPER RESP QL IA.RAPID: NEGATIVE
FLUBV AG UPPER RESP QL IA.RAPID: NEGATIVE
GFR SERPLBLD CREATININE-BSD FMLA CKD-EPI: 42 ML/MIN/1.73/M2
GLUCOSE SERPL-MCNC: 167 MG/DL (ref 70–110)
GLUCOSE UR QL STRIP: ABNORMAL
HCT VFR BLD AUTO: 31.2 % (ref 40–54)
HGB BLD-MCNC: 9.3 GM/DL (ref 14–18)
HGB UR QL STRIP: NEGATIVE
IMM GRANULOCYTES # BLD AUTO: 0.03 K/UL (ref 0–0.04)
IMM GRANULOCYTES NFR BLD AUTO: 0.3 % (ref 0–0.5)
KETONES UR QL STRIP: NEGATIVE
LACTATE SERPL-SCNC: 1.2 MMOL/L (ref 0.5–2.2)
LDH SERPL L TO P-CCNC: 2 MMOL/L (ref 0.5–2.2)
LEUKOCYTE ESTERASE UR QL STRIP: NEGATIVE
LPM: 4
LYMPHOCYTES # BLD AUTO: 1.97 K/UL (ref 1–4.8)
MAGNESIUM SERPL-MCNC: 1.9 MG/DL (ref 1.6–2.6)
MCH RBC QN AUTO: 26.6 PG (ref 27–31)
MCHC RBC AUTO-ENTMCNC: 29.8 G/DL (ref 32–36)
MCV RBC AUTO: 89 FL (ref 82–98)
NITRITE UR QL STRIP: NEGATIVE
NT-PROBNP SERPL-MCNC: 379 PG/ML
NUCLEATED RBC (/100WBC) (OHS): 0 /100 WBC
PCO2 BLDA: 44.7 MMHG (ref 35–45)
PH SMN: 7.38 [PH] (ref 7.35–7.45)
PH UR STRIP: 6 [PH]
PLATELET # BLD AUTO: 359 K/UL (ref 150–450)
PMV BLD AUTO: 11.2 FL (ref 9.2–12.9)
PO2 BLDA: 30.7 MMHG (ref 40–60)
POC BASE DEFICIT: 1 MMOL/L (ref -2–2)
POC HCO3: 26.4 MMOL/L (ref 24–28)
POC PERFORMED BY: ABNORMAL
POC SATURATED O2: 50.1 % (ref 95–100)
POCT GLUCOSE: 218 MG/DL (ref 70–110)
POCT GLUCOSE: 235 MG/DL (ref 70–110)
POCT GLUCOSE: 266 MG/DL (ref 70–110)
POCT GLUCOSE: 305 MG/DL (ref 70–110)
POTASSIUM SERPL-SCNC: 4.9 MMOL/L (ref 3.5–5.1)
PROT SERPL-MCNC: 7.1 GM/DL (ref 6–8.4)
PROT UR QL STRIP: ABNORMAL
RBC # BLD AUTO: 3.5 M/UL (ref 4.6–6.2)
RELATIVE EOSINOPHIL (OHS): 2.7 %
RELATIVE LYMPHOCYTE (OHS): 18.9 % (ref 18–48)
RELATIVE MONOCYTE (OHS): 9.2 % (ref 4–15)
RELATIVE NEUTROPHIL (OHS): 68.4 % (ref 38–73)
SARS-COV-2 RDRP RESP QL NAA+PROBE: NEGATIVE
SODIUM SERPL-SCNC: 142 MMOL/L (ref 136–145)
SP GR UR STRIP: 1.02
SPECIMEN SOURCE: ABNORMAL
TROPONIN I SERPL HS-MCNC: <3 NG/L
UROBILINOGEN UR STRIP-ACNC: NEGATIVE EU/DL
WBC # BLD AUTO: 10.41 K/UL (ref 3.9–12.7)

## 2025-08-24 PROCEDURE — 81003 URINALYSIS AUTO W/O SCOPE: CPT | Mod: HCNC | Performed by: EMERGENCY MEDICINE

## 2025-08-24 PROCEDURE — 63600175 PHARM REV CODE 636 W HCPCS: Mod: HCNC | Performed by: EMERGENCY MEDICINE

## 2025-08-24 PROCEDURE — 87502 INFLUENZA DNA AMP PROBE: CPT | Mod: HCNC

## 2025-08-24 PROCEDURE — 83605 ASSAY OF LACTIC ACID: CPT | Mod: HCNC

## 2025-08-24 PROCEDURE — 96365 THER/PROPH/DIAG IV INF INIT: CPT | Mod: HCNC

## 2025-08-24 PROCEDURE — 92523 SPEECH SOUND LANG COMPREHEN: CPT | Mod: HCNC

## 2025-08-24 PROCEDURE — 96366 THER/PROPH/DIAG IV INF ADDON: CPT | Mod: HCNC

## 2025-08-24 PROCEDURE — 93005 ELECTROCARDIOGRAM TRACING: CPT | Mod: HCNC

## 2025-08-24 PROCEDURE — 99900035 HC TECH TIME PER 15 MIN (STAT): Mod: HCNC

## 2025-08-24 PROCEDURE — 25000003 PHARM REV CODE 250: Mod: HCNC

## 2025-08-24 PROCEDURE — 83735 ASSAY OF MAGNESIUM: CPT | Mod: HCNC | Performed by: EMERGENCY MEDICINE

## 2025-08-24 PROCEDURE — 63600175 PHARM REV CODE 636 W HCPCS: Mod: HCNC

## 2025-08-24 PROCEDURE — 25000242 PHARM REV CODE 250 ALT 637 W/ HCPCS: Mod: HCNC

## 2025-08-24 PROCEDURE — 92610 EVALUATE SWALLOWING FUNCTION: CPT | Mod: HCNC

## 2025-08-24 PROCEDURE — 99291 CRITICAL CARE FIRST HOUR: CPT | Mod: HCNC

## 2025-08-24 PROCEDURE — 83605 ASSAY OF LACTIC ACID: CPT | Mod: 59,HCNC | Performed by: EMERGENCY MEDICINE

## 2025-08-24 PROCEDURE — 94761 N-INVAS EAR/PLS OXIMETRY MLT: CPT | Mod: HCNC,XB

## 2025-08-24 PROCEDURE — 96367 TX/PROPH/DG ADDL SEQ IV INF: CPT | Mod: HCNC

## 2025-08-24 PROCEDURE — 80053 COMPREHEN METABOLIC PANEL: CPT | Mod: HCNC | Performed by: EMERGENCY MEDICINE

## 2025-08-24 PROCEDURE — G0378 HOSPITAL OBSERVATION PER HR: HCPCS | Mod: HCNC

## 2025-08-24 PROCEDURE — 83880 ASSAY OF NATRIURETIC PEPTIDE: CPT | Mod: HCNC | Performed by: EMERGENCY MEDICINE

## 2025-08-24 PROCEDURE — 87040 BLOOD CULTURE FOR BACTERIA: CPT | Mod: HCNC | Performed by: EMERGENCY MEDICINE

## 2025-08-24 PROCEDURE — 87502 INFLUENZA DNA AMP PROBE: CPT | Mod: HCNC | Performed by: EMERGENCY MEDICINE

## 2025-08-24 PROCEDURE — 63700000 PHARM REV CODE 250 ALT 637 W/O HCPCS: Mod: HCNC

## 2025-08-24 PROCEDURE — 96360 HYDRATION IV INFUSION INIT: CPT | Mod: XS,HCNC

## 2025-08-24 PROCEDURE — 51702 INSERT TEMP BLADDER CATH: CPT | Mod: HCNC

## 2025-08-24 PROCEDURE — 25000003 PHARM REV CODE 250: Mod: HCNC | Performed by: EMERGENCY MEDICINE

## 2025-08-24 PROCEDURE — 96372 THER/PROPH/DIAG INJ SC/IM: CPT

## 2025-08-24 PROCEDURE — U0002 COVID-19 LAB TEST NON-CDC: HCPCS | Mod: HCNC | Performed by: EMERGENCY MEDICINE

## 2025-08-24 PROCEDURE — 84484 ASSAY OF TROPONIN QUANT: CPT | Mod: HCNC | Performed by: EMERGENCY MEDICINE

## 2025-08-24 PROCEDURE — 93010 ELECTROCARDIOGRAM REPORT: CPT | Mod: HCNC,,, | Performed by: INTERNAL MEDICINE

## 2025-08-24 PROCEDURE — 82803 BLOOD GASES ANY COMBINATION: CPT | Mod: HCNC

## 2025-08-24 PROCEDURE — 96375 TX/PRO/DX INJ NEW DRUG ADDON: CPT

## 2025-08-24 PROCEDURE — 99222 1ST HOSP IP/OBS MODERATE 55: CPT | Mod: HCNC,,, | Performed by: UROLOGY

## 2025-08-24 PROCEDURE — 97535 SELF CARE MNGMENT TRAINING: CPT | Mod: HCNC

## 2025-08-24 PROCEDURE — 94640 AIRWAY INHALATION TREATMENT: CPT | Mod: HCNC,XB

## 2025-08-24 PROCEDURE — 85025 COMPLETE CBC W/AUTO DIFF WBC: CPT | Mod: HCNC | Performed by: EMERGENCY MEDICINE

## 2025-08-24 PROCEDURE — 87641 MR-STAPH DNA AMP PROBE: CPT | Mod: HCNC

## 2025-08-24 RX ORDER — ACETAMINOPHEN 325 MG/1
650 TABLET ORAL EVERY 8 HOURS PRN
Status: DISCONTINUED | OUTPATIENT
Start: 2025-08-24 | End: 2025-08-27 | Stop reason: HOSPADM

## 2025-08-24 RX ORDER — GLUCAGON 1 MG
1 KIT INJECTION
Status: DISCONTINUED | OUTPATIENT
Start: 2025-08-24 | End: 2025-08-24

## 2025-08-24 RX ORDER — IPRATROPIUM BROMIDE AND ALBUTEROL SULFATE 2.5; .5 MG/3ML; MG/3ML
3 SOLUTION RESPIRATORY (INHALATION) EVERY 4 HOURS
Status: DISCONTINUED | OUTPATIENT
Start: 2025-08-24 | End: 2025-08-27 | Stop reason: HOSPADM

## 2025-08-24 RX ORDER — CEFTRIAXONE 1 G/1
1 INJECTION, POWDER, FOR SOLUTION INTRAMUSCULAR; INTRAVENOUS
Status: DISCONTINUED | OUTPATIENT
Start: 2025-08-24 | End: 2025-08-27

## 2025-08-24 RX ORDER — FINASTERIDE 5 MG/1
5 TABLET, FILM COATED ORAL DAILY
Status: DISCONTINUED | OUTPATIENT
Start: 2025-08-24 | End: 2025-08-27 | Stop reason: HOSPADM

## 2025-08-24 RX ORDER — TAMSULOSIN HYDROCHLORIDE 0.4 MG/1
0.4 CAPSULE ORAL DAILY
Status: DISCONTINUED | OUTPATIENT
Start: 2025-08-24 | End: 2025-08-27 | Stop reason: HOSPADM

## 2025-08-24 RX ORDER — IBUPROFEN 200 MG
16 TABLET ORAL
Status: DISCONTINUED | OUTPATIENT
Start: 2025-08-24 | End: 2025-08-24

## 2025-08-24 RX ORDER — GLUCAGON 1 MG
1 KIT INJECTION
Status: DISCONTINUED | OUTPATIENT
Start: 2025-08-24 | End: 2025-08-27 | Stop reason: HOSPADM

## 2025-08-24 RX ORDER — SODIUM CHLORIDE 0.9 % (FLUSH) 0.9 %
10 SYRINGE (ML) INJECTION EVERY 12 HOURS PRN
Status: DISCONTINUED | OUTPATIENT
Start: 2025-08-24 | End: 2025-08-27 | Stop reason: HOSPADM

## 2025-08-24 RX ORDER — IBUPROFEN 200 MG
24 TABLET ORAL
Status: DISCONTINUED | OUTPATIENT
Start: 2025-08-24 | End: 2025-08-27 | Stop reason: HOSPADM

## 2025-08-24 RX ORDER — ONDANSETRON HYDROCHLORIDE 2 MG/ML
4 INJECTION, SOLUTION INTRAVENOUS EVERY 8 HOURS PRN
Status: DISCONTINUED | OUTPATIENT
Start: 2025-08-24 | End: 2025-08-27 | Stop reason: HOSPADM

## 2025-08-24 RX ORDER — NALOXONE HCL 0.4 MG/ML
0.02 VIAL (ML) INJECTION
Status: DISCONTINUED | OUTPATIENT
Start: 2025-08-24 | End: 2025-08-27 | Stop reason: HOSPADM

## 2025-08-24 RX ORDER — AZITHROMYCIN 250 MG/1
500 TABLET, FILM COATED ORAL DAILY
Status: COMPLETED | OUTPATIENT
Start: 2025-08-24 | End: 2025-08-26

## 2025-08-24 RX ORDER — METOPROLOL TARTRATE 1 MG/ML
5 INJECTION, SOLUTION INTRAVENOUS EVERY 5 MIN PRN
Status: DISCONTINUED | OUTPATIENT
Start: 2025-08-24 | End: 2025-08-27 | Stop reason: HOSPADM

## 2025-08-24 RX ORDER — IBUPROFEN 200 MG
16 TABLET ORAL
Status: DISCONTINUED | OUTPATIENT
Start: 2025-08-24 | End: 2025-08-27 | Stop reason: HOSPADM

## 2025-08-24 RX ORDER — IBUPROFEN 200 MG
24 TABLET ORAL
Status: DISCONTINUED | OUTPATIENT
Start: 2025-08-24 | End: 2025-08-24

## 2025-08-24 RX ORDER — INSULIN ASPART 100 [IU]/ML
0-5 INJECTION, SOLUTION INTRAVENOUS; SUBCUTANEOUS
Status: DISCONTINUED | OUTPATIENT
Start: 2025-08-24 | End: 2025-08-27 | Stop reason: HOSPADM

## 2025-08-24 RX ADMIN — AZITHROMYCIN DIHYDRATE 500 MG: 250 TABLET ORAL at 11:08

## 2025-08-24 RX ADMIN — VANCOMYCIN HYDROCHLORIDE 2250 MG: 10 INJECTION, POWDER, LYOPHILIZED, FOR SOLUTION INTRAVENOUS at 03:08

## 2025-08-24 RX ADMIN — IPRATROPIUM BROMIDE AND ALBUTEROL SULFATE 3 ML: 2.5; .5 SOLUTION RESPIRATORY (INHALATION) at 03:08

## 2025-08-24 RX ADMIN — SODIUM CHLORIDE, POTASSIUM CHLORIDE, SODIUM LACTATE AND CALCIUM CHLORIDE 1000 ML: 600; 310; 30; 20 INJECTION, SOLUTION INTRAVENOUS at 03:08

## 2025-08-24 RX ADMIN — IPRATROPIUM BROMIDE AND ALBUTEROL SULFATE 3 ML: 2.5; .5 SOLUTION RESPIRATORY (INHALATION) at 11:08

## 2025-08-24 RX ADMIN — FINASTERIDE 5 MG: 5 TABLET, FILM COATED ORAL at 11:08

## 2025-08-24 RX ADMIN — CEFTRIAXONE SODIUM 1 G: 1 INJECTION, POWDER, FOR SOLUTION INTRAMUSCULAR; INTRAVENOUS at 11:08

## 2025-08-24 RX ADMIN — INSULIN ASPART 3 UNITS: 100 INJECTION, SOLUTION INTRAVENOUS; SUBCUTANEOUS at 02:08

## 2025-08-24 RX ADMIN — IPRATROPIUM BROMIDE AND ALBUTEROL SULFATE 3 ML: 2.5; .5 SOLUTION RESPIRATORY (INHALATION) at 07:08

## 2025-08-24 RX ADMIN — PIPERACILLIN SODIUM AND TAZOBACTAM SODIUM 4.5 G: 4; .5 INJECTION, POWDER, LYOPHILIZED, FOR SOLUTION INTRAVENOUS at 02:08

## 2025-08-24 RX ADMIN — INSULIN ASPART 2 UNITS: 100 INJECTION, SOLUTION INTRAVENOUS; SUBCUTANEOUS at 09:08

## 2025-08-25 ENCOUNTER — TELEPHONE (OUTPATIENT)
Dept: HOME HEALTH SERVICES | Facility: CLINIC | Age: 70
End: 2025-08-25
Payer: MEDICARE

## 2025-08-25 DIAGNOSIS — N17.9 AKI (ACUTE KIDNEY INJURY): ICD-10-CM

## 2025-08-25 DIAGNOSIS — J96.01 ACUTE RESPIRATORY FAILURE WITH HYPOXIA: Primary | ICD-10-CM

## 2025-08-25 LAB
ABSOLUTE EOSINOPHIL (OHS): 0.3 K/UL
ABSOLUTE MONOCYTE (OHS): 0.72 K/UL (ref 0.3–1)
ABSOLUTE NEUTROPHIL COUNT (OHS): 4.98 K/UL (ref 1.8–7.7)
ANION GAP (OHS): 9 MMOL/L (ref 8–16)
BASOPHILS # BLD AUTO: 0.02 K/UL
BASOPHILS NFR BLD AUTO: 0.3 %
BUN SERPL-MCNC: 16 MG/DL (ref 8–23)
CALCIUM SERPL-MCNC: 8.3 MG/DL (ref 8.7–10.5)
CHLORIDE SERPL-SCNC: 107 MMOL/L (ref 95–110)
CO2 SERPL-SCNC: 24 MMOL/L (ref 23–29)
CREAT SERPL-MCNC: 1.1 MG/DL (ref 0.5–1.4)
ERYTHROCYTE [DISTWIDTH] IN BLOOD BY AUTOMATED COUNT: 14.3 % (ref 11.5–14.5)
GFR SERPLBLD CREATININE-BSD FMLA CKD-EPI: >60 ML/MIN/1.73/M2
GLUCOSE SERPL-MCNC: 185 MG/DL (ref 70–110)
HCT VFR BLD AUTO: 29.3 % (ref 40–54)
HGB BLD-MCNC: 9.3 GM/DL (ref 14–18)
IMM GRANULOCYTES # BLD AUTO: 0.02 K/UL (ref 0–0.04)
IMM GRANULOCYTES NFR BLD AUTO: 0.3 % (ref 0–0.5)
LYMPHOCYTES # BLD AUTO: 1.64 K/UL (ref 1–4.8)
MAGNESIUM SERPL-MCNC: 1.6 MG/DL (ref 1.6–2.6)
MCH RBC QN AUTO: 27.6 PG (ref 27–31)
MCHC RBC AUTO-ENTMCNC: 31.7 G/DL (ref 32–36)
MCV RBC AUTO: 87 FL (ref 82–98)
MRSA PCR SCRN (OHS): NOT DETECTED
NUCLEATED RBC (/100WBC) (OHS): 0 /100 WBC
PLATELET # BLD AUTO: 309 K/UL (ref 150–450)
PMV BLD AUTO: 10.6 FL (ref 9.2–12.9)
POCT GLUCOSE: 193 MG/DL (ref 70–110)
POCT GLUCOSE: 278 MG/DL (ref 70–110)
POCT GLUCOSE: 308 MG/DL (ref 70–110)
POCT GLUCOSE: 320 MG/DL (ref 70–110)
POTASSIUM SERPL-SCNC: 3.9 MMOL/L (ref 3.5–5.1)
RBC # BLD AUTO: 3.37 M/UL (ref 4.6–6.2)
RELATIVE EOSINOPHIL (OHS): 3.9 %
RELATIVE LYMPHOCYTE (OHS): 21.4 % (ref 18–48)
RELATIVE MONOCYTE (OHS): 9.4 % (ref 4–15)
RELATIVE NEUTROPHIL (OHS): 64.7 % (ref 38–73)
SODIUM SERPL-SCNC: 140 MMOL/L (ref 136–145)
WBC # BLD AUTO: 7.68 K/UL (ref 3.9–12.7)

## 2025-08-25 PROCEDURE — 36415 COLL VENOUS BLD VENIPUNCTURE: CPT | Mod: HCNC | Performed by: NURSE PRACTITIONER

## 2025-08-25 PROCEDURE — 25000003 PHARM REV CODE 250: Mod: HCNC

## 2025-08-25 PROCEDURE — 96376 TX/PRO/DX INJ SAME DRUG ADON: CPT

## 2025-08-25 PROCEDURE — 25000242 PHARM REV CODE 250 ALT 637 W/ HCPCS: Mod: HCNC

## 2025-08-25 PROCEDURE — 51702 INSERT TEMP BLADDER CATH: CPT | Mod: HCNC

## 2025-08-25 PROCEDURE — 92526 ORAL FUNCTION THERAPY: CPT | Mod: HCNC

## 2025-08-25 PROCEDURE — 63700000 PHARM REV CODE 250 ALT 637 W/O HCPCS: Mod: HCNC

## 2025-08-25 PROCEDURE — 63600175 PHARM REV CODE 636 W HCPCS: Mod: HCNC

## 2025-08-25 PROCEDURE — 94761 N-INVAS EAR/PLS OXIMETRY MLT: CPT | Mod: HCNC

## 2025-08-25 PROCEDURE — 83735 ASSAY OF MAGNESIUM: CPT | Mod: HCNC | Performed by: NURSE PRACTITIONER

## 2025-08-25 PROCEDURE — 99900035 HC TECH TIME PER 15 MIN (STAT): Mod: HCNC

## 2025-08-25 PROCEDURE — 94640 AIRWAY INHALATION TREATMENT: CPT | Mod: HCNC,XB

## 2025-08-25 PROCEDURE — 80048 BASIC METABOLIC PNL TOTAL CA: CPT | Mod: HCNC | Performed by: NURSE PRACTITIONER

## 2025-08-25 PROCEDURE — 85025 COMPLETE CBC W/AUTO DIFF WBC: CPT | Mod: HCNC | Performed by: NURSE PRACTITIONER

## 2025-08-25 PROCEDURE — 11000001 HC ACUTE MED/SURG PRIVATE ROOM: Mod: HCNC

## 2025-08-25 PROCEDURE — 92507 TX SP LANG VOICE COMM INDIV: CPT | Mod: HCNC

## 2025-08-25 RX ORDER — INSULIN ASPART 100 [IU]/ML
10 INJECTION, SOLUTION INTRAVENOUS; SUBCUTANEOUS 3 TIMES DAILY
Qty: 9 ML | Refills: 11 | Status: SHIPPED | OUTPATIENT
Start: 2025-08-25 | End: 2026-08-25

## 2025-08-25 RX ORDER — MUPIROCIN 20 MG/G
OINTMENT TOPICAL 2 TIMES DAILY
Qty: 30 G | Refills: 1 | Status: SHIPPED | OUTPATIENT
Start: 2025-08-25 | End: 2025-09-09

## 2025-08-25 RX ORDER — INSULIN GLARGINE 100 [IU]/ML
25 INJECTION, SOLUTION SUBCUTANEOUS DAILY
Qty: 7.5 ML | Refills: 11 | Status: SHIPPED | OUTPATIENT
Start: 2025-08-25 | End: 2026-08-25

## 2025-08-25 RX ADMIN — FINASTERIDE 5 MG: 5 TABLET, FILM COATED ORAL at 08:08

## 2025-08-25 RX ADMIN — IPRATROPIUM BROMIDE AND ALBUTEROL SULFATE 3 ML: 2.5; .5 SOLUTION RESPIRATORY (INHALATION) at 12:08

## 2025-08-25 RX ADMIN — IPRATROPIUM BROMIDE AND ALBUTEROL SULFATE 3 ML: 2.5; .5 SOLUTION RESPIRATORY (INHALATION) at 11:08

## 2025-08-25 RX ADMIN — INSULIN ASPART 3 UNITS: 100 INJECTION, SOLUTION INTRAVENOUS; SUBCUTANEOUS at 11:08

## 2025-08-25 RX ADMIN — INSULIN ASPART 4 UNITS: 100 INJECTION, SOLUTION INTRAVENOUS; SUBCUTANEOUS at 05:08

## 2025-08-25 RX ADMIN — IPRATROPIUM BROMIDE AND ALBUTEROL SULFATE 3 ML: 2.5; .5 SOLUTION RESPIRATORY (INHALATION) at 03:08

## 2025-08-25 RX ADMIN — AZITHROMYCIN DIHYDRATE 500 MG: 250 TABLET ORAL at 08:08

## 2025-08-25 RX ADMIN — IPRATROPIUM BROMIDE AND ALBUTEROL SULFATE 3 ML: 2.5; .5 SOLUTION RESPIRATORY (INHALATION) at 04:08

## 2025-08-25 RX ADMIN — INSULIN ASPART 2 UNITS: 100 INJECTION, SOLUTION INTRAVENOUS; SUBCUTANEOUS at 09:08

## 2025-08-25 RX ADMIN — IPRATROPIUM BROMIDE AND ALBUTEROL SULFATE 3 ML: 2.5; .5 SOLUTION RESPIRATORY (INHALATION) at 07:08

## 2025-08-25 RX ADMIN — TAMSULOSIN HYDROCHLORIDE 0.4 MG: 0.4 CAPSULE ORAL at 08:08

## 2025-08-25 RX ADMIN — CEFTRIAXONE SODIUM 1 G: 1 INJECTION, POWDER, FOR SOLUTION INTRAMUSCULAR; INTRAVENOUS at 09:08

## 2025-08-26 LAB
OHS QRS DURATION: 82 MS
OHS QTC CALCULATION: 432 MS
POCT GLUCOSE: 251 MG/DL (ref 70–110)
POCT GLUCOSE: 253 MG/DL (ref 70–110)
POCT GLUCOSE: 299 MG/DL (ref 70–110)
POCT GLUCOSE: 314 MG/DL (ref 70–110)

## 2025-08-26 PROCEDURE — 25000003 PHARM REV CODE 250: Mod: HCNC

## 2025-08-26 PROCEDURE — 63700000 PHARM REV CODE 250 ALT 637 W/O HCPCS: Mod: HCNC

## 2025-08-26 PROCEDURE — 63600175 PHARM REV CODE 636 W HCPCS: Mod: HCNC

## 2025-08-26 PROCEDURE — 94761 N-INVAS EAR/PLS OXIMETRY MLT: CPT | Mod: HCNC

## 2025-08-26 PROCEDURE — 99900035 HC TECH TIME PER 15 MIN (STAT): Mod: HCNC

## 2025-08-26 PROCEDURE — 94640 AIRWAY INHALATION TREATMENT: CPT | Mod: HCNC

## 2025-08-26 PROCEDURE — 11000001 HC ACUTE MED/SURG PRIVATE ROOM: Mod: HCNC

## 2025-08-26 PROCEDURE — 92507 TX SP LANG VOICE COMM INDIV: CPT | Mod: HCNC

## 2025-08-26 PROCEDURE — 25000242 PHARM REV CODE 250 ALT 637 W/ HCPCS: Mod: HCNC

## 2025-08-26 RX ORDER — AMOXICILLIN AND CLAVULANATE POTASSIUM 875; 125 MG/1; MG/1
1 TABLET, FILM COATED ORAL 2 TIMES DAILY
Qty: 4 TABLET | Refills: 0 | Status: SHIPPED | OUTPATIENT
Start: 2025-08-26 | End: 2025-08-30

## 2025-08-26 RX ORDER — INSULIN GLARGINE 100 [IU]/ML
12 INJECTION, SOLUTION SUBCUTANEOUS NIGHTLY
Status: DISCONTINUED | OUTPATIENT
Start: 2025-08-26 | End: 2025-08-27 | Stop reason: HOSPADM

## 2025-08-26 RX ORDER — FINASTERIDE 5 MG/1
5 TABLET, FILM COATED ORAL DAILY
Qty: 30 TABLET | Refills: 11 | Status: SHIPPED | OUTPATIENT
Start: 2025-08-27 | End: 2026-08-27

## 2025-08-26 RX ADMIN — INSULIN GLARGINE 12 UNITS: 100 INJECTION, SOLUTION SUBCUTANEOUS at 09:08

## 2025-08-26 RX ADMIN — CEFTRIAXONE SODIUM 1 G: 1 INJECTION, POWDER, FOR SOLUTION INTRAMUSCULAR; INTRAVENOUS at 09:08

## 2025-08-26 RX ADMIN — FINASTERIDE 5 MG: 5 TABLET, FILM COATED ORAL at 09:08

## 2025-08-26 RX ADMIN — IPRATROPIUM BROMIDE AND ALBUTEROL SULFATE 3 ML: 2.5; .5 SOLUTION RESPIRATORY (INHALATION) at 11:08

## 2025-08-26 RX ADMIN — INSULIN ASPART 3 UNITS: 100 INJECTION, SOLUTION INTRAVENOUS; SUBCUTANEOUS at 06:08

## 2025-08-26 RX ADMIN — AZITHROMYCIN DIHYDRATE 500 MG: 250 TABLET ORAL at 09:08

## 2025-08-26 RX ADMIN — IPRATROPIUM BROMIDE AND ALBUTEROL SULFATE 3 ML: 2.5; .5 SOLUTION RESPIRATORY (INHALATION) at 07:08

## 2025-08-26 RX ADMIN — IPRATROPIUM BROMIDE AND ALBUTEROL SULFATE 3 ML: 2.5; .5 SOLUTION RESPIRATORY (INHALATION) at 04:08

## 2025-08-26 RX ADMIN — INSULIN ASPART 1 UNITS: 100 INJECTION, SOLUTION INTRAVENOUS; SUBCUTANEOUS at 09:08

## 2025-08-26 RX ADMIN — TAMSULOSIN HYDROCHLORIDE 0.4 MG: 0.4 CAPSULE ORAL at 09:08

## 2025-08-26 RX ADMIN — INSULIN ASPART 4 UNITS: 100 INJECTION, SOLUTION INTRAVENOUS; SUBCUTANEOUS at 12:08

## 2025-08-26 RX ADMIN — IPRATROPIUM BROMIDE AND ALBUTEROL SULFATE 3 ML: 2.5; .5 SOLUTION RESPIRATORY (INHALATION) at 03:08

## 2025-08-27 ENCOUNTER — TELEPHONE (OUTPATIENT)
Dept: ADMINISTRATIVE | Facility: CLINIC | Age: 70
End: 2025-08-27
Payer: MEDICARE

## 2025-08-27 ENCOUNTER — PATIENT OUTREACH (OUTPATIENT)
Dept: ADMINISTRATIVE | Facility: OTHER | Age: 70
End: 2025-08-27
Payer: MEDICARE

## 2025-08-27 VITALS
HEIGHT: 70 IN | WEIGHT: 200.63 LBS | HEART RATE: 83 BPM | DIASTOLIC BLOOD PRESSURE: 74 MMHG | SYSTOLIC BLOOD PRESSURE: 119 MMHG | BODY MASS INDEX: 28.72 KG/M2 | TEMPERATURE: 99 F | RESPIRATION RATE: 18 BRPM | OXYGEN SATURATION: 97 %

## 2025-08-27 LAB
ANION GAP (OHS): 11 MMOL/L (ref 8–16)
BUN SERPL-MCNC: 10 MG/DL (ref 8–23)
CALCIUM SERPL-MCNC: 8.7 MG/DL (ref 8.7–10.5)
CHLORIDE SERPL-SCNC: 105 MMOL/L (ref 95–110)
CO2 SERPL-SCNC: 25 MMOL/L (ref 23–29)
CREAT SERPL-MCNC: 1 MG/DL (ref 0.5–1.4)
ERYTHROCYTE [DISTWIDTH] IN BLOOD BY AUTOMATED COUNT: 14.2 % (ref 11.5–14.5)
GFR SERPLBLD CREATININE-BSD FMLA CKD-EPI: >60 ML/MIN/1.73/M2
GLUCOSE SERPL-MCNC: 222 MG/DL (ref 70–110)
HCT VFR BLD AUTO: 29 % (ref 40–54)
HGB BLD-MCNC: 9.2 GM/DL (ref 14–18)
MCH RBC QN AUTO: 27.3 PG (ref 27–31)
MCHC RBC AUTO-ENTMCNC: 31.7 G/DL (ref 32–36)
MCV RBC AUTO: 86 FL (ref 82–98)
PLATELET # BLD AUTO: 322 K/UL (ref 150–450)
PMV BLD AUTO: 11.8 FL (ref 9.2–12.9)
POCT GLUCOSE: 241 MG/DL (ref 70–110)
POCT GLUCOSE: 276 MG/DL (ref 70–110)
POTASSIUM SERPL-SCNC: 4.1 MMOL/L (ref 3.5–5.1)
RBC # BLD AUTO: 3.37 M/UL (ref 4.6–6.2)
SODIUM SERPL-SCNC: 141 MMOL/L (ref 136–145)
WBC # BLD AUTO: 8.37 K/UL (ref 3.9–12.7)

## 2025-08-27 PROCEDURE — 85027 COMPLETE CBC AUTOMATED: CPT | Mod: HCNC

## 2025-08-27 PROCEDURE — 80048 BASIC METABOLIC PNL TOTAL CA: CPT | Mod: HCNC

## 2025-08-27 PROCEDURE — 25000242 PHARM REV CODE 250 ALT 637 W/ HCPCS: Mod: HCNC

## 2025-08-27 PROCEDURE — 25000003 PHARM REV CODE 250: Mod: HCNC

## 2025-08-27 PROCEDURE — 36415 COLL VENOUS BLD VENIPUNCTURE: CPT | Mod: HCNC

## 2025-08-27 PROCEDURE — 94761 N-INVAS EAR/PLS OXIMETRY MLT: CPT | Mod: HCNC

## 2025-08-27 PROCEDURE — 99900035 HC TECH TIME PER 15 MIN (STAT): Mod: HCNC

## 2025-08-27 PROCEDURE — 25000003 PHARM REV CODE 250: Mod: HCNC | Performed by: REGISTERED NURSE

## 2025-08-27 PROCEDURE — 94640 AIRWAY INHALATION TREATMENT: CPT | Mod: HCNC

## 2025-08-27 RX ORDER — QUETIAPINE FUMARATE 25 MG/1
25 TABLET, FILM COATED ORAL NIGHTLY
Qty: 30 TABLET | Refills: 11 | Status: SHIPPED | OUTPATIENT
Start: 2025-08-27 | End: 2025-09-01

## 2025-08-27 RX ORDER — TAMSULOSIN HYDROCHLORIDE 0.4 MG/1
0.8 CAPSULE ORAL DAILY
Qty: 60 CAPSULE | Refills: 2 | Status: SHIPPED | OUTPATIENT
Start: 2025-08-27

## 2025-08-27 RX ORDER — AMLODIPINE BESYLATE 5 MG/1
10 TABLET ORAL DAILY
Status: DISCONTINUED | OUTPATIENT
Start: 2025-08-27 | End: 2025-08-27 | Stop reason: HOSPADM

## 2025-08-27 RX ORDER — METOPROLOL TARTRATE 50 MG/1
50 TABLET ORAL 2 TIMES DAILY
Status: DISCONTINUED | OUTPATIENT
Start: 2025-08-27 | End: 2025-08-27 | Stop reason: HOSPADM

## 2025-08-27 RX ADMIN — ACETAMINOPHEN 650 MG: 325 TABLET ORAL at 12:08

## 2025-08-27 RX ADMIN — FINASTERIDE 5 MG: 5 TABLET, FILM COATED ORAL at 09:08

## 2025-08-27 RX ADMIN — AMLODIPINE BESYLATE 10 MG: 5 TABLET ORAL at 09:08

## 2025-08-27 RX ADMIN — TAMSULOSIN HYDROCHLORIDE 0.4 MG: 0.4 CAPSULE ORAL at 09:08

## 2025-08-27 RX ADMIN — INSULIN ASPART 3 UNITS: 100 INJECTION, SOLUTION INTRAVENOUS; SUBCUTANEOUS at 10:08

## 2025-08-27 RX ADMIN — IPRATROPIUM BROMIDE AND ALBUTEROL SULFATE 3 ML: 2.5; .5 SOLUTION RESPIRATORY (INHALATION) at 04:08

## 2025-08-27 RX ADMIN — IPRATROPIUM BROMIDE AND ALBUTEROL SULFATE 3 ML: 2.5; .5 SOLUTION RESPIRATORY (INHALATION) at 07:08

## 2025-08-27 RX ADMIN — METOPROLOL TARTRATE 50 MG: 50 TABLET, FILM COATED ORAL at 09:08

## 2025-08-27 RX ADMIN — IPRATROPIUM BROMIDE AND ALBUTEROL SULFATE 3 ML: 2.5; .5 SOLUTION RESPIRATORY (INHALATION) at 12:08

## 2025-08-29 LAB
BACTERIA BLD CULT: NORMAL
BACTERIA BLD CULT: NORMAL

## 2025-09-01 ENCOUNTER — HOSPITAL ENCOUNTER (EMERGENCY)
Facility: HOSPITAL | Age: 70
Discharge: HOME | End: 2025-09-01
Attending: EMERGENCY MEDICINE
Payer: MEDICARE

## 2025-09-01 VITALS
DIASTOLIC BLOOD PRESSURE: 69 MMHG | RESPIRATION RATE: 18 BRPM | HEART RATE: 66 BPM | TEMPERATURE: 98 F | WEIGHT: 200 LBS | BODY MASS INDEX: 28.63 KG/M2 | OXYGEN SATURATION: 99 % | HEIGHT: 70 IN | SYSTOLIC BLOOD PRESSURE: 135 MMHG

## 2025-09-01 DIAGNOSIS — Z97.8 PRESENCE OF INDWELLING FOLEY CATHETER: ICD-10-CM

## 2025-09-01 DIAGNOSIS — R05.9 COUGH: ICD-10-CM

## 2025-09-01 DIAGNOSIS — R45.1 AGITATION: Primary | ICD-10-CM

## 2025-09-01 LAB
ABSOLUTE EOSINOPHIL (OHS): 0.39 K/UL
ABSOLUTE MONOCYTE (OHS): 0.67 K/UL (ref 0.3–1)
ABSOLUTE NEUTROPHIL COUNT (OHS): 5.37 K/UL (ref 1.8–7.7)
ALBUMIN SERPL BCP-MCNC: 3 G/DL (ref 3.5–5.2)
ALP SERPL-CCNC: 114 UNIT/L (ref 38–126)
ALT SERPL W/O P-5'-P-CCNC: 11 UNIT/L (ref 10–44)
ANION GAP (OHS): 9 MMOL/L (ref 8–16)
AST SERPL-CCNC: 17 UNIT/L (ref 15–46)
BASOPHILS # BLD AUTO: 0.03 K/UL
BASOPHILS NFR BLD AUTO: 0.4 %
BILIRUB SERPL-MCNC: 0.2 MG/DL (ref 0.1–1)
BILIRUB UR QL STRIP.AUTO: NEGATIVE
BUN SERPL-MCNC: 13 MG/DL (ref 2–20)
CALCIUM SERPL-MCNC: 8.2 MG/DL (ref 8.7–10.5)
CHLORIDE SERPL-SCNC: 109 MMOL/L (ref 95–110)
CLARITY UR: CLEAR
CO2 SERPL-SCNC: 22 MMOL/L (ref 23–29)
COLOR UR AUTO: YELLOW
CREAT SERPL-MCNC: 1 MG/DL (ref 0.5–1.4)
ERYTHROCYTE [DISTWIDTH] IN BLOOD BY AUTOMATED COUNT: 14.4 % (ref 11.5–14.5)
GFR SERPLBLD CREATININE-BSD FMLA CKD-EPI: >60 ML/MIN/1.73/M2
GLUCOSE SERPL-MCNC: 166 MG/DL (ref 70–110)
GLUCOSE UR QL STRIP: NEGATIVE
HCT VFR BLD AUTO: 31 % (ref 40–54)
HGB BLD-MCNC: 9.8 GM/DL (ref 14–18)
HGB UR QL STRIP: NEGATIVE
IMM GRANULOCYTES # BLD AUTO: 0.01 K/UL (ref 0–0.04)
IMM GRANULOCYTES NFR BLD AUTO: 0.1 % (ref 0–0.5)
KETONES UR QL STRIP: NEGATIVE
LEUKOCYTE ESTERASE UR QL STRIP: NEGATIVE
LYMPHOCYTES # BLD AUTO: 1.5 K/UL (ref 1–4.8)
MCH RBC QN AUTO: 27.5 PG (ref 27–31)
MCHC RBC AUTO-ENTMCNC: 31.6 G/DL (ref 32–36)
MCV RBC AUTO: 87 FL (ref 82–98)
NITRITE UR QL STRIP: NEGATIVE
NUCLEATED RBC (/100WBC) (OHS): 0 /100 WBC
PH UR STRIP: 6 [PH]
PLATELET # BLD AUTO: 288 K/UL (ref 150–450)
PMV BLD AUTO: 10.1 FL (ref 9.2–12.9)
POTASSIUM SERPL-SCNC: 3.9 MMOL/L (ref 3.5–5.1)
PROT SERPL-MCNC: 6.7 GM/DL (ref 6–8.4)
PROT UR QL STRIP: ABNORMAL
RBC # BLD AUTO: 3.57 M/UL (ref 4.6–6.2)
RELATIVE EOSINOPHIL (OHS): 4.9 %
RELATIVE LYMPHOCYTE (OHS): 18.8 % (ref 18–48)
RELATIVE MONOCYTE (OHS): 8.4 % (ref 4–15)
RELATIVE NEUTROPHIL (OHS): 67.4 % (ref 38–73)
SODIUM SERPL-SCNC: 140 MMOL/L (ref 136–145)
SP GR UR STRIP: 1.02
UROBILINOGEN UR STRIP-ACNC: NEGATIVE EU/DL
WBC # BLD AUTO: 7.97 K/UL (ref 3.9–12.7)

## 2025-09-01 PROCEDURE — 25000003 PHARM REV CODE 250: Mod: HCNC,ER | Performed by: EMERGENCY MEDICINE

## 2025-09-01 PROCEDURE — 81003 URINALYSIS AUTO W/O SCOPE: CPT | Mod: HCNC,ER | Performed by: EMERGENCY MEDICINE

## 2025-09-01 PROCEDURE — 82040 ASSAY OF SERUM ALBUMIN: CPT | Mod: HCNC,ER | Performed by: EMERGENCY MEDICINE

## 2025-09-01 PROCEDURE — 99284 EMERGENCY DEPT VISIT MOD MDM: CPT | Mod: 25,HCNC,ER

## 2025-09-01 PROCEDURE — 85025 COMPLETE CBC W/AUTO DIFF WBC: CPT | Mod: HCNC,ER | Performed by: EMERGENCY MEDICINE

## 2025-09-01 RX ORDER — LIDOCAINE HYDROCHLORIDE 20 MG/ML
10 JELLY TOPICAL
Status: COMPLETED | OUTPATIENT
Start: 2025-09-01 | End: 2025-09-01

## 2025-09-01 RX ORDER — QUETIAPINE FUMARATE 25 MG/1
25 TABLET, FILM COATED ORAL
Status: COMPLETED | OUTPATIENT
Start: 2025-09-01 | End: 2025-09-01

## 2025-09-01 RX ORDER — QUETIAPINE FUMARATE 25 MG/1
25 TABLET, FILM COATED ORAL NIGHTLY
Qty: 30 TABLET | Refills: 0 | Status: SHIPPED | OUTPATIENT
Start: 2025-09-01 | End: 2026-09-01

## 2025-09-01 RX ORDER — LIDOCAINE HYDROCHLORIDE 20 MG/ML
SOLUTION OROPHARYNGEAL EVERY 6 HOURS
Qty: 100 ML | Refills: 0 | Status: SHIPPED | OUTPATIENT
Start: 2025-09-01 | End: 2025-10-01

## 2025-09-01 RX ORDER — QUETIAPINE FUMARATE 25 MG/1
25 TABLET, FILM COATED ORAL NIGHTLY
Qty: 30 TABLET | Refills: 11 | Status: SHIPPED | OUTPATIENT
Start: 2025-09-01 | End: 2025-09-01

## 2025-09-01 RX ADMIN — QUETIAPINE FUMARATE 25 MG: 25 TABLET ORAL at 09:09

## 2025-09-01 RX ADMIN — LIDOCAINE HYDROCHLORIDE 10 ML: 20 JELLY TOPICAL at 09:09

## 2025-09-02 RX ORDER — TAMSULOSIN HYDROCHLORIDE 0.4 MG/1
0.8 CAPSULE ORAL DAILY
Qty: 60 CAPSULE | Refills: 2 | OUTPATIENT
Start: 2025-09-02

## 2025-09-04 ENCOUNTER — CARE AT HOME (OUTPATIENT)
Dept: HOME HEALTH SERVICES | Facility: CLINIC | Age: 70
End: 2025-09-04
Payer: MEDICARE

## 2025-09-04 VITALS
HEART RATE: 68 BPM | RESPIRATION RATE: 18 BRPM | DIASTOLIC BLOOD PRESSURE: 64 MMHG | SYSTOLIC BLOOD PRESSURE: 124 MMHG | OXYGEN SATURATION: 96 % | TEMPERATURE: 98 F

## 2025-09-04 DIAGNOSIS — R45.1 RESTLESSNESS: ICD-10-CM

## 2025-09-04 DIAGNOSIS — R33.9 URINARY RETENTION: Primary | ICD-10-CM

## 2025-09-04 RX ORDER — FLUOXETINE 20 MG/5ML
20 SOLUTION ORAL DAILY
Qty: 150 ML | Refills: 1 | Status: SHIPPED | OUTPATIENT
Start: 2025-09-04